# Patient Record
Sex: FEMALE | Race: WHITE | Employment: OTHER | URBAN - METROPOLITAN AREA
[De-identification: names, ages, dates, MRNs, and addresses within clinical notes are randomized per-mention and may not be internally consistent; named-entity substitution may affect disease eponyms.]

---

## 2017-10-16 ENCOUNTER — OFFICE VISIT (OUTPATIENT)
Dept: RHEUMATOLOGY | Age: 52
End: 2017-10-16

## 2017-10-16 VITALS
HEIGHT: 63 IN | SYSTOLIC BLOOD PRESSURE: 119 MMHG | HEART RATE: 71 BPM | DIASTOLIC BLOOD PRESSURE: 78 MMHG | TEMPERATURE: 98.4 F | WEIGHT: 151 LBS | OXYGEN SATURATION: 96 % | RESPIRATION RATE: 18 BRPM | BODY MASS INDEX: 26.75 KG/M2

## 2017-10-16 DIAGNOSIS — D86.9 SARCOIDOSIS: ICD-10-CM

## 2017-10-16 DIAGNOSIS — D64.9 ANEMIA, UNSPECIFIED TYPE: ICD-10-CM

## 2017-10-16 DIAGNOSIS — M81.6 LOCALIZED OSTEOPOROSIS, UNSPECIFIED PATHOLOGICAL FRACTURE PRESENCE: Primary | ICD-10-CM

## 2017-10-16 RX ORDER — LEVOCETIRIZINE DIHYDROCHLORIDE 5 MG/1
TABLET, FILM COATED ORAL
COMMUNITY
Start: 2017-10-11 | End: 2017-11-14

## 2017-10-16 NOTE — PROGRESS NOTES
CHIEF COMPLAINT  The patient was sent for rheumatology consultation by Dr. Ceci Redding MD and Dr. Jose Santacruz for evaluation of osteoporosis. HISTORY OF PRESENT ILLNESS  This is a 47 y.o.  female. Today, the patient complains of pain in the joints. Location: shoulder, hand, knee, foot, cervical spine  Severity:  3 on a scale of 0-10  Timing: intermittent   Duration:  many years  Modifying factors: none  Context/Associated signs and symptoms: The patient states that she was diagnosed with osteoporosis in 2011. She was started on Fosamax however this made her gain weight so she stopped this medication and took calcium and vitamin D. She had a bone density in 2013, 2015, and 2017 all of which showed osteoporosis. Her most recent bone density was \"worse\". She had a bone scan recently that showed a possible insufficiency fracture of the sacrum. The patient also states that she had a fracture over 10 years ago of both ribs with minimal trauma. She does not smoke, has not been on any anti-depressant medication, does have a carbonated beverage every day, has a vague history of sarcoidosis and no other risk factors. It was diagnosed after a chest x-ray and biopsy revealed sarcoidosis. She was not on any treatment for this and has not had any symptoms of inflammatory arthritis, interstitial lung disease or inflammatory skin disease.     RHEUMATOLOGY REVIEW OF SYSTEMS   Positives as per HPI  Negatives as follows:  Fredy Ramírez:  Denies unexplained persistent fevers, weight change, chronic fatigue  HEAD/EYES:   Denies eye redness, blurry vision or sudden loss of vision, dry eyes, HA, temporal artery pain  ENT:    Denies oral/nasal ulcers, recurrent sinus infections, dry mouth  RESPIRATORY:  No pleuritic pain, history of pleural effusions, hemoptysis, exertional dyspnea  CARDIOVASCULAR:  Denies chest pain, history of pericardial effusions  GASTRO:   Denies heartburn, esophageal dysmotility, abdominal pain, nausea, vomiting, diarrhea, blood in the stool  HEMATOLOGIC:  No easy bruising, purpura, swollen lymph nodes  SKIN:    Denies alopecia, ulcers, nodules, sun sensitivity, unexplained persistent rash   VASCULAR:   Denies edema, cyanosis, raynaud phenomenon  NEUROLOGIC:  Denies specific muscle weakness, paresthesias   PSYCHIATRIC:  No sleep disturbance / snoring  MSK:    No morning stiffness >1 hour, SI joint pain, persistent joint swelling    MEDICAL AND SOCIAL HISTORY  This was reviewed with the patient and reviewed in the medical records. Past Medical History:   Diagnosis Date    Arthritis     Constipation     Diabetes ([address]     Falls     Fatigue     Headache     Joint pain     Memory disorder     Neuropathy     Osteoporosis     Thyroid disease     Unspecified cerebral artery occlusion with cerebral infarction      Past Surgical History:   Procedure Laterality Date    HX  SECTION      HX HYSTERECTOMY      HX ORTHOPAEDIC       Social History   Substance Use Topics    Smoking status: Never Smoker    Smokeless tobacco: Not on file    Alcohol use No     Employment - No history of exposure to asbestos or silica  Sleep - No snoring  Exercise - no    FAMILY HISTORY  No autoimmune disease in 1st degree relatives     MEDICATIONS  All the current medications were reviewed in detail. PHYSICAL EXAM  Blood pressure 119/78, pulse 71, temperature 98.4 °F (36.9 °C), temperature source Oral, resp. rate 18, height 5' 3\" (1.6 m), weight 151 lb (68.5 kg), SpO2 96 %. GENERAL APPEARANCE: Well-nourished adult in no acute distress. EYES: No scleral erythema, conjunctival injection. ENT: No oral ulcer, parotid enlargement. NECK: No adenopathy, thyroid enlargement. CARDIOVASCULAR: Heart rhythm is regular. No murmur, rub, gallop. CHEST: Normal vesicular breath sounds. No wheezes, rales, pleural friction rubs. ABDOMINAL: The abdomen is soft and nontender. Liver and spleen are nonpalpable.  Bowel sounds are normal.  EXTREMITIES: There is no evidence of clubbing, cyanosis, edema. SKIN: No rash, palpable purpura, digital ulcer, abnormal thickening. NEUROLOGICAL: Normal gait and station, full strength in upper and lower extremities, normal sensation to light touch. MUSCULOSKELETAL:   Upper extremities - full range of motion, no tenderness, no swelling, no synovial thickening and no deformity of joints. Mild OA changes over DIPs  Lower extremities - full range of motion, no tenderness, no swelling, no synovial thickening and no deformity of joints. LABS, RADIOLOGY AND PROCEDURES  Previous labs reviewed -Yes  Previous radiology reviewed -Yes  Previous procedures reviewed -Yes  Previous medical records reviewed/summarized -Yes    ASSESSMENT  1. Patient meets criteria for treatment of osteoporosis. She began had fractures at an early age and therefore secondary causes of osteoporosis maybe found. We will obtain lab work for this. Her diagnosis of sarcoid was in the past and does not seem to be contributing to osteoporosis however she does have inflammatory disease this can caused osteoporosis. We will check a chest x-ray and an ACE level. Osteoporosis: The patient fulfills the [address] [place] [address] guidelines for pharmacologic intervention in postmenopausal women and men ? 47years of age    History of hip or vertebral fracture: yes  Other prior fractures and T-score between -1.0 and -2.5 at the femoral neck, total hip, or spine, as measured by DEXA : no   T-score ?-2.5 at the femoral neck, total hip, or spine, after appropriate evaluation to exclude secondary causes: yes  T-score between -1.0 and -2.5 at the femoral neck, total hip, or spine and secondary causes associated with high risk of fracture, such as glucocorticoid use or total immobilization: no  T-score between -1 and -2.5 at the femoral neck, total hip, or spine, and a 10-year probability of hip fracture ? 3% or a 10-year probability of any major osteoporosis-related fracture ? 20 % based upon the [place]-adapted WHO algorithm: no    PLAN  1. Check for secondary causes of low bone mass - vitamin D level, Ca, Cr, albumin, PO4, total protein, CBC, LFT's, Alk phos, Urine Ca, urine Cr (to perform spot Ca/Cr ratio)  2. Check bone turnover markers - osteocalcin, bone specific alk phos  3. Check PTH, TSH, ESR, immunoelectrophoresis  4. Start prolia for osteoporosis if workup is negative       Zack Han MD  Adult and Pediatric Rheumatology     [address] [place] [address] [facility] [address], [address] [place] [address], Phone [phone], Fax [phone]     Visiting  of Pediatrics    Department of Pediatrics, [facility] of [address] [place] [address], [address] [place] [address], Phone [phone], Fax [phone]    There are no Patient Instructions on file for this visit.     cc:  Claudio Blas MD

## 2017-10-16 NOTE — MR AVS SNAPSHOT
Visit Information Date & Time Provider Department Dept. Phone Encounter #  
 10/16/2017 11:00 AM Franca Hernández MD [address] [id number] Follow-up Instructions Return in about 12 months (around 10/16/2018). Upcoming Health Maintenance Date Due Hepatitis C Screening 1965 HEMOGLOBIN A1C Q6M 1965 LIPID PANEL Q1 1965 FOOT EXAM Q1 4/2/1974 MICROALBUMIN Q1 4/2/1974 EYE EXAM RETINAL OR DILATED Q1 4/2/1974 Pneumococcal 19-64 Medium Risk (1 of 1 - PPSV23) 4/2/1983 DTaP/Tdap/Td series (1 - Tdap) 4/2/1985 PAP AKA CERVICAL CYTOLOGY 4/2/1985 BREAST CANCER SCRN MAMMOGRAM 4/2/2014 FOBT Q 1 YEAR AGE 50-75 4/2/2014 INFLUENZA AGE 9 TO ADULT 8/1/2017 Allergies as of 10/16/2017  Review Complete On: 10/16/2017 By: Kenny Courtney LPN No Known Allergies Current Immunizations  Never Reviewed No immunizations on file. Not reviewed this visit You Were Diagnosed With   
  
 Codes Comments Localized osteoporosis, unspecified pathological fracture presence    -  Primary ICD-10-CM: M81.6 ICD-9-CM: 733.09 Anemia, unspecified type     ICD-10-CM: D64.9 ICD-9-CM: 934. 9 Vitals BP Pulse Temp Resp Height(growth percentile) Weight(growth percentile) 119/78 (BP 1 Location: Left arm, BP Patient Position: Sitting) 71 98.4 °F (36.9 °C) (Oral) 18 5' 3\" (1.6 m) 151 lb (68.5 kg) SpO2 BMI OB Status Smoking Status 96% 26.75 kg/m2 Hysterectomy Never Smoker BMI and BSA Data Body Mass Index Body Surface Area  
 26.75 kg/m 2 1.74 m 2 Preferred Pharmacy Pharmacy Name Phone [facility] [place] [id number] [phone] Your Updated Medication List  
  
   
This list is accurate as of: 10/16/17 11:28 AM.  Always use your most recent med list.  
  
  
  
  
 AMBIEN 5 mg tablet Generic drug:  zolpidem Take  by mouth nightly as needed for Sleep. butalbital-acetaminophen-caffeine -40 mg per tablet Commonly known as:  [employer] Take 1 Tab by mouth every four (4) hours as needed for Pain. Max Daily Amount: 6 Tabs. Indications: TENSION-TYPE HEADACHE, Intracranial Hypotension  
  
 diclofenac EC 75 mg EC tablet Commonly known as:  VOLTAREN Take  by mouth two (2) times a day.  
  
 gabapentin 400 mg capsule Commonly known as:  NEURONTIN Take 400 mg by mouth three (3) times daily. GLUCOSAMINE CHONDROITIN PLUS PO Take  by mouth. HUMALOG SC  
by SubCUTAneous route. Sliding scale LEVEMIR SC  
by SubCUTAneous route. 17 units in morning and 13 at night  
  
 levocetirizine 5 mg tablet Commonly known as:  XYZAL  
  
 LEVOTHROID 75 mcg tablet Generic drug:  levothyroxine Take  by mouth Daily (before breakfast). losartan 50 mg tablet Commonly known as:  COZAAR Take  by mouth daily. metoclopramide HCl 5 mg tablet Commonly known as:  REGLAN Take 5 mg by mouth Before breakfast, lunch, and dinner. promethazine 25 mg tablet Commonly known as:  PHENERGAN Take 1 Tab by mouth every six (6) hours as needed for Nausea. simvastatin 40 mg tablet Commonly known as:  ZOCOR Take  by mouth nightly. traMADol 50 mg tablet Commonly known as:  ULTRAM  
Take 1 Tab by mouth every six (6) hours as needed for Pain. Max Daily Amount: 200 mg. VITAMIN A PO Take  by mouth. VITAMIN B-12 500 mcg tablet Generic drug:  cyanocobalamin Take 500 mcg by mouth daily. VITAMIN C 500 mg tablet Generic drug:  ascorbic acid (vitamin C) Take  by mouth. VITAMIN D3 2,000 unit Tab Generic drug:  cholecalciferol (vitamin D3) Take  by mouth.  
  
 vitamin E 400 unit capsule Commonly known as:  [address] Take  by mouth. We Performed the Following ALKALINE PHOSPHATASE, BONE [87506 CPT(R)] CALCIUM, RANDOM URINE [[id number] Custom] CBC+PLATELET+HEM REVIEW [98770 CPT(R)] CREATININE, UR, RANDOM [id number] CPT(R)] ESTRADIOL [id number] CPT(R)] [facility] AND  [68616 CPT(R)] IMMUNOELECTROPHORESIS [facility] [id number] CPT(R)] MAGNESIUM [id number] CPT(R)] METABOLIC PANEL, COMPREHENSIVE [27411 CPT(R)] N-TELOPEPTIDE [[id number] Custom] OSTEOCALCIN [id number] CPT(R)] PHOSPHORUS [48364 CPT(R)] PROTEIN ELECTROPHORESIS, URINE RANDOM [55765 CPT(R)] PROTEIN ELECTROPHORESIS [38973 CPT(R)] PTH INTACT [43773 CPT(R)] TSH 3RD GENERATION [26790 CPT(R)] Follow-up Instructions Return in about 12 months (around 10/16/2018). To-Do List   
 10/16/2017 Lab:  SED RATE (ESR)   
  
 10/16/2017 Imaging:  XR CHEST PA LAT Introducing [facility]! [employer] introduces [employer] patient portal. Now you can access parts of your medical record, email your doctor's office, and request medication refills online. 1. In your internet browser, go to [email or web address] 2. Click on the First Time User? Click Here link in the Sign In box. You will see the New Member Sign Up page. 3. Enter your [employer] Access Code exactly as it appears below. You will not need to use this code after youve completed the sign-up process. If you do not sign up before the expiration date, you must request a new code. · [employer] Access Code: [email or web address] Expires: 11/23/2017 10:02 AM 
 
4. Enter the last four digits of your Social Security Number (xxxx) and Date of Birth (mm/dd/yyyy) as indicated and click Submit. You will be taken to the next sign-up page. 5. Create a [employer] ID. This will be your [employer] login ID and cannot be changed, so think of one that is secure and easy to remember. 6. Create a [employer] password. You can change your password at any time. 7. Enter your Password Reset Question and Answer.  This can be used at a later time if you forget your password. 8. Enter your e-mail address. You will receive e-mail notification when new information is available in [address] 9. Click Sign Up. You can now view and download portions of your medical record. 10. Click the Download Summary menu link to download a portable copy of your medical information. If you have questions, please visit the Frequently Asked Questions section of the [employer] website. Remember, [employer] is NOT to be used for urgent needs. For medical emergencies, dial 911. Now available from your iPhone and Android! Please provide this summary of care documentation to your next provider. Your primary care clinician is listed as Angelica Crane. If you have any questions after today's visit, please call [phone].

## 2017-10-17 LAB — OSTEOCALCIN SERPL-MCNC: 20.4 NG/ML

## 2017-10-19 LAB
ALBUMIN MFR UR ELPH: 100 %
ALPHA1 GLOB MFR UR ELPH: 0 %
ALPHA2 GLOB MFR UR ELPH: 0 %
B-GLOBULIN MFR UR ELPH: 0 %
CALCIUM 24H UR-MCNC: 4.9 MG/DL
COLLAGEN NTX SER-SCNC: 9.8 NMOL BCE/L (ref 6.2–19)
COLLAGEN NTX SER-SCNC: NORMAL NMOL BCE/L
CREAT UR-MCNC: 77.1 MG/DL
GAMMA GLOB MFR UR ELPH: 0 %
M PROTEIN MFR UR ELPH: NORMAL %
PLEASE NOTE:, 133800: NORMAL
PROT UR-MCNC: 6.3 MG/DL
SPECIMEN STATUS REPORT, ROLRST: NORMAL

## 2017-10-20 ENCOUNTER — TELEPHONE (OUTPATIENT)
Dept: RHEUMATOLOGY | Age: 52
End: 2017-10-20

## 2017-10-20 NOTE — TELEPHONE ENCOUNTER
Returned Patient's call and informed her lab work has not all resulted yet, and Dr. Aris Smyth will let her know  results either by mail, or have his nurse call with the final results, and plan of care.

## 2017-10-20 NOTE — TELEPHONE ENCOUNTER
Patient would like a call back to go over her blood work and urine test she had done. Her phone is [phone].

## 2017-10-21 LAB
ACE SERPL-CCNC: 66 U/L (ref 14–82)
ALBUMIN SERPL ELPH-MCNC: 3.7 G/DL (ref 2.9–4.4)
ALBUMIN SERPL-MCNC: 4.4 G/DL (ref 3.5–5.5)
ALBUMIN/GLOB SERPL: 1.3 {RATIO} (ref 0.7–1.7)
ALBUMIN/GLOB SERPL: 2.1 {RATIO} (ref 1.2–2.2)
ALP BONE SERPL-MCNC: 17.1 UG/L
ALP SERPL-CCNC: 87 IU/L (ref 39–117)
ALPHA1 GLOB SERPL ELPH-MCNC: 0.2 G/DL (ref 0–0.4)
ALPHA2 GLOB SERPL ELPH-MCNC: 0.7 G/DL (ref 0.4–1)
ALT SERPL-CCNC: 29 IU/L (ref 0–32)
AST SERPL-CCNC: 38 IU/L (ref 0–40)
B-GLOBULIN SERPL ELPH-MCNC: 1.1 G/DL (ref 0.7–1.3)
BASOPHILS # BLD MANUAL: 0 X10E3/UL (ref 0–0.2)
BASOPHILS NFR BLD MANUAL: 1 %
BILIRUB SERPL-MCNC: 0.8 MG/DL (ref 0–1.2)
BUN SERPL-MCNC: 15 MG/DL (ref 6–24)
BUN/CREAT SERPL: 16 (ref 9–23)
CALCIUM SERPL-MCNC: 9.9 MG/DL (ref 8.7–10.2)
CHLORIDE SERPL-SCNC: 96 MMOL/L (ref 96–106)
CO2 SERPL-SCNC: 26 MMOL/L (ref 18–29)
CREAT SERPL-MCNC: 0.94 MG/DL (ref 0.57–1)
CRP SERPL-MCNC: 0.5 MG/L (ref 0–4.9)
DIFFERENTIAL COMMENT, 115260: NORMAL
EOSINOPHIL # BLD MANUAL: 0 X10E3/UL (ref 0–0.4)
EOSINOPHIL NFR BLD MANUAL: 1 %
ERYTHROCYTE [DISTWIDTH] IN BLOOD BY AUTOMATED COUNT: 12.6 % (ref 12.3–15.4)
ERYTHROCYTE [SEDIMENTATION RATE] IN BLOOD BY WESTERGREN METHOD: 9 MM/HR (ref 0–40)
ESTRADIOL SERPL-MCNC: <5 PG/ML
FSH SERPL-ACNC: 111.1 MIU/ML
GAMMA GLOB SERPL ELPH-MCNC: 0.7 G/DL (ref 0.4–1.8)
GFR SERPLBLD CREATININE-BSD FMLA CKD-EPI: 69 ML/MIN/1.73
GFR SERPLBLD CREATININE-BSD FMLA CKD-EPI: 80 ML/MIN/1.73
GLOBULIN SER CALC-MCNC: 2.1 G/DL (ref 1.5–4.5)
GLOBULIN SER CALC-MCNC: 2.8 G/DL (ref 2.2–3.9)
GLUCOSE SERPL-MCNC: 167 MG/DL (ref 65–99)
HCT VFR BLD AUTO: 35.8 % (ref 34–46.6)
HGB BLD-MCNC: 12.4 G/DL (ref 11.1–15.9)
IGA SERPL-MCNC: 177 MG/DL (ref 87–352)
IGG SERPL-MCNC: 750 MG/DL (ref 700–1600)
IGM SERPL-MCNC: 99 MG/DL (ref 26–217)
LH SERPL-ACNC: 45.5 MIU/ML
LYMPHOCYTES # BLD MANUAL: 1.9 X10E3/UL (ref 0.7–3.1)
LYMPHOCYTES NFR BLD MANUAL: 41 %
M PROTEIN SERPL ELPH-MCNC: NORMAL G/DL
MAGNESIUM SERPL-MCNC: 1.9 MG/DL (ref 1.6–2.3)
MCH RBC QN AUTO: 30.3 PG (ref 26.6–33)
MCHC RBC AUTO-ENTMCNC: 34.6 G/DL (ref 31.5–35.7)
MCV RBC AUTO: 88 FL (ref 79–97)
MONOCYTES # BLD MANUAL: 0.4 X10E3/UL (ref 0.1–0.9)
MONOCYTES NFR BLD MANUAL: 8 %
NEUTROPHILS # BLD MANUAL: 2.3 X10E3/UL (ref 1.4–7)
NEUTROPHILS NFR BLD MANUAL: 49 %
PHOSPHATE SERPL-MCNC: 4.3 MG/DL (ref 2.5–4.5)
PLATELET # BLD AUTO: 223 X10E3/UL (ref 150–379)
PLATELET BLD QL SMEAR: ADEQUATE
PLEASE NOTE, 011150: NORMAL
POTASSIUM SERPL-SCNC: 4.5 MMOL/L (ref 3.5–5.2)
PROT PATTERN SERPL IFE-IMP: NORMAL
PROT SERPL-MCNC: 6.5 G/DL (ref 6–8.5)
PTH-INTACT SERPL-MCNC: 28 PG/ML (ref 15–65)
RBC # BLD AUTO: 4.09 X10E6/UL (ref 3.77–5.28)
RBC MORPH BLD: NORMAL
SODIUM SERPL-SCNC: 136 MMOL/L (ref 134–144)
TSH SERPL DL<=0.005 MIU/L-ACNC: 2.01 UIU/ML (ref 0.45–4.5)
WBC # BLD AUTO: 4.6 X10E3/UL (ref 3.4–10.8)

## 2017-10-24 ENCOUNTER — TELEPHONE (OUTPATIENT)
Dept: RHEUMATOLOGY | Age: 52
End: 2017-10-24

## 2017-10-24 NOTE — TELEPHONE ENCOUNTER
----- Message from Rosalie Yadav LPN sent at 86/95/4353  9:53 AM EDT -----  Regarding: FW: Dr. Terrell Potts      ----- Message -----     From: Maria Ines Dorsey     Sent: 10/24/2017   9:47 AM       To: [facility] Nurse Pool  Subject: FW: Dr. Terrell Potts                              ----- Message -----     From: Gianfranco Pretty     Sent: 10/24/2017   9:37 AM       To: [facility] Front Office Pool  Subject: Dr. Terrell Potts                              Pt is requesting a call back regarding her blood work that was done last week. The pt stated Dr. Alec Pagan cannot write her Rx until those results are in. The pt best contact number is [phone].

## 2017-11-03 ENCOUNTER — TELEPHONE (OUTPATIENT)
Dept: RHEUMATOLOGY | Age: 52
End: 2017-11-03

## 2017-11-03 NOTE — TELEPHONE ENCOUNTER
Patient wants to go to [facility] for her Prolia injection. Will fax order over on Tuesday. Mailed patient forms for patient assistance if needed.

## 2017-11-03 NOTE — TELEPHONE ENCOUNTER
Patient is checking on the status of her injection for her Osteoporosis for an authorization. She does not know the name of her medication. Her phone is [phone].

## 2017-11-14 ENCOUNTER — HOSPITAL ENCOUNTER (OUTPATIENT)
Dept: INFUSION THERAPY | Age: 52
Discharge: HOME | End: 2017-11-14
Payer: MEDICARE

## 2017-11-14 VITALS
HEART RATE: 72 BPM | RESPIRATION RATE: 18 BRPM | SYSTOLIC BLOOD PRESSURE: 107 MMHG | OXYGEN SATURATION: 98 % | DIASTOLIC BLOOD PRESSURE: 63 MMHG | TEMPERATURE: 97.1 F

## 2017-11-14 PROCEDURE — 74011250636 HC RX REV CODE- 250/636: Performed by: PEDIATRICS

## 2017-11-14 PROCEDURE — 96372 THER/PROPH/DIAG INJ SC/IM: CPT

## 2017-11-14 RX ADMIN — DENOSUMAB 60 MG: 60 INJECTION SUBCUTANEOUS at 10:24

## 2017-11-14 NOTE — PROGRESS NOTES
[facility] Stay Note:  Arrived - 1020  Labs obtained 10/16/17 Ca 9.9    Visit Vitals    /70 (BP 1 Location: Left arm, BP Patient Position: Sitting)    Pulse 72    Temp 97.1 °F (36.2 °C)    Resp 18    SpO2 98%     Assessment - unchanged. Reviewed Prolia info. Pt denies any recent or upcoming dental work. Prolia 60mg SQ slowly in L arm. Visit Vitals    /63    Pulse 72    Temp 97.1 °F (36.2 °C)    Resp 18    SpO2 98%     1055 - Tolerated well. Pt observed for 30 minutes post injection. No reaction noted. Reviewed Prolia D/C instructions, handouts given. Verbalized understanding. Pt denies any acute problems/changes. Discharged from [facility] ambulatory. No distress.  Next appt: 5/8/18 at 1030

## 2018-02-22 ENCOUNTER — HOSPITAL ENCOUNTER (OUTPATIENT)
Dept: MRI IMAGING | Age: 53
Discharge: HOME | End: 2018-02-22
Attending: FAMILY MEDICINE
Payer: MEDICARE

## 2018-02-22 DIAGNOSIS — M54.17 LUMBOSACRAL RADICULOPATHY: ICD-10-CM

## 2018-02-22 DIAGNOSIS — M54.50 LOW BACK PAIN: ICD-10-CM

## 2018-02-22 PROCEDURE — 72148 MRI LUMBAR SPINE W/O DYE: CPT

## 2018-04-03 ENCOUNTER — TELEPHONE (OUTPATIENT)
Dept: FAMILY MEDICINE CLINIC | Age: 53
End: 2018-04-03

## 2018-04-03 ENCOUNTER — OFFICE VISIT (OUTPATIENT)
Dept: FAMILY MEDICINE CLINIC | Age: 53
End: 2018-04-03

## 2018-04-03 VITALS
BODY MASS INDEX: 25.87 KG/M2 | DIASTOLIC BLOOD PRESSURE: 63 MMHG | SYSTOLIC BLOOD PRESSURE: 121 MMHG | RESPIRATION RATE: 18 BRPM | OXYGEN SATURATION: 98 % | TEMPERATURE: 97 F | HEART RATE: 72 BPM | WEIGHT: 146 LBS | HEIGHT: 63 IN

## 2018-04-03 DIAGNOSIS — E03.9 ACQUIRED HYPOTHYROIDISM: ICD-10-CM

## 2018-04-03 DIAGNOSIS — M15.9 PRIMARY OSTEOARTHRITIS INVOLVING MULTIPLE JOINTS: ICD-10-CM

## 2018-04-03 DIAGNOSIS — Z86.39 HISTORY OF HYPOGLYCEMIA: ICD-10-CM

## 2018-04-03 DIAGNOSIS — E11.42 TYPE 2 DIABETES MELLITUS WITH DIABETIC POLYNEUROPATHY, WITH LONG-TERM CURRENT USE OF INSULIN (HCC): Primary | ICD-10-CM

## 2018-04-03 DIAGNOSIS — Z79.4 TYPE 2 DIABETES MELLITUS WITH DIABETIC POLYNEUROPATHY, WITH LONG-TERM CURRENT USE OF INSULIN (HCC): Primary | ICD-10-CM

## 2018-04-03 DIAGNOSIS — Z11.59 ENCOUNTER FOR HEPATITIS C SCREENING TEST FOR LOW RISK PATIENT: ICD-10-CM

## 2018-04-03 DIAGNOSIS — R16.0 LIVER MASS: ICD-10-CM

## 2018-04-03 PROBLEM — E10.42 TYPE 1 DIABETES MELLITUS WITH DIABETIC POLYNEUROPATHY (HCC): Status: ACTIVE | Noted: 2018-04-03

## 2018-04-03 NOTE — PROGRESS NOTES
Ammy Borjas is a 46 y.o. female who presents with the following:  Chief Complaint   Patient presents with    Osteoporosis    Diabetes    Arthritis    Cholesterol Problem    Thyroid Problem    Neurologic Problem     Short-term memory loss    Mass     In the liver       Osteoporosis   The history is provided by the patient (Patient has a history of osteoporosis and is used Prolia as well as taking vitamin D and calcium supplementation and is having some pain in her back and down her legs which is mixed with arthritis). Pertinent negatives include no chest pain, no abdominal pain, no headaches and no shortness of breath. Diabetes   The history is provided by the patient (Patient is a fragile diabetic with frequent episodes of hypoglycemia and her body no longer responds as one normally would so she takes her sugars up to 8 times a day to watch out for this. The patient has already had at least 4 CVAs related to hypoglycem). Pertinent negatives include no chest pain, no abdominal pain, no headaches and no shortness of breath. Arthritis   The history is provided by the patient (Patient has diffuse osteoarthritis which is primary causing her pain and the right foot by the great toe especially. ). Pertinent negatives include no chest pain, no abdominal pain, no headaches and no shortness of breath. Cholesterol Problem   The history is provided by the patient (Has hyperlipidemia which is managed with a statin without muscle pain or weakness. ). Pertinent negatives include no chest pain, no abdominal pain, no headaches and no shortness of breath. Thyroid Problem   The history is provided by the patient (Patient's hypothyroidism is being treated with levothyroxine 75 mcg daily and she is feeling no fatigue. ). Pertinent negatives include no chest pain, no abdominal pain, no headaches and no shortness of breath.    Neurologic Problem   The history is provided by the patient (Patient has polyneuropathy which fortunately has not affected her eyes as of yet and she gets yearly eye examinations. ). Primary symptoms include memory loss. Pertinent negatives include no shortness of breath, no chest pain and no headaches. Mass   The history is provided by the patient (Patient has a 25 since size lesion in her liver which has not been checked for several years and partly because of her short-term memory loss she is forgotten about it until recently. ). Pertinent negatives include no chest pain, no abdominal pain, no headaches and no shortness of breath. No Known Allergies    Current Outpatient Prescriptions   Medication Sig    insulin detemir U-100 (LEVEMIR U-100 INSULIN) 100 unit/mL injection 12 units in morning and 11 at night    GLUC/CHND/OM3/DHA/EPA/FISH/STR (GLUCOSAMINE CHONDROITIN PLUS PO) Take  by mouth.  traMADol (ULTRAM) 50 mg tablet Take 1 Tab by mouth every six (6) hours as needed for Pain. Max Daily Amount: 200 mg.  cholecalciferol, vitamin D3, (VITAMIN D3) 2,000 unit tab Take  by mouth.  INSULIN LISPRO (HUMALOG SC) by SubCUTAneous route. Sliding scale    diclofenac EC (VOLTAREN) 75 mg EC tablet Take  by mouth two (2) times a day.  losartan (COZAAR) 50 mg tablet Take  by mouth daily.  levothyroxine (LEVOTHROID) 75 mcg tablet Take  by mouth Daily (before breakfast).  ascorbic acid (VITAMIN C) 500 mg tablet Take  by mouth.  cyanocobalamin (VITAMIN B-12) 500 mcg tablet Take 500 mcg by mouth daily.  VITAMIN A PO Take  by mouth.  vitamin E (AQUA GEMS) 400 unit capsule Take  by mouth.  gabapentin (NEURONTIN) 400 mg capsule Take 400 mg by mouth three (3) times daily.  simvastatin (ZOCOR) 40 mg tablet Take  by mouth nightly.  zolpidem (AMBIEN) 5 mg tablet Take  by mouth nightly as needed for Sleep. No current facility-administered medications for this visit.         Past Medical History:   Diagnosis Date    Arthritis     Constipation     Diabetes ([address]     Falls     Fatigue     Headache     Joint pain     Memory disorder     Neuropathy     Osteoporosis     Thyroid disease     Unspecified cerebral artery occlusion with cerebral infarction        Past Surgical History:   Procedure Laterality Date    HX  SECTION      HX HYSTERECTOMY      HX ORTHOPAEDIC         Family History   Problem Relation Age of Onset    Heart Disease Mother     Cancer Father     Cancer Maternal Grandfather        Social History     Social History    Marital status: SINGLE     Spouse name: N/A    Number of children: N/A    Years of education: N/A     Social History Main Topics    Smoking status: Former Smoker    Smokeless tobacco: Never Used    Alcohol use None    Drug use: No    Sexual activity: Not Asked     Other Topics Concern    None     Social History Narrative       Review of Systems   Constitutional: Negative for chills, fever, malaise/fatigue and weight loss. HENT: Negative for congestion, hearing loss, sore throat and tinnitus. Eyes: Negative for blurred vision, pain and discharge. Respiratory: Negative for cough, shortness of breath and wheezing. Cardiovascular: Negative for chest pain, palpitations, orthopnea, claudication and leg swelling. Gastrointestinal: Negative for abdominal pain, constipation and heartburn. History of what sounds like gastroparesis diabeticorum   Genitourinary: Negative for dysuria, frequency and urgency. Musculoskeletal: Positive for joint pain. Negative for falls and myalgias. Skin: Negative for itching and rash. Neurological: Negative for dizziness, tingling, tremors and headaches. Endo/Heme/Allergies: Negative for environmental allergies and polydipsia. Psychiatric/Behavioral: Positive for memory loss. Negative for depression and substance abuse. The patient is not nervous/anxious.         Visit Vitals    /63 (BP 1 Location: Left arm)    Pulse 72    Temp 97 °F (36.1 °C)    Resp 18    Ht 5' 3\" (1.6 m)  Wt 146 lb (66.2 kg)    SpO2 98%    BMI 25.86 kg/m2     Physical Exam   Constitutional: She is oriented to person, place, and time and well-developed, well-nourished, and in no distress. HENT:   Head: Normocephalic and atraumatic. Right Ear: External ear normal.   Left Ear: External ear normal.   Mouth/Throat: Oropharynx is clear and moist.   Eyes: Conjunctivae and EOM are normal. Pupils are equal, round, and reactive to light. Right eye exhibits no discharge. Left eye exhibits no discharge. Neck: Normal range of motion. Neck supple. No tracheal deviation present. No thyromegaly present. Cardiovascular: Normal rate, regular rhythm, normal heart sounds and intact distal pulses. Exam reveals no gallop and no friction rub. No murmur heard. Pulmonary/Chest: Effort normal and breath sounds normal. No respiratory distress. She has no wheezes. She exhibits no tenderness. Abdominal: Soft. Bowel sounds are normal. She exhibits no distension and no mass. There is no tenderness. There is no rebound and no guarding. Musculoskeletal: She exhibits no edema or tenderness. Lymphadenopathy:     She has no cervical adenopathy. Neurological: She is alert and oriented to person, place, and time. She has normal reflexes. No cranial nerve deficit. She exhibits normal muscle tone. Gait normal. Coordination normal.   Skin: Skin is warm and dry. No rash noted. No erythema. No pallor. Psychiatric: Mood, memory, affect and judgment normal.         ICD-10-CM ICD-9-CM    1. Type 2 diabetes mellitus with diabetic polyneuropathy, with long-term current use of insulin ([facility]) E11.42 250.60 MICROALBUMIN, UR, RAND W/ MICROALB/CREAT RATIO    Z79.4 357.2 HEMOGLOBIN A1C WITH EAG     R72.01 METABOLIC PANEL, COMPREHENSIVE      LIPID PANEL      HM DIABETES FOOT EXAM      REFERRAL TO OPHTHALMOLOGY   2. Acquired hypothyroidism E03.9 244.9 TSH 3RD GENERATION   3.  Encounter for hepatitis C screening test for low risk patient Z11.59 V73.89 HEPATITIS C AB   4. Primary osteoarthritis involving multiple joints M15.0 715.09    5. History of hypoglycemia Z86.39 V12.29        Orders Placed This Encounter    MICROALBUMIN, UR, RAND W/ MICROALB/CREAT RATIO    HEMOGLOBIN A1C WITH EAG    METABOLIC PANEL, COMPREHENSIVE    LIPID PANEL    HEPATITIS C AB    TSH 3RD GENERATION    REFERRAL TO OPHTHALMOLOGY     Referral Priority:   Routine     Referral Type:   Consultation     Referral Reason:   Specialty Services Required     Requested Specialty:   Ophthalmology    HM DIABETES FOOT EXAM    insulin detemir U-100 (LEVEMIR U-100 INSULIN) 100 unit/mL injection     Si units in morning and 11 at night     Dispense:  12 Vial     Refill:  1   As the pad patient's last A1c was 7 I would continue her connection with the diabetologist at [facility].  I have asked her to have a copy of her notes sent here so we can put it in her record. Patient is continuing with her mammograms and states she has one due next month and states she sees her eye doctor yearly and sometimes every 6 months and will keep us apprised.     Follow-up Disposition: Not on Payam Moeller MD

## 2018-04-03 NOTE — TELEPHONE ENCOUNTER
Pt came back in a was upset that her chart lists her as type 2 diabetic and she is type 1 and she would like this corrected

## 2018-04-03 NOTE — MR AVS SNAPSHOT
[address] [phone] Patient: Ashish Meehan MRN: [id number] :1965 Visit Information Date & Time Provider Department Dept. Phone Encounter #  
 4/3/2018  1:00 PM Steven Barron MD Regla Sanches [id number] Your Appointments 4/10/2018  1:30 PM  
New Patient with Julien Haro MD  
[facility] [address] [facility]) Appt Note: np diabetes referred by Dr. Georgia Eli ph. [phone]  
 [address] [place] [address] [address] [id number] 8713 [facility] [address]  
  
    
 7/3/2018 11:00 AM  
Follow Up with Steven Barron MD  
[facility] ([address]) Appt Note: 3 month f/u  
 [address]  
[phone]  [address] Upcoming Health Maintenance Date Due Hepatitis C Screening 1965 HEMOGLOBIN A1C Q6M 1965 LIPID PANEL Q1 1964 FOOT EXAM Q1 1974 MICROALBUMIN Q1 1974 EYE EXAM RETINAL OR DILATED Q1 1974 Pneumococcal 19-64 Medium Risk (1 of 1 - PPSV23) 1983 PAP AKA CERVICAL CYTOLOGY 1985 BREAST CANCER SCRN MAMMOGRAM 2014 MEDICARE YEARLY EXAM 2018 COLONOSCOPY 2027 DTaP/Tdap/Td series (2 - Td) 4/3/2028 Allergies as of 4/3/2018  Review Complete On: 2017 By: Leobardo Jane RN No Known Allergies Current Immunizations  Reviewed on 2017 No immunizations on file. Not reviewed this visit You Were Diagnosed With   
  
 Codes Comments Type 2 diabetes mellitus with diabetic polyneuropathy, with long-term current use of insulin (HCC)    -  Primary ICD-10-CM: E11.42, Z79.4 ICD-9-CM: 250.60, 357.2, V58.67   
 Acquired hypothyroidism     ICD-10-CM: E03.9 ICD-9-CM: 244.9 Encounter for hepatitis C screening test for low risk patient     ICD-10-CM: Z11.59 
ICD-9-CM: V73.89 Primary osteoarthritis involving multiple joints     ICD-10-CM: M15.0 ICD-9-CM: 715.09 History of hypoglycemia     ICD-10-CM: Z86.39 
ICD-9-CM: V12.29 Vitals BP Pulse Temp Resp Height(growth percentile) Weight(growth percentile) 121/63 (BP 1 Location: Left arm) 72 97 °F (36.1 °C) 18 5' 3\" (1.6 m) 146 lb (66.2 kg) SpO2 BMI OB Status Smoking Status 98% 25.86 kg/m2 Hysterectomy Former Smoker Vitals History BMI and BSA Data Body Mass Index Body Surface Area  
 25.86 kg/m 2 1.72 m 2 Preferred Pharmacy Pharmacy Name Phone [address] [phone] Your Updated Medication List  
  
   
This list is accurate as of 4/3/18  2:03 PM.  Always use your most recent med list.  
  
  
  
  
 AMBIEN 5 mg tablet Generic drug:  zolpidem Take  by mouth nightly as needed for Sleep. butalbital-acetaminophen-caffeine -40 mg per tablet Commonly known as:  [employer] Take 1 Tab by mouth every four (4) hours as needed for Pain. Max Daily Amount: 6 Tabs. Indications: TENSION-TYPE HEADACHE, Intracranial Hypotension  
  
 diclofenac EC 75 mg EC tablet Commonly known as:  VOLTAREN Take  by mouth two (2) times a day.  
  
 gabapentin 400 mg capsule Commonly known as:  NEURONTIN Take 400 mg by mouth three (3) times daily. GLUCOSAMINE CHONDROITIN PLUS PO Take  by mouth. HUMALOG SC  
by SubCUTAneous route. Sliding scale LEVEMIR SC  
by SubCUTAneous route. 17 units in morning and 13 at night LEVOTHROID 75 mcg tablet Generic drug:  levothyroxine Take  by mouth Daily (before breakfast). losartan 50 mg tablet Commonly known as:  COZAAR Take  by mouth daily. metoclopramide HCl 5 mg tablet Commonly known as:  REGLAN Take 5 mg by mouth Before breakfast, lunch, and dinner. promethazine 25 mg tablet Commonly known as:  PHENERGAN Take 1 Tab by mouth every six (6) hours as needed for Nausea. simvastatin 40 mg tablet Commonly known as:  ZOCOR Take  by mouth nightly. traMADol 50 mg tablet Commonly known as:  ULTRAM  
Take 1 Tab by mouth every six (6) hours as needed for Pain. Max Daily Amount: 200 mg. VITAMIN A PO Take  by mouth. VITAMIN B-12 500 mcg tablet Generic drug:  cyanocobalamin Take 500 mcg by mouth daily. VITAMIN C 500 mg tablet Generic drug:  ascorbic acid (vitamin C) Take  by mouth. VITAMIN D3 2,000 unit Tab Generic drug:  cholecalciferol (vitamin D3) Take  by mouth.  
  
 vitamin E 400 unit capsule Commonly known as:  [address] Take  by mouth. We Performed the Following HEMOGLOBIN A1C WITH EAG [13163 CPT(R)] HEPATITIS C AB [73131 CPT(R)]  DIABETES FOOT EXAM [HM7 Custom] LIPID PANEL [28542 CPT(R)] METABOLIC PANEL, COMPREHENSIVE [42019 CPT(R)] MICROALBUMIN, UR, RAND W/ MICROALB/CREAT RATIO [id number] CPT(R)] REFERRAL TO OPHTHALMOLOGY [REF57 Custom] TSH 3RD GENERATION [22037 CPT(R)] To-Do List   
 05/17/2018 10:30 AM  
  Appointment with CHAIR 2 [facility] ([phone]) Referral Information Referral ID Referred By Referred To  
  
 [id number] Monica Villanueva Not Available Visits Status Start Date End Date 1 New Request 4/3/18 4/3/19 If your referral has a status of pending review or denied, additional information will be sent to support the outcome of this decision. Introducing [facility] & HEALTH SERVICES! Mikael Chambers introduces [employer] patient portal. Now you can access parts of your medical record, email your doctor's office, and request medication refills online.    
 
1. In your internet browser, go to [email or web address] 2. Click on the First Time User? Click Here link in the Sign In box. You will see the New Member Sign Up page. 3. Enter your Resource Data Access Code exactly as it appears below. You will not need to use this code after youve completed the sign-up process. If you do not sign up before the expiration date, you must request a new code. · Resource Data Access Code: [email or web address] Expires: 7/2/2018 12:44 PM 
 
4. Enter the last four digits of your Social Security Number (xxxx) and Date of Birth (mm/dd/yyyy) as indicated and click Submit. You will be taken to the next sign-up page. 5. Create a Vudut ID. This will be your Resource Data login ID and cannot be changed, so think of one that is secure and easy to remember. 6. Create a Resource Data password. You can change your password at any time. 7. Enter your Password Reset Question and Answer. This can be used at a later time if you forget your password. 8. Enter your e-mail address. You will receive e-mail notification when new information is available in [address] 9. Click Sign Up. You can now view and download portions of your medical record. 10. Click the Download Summary menu link to download a portable copy of your medical information. If you have questions, please visit the Frequently Asked Questions section of the Resource Data website. Remember, Resource Data is NOT to be used for urgent needs. For medical emergencies, dial 911. Now available from your iPhone and Android! Please provide this summary of care documentation to your next provider. Your primary care clinician is listed as Mango Lubin. If you have any questions after today's visit, please call [phone].

## 2018-04-04 LAB
ALBUMIN SERPL-MCNC: 4.2 G/DL (ref 3.5–5.5)
ALBUMIN/CREAT UR: 12.3 MG/G CREAT (ref 0–30)
ALBUMIN/GLOB SERPL: 1.9 {RATIO} (ref 1.2–2.2)
ALP SERPL-CCNC: 59 IU/L (ref 39–117)
ALT SERPL-CCNC: 23 IU/L (ref 0–32)
AST SERPL-CCNC: 28 IU/L (ref 0–40)
BILIRUB SERPL-MCNC: 1.1 MG/DL (ref 0–1.2)
BUN SERPL-MCNC: 16 MG/DL (ref 6–24)
BUN/CREAT SERPL: 18 (ref 9–23)
CALCIUM SERPL-MCNC: 9.9 MG/DL (ref 8.7–10.2)
CHLORIDE SERPL-SCNC: 95 MMOL/L (ref 96–106)
CHOLEST SERPL-MCNC: 162 MG/DL (ref 100–199)
CO2 SERPL-SCNC: 27 MMOL/L (ref 18–29)
CREAT SERPL-MCNC: 0.87 MG/DL (ref 0.57–1)
CREAT UR-MCNC: 54.3 MG/DL
EST. AVERAGE GLUCOSE BLD GHB EST-MCNC: 194 MG/DL
GFR SERPLBLD CREATININE-BSD FMLA CKD-EPI: 76 ML/MIN/1.73
GFR SERPLBLD CREATININE-BSD FMLA CKD-EPI: 87 ML/MIN/1.73
GLOBULIN SER CALC-MCNC: 2.2 G/DL (ref 1.5–4.5)
GLUCOSE SERPL-MCNC: 262 MG/DL (ref 65–99)
HBA1C MFR BLD: 8.4 % (ref 4.8–5.6)
HCV AB S/CO SERPL IA: <0.1 S/CO RATIO (ref 0–0.9)
HDLC SERPL-MCNC: 70 MG/DL
INTERPRETATION, 910389: NORMAL
LDLC SERPL CALC-MCNC: 72 MG/DL (ref 0–99)
MICROALBUMIN UR-MCNC: 6.7 UG/ML
POTASSIUM SERPL-SCNC: 4.6 MMOL/L (ref 3.5–5.2)
PROT SERPL-MCNC: 6.4 G/DL (ref 6–8.5)
SODIUM SERPL-SCNC: 136 MMOL/L (ref 134–144)
TRIGL SERPL-MCNC: 101 MG/DL (ref 0–149)
TSH SERPL DL<=0.005 MIU/L-ACNC: 2.23 UIU/ML (ref 0.45–4.5)
VLDLC SERPL CALC-MCNC: 20 MG/DL (ref 5–40)

## 2018-04-05 NOTE — PROGRESS NOTES
Pt aware, has an appt with her endocrine dr tomorrow, she will call back to give us a number to fax to.

## 2018-04-06 ENCOUNTER — TELEPHONE (OUTPATIENT)
Dept: FAMILY MEDICINE CLINIC | Age: 53
End: 2018-04-06

## 2018-04-06 NOTE — TELEPHONE ENCOUNTER
Patient called and stated she is seeing Dr. Anurag [facility]   Fax: [phone]

## 2018-04-10 ENCOUNTER — OFFICE VISIT (OUTPATIENT)
Dept: ENDOCRINOLOGY | Age: 53
End: 2018-04-10

## 2018-04-10 VITALS
HEIGHT: 63 IN | DIASTOLIC BLOOD PRESSURE: 66 MMHG | SYSTOLIC BLOOD PRESSURE: 114 MMHG | WEIGHT: 144 LBS | TEMPERATURE: 68 F | BODY MASS INDEX: 25.52 KG/M2

## 2018-04-10 DIAGNOSIS — E10.42 TYPE 1 DIABETES MELLITUS WITH DIABETIC POLYNEUROPATHY (HCC): Primary | ICD-10-CM

## 2018-04-10 DIAGNOSIS — E03.9 ACQUIRED HYPOTHYROIDISM: ICD-10-CM

## 2018-04-10 RX ORDER — ACETAMINOPHEN 500 MG
500 TABLET ORAL
COMMUNITY
End: 2018-09-07

## 2018-04-10 NOTE — MR AVS SNAPSHOT
[address] [address] [phone] Patient: Artur Conn MRN: [id number] :1965 Visit Information Date & Time Provider Department Dept. Phone Encounter #  
 4/10/2018  1:30 PM Madhavi Montano, [address] [facility] [id number] Your Appointments 2018  9:30 AM  
Follow Up with Madhavi Montano MD  
[facility]) [address] [facility] [place] [address] [address] 45[address] [facility] [address]  
  
    
 7/3/2018 11:00 AM  
Follow Up with Salvatore Merritt MD  
[facility] ([facility]) Appt Note: 3 month f/u  
 [address]  
[phone] [address] Upcoming Health Maintenance Date Due  
 EYE EXAM RETINAL OR DILATED Q1 1974 Pneumococcal 19-64 Medium Risk (1 of 1 - PPSV23) 1983 PAP AKA CERVICAL CYTOLOGY 1985 BREAST CANCER SCRN MAMMOGRAM 2014 HEMOGLOBIN A1C Q6M 10/3/2018 MEDICARE YEARLY EXAM 2018 FOOT EXAM Q1 4/3/2019 MICROALBUMIN Q1 4/3/2019 LIPID PANEL Q1 4/3/2019 COLONOSCOPY 2027 DTaP/Tdap/Td series (2 - Td) 4/3/2028 Allergies as of 4/10/2018  Review Complete On: 4/10/2018 By: Madhavi Montano MD  
 No Known Allergies Current Immunizations  Reviewed on 2017 No immunizations on file. Not reviewed this visit You Were Diagnosed With   
  
 Codes Comments Type 1 diabetes mellitus with diabetic polyneuropathy (HCC)    -  Primary ICD-10-CM: E10.42 
ICD-9-CM: 250.61, 357.2 Acquired hypothyroidism     ICD-10-CM: E03.9 ICD-9-CM: 466. 9 Vitals BP Temp Height(growth percentile) Weight(growth percentile) BMI OB Status 114/66 (!) 68 °F (20 °C) 5' 3\" (1.6 m) 144 lb (65.3 kg) 25.51 kg/m2 Hysterectomy Smoking Status Former Smoker Vitals History BMI and BSA Data Body Mass Index Body Surface Area 25.51 kg/m 2 1.7 m 2 Preferred Pharmacy Pharmacy Name Phone [address] [phone] Your Updated Medication List  
  
   
This list is accurate as of 4/10/18  2:23 PM.  Always use your most recent med list.  
  
  
  
  
 AMBIEN 5 mg tablet Generic drug:  zolpidem Take  by mouth nightly as needed for Sleep. diclofenac EC 75 mg EC tablet Commonly known as:  VOLTAREN Take  by mouth two (2) times a day.  
  
 gabapentin 400 mg capsule Commonly known as:  NEURONTIN Take 400 mg by mouth three (3) times daily. GLUCOSAMINE CHONDROITIN PLUS PO Take  by mouth. HUMALOG SC  
by SubCUTAneous route. Sliding scale  
  
 insulin detemir U-100 100 unit/mL injection Commonly known as:  LEVEMIR U-100 INSULIN  
12 units in morning and 11 at night LEVOTHROID 75 mcg tablet Generic drug:  levothyroxine Take  by mouth Daily (before breakfast). losartan 50 mg tablet Commonly known as:  COZAAR Take  by mouth daily. simvastatin 40 mg tablet Commonly known as:  ZOCOR Take  by mouth nightly. traMADol 50 mg tablet Commonly known as:  ULTRAM  
Take 1 Tab by mouth every six (6) hours as needed for Pain. Max Daily Amount: 200 mg.  
  
 TYLENOL EXTRA STRENGTH 500 mg tablet Generic drug:  acetaminophen Take  by mouth every six (6) hours as needed for Pain (takes 2 tabs BID). VITAMIN A PO Take  by mouth. VITAMIN B-12 500 mcg tablet Generic drug:  cyanocobalamin Take 500 mcg by mouth daily. VITAMIN C 500 mg tablet Generic drug:  ascorbic acid (vitamin C) Take  by mouth. VITAMIN D3 2,000 unit Tab Generic drug:  cholecalciferol (vitamin D3) Take  by mouth.  
  
 vitamin E 400 unit capsule Commonly known as:  [address] Take  by mouth. Only on M and Thurs We Performed the Following  DIABETES FOOT EXAM [HM7 Custom] To-Do List   
 05/17/2018 10:30 AM  
  Appointment with CHAIR 2 [facility] ([phone]) Introducing [facility] & [facility] SERVICES! [facility] introduces [employer] patient portal. Now you can access parts of your medical record, email your doctor's office, and request medication refills online. 1. In your internet browser, go to [email or web address] 2. Click on the First Time User? Click Here link in the Sign In box. You will see the New Member Sign Up page. 3. Enter your [employer] Access Code exactly as it appears below. You will not need to use this code after youve completed the sign-up process. If you do not sign up before the expiration date, you must request a new code. · [employer] Access Code: [email or web address] Expires: 7/2/2018 12:44 PM 
 
4. Enter the last four digits of your Social Security Number (xxxx) and Date of Birth (mm/dd/yyyy) as indicated and click Submit. You will be taken to the next sign-up page. 5. Create a [employer] ID. This will be your [employer] login ID and cannot be changed, so think of one that is secure and easy to remember. 6. Create a [employer] password. You can change your password at any time. 7. Enter your Password Reset Question and Answer. This can be used at a later time if you forget your password. 8. Enter your e-mail address. You will receive e-mail notification when new information is available in [address] 9. Click Sign Up. You can now view and download portions of your medical record. 10. Click the Download Summary menu link to download a portable copy of your medical information. If you have questions, please visit the Frequently Asked Questions section of the [employer] website.  Remember, [employer] is NOT to be used for urgent needs. For medical emergencies, dial 911. Now available from your iPhone and Android! Please provide this summary of care documentation to your next provider. Your primary care clinician is listed as Duong Esqueda. If you have any questions after today's visit, please call [phone].

## 2018-04-10 NOTE — LETTER
4/10/2018 2:24 PM 
 
Patient:  Artur Conn YOB: 1965 Date of Visit: 4/10/2018 Dear Sonia Bailey., MD 
[address] [place] [address] VIA In Basket 
 : Thank you for referring Ms. Sharon Joshua to me for evaluation/treatment. Below are the relevant portions of my assessment and plan of care. If you have questions, please do not hesitate to call me. I look forward to following Ms. Mendosa along with you. Sincerely, Madhavi Montano MD

## 2018-04-10 NOTE — PROGRESS NOTES
Chief Complaint   Patient presents with    Diabetes     Pcp and pharmacy verified   Records reviewed  History of Present Illness: Aleks Alvarado is a 46 y.o. female who I was asked to see in consult by Dr. Cesar Hidalgo, for evaluation of diabetes. She was previously followed by Dr. Cary Vizcaino at [facility], will request the records from Dr. Jessica Domignuez. Pt notes \"I have been a Type I diabetic for 37 years\". \"I don't count carbs, I eat what I want and most of the time I will remember to take my insulin but not all the time. I stay active walking my dog 2-4 miles per day\". Current regimen is Levemir 12 units in the AM and 11 units with dinner and Humalog \"I don't usually take more than 2 units, but sometimes 2 is not enough and if I am going to eat a desert and what my blood sugar is I may take up to 4 units. She notes her BGs have been much higher recently, because she has had a sinus infection. Checks blood sugars 8 times per day because she is prone to low blood sugars and she is not able to sense hypoglycemia. Most recent Hgb A1c was 8.4% in April 2018. A typical day is as follows:  Pt wakes around 8AM.  She gets up, drinks a pepsi and if my sugar is not low I will give insulin to cover the pepsi. She will have some oatmeal or a 100 calorie protein bar. Her next meal will be around 1-2PM, yesterday she had 6\" seafood sub, no side items and water. She did not give herself any insulin for the sub. If she has a large lunch, she will not typically have dinner. For dinner last night she had pretzels. She will have one main meal per day. Exercise consists of waking her dog 2-4 miles per day. Pt has hx of CVA x4, \"one of which caused a CNS leak\". \"I had a heart issue in 1996 and they put me on medication but I was taken off the medication by another Endocrinologist about 3 years ago and I have not had any troubles. She reports she had a stress test and an ECHO and \"everything came back fine\".   Pt has hx of polyneuropathy from her knees to her feet. She takes Gabapentin 200mg BID, which does help with her pain. Pt has no hx of Nephrology (Urine MA/Cr 12 on 4/3/18). Last eye exam was 2017, she sees Dr. Ashwin Escobedo at [facility] every 6 months. She does have retinopathy and cataracts \"but there is nothing that needs to be done about it right now\". Pt has hx of osteoporosis for which she is followed by Dr. Carole Hawkins of Rheumatology. 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 She was never treated and it \"went away\". Pt has hx of hypothyroidism and is taking LT4 75mcg daily. Her TSH was 2.230 on 4/3/18. She has been on LT4 since the age of 20. Past Medical History:   Diagnosis Date    Arthritis     Constipation     Diabetes ([address]     Falls     Fatigue     Headache     Joint pain     Memory disorder     Neuropathy     Osteoporosis     Thyroid disease     Unspecified cerebral artery occlusion with cerebral infarction      Past Surgical History:   Procedure Laterality Date    HX  SECTION      HX HYSTERECTOMY      HX ORTHOPAEDIC       Current Outpatient Prescriptions   Medication Sig    acetaminophen (TYLENOL EXTRA STRENGTH) 500 mg tablet Take  by mouth every six (6) hours as needed for Pain (takes 2 tabs BID).  insulin detemir U-100 (LEVEMIR U-100 INSULIN) 100 unit/mL injection 12 units in morning and 11 at night    GLUC/CHND/OM3/DHA/EPA/FISH/STR (GLUCOSAMINE CHONDROITIN PLUS PO) Take  by mouth.  traMADol (ULTRAM) 50 mg tablet Take 1 Tab by mouth every six (6) hours as needed for Pain. Max Daily Amount: 200 mg.  cholecalciferol, vitamin D3, (VITAMIN D3) 2,000 unit tab Take  by mouth.  INSULIN LISPRO (HUMALOG SC) by SubCUTAneous route. Sliding scale    diclofenac EC (VOLTAREN) 75 mg EC tablet Take  by mouth two (2) times a day.  losartan (COZAAR) 50 mg tablet Take  by mouth daily.     levothyroxine (LEVOTHROID) 75 mcg tablet Take  by mouth Daily (before breakfast).  ascorbic acid (VITAMIN C) 500 mg tablet Take  by mouth.  cyanocobalamin (VITAMIN B-12) 500 mcg tablet Take 500 mcg by mouth daily.  VITAMIN A PO Take  by mouth.  vitamin E (AQUA GEMS) 400 unit capsule Take  by mouth. Only on M and Thurs    gabapentin (NEURONTIN) 400 mg capsule Take 400 mg by mouth three (3) times daily.  simvastatin (ZOCOR) 40 mg tablet Take  by mouth nightly.  zolpidem (AMBIEN) 5 mg tablet Take  by mouth nightly as needed for Sleep. No current facility-administered medications for this visit. No Known Allergies  Family History   Problem Relation Age of Onset    Heart Disease Mother     Hypertension Mother     Heart Disease Maternal Grandfather     Cancer Maternal Aunt      Pancreatic and Liver    Cancer Maternal Grandmother      Lung    Diabetes Maternal Grandmother     Cancer Maternal Aunt      Breast    Diabetes Maternal Aunt      Social History     Social History    Marital status: SINGLE     Spouse name: N/A    Number of children: N/A    Years of education: N/A     Occupational History    Not on file. Social History Main Topics    Smoking status: Former Smoker     Quit date: 4/10/2007    Smokeless tobacco: Never Used    Alcohol use No    Drug use: No    Sexual activity: Not on file     Other Topics Concern    Not on file     Social History Narrative     Review of Systems:  - Constitutional Symptoms: no fevers, chills, weight loss  - Eyes: no blurry vision or double vision  - Cardiovascular: no chest pain or palpitations  - Respiratory: no cough or shortness of breath  - Gastrointestinal: no dysphagia or abdominal pain  - Musculoskeletal: no joint pains or weakness  - Integumentary: no rashes  - Neurological: + numbness, tingling, or headaches  - Psychiatric: no depression or anxiety  - Endocrine: no heat or cold intolerance, occasional polyuria, pt drinks \"a gallon of water per day\".      Physical Examination:  Blood pressure 114/66, temperature (!) 68 °F (20 °C), height 5' 3\" (1.6 m), weight 144 lb (65.3 kg). - General: pleasant, no distress, good eye contact  - HEENT: no exopthalmos, no periorbital edema, no scleral/conjunctival injection, EOMI, no lid lag or stare  - Neck: supple, no thyromegaly, masses, lymph nodes, or carotid bruits, no supraclavicular or dorsocervical fat pads  - Cardiovascular: regular, normal rate, normal S1 and S2, no murmurs/rubs/gallops, 2+ dorsalis pedis pulses bilaterally  - Respiratory: clear to auscultation bilaterally  - Gastrointestinal: soft, nontender, nondistended, no masses, no hepatosplenomegaly  - Musculoskeletal: no proximal muscle weakness in upper or lower extremities  - Integumentary: no acanthosis nigricans, no abdominal striae, no rashes, no edema, no foot ulcers  - Neurological: intact sensation to monofilament 4/4 locations, intact vibratory sensation at great toes bilaterally, + calluses bilaterally  - Psychiatric: normal mood and affect    Data Reviewed:   Component      Latest Ref Rng & Units 4/3/2018 4/3/2018 4/3/2018 4/3/2018           2:01 PM  2:01 PM  2:01 PM  2:01 PM   Glucose      65 - 99 mg/dL    262 (H)   BUN      6 - 24 mg/dL    16   Creatinine      0.57 - 1.00 mg/dL    0.87   GFR est non-AA      >59 mL/min/1.73    76   GFR est AA      >59 mL/min/1.73    87   BUN/Creatinine ratio      9 - 23    18   Sodium      134 - 144 mmol/L    136   Potassium      3.5 - 5.2 mmol/L    4.6   Chloride      96 - 106 mmol/L    95 (L)   CO2      18 - 29 mmol/L    27   Calcium      8.7 - 10.2 mg/dL    9.9   Protein, total      6.0 - 8.5 g/dL    6.4   Albumin      3.5 - 5.5 g/dL    4.2   GLOBULIN, TOTAL      1.5 - 4.5 g/dL    2.2   A-G Ratio      1.2 - 2.2    1.9   Bilirubin, total      0.0 - 1.2 mg/dL    1.1   Alk.  phosphatase      39 - 117 IU/L    59   AST      0 - 40 IU/L    28   ALT (SGPT)      0 - 32 IU/L    23   Cholesterol, total      100 - 199 mg/dL   162 Triglyceride      0 - 149 mg/dL   101    HDL Cholesterol      >39 mg/dL   70    VLDL, calculated      5 - 40 mg/dL   20    LDL, calculated      0 - 99 mg/dL   72    Hep C Virus Ab      0.0 - 0.9 s/co ratio  <0.1     TSH      0.450 - 4.500 uIU/mL 2.230        Component      Latest Ref Rng & Units 4/3/2018 4/3/2018           2:01 PM  2:01 PM   Creatinine, urine      Not Estab. mg/dL  54.3   Microalbumin, urine      Not Estab. ug/mL  6.7   Microalbumin/Creat. Ratio      0.0 - 30.0 mg/g creat  12.3   Hemoglobin A1c, (calculated)      4.8 - 5.6 % 8.4 (H)    Estimated average glucose      mg/dL 194      Assessment/Plan:   1. Type 1 diabetes mellitus with diabetic polyneuropathy ([facility] [address]    2. Acquired hypothyroidism    1) Pt reports that she does take her Levemir 12 units in the AM and 11 units with dinner faithfully, but does not always take her Humalog. She notes that she is very afraid of getting low blood sugars and will not take humalog if her BG is not already high at times and there are times she forgets to test her BG before she eats so will not take humalog at that time. Will have pt check her BGs 8 times per day and keep a log of her blood sugars and a log of what she eats and drinks for the next two weeks and mail her BG logs to me in 2 weeks. With her hx of neuropathy and calluses, she would benefit from DM shoes and inserts. 2) Pt is clinically and biochemically euthyroid on the LT4 75mcg daily. Pt voices understanding and agreement with the plan. Pain noted and pt was recommended to call her PCP for further evaluation and treatment, as needed    RTC 4 weeks. We spent 40 minutes of face to face time together and > 50% of the time was spent in counseling on the above issues. Follow-up Disposition:  Return in 4 weeks (on 5/9/2018).     Copy sent to:  Dr. Rachel Rob

## 2018-05-01 ENCOUNTER — TELEPHONE (OUTPATIENT)
Dept: FAMILY MEDICINE CLINIC | Age: 53
End: 2018-05-01

## 2018-05-01 RX ORDER — TRAMADOL HYDROCHLORIDE 50 MG/1
50 TABLET ORAL
Qty: 20 TAB | Refills: 0 | OUTPATIENT
Start: 2018-05-01

## 2018-05-01 NOTE — TELEPHONE ENCOUNTER
Patient is needing to know what she can take for seasonal allergies that wont interfere with her other meds

## 2018-05-02 RX ORDER — TRAMADOL HYDROCHLORIDE 50 MG/1
50 TABLET ORAL
Qty: 20 TAB | Refills: 0 | OUTPATIENT
Start: 2018-05-02

## 2018-05-03 ENCOUNTER — OFFICE VISIT (OUTPATIENT)
Dept: FAMILY MEDICINE CLINIC | Age: 53
End: 2018-05-03

## 2018-05-03 VITALS
OXYGEN SATURATION: 100 % | WEIGHT: 145.2 LBS | SYSTOLIC BLOOD PRESSURE: 128 MMHG | DIASTOLIC BLOOD PRESSURE: 72 MMHG | RESPIRATION RATE: 18 BRPM | HEART RATE: 75 BPM | BODY MASS INDEX: 25.73 KG/M2 | HEIGHT: 63 IN

## 2018-05-03 DIAGNOSIS — M79.671 PAIN IN RIGHT FOOT: Primary | ICD-10-CM

## 2018-05-03 DIAGNOSIS — J30.9 CHRONIC ALLERGIC RHINITIS: ICD-10-CM

## 2018-05-03 DIAGNOSIS — M15.9 PRIMARY OSTEOARTHRITIS INVOLVING MULTIPLE JOINTS: ICD-10-CM

## 2018-05-03 RX ORDER — FLUTICASONE PROPIONATE 50 MCG
SPRAY, SUSPENSION (ML) NASAL
Qty: 1 BOTTLE | Refills: 5 | Status: SHIPPED | OUTPATIENT
Start: 2018-05-03 | End: 2018-08-31

## 2018-05-03 RX ORDER — TRAMADOL HYDROCHLORIDE 50 MG/1
50 TABLET ORAL
Qty: 30 TAB | Refills: 2 | Status: SHIPPED | OUTPATIENT
Start: 2018-05-03 | End: 2018-08-13 | Stop reason: SDUPTHER

## 2018-05-03 NOTE — PROGRESS NOTES
Essie Parkinson is a 46 y.o. female who presents with the following:  Chief Complaint   Patient presents with    Foot Pain     right foot    Allergic Rhinitis       Foot Pain   The history is provided by the patient (Patient fractured the proximal aspect of the right fifth ray about 2 years ago and has been doing well since it healed until the past week when it has been becoming painful again precluding her from walking normally. ). Pertinent negatives include no chest pain, no abdominal pain, no headaches and no shortness of breath. Associated symptoms comments: Patient has chronic osteoarthritis of the first MPJ of the right foot and was being given tramadol for this by the orthopedic surgeon and then was taken up by another physician in [facility] says that the patient was taking 1 every morning to help control the pain in the first MPJ along with 75 mg of diclofenac. Patient is a diabetic with peripheral neuropathy will also takes gabapentin 200 mg in the morning and 200 mg in the evening and has failed attempts at raising this dose because of edema. The patient has not seen a foot and ankle doctor yet. Allergic Rhinitis   The history is provided by the patient (Patient was told she did not have any allergies by an allergist has been having a horrible time since the pollen is been flying with her head being congested in sinuses feeling pressured and having a postnasal drip and rhinitis. ). Pertinent negatives include no chest pain, no abdominal pain, no headaches and no shortness of breath. No Known Allergies    Current Outpatient Prescriptions   Medication Sig    traMADol (ULTRAM) 50 mg tablet Take 1 Tab by mouth every six (6) hours as needed for Pain. Max Daily Amount: 200 mg.    fluticasone (FLONASE) 50 mcg/actuation nasal spray 1 spray each nostril twice daily  Indications:  Allergic Rhinitis    acetaminophen (TYLENOL EXTRA STRENGTH) 500 mg tablet Take  by mouth every six (6) hours as needed for Pain (takes 2 tabs BID).  insulin detemir U-100 (LEVEMIR U-100 INSULIN) 100 unit/mL injection 12 units in morning and 11 at night    GLUC/CHND/OM3/DHA/EPA/FISH/STR (GLUCOSAMINE CHONDROITIN PLUS PO) Take  by mouth.  cholecalciferol, vitamin D3, (VITAMIN D3) 2,000 unit tab Take  by mouth.  INSULIN LISPRO (HUMALOG SC) by SubCUTAneous route. Sliding scale    diclofenac EC (VOLTAREN) 75 mg EC tablet Take  by mouth two (2) times a day.  losartan (COZAAR) 50 mg tablet Take  by mouth daily.  levothyroxine (LEVOTHROID) 75 mcg tablet Take  by mouth Daily (before breakfast).  ascorbic acid (VITAMIN C) 500 mg tablet Take  by mouth.  cyanocobalamin (VITAMIN B-12) 500 mcg tablet Take 500 mcg by mouth daily.  VITAMIN A PO Take  by mouth.  vitamin E (AQUA GEMS) 400 unit capsule Take  by mouth. Only on M and Thurs    gabapentin (NEURONTIN) 400 mg capsule Take 400 mg by mouth three (3) times daily.  simvastatin (ZOCOR) 40 mg tablet Take  by mouth nightly.  zolpidem (AMBIEN) 5 mg tablet Take  by mouth nightly as needed for Sleep. No current facility-administered medications for this visit.         Past Medical History:   Diagnosis Date    Arthritis     Constipation     Diabetes ([address]     Falls     Fatigue     Headache     Joint pain     Memory disorder     Neuropathy     Osteoporosis     Thyroid disease     Unspecified cerebral artery occlusion with cerebral infarction        Past Surgical History:   Procedure Laterality Date    HX  SECTION      HX HYSTERECTOMY      HX ORTHOPAEDIC         Family History   Problem Relation Age of Onset    Heart Disease Mother     Hypertension Mother     Heart Disease Maternal Grandfather     Cancer Maternal Aunt      Pancreatic and Liver    Cancer Maternal Grandmother      Lung    Diabetes Maternal Grandmother     Cancer Maternal Aunt      Breast    Diabetes Maternal Aunt        Social History     Social History    Marital status: SINGLE     Spouse name: N/A    Number of children: N/A    Years of education: N/A     Social History Main Topics    Smoking status: Former Smoker     Quit date: 4/10/2007    Smokeless tobacco: Never Used    Alcohol use No    Drug use: No    Sexual activity: Not Asked     Other Topics Concern    None     Social History Narrative       Review of Systems   Constitutional: Negative for fever and malaise/fatigue. HENT: Positive for congestion. Respiratory: Positive for cough. Negative for shortness of breath. Cardiovascular: Negative for chest pain. Gastrointestinal: Negative for abdominal pain. Neurological: Negative for headaches. Visit Vitals    /72 (BP 1 Location: Left arm, BP Patient Position: Sitting)    Pulse 75    Resp 18    Ht 5' 3\" (1.6 m)    Wt 145 lb 3.2 oz (65.9 kg)    SpO2 100%    BMI 25.72 kg/m2     Physical Exam   Constitutional: She is oriented to person, place, and time. No distress. Has coryza that is clear - mild distress and the patient is walking with quite a limp favoring the right foot walking with her foot out to the side as to put most of her pressure on the medial aspect of the foot and keep pressure off the lateral aspect   HENT:   Head: Normocephalic and atraumatic. Right Ear: External ear normal.   Left Ear: External ear normal.   Mouth/Throat: No oropharyngeal exudate. Red mm's in the nose and tht   Eyes: Conjunctivae and EOM are normal. Pupils are equal, round, and reactive to light. Right eye exhibits no discharge. Left eye exhibits no discharge. Neck: Normal range of motion. Neck supple. No tracheal deviation present. No thyromegaly present. Cardiovascular: Normal rate, regular rhythm, normal heart sounds and intact distal pulses. Exam reveals no gallop and no friction rub. No murmur heard. Pulmonary/Chest: Effort normal and breath sounds normal. No stridor. No respiratory distress. She has no wheezes. She has no rales.  She exhibits no tenderness. Abdominal: She exhibits no distension and no mass. There is no tenderness. There is no guarding. Musculoskeletal: She exhibits tenderness (Patient is tender over the proximal aspect of the fifth ray of the right foot). She exhibits no edema. Lymphadenopathy:     She has no cervical adenopathy. Neurological: She is alert and oriented to person, place, and time. She has normal reflexes. Gait normal.   Skin: Skin is warm and dry. No rash noted. She is not diaphoretic. No erythema. Psychiatric: Mood, memory, affect and judgment normal.   Vitals reviewed. ICD-10-CM ICD-9-CM    1. Pain in right foot M79.671 729.5 XR FOOT RT MIN 3 V      traMADol (ULTRAM) 50 mg tablet      REFERRAL TO ORTHOPEDICS   2. Chronic allergic rhinitis J30.9 477.9 fluticasone (FLONASE) 50 mcg/actuation nasal spray   3. Primary osteoarthritis involving multiple joints M15.0 715.09 REFERRAL TO ORTHOPEDICS       Orders Placed This Encounter    XR FOOT RT MIN 3 V     Standing Status:   Future     Standing Expiration Date:   6/3/2019     Order Specific Question:   Reason for Exam     Answer:   right foot pain in the proximal aspect of the fifth ray    REFERRAL TO ORTHOPEDICS     Referral Priority:   Routine     Referral Type:   Consultation     Referral Reason:   Specialty Services Required    traMADol (ULTRAM) 50 mg tablet     Sig: Take 1 Tab by mouth every six (6) hours as needed for Pain. Max Daily Amount: 200 mg. Dispense:  30 Tab     Refill:  2    fluticasone (FLONASE) 50 mcg/actuation nasal spray     Si spray each nostril twice daily  Indications: Allergic Rhinitis     Dispense:  1 Bottle     Refill:  5   Patient is told to not try any marijuana extracts or oils for her foot pain as this would show up eventually in her drug screen and the law and [place] it will not allow me to prescribe for her and that event.   A controlled substances agreement was signed by the patient and I reviewed her  and found all to be in line.     Follow-up Disposition: Not on Ru Almaraz MD

## 2018-05-03 NOTE — MR AVS SNAPSHOT
[address] [phone] Patient: Ammy Borjas MRN: [id number] :[id number] Visit Information Date & Time Provider Department Dept. Phone Encounter #  
 5/3/2018  2:00 PM Constantine Ramirez MD [address] [place] [address] [id number] Your Appointments 2018  9:30 AM  
Follow Up with Andrew Garza MD  
[facility] [address] [facility]) One [facility] [address] [id number] [address] [facility] [phone]  
  
    
 8/3/2018 11:00 AM  
Follow Up with Constantine Ramirez MD  
[facility] ([address]) Appt Note: 3 month f/u  
 [address] [facility]  
[phone] [address] [facility] Upcoming Health Maintenance Date Due  
 EYE EXAM RETINAL OR DILATED Q1 1974 Pneumococcal 19-64 Medium Risk (1 of 1 - PPSV23) 1983 PAP AKA CERVICAL CYTOLOGY 1985 BREAST CANCER SCRN MAMMOGRAM 2014 Influenza Age 5 to Adult 2018 HEMOGLOBIN A1C Q6M 10/3/2018 MEDICARE YEARLY EXAM 2018 MICROALBUMIN Q1 4/3/2019 LIPID PANEL Q1 4/3/2019 FOOT EXAM Q1 4/10/2019 COLONOSCOPY 2027 DTaP/Tdap/Td series (2 - Td) 4/3/2028 Allergies as of 5/3/2018  Review Complete On: 5/3/2018 By: Constantine Ramirez MD  
 No Known Allergies Current Immunizations  Reviewed on 2017 No immunizations on file. Not reviewed this visit You Were Diagnosed With   
  
 Codes Comments Pain in right foot    -  Primary ICD-10-CM: T62.865 ICD-9-CM: 729.5 Chronic allergic rhinitis     ICD-10-CM: J30.9 ICD-9-CM: 477.9 Primary osteoarthritis involving multiple joints     ICD-10-CM: M15.0 ICD-9-CM: 715.09 Vitals BP Pulse Resp Height(growth percentile) Weight(growth percentile) SpO2  
 128/72 (BP 1 Location: Left arm, BP Patient Position: Sitting) 75 18 5' 3\" (1.6 m) 145 lb 3.2 oz (65.9 kg) 100% BMI OB Status Smoking Status 25.72 kg/m2 Hysterectomy Former Smoker Vitals History BMI and BSA Data Body Mass Index Body Surface Area 25.72 kg/m 2 1.71 m 2 Preferred Pharmacy Pharmacy Name Phone [facility] [address] [phone] Your Updated Medication List  
  
   
This list is accurate as of 5/3/18  2:59 PM.  Always use your most recent med list.  
  
  
  
  
 AMBIEN 5 mg tablet Generic drug:  zolpidem Take  by mouth nightly as needed for Sleep. diclofenac EC 75 mg EC tablet Commonly known as:  VOLTAREN Take  by mouth two (2) times a day. fluticasone 50 mcg/actuation nasal spray Commonly known as:  FLONASE  
1 spray each nostril twice daily  Indications: Allergic Rhinitis  
  
 gabapentin 400 mg capsule Commonly known as:  NEURONTIN Take 400 mg by mouth three (3) times daily. GLUCOSAMINE CHONDROITIN PLUS PO Take  by mouth. HUMALOG SC  
by SubCUTAneous route. Sliding scale  
  
 insulin detemir U-100 100 unit/mL injection Commonly known as:  LEVEMIR U-100 INSULIN  
12 units in morning and 11 at night LEVOTHROID 75 mcg tablet Generic drug:  levothyroxine Take  by mouth Daily (before breakfast). losartan 50 mg tablet Commonly known as:  COZAAR Take  by mouth daily. simvastatin 40 mg tablet Commonly known as:  ZOCOR Take  by mouth nightly. traMADol 50 mg tablet Commonly known as:  ULTRAM  
Take 1 Tab by mouth every six (6) hours as needed for Pain. Max Daily Amount: 200 mg.  
  
 TYLENOL EXTRA STRENGTH 500 mg tablet Generic drug:  acetaminophen Take  by mouth every six (6) hours as needed for Pain (takes 2 tabs BID).  VITAMIN A PO  
 Take  by mouth. VITAMIN B-12 500 mcg tablet Generic drug:  cyanocobalamin Take 500 mcg by mouth daily. VITAMIN C 500 mg tablet Generic drug:  ascorbic acid (vitamin C) Take  by mouth. VITAMIN D3 2,000 unit Tab Generic drug:  cholecalciferol (vitamin D3) Take  by mouth.  
  
 vitamin E 400 unit capsule Commonly known as:  [address] Take  by mouth. Only on M and Thurs Prescriptions Printed Refills  
 traMADol (ULTRAM) 50 mg tablet 2 Sig: Take 1 Tab by mouth every six (6) hours as needed for Pain. Max Daily Amount: 200 mg. Class: Print Route: Oral  
  
Prescriptions Sent to Pharmacy Refills  
 fluticasone (FLONASE) 50 mcg/actuation nasal spray 5 Si spray each nostril twice daily  Indications: Allergic Rhinitis Class: Normal  
 Pharmacy: [address] Ph #: [phone] We Performed the Following REFERRAL TO ORTHOPEDICS [[id number] Custom] Comments:  
 Refer to KEESHA Dickens at [address] [place] [address] To-Do List   
 2018 Imaging:  XR FOOT RT MIN 3 V   
  
 2018 1:00 PM  
  Appointment with [address] [place] [address] at [facility] ([phone]) EXAM INSTRUCTIONS Do not wear deodorant, lotion, or powders to your appointment. GENERAL INSTRUCTIONS - Bring a list of all medications you are currently taking, including over the counter medications. - Only patients will be allowed in the exam room. This includes children. - Children under the age of 15 may not be left unattended. - [address] [place] [address] patients must have an armband and be accompanied by a caregiver or family member.  - If you have a hand-written prescription for this exam, you must bring it with you on the day of your exam. - Bring all relevant prior images from any facility outside of [facility] with you on the day of your exam.  Failure to provide images may delay reading by Radiologist.  If you have questions or need to reschedule or cancel your appointment, call [phone].  
  
 05/17/2018 10:30 AM  
  Appointment with CHAIR 2 [facility] ([phone]) Referral Information Referral ID Referred By Referred To  
  
 [id number] Zeny Zaid Not Available Visits Status Start Date End Date 1 New Request 5/3/18 5/3/19 If your referral has a status of pending review or denied, additional information will be sent to support the outcome of this decision. Introducing [facility]! [employer] introduces [email or web address] patient portal. Now you can access parts of your medical record, email your doctor's office, and request medication refills online. 1. In your internet browser, go to [email or web address] 2. Click on the First Time User? Click Here link in the Sign In box. You will see the New Member Sign Up page. 3. Enter your [email or web address] Access Code exactly as it appears below. You will not need to use this code after youve completed the sign-up process. If you do not sign up before the expiration date, you must request a new code. · [email or web address] Access Code: [email or web address] Expires: 7/2/2018 12:44 PM 
 
4. Enter the last four digits of your Social Security Number (xxxx) and Date of Birth (mm/dd/yyyy) as indicated and click Submit. You will be taken to the next sign-up page. 5. Create a PlaytestCloudt ID. This will be your PlaytestCloudt login ID and cannot be changed, so think of one that is secure and easy to remember. 6. Create a PlaytestCloudt password. You can change your password at any time. 7. Enter your Password Reset Question and Answer. This can be used at a later time if you forget your password. 8. Enter your e-mail address. You will receive e-mail notification when new information is available in [address] 9. Click Sign Up. You can now view and download portions of your medical record. 10. Click the Download Summary menu link to download a portable copy of your medical information. If you have questions, please visit the Frequently Asked Questions section of the [email or web address] website. Remember, [email or web address] is NOT to be used for urgent needs. For medical emergencies, dial 911. Now available from your iPhone and Android! Please provide this summary of care documentation to your next provider. Your primary care clinician is listed as Tex Martinez. If you have any questions after today's visit, please call [phone].

## 2018-05-03 NOTE — PROGRESS NOTES
1. Have you been to the ER, urgent care clinic since your last visit? Hospitalized since your last visit? No    2. Have you seen or consulted any other health care providers outside of the [address] [place] [address] since your last visit? Include any pap smears or colon screening.  No

## 2018-05-08 ENCOUNTER — APPOINTMENT (OUTPATIENT)
Dept: INFUSION THERAPY | Age: 53
End: 2018-05-08
Payer: MEDICARE

## 2018-05-09 ENCOUNTER — OFFICE VISIT (OUTPATIENT)
Dept: ENDOCRINOLOGY | Age: 53
End: 2018-05-09

## 2018-05-09 VITALS
BODY MASS INDEX: 25.27 KG/M2 | HEIGHT: 63 IN | SYSTOLIC BLOOD PRESSURE: 97 MMHG | DIASTOLIC BLOOD PRESSURE: 56 MMHG | WEIGHT: 142.6 LBS | HEART RATE: 64 BPM

## 2018-05-09 DIAGNOSIS — E03.9 ACQUIRED HYPOTHYROIDISM: ICD-10-CM

## 2018-05-09 DIAGNOSIS — E10.42 TYPE 1 DIABETES MELLITUS WITH DIABETIC POLYNEUROPATHY (HCC): Primary | ICD-10-CM

## 2018-05-09 RX ORDER — GABAPENTIN 100 MG/1
200 CAPSULE ORAL 2 TIMES DAILY
COMMUNITY
Start: 2018-04-11 | End: 2018-08-17 | Stop reason: SDUPTHER

## 2018-05-09 RX ORDER — INSULIN LISPRO 100 [IU]/ML
INJECTION, SOLUTION INTRAVENOUS; SUBCUTANEOUS
Qty: 1 VIAL | Refills: 0
Start: 2018-05-09 | End: 2018-08-31 | Stop reason: SDUPTHER

## 2018-05-09 NOTE — PATIENT INSTRUCTIONS
1) Levemir:12 units in the morning and 8 units at bedtime. 2) Humalog: If your blood sugar in under 100 take 4 units  If your blood sugar is 100-149 take 5 units  If your blood sugar if 150-199 take 6 units  If your blood sugar is 200-250 take 7 units  If your blood sugar is over 300 take 8 units. 3) Send me some blood sugar readings in two weeks.

## 2018-05-09 NOTE — PROGRESS NOTES
Chief Complaint   Patient presents with    Diabetes     pcp and pharmacy verified. Eye exam next week. Records since last visit reviewed. History of Present Illness: Ashish Meehan is a 46 y.o. female here for follow up of diabetes. Pt notes \"I have been a Type I diabetic for 37 years\". \"I don't count carbs, I eat what I want and most of the time I will remember to take my insulin but not all the time. I stay active walking my dog 2-4 miles per day\". At our initial visit in April 2018 her regimen consisted of Levemir 12 units in the AM and 11 units with dinner and Humalog \"I don't usually take more than 2 units, but sometimes 2 is not enough and if I am going to eat a desert and what my blood sugar is I may take up to 4 units. Checks blood sugars 8 times per day because she is prone to low blood sugars and she is not able to sense hypoglycemia. Most recent Hgb A1c was 8.4% in April 2018. We agreed to have pt keep a diet, BG and insulin log for 4 weeks and then we would sit down with the numbers and discuss options to help improve her BGs and minimize her low blood sugars. Pt brought in her logs. Her FBGs have been in the 's range. When pt does not take any humalog with breakfast or takes less that 4 units her BGs tend to increase, but if she takes 5 units with breakfast her BGs two hours after breakfast are better. Between two hours post-breakfast, and pre-lunch her BGs tend to decline. She has had blood sugars at bedtime in the 40-60's, but also in the 180-300's range. She has had decreased in her BG overnight as well. She is still taking the Levemir 12 units in the AM and 11 units with dinner. She has been taking 2-5 units with meals plus correction    A typical day is as follows:  Pt wakes around 8AM.  She gets up, drinks a pepsi and if my sugar is not low I will give insulin to cover the pepsi. She will have some oatmeal or a 100 calorie protein bar.   Her next meal will be around 1-4PM, yesterday she had pasta, salad and some shrimp scampi. This tends to me her main meal of the day and she does not eat another meal.  She did not eat anything after her lunch yesterday. She will have one main meal per day. About once per week she will have a slurpie (she tries to cover with a predictive dose of humalog 3-5 units)    Exercise consists of waking her dog 2-4 miles per day. Pt has hx of CVA x4, \"one of which caused a CNS leak\". \"I had a heart issue in 1996 and they put me on medication but I was taken off the medication by another Endocrinologist about 3 years ago and I have not had any troubles. She reports she had a stress test and an ECHO and \"everything came back fine\". Pt has hx of polyneuropathy from her knees to her feet. She takes Gabapentin 200mg BID, which does help with her pain. Pt has no hx of Nephrology (Urine MA/Cr 12 on 4/3/18). Last eye exam was December 2017, she sees Dr. Tim Shea at [facility] every 6 months. She does have retinopathy and cataracts \"but there is nothing that needs to be done about it right now\". Pt has hx of osteoporosis for which she is followed by Dr. Avery Echols of Rheumatology. 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 She was never treated and it \"went away\". Pt has hx of hypothyroidism and is taking LT4 75mcg daily. Her TSH was 2.230 on 4/3/18. She has been on LT4 since the age of 20. Current Outpatient Prescriptions   Medication Sig    gabapentin (NEURONTIN) 100 mg capsule 200 mg two (2) times a day.  insulin detemir U-100 (LEVEMIR U-100 INSULIN) 100 unit/mL injection 12 units in morning and 8 at night    insulin lispro (HUMALOG U-100 INSULIN) 100 unit/mL injection 4-8 units with each meal. Max 15 units per day    traMADol (ULTRAM) 50 mg tablet Take 1 Tab by mouth every six (6) hours as needed for Pain.  Max Daily Amount: 200 mg.    fluticasone (FLONASE) 50 mcg/actuation nasal spray 1 spray each nostril twice daily  Indications: Allergic Rhinitis    acetaminophen (TYLENOL EXTRA STRENGTH) 500 mg tablet Take  by mouth every six (6) hours as needed for Pain (takes 2 tabs BID).  GLUC/CHND/OM3/DHA/EPA/FISH/STR (GLUCOSAMINE CHONDROITIN PLUS PO) Take  by mouth.  cholecalciferol, vitamin D3, (VITAMIN D3) 2,000 unit tab Take  by mouth.  diclofenac EC (VOLTAREN) 75 mg EC tablet Take  by mouth two (2) times a day.  losartan (COZAAR) 50 mg tablet Take  by mouth daily.  levothyroxine (LEVOTHROID) 75 mcg tablet Take  by mouth Daily (before breakfast).  ascorbic acid (VITAMIN C) 500 mg tablet Take  by mouth.  cyanocobalamin (VITAMIN B-12) 500 mcg tablet Take 500 mcg by mouth daily.  VITAMIN A PO Take  by mouth.  vitamin E (AQUA GEMS) 400 unit capsule Take  by mouth. Only on M and Thurs    simvastatin (ZOCOR) 40 mg tablet Take  by mouth nightly.  zolpidem (AMBIEN) 5 mg tablet Take  by mouth nightly as needed for Sleep. No current facility-administered medications for this visit. No Known Allergies  Review of Systems:  - Eyes: no blurry vision or double vision  - Cardiovascular: no chest pain  - Respiratory: no shortness of breath  - Musculoskeletal: no myalgias  - Neurological: no numbness/tingling in extremities    Physical Examination:  Blood pressure 97/56, pulse 64, height 5' 3\" (1.6 m), weight 142 lb 9.6 oz (64.7 kg). - General: pleasant, no distress, good eye contact   - Psychiatric: normal mood and affect    Data Reviewed:   RIGHT UPPER QUADRANT ULTRASOUND     INDICATION:  Liver lesion.     COMPARISON:  None.     FINDINGS:      The liver is normal in size, contour and echotexture.       There is hepatopedal portal vein flow. The hepatic veins are patent.     Previous cholecystectomy.     The common bile duct is not enlarged, measuring 5 mm. There is no intrahepatic  bile duct dilatation.     The right kidney measures 9.3 cm in length.   No renal mass, stone or collecting  system dilatation.     The visualized portions of the pancreas are unremarkable.     There is no evidence of ascites or pleural effusion.     IMPRESSION  IMPRESSION:     No liver lesion identified. Previous cholecystectomy. Component      Latest Ref Rng & Units 4/3/2018 4/3/2018 4/3/2018 4/3/2018           2:01 PM  2:01 PM  2:01 PM  2:01 PM   Glucose      65 - 99 mg/dL   262 (H)    BUN      6 - 24 mg/dL   16    Creatinine      0.57 - 1.00 mg/dL   0.87    GFR est non-AA      >59 mL/min/1.73   76    GFR est AA      >59 mL/min/1.73   87    BUN/Creatinine ratio      9 - 23   18    Sodium      134 - 144 mmol/L   136    Potassium      3.5 - 5.2 mmol/L   4.6    Chloride      96 - 106 mmol/L   95 (L)    CO2      18 - 29 mmol/L   27    Calcium      8.7 - 10.2 mg/dL   9.9    Protein, total      6.0 - 8.5 g/dL   6.4    Albumin      3.5 - 5.5 g/dL   4.2    GLOBULIN, TOTAL      1.5 - 4.5 g/dL   2.2    A-G Ratio      1.2 - 2.2   1.9    Bilirubin, total      0.0 - 1.2 mg/dL   1.1    Alk. phosphatase      39 - 117 IU/L   59    AST      0 - 40 IU/L   28    ALT (SGPT)      0 - 32 IU/L   23    Cholesterol, total      100 - 199 mg/dL  162     Triglyceride      0 - 149 mg/dL  101     HDL Cholesterol      >39 mg/dL  70     VLDL, calculated      5 - 40 mg/dL  20     LDL, calculated      0 - 99 mg/dL  72     Hemoglobin A1c, (calculated)      4.8 - 5.6 %    8.4 (H)   Estimated average glucose      mg/dL    194   TSH      0.450 - 4.500 uIU/mL 2.230        Component      Latest Ref Rng & Units 4/3/2018           2:01 PM   Creatinine, urine      Not Estab. mg/dL 54.3   Microalbumin, urine      Not Estab. ug/mL 6.7   Microalbumin/Creat. Ratio      0.0 - 30.0 mg/g creat 12.3       Assessment/Plan:   1) DM > Based on her BG logs will have pt change her Levemir to 12 units in the AM and 8 units HS. Pt to take 5 units of humalog with each meal plus a 1:50 correction.   Will have pt check her BGs 8 times per day and keep a log of her blood sugars and a log of what she eats and drinks for the next two weeks and mail her BG logs to me in 2 weeks. With her hx of neuropathy and calluses, she would benefit from DM shoes and inserts. 2) Hypothyroidism > Pt is clinically and biochemically euthyroid on the LT4 75mcg daily. Pt voices understanding and agreement with the plan. Pain noted and pt was recommended to call her PCP for further evaluation and treatment, as needed      Patient Instructions   1) Levemir:12 units in the morning and 8 units at bedtime. 2) Humalog: If your blood sugar in under 100 take 4 units  If your blood sugar is 100-149 take 5 units  If your blood sugar if 150-199 take 6 units  If your blood sugar is 200-250 take 7 units  If your blood sugar is over 300 take 8 units. 3) Send me some blood sugar readings in two weeks. Follow-up Disposition:  Return in about 3 months (around 8/9/2018).     Copy sent to:  Dr. Rupali Mcfarland

## 2018-05-09 NOTE — MR AVS SNAPSHOT
[address] [facility] [address] [address] [phone] Patient: Robi Vaughan MRN: [id number] :1965 Visit Information Date & Time Provider Department Dept. Phone Encounter #  
 2018  9:30 AM MD [facility] [phone] [id number] Follow-up Instructions Return in about 3 months (around 2018). Your Appointments 8/3/2018 11:00 AM  
Follow Up with Mariajose Nayak MD  
[facility] ([facility]) Appt Note: 3 month f/u  
 [address]  
[phone] [address] Upcoming Health Maintenance Date Due  
 EYE EXAM RETINAL OR DILATED Q1 1974 Pneumococcal 19-64 Medium Risk (1 of 1 - PPSV23) 1983 PAP AKA CERVICAL CYTOLOGY 1985 Influenza Age 5 to Adult 2018 HEMOGLOBIN A1C Q6M 10/3/2018 MEDICARE YEARLY EXAM 2018 MICROALBUMIN Q1 4/3/2019 LIPID PANEL Q1 4/3/2019 FOOT EXAM Q1 4/10/2019 BREAST CANCER SCRN MAMMOGRAM 2019 COLONOSCOPY 2027 DTaP/Tdap/Td series (2 - Td) 4/3/2028 Allergies as of 2018  Review Complete On: 2018 By: Marlene Mendez MD  
 No Known Allergies Current Immunizations  Reviewed on 2017 No immunizations on file. Not reviewed this visit You Were Diagnosed With   
  
 Codes Comments Type 1 diabetes mellitus with diabetic polyneuropathy (HCC)    -  Primary ICD-10-CM: E10.42 
ICD-9-CM: 250.61, 357.2 Acquired hypothyroidism     ICD-10-CM: E03.9 ICD-9-CM: 705. 9 Vitals BP Pulse Height(growth percentile) Weight(growth percentile) BMI OB Status 97/56 64 5' 3\" (1.6 m) 142 lb 9.6 oz (64.7 kg) 25.26 kg/m2 Hysterectomy Smoking Status Former Smoker Vitals History BMI and BSA Data Body Mass Index Body Surface Area  
 25.26 kg/m 2 1.7 m 2 Preferred Pharmacy Pharmacy Name Phone [address] [facility] [address] [phone] Your Updated Medication List  
  
   
This list is accurate as of 5/9/18  9:46 AM.  Always use your most recent med list.  
  
  
  
  
 AMBIEN 5 mg tablet Generic drug:  zolpidem Take  by mouth nightly as needed for Sleep. diclofenac EC 75 mg EC tablet Commonly known as:  VOLTAREN Take  by mouth two (2) times a day. fluticasone 50 mcg/actuation nasal spray Commonly known as:  FLONASE  
1 spray each nostril twice daily  Indications: Allergic Rhinitis  
  
 gabapentin 100 mg capsule Commonly known as:  NEURONTIN  
200 mg two (2) times a day. GLUCOSAMINE CHONDROITIN PLUS PO Take  by mouth. HUMALOG SC  
by SubCUTAneous route. Sliding scale  
  
 insulin detemir U-100 100 unit/mL injection Commonly known as:  LEVEMIR U-100 INSULIN  
12 units in morning and 11 at night LEVOTHROID 75 mcg tablet Generic drug:  levothyroxine Take  by mouth Daily (before breakfast). losartan 50 mg tablet Commonly known as:  COZAAR Take  by mouth daily. simvastatin 40 mg tablet Commonly known as:  ZOCOR Take  by mouth nightly. traMADol 50 mg tablet Commonly known as:  ULTRAM  
Take 1 Tab by mouth every six (6) hours as needed for Pain. Max Daily Amount: 200 mg.  
  
 TYLENOL EXTRA STRENGTH 500 mg tablet Generic drug:  acetaminophen Take  by mouth every six (6) hours as needed for Pain (takes 2 tabs BID). VITAMIN A PO Take  by mouth. VITAMIN B-12 500 mcg tablet Generic drug:  cyanocobalamin Take 500 mcg by mouth daily. VITAMIN C 500 mg tablet Generic drug:  ascorbic acid (vitamin C) Take  by mouth. VITAMIN D3 2,000 unit Tab Generic drug:  cholecalciferol (vitamin D3) Take  by mouth.  
  
 vitamin E 400 unit capsule Commonly known as:  [address] Take  by mouth. Only on M and Thurs Follow-up Instructions Return in about 3 months (around 8/9/2018). To-Do List   
 05/17/2018 10:30 AM  
  Appointment with CHAIR 2 [facility] ([phone]) Patient Instructions 1) Levemir:12 units in the morning and 8 units at bedtime. 2) Humalog: If your blood sugar in under 100 take 4 units If your blood sugar is 100-149 take 5 units If your blood sugar if 150-199 take 6 units If your blood sugar is 200-250 take 7 units If your blood sugar is over 300 take 8 units. 3) Send me some blood sugar readings in two weeks. Introducing [facility] SERVICES! Chari Blum introduces Manjrasoft patient portal. Now you can access parts of your medical record, email your doctor's office, and request medication refills online. 1. In your internet browser, go to [email or web address] 2. Click on the First Time User? Click Here link in the Sign In box. You will see the New Member Sign Up page. 3. Enter your Manjrasoft Access Code exactly as it appears below. You will not need to use this code after youve completed the sign-up process. If you do not sign up before the expiration date, you must request a new code. · Manjrasoft Access Code: [email or web address] Expires: 7/2/2018 12:44 PM 
 
4. Enter the last four digits of your Social Security Number (xxxx) and Date of Birth (mm/dd/yyyy) as indicated and click Submit. You will be taken to the next sign-up page. 5. Create a Big Fisht ID. This will be your Manjrasoft login ID and cannot be changed, so think of one that is secure and easy to remember. 6. Create a Big Fisht password. You can change your password at any time. 7. Enter your Password Reset Question and Answer. This can be used at a later time if you forget your password. 8. Enter your e-mail address.  You will receive e-mail notification when new information is available in SnapAppointments. 9. Click Sign Up. You can now view and download portions of your medical record. 10. Click the Download Summary menu link to download a portable copy of your medical information. If you have questions, please visit the Frequently Asked Questions section of the SnapAppointments website. Remember, SnapAppointments is NOT to be used for urgent needs. For medical emergencies, dial 911. Now available from your iPhone and Android! Please provide this summary of care documentation to your next provider. Your primary care clinician is listed as Emily Garsia. If you have any questions after today's visit, please call [phone].

## 2018-05-14 ENCOUNTER — TELEPHONE (OUTPATIENT)
Dept: FAMILY MEDICINE CLINIC | Age: 53
End: 2018-05-14

## 2018-05-14 ENCOUNTER — OFFICE VISIT (OUTPATIENT)
Dept: FAMILY MEDICINE CLINIC | Age: 53
End: 2018-05-14

## 2018-05-14 VITALS
BODY MASS INDEX: 25.52 KG/M2 | RESPIRATION RATE: 18 BRPM | OXYGEN SATURATION: 99 % | TEMPERATURE: 98.6 F | HEART RATE: 67 BPM | DIASTOLIC BLOOD PRESSURE: 53 MMHG | HEIGHT: 63 IN | WEIGHT: 144 LBS | SYSTOLIC BLOOD PRESSURE: 111 MMHG

## 2018-05-14 DIAGNOSIS — S61.112A LACERATION OF LEFT THUMB WITHOUT FOREIGN BODY WITH DAMAGE TO NAIL, INITIAL ENCOUNTER: ICD-10-CM

## 2018-05-14 DIAGNOSIS — J02.9 SORE THROAT: Primary | ICD-10-CM

## 2018-05-14 DIAGNOSIS — J20.9 ACUTE BRONCHITIS, UNSPECIFIED ORGANISM: ICD-10-CM

## 2018-05-14 DIAGNOSIS — J20.9 ACUTE BRONCHITIS, UNSPECIFIED ORGANISM: Primary | ICD-10-CM

## 2018-05-14 LAB
S PYO AG THROAT QL: NEGATIVE
VALID INTERNAL CONTROL?: YES

## 2018-05-14 RX ORDER — ALBUTEROL SULFATE 90 UG/1
2 AEROSOL, METERED RESPIRATORY (INHALATION)
Qty: 1 INHALER | Refills: 0 | Status: SHIPPED | OUTPATIENT
Start: 2018-05-14 | End: 2018-08-31

## 2018-05-14 RX ORDER — AZITHROMYCIN 250 MG/1
TABLET, FILM COATED ORAL
Qty: 6 TAB | Refills: 0 | Status: SHIPPED | OUTPATIENT
Start: 2018-05-14 | End: 2018-05-19

## 2018-05-14 RX ORDER — BENZONATATE 200 MG/1
200 CAPSULE ORAL
Qty: 30 CAP | Refills: 0 | Status: SHIPPED | OUTPATIENT
Start: 2018-05-14 | End: 2018-05-21

## 2018-05-14 NOTE — MR AVS SNAPSHOT
[facility] 
 
 
 [address] [phone] Patient: Jackelyn Trujillo MRN: [id number] :1965 Visit Information Date & Time Provider Department Dept. Phone Encounter #  
 2018 11:30 AM Michelle Garsia  [address] [facility] [phone] [id number] Your Appointments 8/3/2018 11:00 AM  
Follow Up with Darcy Londono MD  
[facility] ([address]) Appt Note: 3 month f/u  
 [address] [place] [address]  
[phone] [address] [address]  
  
    
 8/10/2018 11:10 AM  
Follow Up with Gilbert Chacon MD  
[facility] [address] [place] [address]) Appt Note: 3 month f/u  [facility] [employer] [phone] [address] [place] [address] Upcoming Health Maintenance Date Due  
 EYE EXAM RETINAL OR DILATED Q1 1974 Pneumococcal 19-64 Medium Risk (1 of 1 - PPSV23) 1983 PAP AKA CERVICAL CYTOLOGY 1985 Influenza Age 5 to Adult 2018 HEMOGLOBIN A1C Q6M 10/3/2018 MEDICARE YEARLY EXAM 2018 MICROALBUMIN Q1 4/3/2019 LIPID PANEL Q1 4/3/2019 FOOT EXAM Q1 4/10/2019 BREAST CANCER SCRN MAMMOGRAM 2019 COLONOSCOPY 2027 DTaP/Tdap/Td series (2 - Td) 4/3/2028 Allergies as of 2018  Review Complete On: 2018 By: Michelle Garsia NP No Known Allergies Current Immunizations  Reviewed on 2017 No immunizations on file. Not reviewed this visit You Were Diagnosed With   
  
 Codes Comments Sore throat    -  Primary ICD-10-CM: J02.9 ICD-9-CM: 389 Acute bronchitis, unspecified organism     ICD-10-CM: J20.9 ICD-9-CM: 466.0  Laceration of left thumb without foreign body with damage to nail, initial encounter     ICD-10-CM: J92.803N ICD-9-CM: 757. 0 Vitals BP Pulse Temp Resp Height(growth percentile) Weight(growth percentile) 111/53 (BP 1 Location: Left arm, BP Patient Position: Sitting) 67 98.6 °F (37 °C) (Oral) 18 5' 3\" (1.6 m) 144 lb (65.3 kg) SpO2 BMI OB Status Smoking Status 99% 25.51 kg/m2 Hysterectomy Former Smoker Vitals History BMI and BSA Data Body Mass Index Body Surface Area 25.51 kg/m 2 1.7 m 2 Preferred Pharmacy Pharmacy Name Phone [address] [phone] Your Updated Medication List  
  
   
This list is accurate as of 5/14/18 11:53 AM.  Always use your most recent med list.  
  
  
  
  
 albuterol 90 mcg/actuation inhaler Commonly known as:  PROVENTIL HFA, VENTOLIN HFA, PROAIR HFA Take 2 Puffs by inhalation every four (4) hours as needed (chest tightness). Indications: BRONCHOSPASM PREVENTION  
  
 AMBIEN 5 mg tablet Generic drug:  zolpidem Take  by mouth nightly as needed for Sleep.  
  
 benzonatate 200 mg capsule Commonly known as:  TESSALON Take 1 Cap by mouth three (3) times daily as needed for Cough for up to 7 days. diclofenac EC 75 mg EC tablet Commonly known as:  VOLTAREN Take  by mouth two (2) times a day. fluticasone 50 mcg/actuation nasal spray Commonly known as:  FLONASE  
1 spray each nostril twice daily  Indications: Allergic Rhinitis  
  
 gabapentin 100 mg capsule Commonly known as:  NEURONTIN  
200 mg two (2) times a day. GLUCOSAMINE CHONDROITIN PLUS PO Take  by mouth. insulin detemir U-100 100 unit/mL injection Commonly known as:  LEVEMIR U-100 INSULIN  
12 units in morning and 8 at night  
  
 insulin lispro 100 unit/mL injection Commonly known as:  HumaLOG U-100 Insulin 4-8 units with each meal. Max 15 units per day LEVOTHROID 75 mcg tablet Generic drug:  levothyroxine Take  by mouth Daily (before breakfast). losartan 50 mg tablet Commonly known as:  COZAAR Take  by mouth daily. simvastatin 40 mg tablet Commonly known as:  ZOCOR Take  by mouth nightly. traMADol 50 mg tablet Commonly known as:  ULTRAM  
Take 1 Tab by mouth every six (6) hours as needed for Pain. Max Daily Amount: 200 mg.  
  
 TYLENOL EXTRA STRENGTH 500 mg tablet Generic drug:  acetaminophen Take  by mouth every six (6) hours as needed for Pain (takes 2 tabs BID). VITAMIN A PO Take  by mouth. VITAMIN B-12 500 mcg tablet Generic drug:  cyanocobalamin Take 500 mcg by mouth daily. VITAMIN C 500 mg tablet Generic drug:  ascorbic acid (vitamin C) Take  by mouth. VITAMIN D3 2,000 unit Tab Generic drug:  cholecalciferol (vitamin D3) Take  by mouth.  
  
 vitamin E 400 unit capsule Commonly known as:  [address] Take  by mouth. Only on M and Thurs Prescriptions Sent to Pharmacy Refills  
 albuterol (PROVENTIL HFA, VENTOLIN HFA, PROAIR HFA) 90 mcg/actuation inhaler 0 Sig: Take 2 Puffs by inhalation every four (4) hours as needed (chest tightness). Indications: BRONCHOSPASM PREVENTION Class: Normal  
 Pharmacy: [facility] [address] [address] [address] Ph #: [phone] Route: Inhalation  
 benzonatate (TESSALON) 200 mg capsule 0 Sig: Take 1 Cap by mouth three (3) times daily as needed for Cough for up to 7 days. Class: Normal  
 Pharmacy: [facility] [address], [address] Ph #: [phone] Route: Oral  
  
We Performed the Following AMB POC RAPID STREP A [92238 CPT(R)] To-Do List   
 05/14/2018 Imaging:  XR CHEST PA LAT   
  
 05/17/2018 10:30 AM  
  Appointment with CHAIR 2 [facility] ([phone]) Introducing [address]!    
 Tereso Bragg introduces [employer] patient portal. Now you can access parts of your medical record, email your doctor's office, and request medication refills online. 1. In your internet browser, go to [email or web address] 2. Click on the First Time User? Click Here link in the Sign In box. You will see the New Member Sign Up page. 3. Enter your [employer] Access Code exactly as it appears below. You will not need to use this code after youve completed the sign-up process. If you do not sign up before the expiration date, you must request a new code. · [employer] Access Code: [email or web address] Expires: 7/2/2018 12:44 PM 
 
4. Enter the last four digits of your Social Security Number (xxxx) and Date of Birth (mm/dd/yyyy) as indicated and click Submit. You will be taken to the next sign-up page. 5. Create a WEMS ID. This will be your [employer]EMS login ID and cannot be changed, so think of one that is secure and easy to remember. 6. Create a [employer]EMS password. You can change your password at any time. 7. Enter your Password Reset Question and Answer. This can be used at a later time if you forget your password. 8. Enter your e-mail address. You will receive e-mail notification when new information is available in [address] 9. Click Sign Up. You can now view and download portions of your medical record. 10. Click the Download Summary menu link to download a portable copy of your medical information. If you have questions, please visit the Frequently Asked Questions section of the [employer]EM[employer] website. Remember, [employer] is NOT to be used for urgent needs. For medical emergencies, dial 911. Now available from your iPhone and Android! Please provide this summary of care documentation to your next provider. Your primary care clinician is listed as Catalino Franks. If you have any questions after today's visit, please call [phone].

## 2018-05-14 NOTE — TELEPHONE ENCOUNTER
Patient called and stated you were going to call her in a antibiotic and it wasn't done. She stated the tessalon pearls don't work for her.

## 2018-05-14 NOTE — PROGRESS NOTES
Subjective:     Chief Complaint   Patient presents with   Dolores Avendano is a 46 y.o. female who presents today with c/o a sore throat and a cough that started about a week ago after she took her mother to a doctor's appt. For the past 2 days, when she coughs or takes in a deep breath she feels a pressure in her chest. Can't sleep at night due to coughing. No fever or chills. No SOB. Does not smoke. No hx of asthma or COPD. Has had a hx of walking pneumonia. Also mentions that she cut her left thumb with a kitchen knife while chopping vegetables last week. \"It bled like crazy and probably needed stitches\" but she did not seek any medical care. Has been applying triple antibiotic ointment every day. Still has good use of her thumb and pain is tolerable. Patient Active Problem List   Diagnosis Code   [facility]. Abhi Alvarez    Hx of stroke without residual deficits Z86.73    Hx of brain surgery Z98.890    Type 1 diabetes mellitus with diabetic polyneuropathy (HCC) E10.42    Acquired hypothyroidism E03.9    Primary osteoarthritis involving multiple joints M15.0    History of hypoglycemia Z86.39    Pain in right foot M79.671    Chronic allergic rhinitis J30.9       Past Medical History:   Diagnosis Date    Arthritis     Constipation     Diabetes ([address]     Falls     Fatigue     Headache     Joint pain     Memory disorder     Menopause     Neuropathy     Osteoporosis     Thyroid disease     Unspecified cerebral artery occlusion with cerebral infarction          Current Outpatient Prescriptions:     gabapentin (NEURONTIN) 100 mg capsule, 200 mg two (2) times a day., Disp: , Rfl:     insulin detemir U-100 (LEVEMIR U-100 INSULIN) 100 unit/mL injection, 12 units in morning and 8 at night, Disp: 12 Vial, Rfl: 1    insulin lispro (HUMALOG U-100 INSULIN) 100 unit/mL injection, 4-8 units with each meal. Max 15 units per day, Disp: 1 Vial, Rfl: 0    traMADol (ULTRAM) 50 mg tablet, Take 1 Tab by mouth every six (6) hours as needed for Pain. Max Daily Amount: 200 mg., Disp: 30 Tab, Rfl: 2    fluticasone (FLONASE) 50 mcg/actuation nasal spray, 1 spray each nostril twice daily  Indications: Allergic Rhinitis, Disp: 1 Bottle, Rfl: 5    acetaminophen (TYLENOL EXTRA STRENGTH) 500 mg tablet, Take  by mouth every six (6) hours as needed for Pain (takes 2 tabs BID). , Disp: , Rfl:     GLUC/CHND/OM3/DHA/EPA/FISH/STR (GLUCOSAMINE CHONDROITIN PLUS PO), Take  by mouth., Disp: , Rfl:     cholecalciferol, vitamin D3, (VITAMIN D3) 2,000 unit tab, Take  by mouth., Disp: , Rfl:     diclofenac EC (VOLTAREN) 75 mg EC tablet, Take  by mouth two (2) times a day., Disp: , Rfl:     losartan (COZAAR) 50 mg tablet, Take  by mouth daily. , Disp: , Rfl:     levothyroxine (LEVOTHROID) 75 mcg tablet, Take  by mouth Daily (before breakfast). , Disp: , Rfl:     ascorbic acid (VITAMIN C) 500 mg tablet, Take  by mouth., Disp: , Rfl:     cyanocobalamin (VITAMIN B-12) 500 mcg tablet, Take 500 mcg by mouth daily. , Disp: , Rfl:     VITAMIN A PO, Take  by mouth., Disp: , Rfl:     vitamin E (AQUA GEMS) 400 unit capsule, Take  by mouth.  Only on M and Thurs, Disp: , Rfl:     simvastatin (ZOCOR) 40 mg tablet, Take  by mouth nightly., Disp: , Rfl:     zolpidem (AMBIEN) 5 mg tablet, Take  by mouth nightly as needed for Sleep., Disp: , Rfl:     No Known Allergies    Past Surgical History:   Procedure Laterality Date    HX  SECTION      HX HYSTERECTOMY  2006    HX OOPHORECTOMY      HX ORTHOPAEDIC         History   Smoking Status    Former Smoker    Quit date: 4/10/2007   Smokeless Tobacco    Never Used       Social History     Social History    Marital status: SINGLE     Spouse name: N/A    Number of children: N/A    Years of education: N/A     Social History Main Topics    Smoking status: Former Smoker     Quit date: 4/10/2007    Smokeless tobacco: Never Used    Alcohol use No    Drug use: No    Sexual activity: Not Asked     Other Topics Concern    None     Social History Narrative       Family History   Problem Relation Age of Onset    Heart Disease Mother     Hypertension Mother     Heart Disease Maternal Grandfather     Cancer Maternal Aunt      Pancreatic and Liver    Cancer Maternal Grandmother      Lung    Diabetes Maternal Grandmother     Cancer Maternal Aunt      Breast    Breast Cancer Maternal Aunt     Diabetes Maternal Aunt        ROS:  Gen: denies fever, chills, or fatigue  HEENT:+ sore throat. denies H/A, runny nose or nasal congestion  Resp: + cough, + chest pressure when taking in a deep breath, denies dyspnea or wheezing  CV:  Denies chest pain or palpitations  Extremeties: denies edema, pallor, or cyanosis  Skin: denies rashes or new lesions   Heme: no lymphadenopathy     Objective:     Visit Vitals    /53 (BP 1 Location: Left arm, BP Patient Position: Sitting)    Pulse 67    Temp 98.6 °F (37 °C) (Oral)    Resp 18    Ht 5' 3\" (1.6 m)    Wt 144 lb (65.3 kg)    SpO2 99%    BMI 25.51 kg/m2     Body mass index is 25.51 kg/(m^2). General: Alert and oriented. No acute distress. Well nourished. HEENT :  Ears:TMs normal. Canals clear. Eyes: Sclera white, conjunctiva clear. PERRLA. Extra ocular movements intact. Nose: Patent. Nasal mucosa pink, non-edematous, and without drainage. Mouth: Pharynx erythematous, without exudate   Neck: Supple with FROM. No lymphadenopathy  Lungs: Breathing even and unlabored. All lobes clear to auscultation bilaterally   Heart :RRR, S1 and S2 normal intensity, no extra heart sounds  Extremities: Non-edematous, no pallor or cyanosis. Bilat. radial pulses 2+  Skin: Laceration to tip of left thumb approx 0.5cm in length. Skin is peeled back and wound bed is beefy red without exudate. Surrounding skin is without erythema or streaking. Otherwise skin is warm, dry, and intact. No results found for this visit on 05/14/18.     Assessment/ Plan: 1. Sore throat  - Strep test negative, likely viral etiology. 2. Acute bronchitis  -Start albuterol inhaler- 2 puffs q4 hours prn cough/ chest pressure  -Start tessalon perles 100mg po TID prn cough  -CXR to rule out pneumonia- will call with results  -RTO if symptoms worsen or do not improve over the next week. -F/U prn    3. Laceration of thumb, left hand  Dead skin snipped off to promote wound healing. Triple antibiotic ointment applied with clean band-aid. Cont daily dressing changes with triple antibiotic ointment and band-aid. Notify PCP if s/s infection (yellow drainage, increased pain, or redness)  F/U prn    Orders Placed This Encounter    AMB POC RAPID STREP A         Verbal and written instructions (see AVS) provided.  Patient expresses understanding of diagnosis and treatment plan. Health Maintenance Due   Topic Date Due    EYE EXAM RETINAL OR DILATED Q1  04/02/1974    Pneumococcal 19-64 Medium Risk (1 of 1 - PPSV23) 04/02/1983    PAP AKA CERVICAL CYTOLOGY  04/02/1985         Follow-up Disposition: Not on File      Nora Sanchez NP

## 2018-05-14 NOTE — TELEPHONE ENCOUNTER
Please call pt and let her know I called her in an antibiotic for bronchitis. She can also try some OTC Delsym cough syrup as needed for symptom relief. Thanks!

## 2018-05-14 NOTE — PROGRESS NOTES
1. Have you been to the ER, urgent care clinic since your last visit? Hospitalized since your last visit? No    2. Have you seen or consulted any other health care providers outside of the [facility] since your last visit? Include any pap smears or colon screening.  No

## 2018-05-17 ENCOUNTER — HOSPITAL ENCOUNTER (OUTPATIENT)
Dept: INFUSION THERAPY | Age: 53
Discharge: HOME | End: 2018-05-17
Payer: MEDICARE

## 2018-05-17 VITALS
HEART RATE: 67 BPM | RESPIRATION RATE: 18 BRPM | OXYGEN SATURATION: 98 % | DIASTOLIC BLOOD PRESSURE: 56 MMHG | TEMPERATURE: 98.7 F | SYSTOLIC BLOOD PRESSURE: 92 MMHG

## 2018-05-17 LAB
ANION GAP BLD CALC-SCNC: 17 MMOL/L (ref 10–20)
BUN BLD-MCNC: 10 MG/DL (ref 9–20)
CA-I BLD-MCNC: 1.22 MMOL/L (ref 1.12–1.32)
CHLORIDE BLD-SCNC: 93 MMOL/L (ref 98–107)
CO2 BLD-SCNC: 27 MMOL/L (ref 21–32)
CREAT BLD-MCNC: 0.8 MG/DL (ref 0.6–1.3)
GLUCOSE BLD-MCNC: 122 MG/DL (ref 65–100)
HCT VFR BLD CALC: 34 % (ref 35–47)
MAGNESIUM SERPL-MCNC: 1.6 MG/DL (ref 1.6–2.4)
PHOSPHATE SERPL-MCNC: 3.5 MG/DL (ref 2.6–4.7)
POTASSIUM BLD-SCNC: 4.1 MMOL/L (ref 3.5–5.1)
SERVICE CMNT-IMP: ABNORMAL
SODIUM BLD-SCNC: 132 MMOL/L (ref 136–145)

## 2018-05-17 PROCEDURE — 83735 ASSAY OF MAGNESIUM: CPT | Performed by: PEDIATRICS

## 2018-05-17 PROCEDURE — 80047 BASIC METABLC PNL IONIZED CA: CPT

## 2018-05-17 PROCEDURE — 84100 ASSAY OF PHOSPHORUS: CPT | Performed by: PEDIATRICS

## 2018-05-17 PROCEDURE — 36415 COLL VENOUS BLD VENIPUNCTURE: CPT | Performed by: PEDIATRICS

## 2018-05-17 PROCEDURE — 96372 THER/PROPH/DIAG INJ SC/IM: CPT

## 2018-05-17 PROCEDURE — 74011250636 HC RX REV CODE- 250/636: Performed by: PEDIATRICS

## 2018-05-17 RX ADMIN — DENOSUMAB 60 MG: 60 INJECTION SUBCUTANEOUS at 10:18

## 2018-05-17 NOTE — PROGRESS NOTES
[facility] Visit Note:  Arrived - 1005  Visit Vitals    BP 92/56 (BP 1 Location: Left arm)    Pulse 67    Temp 98.7 °F (37.1 °C)    Resp 18    SpO2 98%           Assessment completed. Pt denies any distress or discomfort at this time. Reviewed Prolia info. Pt denies any recent or upcoming dental work. Prolia 60mg SQ slowly in left arm. 1020 - Tolerated well. No reaction noted. Reviewed Prolia D/C instructions. Verbalized understanding. Pt denies any acute problems/changes. Discharged from [facility] ambulatory.  No distress with next apt

## 2018-05-17 NOTE — PROGRESS NOTES
Problem: Patient Education:  Go to Education Activity  Goal: Patient/Family Education  Outcome: Progressing Towards Goal  Pt aware to ask questions when they arise

## 2018-05-22 ENCOUNTER — DOCUMENTATION ONLY (OUTPATIENT)
Dept: FAMILY MEDICINE CLINIC | Age: 53
End: 2018-05-22

## 2018-06-05 ENCOUNTER — TELEPHONE (OUTPATIENT)
Dept: FAMILY MEDICINE CLINIC | Age: 53
End: 2018-06-05

## 2018-06-05 NOTE — TELEPHONE ENCOUNTER
Pt states since stopping her levocetirizine she has been nauseous and is asking if there is something else she could take, or something over the counter.

## 2018-06-07 DIAGNOSIS — I49.3 PVC (PREMATURE VENTRICULAR CONTRACTION): ICD-10-CM

## 2018-06-07 DIAGNOSIS — R42 DIZZINESS: ICD-10-CM

## 2018-06-07 RX ORDER — ZOLPIDEM TARTRATE 5 MG/1
TABLET ORAL
OUTPATIENT
Start: 2018-06-07

## 2018-06-14 ENCOUNTER — OFFICE VISIT (OUTPATIENT)
Dept: FAMILY MEDICINE CLINIC | Age: 53
End: 2018-06-14

## 2018-06-14 VITALS
WEIGHT: 138 LBS | TEMPERATURE: 95.2 F | SYSTOLIC BLOOD PRESSURE: 125 MMHG | HEIGHT: 63 IN | OXYGEN SATURATION: 100 % | HEART RATE: 82 BPM | BODY MASS INDEX: 24.45 KG/M2 | RESPIRATION RATE: 18 BRPM | DIASTOLIC BLOOD PRESSURE: 71 MMHG

## 2018-06-14 DIAGNOSIS — I49.3 PVC (PREMATURE VENTRICULAR CONTRACTION): ICD-10-CM

## 2018-06-14 DIAGNOSIS — F07.81 CONCUSSION SYNDROME: Primary | ICD-10-CM

## 2018-06-14 DIAGNOSIS — R11.0 NAUSEA: ICD-10-CM

## 2018-06-14 DIAGNOSIS — R42 DIZZINESS: ICD-10-CM

## 2018-06-14 DIAGNOSIS — G47.01 INSOMNIA DUE TO MEDICAL CONDITION: ICD-10-CM

## 2018-06-14 RX ORDER — ONDANSETRON 4 MG/1
4-8 TABLET, ORALLY DISINTEGRATING ORAL
Qty: 45 TAB | Refills: 0 | Status: SHIPPED | OUTPATIENT
Start: 2018-06-14 | End: 2018-09-05

## 2018-06-14 RX ORDER — ZOLPIDEM TARTRATE 5 MG/1
5 TABLET ORAL
Qty: 30 TAB | Refills: 0 | Status: SHIPPED | OUTPATIENT
Start: 2018-06-14 | End: 2018-07-09 | Stop reason: SDUPTHER

## 2018-06-14 NOTE — PROGRESS NOTES
Subjective:     Chief Complaint   Patient presents with   [address] Follow Up    Dizziness    Nausea       Chun Rivera is a 46 y.o. female who presents today for follow-up from an ER visit 4 days ago after she hit her head on a tailgate. She had been experiencing numbness in her head and neck that day after the injury. Her CT of the head and cervical spine were negative and she was diagnosed with a concussion. This is her second one within the past 8 months. 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 However, yesterday she went to a neighbor's house around [address] and around 10:30 she felt like she was going to pass out. A day later she tried to take her dog for a walk and had to turn around due to dizziness. She is also c/o constant nausea. Feels better when she lays down to rest. She is also having problems sleeping at night although this is not a new problem. She used to take Ambien in the past and would like to restart it. She has already tried Melatonin and it does not work. She can only tolerate 400mg of gabapentin and it does not help insomnia, only her neuropathy. Patient Active Problem List   Diagnosis Code   [facility]. Barbkwame Griffinmarina    Hx of stroke without residual deficits Z86.73    Hx of brain surgery Z98.890    Type 1 diabetes mellitus with diabetic polyneuropathy (HCC) E10.42    Acquired hypothyroidism E03.9    Primary osteoarthritis involving multiple joints M15.0    History of hypoglycemia Z86.39    Pain in right foot M79.671    Chronic allergic rhinitis J30.9       Past Medical History:   Diagnosis Date    Arthritis     Constipation     Diabetes ([address]     Falls     Fatigue     Headache     Joint pain     Memory disorder     Menopause     Neuropathy     Osteoporosis     Thyroid disease     Unspecified cerebral artery occlusion with cerebral infarction          Current Outpatient Prescriptions:     albuterol (PROVENTIL HFA, VENTOLIN HFA, PROAIR HFA) 90 mcg/actuation inhaler, Take 2 Puffs by inhalation every four (4) hours as needed (chest tightness). Indications: BRONCHOSPASM PREVENTION, Disp: 1 Inhaler, Rfl: 0    gabapentin (NEURONTIN) 100 mg capsule, 200 mg two (2) times a day., Disp: , Rfl:     insulin detemir U-100 (LEVEMIR U-100 INSULIN) 100 unit/mL injection, 12 units in morning and 8 at night, Disp: 12 Vial, Rfl: 1    insulin lispro (HUMALOG U-100 INSULIN) 100 unit/mL injection, 4-8 units with each meal. Max 15 units per day, Disp: 1 Vial, Rfl: 0    traMADol (ULTRAM) 50 mg tablet, Take 1 Tab by mouth every six (6) hours as needed for Pain. Max Daily Amount: 200 mg., Disp: 30 Tab, Rfl: 2    fluticasone (FLONASE) 50 mcg/actuation nasal spray, 1 spray each nostril twice daily  Indications: Allergic Rhinitis, Disp: 1 Bottle, Rfl: 5    acetaminophen (TYLENOL EXTRA STRENGTH) 500 mg tablet, Take  by mouth every six (6) hours as needed for Pain (takes 2 tabs BID). , Disp: , Rfl:     GLUC/CHND/OM3/DHA/EPA/FISH/STR (GLUCOSAMINE CHONDROITIN PLUS PO), Take  by mouth., Disp: , Rfl:     cholecalciferol, vitamin D3, (VITAMIN D3) 2,000 unit tab, Take  by mouth., Disp: , Rfl:     diclofenac EC (VOLTAREN) 75 mg EC tablet, Take  by mouth two (2) times a day., Disp: , Rfl:     losartan (COZAAR) 50 mg tablet, Take  by mouth daily. , Disp: , Rfl:     levothyroxine (LEVOTHROID) 75 mcg tablet, Take  by mouth Daily (before breakfast). , Disp: , Rfl:     ascorbic acid (VITAMIN C) 500 mg tablet, Take  by mouth., Disp: , Rfl:     cyanocobalamin (VITAMIN B-12) 500 mcg tablet, Take 500 mcg by mouth daily. , Disp: , Rfl:     VITAMIN A PO, Take  by mouth., Disp: , Rfl:     vitamin E (AQUA GEMS) 400 unit capsule, Take  by mouth.  Only on M and Thurs, Disp: , Rfl:     simvastatin (ZOCOR) 40 mg tablet, Take  by mouth nightly., Disp: , Rfl:     zolpidem (AMBIEN) 5 mg tablet, Take  by mouth nightly as needed for Sleep., Disp: , Rfl:     No Known Allergies    Past Surgical History:   Procedure Laterality Date    HX  SECTION      HX HYSTERECTOMY  2006    HX OOPHORECTOMY      HX ORTHOPAEDIC         History   Smoking Status    Former Smoker    Quit date: 4/10/2007   Smokeless Tobacco    Never Used       Social History     Social History    Marital status: SINGLE     Spouse name: N/A    Number of children: N/A    Years of education: N/A     Social History Main Topics    Smoking status: Former Smoker     Quit date: 4/10/2007    Smokeless tobacco: Never Used    Alcohol use No    Drug use: No    Sexual activity: Not Asked     Other Topics Concern    None     Social History Narrative       Family History   Problem Relation Age of Onset    Heart Disease Mother     Hypertension Mother     Heart Disease Maternal Grandfather     Cancer Maternal Aunt      Pancreatic and Liver    Cancer Maternal Grandmother      Lung    Diabetes Maternal Grandmother     Cancer Maternal Aunt      Breast    Breast Cancer Maternal Aunt     Diabetes Maternal Aunt        ROS:  Gen: denies fever, chills, or fatigue  HEENT:denies H/A or changes in vision  Resp: denies dyspnea, cough, or wheezing  CV: denies chest pain, pressure, or palpitations  Extremeties: denies edema  GI[de-identified] + nausea. denies abdominal pain, vomiting, diarrhea, or constipation  Neuro: + dizziness. + numbness/tingling (has diabetic neuropathy)  Skin: denies rashes or new lesions   Psych: + insomnia, denies changes in mood    Objective:     Visit Vitals    /71 (BP 1 Location: Left arm, BP Patient Position: Sitting)    Pulse 82    Temp 95.2 °F (35.1 °C) (Oral)    Resp 18    Ht 5' 3\" (1.6 m)    Wt 138 lb (62.6 kg)    SpO2 100%    BMI 24.45 kg/m2     Body mass index is 24.45 kg/(m^2). General: Alert and oriented. No acute distress. Well nourished. HEENT :  Eyes: Sclera white, conjunctiva clear. PERRLA. Extra ocular movements intact. Neck: Supple with FROM. Lungs: Breathing even and unlabored.  All lobes clear to auscultation bilaterally   Heart :RRR, S1 and S2 normal intensity, no extra heart sounds  Extremities: Non-edematous  Neuro: Cranial nerves grossly normal.  Mental status: Cognition, concentration, memory, and speech intact. Sensory: Sensation intact. Coordination and balance normal.  Psych: Mood and thought content appropriate for situation. Dressed appropriately and with good hygiene. Skin: Warm, dry, and intact. No lesions or discoloration. No results found for this visit on 06/14/18. Assessment/ Plan:     1. Post concussion syndrome  Educated patient on the importance of \"brain rest\" for a while until she is symptom-free for 24 hours. No physical activity of any kind, no reading or intellectual stimulation. Once she is symptom-free for 24 hours she may gradually increase her level of activity. If symptoms return, she is to go back on \"brain rest\" and try again in a few days after she is symptom-free. Recommended she follow up in a couple of weeks but pt says she will call me if she has any problems. 2. Nausea  Start Zofran 4mg- take 1-2 tablets SL q8 hours prn nausea    3. Insomnia  Printed out script for Ambien 5mg po qHS prn sleep per pt request, it has worked well for her in the past.  Recommended her use it with caution considering post-concussion status. Instructed her to wait to start using it after she has been symptom-free for a few days. Notify PCP if insomnia worsens or if medication is ineffective. Verbal and written instructions (see AVS) provided.  Patient expresses understanding of diagnosis and treatment plan. Health Maintenance Due   Topic Date Due    Pneumococcal 19-64 Medium Risk (1 of 1 - PPSV23) 04/02/1983    PAP AKA CERVICAL CYTOLOGY  04/02/1985         Follow-up Disposition: Not on File      Nora Elizalde NP
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
## 2018-06-14 NOTE — MR AVS SNAPSHOT
[address] [facility] 
 
 
 [phone] Patient: Reinier Morrell MRN: [id number] :1965 Visit Information Date & Time Provider Department Dept. Phone Encounter #  
 2018  1:00 PM Shankar Lofton NP [address] [phone] Follow-up Instructions Return if symptoms worsen or fail to improve. Your Appointments 8/3/2018 11:00 AM  
Follow Up with Rosa Maria Aldana MD  
[facility] ([address]) Appt Note: 3 month f/u  
 [address] [place] [address]  
[phone] [address] [facility] [address]  
  
    
 8/10/2018 11:10 AM  
Follow Up with Emil Jones MD  
[facility] [address] [place] [address]) Appt Note: 3 month f/u  Diabetes [facility] [address] [id number] [address] [facility] Upcoming Health Maintenance Date Due Pneumococcal 19-64 Medium Risk (1 of 1 - PPSV23) 1983 PAP AKA CERVICAL CYTOLOGY 1985 Influenza Age 5 to Adult 2018 HEMOGLOBIN A1C Q6M 10/3/2018 MEDICARE YEARLY EXAM 2018 MICROALBUMIN Q1 4/3/2019 LIPID PANEL Q1 4/3/2019 FOOT EXAM Q1 4/10/2019 BREAST CANCER SCRN MAMMOGRAM 2019 EYE EXAM RETINAL OR DILATED Q1 2019 COLONOSCOPY 2027 DTaP/Tdap/Td series (2 - Td) 4/3/2028 Allergies as of 2018  Review Complete On: 2018 By: Shankar Lofton NP No Known Allergies Current Immunizations  Reviewed on 2017 No immunizations on file. Not reviewed this visit You Were Diagnosed With   
  
 Codes Comments Concussion syndrome    -  Primary ICD-10-CM: F07.81 ICD-9-CM: 310.2 Nausea     ICD-10-CM: R11.0 ICD-9-CM: 787.02   
 Insomnia due to medical condition     ICD-10-CM: G47.01 
ICD-9-CM: 327.01 Dizziness     ICD-10-CM: A44 ICD-9-CM: 780.4 PVC (premature ventricular contraction)     ICD-10-CM: I49.3 ICD-9-CM: 427.69 Vitals BP Pulse Temp Resp Height(growth percentile) Weight(growth percentile) 125/71 (BP 1 Location: Left arm, BP Patient Position: Sitting) 82 95.2 °F (35.1 °C) (Oral) 18 5' 3\" (1.6 m) 138 lb (62.6 kg) SpO2 BMI OB Status Smoking Status 100% 24.45 kg/m2 Hysterectomy Former Smoker Vitals History BMI and BSA Data Body Mass Index Body Surface Area  
 24.45 kg/m 2 1.67 m 2 Preferred Pharmacy Pharmacy Name Phone [address] [phone] Your Updated Medication List  
  
   
This list is accurate as of 6/14/18  1:38 PM.  Always use your most recent med list.  
  
  
  
  
 albuterol 90 mcg/actuation inhaler Commonly known as:  PROVENTIL HFA, VENTOLIN HFA, PROAIR HFA Take 2 Puffs by inhalation every four (4) hours as needed (chest tightness). Indications: BRONCHOSPASM PREVENTION  
  
 diclofenac EC 75 mg EC tablet Commonly known as:  VOLTAREN Take  by mouth two (2) times a day. fluticasone 50 mcg/actuation nasal spray Commonly known as:  FLONASE  
1 spray each nostril twice daily  Indications: Allergic Rhinitis  
  
 gabapentin 100 mg capsule Commonly known as:  NEURONTIN  
200 mg two (2) times a day. GLUCOSAMINE CHONDROITIN PLUS PO Take  by mouth. insulin detemir U-100 100 unit/mL injection Commonly known as:  LEVEMIR U-100 INSULIN  
12 units in morning and 8 at night  
  
 insulin lispro 100 unit/mL injection Commonly known as:  HumaLOG U-100 Insulin 4-8 units with each meal. Max 15 units per day LEVOTHROID 75 mcg tablet Generic drug:  levothyroxine Take  by mouth Daily (before breakfast). losartan 50 mg tablet Commonly known as:  COZAAR  
 Take  by mouth daily. ondansetron 4 mg disintegrating tablet Commonly known as:  ZOFRAN ODT Take 1-2 Tabs by mouth every eight (8) hours as needed for Nausea. simvastatin 40 mg tablet Commonly known as:  ZOCOR Take  by mouth nightly. traMADol 50 mg tablet Commonly known as:  ULTRAM  
Take 1 Tab by mouth every six (6) hours as needed for Pain. Max Daily Amount: 200 mg.  
  
 TYLENOL EXTRA STRENGTH 500 mg tablet Generic drug:  acetaminophen Take  by mouth every six (6) hours as needed for Pain (takes 2 tabs BID). VITAMIN A PO Take  by mouth. VITAMIN B-12 500 mcg tablet Generic drug:  cyanocobalamin Take 500 mcg by mouth daily. VITAMIN C 500 mg tablet Generic drug:  ascorbic acid (vitamin C) Take  by mouth. VITAMIN D3 2,000 unit Tab Generic drug:  cholecalciferol (vitamin D3) Take  by mouth.  
  
 vitamin E 400 unit capsule Commonly known as:  [address] Take  by mouth. Only on M and Thurs  
  
 zolpidem 5 mg tablet Commonly known as:  AMBIEN Take 1 Tab by mouth nightly as needed for Sleep. Max Daily Amount: 5 mg. Prescriptions Printed Refills  
 zolpidem (AMBIEN) 5 mg tablet 0 Sig: Take 1 Tab by mouth nightly as needed for Sleep. Max Daily Amount: 5 mg. Class: Print Route: Oral  
  
Prescriptions Sent to Pharmacy Refills  
 ondansetron (ZOFRAN ODT) 4 mg disintegrating tablet 0 Sig: Take 1-2 Tabs by mouth every eight (8) hours as needed for Nausea. Class: Normal  
 Pharmacy: [facility] [address] [address] [address] Ph #: [phone] Route: Oral  
  
Follow-up Instructions Return if symptoms worsen or fail to improve. To-Do List   
 11/15/2018 10:30 AM  
  Appointment with CHAIR 2 JEAN-PAUL [facility] ([phone]) Patient Instructions Postconcussion Syndrome: Care Instructions Your Care Instructions Postconcussion syndrome occurs after a blow to the head or body. Common symptoms are changes in the ability to concentrate, think, remember, or solve problems. Symptoms, which may include headaches, personality changes, and dizziness, may be related to stress from the events surrounding the accident that caused the injury. Follow-up care is a key part of your treatment and safety. Be sure to make and go to all appointments, and call your doctor if you are having problems. It's also a good idea to know your test results and keep a list of the medicines you take. How can you care for yourself at home? Pain · Rest is the best treatment for postconcussion syndrome. · Do not drive if you have taken a prescription pain medicine. · Rest in a quiet, dark room until your headache is gone. Close your eyes and try to relax or go to sleep. Do not watch TV or read. · Put a cold, moist cloth or cold pack on the painful area for 10 to 20 minutes at a time. Put a thin cloth between the cold pack and your skin. · Have someone gently massage your neck and shoulders. · Take your medicines exactly as prescribed. Call your doctor if you think you are having a problem with your medicine. You will get more details on the specific medicines your doctor prescribes. Stress · Try to reduce stress. Some ways to do this include: ¨ Taking slow, deep breaths. ¨ Soaking in a warm bath. ¨ Listening to soothing music. ¨ Taking a yoga class. ¨ Having a massage or back rub. ¨ Drinking a warm, nonalcoholic, noncaffeinated beverage. · Get enough sleep. · Eat a healthy, balanced diet. A balanced diet includes whole grains, dairy, fruits and vegetables, and protein. Eat a variety of foods from each of those groups so you get all the nutrients you need. · Avoid alcohol and illegal drugs. · Try relaxation exercises, such as breathing and muscle relaxation exercises. · Talk to your doctor about counseling. It may help you deal with stress from your accident. When should you call for help? Watch closely for changes in your health, and be sure to contact your doctor if: 
? · You do not get better as expected. ? · Your symptoms, such as headaches, trouble concentrating, or changes in mood, get worse. Where can you learn more? Go to http://barry-emma.info/. Enter V377 in the search box to learn more about \"Postconcussion Syndrome: Care Instructions. \" Current as of: October 14, 2016 Content Version: 11.4 © 3767-7035 niid.to. Care instructions adapted under license by [employer] (which disclaims liability or warranty for this information). If you have questions about a medical condition or this instruction, always ask your healthcare professional. [address] any warranty or liability for your use of this information. Introducing [facility]! [facility] introduces [employer] CU[employer] some sugar patient portal. Now you can access parts of your medical record, email your doctor's office, and request medication refills online. 1. In your internet browser, go to [email or web address] 2. Click on the First Time User? Click Here link in the Sign In box. You will see the New Member Sign Up page. 3. Enter your [employer] Access Code exactly as it appears below. You will not need to use this code after youve completed the sign-up process. If you do not sign up before the expiration date, you must request a new code. · [employer] Access Code: [email or web address] Expires: 7/2/2018 12:44 PM 
 
4. Enter the last four digits of your Social Security Number (xxxx) and Date of Birth (mm/dd/yyyy) as indicated and click Submit. You will be taken to the next sign-up page. 5. Create a [employer] some sugar ID. This will be your [employer] login ID and cannot be changed, so think of one that is secure and easy to remember. 6. Create a Collarity password. You can change your password at any time. 7. Enter your Password Reset Question and Answer. This can be used at a later time if you forget your password. 8. Enter your e-mail address. You will receive e-mail notification when new information is available in [address] 9. Click Sign Up. You can now view and download portions of your medical record. 10. Click the Download Summary menu link to download a portable copy of your medical information. If you have questions, please visit the Frequently Asked Questions section of the Collarity website. Remember, Collarity is NOT to be used for urgent needs. For medical emergencies, dial 911. Now available from your iPhone and Android! Please provide this summary of care documentation to your next provider. Your primary care clinician is listed as Ricardo Dai. If you have any questions after today's visit, please call [phone].

## 2018-07-03 DIAGNOSIS — I49.3 PVC (PREMATURE VENTRICULAR CONTRACTION): ICD-10-CM

## 2018-07-03 DIAGNOSIS — R42 DIZZINESS: ICD-10-CM

## 2018-07-03 RX ORDER — DICLOFENAC SODIUM 75 MG/1
TABLET, DELAYED RELEASE ORAL 2 TIMES DAILY
OUTPATIENT
Start: 2018-07-03

## 2018-07-05 DIAGNOSIS — R42 DIZZINESS: ICD-10-CM

## 2018-07-05 DIAGNOSIS — I49.3 PVC (PREMATURE VENTRICULAR CONTRACTION): ICD-10-CM

## 2018-07-06 ENCOUNTER — OFFICE VISIT (OUTPATIENT)
Dept: FAMILY MEDICINE CLINIC | Age: 53
End: 2018-07-06

## 2018-07-06 VITALS
TEMPERATURE: 96.1 F | RESPIRATION RATE: 18 BRPM | SYSTOLIC BLOOD PRESSURE: 127 MMHG | DIASTOLIC BLOOD PRESSURE: 65 MMHG | WEIGHT: 134 LBS | HEIGHT: 63 IN | OXYGEN SATURATION: 100 % | BODY MASS INDEX: 23.74 KG/M2 | HEART RATE: 84 BPM

## 2018-07-06 DIAGNOSIS — G56.01 CARPAL TUNNEL SYNDROME OF RIGHT WRIST: ICD-10-CM

## 2018-07-06 DIAGNOSIS — M79.671 PAIN IN RIGHT FOOT: ICD-10-CM

## 2018-07-06 DIAGNOSIS — M15.9 PRIMARY OSTEOARTHRITIS INVOLVING MULTIPLE JOINTS: ICD-10-CM

## 2018-07-06 DIAGNOSIS — Z79.899 ENCOUNTER FOR LONG-TERM (CURRENT) USE OF MEDICATIONS: Primary | ICD-10-CM

## 2018-07-06 RX ORDER — DICLOFENAC SODIUM 75 MG/1
75 TABLET, DELAYED RELEASE ORAL 2 TIMES DAILY
Qty: 60 TAB | Refills: 3 | Status: SHIPPED | OUTPATIENT
Start: 2018-07-06 | End: 2018-09-27 | Stop reason: SDUPTHER

## 2018-07-06 NOTE — PROGRESS NOTES
1. Have you been to the ER, urgent care clinic since your last visit? Hospitalized since your last visit?no    2. Have you seen or consulted any other health care providers outside of the [facility] since your last visit? Include any pap smears or colon screening.  no

## 2018-07-06 NOTE — PROGRESS NOTES
Subjective:     Chief Complaint   Patient presents with    Medication Refill       Avery Turner is a 46 y.o. female who presents today to request refills for diclofenac. Says she has chronic arthritic pain in her big toe joint on the R foot. Also has pain in the tailbone area from a few falls that occurred almost 8 months ago. Has significant osteoporosis and receives Prolia injections every 6 months at [facility]. She always takes her diclofenac with food to protect her stomach. She also mentions some numbness and tingling in the 2nd, 3rd, and 4th fingers of the R hand. Said she accidentally hit the tip of one of the fingers the other day and it was excruciating. Her mother has a hx of carpal tunnel and believes that is what it is. Already takes gabapentin and tramadol for chronic pain. Also has a hx of diabetes. Is hoping she does not need  surgery. Patient Active Problem List   Diagnosis Code   [facility]. Crissie Pel    Hx of stroke without residual deficits Z86.73    Hx of brain surgery Z98.890    Type 1 diabetes mellitus with diabetic polyneuropathy (HCC) E10.42    Acquired hypothyroidism E03.9    Primary osteoarthritis involving multiple joints M15.0    History of hypoglycemia Z86.39    Pain in right foot M79.671    Chronic allergic rhinitis J30.9       Past Medical History:   Diagnosis Date    Arthritis     Constipation     Diabetes ([address]     Falls     Fatigue     Headache     Joint pain     Memory disorder     Menopause     Neuropathy     Osteoporosis     Thyroid disease     Unspecified cerebral artery occlusion with cerebral infarction          Current Outpatient Prescriptions:     ondansetron (ZOFRAN ODT) 4 mg disintegrating tablet, Take 1-2 Tabs by mouth every eight (8) hours as needed for Nausea., Disp: 45 Tab, Rfl: 0    zolpidem (AMBIEN) 5 mg tablet, Take 1 Tab by mouth nightly as needed for Sleep.  Max Daily Amount: 5 mg., Disp: 30 Tab, Rfl: 0    albuterol (PROVENTIL HFA, VENTOLIN HFA, PROAIR HFA) 90 mcg/actuation inhaler, Take 2 Puffs by inhalation every four (4) hours as needed (chest tightness). Indications: BRONCHOSPASM PREVENTION, Disp: 1 Inhaler, Rfl: 0    gabapentin (NEURONTIN) 100 mg capsule, 200 mg two (2) times a day., Disp: , Rfl:     insulin detemir U-100 (LEVEMIR U-100 INSULIN) 100 unit/mL injection, 12 units in morning and 8 at night, Disp: 12 Vial, Rfl: 1    insulin lispro (HUMALOG U-100 INSULIN) 100 unit/mL injection, 4-8 units with each meal. Max 15 units per day, Disp: 1 Vial, Rfl: 0    traMADol (ULTRAM) 50 mg tablet, Take 1 Tab by mouth every six (6) hours as needed for Pain. Max Daily Amount: 200 mg., Disp: 30 Tab, Rfl: 2    fluticasone (FLONASE) 50 mcg/actuation nasal spray, 1 spray each nostril twice daily  Indications: Allergic Rhinitis, Disp: 1 Bottle, Rfl: 5    acetaminophen (TYLENOL EXTRA STRENGTH) 500 mg tablet, Take  by mouth every six (6) hours as needed for Pain (takes 2 tabs BID). , Disp: , Rfl:     GLUC/CHND/OM3/DHA/EPA/FISH/STR (GLUCOSAMINE CHONDROITIN PLUS PO), Take  by mouth., Disp: , Rfl:     cholecalciferol, vitamin D3, (VITAMIN D3) 2,000 unit tab, Take  by mouth., Disp: , Rfl:     losartan (COZAAR) 50 mg tablet, Take  by mouth daily. , Disp: , Rfl:     levothyroxine (LEVOTHROID) 75 mcg tablet, Take  by mouth Daily (before breakfast). , Disp: , Rfl:     ascorbic acid (VITAMIN C) 500 mg tablet, Take  by mouth., Disp: , Rfl:     cyanocobalamin (VITAMIN B-12) 500 mcg tablet, Take 500 mcg by mouth daily. , Disp: , Rfl:     VITAMIN A PO, Take  by mouth., Disp: , Rfl:     vitamin E (AQUA GEMS) 400 unit capsule, Take  by mouth.  Only on M and Thurs, Disp: , Rfl:     simvastatin (ZOCOR) 40 mg tablet, Take  by mouth nightly., Disp: , Rfl:     diclofenac EC (VOLTAREN) 75 mg EC tablet, Take 1 Tab by mouth two (2) times a day., Disp: 60 Tab, Rfl: 3    No Known Allergies    Past Surgical History:   Procedure Laterality Date    HX  SECTION      HX HYSTERECTOMY  2006    HX OOPHORECTOMY      HX ORTHOPAEDIC         History   Smoking Status    Former Smoker    Quit date: 4/10/2007   Smokeless Tobacco    Never Used       Social History     Social History    Marital status: SINGLE     Spouse name: N/A    Number of children: N/A    Years of education: N/A     Social History Main Topics    Smoking status: Former Smoker     Quit date: 4/10/2007    Smokeless tobacco: Never Used    Alcohol use No    Drug use: No    Sexual activity: Not on file     Other Topics Concern    Not on file     Social History Narrative       Family History   Problem Relation Age of Onset    Heart Disease Mother     Hypertension Mother     Heart Disease Maternal Grandfather     Cancer Maternal Aunt      Pancreatic and Liver    Cancer Maternal Grandmother      Lung    Diabetes Maternal Grandmother     Cancer Maternal Aunt      Breast    Breast Cancer Maternal Aunt     Diabetes Maternal Aunt        ROS:  Gen: denies fever, chills, or fatigue  Resp: denies dyspnea  CV: denies chest pain  Extremeties: denies edema, pallor, or cyanosis  Musculoskeletal: + joint pain of great toe R foot and tailbone, no stiffness or muscle cramps  Neuro: + numbness/tingling in 3 middle finger of R hand. No dizziness  Skin: denies rashes or new lesions     Objective:     Visit Vitals    /65    Pulse 84    Temp 96.1 °F (35.6 °C)    Resp 18    Ht 5' 3\" (1.6 m)    Wt 134 lb (60.8 kg)    SpO2 100%    BMI 23.74 kg/m2     Body mass index is 23.74 kg/(m^2). General: Alert and oriented. No acute distress. Well nourished. Lungs: Breathing even and unlabored. Extremities: Non-edematous, no pallor or cyanosis. Bilat. radial pulses 2+  Musculoskeletal: No joint heat, erythema, or swelling. FROM in all joints. Neuro: Cranial nerves grossly normal.  Mental status: Cognition, concentration, memory, and speech intact.    Sensory: Decreased sensation to light touch in 3 middle fingers of R hand. + Phalen's test. Coordination and balance normal.  Motor: good  strength. Skin: Warm, dry, and intact. No lesions or discoloration. No results found for this visit on 07/06/18. Assessment/ Plan:     1. Arthritis  Cont diclofenac 75mg po BID prn pain- take with food    2. Carpal tunnel syndrome, R hand  Recommended wrist splints to relieve pressure of median nerve. Pt sees an orthopedist Dr Darrell Aquino for her R foot. Says she will ask him if one of his partners at [facility] can look at her hand at her next appt 7/19. Verbal and written instructions (see AVS) provided.  Patient expresses understanding of diagnosis and treatment plan. Health Maintenance Due   Topic Date Due    Pneumococcal 19-64 Medium Risk (1 of 1 - PPSV23) 04/02/1983    PAP AKA CERVICAL CYTOLOGY  04/02/1985         Follow-up Disposition: Not on File      Nora Barrientos NP

## 2018-07-06 NOTE — TELEPHONE ENCOUNTER
Spoke to patient after verifying two identifiers, informed per provider her prescription refill request required a office visit for approval.  Patient acknowledged understanding. Forwarded call to scheduling.

## 2018-07-09 DIAGNOSIS — R42 DIZZINESS: ICD-10-CM

## 2018-07-09 DIAGNOSIS — I49.3 PVC (PREMATURE VENTRICULAR CONTRACTION): ICD-10-CM

## 2018-07-09 RX ORDER — ZOLPIDEM TARTRATE 5 MG/1
5 TABLET ORAL
Qty: 30 TAB | Refills: 3 | OUTPATIENT
Start: 2018-07-09 | End: 2018-08-31 | Stop reason: SDUPTHER

## 2018-07-11 LAB — DRUGS UR: NORMAL

## 2018-08-03 DIAGNOSIS — M79.671 PAIN IN RIGHT FOOT: ICD-10-CM

## 2018-08-03 DIAGNOSIS — F11.90 CHRONIC NARCOTIC USE: Primary | ICD-10-CM

## 2018-08-03 RX ORDER — TRAMADOL HYDROCHLORIDE 50 MG/1
50 TABLET ORAL
Qty: 30 TAB | Refills: 2 | OUTPATIENT
Start: 2018-08-03

## 2018-08-06 ENCOUNTER — LAB ONLY (OUTPATIENT)
Dept: FAMILY MEDICINE CLINIC | Age: 53
End: 2018-08-06

## 2018-08-06 DIAGNOSIS — Z79.899 ENCOUNTER FOR LONG-TERM (CURRENT) USE OF MEDICATIONS: Primary | ICD-10-CM

## 2018-08-08 ENCOUNTER — TELEPHONE (OUTPATIENT)
Dept: FAMILY MEDICINE CLINIC | Age: 53
End: 2018-08-08

## 2018-08-08 NOTE — TELEPHONE ENCOUNTER
Pt calling for refill on Tramadol and states she did the uds as directed. She is stating as of tomorrow she will be out.

## 2018-08-10 ENCOUNTER — OFFICE VISIT (OUTPATIENT)
Dept: ENDOCRINOLOGY | Age: 53
End: 2018-08-10

## 2018-08-10 VITALS
WEIGHT: 135.2 LBS | DIASTOLIC BLOOD PRESSURE: 64 MMHG | HEIGHT: 63 IN | SYSTOLIC BLOOD PRESSURE: 113 MMHG | HEART RATE: 63 BPM | BODY MASS INDEX: 23.96 KG/M2

## 2018-08-10 DIAGNOSIS — R53.83 FATIGUE, UNSPECIFIED TYPE: ICD-10-CM

## 2018-08-10 DIAGNOSIS — E10.42 TYPE 1 DIABETES MELLITUS WITH DIABETIC POLYNEUROPATHY (HCC): Primary | ICD-10-CM

## 2018-08-10 DIAGNOSIS — R68.89 OTHER GENERAL SYMPTOMS AND SIGNS: ICD-10-CM

## 2018-08-10 DIAGNOSIS — E55.9 HYPOVITAMINOSIS D: ICD-10-CM

## 2018-08-10 DIAGNOSIS — E03.9 ACQUIRED HYPOTHYROIDISM: ICD-10-CM

## 2018-08-10 LAB
AMPHETAMINES UR QL SCN: NEGATIVE NG/ML
BARBITURATES UR QL SCN: NEGATIVE NG/ML
BENZODIAZ UR QL SCN: NEGATIVE NG/ML
BZE UR QL SCN: NEGATIVE NG/ML
CANNABINOIDS UR QL SCN: NEGATIVE NG/ML
CREAT UR-MCNC: 35.4 MG/DL (ref 20–300)
FENTANYL+NORFENTANYL UR QL SCN: NEGATIVE PG/ML
HBA1C MFR BLD HPLC: 7.2 %
MEPERIDINE UR QL: NEGATIVE NG/ML
METHADONE UR QL SCN: NEGATIVE NG/ML
OPIATES UR QL SCN: NEGATIVE NG/ML
OXYCODONE+OXYMORPHONE UR QL SCN: NEGATIVE NG/ML
PCP UR QL: NEGATIVE NG/ML
PH UR: 6.2 [PH] (ref 4.5–8.9)
PLEASE NOTE:, 733157: ABNORMAL
PROPOXYPH UR QL SCN: NEGATIVE NG/ML
SP GR UR: 1.01
TRAMADOL UR-MCNC: POSITIVE UG/ML

## 2018-08-10 NOTE — PROGRESS NOTES
Chief Complaint   Patient presents with    Diabetes     pcp and pharmacy verified   Records since last visit reviewed. History of Present Illness: Supriya Slater is a 46 y.o. female here for follow up of diabetes. Pt notes \"I have been a Type I diabetic for 37 years\". \"I don't count carbs, I eat what I want and most of the time I will remember to take my insulin but not all the time. I stay active walking my dog 2-4 miles per day\". At our initial visit in April 2018 her regimen consisted of Levemir 12 units in the AM and 11 units with dinner and Humalog \"I don't usually take more than 2 units, but sometimes 2 is not enough and if I am going to eat a desert and what my blood sugar is I may take up to 4 units. Checks blood sugars 8 times per day because she is prone to low blood sugars and she is not able to sense hypoglycemia. At our last visit in May 2018 her BGs were improved, but still above goal, but was having issues of hypoglycemia, particularly at bedtime. I instructed her to change her insulin regimen to Levemir to 12 units in the AM and 8 units HS and Humalog 5 units with each meal plus a 1:50 correction. Her A1C today was 7.2%. She is taking Levemir 12 units in the AM and 10 units HS and Humalog 2-6 units based on what her sugars are running and how many carbs she is eating. Pt notes she fractured a rib, left foot and she needs surgery for her right wrist.  She has a stress fracture in her left foot and her podiatrist put her in a walking boot. Her orthopedic doctor will be doing a nerve study to see if she has CTS as she has been having numbness in her fingers. Pt notes that \"I feel exhausted, like someone drained my energy out\". When she wakes up she feels good in the morning, but \"by the afternoon I feel drained and tired\". She had a low BG in the 50's today. She notes she has not been having issues of hypoglycemia, before today.   She notes that \"I have not been checking my BGs as frequently because the cost of the strips so if I don't feel bad I may not check my BG and won't take my insulin and then if my BGs get high I have to start playing catch-up. Her BGs have been in the 's. A typical day is as follows:  Pt wakes around 6-7AM.  She gets up, takes her dog for a two mile walk and then drinks a pepsi and \"if my sugar is not low I will give insulin to cover the pepsi\". She will have 5 saltine crackers to help with the nausea she has in the morning, since her PCP stopped her Metoclopramide. Her next meal will be around 1-5PM, yesterday she had three slices of cheese pizza and and water. This tends to me her main meal of the day and she does not eat another meal.  She did not eat anything after her lunch yesterday. She will have one main meal per day. She is not having the slurpie as often \"mabey twice per month. \"    Exercise consists of waking her dog 2-4 miles per day. Pt has hx of CVA x4, \"one of which caused a CNS leak\". \"I had a heart issue in 1996 and they put me on medication but I was taken off the medication by another Endocrinologist about 3 years ago and I have not had any troubles. She reports she had a stress test and an ECHO and \"everything came back fine\". Pt has hx of polyneuropathy from her knees to her feet. She takes Gabapentin 200mg BID, which does help with her pain. Pt has no hx of Nephrology (Urine MA/Cr 12 on 4/3/18). Last eye exam was June 2018 She does have retinopathy and cataracts \"but there is nothing that needs to be done about it right now\". Pt has hx of osteoporosis for which she is followed by Dr. Meenu Chatman of Rheumatology. 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 She was never treated and it \"went away\". Pt has hx of hypothyroidism and is taking LT4 75mcg daily. Her TSH was 2.230 on 4/3/18. She has been on LT4 since the age of 20.     Current Outpatient Prescriptions   Medication Sig    calcium-cholecalciferol, d3, (CALCIUM 600 + D) 600-125 mg-unit tab Take  by mouth two (2) times a day.  denosumab (PROLIA) 60 mg/mL injection 60 mg by SubCUTAneous route. Every 6 months    zolpidem (AMBIEN) 5 mg tablet Take 1 Tab by mouth nightly as needed for Sleep. Max Daily Amount: 5 mg. (Patient taking differently: Take 5 mg by mouth daily.)    diclofenac EC (VOLTAREN) 75 mg EC tablet Take 1 Tab by mouth two (2) times a day.  albuterol (PROVENTIL HFA, VENTOLIN HFA, PROAIR HFA) 90 mcg/actuation inhaler Take 2 Puffs by inhalation every four (4) hours as needed (chest tightness). Indications: BRONCHOSPASM PREVENTION    gabapentin (NEURONTIN) 100 mg capsule 200 mg two (2) times a day.  insulin detemir U-100 (LEVEMIR U-100 INSULIN) 100 unit/mL injection 12 units in morning and 8 at night (Patient taking differently: 12 units in morning and 10 at night)    insulin lispro (HUMALOG U-100 INSULIN) 100 unit/mL injection 4-8 units with each meal. Max 15 units per day (Patient taking differently: 42-6 units with each meal, per sliding scale. Max 15 units per day)    fluticasone (FLONASE) 50 mcg/actuation nasal spray 1 spray each nostril twice daily  Indications: Allergic Rhinitis    acetaminophen (TYLENOL EXTRA STRENGTH) 500 mg tablet Take  by mouth every six (6) hours as needed for Pain (takes 2 tabs BID).  GLUC/CHND/OM3/DHA/EPA/FISH/STR (GLUCOSAMINE CHONDROITIN PLUS PO) Take  by mouth.  cholecalciferol, vitamin D3, (VITAMIN D3) 2,000 unit tab Take  by mouth.  losartan (COZAAR) 50 mg tablet Take  by mouth daily.  levothyroxine (LEVOTHROID) 75 mcg tablet Take  by mouth Daily (before breakfast).  ascorbic acid (VITAMIN C) 500 mg tablet Take  by mouth.  cyanocobalamin (VITAMIN B-12) 500 mcg tablet Take 500 mcg by mouth daily.  VITAMIN A PO Take  by mouth.  vitamin E (AQUA GEMS) 400 unit capsule Take  by mouth.  Only on M and Thurs    simvastatin (ZOCOR) 40 mg tablet Take  by mouth nightly.  ondansetron (ZOFRAN ODT) 4 mg disintegrating tablet Take 1-2 Tabs by mouth every eight (8) hours as needed for Nausea.  traMADol (ULTRAM) 50 mg tablet Take 1 Tab by mouth every six (6) hours as needed for Pain. Max Daily Amount: 200 mg. No current facility-administered medications for this visit. No Known Allergies  Review of Systems:  - Eyes: no blurry vision or double vision  - Cardiovascular: no chest pain  - Respiratory: no shortness of breath  - Musculoskeletal: no myalgias  - Neurological: no numbness/tingling in extremities    Physical Examination:  Blood pressure 113/64, pulse 63, height 5' 3\" (1.6 m), weight 135 lb 3.2 oz (61.3 kg). - General: pleasant, no distress, good eye contact   - Neck: no carotid bruits  - Cardiovascular: regular, normal rate, nl s1 and s2, no m/r/g, 2+ DP pulses   - Respiratory: clear bilaterally  - Integumentary: no edema, will defer foot exam as she just had an exam 3 months ago by her podiatrist and she is in a walking boot. - Psychiatric: normal mood and affect    Data Reviewed:   Component      Latest Ref Rng & Units 5/17/2018 5/17/2018          10:14 AM 10:12 AM   Calcium, ionized (POC)      1.12 - 1.32 mmol/L 1.22    Sodium (POC)      136 - 145 mmol/L 132 (L)    Potassium (POC)      3.5 - 5.1 mmol/L 4.1    Chloride, POC      98 - 107 mmol/L 93 (L)    CO2 (POC)      21 - 32 mmol/L 27    Anion gap, POC      10 - 20 mmol/L 17    GLUCOSE,FAST - POC      65 - 100 mg/dL 122 (H)    BUN (POC)      9 - 20 mg/dL 10    Creatinine, POC      0.6 - 1.3 mg/dL 0.8    GFRAA, POC      >60 ml/min/1.73m2 >60    GFRNA, POC      >60 ml/min/1.73m2 >60    Hematocrit (POC)      35.0 - 47.0 % 34 (L)    Phosphorus      2.6 - 4.7 MG/DL  3.5     Component      Latest Ref Rng & Units 5/17/2018          10:12 AM   Magnesium      1.6 - 2.4 mg/dL 1.6     Her A1C today was 7.2%. Assessment/Plan:   1) DM > Her A1C today was 7.2%.  When she takes her insulin as instructed her BGs are running in a good range. Pt to continue the Levemir to 12 units in the AM and 10 units HS. Pt to take 5 units of humalog with each meal plus a 1:50 correction. Will have pt check her BGs 8 times per day and keep a log of her blood sugars and a log of what she eats and drinks for the next two weeks and mail her BG logs to me in 2 weeks. With her hx of neuropathy and calluses, she would benefit from DM shoes and inserts. 2) Hypothyroidism > Pt reports that she has been feeling run down and tired. Will check TFTs today. 3) Fatigue > Will check Vitamin D, Vitamin B-12, Iron, CBC and CMP    Pt voices understanding and agreement with the plan. Pain noted and pt was recommended to call her PCP for further evaluation and treatment, as needed    Follow-up Disposition:  Return in about 3 months (around 11/10/2018).     Copy sent to:  Dr. Howie Stern

## 2018-08-11 LAB
25(OH)D3+25(OH)D2 SERPL-MCNC: 67.1 NG/ML (ref 30–100)
ALBUMIN SERPL-MCNC: 4.6 G/DL (ref 3.5–5.5)
ALBUMIN/GLOB SERPL: 2.1 {RATIO} (ref 1.2–2.2)
ALP SERPL-CCNC: 57 IU/L (ref 39–117)
ALT SERPL-CCNC: 27 IU/L (ref 0–32)
AST SERPL-CCNC: 32 IU/L (ref 0–40)
BILIRUB SERPL-MCNC: 1 MG/DL (ref 0–1.2)
BUN SERPL-MCNC: 15 MG/DL (ref 6–24)
BUN/CREAT SERPL: 19 (ref 9–23)
CALCIUM SERPL-MCNC: 9.5 MG/DL (ref 8.7–10.2)
CHLORIDE SERPL-SCNC: 94 MMOL/L (ref 96–106)
CO2 SERPL-SCNC: 26 MMOL/L (ref 20–29)
CREAT SERPL-MCNC: 0.81 MG/DL (ref 0.57–1)
ERYTHROCYTE [DISTWIDTH] IN BLOOD BY AUTOMATED COUNT: 13.4 % (ref 12.3–15.4)
FERRITIN SERPL-MCNC: 89 NG/ML (ref 15–150)
GLOBULIN SER CALC-MCNC: 2.2 G/DL (ref 1.5–4.5)
GLUCOSE SERPL-MCNC: 144 MG/DL (ref 65–99)
HCT VFR BLD AUTO: 34.7 % (ref 34–46.6)
HGB BLD-MCNC: 11.6 G/DL (ref 11.1–15.9)
IRON SATN MFR SERPL: 24 % (ref 15–55)
IRON SERPL-MCNC: 76 UG/DL (ref 27–159)
MAGNESIUM SERPL-MCNC: 1.8 MG/DL (ref 1.6–2.3)
MCH RBC QN AUTO: 30.1 PG (ref 26.6–33)
MCHC RBC AUTO-ENTMCNC: 33.4 G/DL (ref 31.5–35.7)
MCV RBC AUTO: 90 FL (ref 79–97)
PHOSPHATE SERPL-MCNC: 3.7 MG/DL (ref 2.5–4.5)
PLATELET # BLD AUTO: 217 X10E3/UL (ref 150–379)
POTASSIUM SERPL-SCNC: 4.3 MMOL/L (ref 3.5–5.2)
PROT SERPL-MCNC: 6.8 G/DL (ref 6–8.5)
RBC # BLD AUTO: 3.86 X10E6/UL (ref 3.77–5.28)
SODIUM SERPL-SCNC: 134 MMOL/L (ref 134–144)
T4 FREE SERPL-MCNC: 2.03 NG/DL (ref 0.82–1.77)
TIBC SERPL-MCNC: 312 UG/DL (ref 250–450)
TSH SERPL DL<=0.005 MIU/L-ACNC: 1.33 UIU/ML (ref 0.45–4.5)
UIBC SERPL-MCNC: 236 UG/DL (ref 131–425)
VIT B12 SERPL-MCNC: 1223 PG/ML (ref 232–1245)
WBC # BLD AUTO: 4.6 X10E3/UL (ref 3.4–10.8)

## 2018-08-13 ENCOUNTER — TELEPHONE (OUTPATIENT)
Dept: FAMILY MEDICINE CLINIC | Age: 53
End: 2018-08-13

## 2018-08-13 DIAGNOSIS — M79.671 PAIN IN RIGHT FOOT: ICD-10-CM

## 2018-08-13 RX ORDER — TRAMADOL HYDROCHLORIDE 50 MG/1
50 TABLET ORAL
Qty: 30 TAB | Refills: 0 | Status: SHIPPED | OUTPATIENT
Start: 2018-09-10 | End: 2018-09-07

## 2018-08-13 RX ORDER — TRAMADOL HYDROCHLORIDE 50 MG/1
50 TABLET ORAL
Qty: 30 TAB | Refills: 0 | Status: SHIPPED | OUTPATIENT
Start: 2018-08-13 | End: 2018-08-13 | Stop reason: SDUPTHER

## 2018-08-13 NOTE — TELEPHONE ENCOUNTER
Pt called this morning to check on her rx for tramadol. She stated she has been waiting two weeks now and would like a call today.

## 2018-08-13 NOTE — TELEPHONE ENCOUNTER
Spoke to patient on the phone, urine drug screen is appropriately positive for tramadol. Explained to her that I am refilling the Tramadol today and she will need to be seen every 3 months. I last saw her on 7/6/18 so she will need to be seen again around 10/6/18 for more refills. Pt was frustrated at this requirement but verbalized understanding.

## 2018-08-13 NOTE — PROGRESS NOTES
Called pt regarding her labs. Her CBC, CMP, Mag, Phos, Iron, Ferratin, B-12, Vitamin D were in a good normal range. Her TSH was in a good range. Pt to continue the LT4 75mcg daily.

## 2018-08-14 ENCOUNTER — TELEPHONE (OUTPATIENT)
Dept: FAMILY MEDICINE CLINIC | Age: 53
End: 2018-08-14

## 2018-08-14 NOTE — TELEPHONE ENCOUNTER
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 She was also concerned it may be food poisoning since she ate some [place] last night. I advised she go to the emergency room.

## 2018-08-17 DIAGNOSIS — R52 PAIN: Primary | ICD-10-CM

## 2018-08-17 DIAGNOSIS — R42 DIZZINESS: ICD-10-CM

## 2018-08-17 DIAGNOSIS — I49.3 PVC (PREMATURE VENTRICULAR CONTRACTION): ICD-10-CM

## 2018-08-17 RX ORDER — GABAPENTIN 100 MG/1
200 CAPSULE ORAL 2 TIMES DAILY
Qty: 180 CAP | Refills: 1 | Status: SHIPPED | OUTPATIENT
Start: 2018-08-17 | End: 2018-08-31 | Stop reason: SDUPTHER

## 2018-08-17 RX ORDER — LEVOTHYROXINE SODIUM 75 UG/1
75 TABLET ORAL
Qty: 90 TAB | Refills: 1 | Status: SHIPPED | OUTPATIENT
Start: 2018-08-17 | End: 2018-08-31 | Stop reason: SDUPTHER

## 2018-08-31 ENCOUNTER — OFFICE VISIT (OUTPATIENT)
Dept: FAMILY MEDICINE CLINIC | Age: 53
End: 2018-08-31

## 2018-08-31 ENCOUNTER — TELEPHONE (OUTPATIENT)
Dept: FAMILY MEDICINE CLINIC | Age: 53
End: 2018-08-31

## 2018-08-31 VITALS
TEMPERATURE: 97.8 F | SYSTOLIC BLOOD PRESSURE: 127 MMHG | HEIGHT: 63 IN | WEIGHT: 140 LBS | OXYGEN SATURATION: 98 % | RESPIRATION RATE: 18 BRPM | DIASTOLIC BLOOD PRESSURE: 78 MMHG | BODY MASS INDEX: 24.8 KG/M2 | HEART RATE: 70 BPM

## 2018-08-31 DIAGNOSIS — Z01.818 PREOP EXAMINATION: Primary | ICD-10-CM

## 2018-08-31 DIAGNOSIS — I49.3 PVC (PREMATURE VENTRICULAR CONTRACTION): ICD-10-CM

## 2018-08-31 DIAGNOSIS — R52 PAIN: ICD-10-CM

## 2018-08-31 DIAGNOSIS — E10.42 TYPE 1 DIABETES MELLITUS WITH DIABETIC POLYNEUROPATHY (HCC): ICD-10-CM

## 2018-08-31 RX ORDER — LOSARTAN POTASSIUM 50 MG/1
50 TABLET ORAL DAILY
Qty: 90 TAB | Refills: 1 | Status: SHIPPED | OUTPATIENT
Start: 2018-08-31 | End: 2019-02-07 | Stop reason: SDUPTHER

## 2018-08-31 RX ORDER — ZOLPIDEM TARTRATE 5 MG/1
5 TABLET ORAL
Qty: 30 TAB | Refills: 3 | Status: SHIPPED | OUTPATIENT
Start: 2018-08-31 | End: 2019-01-28 | Stop reason: ALTCHOICE

## 2018-08-31 RX ORDER — SIMVASTATIN 40 MG/1
40 TABLET, FILM COATED ORAL
Qty: 90 TAB | Refills: 1 | Status: SHIPPED | OUTPATIENT
Start: 2018-08-31 | End: 2019-02-07 | Stop reason: SDUPTHER

## 2018-08-31 RX ORDER — GABAPENTIN 100 MG/1
200 CAPSULE ORAL 4 TIMES DAILY
Qty: 120 CAP | Refills: 3 | Status: SHIPPED | OUTPATIENT
Start: 2018-08-31 | End: 2018-08-31 | Stop reason: SDUPTHER

## 2018-08-31 RX ORDER — INSULIN LISPRO 100 [IU]/ML
INJECTION, SOLUTION INTRAVENOUS; SUBCUTANEOUS
Qty: 1 VIAL | Refills: 0 | Status: SHIPPED | OUTPATIENT
Start: 2018-08-31 | End: 2019-02-07 | Stop reason: SDUPTHER

## 2018-08-31 RX ORDER — GABAPENTIN 100 MG/1
200 CAPSULE ORAL 2 TIMES DAILY
Qty: 120 CAP | Refills: 3 | Status: SHIPPED | OUTPATIENT
Start: 2018-08-31 | End: 2018-09-05 | Stop reason: DRUGHIGH

## 2018-08-31 RX ORDER — LEVOTHYROXINE SODIUM 75 UG/1
75 TABLET ORAL
Qty: 90 TAB | Refills: 1 | Status: SHIPPED | OUTPATIENT
Start: 2018-08-31 | End: 2019-02-07 | Stop reason: SDUPTHER

## 2018-08-31 NOTE — PROGRESS NOTES
1. Have you been to the ER, urgent care clinic since your last visit? Hospitalized since your last visit? yes ER 8-14-18    2. Have you seen or consulted any other health care providers outside of the [address] [place] [address] since your last visit? Include any pap smears or colon screening.  No

## 2018-08-31 NOTE — MR AVS SNAPSHOT
[address] [phone] Patient: Dorene Jones MRN: [id number] :1965 Visit Information Date & Time Provider Department Dept. Phone Encounter #  
 2018 10:00 AM Kike Mejia  [address] [facility] [phone] [id number] Follow-up Instructions Return if symptoms worsen or fail to improve. Your Appointments 2018 11:10 AM  
Follow Up with MD [facility] Appt Note: 3 month follow up/diabetes [facility] [phone] [address] Upcoming Health Maintenance Date Due Pneumococcal 19-64 Medium Risk (1 of 1 - PPSV23) 1983 PAP AKA CERVICAL CYTOLOGY 1985 Influenza Age 5 to Adult 2018 MEDICARE YEARLY EXAM 2018 HEMOGLOBIN A1C Q6M 2/10/2019 MICROALBUMIN Q1 4/3/2019 LIPID PANEL Q1 4/3/2019 BREAST CANCER SCRN MAMMOGRAM 2019 EYE EXAM RETINAL OR DILATED Q1 2019 FOOT EXAM Q1 8/10/2019 COLONOSCOPY 2027 DTaP/Tdap/Td series (2 - Td) 4/3/2028 Allergies as of 2018  Review Complete On: 2018 By: Kike Mejia NP No Known Allergies Current Immunizations  Reviewed on 2017 No immunizations on file. Not reviewed this visit You Were Diagnosed With   
  
 Codes Comments Preop examination    -  Primary ICD-10-CM: U78.460 ICD-9-CM: V72.84 Dizziness     ICD-10-CM: I23 ICD-9-CM: 780.4 PVC (premature ventricular contraction)     ICD-10-CM: I49.3 ICD-9-CM: 427.69 Type 1 diabetes mellitus with diabetic polyneuropathy (HCC)     ICD-10-CM: E10.42 
ICD-9-CM: 250.61, 357.2 Pain     ICD-10-CM: R52 ICD-9-CM: 780.96 Vitals BP Pulse Temp Resp Height(growth percentile) Weight(growth percentile) 127/78 (BP 1 Location: Left arm) 70 97.8 °F (36.6 °C) (Oral) 18 5' 3\" (1.6 m) 140 lb (63.5 kg) SpO2 BMI OB Status Smoking Status 98% 24.8 kg/m2 Hysterectomy Former Smoker Vitals History BMI and BSA Data Body Mass Index Body Surface Area  
 24.8 kg/m 2 1.68 m 2 Preferred Pharmacy Pharmacy Name Phone [id number] [address] [place] [phone] Your Updated Medication List  
  
   
This list is accurate as of 8/31/18 10:28 AM.  Always use your most recent med list.  
  
  
  
  
 albuterol 90 mcg/actuation inhaler Commonly known as:  PROVENTIL HFA, VENTOLIN HFA, PROAIR HFA Take 2 Puffs by inhalation every four (4) hours as needed (chest tightness). Indications: BRONCHOSPASM PREVENTION  
  
 CALCIUM 600 + D 600-125 mg-unit Tab Generic drug:  calcium-cholecalciferol (d3) Take  by mouth two (2) times a day. diclofenac EC 75 mg EC tablet Commonly known as:  VOLTAREN Take 1 Tab by mouth two (2) times a day. fluticasone 50 mcg/actuation nasal spray Commonly known as:  FLONASE  
1 spray each nostril twice daily  Indications: Allergic Rhinitis  
  
 gabapentin 100 mg capsule Commonly known as:  NEURONTIN Take 2 Caps by mouth two (2) times a day. GLUCOSAMINE CHONDROITIN PLUS PO Take  by mouth. insulin detemir U-100 100 unit/mL injection Commonly known as:  LEVEMIR U-100 INSULIN  
12 units in morning and 10 at night  
  
 insulin lispro 100 unit/mL injection Commonly known as:  HumaLOG U-100 Insulin 2-5 units with each meal. Max 15 units per day  
  
 levothyroxine 75 mcg tablet Commonly known as:  LEVOTHROID Take 1 Tab by mouth Daily (before breakfast). Indications: hypothyroidism  
  
 losartan 50 mg tablet Commonly known as:  COZAAR Take 1 Tab by mouth daily. ondansetron 4 mg disintegrating tablet Commonly known as:  ZOFRAN ODT Take 1-2 Tabs by mouth every eight (8) hours as needed for Nausea. ONETOUCH ULTRA BLUE TEST STRIP strip Generic drug:  glucose blood VI test strips USE TO TEST BLOOD SUGAR 8 TIMES A DAY (E11.40) PROLIA 60 mg/mL injection Generic drug:  denosumab 60 mg by SubCUTAneous route. Every 6 months  
  
 simvastatin 40 mg tablet Commonly known as:  ZOCOR Take 1 Tab by mouth nightly. traMADol 50 mg tablet Commonly known as:  ULTRAM  
Take 1 Tab by mouth every six (6) hours as needed for Pain. Max Daily Amount: 200 mg. Indications: Pain Start taking on:  9/10/2018 TYLENOL EXTRA STRENGTH 500 mg tablet Generic drug:  acetaminophen Take  by mouth every six (6) hours as needed for Pain (takes 2 tabs BID). VITAMIN A PO Take  by mouth. VITAMIN B-12 500 mcg tablet Generic drug:  cyanocobalamin Take 500 mcg by mouth daily. VITAMIN C 500 mg tablet Generic drug:  ascorbic acid (vitamin C) Take  by mouth. VITAMIN D3 2,000 unit Tab Generic drug:  cholecalciferol (vitamin D3) Take  by mouth.  
  
 vitamin E 400 unit capsule Commonly known as:  [address] Take  by mouth. Only on M and Thurs  
  
 zolpidem 5 mg tablet Commonly known as:  AMBIEN Take 1 Tab by mouth nightly as needed for Sleep. Max Daily Amount: 5 mg. Prescriptions Printed Refills  
 zolpidem (AMBIEN) 5 mg tablet 3 Sig: Take 1 Tab by mouth nightly as needed for Sleep. Max Daily Amount: 5 mg. Class: Print Route: Oral  
 simvastatin (ZOCOR) 40 mg tablet 1 Sig: Take 1 Tab by mouth nightly. Class: Print Route: Oral  
 losartan (COZAAR) 50 mg tablet 1 Sig: Take 1 Tab by mouth daily. Class: Print Route: Oral  
 levothyroxine (LEVOTHROID) 75 mcg tablet 1 Sig: Take 1 Tab by mouth Daily (before breakfast). Indications: hypothyroidism Class: Print  Route: Oral  
 insulin lispro (HUMALOG U-100 INSULIN) 100 unit/mL injection 0 Si-5 units with each meal. Max 15 units per day Class: Print  
 insulin detemir U-100 (LEVEMIR U-100 INSULIN) 100 unit/mL injection 1 Si units in morning and 10 at night Class: Print We Performed the Following CBC WITH AUTOMATED DIFF [30080 CPT(R)] METABOLIC PANEL, BASIC [76488 CPT(R)] Follow-up Instructions Return if symptoms worsen or fail to improve. To-Do List   
 11/15/2018 10:30 AM  
  Appointment with CHAIR 2 [facility] ([phone]) Introducing [facility] [place] [facility] SERVICES! Lev Herring introduces [employer] patient portal. Now you can access parts of your medical record, email your doctor's office, and request medication refills online. 1. In your internet browser, go to [email or web address] 2. Click on the First Time User? Click Here link in the Sign In box. You will see the New Member Sign Up page. 3. Enter your [employer] Access Code exactly as it appears below. You will not need to use this code after youve completed the sign-up process. If you do not sign up before the expiration date, you must request a new code. · [employer] Access Code: [email or web address] Expires: 2018 12:59 PM 
 
4. Enter the last four digits of your Social Security Number (xxxx) and Date of Birth (mm/dd/yyyy) as indicated and click Submit. You will be taken to the next sign-up page. 5. Create a [employer] ID. This will be your [employer] login ID and cannot be changed, so think of one that is secure and easy to remember. 6. Create a [employer] password. You can change your password at any time. 7. Enter your Password Reset Question and Answer. This can be used at a later time if you forget your password. 8. Enter your e-mail address. You will receive e-mail notification when new information is available in [address] 9. Click Sign Up. You can now view and download portions of your medical record. 10. Click the Download Summary menu link to download a portable copy of your medical information. If you have questions, please visit the Frequently Asked Questions section of the [employer] website. Remember, [employer] is NOT to be used for urgent needs. For medical emergencies, dial 911. Now available from your iPhone and Android! Please provide this summary of care documentation to your next provider. Your primary care clinician is listed as Sally Leal. If you have any questions after today's visit, please call [phone].

## 2018-08-31 NOTE — PROGRESS NOTES
Subjective:     Chief Complaint   Patient presents with    Pre-op Exam     right carpal tunnel        Adeola David is a 46 y.o. female who presents today for a pre-op exam. She is scheduled to have carpal tunnel surgery on the R hand 9/7/18 with Dr. Brigid Toussaint. She is feeling well today and has no complaints. She denies any problems with anesthesia in the past. She has a hx of well-controlled type 1 diabetes and is followed by an endocrinologist. Her last A1C was at goal at 7.2% on 8/10/18. She is on insulin. She does have a hx of CVA x 4 with the most recent o in 2004. Says all of her strokes were related to hypoglycemic episodes. She does not smoke or drink alcohol. She denies illegal drug use and takes Tramadol 50mg po q 6 hours prn chronic arthritic foot pain. She sees an orthopedist for her arthritis and is wearing a boot today. She also takes Gabapentin 200mg po BID for chronic back pain. She also has a hx of hypothyroidism and last TSH was within normal range at 1.33 on 8/10/18. She takes Losartan 50mg po daily for HTN and has well controlled BP. She is taking calcium supplements and receiving Prolia injections for osteoporosis. She did have an episode of heat exhaustion and dehydration on 8/14/18 that required a trip to the ER. At this time an EKG was done but was normal. She had a bump in her WBC's and creatinine so we will recheck this today. She is requesting paper prescriptions to take to her pharmacy because they have had issues filling her electronic prescriptions in the past.     Patient Active Problem List   Diagnosis Code   [facility]. Carmelo Medel    Hx of stroke without residual deficits Z86.73    Hx of brain surgery Z98.890    Type 1 diabetes mellitus with diabetic polyneuropathy (HCC) E10.42    Acquired hypothyroidism E03.9    Primary osteoarthritis involving multiple joints M15.0    History of hypoglycemia Z86.39    Pain in right foot M79.671    Chronic allergic rhinitis J30.9 Past Medical History:   Diagnosis Date    Arthritis     Constipation     Diabetes ([facility] [address]     Falls     Fatigue     Headache     Joint pain     Memory disorder     Menopause     Neuropathy     Osteoporosis     Thyroid disease     Unspecified cerebral artery occlusion with cerebral infarction          Current Outpatient Prescriptions:     zolpidem (AMBIEN) 5 mg tablet, Take 1 Tab by mouth nightly as needed for Sleep. Max Daily Amount: 5 mg., Disp: 30 Tab, Rfl: 3    simvastatin (ZOCOR) 40 mg tablet, Take 1 Tab by mouth nightly., Disp: 90 Tab, Rfl: 1    losartan (COZAAR) 50 mg tablet, Take 1 Tab by mouth daily. , Disp: 90 Tab, Rfl: 1    levothyroxine (LEVOTHROID) 75 mcg tablet, Take 1 Tab by mouth Daily (before breakfast). Indications: hypothyroidism, Disp: 90 Tab, Rfl: 1    insulin lispro (HUMALOG U-100 INSULIN) 100 unit/mL injection, 2-5 units with each meal. Max 15 units per day, Disp: 1 Vial, Rfl: 0    insulin detemir U-100 (LEVEMIR U-100 INSULIN) 100 unit/mL injection, 12 units in morning and 10 at night, Disp: 12 Vial, Rfl: 1    gabapentin (NEURONTIN) 100 mg capsule, Take 2 Caps by mouth two (2) times a day., Disp: 120 Cap, Rfl: 3    ONETOUCH ULTRA BLUE TEST STRIP strip, USE TO TEST BLOOD SUGAR 8 TIMES A DAY (E11.40), Disp: 300 Strip, Rfl: 5    [START ON 9/10/2018] traMADol (ULTRAM) 50 mg tablet, Take 1 Tab by mouth every six (6) hours as needed for Pain. Max Daily Amount: 200 mg. Indications: Pain, Disp: 30 Tab, Rfl: 0    calcium-cholecalciferol, d3, (CALCIUM 600 + D) 600-125 mg-unit tab, Take  by mouth two (2) times a day., Disp: , Rfl:     denosumab (PROLIA) 60 mg/mL injection, 60 mg by SubCUTAneous route.  Every 6 months, Disp: , Rfl:     diclofenac EC (VOLTAREN) 75 mg EC tablet, Take 1 Tab by mouth two (2) times a day., Disp: 60 Tab, Rfl: 3    ondansetron (ZOFRAN ODT) 4 mg disintegrating tablet, Take 1-2 Tabs by mouth every eight (8) hours as needed for Nausea., Disp: 45 Tab, Rfl: 0    acetaminophen (TYLENOL EXTRA STRENGTH) 500 mg tablet, Take  by mouth every six (6) hours as needed for Pain (takes 2 tabs BID). , Disp: , Rfl:     GLUC/CHND/OM3/DHA/EPA/FISH/STR (GLUCOSAMINE CHONDROITIN PLUS PO), Take  by mouth., Disp: , Rfl:     cholecalciferol, vitamin D3, (VITAMIN D3) 2,000 unit tab, Take  by mouth., Disp: , Rfl:     ascorbic acid (VITAMIN C) 500 mg tablet, Take  by mouth., Disp: , Rfl:     cyanocobalamin (VITAMIN B-12) 500 mcg tablet, Take 500 mcg by mouth daily. , Disp: , Rfl:     VITAMIN A PO, Take  by mouth., Disp: , Rfl:     vitamin E (AQUA GEMS) 400 unit capsule, Take  by mouth.  Only on  and Thurs, Disp: , Rfl:     No Known Allergies    Past Surgical History:   Procedure Laterality Date    HX  SECTION      HX HYSTERECTOMY      HX OOPHORECTOMY      HX ORTHOPAEDIC         History   Smoking Status    Former Smoker    Quit date: 4/10/2007   Smokeless Tobacco    Never Used       Social History     Social History    Marital status: SINGLE     Spouse name: N/A    Number of children: N/A    Years of education: N/A     Social History Main Topics    Smoking status: Former Smoker     Quit date: 4/10/2007    Smokeless tobacco: Never Used    Alcohol use No    Drug use: No    Sexual activity: Not Asked     Other Topics Concern    None     Social History Narrative       Family History   Problem Relation Age of Onset    Heart Disease Mother     Hypertension Mother     Heart Disease Maternal Grandfather     Cancer Maternal Aunt      Pancreatic and Liver    Cancer Maternal Grandmother      Lung    Diabetes Maternal Grandmother     Cancer Maternal Aunt      Breast    Breast Cancer Maternal Aunt     Diabetes Maternal Aunt        ROS:  Gen: denies fever, chills, or fatigue  HEENT:denies H/A, nasal congestion, runny nose, or sore throat  Resp: denies dyspnea, cough, or wheezing  CV: denies chest pain, pressure, or palpitations  Extremeties: denies edema, pallor, or cyanosis  GI[de-identified] denies abdominal pain, nausea, vomiting, diarrhea, or constipation  : denies dysuria, hematuria, urinary frequency or urgency  Musculoskeletal: + chronic pain in R great toe and lower back, No stiffness or muscle cramps  Neuro: denies numbness/tingling or dizziness  Endo: denies polyuria, polydipsia, or heat/cold intolerance  Skin: denies rashes or new lesions   Psych: denies anxiety, depression, demarco, or other changes in mood    Objective:     Visit Vitals    /78 (BP 1 Location: Left arm)    Pulse 70    Temp 97.8 °F (36.6 °C) (Oral)    Resp 18    Ht 5' 3\" (1.6 m)    Wt 140 lb (63.5 kg)    SpO2 98%    BMI 24.8 kg/m2     Body mass index is 24.8 kg/(m^2). General: Alert and oriented. No acute distress. Well nourished. HEENT :  Ears:TMs normal. Canals clear. Eyes: Sclera white, conjunctiva clear. PERRLA. Extra ocular movements intact. Nose: Patent. Nasal mucosa pink, non-edematous, and without drainage. Mouth: Pharynx non-erythematous, without exudate   Neck: Supple with FROM. No lymphadenopathy  Lungs: Breathing even and unlabored. All lobes clear to auscultation bilaterally   Heart :RRR, S1 and S2 normal intensity, no extra heart sounds  Extremities: Non-edematous, no pallor or cyanosis. Bilat. radial and pedal pulses 2+  Abdomen: Soft and non-distended. Bowel sounds active. No tenderness to palpation, no masses. Musculoskeletal: No joint pain, heat, erythema, or swelling. FROM in all joints. Neuro: Cranial nerves grossly normal.  Mental status: Cognition, concentration, memory, and speech intact. Sensory: Sensation intact. Coordination and balance normal.  Psych: Mood is pleasant and thought content appropriate for situation. Dressed appropriately and with good hygiene. Skin: Warm, dry, and intact. No lesions or discoloration.     Results for orders placed or performed during the hospital encounter of 08/14/18   CBC WITH AUTOMATED DIFF   Result Value Ref Range    WBC 11.7 (H) 3.6 - 11.0 K/uL    RBC 3.97 3.80 - 5.20 M/uL    HGB 12.5 11.5 - 16.0 g/dL    HCT 34.5 (L) 35.0 - 47.0 %    MCV 86.9 80.0 - 99.0 FL    MCH 31.5 26.0 - 34.0 PG    MCHC 36.2 30.0 - 36.5 g/dL    RDW 11.2 (L) 11.5 - 14.5 %    PLATELET 223 805 - 841 K/uL    MPV 8.6 (L) 8.9 - 12.9 FL    NRBC 0.0 0  WBC    ABSOLUTE NRBC 0.00 0.00 - 0.01 K/uL    NEUTROPHILS 83 (H) 32 - 75 %    LYMPHOCYTES 11 (L) 12 - 49 %    MONOCYTES 5 5 - 13 %    EOSINOPHILS 0 0 - 7 %    BASOPHILS 0 0 - 1 %    IMMATURE GRANULOCYTES 0 0.0 - 0.5 %    ABS. NEUTROPHILS 9.8 (H) 1.8 - 8.0 K/UL    ABS. LYMPHOCYTES 1.3 0.8 - 3.5 K/UL    ABS. MONOCYTES 0.6 0.0 - 1.0 K/UL    ABS. EOSINOPHILS 0.0 0.0 - 0.4 K/UL    ABS. BASOPHILS 0.0 0.0 - 0.1 K/UL    ABS. IMM. GRANS. 0.1 (H) 0.00 - 0.04 K/UL    DF AUTOMATED     METABOLIC PANEL, COMPREHENSIVE   Result Value Ref Range    Sodium 133 (L) 136 - 145 mmol/L    Potassium 4.5 3.5 - 5.1 mmol/L    Chloride 95 (L) 97 - 108 mmol/L    CO2 30 21 - 32 mmol/L    Anion gap 8 5 - 15 mmol/L    Glucose 231 (H) 65 - 100 mg/dL    BUN 19 6 - 20 MG/DL    Creatinine 1.11 (H) 0.55 - 1.02 MG/DL    BUN/Creatinine ratio 17 12 - 20      GFR est AA >60 >60 ml/min/1.73m2    GFR est non-AA 51 (L) >60 ml/min/1.73m2    Calcium 9.7 8.5 - 10.1 MG/DL    Bilirubin, total 1.4 (H) 0.2 - 1.0 MG/DL    ALT (SGPT) 32 12 - 78 U/L    AST (SGOT) 28 15 - 37 U/L    Alk.  phosphatase 63 45 - 117 U/L    Protein, total 7.4 6.4 - 8.2 g/dL    Albumin 4.3 3.5 - 5.0 g/dL    Globulin 3.1 2.0 - 4.0 g/dL    A-G Ratio 1.4 1.1 - 2.2     LIPASE   Result Value Ref Range    Lipase 180 73 - 393 U/L   MAGNESIUM   Result Value Ref Range    Magnesium 1.6 1.6 - 2.4 mg/dL   LACTIC ACID   Result Value Ref Range    Lactic acid 1.1 0.4 - 2.0 MMOL/L   TROPONIN I   Result Value Ref Range    Troponin-I, Qt. <0.05 <0.05 ng/mL   EKG, 12 LEAD, INITIAL   Result Value Ref Range    Ventricular Rate 72 BPM    Atrial Rate 72 BPM    P-R Interval 162 ms    QRS Duration 86 ms    Q-T Interval 412 ms    QTC Calculation (Bezet) 451 ms    Calculated P Axis 69 degrees    Calculated R Axis 61 degrees    Calculated T Axis 54 degrees    Diagnosis       Normal sinus rhythm  Low voltage QRS  Borderline ECG  When compared with ECG of 10-LIZETH-2018 15:07,  No significant change was found  Confirmed by Master Pineda MD, -- ([id number]) on 8/15/2018 5:48:23 AM         No results found for this visit on 18. Assessment/ Plan:     1. Preop examination  - CBC WITH AUTOMATED DIFF  - METABOLIC PANEL, BASIC  -Pt's diabetes and hypothyroidism are well-controlled at this time and this sounds like a low-risk procedure.  -Pt cleared for surgery pending lab results. Yane Freedman Cardiac Risk Evaluation: (Low risk) 0-5 points. - Revised Cardiac Risk Index score of 2 (6.6% risk)  - Will fill out paperwork and fax to Dr Alejandro Cooper office today. -F/U prn    Orders Placed This Encounter    CBC WITH AUTOMATED DIFF    METABOLIC PANEL, BASIC    zolpidem (AMBIEN) 5 mg tablet     Sig: Take 1 Tab by mouth nightly as needed for Sleep. Max Daily Amount: 5 mg. Dispense:  30 Tab     Refill:  3    simvastatin (ZOCOR) 40 mg tablet     Sig: Take 1 Tab by mouth nightly. Dispense:  90 Tab     Refill:  1    losartan (COZAAR) 50 mg tablet     Sig: Take 1 Tab by mouth daily. Dispense:  90 Tab     Refill:  1    levothyroxine (LEVOTHROID) 75 mcg tablet     Sig: Take 1 Tab by mouth Daily (before breakfast). Indications: hypothyroidism     Dispense:  90 Tab     Refill:  1    insulin lispro (HUMALOG U-100 INSULIN) 100 unit/mL injection     Si-5 units with each meal. Max 15 units per day     Dispense:  1 Vial     Refill:  0    insulin detemir U-100 (LEVEMIR U-100 INSULIN) 100 unit/mL injection     Si units in morning and 10 at night     Dispense:  12 Vial     Refill:  1    DISCONTD: gabapentin (NEURONTIN) 100 mg capsule     Sig: Take 2 Caps by mouth four (4) times daily.      Dispense:  120 Cap     Refill:  3    gabapentin (NEURONTIN) 100 mg capsule     Sig: Take 2 Caps by mouth two (2) times a day. Dispense:  120 Cap     Refill:  3         Verbal and written instructions (see AVS) provided.  Patient expresses understanding of diagnosis and treatment plan. Health Maintenance Due   Topic Date Due    Pneumococcal 19-64 Medium Risk (1 of 1 - PPSV23) 04/02/1983    PAP AKA CERVICAL CYTOLOGY  04/02/1985    Influenza Age 5 to Adult  08/01/2018         Follow-up Disposition:  Return if symptoms worsen or fail to improve. Vinayak Solitario.  Nini Barrientos NP

## 2018-08-31 NOTE — TELEPHONE ENCOUNTER
Spoke with Vanna Mcguire at [facility] DR LETY SMITH. João Vasquez had sent in insulin vials instead of the pens. Per João Vasquez we changed to the pens like she had been getting.

## 2018-09-01 LAB
BASOPHILS # BLD AUTO: 0 X10E3/UL (ref 0–0.2)
BASOPHILS NFR BLD AUTO: 1 %
BUN SERPL-MCNC: 16 MG/DL (ref 6–24)
BUN/CREAT SERPL: 20 (ref 9–23)
CALCIUM SERPL-MCNC: 9.6 MG/DL (ref 8.7–10.2)
CHLORIDE SERPL-SCNC: 97 MMOL/L (ref 96–106)
CO2 SERPL-SCNC: 26 MMOL/L (ref 20–29)
CREAT SERPL-MCNC: 0.8 MG/DL (ref 0.57–1)
EOSINOPHIL # BLD AUTO: 0.1 X10E3/UL (ref 0–0.4)
EOSINOPHIL NFR BLD AUTO: 2 %
ERYTHROCYTE [DISTWIDTH] IN BLOOD BY AUTOMATED COUNT: 13 % (ref 12.3–15.4)
GLUCOSE SERPL-MCNC: 229 MG/DL (ref 65–99)
HCT VFR BLD AUTO: 35.3 % (ref 34–46.6)
HGB BLD-MCNC: 11.7 G/DL (ref 11.1–15.9)
IMM GRANULOCYTES # BLD: 0 X10E3/UL (ref 0–0.1)
IMM GRANULOCYTES NFR BLD: 0 %
LYMPHOCYTES # BLD AUTO: 1.5 X10E3/UL (ref 0.7–3.1)
LYMPHOCYTES NFR BLD AUTO: 34 %
MCH RBC QN AUTO: 31 PG (ref 26.6–33)
MCHC RBC AUTO-ENTMCNC: 33.1 G/DL (ref 31.5–35.7)
MCV RBC AUTO: 94 FL (ref 79–97)
MONOCYTES # BLD AUTO: 0.4 X10E3/UL (ref 0.1–0.9)
MONOCYTES NFR BLD AUTO: 9 %
NEUTROPHILS # BLD AUTO: 2.3 X10E3/UL (ref 1.4–7)
NEUTROPHILS NFR BLD AUTO: 54 %
PLATELET # BLD AUTO: 210 X10E3/UL (ref 150–379)
POTASSIUM SERPL-SCNC: 4.6 MMOL/L (ref 3.5–5.2)
RBC # BLD AUTO: 3.77 X10E6/UL (ref 3.77–5.28)
SODIUM SERPL-SCNC: 135 MMOL/L (ref 134–144)
WBC # BLD AUTO: 4.2 X10E3/UL (ref 3.4–10.8)

## 2018-09-04 NOTE — PERIOP NOTES
Call placed to patient and PAT phone call started. Patient states that she is 3 hrs away from her house and does not know the names of her medications at this time. Patient to return call to PAT when she returns home to complete phone call. Message left with [facility] at Dr. Howell Asper office to request that patient come to PAT, DOS 9/7/18.

## 2018-09-04 NOTE — PERIOP NOTES
INSTRUCTIONS [address], [facility] [address] MAIN OR [phone] MAIN PRE OP [phone] AMBULATORY PRE OP [phone] PRE-ADMISSION TESTING 21  Surgery Date:   *** Is surgery arrival time given by surgeon? {YES/NO:85917} If Brittney Weir staff will call you between 3 and 7pm the day before your surgery with your arrival time. (If your surgery is on a Monday, we will call you the Friday before.) Call [phone] after 7pm Monday-Friday if you did not receive your arrival time. Answers to Common Questions When You 
Arrive Arrive at the [facility] [address] [facility] on the day of your surgery Have your insurance card, photo ID, and any copayment (if needed) Food 
 and  
Drink NO food or drink after midnight the night before surgery This means NO water, gum, mints, coffee, juice, etc. 
No alcohol (beer, wine, liquor) 24 hours before and after surgery Medicine to TAKE Morning of Surgery MEDICATIONS TO TAKE THE MORNING OF SURGERY WITH A SIP OF WATER:  
? *** Medicine To 
STOP  
FOR PAIN 
? NO Aspirin for pain ? NO Non-Steroidal Anti-Inflammatory Drugs (NSAIDs:  
for example, Ibuprofen (Advil, Motrin), Naproxen (Aleve) ? You can take Tylenol  follow instructions on the bottle 
? STOP herbal supplements and vitamins 1 week before surgery Blood Thinners ? If you take Aspirin, Plavix, Coumadin, blood-thinning or anti-clot medicine, talk to your surgeon and/or follow the instructions from the doctor who told you to take that medicine Clothing Jewelry Valuables Bathing CLOTHING 
? Wear loose, comfortable clothes ? Wear glasses instead of contacts ? Leave money, jewelry and valuables at home ? No make-up, particularly mascara, the day of surgery ? REMOVE ALL piercings, rings, and jewelry - leave at home ? Wear hair loose or down; no pony-tails, buns, or metal hair clips BATHING 
? Follow all special bath instructions (for total joint replacement, spine and bowel surgeries.) ? If you shower the morning of surgery, please do not apply any lotions, powders, or deodorants afterwards. Do not shave or trim anywhere 24 hours before surgery. Going Home 
or Spending the Night  
? SAME-DAY SURGERY: You must have a responsible adult drive you home and stay with you 24 hours after surgery ? ADMITS: If your doctor is keeping you into the hospital after surgery, leave personal belongings/luggage in your car until you have a hospital room number. Hospital discharge time is 12 noon Drivers must be here before 12 noon unless you are told differently Follow all instructions so your surgery wont be cancelled. Please, be on time. If a situation occurs and you are delayed the day of surgery, call [phone] or [phone]. If your physical condition changes (like a fever, cold, flu, etc.) call your surgeon as soon as possible. The Preadmission Testing staff can be reached at [phone]. OTHER SPECIAL INSTRUCTIONS:  *** The {patient/parent guardian:49441} was contacted  {genviaroute:[id number]}. {HE/SHE:28995}  {verbalizedemonstrate:11845}  understanding of all instructions and {does/does not/wild card (D):05372}  need reinforcement.

## 2018-09-04 NOTE — PROGRESS NOTES
Labs look great! Please fax results over to Dr Hiram Aragon (orthopedist), pt having carpal tunnel surgery this week. Glucose high but pt has diabetes.

## 2018-09-04 NOTE — PERIOP NOTES
Received a return call from Sylwia at Dr. Julian Mejia office informing me that the patient lives very far away, but she will try to see if the patient can come to [facility] on Wednesday of Thursday. Per Sylwia the patient is getting medical clearance from her PCP.   DOS: 9/7/2018

## 2018-09-05 RX ORDER — NAPROXEN SODIUM 220 MG
220 TABLET ORAL
COMMUNITY
End: 2019-03-05

## 2018-09-05 NOTE — PERIOP NOTES
[address] [employer] [facility] [address], [address]    MAIN OR [phone]    MAIN PRE OP [phone]    AMBULATORY PRE OP [phone]    PRE-ADMISSION TESTING [phone]       Surgery Date:   Friday, 9/7/18. Is surgery arrival time given by surgeon? NO  If NO, [facility] Patch staff will call you between 3 and 7pm the day before your surgery with your arrival time. (If your surgery is on a Monday, we will call you the Friday before.)    Call [phone] after 7pm Monday-Friday if you did not receive your arrival time. Verification phone call on Thursday, 9/6/18. Answers to Common Questions   When You  Arrive   Arrive at the [facility] [address] [facility] on the day of your surgery  Have your insurance card, photo ID, and any copayment (if needed)     Food   and   Drink   NO food or drink after midnight the night before surgery    This means NO water, gum, mints, coffee, juice, etc.  No alcohol (beer, wine, liquor) 24 hours before and after surgery     Medicine to   TAKE   Morning of Surgery   MEDICATIONS TO TAKE THE MORNING OF SURGERY WITH A SIP OF WATER:    Pt unable to TAKE any medications on empty stomach.  She will not be taking the Gabapentin or Levothyroxine     Medicine  To  STOP   FOR PAIN   NO Aspirin for pain Stop Today until after surgery   NO Non-Steroidal Anti-Inflammatory Drugs (NSAIDs:   for example, Ibuprofen (Advil, Motrin), Naproxen (Aleve) Stop NOW until after surgery   You can take Tylenol - follow instructions on the bottle   STOP herbal supplements and vitamins 1 week before surgery  STOP now until after surgery     Blood  Thinners    If you take Aspirin, Plavix, Coumadin, blood-thinning or anti-clot medicine, talk to your surgeon and/or follow the instructions from the doctor who told you to take that medicine     Clothing  Jewelry  Valuables  Bathing     CLOTHING   Wear loose, comfortable clothes   Wear glasses instead of contacts  [facility], jewelry and valuables at home   No make-up, particularly mascara, the day of surgery   REMOVE ALL piercings, rings, and jewelry - leave at home   Wear hair loose or down; no pony-tails, buns, or metal hair clips    BATHING   Follow all special bath instructions (for total joint replacement, spine and bowel surgeries.)   If you shower the morning of surgery, please do not apply any lotions, powders, or deodorants afterwards. Do not shave or trim anywhere 24 hours before surgery. Going Home  or  Spending the Night    SAME-DAY SURGERY: You must have a responsible adult drive you home and stay with you 24 hours after surgery   ADMITS: If your doctor is keeping you into the hospital after surgery, leave personal belongings/luggage in your car until you have a hospital room number. Hospital discharge time is 12 noon  Drivers must be here before 12 noon unless you are told differently         Follow all instructions so your surgery wont be cancelled. Please, be on time. If a situation occurs and you are delayed the day of surgery, call [phone] or [phone]. If your physical condition changes (like a fever, cold, flu, etc.) call your surgeon as soon as possible. The Preadmission Testing staff can be reached at [phone]. OTHER SPECIAL INSTRUCTIONS:  Free  parking 7:00 am to 5:00 pm.  Check blood sugar morning of surgery and if needed take sugar or glucose tabs as needed. The patient was contacted  via phone. She  verbalize  understanding of all instructions and does not  need reinforcement.

## 2018-09-06 ENCOUNTER — ANESTHESIA EVENT (OUTPATIENT)
Dept: SURGERY | Age: 53
End: 2018-09-06
Payer: MEDICARE

## 2018-09-07 ENCOUNTER — ANESTHESIA (OUTPATIENT)
Dept: SURGERY | Age: 53
End: 2018-09-07
Payer: MEDICARE

## 2018-09-07 ENCOUNTER — HOSPITAL ENCOUNTER (OUTPATIENT)
Age: 53
Setting detail: OUTPATIENT SURGERY
Discharge: HOME | End: 2018-09-07
Attending: ORTHOPAEDIC SURGERY | Admitting: ORTHOPAEDIC SURGERY
Payer: MEDICARE

## 2018-09-07 VITALS
HEART RATE: 72 BPM | RESPIRATION RATE: 21 BRPM | OXYGEN SATURATION: 98 % | TEMPERATURE: 97.7 F | DIASTOLIC BLOOD PRESSURE: 72 MMHG | HEIGHT: 63 IN | WEIGHT: 135 LBS | BODY MASS INDEX: 23.92 KG/M2 | SYSTOLIC BLOOD PRESSURE: 128 MMHG

## 2018-09-07 DIAGNOSIS — G56.01 CARPAL TUNNEL SYNDROME ON RIGHT: Primary | ICD-10-CM

## 2018-09-07 DIAGNOSIS — E10.42 TYPE 1 DIABETES MELLITUS WITH DIABETIC POLYNEUROPATHY (HCC): ICD-10-CM

## 2018-09-07 LAB
GLUCOSE BLD STRIP.AUTO-MCNC: 144 MG/DL (ref 65–100)
GLUCOSE BLD STRIP.AUTO-MCNC: 233 MG/DL (ref 65–100)
SERVICE CMNT-IMP: ABNORMAL
SERVICE CMNT-IMP: ABNORMAL

## 2018-09-07 PROCEDURE — 76060000061 HC AMB SURG ANES 0.5 TO 1 HR: Performed by: ORTHOPAEDIC SURGERY

## 2018-09-07 PROCEDURE — 74011250636 HC RX REV CODE- 250/636: Performed by: ORTHOPAEDIC SURGERY

## 2018-09-07 PROCEDURE — 74011250636 HC RX REV CODE- 250/636

## 2018-09-07 PROCEDURE — A4565 SLINGS: HCPCS

## 2018-09-07 PROCEDURE — 77030003601 HC NDL NRV BLK BBMI -A

## 2018-09-07 PROCEDURE — 74011250636 HC RX REV CODE- 250/636: Performed by: ANESTHESIOLOGY

## 2018-09-07 PROCEDURE — 77030018836 HC SOL IRR NACL ICUM -A: Performed by: ORTHOPAEDIC SURGERY

## 2018-09-07 PROCEDURE — 77030002986 HC SUT PROL J&J -A: Performed by: ORTHOPAEDIC SURGERY

## 2018-09-07 PROCEDURE — 82962 GLUCOSE BLOOD TEST: CPT

## 2018-09-07 PROCEDURE — 77030000032 HC CUF TRNQT ZIMM -B: Performed by: ORTHOPAEDIC SURGERY

## 2018-09-07 PROCEDURE — 77030020782 HC GWN BAIR PAWS FLX 3M -B

## 2018-09-07 PROCEDURE — 76210000050 HC AMBSU PH II REC 0.5 TO 1 HR: Performed by: ORTHOPAEDIC SURGERY

## 2018-09-07 PROCEDURE — 76030000000 HC AMB SURG OR TIME 0.5 TO 1: Performed by: ORTHOPAEDIC SURGERY

## 2018-09-07 RX ORDER — SODIUM CHLORIDE 0.9 % (FLUSH) 0.9 %
5-10 SYRINGE (ML) INJECTION AS NEEDED
Status: DISCONTINUED | OUTPATIENT
Start: 2018-09-07 | End: 2018-09-07 | Stop reason: HOSPADM

## 2018-09-07 RX ORDER — MIDAZOLAM HYDROCHLORIDE 1 MG/ML
INJECTION, SOLUTION INTRAMUSCULAR; INTRAVENOUS AS NEEDED
Status: DISCONTINUED | OUTPATIENT
Start: 2018-09-07 | End: 2018-09-07 | Stop reason: HOSPADM

## 2018-09-07 RX ORDER — PROPOFOL 10 MG/ML
INJECTION, EMULSION INTRAVENOUS
Status: DISCONTINUED | OUTPATIENT
Start: 2018-09-07 | End: 2018-09-07 | Stop reason: HOSPADM

## 2018-09-07 RX ORDER — CEPHALEXIN 500 MG/1
500 CAPSULE ORAL 3 TIMES DAILY
Qty: 21 CAP | Refills: 0 | Status: SHIPPED | OUTPATIENT
Start: 2018-09-07 | End: 2018-09-27 | Stop reason: ALTCHOICE

## 2018-09-07 RX ORDER — CEFAZOLIN SODIUM/WATER 2 G/20 ML
2 SYRINGE (ML) INTRAVENOUS ONCE
Status: COMPLETED | OUTPATIENT
Start: 2018-09-07 | End: 2018-09-07

## 2018-09-07 RX ORDER — HYDROMORPHONE HYDROCHLORIDE 1 MG/ML
.25-1 INJECTION, SOLUTION INTRAMUSCULAR; INTRAVENOUS; SUBCUTANEOUS
Status: DISCONTINUED | OUTPATIENT
Start: 2018-09-07 | End: 2018-09-07 | Stop reason: HOSPADM

## 2018-09-07 RX ORDER — SODIUM CHLORIDE, SODIUM LACTATE, POTASSIUM CHLORIDE, CALCIUM CHLORIDE 600; 310; 30; 20 MG/100ML; MG/100ML; MG/100ML; MG/100ML
125 INJECTION, SOLUTION INTRAVENOUS CONTINUOUS
Status: DISCONTINUED | OUTPATIENT
Start: 2018-09-07 | End: 2018-09-07 | Stop reason: HOSPADM

## 2018-09-07 RX ORDER — SODIUM CHLORIDE 0.9 % (FLUSH) 0.9 %
5-10 SYRINGE (ML) INJECTION EVERY 8 HOURS
Status: DISCONTINUED | OUTPATIENT
Start: 2018-09-07 | End: 2018-09-07 | Stop reason: HOSPADM

## 2018-09-07 RX ORDER — FLUMAZENIL 0.1 MG/ML
0.2 INJECTION INTRAVENOUS
Status: DISCONTINUED | OUTPATIENT
Start: 2018-09-07 | End: 2018-09-07 | Stop reason: HOSPADM

## 2018-09-07 RX ORDER — ONDANSETRON 2 MG/ML
INJECTION INTRAMUSCULAR; INTRAVENOUS AS NEEDED
Status: DISCONTINUED | OUTPATIENT
Start: 2018-09-07 | End: 2018-09-07 | Stop reason: HOSPADM

## 2018-09-07 RX ORDER — NALOXONE HYDROCHLORIDE 0.4 MG/ML
0.04 INJECTION, SOLUTION INTRAMUSCULAR; INTRAVENOUS; SUBCUTANEOUS
Status: DISCONTINUED | OUTPATIENT
Start: 2018-09-07 | End: 2018-09-07 | Stop reason: HOSPADM

## 2018-09-07 RX ORDER — LIDOCAINE HYDROCHLORIDE 10 MG/ML
0.1 INJECTION, SOLUTION EPIDURAL; INFILTRATION; INTRACAUDAL; PERINEURAL AS NEEDED
Status: DISCONTINUED | OUTPATIENT
Start: 2018-09-07 | End: 2018-09-07 | Stop reason: HOSPADM

## 2018-09-07 RX ORDER — FENTANYL CITRATE 50 UG/ML
INJECTION, SOLUTION INTRAMUSCULAR; INTRAVENOUS AS NEEDED
Status: DISCONTINUED | OUTPATIENT
Start: 2018-09-07 | End: 2018-09-07 | Stop reason: HOSPADM

## 2018-09-07 RX ORDER — DEXAMETHASONE SODIUM PHOSPHATE 4 MG/ML
INJECTION, SOLUTION INTRA-ARTICULAR; INTRALESIONAL; INTRAMUSCULAR; INTRAVENOUS; SOFT TISSUE AS NEEDED
Status: DISCONTINUED | OUTPATIENT
Start: 2018-09-07 | End: 2018-09-07 | Stop reason: HOSPADM

## 2018-09-07 RX ORDER — PROPOFOL 10 MG/ML
INJECTION, EMULSION INTRAVENOUS AS NEEDED
Status: DISCONTINUED | OUTPATIENT
Start: 2018-09-07 | End: 2018-09-07 | Stop reason: HOSPADM

## 2018-09-07 RX ORDER — DIPHENHYDRAMINE HYDROCHLORIDE 50 MG/ML
12.5 INJECTION, SOLUTION INTRAMUSCULAR; INTRAVENOUS AS NEEDED
Status: DISCONTINUED | OUTPATIENT
Start: 2018-09-07 | End: 2018-09-07 | Stop reason: HOSPADM

## 2018-09-07 RX ORDER — HYDROCODONE BITARTRATE AND ACETAMINOPHEN 5; 325 MG/1; MG/1
1 TABLET ORAL
Qty: 15 TAB | Refills: 0 | Status: SHIPPED | OUTPATIENT
Start: 2018-09-07 | End: 2018-09-27 | Stop reason: ALTCHOICE

## 2018-09-07 RX ADMIN — DEXAMETHASONE SODIUM PHOSPHATE 4 MG: 4 INJECTION, SOLUTION INTRA-ARTICULAR; INTRALESIONAL; INTRAMUSCULAR; INTRAVENOUS; SOFT TISSUE at 08:06

## 2018-09-07 RX ADMIN — PROPOFOL 100 MCG/KG/MIN: 10 INJECTION, EMULSION INTRAVENOUS at 07:57

## 2018-09-07 RX ADMIN — Medication 2 G: at 08:00

## 2018-09-07 RX ADMIN — FENTANYL CITRATE 50 MCG: 50 INJECTION, SOLUTION INTRAMUSCULAR; INTRAVENOUS at 07:09

## 2018-09-07 RX ADMIN — ONDANSETRON 4 MG: 2 INJECTION INTRAMUSCULAR; INTRAVENOUS at 08:06

## 2018-09-07 RX ADMIN — MIDAZOLAM HYDROCHLORIDE 2 MG: 1 INJECTION, SOLUTION INTRAMUSCULAR; INTRAVENOUS at 07:06

## 2018-09-07 RX ADMIN — PROPOFOL 50 MG: 10 INJECTION, EMULSION INTRAVENOUS at 07:57

## 2018-09-07 RX ADMIN — FENTANYL CITRATE 50 MCG: 50 INJECTION, SOLUTION INTRAMUSCULAR; INTRAVENOUS at 07:06

## 2018-09-07 RX ADMIN — SODIUM CHLORIDE, POTASSIUM CHLORIDE, SODIUM LACTATE AND CALCIUM CHLORIDE: 600; 310; 30; 20 INJECTION, SOLUTION INTRAVENOUS at 07:42

## 2018-09-07 NOTE — ANESTHESIA PROCEDURE NOTES
Peripheral Block Start time: 9/7/2018 7:06 AM 
End time: 9/7/2018 7:13 AM 
Performed by: Misty Mix Authorized by: Sara Boucher Pre-procedure: Indications: at surgeon's request and primary anesthetic Preanesthetic Checklist: patient identified, risks and benefits discussed, site marked, timeout performed, anesthesia consent given and patient being monitored Timeout Time: 07:06 Block Type:  
Block Type:  Supraclavicular Laterality:  Right Monitoring:  Continuous pulse ox, frequent vital sign checks, heart rate, responsive to questions and oxygen Injection Technique:  Single shot Procedures: ultrasound guided and nerve stimulator Patient Position: supine Prep: chlorhexidine Location:  Supraclavicular Needle Type:  Stimuplex Needle Gauge:  22 G Needle Localization:  Anatomical landmarks and ultrasound guidance Medication Injected:  2.0% 
mepivacaine Volume (mL):  30 
 
Assessment: 
Number of attempts:  1 Injection Assessment:  Incremental injection every 5 mL, local visualized surrounding nerve on ultrasound, negative aspiration for blood, no paresthesia and no intravascular symptoms Patient tolerance:  Patient tolerated the procedure well with no immediate complications

## 2018-09-07 NOTE — BRIEF OP NOTE
BRIEF OPERATIVE NOTE    Date of Procedure: 9/7/2018   Preoperative Diagnosis: RIGHT CARPAL TUNNEL  Postoperative Diagnosis: RIGHT CARPAL TUNNEL    Procedure(s):  RIGHT CARPAL TUNNEL RELEASE  (AXILLARY BLOCK)  Surgeon(s) and Role:     * Jennifer Montana MD - Primary         Surgical Assistant:     Surgical Staff:  Circ-1: Yao Pa RN  Physician Assistant: Hellen Victor PA-C  Scrub RN-1: Kvng Galvan RN  Event Time In   Incision Start 0818   Incision Close 0830     Anesthesia: Other   Estimated Blood Loss: None  Specimens: * No specimens in log *   Findings: Same as above  Complications: None  Implants: * No implants in log *

## 2018-09-07 NOTE — IP AVS SNAPSHOT
[address] [phone] Patient: Missy Arriaga MRN: [id number] :[id number] About your hospitalization You were admitted on:  2018 You last received care in the:  [facility] [address] AMB SURG UNIT You were discharged on:  2018 Why you were hospitalized Your primary diagnosis was:  Not on File Follow-up Information Follow up With Details Comments [facility] [address] [id number] [phone] Discharge Orders None A check zack indicates which time of day the medication should be taken. My Medications START taking these medications Instructions Each Dose to Equal  
 Morning Noon Evening Bedtime  
 cephALEXin 500 mg capsule Commonly known as:  Pablo Cantor Your next dose is: Today and finish all of this RX Take 1 Cap by mouth three (3) times daily. 500 mg HYDROcodone-acetaminophen 5-325 mg per tablet Commonly known as:  Nany Bolivar Your last dose was: Today as needed for pain Take 1 Tab by mouth every four (4) hours as needed for Pain. Max Daily Amount: 6 Tabs. 1 Tab CHANGE how you take these medications Instructions Each Dose to Equal  
 Morning Noon Evening Bedtime  
 diclofenac EC 75 mg EC tablet Commonly known as:  VOLTAREN What changed:  when to take this Your next dose is:  today Take 1 Tab by mouth two (2) times a day. 75 mg * LEVEMIR U-100 INSULIN 100 unit/mL injection Generic drug:  insulin detemir U-100 What changed:  Another medication with the same name was changed. Make sure you understand how and when to take each. Your next dose is: Today  Blood Glucose was 233 at 9AM  
   
 10 Units by SubCUTAneous route Daily (before dinner). 10 Units * insulin detemir U-100 100 unit/mL injection Commonly known as:  LEVEMIR U-100 INSULIN What changed:   
- how much to take 
- how to take this - when to take this 
- additional instructions 12 units in morning and 10 at night  
     
   
   
   
  
 insulin lispro 100 unit/mL injection Commonly known as:  HumaLOG U-100 Insulin What changed:  additional instructions Your next dose is: Today  Check glucose 2-5 units with each meal. Max 15 units per day  
     
   
   
   
  
 losartan 50 mg tablet Commonly known as:  COZAAR What changed:  when to take this Your last dose was:  today Take 1 Tab by mouth daily. 50 mg  
    
   
   
   
  
 * Notice: This list has 2 medication(s) that are the same as other medications prescribed for you. Read the directions carefully, and ask your doctor or other care provider to review them with you. CONTINUE taking these medications Instructions Each Dose to Equal  
 Morning Noon Evening Bedtime ALEVE 220 mg tablet Generic drug:  naproxen sodium Your next dose is: Today as needed Take 220 mg by mouth two (2) times daily as needed. 220 mg CALCIUM 500 + D PO Your next dose is:  today Take 4,000 mg by mouth nightly. 4000 mg  
    
   
   
   
  
 GABAPENTIN (BULK) Your next dose is:  today Take 200 mg by mouth ACB/HS. 200 mg GLUCOSAMINE CHONDROITIN PLUS PO Your next dose is:  today Take 1 Tab by mouth daily (with breakfast). 1 Tab  
    
   
   
   
  
 levothyroxine 75 mcg tablet Commonly known as:  LEVOTHROID Your next dose is: Today if not taken this AM  
   
 Take 1 Tab by mouth Daily (before breakfast). Indications: hypothyroidism 75 mcg ONETOUCH ULTRA BLUE TEST STRIP strip Generic drug:  glucose blood VI test strips Your next dose is:  N/A  
   
 USE TO TEST BLOOD SUGAR 8 TIMES A DAY (E11.40) PROLIA 60 mg/mL injection Generic drug:  denosumab Your next dose is:  When due according to your schedule 60 mg by SubCUTAneous route. Every 6 months 60 mg  
    
   
   
   
  
 simvastatin 40 mg tablet Commonly known as:  ZOCOR Your next dose is:  today Take 1 Tab by mouth nightly. 40 mg  
    
   
   
   
  
 VITAMIN A PO Your next dose is:  today Take 2,400 mcg by mouth nightly. 2400 mcg VITAMIN C PO Your next dose is:  today Take 3,000 mg by mouth daily (with breakfast). 3000 mg  
    
   
   
   
  
 VITAMIN D3 2,000 unit Tab Generic drug:  cholecalciferol (vitamin D3) Your next dose is: Today Take  by mouth.  
     
   
   
   
  
 vitamin E 400 unit capsule Commonly known as:  [address] Your last dose was:  today Your next dose is:  today Take 400 Units by mouth every Monday and Thursday. Only on M and Thurs at Bedtime 400 Units  
    
   
   
   
  
 zolpidem 5 mg tablet Commonly known as:  AMBIEN Your next dose is: Today as needed Take 1 Tab by mouth nightly as needed for Sleep. Max Daily Amount: 5 mg.  
 5 mg STOP taking these medications   
 traMADol 50 mg tablet Commonly known as:  ULTRAM  
   
  
 TYLENOL EXTRA STRENGTH 500 mg tablet Generic drug:  acetaminophen Where to Get Your Medications These medications were sent to [facility]/pharmacy #[id number]- [place], [address] [place], [facility] [address] Phone:  [phone]  
  cephALEXin 500 mg capsule Information on where to get these meds will be given to you by the nurse or doctor. ! Ask your nurse or doctor about these medications HYDROcodone-acetaminophen 5-325 mg per tablet Opioid Education Prescription Opioids: What You Need to Know: Prescription opioids can be used to help relieve moderate-to-severe pain and are often prescribed following a surgery or injury, or for certain health conditions. These medications can be an important part of treatment but also come with serious risks. Opioids are strong pain medicines. Examples include hydrocodone, oxycodone, fentanyl, and morphine. Heroin is an example of an illegal opioid. It is important to work with your health care provider to make sure you are getting the safest, most effective care. WHAT ARE THE RISKS AND SIDE EFFECTS OF OPIOID USE? Prescription opioids carry serious risks of addiction and overdose, especially with prolonged use. An opioid overdose, often marked by slow breathing, can cause sudden death. The use of prescription opioids can have a number of side effects as well, even when taken as directed. · Tolerance-meaning you might need to take more of a medication for the same pain relief · Physical dependence-meaning you have symptoms of withdrawal when the medication is stopped. Withdrawal symptoms can include nausea, sweating, chills, diarrhea, stomach cramps, and muscle aches. Withdrawal can last up to several weeks, depending on which drug you took and how long you took it. · Increased sensitivity to pain · Constipation · Nausea, vomiting, and dry mouth · Sleepiness and dizziness · Confusion · Depression · Low levels of testosterone that can result in lower sex drive, energy, and strength · Itching and sweating RISKS ARE GREATER WITH:      
· History of drug misuse, substance use disorder, or overdose · Mental health conditions (such as depression or anxiety) · Sleep apnea · Older age (72 years or older) · Pregnancy Avoid alcohol while taking prescription opioids. Also, unless specifically advised by your health care provider, medications to avoid include: · Benzodiazepines (such as Xanax or Valium) · Muscle relaxants (such as Soma or Flexeril) · Hypnotics (such as Ambien or Lunesta) · Other prescription opioids KNOW YOUR OPTIONS Talk to your health care provider about ways to manage your pain that don't involve prescription opioids. Some of these options may actually work better and have fewer risks and side effects. Options may include: 
· Pain relievers such as acetaminophen, ibuprofen, and naproxen · Some medications that are also used for depression or seizures · Physical therapy and exercise · Counseling to help patients learn how to cope better with triggers of pain and stress. · Application of heat or cold compress · Massage therapy · Relaxation techniques Be Informed Make sure you know the name of your medication, how much and how often to take it, and its potential risks & side effects. IF YOU ARE PRESCRIBED OPIOIDS FOR PAIN: 
· Never take opioids in greater amounts or more often than prescribed. Remember the goal is not to be pain-free but to manage your pain at a tolerable level. · Follow up with your primary care provider to: · Work together to create a plan on how to manage your pain. · Talk about ways to help manage your pain that don't involve prescription opioids. · Talk about any and all concerns and side effects. · Help prevent misuse and abuse. · Never sell or share prescription opioids · Help prevent misuse and abuse. · Store prescription opioids in a secure place and out of reach of others (this may include visitors, children, friends, and family). · Safely dispose of unused/unwanted prescription opioids: Find your community drug take-back program or your pharmacy mail-back program, or flush them down the toilet, following guidance from the Food and Drug Administration (www.fda.gov/Drugs/ResourcesForYou). · Visit www.cdc.gov/drugoverdose to learn about the risks of opioid abuse and overdose.  
· If you believe you may be struggling with addiction, tell your health care provider and ask for guidance or call [address] [facility] at 4-712-025-YINB. Discharge Instructions Going Home After A Peripheral Nerve Block Patient Information The anesthesiology team has provided for your pain control through a technique called regional anesthesia. As the name implies, anesthesia (decreased or no pain, sensation, or motor control) has been provided to a specific region, whether that be an arm or a leg. How does this happen?  you might ask. While you were sleepy, the anesthesia provider provided medicine to a specific group of nerves either in the neck/shoulder region or in the groin and/or buttock region, similar to the way a dentist might numb a tooth (teeth.)  They typically use an ultrasound machine to know where the medicine is placed in relation to the nerves they wish to numb up or block.   What this means is that for the next few hours, you should expect to have a numb limb. Below are some things we wish for you to read and be familiar with concerning your anesthetized limb. Caring For a Blocked Body Part General Considerations: ? The numbness may last up to 24 hours ? You must protect your arm or leg. The blocked extremity is numb, weak, and difficult for your brain to locate and communicate with. To do this you should: 
o Pay attention to the position of the blocked limb at all times. o Be very careful when placing hot or cod items on a numb limb. You could cause tissue damage like burns or frostbite if you are unable to feel temperature. Carefully follow your discharge instructions regarding the use of these therapies in you post-operative care. o Carefully pad the affected limb. Normally we continually move and adjust the position of our bodies without thinking about it.   This movement and continuous repositioning helps to prevent injuries from immobility. When a limb is numb it still requires this care 
o Be extremely careful not to bump or hit the numb body part. This can result in an unrecognized injury, at lease until the blocked limb wakes up. It can also result in worse pain of your already post-surgical limb. Arm/Shoulder Blocks: 
? You may experience a droopy eyelid, nasal stuffiness, and redness of the eye after receiving an arm/shoulder block. This is called Mageds Syndrome, and is very common. There is no need for concern. You may also experience mild hoarseness, but all of these symptoms should resolve within 24 hours. ? Some patients may experience mild shortness of breath. A sitting position will help alleviate this and it should resolve within 24 hours. If you experience significant or progressive worsening of the shortness of breath, seek medical attention immediately. Knee/Foot Blocks: 
? DO NOT use the blocked leg to walk, balance or support yourself. Your leg will not be able to balance your weight properly while any part of your leg is numb. You are at a RISK for [address]. ? Be careful not to drag your foot along the ground or stub your toes. Contact Phone Numbers CALL 911 IN CASE OF AN EMERGENCY. For all other non-emergency inquiries call the [facility]  at [phone] and ask for the anesthesiologist on call to be paged. P.R.I.C.E. INSTRUCTIONS PRICE is an acronym that stands for Protect, Rest, Ice, Compression, and Elevation (sometimes you might see the acronym RICE.)   Listed below are five activities one can do for an injured limb or soft tissue injury. While much anecdotal understanding learned through many years of experience supports these seemingly common sense treatments, building scientific evidence is showing how and why these treatment principles are proving to be so beneficial.  Below is a breakdown of what the PRICE principles entail to speed healing along. PROTECT may sound like an obvious thing to do, and really, it is common sense. After an injury or surgery, protecting the site that hurts help to prevent further injury. REST is essential for an injured limb. Like protection, the more you are up on an injured limb, especially in the early stages of an injury, the more damage you can do. Rest means no activities that would involve the use of the injured tissues so that the early stages of healing can begin without  interruption. ICE \"is perhaps the simplest and oldest [therapy] in the treatment of soft tissue injuries. \"4    Ice help decrease swelling in inflamed and damaged tissues, can diminish the feeling of pain and decrease muscle spasms, and, immediately after an injury,  can slow cellular metabolism and help to prevent further tissue injury from oxygen starvation caused by the swelling. 5   
 
COMPRESSION help decrease pain by limiting movement of an injured limb. Compression can be found through the use of an elastic wrap bandage, a cast, splint, or simply a snug cooling cuff or an ice pack and pillow. ELEVATION is a very important intervention. Placing the injury above the level of the heart whenever possible helps decrease swelling by using gravity to one's advantage . Placing the injury above the level of the heart also helps prevent, or at least decrease, the throbbing pain that is sometimes experienced after surgery or injury. Sources: 1. Muscle injuries: optimizing recovery (2007) Best Practice & Research Clinical Rheumatology Vol. 21, No. 2, pp 317-331, Accessed 9/26/11 2. PRICE first aid guidelines - Protection, Rest, Ice, Compression and Elevation By Lissette Nobles [employer]. com Guide, Updated March 27, 2011, Accessed 9/26/11 http://sportsmedicine. WISHCLOUDS.com/cs/rehab/a/rice. htm 3. Rest Ice Compression Elevation: RICE for injuries, Accessed 9/26/11 LipLotion.ch. com/rest-ice-compression-elevation. html 4. The use of ice in the treatment of acute soft-tissue injury (2004) Laurie, Vol. 32, No. 1, pp 251-261, Accessed 9/26/11 http://ajs.Fighters.com/content/32/1/251.full.pdf+html Soft tissue damage and healing; theory and techniques, www.iaaf.org, Ch. 9 of  medical page, by Mariama Khanna 9/27/11 [facility].gl. pdf [address] EFFECT GUIDE The [address] EFFECT GUIDE was provided to the PATIENT AND CARE PROVIDER. Information provided includes instruction about drug purpose and common side effects for the following medications:  
5. [facility] Hand/Wrist/Elbow Surgery Post op Instruction [phone] Dressing You have bulky dressing on your hand 
 
 
_____ Do NOT remove dressing until next appointment with Dr. Zuleika Soto or Occupational therapy (OT) please call Dr. Luis Pantoja office if this is not already arranged OR 
 
__X___ Kelly Hoyos may remove the dressing from your hand in __5___ days after surgery - Please cover the wound with a Band-Aid (NOT waterproof type) and change daily. - After your dressing is off, do not do any strenuous activities with your hand/wrist until seen by OT or Dr. Zuleika Soto within 2 weeks postoperatively. Please call Dr. Luis Pantoja office if this is not already arranged. Showering You may shower as soon as you want after surgery. Please cover the dressing with a bag. If you are allowed to remove your dressing you may shower and get the wound wet 3 days after your surgery. You may allow the water to run over the incision and pat the incision dry. DO NOT let the wound soak in a tub, pool, or dish water. ICE & ELEVATE The use of ice on your hand, wrist, or elbow after surgery will help with both pain control and swelling.  Continue with icing your hand, wrist or elbow for the first 48 hours after surgery. Thereafter, use it on an as needed basis. Elevate your hand above heart level as much as possible for the first 48 hours, even while walking. This will keep the swelling to a minimum and prevent throbbing and pain. Pain Medication If you received a regional anesthetic block your arm and hand maybe numb for several hours (6-24 hours). You will be discharged from the surgery center to home with an oral pain medication (analgesic). Rest and elevation is still one of the most important factor for pain control. Take pain medication as directed even though the pain is minimal with the block; do not wait for the pain to become out of control. The most severe pain occurs as the block wears off. Even if you are not having pain but feel the arm and hand waking up, take your pain medication so that it will be working when needed. Pain medication may cause some lethargy, nausea, or constipation. If these symptoms become significantly problematic, please contact Dr. Ash Tompkins office and discontinue the medication. You have been provided Norco for pain control. Do not take this while taking Tramadol. Take one medication or the other for pain but not both. You may take Ibuprofen or Aleve with this if needed and if you are able tolerate this. Diet Gradually resume your normal diet. The night of your surgery, begin with liquids and/or light foods. If you are feeling well enough in the morning, progress to your normal eating patterns. Eating a well-balanced diet with plenty of fresh fruits and vegetables and drinking plenty of fluids may help alleviate any constipation resulting from pain medication. Driving It is not advisable to drive a vehicle while you are on pain medication due to the possible side effects. However, once you are off pain medication and you feel that you are able to safely control the vehicle, you may drive Warning Signs Observe your hand and incision site for increased redness, inflammation, drainage, foul odor, or increased pain unrelieved by rest or medication. If any of the above occurs, or should you develop a fever greater than 101 degrees, please notify Dr. Talia Selby office. Appointments You should already have an appointment to see Dr. Brigid Toussaint within 2 weeks. If you do not have this appointment, please contact our office. Do not hesitate to call with any questions or concerns. Your post-operative appointment has been made for the following date and time with Dr. Stacia Nunez Physician Assistant, Diana Izaguirre: 
 
Date:     ______9/17/18_____________       Time:    ____2 pm_____________ Location:   [address] ____X________  [address] ____________ Hand Therapy Date:  ______________  Time: ________________ Location:  [address] ______________  [facility] Ed[place]s _____________ Dr. Talia Selby office numbers are:  
 
? [phone] or [phone] ? [phone], ext [phone] TOLL FREE ? After business hours or holidays, please call the numbers above and speak with the physician on call ? If life threatening emergency call 911 Introducing [facility]! Meena Naranjo introduces [employer] patient portal. Now you can access parts of your medical record, email your doctor's office, and request medication refills online. 1. In your internet browser, go to [email or web address] 2. Click on the First Time User? Click Here link in the Sign In box. You will see the New Member Sign Up page. 3. Enter your [employer]ribe Access Code exactly as it appears below. You will not need to use this code after youve completed the sign-up process. If you do not sign up before the expiration date, you must request a new code. · [employer]ribe Access Code: [email or web address] Expires: 9/30/2018 12:59 PM 
 
4.  Enter the last four digits of your Social Security Number (xxxx) and Date of Birth (mm/dd/yyyy) as indicated and click Submit. You will be taken to the next sign-up page. 5. Create a Creativity Softwaret ID. This will be your [employer] login ID and cannot be changed, so think of one that is secure and easy to remember. 6. Create a Creativity Softwaret password. You can change your password at any time. 7. Enter your Password Reset Question and Answer. This can be used at a later time if you forget your password. 8. Enter your e-mail address. You will receive e-mail notification when new information is available in [address] 9. Click Sign Up. You can now view and download portions of your medical record. 10. Click the Download Summary menu link to download a portable copy of your medical information. If you have questions, please visit the Frequently Asked Questions section of the [employer] website. Remember, [employer] is NOT to be used for urgent needs. For medical emergencies, dial 911. Now available from your iPhone and Android! Introducing Arias Saul As a [facility] patient, I wanted to make you aware of our electronic visit tool called Arias Raúldenysbuzz. [facility] 24/7 allows you to connect within minutes with a medical provider 24 hours a day, seven days a week via a mobile device or tablet or logging into a secure website from your computer. You can access Arias Saul from anywhere in the [place]. A virtual visit might be right for you when you have a simple condition and feel like you just dont want to get out of bed, or cant get away from work for an appointment, when your regular [facility] provider is not available (evenings, weekends or holidays), or when youre out of town and need minor care. Electronic visits cost only $49 and if the [facility] [facility] 24/7 provider determines a prescription is needed to treat your condition, one can be electronically transmitted to a nearby pharmacy*. Please take a moment to enroll today if you have not already done so. The enrollment process is free and takes just a few minutes. To enroll, please download the [employer] 24/7 geronimo to your tablet or phone, or visit [email or web address] to enroll on your computer. And, as an [address] [place] [address] patient with a [employer] account, the results of your visits will be scanned into your electronic medical record and your primary care provider will be able to view the scanned results. We urge you to continue to see your regular [facility] provider for your ongoing medical care. And while your primary care provider may not be the one available when you seek a eCert virtual visit, the peace of mind you get from getting a real diagnosis real time can be priceless. For more information on eCert, view our Frequently Asked Questions (FAQs) at [email or web address]. Sincerely, 
 
Loraine Henderson MD 
Chief Medical Officer [address] [place] [address] *:  certain medications cannot be prescribed via eCert Providers Seen During Your Hospitalization Provider Specialty Primary office phone Jen Story MD Orthopedic Surgery [phone] Your Primary Care Physician (PCP) Primary Care Physician Office Phone Office Fax [address] [place] [address] [phone] You are allergic to the following No active allergies Recent Documentation Height Weight BMI OB Status Smoking Status 1.6 m 61.2 kg 23.91 kg/m2 Hysterectomy Former Smoker Emergency Contacts Name Discharge Info Relation Home Work Mobile [facility] DISCHARGE CAREGIVER [3] Son [22]   [phone] Marlin Sutton DISCHARGE CAREGIVER [3] Mother [14]   [phone] Patient Belongings  The following personal items are in your possession at time of discharge: 
  Dental Appliances: Partials, Uppers, Lowers  Visual Aid: None      Home Medications: None   Jewelry: None  Clothing: Pants, Undergarments, Shirt (clothing bag to PACU locker, boot)    Other Valuables: None  Personal Items Sent to Safe: none Please provide this summary of care documentation to your next provider. Signatures-by signing, you are acknowledging that this After Visit Summary has been reviewed with you and you have received a copy. Patient Signature:  ____________________________________________________________ Date:  ____________________________________________________________  
  
[facility] Provider Signature:  ____________________________________________________________ Date:  ____________________________________________________________

## 2018-09-07 NOTE — IP AVS SNAPSHOT
[address] [place] [address] 1007 [facility] 
[phone] Patient: Justin Jarquin MRN: [id number] :1965 A check zack indicates which time of day the medication should be taken. My Medications START taking these medications Instructions Each Dose to Equal  
 Morning Noon Evening Bedtime  
 cephALEXin 500 mg capsule Commonly known as:  Julio Ta Your next dose is: Today and finish all of this RX Take 1 Cap by mouth three (3) times daily. 500 mg HYDROcodone-acetaminophen 5-325 mg per tablet Commonly known as:  Chelsie President Your last dose was: Today as needed for pain Take 1 Tab by mouth every four (4) hours as needed for Pain. Max Daily Amount: 6 Tabs. 1 Tab CHANGE how you take these medications Instructions Each Dose to Equal  
 Morning Noon Evening Bedtime  
 diclofenac EC 75 mg EC tablet Commonly known as:  VOLTAREN What changed:  when to take this Your next dose is:  today Take 1 Tab by mouth two (2) times a day. 75 mg * LEVEMIR U-100 INSULIN 100 unit/mL injection Generic drug:  insulin detemir U-100 What changed:  Another medication with the same name was changed. Make sure you understand how and when to take each. Your next dose is: Today  Blood Glucose was 233 at 9AM  
   
 10 Units by SubCUTAneous route Daily (before dinner). 10 Units * insulin detemir U-100 100 unit/mL injection Commonly known as:  LEVEMIR U-100 INSULIN What changed:   
- how much to take 
- how to take this - when to take this 
- additional instructions 12 units in morning and 10 at night  
     
   
   
   
  
 insulin lispro 100 unit/mL injection Commonly known as:  HumaLOG U-100 Insulin What changed:  additional instructions Your next dose is: Today  Check glucose 2-5 units with each meal. Max 15 units per day  
     
   
   
   
  
 losartan 50 mg tablet Commonly known as:  COZAAR What changed:  when to take this Your last dose was:  today Take 1 Tab by mouth daily. 50 mg  
    
   
   
   
  
 * Notice: This list has 2 medication(s) that are the same as other medications prescribed for you. Read the directions carefully, and ask your doctor or other care provider to review them with you. CONTINUE taking these medications Instructions Each Dose to Equal  
 Morning Noon Evening Bedtime ALEVE 220 mg tablet Generic drug:  naproxen sodium Your next dose is: Today as needed Take 220 mg by mouth two (2) times daily as needed. 220 mg CALCIUM 500 + D PO Your next dose is:  today Take 4,000 mg by mouth nightly. 4000 mg  
    
   
   
   
  
 GABAPENTIN (BULK) Your next dose is:  today Take 200 mg by mouth ACB/HS. 200 mg GLUCOSAMINE CHONDROITIN PLUS PO Your next dose is:  today Take 1 Tab by mouth daily (with breakfast). 1 Tab  
    
   
   
   
  
 levothyroxine 75 mcg tablet Commonly known as:  LEVOTHROID Your next dose is: Today if not taken this AM  
   
 Take 1 Tab by mouth Daily (before breakfast). Indications: hypothyroidism 75 mcg ONETOUCH ULTRA BLUE TEST STRIP strip Generic drug:  glucose blood VI test strips Your next dose is:  N/A  
   
 USE TO TEST BLOOD SUGAR 8 TIMES A DAY (E11.40) PROLIA 60 mg/mL injection Generic drug:  denosumab Your next dose is:  When due according to your schedule 60 mg by SubCUTAneous route. Every 6 months 60 mg  
    
   
   
   
  
 simvastatin 40 mg tablet Commonly known as:  ZOCOR Your next dose is:  today Take 1 Tab by mouth nightly. 40 mg  
    
   
   
   
  
 VITAMIN A PO Your next dose is:  today Take 2,400 mcg by mouth nightly. 2400 mcg VITAMIN C PO Your next dose is:  today Take 3,000 mg by mouth daily (with breakfast). 3000 mg  
    
   
   
   
  
 VITAMIN D3 2,000 unit Tab Generic drug:  cholecalciferol (vitamin D3) Your next dose is: Today Take  by mouth.  
     
   
   
   
  
 vitamin E 400 unit capsule Commonly known as:  A[address] Your last dose was:  today Your next dose is:  today Take 400 Units by mouth every Monday and Thursday. Only on M and Thurs at Bedtime 400 Units  
    
   
   
   
  
 zolpidem 5 mg tablet Commonly known as:  AMBIEN Your next dose is: Today as needed Take 1 Tab by mouth nightly as needed for Sleep. Max Daily Amount: 5 mg.  
 5 mg STOP taking these medications   
 traMADol 50 mg tablet Commonly known as:  ULTRAM  
   
  
 TYLENOL EXTRA STRENGTH 500 mg tablet Generic drug:  acetaminophen Where to Get Your Medications These medications were sent to [id number] [place], [address]  [address] [facility] [address] Phone:  [phone]  
  cephALEXin 500 mg capsule Information on where to get these meds will be given to you by the nurse or doctor. ! Ask your nurse or doctor about these medications HYDROcodone-acetaminophen 5-325 mg per tablet

## 2018-09-07 NOTE — ANESTHESIA PREPROCEDURE EVALUATION
Anesthetic History No history of anesthetic complications Review of Systems / Medical History Patient summary reviewed, nursing notes reviewed and pertinent labs reviewed Pulmonary Within defined limits Neuro/Psych Within defined limits CVA Headaches Cardiovascular Within defined limits CAD Exercise tolerance: >4 METS 
  
GI/Hepatic/Renal 
Within defined limits Endo/Other Within defined limits Diabetes Hypothyroidism Arthritis Other Findings Comments: Hx cerebral artery occlusion 
neuropathy Physical Exam 
 
Airway Mallampati: II 
 
Neck ROM: normal range of motion Mouth opening: Normal 
 
 Cardiovascular Regular rate and rhythm,  S1 and S2 normal,  no murmur, click, rub, or gallop Rhythm: regular Rate: normal 
 
 
 
 Dental 
No notable dental hx Pulmonary Breath sounds clear to auscultation Abdominal 
GI exam deferred Other Findings Anesthetic Plan ASA: 3 Anesthesia type: MAC and regional - supraclavicular block Induction: Intravenous Anesthetic plan and risks discussed with: Patient

## 2018-09-07 NOTE — OP NOTES
[facility]  OPERATIVE REPORT    Carisa Bello  MR#: [id number]  : 1965  ACCOUNT #: [de-identified]   DATE OF SERVICE: 2018    PREOPERATIVE DIAGNOSIS:  Right carpal tunnel syndrome. POSTOPERATIVE DIAGNOSIS:  Right carpal tunnel syndrome. PROCEDURE PERFORMED:  Right open carpal tunnel release. SURGEON:  Jossie Simmons MD    ASSISTANT:  CULLEN Rudd    ANESTHESIA:  Regional block. ESTIMATED BLOOD LOSS:  Minimal.    DRAINS:  No drains were used. SPECIMENS REMOVED:  There were no specimens. IMPLANTS:  None. COMPLICATIONS. None. The patient was returned to the PACU in stable condition. INDICATIONS:  This is a 69-year-old hand dominant female who has been experiencing paresthesias in her hand for the last 6 months. There is some concern for polyneuropathy secondary to her diabetes. She had tried conservative treatment including a splint, for which she did not find much relief. She underwent a nerve conduction study which demonstrated electrodiagnostic evidence of severe right carpal tunnel syndrome. No evidence of polyneuropathy. After risks, benefits, and alternatives were discussed with the patient, she elected to proceed with the aforementioned procedure. DESCRIPTION OF PROCEDURE:  The patient was identified in the preoperative holding area. Her right extremity was signed. She underwent a block by the anesthesia staff, and was brought back to the operating room. Formal timeout was called to identify the correct surgical site. She did receive preoperative antibiotics half an hour within the incision time. After the arm was prepped and draped in a normal sterile fashion, a tourniquet was inflated up to 250 mmHg for a total tourniquet time of less than 20 minutes. An incision was made in line with cardinal Garner line, the radial border of the ring finger coming proximally, but ending before the distal wrist flexion crease.   The incision was carried through the skin and subcutaneous tissue. Skin hooks were placed, and blunt dissection was performed to the transverse carpal ligament. Pockets were created both at the proximal and distal apices of the incision. Retractors were placed in a radial and ulnar direction as well as distally, and under direct visualization, the transverse carpal ligament was incised, opened from the mid portion distally up to the sentinel fat pad. Then, the retractors were adjusted proximally and again under direct visualization, the remaining transverse carpal ligament and distal forearm fascia were incised, opened. At this point, a Benge elevator was placed proximally in the wound, and pulled back. There were no bands still constricting on the contents of the carpal tunnel. The same was done distally. The contents of the carpal tunnel were inspected. There were no masses. The patient had moderate to severe tenosynovium along the flexor tendons. The transverse carpal ligament itself was moderately thickened. The median nerve appeared to be flattened in  nature. At this point, the wound was thoroughly irrigated out, closed with 4-0 Prolene. Xeroform, 4 x 4's, cast padding, and a volar splint were placed on the patch placed on the patient and allowed to harden. Tourniquet was deflated. The patient was woken up in the operating room and returned to PACU in stable condition.       MD SHON Mccray / JOCELYNN  D: 09/07/2018 08:38     T: 09/07/2018 11:05  JOB #: [id number]

## 2018-09-07 NOTE — DISCHARGE INSTRUCTIONS
A Note for the Pre-Diabetic / Diabetic Patient    · Your blood glucose after surgery was     233 AT 9 am.     Surgical stress on the body can make the blood sugar run higher than usual. Also, steroid-type drugs can be given sometimes to help reduce swelling and nausea. Steroids can unfortunately raise the blood sugar for the next few days as a side-effect of steroid medicines. After surgery, monitor your blood sugar closely. Watch what you eat by closely following a diabetic diet plan. If your blood sugar remains high, notify the doctor who monitors you blood sugar control for further instructions. Going Home After A  Peripheral Nerve Block    Patient Information    The anesthesiology team has provided for your pain control through a technique called regional anesthesia. As the name implies, anesthesia (decreased or no pain, sensation, or motor control) has been provided to a specific region, whether that be an arm or a leg. How does this happen?  you might ask. While you were sleepy, the anesthesia provider provided medicine to a specific group of nerves either in the neck/shoulder region or in the groin and/or buttock region, similar to the way a dentist might numb a tooth (teeth.)  They typically use an ultrasound machine to know where the medicine is placed in relation to the nerves they wish to numb up or block.   What this means is that for the next few hours, you should expect to have a numb limb. Below are some things we wish for you to read and be familiar with concerning your anesthetized limb. Caring For a Blocked Body Part    General Considerations:   The numbness may last up to 24 hours   You must protect your arm or leg. The blocked extremity is numb, weak, and difficult for your brain to locate and communicate with. To do this you should:  o Pay attention to the position of the blocked limb at all times.   o Be very careful when placing hot or cod items on a numb limb.  You could cause tissue damage like burns or frostbite if you are unable to feel temperature. Carefully follow your discharge instructions regarding the use of these therapies in you post-operative care. o Carefully pad the affected limb. Normally we continually move and adjust the position of our bodies without thinking about it. This movement and continuous repositioning helps to prevent injuries from immobility. When a limb is numb it still requires this care  o Be extremely careful not to bump or hit the numb body part. This can result in an unrecognized injury, at lease until the blocked limb wakes up. It can also result in worse pain of your already post-surgical limb. Arm/Shoulder Blocks:   You may experience a droopy eyelid, nasal stuffiness, and redness of the eye after receiving an arm/shoulder block. This is called Mageds Syndrome, and is very common. There is no need for concern. You may also experience mild hoarseness, but all of these symptoms should resolve within 24 hours.  Some patients may experience mild shortness of breath. A sitting position will help alleviate this and it should resolve within 24 hours. If you experience significant or progressive worsening of the shortness of breath, seek medical attention immediately. Knee/Foot Blocks:   DO NOT use the blocked leg to walk, balance or support yourself. Your leg will not be able to balance your weight properly while any part of your leg is numb. You are at a RISK for [address].  Be careful not to drag your foot along the ground or stub your toes. Contact Phone Numbers    CALL 911 IN CASE OF AN EMERGENCY. For all other non-emergency inquiries call the [facility]  at [phone] and ask for the anesthesiologist on call to be paged. P.R.I.C.E.  INSTRUCTIONS    PRICE is an acronym that stands for Protect, Rest, Ice, Compression, and Elevation (sometimes you might see the acronym RICE.) Listed below are five activities one can do for an injured limb or soft tissue injury. While much anecdotal understanding learned through many years of experience supports these seemingly common sense treatments, building scientific evidence is showing how and why these treatment principles are proving to be so beneficial.  Below is a breakdown of what the PRICE principles entail to speed healing along. PROTECT may sound like an obvious thing to do, and really, it is common sense. After an injury or surgery, protecting the site that hurts help to prevent further injury. REST is essential for an injured limb. Like protection, the more you are up on an injured limb, especially in the early stages of an injury, the more damage you can do. Rest means no activities that would involve the use of the injured tissues so that the early stages of healing can begin without  interruption. ICE \"is perhaps the simplest and oldest [therapy] in the treatment of soft tissue injuries. \"4    Ice help decrease swelling in inflamed and damaged tissues, can diminish the feeling of pain and decrease muscle spasms, and, immediately after an injury,  can slow cellular metabolism and help to prevent further tissue injury from oxygen starvation caused by the swelling. 5      COMPRESSION help decrease pain by limiting movement of an injured limb. Compression can be found through the use of an elastic wrap bandage, a cast, splint, or simply a snug cooling cuff or an ice pack and pillow. ELEVATION is a very important intervention. Placing the injury above the level of the heart whenever possible helps decrease swelling by using gravity to one's advantage . Placing the injury above the level of the heart also helps prevent, or at least decrease, the throbbing pain that is sometimes experienced after surgery or injury. Sources:  1. Muscle injuries: optimizing recovery (2007) Best Practice & Research Clinical Rheumatology Vol. 21, No. 2, pp 317-331, Accessed 9/26/11    2. PRICE first aid guidelines - Protection, Rest, Ice, Compression and Elevation By Jacky Bautista About. com Guide, Updated March 27, 2011, Accessed 9/26/11 http://sportsmedicine. [employer].com/cs/rehab/a/rice. htm    3. Rest Ice Compression Elevation: RICE for injuries, Accessed 9/26/11 LipLotion.ch. com/rest-ice-compression-elevation. html    4. The use of ice in the treatment of acute soft-tissue injury (2004) Monetad, Vol. 32, No. 1, pp 251-261, Accessed 9/26/11 http://ajs.Guardium.nVoq/content/32/1/251.full.pdf+html    Soft tissue damage and healing; theory and techniques, www.iaaf.org, Ch. 9 of  medical page, by cortes Peck And Anup Cha 9/27/11 FormerIdols.gl. pdf [facility] MEDICATION AND   SIDE EFFECT GUIDE    The [address] AND SIDE EFFECT GUIDE was provided to the PATIENT AND CARE PROVIDER. Information provided includes instruction about drug purpose and common side effects for the following medications:   5. [facility]        Hand/Wrist/Elbow Surgery  Post op Instruction  [phone]    Dressing    You have bulky dressing on your hand      _____ Do NOT remove dressing until next appointment with Dr. Milagros Pierce or Occupational therapy (OT) please call Dr. Vanessa Valdez office if this is not already arranged         OR    __X___ Hansel Suzy may remove the dressing from your hand in __5___ days after surgery    - Please cover the wound with a Band-Aid (NOT waterproof type) and change daily. - After your dressing is off, do not do any strenuous activities with your hand/wrist until seen by OT or Dr. Milagros Pierce within 2 weeks postoperatively. Please call Dr. Vanessa Valdez office if this is not already arranged. Showering    You may shower as soon as you want after surgery. Please cover the dressing with a bag.      If you are allowed to remove your dressing you may shower and get the wound wet 3 days after your surgery. You may allow the water to run over the incision and pat the incision dry. DO NOT let the wound soak in a tub, pool, or dish water. ICE & ELEVATE    The use of ice on your hand, wrist, or elbow after surgery will help with both pain control and swelling. Continue with icing your hand, wrist or elbow for the first 48 hours after surgery. Thereafter, use it on an as needed basis. Elevate your hand above heart level as much as possible for the first 48 hours, even while walking. This will keep the swelling to a minimum and prevent throbbing and pain. Pain Medication    If you received a regional anesthetic block your arm and hand maybe numb for several hours (6-24 hours). You will be discharged from the surgery center to home with an oral pain medication (analgesic). Rest and elevation is still one of the most important factor for pain control. Take pain medication as directed even though the pain is minimal with the block; do not wait for the pain to become out of control. The most severe pain occurs as the block wears off. Even if you are not having pain but feel the arm and hand waking up, take your pain medication so that it will be working when needed. Pain medication may cause some lethargy, nausea, or constipation. If these symptoms become significantly problematic, please contact Dr. Marty Motta office and discontinue the medication. You have been provided Norco for pain control. Do not take this while taking Tramadol. Take one medication or the other for pain but not both. You may take Ibuprofen or Aleve with this if needed and if you are able tolerate this. Diet    Gradually resume your normal diet. The night of your surgery, begin with liquids and/or light foods. If you are feeling well enough in the morning, progress to your normal eating patterns.  Eating a well-balanced diet with plenty of fresh fruits and vegetables and drinking plenty of fluids may help alleviate any constipation resulting from pain medication. Driving    It is not advisable to drive a vehicle while you are on pain medication due to the possible side effects. However, once you are off pain medication and you feel that you are able to safely control the vehicle, you may drive      Warning Signs    Observe your hand and incision site for increased redness, inflammation, drainage, foul odor, or increased pain unrelieved by rest or medication. If any of the above occurs, or should you develop a fever greater than 101 degrees, please notify Dr. Kelby Ding office. Appointments    You should already have an appointment to see Dr. Alba Marrero within 2 weeks. If you do not have this appointment, please contact our office. Do not hesitate to call with any questions or concerns.     Your post-operative appointment has been made for the following date and time with Dr. Katie Olvera Physician Assistant, Erica Solderrell:    Date:     ______9/17/18_____________       Time:    ____2 pm_____________    Location:   [facility] ____X________  [facility] ____________    Hand Therapy Date:  ______________  Time: ________________    Location:  [address] [place] [address] ______________  Enrique Bailon _____________      Dr. Kelby Ding office numbers are:     Michael [phone] or [phone], ext [phone] TOLL FREE   After business hours or holidays, please call the numbers above and speak with the physician on call   If life threatening emergency call [phone]

## 2018-09-27 PROBLEM — I63.9 CEREBROVASCULAR ACCIDENT (HCC): Status: ACTIVE | Noted: 2017-06-12

## 2018-09-27 PROBLEM — M48.48XA STRESS FRACTURE OF SACRUM: Status: ACTIVE | Noted: 2018-03-13

## 2018-09-27 PROBLEM — Z98.890 HX OF BRAIN SURGERY: Status: ACTIVE | Noted: 2018-09-27

## 2018-09-27 PROBLEM — M54.50 BILATERAL LOW BACK PAIN WITHOUT SCIATICA: Status: ACTIVE | Noted: 2018-03-13

## 2018-09-27 PROBLEM — M19.90 ARTHRITIS: Status: ACTIVE | Noted: 2017-06-26

## 2018-09-27 PROBLEM — M85.80 OSTEOPENIA: Status: ACTIVE | Noted: 2017-06-12

## 2018-11-05 PROBLEM — L85.1 ACQUIRED PLANTAR KERATODERMA: Status: ACTIVE | Noted: 2018-11-05

## 2018-11-06 PROBLEM — Z13.5 DIABETIC RETINOPATHY SCREENING: Status: ACTIVE | Noted: 2018-11-06

## 2018-11-06 PROBLEM — R04.0 NASAL BLEEDING: Status: ACTIVE | Noted: 2018-11-06

## 2018-11-13 ENCOUNTER — OFFICE VISIT (OUTPATIENT)
Dept: ENDOCRINOLOGY | Age: 53
End: 2018-11-13

## 2018-11-13 VITALS
WEIGHT: 138.2 LBS | BODY MASS INDEX: 24.49 KG/M2 | HEIGHT: 63 IN | DIASTOLIC BLOOD PRESSURE: 67 MMHG | HEART RATE: 69 BPM | SYSTOLIC BLOOD PRESSURE: 121 MMHG

## 2018-11-13 DIAGNOSIS — E03.9 ACQUIRED HYPOTHYROIDISM: ICD-10-CM

## 2018-11-13 DIAGNOSIS — E10.42 TYPE 1 DIABETES MELLITUS WITH DIABETIC POLYNEUROPATHY (HCC): Primary | ICD-10-CM

## 2018-11-13 LAB — HBA1C MFR BLD HPLC: 7.7 %

## 2018-11-13 NOTE — PROGRESS NOTES
Chief Complaint Patient presents with  Diabetes  
  pcp and pharmacy verified. Eye exam UTD Records since last visit reviewed. History of Present Illness: Alvarado Vazquez is a 46 y.o. female here for follow up of diabetes. Pt notes \"I have been a Type I diabetic for 37 years\". \"I don't count carbs, I eat what I want and most of the time I will remember to take my insulin but not all the time. I stay active walking my dog 2-4 miles per day\". At our last visit in August 2018 her A1C was 7.2% on Levemir 12 units in the AM and 10 units with dinner and Humalog 2-6 units based on her BGs and the number of carbs she will be eating. Based on her BG readings when she took her insulin as instructed her BGs ran in a good range. I instructed pt to continue the Levemir to 12 units in the AM and 10 units HS. Pt to take 5 units of humalog with each meal plus a 1:50 correction. Her A1C today was 7.7%. \"I have been having sinus infections and my blood sugars have been running high. I was also having a spot on my heal, but by PCP gave me a cream and that cleared up. She is taking Levemir 12 units in the AM and 10 units HS and Humalog 2-5 units based on what her sugars are running and how many carbs she is eating. \"lately it has been in the 5's range\". She checks her BGs 4-8 times per day. She notes her overnight and FBGs overnight have been higher. Pt wakes around 6AM. She has breakfast of 5 crackers and regular soda around 8AM. She will take 3-5 units of Humalog. She does not eat again till 1-5pm, which is her one meal of the day. Yesterday she had a salad with black bean salsa, chicken, pickles, raisins. She will take 3-5 units of Humalog with dinner. If she gets hungry, she will snack on celery with PB or nuts. After dinner she will have an ice cream bar. Around 9PM, if her BG is >200 she will take additional Humalog. Exercise consists of waking her dog 2-4 miles per day. Pt has hx of CVA x4, \"one of which caused a CNS leak\". \"I had a heart issue in 1996 and they put me on medication but I was taken off the medication by another Endocrinologist about 3 years ago and I have not had any troubles. She reports she had a stress test and an ECHO and \"everything came back fine\". Pt has hx of polyneuropathy from her knees to her feet. She takes Gabapentin 200mg BID, which does help with her pain. Pt has no hx of Nephrology (Urine MA/Cr 12 on 4/3/18). Last eye exam was June 2018 She does have retinopathy and cataracts \"but there is nothing that needs to be done about it right now\". She is still followed by Dr. Eliezer Garcia who is following her for foot fracture. She notes her right foot is still fractured and is not healing. Dr. Eliezer Garcia has talked about putting in hardware. She has a walking boot she wears when she is out walking. Pt has hx of osteoporosis for which she is followed by Dr. Omaira Palomares of Rheumatology. 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 She was never treated and it \"went away\". Pt has hx of hypothyroidism and is taking LT4 75mcg daily. She was euthyroid in August 2018. Current Outpatient Medications Medication Sig  
 fluticasone (FLONASE) 50 mcg/actuation nasal spray 2 Sprays by Both Nostrils route daily.  mupirocin (BACTROBAN) 2 % ointment Apply  to affected area daily.  ammonium lactate (AMLACTIN) 12 % topical cream Apply  to affected area two (2) times a day. rub in to affected area well  acetaminophen (TYLENOL) 500 mg tablet Take 2 Tabs by mouth two (2) times daily as needed for Pain for up to 30 days.  acetaminophen (TYLENOL PO) Take  by mouth two (2) times a day.  urea (CARMOL) 40 % ointment Apply  to affected area two (2) times a day. apply to affected area as directed  Dyclonine-Benzethonium (LIQUID BANDAGE) 0.75-0.2 % soln topical solution Apply  to affected area three (3) times daily.  diclofenac EC (VOLTAREN) 75 mg EC tablet  HUMALOG KWIKPEN INSULIN 100 unit/mL kwikpen  sodium chloride (AYR SALINE) 0.65 % drop 2 Drops by Both Nostrils route every two (2) hours as needed.  doxycycline (VIBRAMYCIN) 100 mg capsule Take 1 Cap by mouth two (2) times a day for 10 days.  traMADol (ULTRAM) 50 mg tablet Take 1 Tab by mouth every morning. Max Daily Amount: 50 mg. Indications: Pain, DJD/OA  gabapentin (NEURONTIN) 100 mg capsule Take 200 mg by mouth two (2) times a day.  ascorbate calcium (VITAMIN C PO) Take 3,000 mg by mouth daily (with breakfast).  naproxen sodium (ALEVE) 220 mg tablet Take 220 mg by mouth two (2) times daily as needed.  calcium carbonate/vitamin D3 (CALCIUM 500 + D PO) Take 4,000 mg by mouth nightly.  zolpidem (AMBIEN) 5 mg tablet Take 1 Tab by mouth nightly as needed for Sleep. Max Daily Amount: 5 mg.  simvastatin (ZOCOR) 40 mg tablet Take 1 Tab by mouth nightly.  losartan (COZAAR) 50 mg tablet Take 1 Tab by mouth daily. (Patient taking differently: Take 50 mg by mouth daily (with breakfast). )  levothyroxine (LEVOTHROID) 75 mcg tablet Take 1 Tab by mouth Daily (before breakfast). Indications: hypothyroidism  insulin lispro (HUMALOG U-100 INSULIN) 100 unit/mL injection 2-5 units with each meal. Max 15 units per day (Patient taking differently: 3-5 Units. 2-5 units with each meal. Max 15 units per day)  insulin detemir U-100 (LEVEMIR U-100 INSULIN) 100 unit/mL injection 12 units in morning and 10 at night (Patient taking differently: 12 Units by SubCUTAneous route Daily (before breakfast). 12 units in morning)  ONETOUCH ULTRA BLUE TEST STRIP strip USE TO TEST BLOOD SUGAR 8 TIMES A DAY (E11.40)  denosumab (PROLIA) 60 mg/mL injection 60 mg by SubCUTAneous route. Every 6 months  GLUC/CHND/OM3/DHA/EPA/FISH/STR (GLUCOSAMINE CHONDROITIN PLUS PO) Take 1 Tab by mouth daily (with breakfast).  cholecalciferol, vitamin D3, (VITAMIN D3) 2,000 unit tab Take  by mouth.  VITAMIN A PO Take 2,400 mcg by mouth nightly.  vitamin E (AQUA GEMS) 400 unit capsule Take 400 Units by mouth every Monday and Thursday. Only on M and Thurs at Bedtime  erythromycin (ILOTYCIN) ophthalmic ointment Apply 4-5 times per day for 5 days  Indications: SUPERFICIAL OCULAR INFECTION No current facility-administered medications for this visit. No Known Allergies Review of Systems: - Eyes: no blurry vision or double vision - Cardiovascular: no chest pain - Respiratory: no shortness of breath - Musculoskeletal: no myalgias - Neurological: no numbness/tingling in extremities Physical Examination: 
Blood pressure 121/67, pulse 69, height 5' 3\" (1.6 m), weight 138 lb 3.2 oz (62.7 kg). - General: pleasant, no distress, good eye contact  
- Neck: no carotid bruits - Cardiovascular: regular, normal rate, nl s1 and s2, no m/r/g, 2+ DP pulses - Respiratory: clear bilaterally - Integumentary: no edema,  
- Psychiatric: normal mood and affect Diabetic foot exam:  
 
Left Foot: 
 Visual Exam: callous - present Pulse DP: 2+ (normal) Filament test: reduced sensation Vibratory sensation: normal 
   
Right Foot: 
 Visual Exam: callous - present Pulse DP: 2+ (normal) Filament test: reduced sensation Vibratory sensation: normal 
 
 
 
Data Reviewed:  
Her A1C today was 7.7%. Assessment/Plan:  
1) DM > Her A1C today was 7.7%. When she takes her insulin as instructed her BGs are running in a good range. Pt to continue the Levemir to 12 units in the AM and increase her PM dose to 12 units. Pt again instructed to take 5 units of humalog with each meal plus a 1:50 correction. Will have pt check her BGs 8 times per day and keep a log of her blood sugars and a log of what she eats and drinks for the next two weeks and mail her BG logs to me in 2 weeks. With her hx of neuropathy and calluses, she would benefit from DM shoes and inserts. 2) Hypothyroidism > Pt's TFTs were at goal in August 2018 on LT4 75mcg daily. Pt voices understanding and agreement with the plan. Pain noted and pt was recommended to call her PCP for further evaluation and treatment, as needed Follow-up Disposition: 
Return in about 3 months (around 2/13/2019). Copy sent to: 
Dr. Leatha Hastings

## 2018-11-15 ENCOUNTER — HOSPITAL ENCOUNTER (OUTPATIENT)
Dept: INFUSION THERAPY | Age: 53
End: 2018-11-15
Payer: MEDICARE

## 2018-11-19 ENCOUNTER — HOSPITAL ENCOUNTER (OUTPATIENT)
Dept: INFUSION THERAPY | Age: 53
Discharge: HOME | End: 2018-11-19
Payer: MEDICARE

## 2018-11-19 ENCOUNTER — TELEPHONE (OUTPATIENT)
Dept: RHEUMATOLOGY | Age: 53
End: 2018-11-19

## 2018-11-19 NOTE — TELEPHONE ENCOUNTER
----- Message from Alanis Moreno sent at 11/19/2018 11:33 AM EST -----  Regarding: Dr. Jasmeet Navarrete ([facility]) is requesting a order for \"Prolia\" sent to [facility] [phone]. Pt appt is today. Afshan Melchor best contact [phone].

## 2018-11-19 NOTE — PROGRESS NOTES
@1110 Spoke with Pily Garcia nurse made her aware that no order for Pt.'s Prolia injection has been received. Order had been requested on 18 as well as since 945 am this morning. [facility] spoke with Dr. Teresa Braun and patient needed to see Linda Edouard prior to reordering medication. @5236 Attempted to call patient 3x, left VM. Pt returned call and was made aware that she needed to see physician prior to getting her Prolia. Pt instructed to follow up with Dr. Teresa Braun and re-schedule appointment. @ [phone] Pt arrived at [facility]. Pt stated \" I talk to the office and they said I can have the Prolia. No order received. Office contacted again. Spoke with Arabella Munguia who stated she was faxing order. Order received was a OK to treat and not a medication order for Prolia. Dr. Deborah Garcia office contact. After multiple attempts (7) only a ok to treat order was faxed. Pt has an  order for Prolia. Prolia order needs to be renewed on  Prolia order template and faxed to [address]. Pt rescheduled appointment Trudy HOOKER RN [facility]  spoke with patient as well as office. @ 1403 Received call [employer] clarification given as to why patient was not treated today. Pt needs and actual medication Prolia order not an ok to treat. Per pharmacist she needs the actually order. Made Ananda Samayoa aware there is a Prolia order template which includes what labs need to be drawn, dosage of medication, route and needs to be both signed and dated. Ananda Samayoa stated understanding of information provided and provided [facility] with a direct phone number to her desk.

## 2018-11-29 ENCOUNTER — HOSPITAL ENCOUNTER (OUTPATIENT)
Dept: INFUSION THERAPY | Age: 53
Discharge: HOME | End: 2018-11-29
Payer: MEDICARE

## 2018-11-29 ENCOUNTER — APPOINTMENT (OUTPATIENT)
Dept: INFUSION THERAPY | Age: 53
End: 2018-11-29
Payer: MEDICARE

## 2018-11-29 VITALS
SYSTOLIC BLOOD PRESSURE: 92 MMHG | HEART RATE: 72 BPM | DIASTOLIC BLOOD PRESSURE: 57 MMHG | RESPIRATION RATE: 18 BRPM | OXYGEN SATURATION: 97 % | TEMPERATURE: 97.7 F

## 2018-11-29 LAB
ANION GAP BLD CALC-SCNC: 18 MMOL/L (ref 10–20)
BUN BLD-MCNC: 14 MG/DL (ref 9–20)
CA-I BLD-MCNC: 1.21 MMOL/L (ref 1.12–1.32)
CHLORIDE BLD-SCNC: 94 MMOL/L (ref 98–107)
CO2 BLD-SCNC: 28 MMOL/L (ref 21–32)
CREAT BLD-MCNC: 0.7 MG/DL (ref 0.6–1.3)
GLUCOSE BLD-MCNC: 72 MG/DL (ref 65–100)
HCT VFR BLD CALC: 34 % (ref 35–47)
MAGNESIUM SERPL-MCNC: 1.7 MG/DL (ref 1.6–2.4)
PHOSPHATE SERPL-MCNC: 3.6 MG/DL (ref 2.6–4.7)
POTASSIUM BLD-SCNC: 3.8 MMOL/L (ref 3.5–5.1)
SERVICE CMNT-IMP: ABNORMAL
SODIUM BLD-SCNC: 135 MMOL/L (ref 136–145)

## 2018-11-29 PROCEDURE — 36415 COLL VENOUS BLD VENIPUNCTURE: CPT

## 2018-11-29 PROCEDURE — 83735 ASSAY OF MAGNESIUM: CPT

## 2018-11-29 PROCEDURE — 84100 ASSAY OF PHOSPHORUS: CPT

## 2018-11-29 PROCEDURE — 96372 THER/PROPH/DIAG INJ SC/IM: CPT

## 2018-11-29 PROCEDURE — 80047 BASIC METABLC PNL IONIZED CA: CPT

## 2018-11-29 PROCEDURE — 74011250636 HC RX REV CODE- 250/636: Performed by: INTERNAL MEDICINE

## 2018-11-29 RX ADMIN — DENOSUMAB 60 MG: 60 INJECTION SUBCUTANEOUS at 13:38

## 2018-11-29 NOTE — PROGRESS NOTES
1315 Pt arrived at [facility] ambulatory and in no distress for Prolia. Assessment completed, no new complaints voiced. Pt denies recent or upcoming dental work. Visit Vitals BP 92/57 Pulse 72 Temp 97.7 °F (36.5 °C) Resp 18 SpO2 97% Recent Results (from the past 12 hour(s)) POC CHEM8 Collection Time: 11/29/18  1:29 PM  
Result Value Ref Range Calcium, ionized (POC) 1.21 1.12 - 1.32 mmol/L Sodium (POC) 135 (L) 136 - 145 mmol/L Potassium (POC) 3.8 3.5 - 5.1 mmol/L Chloride (POC) 94 (L) 98 - 107 mmol/L  
 CO2 (POC) 28 21 - 32 mmol/L Anion gap (POC) 18 10 - 20 mmol/L Glucose (POC) 72 65 - 100 mg/dL BUN (POC) 14 9 - 20 mg/dL Creatinine (POC) 0.7 0.6 - 1.3 mg/dL GFRAA, POC >60 >60 ml/min/1.73m2 GFRNA, POC >60 >60 ml/min/1.73m2 Hematocrit (POC) 34 (L) 35.0 - 47.0 % Comment Notified RN or MD immediately by  Medications received: 
Prolia 1340 Pt will call Dr. Sunshine Davila to arrange Renal Panel 10 days post injection. Pt lives 2 hours from this facility and will schedule closer to home. Tolerated treatment well, no adverse reaction noted. D/Cd from [facility] ambulatory and in no distress accompanied by self. Next appt 5/30/18.

## 2018-12-11 ENCOUNTER — APPOINTMENT (OUTPATIENT)
Dept: INFUSION THERAPY | Age: 53
End: 2018-12-11

## 2019-01-11 RX ORDER — INSULIN DETEMIR 100 [IU]/ML
INJECTION, SOLUTION SUBCUTANEOUS
Qty: 10 ADJUSTABLE DOSE PRE-FILLED PEN SYRINGE | Refills: 5 | Status: SHIPPED | OUTPATIENT
Start: 2019-01-11 | End: 2019-02-07 | Stop reason: SDUPTHER

## 2019-01-11 NOTE — TELEPHONE ENCOUNTER
Pt needs a refill on her Levemir sent to [facility] in [place] she is almost out

## 2019-01-16 PROBLEM — R59.0 LYMPHADENOPATHY OF HEAD AND NECK REGION: Status: ACTIVE | Noted: 2019-01-16

## 2019-01-16 PROBLEM — K13.70 MOUTH LESION: Status: ACTIVE | Noted: 2019-01-16

## 2019-01-16 PROBLEM — R63.5 WEIGHT GAIN: Status: ACTIVE | Noted: 2019-01-16

## 2019-01-16 PROBLEM — R10.9 ABDOMINAL BLOATING WITH CRAMPS: Status: ACTIVE | Noted: 2019-01-16

## 2019-01-16 PROBLEM — J32.0 MAXILLARY SINUSITIS: Status: ACTIVE | Noted: 2019-01-16

## 2019-01-16 PROBLEM — R14.0 ABDOMINAL BLOATING WITH CRAMPS: Status: ACTIVE | Noted: 2019-01-16

## 2019-01-29 ENCOUNTER — OFFICE VISIT (OUTPATIENT)
Dept: RHEUMATOLOGY | Age: 54
End: 2019-01-29

## 2019-01-29 ENCOUNTER — HOSPITAL ENCOUNTER (OUTPATIENT)
Dept: LAB | Age: 54
Discharge: HOME | End: 2019-01-29
Payer: MEDICARE

## 2019-01-29 VITALS
HEART RATE: 70 BPM | RESPIRATION RATE: 16 BRPM | DIASTOLIC BLOOD PRESSURE: 70 MMHG | WEIGHT: 140.2 LBS | OXYGEN SATURATION: 96 % | TEMPERATURE: 97.8 F | BODY MASS INDEX: 24.84 KG/M2 | SYSTOLIC BLOOD PRESSURE: 127 MMHG

## 2019-01-29 DIAGNOSIS — D64.9 ANEMIA, UNSPECIFIED TYPE: ICD-10-CM

## 2019-01-29 DIAGNOSIS — D86.9 SARCOIDOSIS: ICD-10-CM

## 2019-01-29 DIAGNOSIS — M81.6 LOCALIZED OSTEOPOROSIS, UNSPECIFIED PATHOLOGICAL FRACTURE PRESENCE: ICD-10-CM

## 2019-01-29 DIAGNOSIS — N95.9 POST MENOPAUSAL PROBLEMS: ICD-10-CM

## 2019-01-29 DIAGNOSIS — M19.90 INFLAMMATORY ARTHRITIS: Primary | ICD-10-CM

## 2019-01-29 DIAGNOSIS — M81.0 AGE RELATED OSTEOPOROSIS, UNSPECIFIED PATHOLOGICAL FRACTURE PRESENCE: ICD-10-CM

## 2019-01-29 PROCEDURE — 86140 C-REACTIVE PROTEIN: CPT

## 2019-01-29 PROCEDURE — 85651 RBC SED RATE NONAUTOMATED: CPT

## 2019-01-29 PROCEDURE — 86431 RHEUMATOID FACTOR QUANT: CPT

## 2019-01-29 PROCEDURE — 86038 ANTINUCLEAR ANTIBODIES: CPT

## 2019-01-29 PROCEDURE — 80053 COMPREHEN METABOLIC PANEL: CPT

## 2019-01-29 PROCEDURE — 36415 COLL VENOUS BLD VENIPUNCTURE: CPT

## 2019-01-29 PROCEDURE — 86200 CCP ANTIBODY: CPT

## 2019-01-29 PROCEDURE — 85007 BL SMEAR W/DIFF WBC COUNT: CPT

## 2019-01-29 RX ORDER — ZOLPIDEM TARTRATE 5 MG/1
TABLET ORAL
COMMUNITY
Start: 2019-01-02 | End: 2019-02-04 | Stop reason: SDUPTHER

## 2019-01-29 NOTE — PROGRESS NOTES
RHEUMATOLOGY PROBLEM LIST AND CHIEF COMPLAINT 1. Osteoporosis: The patient fulfills the [facility] guidelines for pharmacologic intervention in postmenopausal women and men ? 47years of age Treatment - Prolia, 3 injections. 2. Remote history of sarcoidosis - diagnosed after a chest x-ray and biopsy revealed sarcoidosis. She was not on any treatment for this and has not had any symptoms of inflammatory arthritis, interstitial lung disease or inflammatory skin disease. HISTORY OF PRESENT ILLNESS This is a 46 y.o.  female. Today, the patient complains of pain in the joints. Location: hip Severity:  6 on a scale of 0-10 Timing: intermittent Duration: 1.5 years Modifying factors: none Context/Associated signs and symptoms: The patient was last seen Oct 2017 for treatment of osteoporosis. She complains of a \"butterfly fracture\" over her right foot. She was in a boot for 6 months, and continues to wear this PRN with physical activity. She has had regular Prolia treatment, most recent injection was not renewed due to lack of refill. She complains of 30 minutes daily of morning stiffness in her hands that noticeably worsened 6 months ago, improves with activity. She takes Diclofenac PRN, tramadol PRN. RHEUMATOLOGY REVIEW OF SYSTEMS Positives as per history Negatives as follows: 
CONSTITUTlONAL:  Denies unexplained persistent fevers, weight change, chronic fatigue HEAD/EYES:   Denies eye redness, blurry vision or sudden loss of vision, dry eyes, HA, temporal artery pain ENT:    Denies oral/nasal ulcers, recurrent sinus infections, dry mouth RESPIRATORY:  No pleuritic pain, history of pleural effusions, hemoptysis, exertional dyspnea CARDIOVASCULAR:  Denies chest pain, history of pericardial effusions GASTRO:   Denies heartburn, esophageal dysmotility, abdominal pain, nausea, vomiting, diarrhea, blood in the stool HEMATOLOGIC:  No easy bruising, purpura, swollen lymph nodes SKIN:    Denies alopecia, ulcers, nodules, sun sensitivity, unexplained persistent rash VASCULAR:   Denies edema, cyanosis, raynaud phenomenon NEUROLOGIC:  Denies specific muscle weakness, paresthesias PSYCHIATRIC:  No sleep disturbance / snoring, depression, anxiety MSK:    No morning stiffness >1 hour, SI joint pain, persistent joint swelling PAST MEDICAL HISTORY Reviewed with patient, significant changes in medical history - no PHYSICAL EXAM 
Blood pressure 127/70, pulse 70, temperature 97.8 °F (36.6 °C), temperature source Oral, resp. rate 16, weight 140 lb 3.2 oz (63.6 kg), SpO2 96 %. GENERAL APPEARANCE: Well-nourished adult in no acute distress. EYES: No scleral erythema, conjunctival injection. ENT: No oral ulcer, parotid enlargement. NECK: No adenopathy, thyroid enlargement. CARDIOVASCULAR: Heart rhythm is regular. No murmur, rub, gallop. CHEST: Normal vesicular breath sounds. No wheezes, rales, pleural friction rubs. ABDOMINAL: The abdomen is soft and nontender. Liver and spleen are nonpalpable. Bowel sounds are normal. 
EXTREMITIES: There is no evidence of clubbing, cyanosis, edema. SKIN: No rash, palpable purpura, digital ulcer, abnormal thickening. NEUROLOGICAL: Normal gait and station, full strength in upper and lower extremities, normal sensation to light touch. MUSCULOSKELETAL:  
Upper extremities - Fullness over MCPs on the left. Tenderness over the PIPs. Lower extremities - full range of motion, no tenderness, no swelling, no synovial thickening and no deformity of joints. LABS, RADIOLOGY AND PROCEDURES Previous labs reviewed -Yes Previous radiology reviewed -Yes Previous procedures reviewed -Yes Previous medical records reviewed/summarized -Yes ASSESSMENT 1. Osteoporosis - Check bone density scan. We will restart Prolia and reevaluate treatment plan based on the results of the bone scan. 2. Possible inflammatory arthritis - The patient has a history and exam consistent with an inflammatory arthritis. There is joint pain and swelling that is worse in the AM and improves with activity. There is synovitis on our exam today. The distribution is symmetrical which is common in inflammatory arthritis. There are autoimmune causes, infectious causes, malignancy and other systemic diseases that can cause these signs and symptoms. Common autoimmune disease that will need to be ruled out include RA, SLE, and Sjogren's. Hepatitis, malignancy and thyroid disease are other causes of these symptoms. We will try to find a diagnosis with basic labs, radiology and autoantibodies. There was a history of sarcoidosis in the past.  This could be secondary to that condition. PLAN 1. Check CBC, CMP, markers of inflammation (ESR, CRP), RF, CCP, SUJEY w IF 2. X-rays of hands, feet to look for inflammatory/erosive changes 3. Restart Prolia 4. Bone scan Zack Rhodes MD 
Adult and Pediatric Rheumatology [facility], [address] [place] [address], Phone [phone], Fax [phone] E-mail: Sam Mabry  of Pediatrics Department of Pediatrics, [facility] of [address] [place] [address], [address] [place] [address], Phone [phone], Fax [phone] E-mail: [email or web address] There are no Patient Instructions on file for this visit. cc: 
Ibrahima Ibrahim DO Written by velia Benitez, as dictated by Carolynn Jamison.  Elpidio Rhodes M.D.

## 2019-01-31 LAB
ALBUMIN SERPL-MCNC: 4.2 G/DL (ref 3.5–5.5)
ALBUMIN/GLOB SERPL: 1.8 {RATIO} (ref 1.2–2.2)
ALP SERPL-CCNC: 58 IU/L (ref 39–117)
ALT SERPL-CCNC: 35 IU/L (ref 0–32)
ANA TITR SER IF: NEGATIVE {TITER}
AST SERPL-CCNC: 31 IU/L (ref 0–40)
BASOPHILS # BLD MANUAL: 0 X10E3/UL (ref 0–0.2)
BASOPHILS NFR BLD MANUAL: 0 %
BILIRUB SERPL-MCNC: 0.8 MG/DL (ref 0–1.2)
BUN SERPL-MCNC: 15 MG/DL (ref 6–24)
BUN/CREAT SERPL: 17 (ref 9–23)
CALCIUM SERPL-MCNC: 9.3 MG/DL (ref 8.7–10.2)
CCP IGA+IGG SERPL IA-ACNC: 6 UNITS (ref 0–19)
CHLORIDE SERPL-SCNC: 98 MMOL/L (ref 96–106)
CO2 SERPL-SCNC: 26 MMOL/L (ref 20–29)
CREAT SERPL-MCNC: 0.9 MG/DL (ref 0.57–1)
CRP SERPL-MCNC: 1 MG/L (ref 0–4.9)
DIFFERENTIAL COMMENT, 115260: NORMAL
EOSINOPHIL # BLD MANUAL: 0 X10E3/UL (ref 0–0.4)
EOSINOPHIL NFR BLD MANUAL: 0 %
ERYTHROCYTE [DISTWIDTH] IN BLOOD BY AUTOMATED COUNT: 13.2 % (ref 12.3–15.4)
ERYTHROCYTE [SEDIMENTATION RATE] IN BLOOD BY WESTERGREN METHOD: 6 MM/HR (ref 0–40)
GLOBULIN SER CALC-MCNC: 2.3 G/DL (ref 1.5–4.5)
GLUCOSE SERPL-MCNC: 239 MG/DL (ref 65–99)
HCT VFR BLD AUTO: 34.2 % (ref 34–46.6)
HGB BLD-MCNC: 11.8 G/DL (ref 11.1–15.9)
LYMPHOCYTES # BLD MANUAL: 2.1 X10E3/UL (ref 0.7–3.1)
LYMPHOCYTES NFR BLD MANUAL: 31 %
MCH RBC QN AUTO: 30.9 PG (ref 26.6–33)
MCHC RBC AUTO-ENTMCNC: 34.5 G/DL (ref 31.5–35.7)
MCV RBC AUTO: 90 FL (ref 79–97)
MONOCYTES # BLD MANUAL: 0.3 X10E3/UL (ref 0.1–0.9)
MONOCYTES NFR BLD MANUAL: 5 %
NEUTROPHILS # BLD MANUAL: 4.4 X10E3/UL (ref 1.4–7)
NEUTROPHILS NFR BLD MANUAL: 64 %
PLATELET # BLD AUTO: 208 X10E3/UL (ref 150–379)
PLATELET BLD QL SMEAR: ADEQUATE
POTASSIUM SERPL-SCNC: 4.9 MMOL/L (ref 3.5–5.2)
PROT SERPL-MCNC: 6.5 G/DL (ref 6–8.5)
RBC # BLD AUTO: 3.82 X10E6/UL (ref 3.77–5.28)
RBC MORPH BLD: NORMAL
RHEUMATOID FACT SERPL-ACNC: <10 IU/ML (ref 0–13.9)
SODIUM SERPL-SCNC: 138 MMOL/L (ref 134–144)
WBC # BLD AUTO: 6.9 X10E3/UL (ref 3.4–10.8)

## 2019-02-12 ENCOUNTER — TELEPHONE (OUTPATIENT)
Dept: RHEUMATOLOGY | Age: 54
End: 2019-02-12

## 2019-02-12 RX ORDER — PREDNISONE 5 MG/1
5 TABLET ORAL DAILY
Qty: 30 TAB | Refills: 1 | Status: SHIPPED | OUTPATIENT
Start: 2019-02-12 | End: 2019-03-05

## 2019-02-12 NOTE — TELEPHONE ENCOUNTER
Spoke to pt informed pt that script has been sent to the pharmacy pt verbally acknowledged understanding

## 2019-02-12 NOTE — TELEPHONE ENCOUNTER
----- Message from Brenda Alonso RN sent at 2/12/2019 10:03 AM EST -----  Regarding: FW: Dr. Greg Brady: [phone]      ----- Message -----  From: Bri Wilder  Sent: 2/12/2019   9:29 AM  To: [facility] Nurse Pool  Subject: Dr. Julia Calvin                              Pt is requesting a call back to see if the doctor is going to send over a steroid to the pharmacy?

## 2019-02-26 ENCOUNTER — TELEPHONE (OUTPATIENT)
Dept: RHEUMATOLOGY | Age: 54
End: 2019-02-26

## 2019-02-26 PROBLEM — R04.0 NASAL BLEEDING: Status: RESOLVED | Noted: 2018-11-06 | Resolved: 2019-02-26

## 2019-02-26 PROBLEM — R51.9 FACIAL PAIN: Status: ACTIVE | Noted: 2019-02-26

## 2019-02-26 PROBLEM — F32.9 REACTIVE DEPRESSION: Status: ACTIVE | Noted: 2019-02-26

## 2019-02-26 PROBLEM — G89.4 CHRONIC PAIN SYNDROME: Status: ACTIVE | Noted: 2019-02-26

## 2019-02-26 PROBLEM — J32.0 MAXILLARY SINUSITIS: Status: RESOLVED | Noted: 2019-01-16 | Resolved: 2019-02-26

## 2019-02-26 PROBLEM — K13.70 MOUTH LESION: Status: RESOLVED | Noted: 2019-01-16 | Resolved: 2019-02-26

## 2019-02-26 NOTE — TELEPHONE ENCOUNTER
----- Message from Sharon Traore MD sent at 2/25/2019 12:28 PM EST -----  Her bone density showed osteopenia. Recommend Ca/Vit D over the counter.
Left voicemail to have the pt give the office a return call
17-Sep-2018 01:39

## 2019-03-05 ENCOUNTER — OFFICE VISIT (OUTPATIENT)
Dept: ENDOCRINOLOGY | Age: 54
End: 2019-03-05

## 2019-03-05 VITALS
SYSTOLIC BLOOD PRESSURE: 118 MMHG | DIASTOLIC BLOOD PRESSURE: 64 MMHG | BODY MASS INDEX: 25.09 KG/M2 | WEIGHT: 141.6 LBS | HEIGHT: 63 IN | HEART RATE: 74 BPM

## 2019-03-05 DIAGNOSIS — E03.9 ACQUIRED HYPOTHYROIDISM: ICD-10-CM

## 2019-03-05 DIAGNOSIS — E10.42 TYPE 1 DIABETES MELLITUS WITH DIABETIC POLYNEUROPATHY (HCC): Primary | ICD-10-CM

## 2019-03-05 NOTE — PROGRESS NOTES
Chief Complaint   Patient presents with    Diabetes     pcp and pharmacy verified    Thyroid Problem   Records since last visit reviewed. History of Present Illness: Zayra Navarrete is a 46 y.o. female here for follow up of diabetes. Pt notes \"I have been a Type I diabetic for 37 years\". \"I don't count carbs, I eat what I want and most of the time I will remember to take my insulin but not all the time\". At our last visit in November 2018 her A1C was 7.7% on Levemir 12 units in the AM and 10 units with dinner and Humalog 2-6 units based on her BGs and the number of carbs she will be eating. I instructed her to increase her Levemir to 12 units BID and her Humalog to 5 units with each meal plus 1:50 correction for BG >150. Her A1C in January 2019 was 8.0%. \"I know something is going on, my sugars are constantly high and I have been having swelling LNs in my left neck and under my left arm and I have had pressure HAs in the left side of my face. I went to the ER because my left face was numb and they found black spots under the tongue. According to my dentis it was due to the filling material that got into the gums. He adjusted my partial and I have been having sores in my gums. The last time I saw him he sent me to [facility] for an oral surgeon, who I have not seen yet. My ordered a CT of my head and neck and that was fine and a sinius CT was also fine. \"    She also notes she has gained 6 pounds since christmas and she notes \"I feel tired all the time\". Pt reports she has stopped drinking her pepsi in the morning and has stopped eating ice cream after dinner. She has been eating more fruit and vegetables. She is currently taking Levemir 12 units BID and Humalog 3-8 units \"as needed\", based on her BG and what she thinks she will need for what she going to eat. .   She notes she has not been checking her BGs regularly, because of the cost and because Medicare was not paying for it.   She notes that [employer] is able to get the strips, but requires proof that she is checking her BGs more than 3 times per day. She notes that she just got the papers for her to fill out to get proof to Medicare to get the strips covered. Pt wakes around 8AM.  She has breakfast of 5 crackers and water. She notes her FBGs have been in the 200's so she has been taking 4 units of Humalog. She does not eat again till 1-5pm, which is her one meal of the day. Yesterday she had nachos  If she gets hungry, she will snack on fruit, cheese or celery with PB. After dinner she will have an ice cream bar. Around 9PM, if her BG is >200 she will take additional Humalog. Last night at 1030PM she ate the leftover nachos from dinner. Pt has hx of CVA x4, \"one of which caused a CNS leak\". She reports she had a stress test and an ECHO in 2015 and \"everything came back fine\". Pt has hx of polyneuropathy from her knees to her feet. She takes Gabapentin 200mg BID, which does help with her pain. Pt has no hx of Nephrology (Urine MA/Cr 12 on 4/3/18). Last eye exam was June 2018 She does have retinopathy and cataracts \"but there is nothing that needs to be done about it right now\". She has follow up in May 2019. Pt has hx of osteoporosis for which she is followed by Dr. Conrado Escobedo of Rheumatology. 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 She was never treated and it \"went away\". Dr. Blair Oconnell recently put her on prednisone, which she only took for 4 days and her BGs increased significantly, and caused some weight gain, so she stopped taking it. Pt has hx of hypothyroidism and is taking LT4 75mcg daily. Her TSH in January 2019 was 1.45. Current Outpatient Medications   Medication Sig    diclofenac EC (VOLTAREN) 75 mg EC tablet TAKE 1 TABLET BY MOUTH TWICE DAILY FOR  30  DAYS    DULoxetine (CYMBALTA) 60 mg capsule Take 1 Cap by mouth daily.     tiZANidine (ZANAFLEX) 4 mg tablet Take 1 Tab by mouth three (3) times daily as needed.  triamcinolone acetonide (KENALOG) 0.1 % dental paste Apply to oral ulcers BID PRN    zolpidem (AMBIEN) 5 mg tablet Take 1 Tab by mouth nightly as needed for Sleep. Max Daily Amount: 5 mg.  gabapentin (NEURONTIN) 100 mg capsule Take 2 Caps by mouth two (2) times a day for 90 days.  diclofenac EC (VOLTAREN) 75 mg EC tablet     losartan (COZAAR) 50 mg tablet Take 1 Tab by mouth daily.  levothyroxine (LEVOTHROID) 75 mcg tablet Take 1 Tab by mouth Daily (before breakfast).  simvastatin (ZOCOR) 40 mg tablet Take 1 Tab by mouth nightly.  insulin detemir U-100 (LEVEMIR U-100 INSULIN) 100 unit/mL injection 12 Units by SubCUTAneous route Daily (before breakfast). 12 units in morning    albuterol (VENTOLIN HFA) 90 mcg/actuation inhaler Take 2 Puffs by inhalation every four (4) hours as needed for Wheezing or Shortness of Breath.  glucose blood VI test strips (ONETOUCH ULTRA BLUE TEST STRIP) strip USE TO TEST BLOOD SUGAR 8 TIMES A DAY ( z79.4,E11.40)    fluticasone (FLONASE) 50 mcg/actuation nasal spray 2 Sprays by Both Nostrils route daily.  HUMALOG KWIKPEN INSULIN 100 unit/mL kwikpen As per scale    insulin detemir U-100 (LEVEMIR FLEXTOUCH U-100 INSULN) 100 unit/mL (3 mL) inpn INJECT 12 UNITS SUBCUTANEOUSLY IN THE MORNING AND  10  UNITS  AT  NIGHT (Patient taking differently: INJECT 12 UNITS SUBCUTANEOUSLY IN THE MORNING AND  12  UNITS  AT  NIGHT)    bisacodyl (DULCOLAX) 10 mg suppository Insert 10 mg into rectum daily.  bisacodyl (DULCOLAX) 5 mg EC tablet Take 2 Tabs by mouth two (2) times daily as needed for Constipation.  ascorbate calcium 500 mg tab Take 1 Tab by mouth.  cyanocobalamin (VITAMIN B12) 500 mcg tablet Take 1 Tab by mouth.  polyethylene glycol (MIRALAX) 17 gram/dose powder Take 17 g by mouth.  promethazine (PHENERGAN) 25 mg tablet Take 25 mg by mouth.     ammonium lactate (AMLACTIN) 12 % topical cream Apply  to affected area two (2) times a day. rub in to affected area well    acetaminophen (TYLENOL PO) Take  by mouth two (2) times a day.  sodium chloride (AYR SALINE) 0.65 % drop 2 Drops by Both Nostrils route every two (2) hours as needed.  calcium carbonate/vitamin D3 (CALCIUM 500 + D PO) Take 4,000 mg by mouth nightly.  denosumab (PROLIA) 60 mg/mL injection 60 mg by SubCUTAneous route. Every 6 months    GLUC/CHND/OM3/DHA/EPA/FISH/STR (GLUCOSAMINE CHONDROITIN PLUS PO) Take 1 Tab by mouth daily (with breakfast).  cholecalciferol, vitamin D3, (VITAMIN D3) 2,000 unit tab Take  by mouth.  VITAMIN A PO Take 2,400 mcg by mouth nightly.  vitamin E (AQUA GEMS) 400 unit capsule Take 400 Units by mouth every Monday and Thursday. Only on M and Thurs at Bedtime    erythromycin (ILOTYCIN) ophthalmic ointment Apply 4-5 times per day for 5 days  Indications: SUPERFICIAL OCULAR INFECTION     No current facility-administered medications for this visit. No Known Allergies  Review of Systems:  - Eyes: no blurry vision or double vision  - Cardiovascular: no chest pain  - Respiratory: no shortness of breath  - Musculoskeletal: no myalgias  - Neurological: no numbness/tingling in extremities    Physical Examination:  Blood pressure 118/64, pulse 74, height 5' 3\" (1.6 m), weight 141 lb 9.6 oz (64.2 kg).   - General: pleasant, no distress, good eye contact   - Neck: no carotid bruits  - Cardiovascular: regular, normal rate, nl s1 and s2, no m/r/g, 2+ DP pulses   - Respiratory: clear bilaterally  - Integumentary: no edema,   - Psychiatric: normal mood and affect    Diabetic foot exam:     Left Foot:   Visual Exam: callous - present   Pulse DP: 2+ (normal)   Filament test: reduced sensation    Vibratory sensation: normal      Right Foot:   Visual Exam: callous - present   Pulse DP: 2+ (normal)   Filament test: reduced sensation    Vibratory sensation: normal        Data Reviewed:   Component      Latest Ref Rng & Units 1/29/2019 1/17/2019 1/16/2019 1/16/2019           1:55 PM  2:49 PM 12:07 PM 12:07 PM   Sodium      134 - 144 mmol/L 138 136     Potassium      3.5 - 5.2 mmol/L 4.9 4.8     Chloride      96 - 106 mmol/L 98 100     CO2      20 - 29 mmol/L 26 28     Anion gap      5 - 15 mmol/L  8     Glucose      65 - 99 mg/dL 239 (H) 218 (H)     BUN      6 - 24 mg/dL 15 15     Creatinine      0.57 - 1.00 mg/dL 0.90 0.82     BUN/Creatinine ratio      9 - 23 17 18     GFR est AA      >59 mL/min/1.73 84 >60     GFR est non-AA      >59 mL/min/1.73 73 >60     Calcium      8.7 - 10.2 mg/dL 9.3 8.5     Bilirubin, total      0.0 - 1.2 mg/dL 0.8 0.9     ALT (SGPT)      0 - 32 IU/L 35 (H) 47     AST      0 - 40 IU/L 31 39 (H)     Alk. phosphatase      39 - 117 IU/L 58 56     Protein, total      6.0 - 8.5 g/dL 6.5 6.3 (L)     Albumin      3.5 - 5.5 g/dL 4.2 3.5     Globulin      2.0 - 4.0 g/dL  2.8     A-G Ratio      1.2 - 2.2 1.8 1.3     GLOBULIN, TOTAL      1.5 - 4.5 g/dL 2.3      Cortisol, random      ug/dL    10.5   Prolactin      4.8 - 23.3 ng/mL   12.3      Component      Latest Ref Rng & Units 1/16/2019 1/16/2019 1/16/2019 1/16/2019          12:07 PM 12:07 PM 12:07 PM 12:07 PM   Sodium      134 - 144 mmol/L   139    Potassium      3.5 - 5.2 mmol/L   4.6    Chloride      96 - 106 mmol/L   98    CO2      20 - 29 mmol/L   24    Anion gap      5 - 15 mmol/L       Glucose      65 - 99 mg/dL   139 (H)    BUN      6 - 24 mg/dL   13    Creatinine      0.57 - 1.00 mg/dL   0.68    BUN/Creatinine ratio      9 - 23   19    GFR est AA      >59 mL/min/1.73   115    GFR est non-AA      >59 mL/min/1.73   99    Calcium      8.7 - 10.2 mg/dL   9.3    Bilirubin, total      0.0 - 1.2 mg/dL   1.1    ALT (SGPT)      0 - 32 IU/L   30    AST      0 - 40 IU/L   37    Alk.  phosphatase      39 - 117 IU/L   63    Protein, total      6.0 - 8.5 g/dL   6.7    Albumin      3.5 - 5.5 g/dL   4.5    Globulin      2.0 - 4.0 g/dL       A-G Ratio      1.2 - 2.2   2.0    GLOBULIN, TOTAL      1.5 - 4.5 g/dL   2.2    Cholesterol, total      100 - 199 mg/dL  163     Triglyceride      0 - 149 mg/dL  87     HDL Cholesterol      >39 mg/dL  70     VLDL, calculated      5 - 40 mg/dL  17     LDL, calculated      0 - 99 mg/dL  76     Hemoglobin A1c, (calculated)      4.8 - 5.6 % 8.0 (H)      Estimated average glucose      mg/dL 183      TSH      0.450 - 4.500 uIU/mL    1.450     Component      Latest Ref Rng & Units 1/16/2019          12:07 PM   WBC      3.4 - 10.8 x10E3/uL 4.1   RBC      3.77 - 5.28 x10E6/uL 3.85   HGB      11.1 - 15.9 g/dL 12.0   HCT      34.0 - 46.6 % 35.3   MCV      79 - 97 fL 92   MCH      26.6 - 33.0 pg 31.2   MCHC      31.5 - 35.7 g/dL 34.0   RDW      12.3 - 15.4 % 13.1   PLATELET      257 - 335 x10E3/uL 213   NEUTROPHILS      Not Estab. % 53   Lymphocytes      Not Estab. % 36   MONOCYTES      Not Estab. % 9   EOSINOPHILS      Not Estab. % 1   BASOPHILS      Not Estab. % 1   ABS. NEUTROPHILS      1.4 - 7.0 x10E3/uL 2.2   Abs Lymphocytes      0.7 - 3.1 x10E3/uL 1.5   ABS. MONOCYTES      0.1 - 0.9 x10E3/uL 0.4   ABS. EOSINOPHILS      0.0 - 0.4 x10E3/uL 0.0   ABS. BASOPHILS      0.0 - 0.2 x10E3/uL 0.0   IMMATURE GRANULOCYTES      Not Estab. % 0   ABS. IMM. GRANS.      0.0 - 0.1 x10E3/uL 0.0       Assessment/Plan:   1) DM > Her A1C in January 2019 was 8.0%. Pt notes her BGs have been consistently in the 200's range. Will have pt increase her Levemir to 15 units BID and continue the Humalog 4-8 units with each meal.  Will have pt check her BGs 4 times per day and keep a log of her blood sugars and a log of what she eats and drinks for the next two weeks and mail her BG logs to me in 3 weeks. With her hx of neuropathy and calluses, she would benefit from DM shoes and inserts. 2) Hypothyroidism > Pt's TSH was 1.45 in January 2019 on LT4 75mcg daily. Pt voices understanding and agreement with the plan.   Pain noted and pt was recommended to call her PCP for further evaluation and treatment, as needed    We spent 40 minutes of face to face time together and > 50% of the time was spent in counseling on diabetes management and determining what changes to make to her insulin regimen. Follow-up Disposition:  Return in about 3 months (around 6/5/2019).     Copy sent to:  Dr. Chente Cevallos

## 2019-03-05 NOTE — PATIENT INSTRUCTIONS
1) Levemir: 15 units in the morning and 15 units at bedtime. 2) Send me your blood sugar readings in 2-3 weeks.

## 2019-03-06 LAB
ALBUMIN/CREAT UR: <3.5 MG/G CREAT (ref 0–30)
CREAT UR-MCNC: 86 MG/DL
MICROALBUMIN UR-MCNC: <3 UG/ML

## 2019-03-11 ENCOUNTER — TELEPHONE (OUTPATIENT)
Dept: NEUROLOGY | Age: 54
End: 2019-03-11

## 2019-03-11 NOTE — TELEPHONE ENCOUNTER
----- Message from Asif Hunt sent at 3/11/2019 10:09 AM EDT -----  Regarding: Dr. Rajat Harrison  Pt would like to know if the doctor specializes in \"TMJ\" before she schedule a NP appt. Best contact number [phone].

## 2019-03-12 NOTE — TELEPHONE ENCOUNTER
I will see if we can send in a prior authorization letter for her Humalog. I hope she is able to get into the neurologist sooner rather than later. Other things that she can try for TMJ include certain medications, Botox injections in the area, or even a muscular relaxant. The neurologist would be able to tell her more about how the medications work or if she needs injections.

## 2019-03-12 NOTE — TELEPHONE ENCOUNTER
GAVIOTA GRAVES Ms Rene Jarrett, she stated that she made a mistake when she SW someone on yesterday stating that she had TMJ. Per her dentist office on yesterday, she was told that it was a condition called Trigeminal Neuralgia. According to what she had looked up regarding that diagnoses, she is having some of the symptoms and is very miserable right now with the pressure which is causing her to even be nausea. Th only time she feels good is when she first wakes up in the morning before moving around. She has a neurologist appointment scheduled for 04/08/2019 and is hoping she can go sooner if a cancellation comes available. She also wanted to make sure that her Humalog insulin medication be fixed so she can get it from her insurance company because she can't use the others. She eventually will be bringing the insurance information regarding the insulin, has some for now, but wants to make sure she does not run out. Message routed to Dr. Corona Oneill for [id number] [address].

## 2019-03-12 NOTE — TELEPHONE ENCOUNTER
Ms Candance Marinas was called and informed and that per Dr. Abby Stapleton, she stated to me that the Trigeminal Neuralgia deals more with the nerves and could cause some pressure where as the TMJ is more discomfort to the joints. They both sometimes can be treated similar, but that is where the neurologist comes in at with those determinations. She stated OK, thanks and that she is on the cancellation list for a sooner appointment with the urologist.   Dr. Abby Stapleton has been verbally informed.

## 2019-03-20 PROBLEM — J01.11 ACUTE RECURRENT FRONTAL SINUSITIS: Status: ACTIVE | Noted: 2019-03-20

## 2019-03-20 PROBLEM — M53.3: Status: ACTIVE | Noted: 2019-03-20

## 2019-04-08 ENCOUNTER — OFFICE VISIT (OUTPATIENT)
Dept: NEUROLOGY | Age: 54
End: 2019-04-08

## 2019-04-08 VITALS
DIASTOLIC BLOOD PRESSURE: 60 MMHG | OXYGEN SATURATION: 98 % | SYSTOLIC BLOOD PRESSURE: 118 MMHG | WEIGHT: 147 LBS | BODY MASS INDEX: 26.05 KG/M2 | HEIGHT: 63 IN | HEART RATE: 77 BPM

## 2019-04-08 DIAGNOSIS — G50.1 ATYPICAL FACIAL PAIN: Primary | ICD-10-CM

## 2019-04-08 DIAGNOSIS — E10.42 DIABETIC POLYNEUROPATHY ASSOCIATED WITH TYPE 1 DIABETES MELLITUS (HCC): ICD-10-CM

## 2019-04-08 DIAGNOSIS — G43.709 CHRONIC MIGRAINE WITHOUT AURA WITHOUT STATUS MIGRAINOSUS, NOT INTRACTABLE: ICD-10-CM

## 2019-04-08 RX ORDER — AMITRIPTYLINE HYDROCHLORIDE 10 MG/1
30 TABLET, FILM COATED ORAL
Qty: 90 TAB | Refills: 1 | Status: SHIPPED | OUTPATIENT
Start: 2019-04-08 | End: 2019-05-31 | Stop reason: SINTOL

## 2019-04-08 NOTE — PATIENT INSTRUCTIONS
Diabetic Neuropathy: Care Instructions  Your Care Instructions    When you have diabetes, your blood sugar level may get too high. Over time, high blood sugar levels can damage nerves. This is called diabetic neuropathy. Nerve damage can cause pain, burning, tingling, and numbness and may leave you feeling weak. The feet are often affected. When you have nerve damage in your feet, you cannot feel your feet and toes as well as normal and may not notice cuts or sores. Even a small injury can lead to a serious infection. It is very important that you follow your doctor's advice on foot care. Sometimes diabetes damages nerves that help the body function. If this happens, your blood pressure, sweating, digestion, and urination might be affected. Your doctor may give you a target blood sugar level that is higher or lower than you are used to. Try to keep your blood sugar very close to this target level to prevent more damage. Follow-up care is a key part of your treatment and safety. Be sure to make and go to all appointments, and call your doctor if you are having problems. It's also a good idea to know your test results and keep a list of the medicines you take. How can you care for yourself at home? · Take your medicines exactly as prescribed. Call your doctor if you think you are having a problem with your medicine. It is very important that you take your insulin or diabetes pills as your doctor tells you. · Try to keep blood sugar at your target level. ? Eat a variety of healthy foods, with carbohydrate spread out in your meals. A dietitian can help you plan meals. ? Try to get at least 30 minutes of exercise on most days. ? Check your blood sugar as many times each day as your doctor recommends. · Take and record your blood pressure at home if your doctor tells you to. Learn the importance of the two measures of blood pressure (such as 130 over 80, or 130/80).  To take your blood pressure at home:  ? Ask your doctor to check your blood pressure monitor to be sure it is accurate and the cuff fits you. Also ask your doctor to watch you to make sure that you are using it right. ? Do not use medicine known to raise blood pressure (such as some nasal decongestant sprays) before taking your blood pressure. ? Avoid taking your blood pressure if you have just exercised or are nervous or upset. Rest at least 15 minutes before you take a reading. · Take pain medicines exactly as directed. ? If the doctor gave you a prescription medicine for pain, take it as prescribed. ? If you are not taking a prescription pain medicine, ask your doctor if you can take an over-the-counter medicine. · Do not smoke. Smoking can increase your chance for a heart attack or stroke. If you need help quitting, talk to your doctor about stop-smoking programs and medicines. These can increase your chances of quitting for good. · Limit alcohol to 2 drinks a day for men and 1 drink a day for women. Too much alcohol can cause health problems. · Eat small meals often, rather than 2 or 3 large meals a day. To care for your feet  · Prevent injury by wearing shoes at all times, even when you are indoors. · Do foot care as part of your daily routine. Wash your feet and then rub lotion on your feet, but not between your toes. Use a handheld mirror or magnifying mirror to inspect your feet for blisters, cuts, cracks, or sores. · Have your toenails trimmed and filed straight across. · Wear shoes and socks that fit well. Soft shoes that have good support and that fit well (such as tennis shoes) are best for your feet. · Check your shoes for any loose objects or rough edges before you put them on. · Ask your doctor to check your feet during each visit. Your doctor may notice a foot problem you have missed. · Get early treatment for any foot problem, even a minor one. When should you call for help?   Call your doctor now or seek immediate medical care if:    · You have symptoms of infection, such as:  ? Increased pain, swelling, warmth, or redness. ? Red streaks leading from the area. ? Pus draining from the area. ? A fever.     · You have new or worse numbness, pain, or tingling in any part of your body.    Watch closely for changes in your health, and be sure to contact your doctor if:    · You have a new problem with your feet, such as:  ? A new sore or ulcer. ? A break in the skin that is not healing after several days. ? Bleeding corns or calluses. ? An ingrown toenail.     · You do not get better as expected. Where can you learn more? Go to http://barry-emma.info/. Enter G687 in the search box to learn more about \"Diabetic Neuropathy: Care Instructions. \"  Current as of: July 25, 2018  Content Version: 11.9  © 4537-0375 [employer]. Care instructions adapted under license by [employer] (which disclaims liability or warranty for this information). If you have questions about a medical condition or this instruction, always ask your healthcare professional. [place] [address] any warranty or liability for your use of this information. Head or Face Pain: Care Instructions  Your Care Instructions    Common causes of head or face pain are allergies, stress, and injuries. Other causes include tooth problems and sinus infections. Eating certain foods, such as chocolate or cheese, or drinking certain liquids, such as coffee or cola, can cause head pain for some people. If you have mild head pain, you may not need treatment. It is important to watch your symptoms and talk to your doctor if your pain continues or gets worse. Follow-up care is a key part of your treatment and safety. Be sure to make and go to all appointments, and call your doctor if you are having problems.  It's also a good idea to know your test results and keep a list of the medicines you take.  How can you care for yourself at home? · Take pain medicines exactly as directed. ? If the doctor gave you a prescription medicine for pain, take it as prescribed. ? If you are not taking a prescription pain medicine, ask your doctor if you can take an over-the-counter pain medicine. · Take it easy for the next few days or longer if you are not feeling well. · Use a warm, moist towel or heating pad set on low to relax tight muscles in your shoulder and neck. Have someone gently massage your neck and shoulders. · Put ice or a cold pack on the area for 10 to 20 minutes at a time. Put a thin cloth between the ice and your skin. When should you call for help? Call 911 anytime you think you may need emergency care. For example, call if:    · You have twitching, jerking, or a seizure.     · You passed out (lost consciousness).     · You have symptoms of a stroke. These may include:  ? Sudden numbness, tingling, weakness, or loss of movement in your face, arm, or leg, especially on only one side of your body. ? Sudden vision changes. ? Sudden trouble speaking. ? Sudden confusion or trouble understanding simple statements. ? Sudden problems with walking or balance. ? A sudden, severe headache that is different from past headaches.     · You have jaw pain and pain in your chest, shoulder, neck, or arm.    Call your doctor now or seek immediate medical care if:    · You have a fever with a stiff neck or a severe headache.     · You have nausea and vomiting, or you cannot keep food or liquids down.    Watch closely for changes in your health, and be sure to contact your doctor if:    · Your head or face pain does not get better as expected. Where can you learn more? Go to http://barry-emma.info/. Enter P568 in the search box to learn more about \"Head or Face Pain: Care Instructions. \"  Current as of: September 23, 2018  Content Version: 11.9  © 6860-1867 [employer]. Care instructions adapted under license by [employer] (which disclaims liability or warranty for this information). If you have questions about a medical condition or this instruction, always ask your healthcare professional. [address] any warranty or liability for your use of this information. Migraine Headache: Care Instructions  Your Care Instructions  Migraines are painful, throbbing headaches that often start on one side of the head. They may cause nausea and vomiting and make you sensitive to light, sound, or smell. Without treatment, migraines can last from 4 hours to a few days. Medicines can help prevent migraines or stop them after they have started. Your doctor can help you find which ones work best for you. Follow-up care is a key part of your treatment and safety. Be sure to make and go to all appointments, and call your doctor if you are having problems. It's also a good idea to know your test results and keep a list of the medicines you take. How can you care for yourself at home? · Do not drive if you have taken a prescription pain medicine. · Rest in a quiet, dark room until your headache is gone. Close your eyes, and try to relax or go to sleep. Don't watch TV or read. · Put a cold, moist cloth or cold pack on the painful area for 10 to 20 minutes at a time. Put a thin cloth between the cold pack and your skin. · Use a warm, moist towel or a heating pad set on low to relax tight shoulder and neck muscles. · Have someone gently massage your neck and shoulders. · Take your medicines exactly as prescribed. Call your doctor if you think you are having a problem with your medicine. You will get more details on the specific medicines your doctor prescribes. · Be careful not to take pain medicine more often than the instructions allow. You could get worse or more frequent headaches when the medicine wears off.   To prevent migraines  · Keep a headache diary so you can figure out what triggers your headaches. Avoiding triggers may help you prevent headaches. Record when each headache began, how long it lasted, and what the pain was like. (Was it throbbing, aching, stabbing, or dull?) Write down any other symptoms you had with the headache, such as nausea, flashing lights or dark spots, or sensitivity to bright light or loud noise. Note if the headache occurred near your period. List anything that might have triggered the headache. Triggers may include certain foods (chocolate, cheese, wine) or odors, smoke, bright light, stress, or lack of sleep. · If your doctor has prescribed medicine for your migraines, take it as directed. You may have medicine that you take only when you get a migraine and medicine that you take all the time to help prevent migraines. ? If your doctor has prescribed medicine for when you get a headache, take it at the first sign of a migraine, unless your doctor has given you other instructions. ? If your doctor has prescribed medicine to prevent migraines, take it exactly as prescribed. Call your doctor if you think you are having a problem with your medicine. · Find healthy ways to deal with stress. Migraines are most common during or right after stressful times. Take time to relax before and after you do something that has caused a migraine in the past.  · Try to keep your muscles relaxed by keeping good posture. Check your jaw, face, neck, and shoulder muscles for tension. Try to relax them. When you sit at a desk, change positions often. And make sure to stretch for 30 seconds each hour. · Get plenty of sleep and exercise. · Eat meals on a regular schedule. Avoid foods and drinks that often trigger migraines. These include chocolate, alcohol (especially red wine and port), aspartame, monosodium glutamate (MSG), and some additives found in foods (such as hot dogs, santos, cold cuts, aged cheeses, and pickled foods).   · Limit caffeine. Don't drink too much coffee, tea, or soda. But don't quit caffeine suddenly. That can also give you migraines. · Do not smoke or allow others to smoke around you. If you need help quitting, talk to your doctor about stop-smoking programs and medicines. These can increase your chances of quitting for good. · If you are taking birth control pills or hormone therapy, talk to your doctor about whether they are triggering your migraines. When should you call for help? Call 911 anytime you think you may need emergency care. For example, call if:    · You have signs of a stroke. These may include:  ? Sudden numbness, paralysis, or weakness in your face, arm, or leg, especially on only one side of your body. ? Sudden vision changes. ? Sudden trouble speaking. ? Sudden confusion or trouble understanding simple statements. ? Sudden problems with walking or balance. ? A sudden, severe headache that is different from past headaches.    Call your doctor now or seek immediate medical care if:    · You have new or worse nausea and vomiting.     · You have a new or higher fever.     · Your headache gets much worse.    Watch closely for changes in your health, and be sure to contact your doctor if:    · You are not getting better after 2 days (48 hours). Where can you learn more? Go to http://barry-emma.info/. Enter S300 in the search box to learn more about \"Migraine Headache: Care Instructions. \"  Current as of: Nicole 3, 2018  Content Version: 11.9  © 5096-0158 [employer]. Care instructions adapted under license by [employer] (which disclaims liability or warranty for this information). If you have questions about a medical condition or this instruction, always ask your healthcare professional. [place] [address] any warranty or liability for your use of this information.

## 2019-04-08 NOTE — PROGRESS NOTES
NEUROLOGY CLINIC NOTE    Patient ID:  Holly Sherif  [id number]  53 y.o.  1965    Date of Consultation:  April 8, 2019    Referring Physician:  Dr. Lindsay Rogel    Reason for Consultation:  Facial and neck pain    Chief Complaint   Patient presents with    New Patient     Numbness on left side of head and neck, left sided jaw pain, and a lot of pressure in the head       History of Present Illness:     Patient Active Problem List    Diagnosis Date Noted    Acute recurrent frontal sinusitis 03/20/2019    Sacralgia 03/20/2019    Reactive depression 02/26/2019    Chronic pain syndrome 02/26/2019    Facial pain 02/26/2019    Lymphadenopathy of head and neck region 01/16/2019    Abdominal bloating with cramps 01/16/2019    Weight gain 01/16/2019    Diabetic retinopathy screening 11/06/2018    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    Hx of brain surgery 09/27/2018    Pain in right foot 05/03/2018    Chronic allergic rhinitis 05/03/2018    Type 1 diabetes mellitus with diabetic polyneuropathy ([address] 04/03/2018    Acquired hypothyroidism 04/03/2018    Primary osteoarthritis involving multiple joints 04/03/2018    History of hypoglycemia 04/03/2018    Bilateral low back pain without sciatica 03/13/2018    Stress fracture of sacrum 03/13/2018    Arthritis 06/26/2017    Cerebrovascular accident ([address] 06/12/2017    Osteopenia 06/12/2017    Hx of stroke without residual deficits 07/06/2015    History of carpal tunnel release 07/06/2015    Falls     Vitamin D deficiency 11/05/2012    Insomnia 08/18/2011    Dysfunction of eustachian tube 11/09/2010    Mild nonproliferative diabetic retinopathy ([address] 11/08/2010    Impingement syndrome of shoulder region 08/05/2010    Hypercholesterolemia 05/24/2010     Past Medical History:   Diagnosis Date    Arthritis     patient states \"every joint affected\"    Bee sting 09/05/2018    left elbow bee sting     Blister of ankle 09/05/2018    Blister small per patient of left ankle. Wears an air boot due to 2 stress fractures in last 2 months.  CAD (coronary artery disease)     Constipation     Falls 2017    pt reports having 4 falls in 3 days    Fatigue     Headache     Ill-defined condition     multiple broken ribs    Ill-defined condition     reports \"getting infections easily\"    Joint pain     Memory disorder     following spinal fluid leak repair    Menopause     Nausea & vomiting     Neuropathy     Osteoporosis     Thyroid disease     Type 1 diabetes ([address]     diagnosed at age 8    Unspecified cerebral artery occlusion with cerebral infarction     x 4 (last in )      Past Surgical History:   Procedure Laterality Date    HX CARPAL TUNNEL RELEASE Right 2018    HX  SECTION  1987    HX  SECTION      HX CHOLECYSTECTOMY  1996    HX HYSTERECTOMY  2006    HX ORTHOPAEDIC      frozen shoulder surgery x 3    HX ORTHOPAEDIC      frozen finger surgery x 8    HX ORTHOPAEDIC Left 2014    wrist surgery    HX ORTHOPAEDIC Right 1977    hand surgery    HX ORTHOPAEDIC Left     foot surgery    HX OTHER SURGICAL      spinal fluid leak repair (spinal fluid leaking from nose, remove nose, cut fat from stomach, use that as patch behind nose, nose replaced)    HX ROTATOR CUFF REPAIR Left     HX ROTATOR CUFF REPAIR Right     HX TONSILLECTOMY        Medication list reviewed.     No Known Allergies   Social History     Tobacco Use    Smoking status: Former Smoker     Packs/day: 0.50     Years: 8.00     Pack years: 4.00     Last attempt to quit: 4/10/2007     Years since quittin.0    Smokeless tobacco: Never Used   Substance Use Topics    Alcohol use: No      Family History   Problem Relation Age of Onset    Heart Disease Mother     Hypertension Mother     Heart Disease Maternal Grandfather     Cancer Maternal Aunt         Pancreatic and Liver    Cancer Maternal Grandmother         Lung    Diabetes Maternal Grandmother     Cancer Maternal Aunt         Breast    Breast Cancer Maternal Aunt     Diabetes Maternal Aunt         Subjective:      Luba Benton is a 53 y.o. PGHR with a history of depression, chronic pain syndrome, diabetes complicated with neuropathy and retinopathy, thyroid disorder, hypercholesterolemia and vitamin D deficiency who was referred here by Dr. Aditya Warner for further evaluation of her chronic facial pain. Condition has been ongoing since last year. No clear precipitant. Described as pressure pain. Gradual onset. Located eyeballs and left ear. Left face numbness and aching pain. Pressure pain (she does not call it a pain) can be from 8-10/10. It sometimes occurs daily. She can have 4 days without pressure when on a antibiotic. Last for hours. Laying down can ease it. When supine, pressure on the head may worsen it. Worse with movement. Last hgbA1c done 1/16/2019 was 8. Patient has been seen by her PCP for the same condition and workup included:  Treated with antibiotics and medications. CT of neck soft tissue with contrast done 1/24/2019 revealed no masses or lymphadenopathy. No significant abnormalities. Labs done 1/29/2019 were unremarkable (ESR, SUJEY, RF, CCP antibodies IgG/IgA, CRP, CBC and CMP) except for elevated BS (239) and slightly elevated ALT. CT of maxillofacial without contrast done 2/15/2019 revealed no acute bony or soft tissue abnormality of the maxillofacial structures. Review of records reveal:  CT of cervical spine without contrast 6/10/2018 revealed mild degenerative changes with moderate facet arthropathy C3-7. Head CT without contrast 6/10/2018 was unremarkable. Outside reports reviewed: office notes, radiology reports, lab reports, historical medical records.     Review of Systems:    A comprehensive review of systems was performed:   Constitutional: positive for fatigue   Eyes: positive for visual disturbance   Ears, nose, mouth, throat, and face: positive for none  Respiratory: positive for none  Cardiovascular: positive for chest pain, leg swelling  Gastrointestinal: positive for constipation, nausea, poor appetite  Genitourinary: positive for none  Integument/breast: positive for none  Hematologic/lymphatic: positive for none  Musculoskeletal: positive for joint pain, myalgia, weakness  Neurological: positive for falls, memory loss  Behavioral/Psych: positive for none  Endocrine: positive for none  Allergic/Immunologic: positive for none      Objective:     Visit Vitals  /60   Pulse 77   Ht 5' 3\" (1.6 m)   Wt 147 lb (66.7 kg)   SpO2 98%   BMI 26.04 kg/m²       PHYSICAL EXAM:    General Appearance: Alert, patient appears stated age. General:  Well developed, well nourished patent in no apparent distress. Head/Face: The head is normocephalic and atraumatic. Eyes: Conjunctivae appear normal. Sclera appear normal and non-icteric. Nose (and Sinus):   No abnormality of the nose or sinuses is noted. Oral:   Throat is clear. Lymphatics:  No lymphadenopathy in the neck/head. Neck and Thyroid:   No bruits noted in the neck. Respiratory:  Lungs clear to auscultation. Cardiovascular:  Palpation and auscultation: regular rate and rhythm. Extremity: No joint swelling, erythema or pedal edema. NEUROLOGICAL EXAM:    Appearance: The patient is well developed, well nourished, provides a coherent history and is in no acute distress. Mental Status: Oriented to time, place and person. Fluent, no aphasia or dysarthria. Mood and affect appropriate. Cranial Nerves:   Intact visual fields. KENNEY, EOM's full, no nystagmus, no ptosis. Facial sensation is normal. Corneal reflexes are intact. Facial movement is symmetric. Hearing is normal bilaterally. Palate is midline with normal elevation. Sternocleidomastoid and trapezius muscles are normal. Tongue is midline. Motor:  5/5 strength in upper and lower proximal and distal muscles.  Normal bulk and tone. No pronator drift. Reflexes:   Deep tendon reflexes 1+/4 and symmetrical. Downgoing toes. Sensory:   Normal to cold, pinprick except decrease vibration feet. Gait:  Steady. No Romberg. Mild truncal instability. Tremor:   No tremor noted. Cerebellar:  Intact FTN/DEON/HTS. Anterior head posture with shoulders rotated in. Imaging  CT Head, cervical spine, maxillofacial and neck soft tissue were reviewed    Lab Review      Assessment:   Atypical facial pain  Chronic migraine headache  Diabetic neuropathy    Plan:   Neurological examination does not reveal any trigeminal sensory deficits. However her atypical facial pain is suspicious for some elements of trigeminal neuralgia especially in light that she is a diabetic. Discussed trial of medications typically given for neuralgia. Since condition seems to be worse when she lays at night, trial of amitriptyline 10 mg at bedtime initially and up to 30 mg at bedtime if necessary. Prescriptions provided. Further titration will depend on response and tolerability. Need to also consider some elements of migraine and cervicogenic headaches. Patient is advised to correct her anterior head posture. Use headrest while at home and while driving. Use wireless headset. Do not carry anything on the shoulder. Use only one pillow at most when sleeping. If pain worsens may do a trial of triptans. Trial of amitriptyline should also help this condition. Exam reveals distal lower extremity predominantly sensory neuropathy in this patient with long-standing history of diabetes. This may cause some gait instability. Discuss direct correlate between diabetic control and progression of her neuropathy. Advised strict compliance with dietary modification and medications for diabetes. Patient is currently on gabapentin for neuropathic discomforts. Fall precautions. All questions and concerns were answered. Visit lasted 65 minutes.   Greater than 50% was spent reviewing her medical records as summarized above, discussion about her condition, potential etiology, prognosis, direct correlate between her diabetic control and neuralgia and neuropathy, fall precautions, treatment options, medications

## 2019-04-26 ENCOUNTER — TELEPHONE (OUTPATIENT)
Dept: ENDOCRINOLOGY | Age: 54
End: 2019-04-26

## 2019-05-30 ENCOUNTER — HOSPITAL ENCOUNTER (OUTPATIENT)
Dept: INFUSION THERAPY | Age: 54
Discharge: HOME | End: 2019-05-30
Payer: MEDICARE

## 2019-05-30 VITALS
DIASTOLIC BLOOD PRESSURE: 72 MMHG | RESPIRATION RATE: 18 BRPM | SYSTOLIC BLOOD PRESSURE: 123 MMHG | HEART RATE: 63 BPM | TEMPERATURE: 98 F

## 2019-05-30 LAB
ANION GAP BLD CALC-SCNC: 16 MMOL/L (ref 10–20)
BUN BLD-MCNC: 16 MG/DL (ref 9–20)
CA-I BLD-MCNC: 1.31 MMOL/L (ref 1.12–1.32)
CHLORIDE BLD-SCNC: 97 MMOL/L (ref 98–107)
CO2 BLD-SCNC: 28 MMOL/L (ref 21–32)
CREAT BLD-MCNC: 0.9 MG/DL (ref 0.6–1.3)
GLUCOSE BLD-MCNC: 174 MG/DL (ref 65–100)
HCT VFR BLD CALC: 33 % (ref 35–47)
MAGNESIUM SERPL-MCNC: 1.9 MG/DL (ref 1.6–2.4)
PHOSPHATE SERPL-MCNC: 3.2 MG/DL (ref 2.6–4.7)
POTASSIUM BLD-SCNC: 4.6 MMOL/L (ref 3.5–5.1)
SERVICE CMNT-IMP: ABNORMAL
SODIUM BLD-SCNC: 135 MMOL/L (ref 136–145)

## 2019-05-30 PROCEDURE — 80047 BASIC METABLC PNL IONIZED CA: CPT

## 2019-05-30 PROCEDURE — 36415 COLL VENOUS BLD VENIPUNCTURE: CPT

## 2019-05-30 PROCEDURE — 83735 ASSAY OF MAGNESIUM: CPT

## 2019-05-30 PROCEDURE — 84100 ASSAY OF PHOSPHORUS: CPT

## 2019-05-30 PROCEDURE — 96372 THER/PROPH/DIAG INJ SC/IM: CPT

## 2019-05-30 PROCEDURE — 74011250636 HC RX REV CODE- 250/636: Performed by: PEDIATRICS

## 2019-05-30 RX ADMIN — DENOSUMAB 60 MG: 60 INJECTION SUBCUTANEOUS at 11:09

## 2019-05-31 ENCOUNTER — TELEPHONE (OUTPATIENT)
Dept: ENDOCRINOLOGY | Age: 54
End: 2019-05-31

## 2019-05-31 NOTE — TELEPHONE ENCOUNTER
----- Message from [facility] sent at 5/31/2019  1:14 PM EDT -----  Regarding: Dr. Calzada Noss: [phone]  Pt stated she had to do a log for 28days on her levels and Dr. Ele Orozco did not call her back about this report. She submitted the report last week and this is her second time calling about a call back from the doctor.

## 2019-06-01 PROBLEM — G50.0 TRIGEMINAL NEURALGIA: Status: ACTIVE | Noted: 2019-06-01

## 2019-06-01 PROBLEM — R63.5 WEIGHT GAIN: Status: RESOLVED | Noted: 2019-01-16 | Resolved: 2019-06-01

## 2019-06-01 PROBLEM — B35.3 TINEA PEDIS OF BOTH FEET: Status: ACTIVE | Noted: 2019-06-01

## 2019-06-01 PROBLEM — M48.48XA STRESS FRACTURE OF SACRUM: Status: RESOLVED | Noted: 2018-03-13 | Resolved: 2019-06-01

## 2019-06-01 PROBLEM — M53.3: Status: RESOLVED | Noted: 2019-03-20 | Resolved: 2019-06-01

## 2019-06-01 PROBLEM — L60.0 INGROWN TOENAIL OF RIGHT FOOT WITH INFECTION: Status: ACTIVE | Noted: 2019-06-01

## 2019-06-03 ENCOUNTER — TELEPHONE (OUTPATIENT)
Dept: ENDOCRINOLOGY | Age: 54
End: 2019-06-03

## 2019-06-03 NOTE — TELEPHONE ENCOUNTER
I have not received any blood sugar logs and I can not find any logs that have been scanned into her chart.

## 2019-06-03 NOTE — TELEPHONE ENCOUNTER
----- Message from Loida Malone sent at 6/3/2019  1:37 PM EDT -----  Regarding: Dr Nickie Jones  Pt is following up on the status of the 28 day logging of her blood sugar that was faxed over last week    Best contact # [phone]

## 2019-06-04 ENCOUNTER — APPOINTMENT (OUTPATIENT)
Dept: INFUSION THERAPY | Age: 54
End: 2019-06-04
Payer: MEDICARE

## 2019-06-06 NOTE — TELEPHONE ENCOUNTER
Called patient. She states that she will contact her PCP again to ask them to fax the blood sugar logs. Advised her to bring her meter to her appt next week. Patient agreed.

## 2019-06-11 ENCOUNTER — OFFICE VISIT (OUTPATIENT)
Dept: ENDOCRINOLOGY | Age: 54
End: 2019-06-11

## 2019-06-11 VITALS
WEIGHT: 143 LBS | SYSTOLIC BLOOD PRESSURE: 103 MMHG | HEART RATE: 65 BPM | HEIGHT: 63 IN | DIASTOLIC BLOOD PRESSURE: 53 MMHG | BODY MASS INDEX: 25.34 KG/M2

## 2019-06-11 DIAGNOSIS — E10.42 TYPE 1 DIABETES MELLITUS WITH DIABETIC POLYNEUROPATHY (HCC): Primary | ICD-10-CM

## 2019-06-11 DIAGNOSIS — E03.9 ACQUIRED HYPOTHYROIDISM: ICD-10-CM

## 2019-06-11 LAB — HBA1C MFR BLD HPLC: 7.1 %

## 2019-06-11 RX ORDER — CEPHALEXIN 500 MG/1
500 CAPSULE ORAL 2 TIMES DAILY
COMMUNITY
End: 2019-08-06 | Stop reason: ALTCHOICE

## 2019-06-11 NOTE — PATIENT INSTRUCTIONS
1) Levemir: 13 units in the morning and 14 units at bedtime. 2) Send me your blood sugar readings in 3 weeks.

## 2019-06-11 NOTE — PROGRESS NOTES
Chief Complaint   Patient presents with    Diabetes     pcp and pharmacy verified. Eye exam UTD   Records since last visit reviewed. History of Present Illness: Salas Craig is a 53 y.o. female here for follow up of diabetes. Pt notes \"I have been a Type I diabetic for 37 years\". \"I don't count carbs, I eat what I want and most of the time I will remember to take my insulin but not all the time\". Her A1C today was 7.1%. Pt mailed in her BG logs, her FBGs have been running in the 's range, with occasional BGs in the 200's. Her pre-lunch BGs have been in the 's, though she has had some as low as the 50's and some in then 200-300's, her pre dinner have been in the 's range and her HS have been in the 's range. Pt notes that the 15 units of Levemir BID was too high and causing her to have very low BGs so she has been adjusting it and is now taking 14 units twice per day. She is taking Humalog 2-6 units based on her carb intake and BGs. Pt wakes around 6-630AM.  She takes her first dose of Levemir when she wakes up. She notes if her FBG is >70 she will take correction humalog. She notes that she has not been checking her pre-meal BGs lately. She has breakfast around 7-8AM She will have her 6 ritz crackers and regular pepsi. If her BG is under 100 she will take 2 units of Humalog with breakfast, she notes she rarely takes more than 4 units in the morning. She will take her dog for a walk after breakfast.  She does not eat lunch. If she gets hungry, she will snack on popcorn or celery sticks and PB. She has dinner around 4-6PM, last night she had steamed rice, with mixed vegetables and black bean salsa and ham.  She has stopped eating the ice cream bars after dinner. She notes that she may have peanuts or walnuts in the evening on occasions. Pt has hx of CVA x4, \"one of which caused a CNS leak\".    She reports she had a stress test and an ECHO in 2015 and \"everything came back fine\". Pt has hx of polyneuropathy from her knees to her feet. She takes Gabapentin 200mg BID, which does help with her pain. Pt has no hx of Nephrology (Urine MA/Cr 12 on 4/3/18). Last eye exam was May 2019. She does have retinopathy and cataracts. \"He sent me to see a cataract specialist, who told me I had bad cataracts in the right and some in the left\". Pt has hx of osteoporosis for which she is followed by Dr. Varghese Naqvi of Rheumatology. 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 She was never treated and it \"went away\". She is on Prolia every 6 month. Pt has hx of hypothyroidism and is taking LT4 75mcg daily. Her TSH in January 2019 was 1.45. Current Outpatient Medications   Medication Sig    cephALEXin (KEFLEX) 500 mg capsule Take 500 mg by mouth two (2) times a day.  zolpidem (AMBIEN) 5 mg tablet TAKE 1 TABLET BY MOUTH NIGHTLY AS NEEDED (MAX DAILY AMOUNT-5MG(ONE TABLET)    ciclopirox (LOPROX) 0.77 % topical cream Apply  to affected area two (2) times a day.  diclofenac EC (VOLTAREN) 75 mg EC tablet Take 1 Tab by mouth two (2) times a day for 30 days.  traMADol (ULTRAM) 50 mg tablet Take 1 Tab by mouth daily for 30 days. Max Daily Amount: 50 mg. For traumatic arthritis    calcium-cholecalciferol, d3, (CALCIUM 600 + D) 600-125 mg-unit tab Take 1,200 mg by mouth daily.  b complex vitamins (B COMPLEX 1) tablet Take 1 Tab by mouth daily.  glucosamine sulfate 500 mg capsule Take 500 mg by mouth daily.  montelukast (SINGULAIR) 10 mg tablet Take 1 Tab by mouth daily.  losartan (COZAAR) 100 mg tablet Take 1/2 tab daily    levothyroxine (LEVOTHROID) 75 mcg tablet Take 1 Tab by mouth Daily (before breakfast).  simvastatin (ZOCOR) 40 mg tablet Take 1 Tab by mouth nightly.     glucose blood VI test strips (ONETOUCH ULTRA BLUE TEST STRIP) strip USE TO TEST BLOOD SUGAR 8 TIMES A DAY ( z79.4,E11.40)    HUMALOG KWIKPEN INSULIN 100 unit/mL kwikpen As per scale    insulin detemir U-100 (LEVEMIR FLEXTOUCH U-100 INSULN) 100 unit/mL (3 mL) inpn INJECT 12 UNITS SUBCUTANEOUSLY IN THE MORNING AND  10  UNITS  AT  NIGHT (Patient taking differently: INJECT 14 UNITS SUBCUTANEOUSLY IN THE MORNING AND  14  UNITS  AT  NIGHT)    denosumab (PROLIA) 60 mg/mL injection 60 mg by SubCUTAneous route. Every 6 months    cholecalciferol, vitamin D3, (VITAMIN D3) 2,000 unit tab Take 2 Tabs by mouth daily. Indications: vitamin D deficiency    VITAMIN A PO Take 2,400 mcg by mouth nightly.  vitamin E (AQUA GEMS) 400 unit capsule Take 400 Units by mouth every Monday and Thursday. Only on M and Thurs at Bedtime    vitamin a-vitamin c-vit e-min (OCUVITE) tablet Take 1 Tab by mouth daily.  albuterol (VENTOLIN HFA) 90 mcg/actuation inhaler Take 2 Puffs by inhalation every four (4) hours as needed for Wheezing or Shortness of Breath.  cyanocobalamin (VITAMIN B12) 500 mcg tablet Take 1 Tab by mouth. No current facility-administered medications for this visit. No Known Allergies  Review of Systems:  - Eyes: no blurry vision or double vision  - Cardiovascular: no chest pain  - Respiratory: no shortness of breath  - Musculoskeletal: no myalgias  - Neurological: no numbness/tingling in extremities    Physical Examination:  Blood pressure 103/53, pulse 65, height 5' 3\" (1.6 m), weight 143 lb (64.9 kg).   - General: pleasant, no distress, good eye contact   - Neck: no carotid bruits  - Cardiovascular: regular, normal rate, nl s1 and s2, no m/r/g, 2+ DP pulses   - Respiratory: clear bilaterally  - Integumentary: no edema,   - Psychiatric: normal mood and affect    Diabetic foot exam:     Left Foot:   Visual Exam: callous - present   Pulse DP: 2+ (normal)   Filament test: reduced sensation    Vibratory sensation: normal      Right Foot:   Visual Exam: callous - present   Pulse DP: 2+ (normal)   Filament test: reduced sensation    Vibratory sensation: normal        Data Reviewed:   Her A1C today was 7.1%. Assessment/Plan:   1) DM > Her A1C today was 7.1%. Since she has been having morning low BGs, will decrease her AM Levemir from 14 units to 13 units and continue the HS at 14 units. Pt to continue the Humalog 2-6 units with each meal.  Will have pt check her BGs 4 times per day and mail her BG logs to me in 3 weeks. With her hx of neuropathy and calluses, she would benefit from DM shoes and inserts. 2) Hypothyroidism > Pt's TSH was 1.45 in January 2019 on LT4 75mcg daily. Pt is clinically euthyroid. Pt voices understanding and agreement with the plan. Pain noted and pt was recommended to call her PCP for further evaluation and treatment, as needed      Follow-up and Dispositions    · Return in about 3 months (around 9/11/2019).          Copy sent to:  Dr. Jt Mccarthy

## 2019-08-09 PROBLEM — J01.10 SUBACUTE FRONTAL SINUSITIS: Status: ACTIVE | Noted: 2019-03-20

## 2019-08-09 PROBLEM — I70.90 ATHEROSCLEROSIS OF ARTERY: Status: ACTIVE | Noted: 2019-08-09

## 2019-08-14 ENCOUNTER — TELEPHONE (OUTPATIENT)
Dept: ENDOCRINOLOGY | Age: 54
End: 2019-08-14

## 2019-08-14 NOTE — TELEPHONE ENCOUNTER
Pt called and reported that she has been having a lot more high BGs. She is currently taking Levemir 15 units BID and Humalog 1 unit for every 100 points her BG is running. She notes that she will forget her Humalog sometimes. I recommended she continue the Levemimr 15 units BID, but to take 3 units of Humalog with her 5 crackers and regular pepsi, 2 units with with her popcorn in the afternoon and 4-5 units with her dinner in the evening. Pt to change her carb ratio from 1:100 to 1:50. Pt to send me some BG readings in 1 week.

## 2019-08-27 PROBLEM — L60.0 INGROWN TOENAIL OF RIGHT FOOT WITH INFECTION: Status: RESOLVED | Noted: 2019-06-01 | Resolved: 2019-08-27

## 2019-10-09 ENCOUNTER — OFFICE VISIT (OUTPATIENT)
Dept: RHEUMATOLOGY | Age: 54
End: 2019-10-09

## 2019-10-09 VITALS
RESPIRATION RATE: 18 BRPM | SYSTOLIC BLOOD PRESSURE: 113 MMHG | OXYGEN SATURATION: 96 % | HEIGHT: 63 IN | TEMPERATURE: 98 F | DIASTOLIC BLOOD PRESSURE: 71 MMHG | WEIGHT: 146.4 LBS | BODY MASS INDEX: 25.94 KG/M2 | HEART RATE: 68 BPM

## 2019-10-09 DIAGNOSIS — M19.90 INFLAMMATORY ARTHRITIS: Primary | ICD-10-CM

## 2019-10-09 DIAGNOSIS — D86.9 SARCOIDOSIS: ICD-10-CM

## 2019-10-09 DIAGNOSIS — M81.6 LOCALIZED OSTEOPOROSIS, UNSPECIFIED PATHOLOGICAL FRACTURE PRESENCE: ICD-10-CM

## 2019-10-09 RX ORDER — PREDNISONE 5 MG/1
5 TABLET ORAL 2 TIMES DAILY
Qty: 60 TAB | Refills: 0 | Status: SHIPPED | OUTPATIENT
Start: 2019-10-09 | End: 2019-11-26 | Stop reason: ALTCHOICE

## 2019-10-09 NOTE — PROGRESS NOTES
RHEUMATOLOGY PROBLEM LIST AND CHIEF COMPLAINT  1. Osteoporosis: The patient fulfills the [facility] guidelines for pharmacologic intervention in postmenopausal women and men ? 48years of age  Treatment - Prolia, 3 injections. 2. Remote history of sarcoidosis - diagnosed after a chest x-ray and biopsy revealed sarcoidosis. She was not on any treatment for this and has not had any symptoms of inflammatory arthritis, interstitial lung disease or inflammatory skin disease. HISTORY OF PRESENT ILLNESS  This is a 53 y.o.  female. Today, the patient complains of pain in the joints. Location: hip  Severity:  7 on a scale of 0-10  Timing: intermittent   Duration: 2 years  Modifying factors: none  Context/Associated signs and symptoms: The patient returns today due to persistent bilateral hip pain that began two years ago and has since worsened in the past four months. She has had some relief with lidocaine patches. She additionally complains of pain across the bilateral digits with daily monring stiffness lasting 30 minutes to 1 hour. We reviewed the x-rays of the bilateral hands which revealed possible right TFC chondrocalcinosis. She continues on prolia q6 months. She mentions she will be having a surgery on her right foot due to a past fracture.     RHEUMATOLOGY REVIEW OF SYSTEMS   Positives as per history  Negatives as follows:  Starlette Petra:  Denies unexplained persistent fevers, weight change, chronic fatigue  HEAD/EYES:   Denies eye redness, blurry vision or sudden loss of vision, dry eyes, HA, temporal artery pain  ENT:    Denies oral/nasal ulcers, recurrent sinus infections, dry mouth  RESPIRATORY:  No pleuritic pain, history of pleural effusions, hemoptysis, exertional dyspnea  CARDIOVASCULAR:  Denies chest pain, history of pericardial effusions  GASTRO:   Denies heartburn, esophageal dysmotility, abdominal pain, nausea, vomiting, diarrhea, blood in the stool  HEMATOLOGIC:  No easy bruising, purpura, swollen lymph nodes  SKIN:    Denies alopecia, ulcers, nodules, sun sensitivity, unexplained persistent rash   VASCULAR:   Denies edema, cyanosis, raynaud phenomenon  NEUROLOGIC:  Denies specific muscle weakness, paresthesias   PSYCHIATRIC:  No sleep disturbance / snoring, depression, anxiety  MSK:    No morning stiffness >1 hour, SI joint pain, persistent joint swelling    PAST MEDICAL HISTORY  Reviewed with patient, significant changes in medical history - no    PHYSICAL EXAM  Blood pressure 113/71, pulse 68, temperature 98 °F (36.7 °C), temperature source Oral, resp. rate 18, height 5' 3\" (1.6 m), weight 146 lb 6.4 oz (66.4 kg), SpO2 96 %. GENERAL APPEARANCE: Well-nourished adult in no acute distress. EYES: No scleral erythema, conjunctival injection. ENT: No oral ulcer, parotid enlargement. NECK: No adenopathy, thyroid enlargement. CARDIOVASCULAR: Heart rhythm is regular. No murmur, rub, gallop. CHEST: Normal vesicular breath sounds. No wheezes, rales, pleural friction rubs. ABDOMINAL: The abdomen is soft and nontender. Liver and spleen are nonpalpable. Bowel sounds are normal.  EXTREMITIES: There is no evidence of clubbing, cyanosis, edema. SKIN: No rash, palpable purpura, digital ulcer, abnormal thickening. NEUROLOGICAL: Normal gait and station, full strength in upper and lower extremities, normal sensation to light touch. MUSCULOSKELETAL:   Upper extremities - Puffiness over bilateral fingers. Tenderness over the MCPs and PIPs. Lower extremities - Tenderness with external and internal range of motion in the bilateral hips. LABS, RADIOLOGY AND PROCEDURES  Previous labs reviewed -Yes  Previous radiology reviewed -Yes  Previous procedures reviewed -Yes  Previous medical records reviewed/summarized -Yes    ASSESSMENT  1. Osteoporosis - The patient has not had another bone density scan completed. I recommend she continues on Prolia for now.  We will reevaluate the treatment plan based on the results of the bone scan. 2. Possible inflammatory arthritis - The patient's exam revealed puffiness in the bilateral digits with tenderness over the MCPs and PIPs. We discussed that given her history of sarcoidosis, an inflammatory illness could be secondary to that condition. I recommend she begin Prednisone 5 mg BID, if her hands improve with steroid treatment we will plan to escalate treatment. I explained that her hips will likely not improve with Prednisone use; I suspect her hip pain is secondary to OA. I will order x-rays of the hips to further evaluate. She should return in 2 months. 3. Hip Osteoarthritis - Non pharmacologic treatment recommendations include enrollment in an exercise program; land-based or aqua-therapy. This is more successful if the program is individualized to the patient's ability; it can be aerobic conditioning, strengthening or both. An evaluation by physical therapy to access the ability to perform the above is recommended. The use of thermal agents should also be reviewed by PT. Weight loss counseling was given; a nutrition evaluation is usefull for some patients. Psychosocial interventions and walking aids are other management options. Pharmacological treatment with tylenol, oral NSAID's, and tramadol has been shown to be beneficial.  We recommend a PPI with the oral NSAID. Supplements such as glucosamine, chondroitin sulfate and topical capsaicin are not recommended for hip OA. Intraarticular hyaluronate injections are also not recommended. Intraarticular steroid therapies are an option as long as no more then 3 per year are performed. Opioids are reserved for severe refractory hip OA. PLAN  1. Prednisone 5 mg BID  2. X-rays of hips to look for inflammatory/erosive changes   3. Continue Prolia  4. Return in 2 months     Zack Muñoz MD  Adult and Pediatric Rheumatology     [facility], [address] [place] [address], Phone [phone], Fax [phone]   E-mail: [email or web address]    Visiting  of Pediatrics    Department of Pediatrics, [facility] of [address] [place] [address], [address] [place] [address], Phone [phone], Fax [phone]  E-mail: [email or web address]    There are no Patient Instructions on file for this visit. cc:  Nena Velazco DO    Written by velia Cummings, as dictated by Matthews Speaker.  Ivette South M.D.

## 2019-10-09 NOTE — PROGRESS NOTES
Chief Complaint   Patient presents with    Osteoporosis    Follow-up     1. Have you been to the ER, urgent care clinic since your last visit? Hospitalized since your last visit? No    2. Have you seen or consulted any other health care providers outside of the [address] [place] [address] since your last visit? Include any pap smears or colon screening. Yes. Went GI doctor, and Vascular Surgeon in [place].

## 2019-10-10 ENCOUNTER — TELEPHONE (OUTPATIENT)
Dept: ENDOCRINOLOGY | Age: 54
End: 2019-10-10

## 2019-10-10 ENCOUNTER — TELEPHONE (OUTPATIENT)
Dept: RHEUMATOLOGY | Age: 54
End: 2019-10-10

## 2019-10-10 NOTE — TELEPHONE ENCOUNTER
Returned call to patient, but had to leave a message to call the office back due to patient did not answer.

## 2019-10-10 NOTE — TELEPHONE ENCOUNTER
----- Message from Meño Vail sent at 10/10/2019 11:49 AM EDT -----  Regarding: Dr. Curiel/Rx  General Message/Vendor Calls    Caller's first and last name:Pt      Reason for call: Requesting a call back in regards to changing medication (Steriod). Stated need a call back as a possible.       Callback required yes/no and why:Yes      Best contact number(s):[phone]      Details to clarify the request:      Meño Vail

## 2019-10-10 NOTE — TELEPHONE ENCOUNTER
Pt notes her rheumatologist just diagnosed her with Sarcoidosis in her hands and is starting her on prednisone 5mg BID. I instructed her to increase her Levemir to 20 units BID. Pt voices understanding and agreement with the plan.

## 2019-10-10 NOTE — TELEPHONE ENCOUNTER
----- Message from Cedric Shell sent at 10/10/2019 11:35 AM EDT -----  Regarding: Dr. Salazar Message/Vendor Calls    Caller's first and last name: Autumn Jump      Reason for call: pt never received a call back in regards to her 28 day labs and now she's having issues with her osteoporosis and now she has to take steroids but before taking the steroids, pt would like to talk to Dr. Yong Roque because steroids makes her surgar go up      Callback required yes/no and why: yes      Best contact number(s): [phone]      Details to clarify the request:      Cedric Shell

## 2019-10-10 NOTE — TELEPHONE ENCOUNTER
10/10/2019  4:17 PM        MsHéctor Opal Layton called to check the status of the message from today @ 1pm.          Thanks

## 2019-10-10 NOTE — TELEPHONE ENCOUNTER
Patient is returning a call back from [facility] from today. Her phone is [phone].

## 2019-10-11 NOTE — TELEPHONE ENCOUNTER
Called pt and instructed her to take one unit of Humalog with each serving of carbohydrate 1:15 and a correction of 1:50 for BG >150. Pt voices understanding and agreement with the plan.

## 2019-10-11 NOTE — TELEPHONE ENCOUNTER
Used 2 identifier. Patient given Dr. Teresa Braun instructions to take Steroids for 1 week. Informed non-steroidal's do not have the same effect. Patient states her diabetes doctor did adjust her Insulin for the steroid use.

## 2019-10-29 ENCOUNTER — TELEPHONE (OUTPATIENT)
Dept: RHEUMATOLOGY | Age: 54
End: 2019-10-29

## 2019-10-29 NOTE — TELEPHONE ENCOUNTER
Spoke to pt informed pt per Dr Wiley Puentes that there are no steroids that will not affect the pt blood sugars, the pt stated that the prednisone is working and helping with the hand and foot pain, pt stated that she will finish the prednisone and watch her blood sugars.  Pt verbally acknowledged understanding

## 2019-10-29 NOTE — TELEPHONE ENCOUNTER
----- Message from Gamaliel Hassan RN sent at 10/28/2019 11:37 AM EDT -----  Regarding: FW: Dr. Deloris Billings      ----- Message -----  From: Chacho Farfan  Sent: 10/28/2019  11:16 AM EDT  To: [facility] Nurse Pool  Subject: Dr. Jada Chinchilla Message/Vendor Calls    Caller's first and last name:      Reason for call: Requesting a call back in reference to update on \"predniSONE (DELTASONE) 5 mg tablet. Medication is helping arthritis. Glucose levels have been elevated.        Callback required yes/no and why: yes      Best contact number(s):[phone]      Details to clarify the request:      Sharif Quinn

## 2019-10-30 NOTE — TELEPHONE ENCOUNTER
Spoke with patient. Complaining of cramps in legs and thighs pretty severe per patient pain comes and goes. She is taking Gabapentin with no relief. Appointment made for next week patient accepted offered other providers appointment times but she declined wants to see [facility]. Advised to be seen in [place] or call office if symptoms become wors.

## 2019-10-30 NOTE — TELEPHONE ENCOUNTER
----- Message from Fina Route sent at 10/30/2019 11:26 AM EDT -----  Regarding: Dr. Bradley Torres: [phone]  Caller's first and last name: n/a  Reason for call: Legs get calf tightness and cramping  Callback required yes/no and why: yes  Best contact number(s): [phone]  Details to clarify the request: Would like a call from nurse to know if she needs to take potassium or if she needs to come in.

## 2019-11-05 PROBLEM — R25.2 MUSCLE CRAMP, NOCTURNAL: Status: ACTIVE | Noted: 2019-11-05

## 2019-11-12 ENCOUNTER — TELEPHONE (OUTPATIENT)
Dept: RHEUMATOLOGY | Age: 54
End: 2019-11-12

## 2019-11-12 NOTE — TELEPHONE ENCOUNTER
Spoke to pt informed pt per Dr Chuck Ames that her xrays were normal that it showed osteoarthritis, pt verbally acknowledged understanding

## 2019-11-12 NOTE — TELEPHONE ENCOUNTER
----- Message from Mable Zuluaga MD sent at 11/12/2019 10:45 AM EST -----  Her x-rays are normal; show osteoarthritis.

## 2019-12-05 ENCOUNTER — HOSPITAL ENCOUNTER (OUTPATIENT)
Dept: INFUSION THERAPY | Age: 54
End: 2019-12-05
Payer: MEDICARE

## 2019-12-17 ENCOUNTER — HOSPITAL ENCOUNTER (OUTPATIENT)
Dept: INFUSION THERAPY | Age: 54
Discharge: HOME | End: 2019-12-17
Payer: MEDICARE

## 2019-12-17 ENCOUNTER — OFFICE VISIT (OUTPATIENT)
Dept: ENDOCRINOLOGY | Age: 54
End: 2019-12-17

## 2019-12-17 VITALS
WEIGHT: 148 LBS | HEART RATE: 73 BPM | BODY MASS INDEX: 26.22 KG/M2 | DIASTOLIC BLOOD PRESSURE: 57 MMHG | HEIGHT: 63 IN | SYSTOLIC BLOOD PRESSURE: 108 MMHG

## 2019-12-17 VITALS
HEART RATE: 70 BPM | SYSTOLIC BLOOD PRESSURE: 105 MMHG | BODY MASS INDEX: 26.15 KG/M2 | WEIGHT: 147.6 LBS | OXYGEN SATURATION: 97 % | TEMPERATURE: 97.8 F | DIASTOLIC BLOOD PRESSURE: 65 MMHG | HEIGHT: 63 IN | RESPIRATION RATE: 18 BRPM

## 2019-12-17 DIAGNOSIS — E03.9 ACQUIRED HYPOTHYROIDISM: ICD-10-CM

## 2019-12-17 DIAGNOSIS — E10.42 TYPE 1 DIABETES MELLITUS WITH DIABETIC POLYNEUROPATHY (HCC): Primary | ICD-10-CM

## 2019-12-17 LAB
ANION GAP BLD CALC-SCNC: 12 MMOL/L (ref 10–20)
BUN BLD-MCNC: 19 MG/DL (ref 9–20)
CA-I BLD-MCNC: 1.26 MMOL/L (ref 1.12–1.32)
CHLORIDE BLD-SCNC: 96 MMOL/L (ref 98–107)
CO2 BLD-SCNC: 31 MMOL/L (ref 21–32)
CREAT BLD-MCNC: 0.9 MG/DL (ref 0.6–1.3)
GLUCOSE BLD-MCNC: 88 MG/DL (ref 65–100)
HCT VFR BLD CALC: 34 % (ref 35–47)
MAGNESIUM SERPL-MCNC: 1.6 MG/DL (ref 1.6–2.4)
PHOSPHATE SERPL-MCNC: 3.9 MG/DL (ref 2.6–4.7)
POTASSIUM BLD-SCNC: 3.8 MMOL/L (ref 3.5–5.1)
SERVICE CMNT-IMP: ABNORMAL
SODIUM BLD-SCNC: 134 MMOL/L (ref 136–145)

## 2019-12-17 PROCEDURE — 83735 ASSAY OF MAGNESIUM: CPT

## 2019-12-17 PROCEDURE — 80047 BASIC METABLC PNL IONIZED CA: CPT

## 2019-12-17 PROCEDURE — 84100 ASSAY OF PHOSPHORUS: CPT

## 2019-12-17 PROCEDURE — 74011250636 HC RX REV CODE- 250/636: Performed by: PEDIATRICS

## 2019-12-17 PROCEDURE — 96372 THER/PROPH/DIAG INJ SC/IM: CPT

## 2019-12-17 PROCEDURE — 36415 COLL VENOUS BLD VENIPUNCTURE: CPT

## 2019-12-17 RX ADMIN — DENOSUMAB 60 MG: 60 INJECTION SUBCUTANEOUS at 13:23

## 2019-12-17 NOTE — PATIENT INSTRUCTIONS
1) Levemir: 16 units in the morning and 13 units at bedtime. 2) Send me your blood sugar readings in 3 weeks.

## 2019-12-17 NOTE — PROGRESS NOTES
Pt arrived at [facility] ambulatory and in no acute distress for injection. Patient Vitals for the past 12 hrs:   Temp Pulse Resp BP SpO2   12/17/19 1219 97.8 °F (36.6 °C) 70 18 105/65 97 %     Recent Results (from the past 12 hour(s))   POC CHEM8    Collection Time: 12/17/19  1:20 PM   Result Value Ref Range    Calcium, ionized (POC) 1.26 1.12 - 1.32 mmol/L    Sodium (POC) 134 (L) 136 - 145 mmol/L    Potassium (POC) 3.8 3.5 - 5.1 mmol/L    Chloride (POC) 96 (L) 98 - 107 mmol/L    CO2 (POC) 31 21 - 32 mmol/L    Anion gap (POC) 12 10 - 20 mmol/L    Glucose (POC) 88 65 - 100 mg/dL    BUN (POC) 19 9 - 20 mg/dL    Creatinine (POC) 0.9 0.6 - 1.3 mg/dL    GFRAA, POC >60 >60 ml/min/1.73m2    GFRNA, POC >60 >60 ml/min/1.73m2    Hematocrit (POC) 34 (L) 35.0 - 47.0 %    Comment Notified RN or MD immediately by          Medications Administered     denosumab (PROLIA) injection 60 mg     Admin Date  12/17/2019 Action  Given Dose  60 mg Route  SubCUTAneous Administered By  Virgilio Franklin RN                Pt tolerated injection well, no adverse reaction noted. D/Cd from [facility] ambulatory and in no acute distress.     Future Appointments   Date Time Provider Manny Jennifer   12/17/2019  1:30 PM [facility] NURSE 1 [address] REG   1/10/2020 10:10 AM Chiquis Hernández DO [facility] SCHED   1/29/2020 10:30 AM Rosemarie Santiago MD [facility]   3/23/2020 12:10 PM Elizabeth Chase MD [address]   6/19/2020 11:00 AM [facility] NURSE 1 [id number] [facility]

## 2019-12-17 NOTE — PROGRESS NOTES
Chief Complaint   Patient presents with    Diabetes     pcp and pharmacy verified. Eye exam UTD   Records since last visit reviewed. History of Present Illness: Juan Arana is a 53 y.o. female here for follow up of diabetes. Pt notes \"I have been a Type I diabetic for 37 years\". \"I don't count carbs, I eat what I want and most of the time I will remember to take my insulin but not all the time\". Her A1C in November 2019 was up to 8.2%. In October she was diagnosed with Sarcoid and started on Prednisone 5mg BID. I instructed her to increase her Levemir to 20 units BID and her Humalog to 1:15. \"After I finished the steroids I gained my weight and I decreased my Levemir back to 15units twice per day. I have has some low BGs overnight in the 30-60s\". During the day her BGs run in then 100-250's range. \"If I get busy and forget a shot I will get a BG in the 400 range\". Pt notes that she has been forgetting her insulin on occasions. She notes that she has been eating and HS snack as well to prevent low BGs. She has been off the prednisone for 4 weeks. Pt wakes around 6-630AM.  She takes her first dose of Levemir when she wakes up. She notes if her FBG is >70 she will take correction humalog. She notes that she has not been checking her pre-meal BGs lately. She has breakfast around 7-8AM She will have her 6 ritz crackers and regular pepsi. If her BG is under 100 she will take 2 units of Humalog with breakfast, she notes she rarely takes more than 4 units in the morning. She will take her dog for a walk after breakfast. She does not eat lunch. If she gets hungry, she will snack on popcorn or celery sticks and PB. She has dinner around 4-6PM, last night she had a crab salad. She has stopped eating the ice cream bars after dinner. She notes that she may have peanuts or walnuts in the evening on occasions. Last night she had a nutty chauncey. Pt has hx of CVA x4, \"one of which caused a CNS leak\". She reports she had a stress test and an ECHO in 2015 and \"everything came back fine\". Pt has hx of polyneuropathy from her knees to her feet. She takes Gabapentin 200mg BID, which does help with her pain. Pt has no hx of Nephrology (Urine MA/Cr <4547 on June 2019). Last eye exam was November 2019. She does have retinopathy and cataracts. \"He sent me to see a cataract specialist, who told me I had bad cataracts in both eyes. Pt has hx of osteoporosis for which she is followed by Dr. Cecil Fraser of Rheumatology. 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 She was never treated and it \"went away\". She is on Prolia every 6 month. Pt has hx of hypothyroidism and is taking LT4 75mcg daily. Her TSH in August 2019 was 1.73. Current Outpatient Medications   Medication Sig    lidocaine (LIDODERM) 5 % Apply  2 patch to the affected area for 12 hours a day and remove for 12 hours a day.  metoclopramide HCl (REGLAN) 5 mg tablet TAKE 1 TABLET BY MOUTH 4 TIMES DAILY. TAKE 30 MINUTES BEFORE MEALS    HUMALOG KWIKPEN INSULIN 100 unit/mL kwikpen As per scale    miconazole (MONISTAT 7) 2 % vaginal cream Insert 1 Applicator into vagina nightly.  glucos sul 2KCl/msm/chond/C/Mn (GLUCOSAMINE CHONDROITIN PO) Take  by mouth.  potassium 99 mg tablet Take 99 mg by mouth daily.  MAGNESIUM PO Take  by mouth.  ONETOUCH ULTRA BLUE TEST STRIP strip  USE 1 TEST STRIP EACH TIME TO TEST BLOOD SUGAR 8 TIMES A DAY    diclofenac EC (VOLTAREN) 75 mg EC tablet TAKE 1 TABLET BY MOUTH TWICE DAILY FOR 30 DAYS    zolpidem (AMBIEN) 5 mg tablet TAKE 1 TABLET BY MOUTH NIGHTLY MAXIMUM  DAILY  AMOUNT  5MG    traMADol (ULTRAM) 50 mg tablet TAKE 1 TABLET BY MOUTH ONCE DAILY FOR 30 DAYS MAXIMUM  DAILY  AMOUNT  50MG    ascorbate calcium, vitamin C, 500 mg tab Take 1 Tab by mouth.  losartan (COZAAR) 50 mg tablet Take 1 Tab by mouth daily.  Take 1 tab daily    levothyroxine (LEVOTHROID) 75 mcg tablet Take 1 Tab by mouth Daily (before breakfast). Indications: hypothyroidism    simvastatin (ZOCOR) 40 mg tablet Take 1 Tab by mouth nightly.  ciclopirox (LOPROX) 0.77 % topical cream Apply  to affected area two (2) times a day.  glucose blood VI test strips (ONETOUCH ULTRA BLUE TEST STRIP) strip USE TO TEST BLOOD SUGAR 8 TIMES A DAY ( z79.4,E11.40)    insulin detemir U-100 (LEVEMIR FLEXTOUCH U-100 INSULN) 100 unit/mL (3 mL) inpn INJECT 14 UNITS SUBCUTANEOUSLY IN THE MORNING AND  14  UNITS  AT  NIGHT (Patient taking differently: INJECT 15UNITS SUBCUTANEOUSLY IN THE MORNING AND  15  UNITS  AT  NIGHT)    montelukast (SINGULAIR) 10 mg tablet Take 1 Tab by mouth daily.  calcium-cholecalciferol, d3, (CALCIUM 600 + D) 600-125 mg-unit tab Take 1,200 mg by mouth daily.  b complex vitamins (B COMPLEX 1) tablet Take 1 Tab by mouth daily.  denosumab (PROLIA) 60 mg/mL injection 60 mg by SubCUTAneous route. Every 6 months    cholecalciferol, vitamin D3, (VITAMIN D3) 2,000 unit tab Take 1 Tab by mouth daily. 5000 units daily  Indications: low vitamin D levels    VITAMIN A PO Take 2,400 mcg by mouth nightly.  vitamin E (AQUA GEMS) 400 unit capsule Take 400 Units by mouth every Monday and Thursday. Only on M and Thurs at Bedtime     No current facility-administered medications for this visit. Facility-Administered Medications Ordered in Other Visits   Medication Dose Route Frequency    denosumab (PROLIA) injection 60 mg  60 mg SubCUTAneous ONCE     No Known Allergies  Review of Systems:  - Eyes: no blurry vision or double vision  - Cardiovascular: no chest pain  - Respiratory: no shortness of breath  - Musculoskeletal: no myalgias  - Neurological: no numbness/tingling in extremities    Physical Examination:  Blood pressure 108/57, pulse 73, height 5' 3\" (1.6 m), weight 148 lb (67.1 kg).   - General: pleasant, no distress, good eye contact   - Neck: no carotid bruits  - Cardiovascular: regular, normal rate, nl s1 and s2, no m/r/g, 2+ DP pulses   - Respiratory: clear bilaterally  - Integumentary: no edema, no foot ulcers  - Psychiatric: normal mood and affect    Diabetic foot exam:     Left Foot:   Visual Exam: callous - present   Pulse DP: 2+ (normal)   Filament test: reduced sensation    Vibratory sensation: normal      Right Foot:   Visual Exam: callous - present   Pulse DP: 2+ (normal)   Filament test: reduced sensation    Vibratory sensation: normal        Data Reviewed:   Component      Latest Ref Rng & Units 11/5/2019 11/5/2019 8/27/2019          11:48 AM 11:48 AM  2:16 PM   Glucose      65 - 99 mg/dL  264 (H)    BUN      6 - 24 mg/dL  17    Creatinine      0.57 - 1.00 mg/dL  0.72    GFR est non-AA      >59 mL/min/1.73  95    GFR est AA      >59 mL/min/1.73  109    BUN/Creatinine ratio      9 - 23  24 (H)    Sodium      134 - 144 mmol/L  137    Potassium      3.5 - 5.2 mmol/L  4.3    Chloride      96 - 106 mmol/L  96    CO2      20 - 29 mmol/L  26    Calcium      8.7 - 10.2 mg/dL  9.8    Protein, total      6.0 - 8.5 g/dL  6.4    Albumin      3.5 - 5.5 g/dL  4.2    GLOBULIN, TOTAL      1.5 - 4.5 g/dL  2.2    A-G Ratio      1.2 - 2.2  1.9    Bilirubin, total      0.0 - 1.2 mg/dL  0.7    Alk. phosphatase      39 - 117 IU/L  61    AST      0 - 40 IU/L  22    ALT (SGPT)      0 - 32 IU/L  19    Hemoglobin A1c, (calculated)      4.8 - 5.6 % 8.2 (H)     Estimated average glucose      mg/dL 189     TSH      0.450 - 4.500 uIU/mL   1.730       Assessment/Plan:   1) DM > Her A1C in November was up to 8.2%. She was having low BGs over night. Will decrease her HS Levemir by 15% to 13 units, continue Levemir 16 units in the AM and continue the Humalog 1:15 carb ratio. Will have pt check her BGs 4 times per day and mail her BG logs to me in 3 weeks. With her hx of neuropathy and calluses, she would benefit from DM shoes and inserts.     2) Hypothyroidism > Pt's TSH was 1.73 in August 2019 on LT4 75mcg daily. Pt is clinically euthyroid. Pt voices understanding and agreement with the plan. Pain noted and pt was recommended to call her PCP for further evaluation and treatment, as needed      Follow-up and Dispositions    · Return in about 3 months (around 3/17/2020).          Copy sent to:  Dr. Luis Hitchcock

## 2020-01-10 PROBLEM — M70.60 GREATER TROCHANTERIC BURSITIS: Status: ACTIVE | Noted: 2020-01-10

## 2020-01-29 ENCOUNTER — TELEPHONE (OUTPATIENT)
Dept: ENDOCRINOLOGY | Age: 55
End: 2020-01-29

## 2020-01-29 ENCOUNTER — OFFICE VISIT (OUTPATIENT)
Dept: RHEUMATOLOGY | Age: 55
End: 2020-01-29

## 2020-01-29 VITALS
OXYGEN SATURATION: 96 % | SYSTOLIC BLOOD PRESSURE: 139 MMHG | TEMPERATURE: 97.7 F | BODY MASS INDEX: 25.79 KG/M2 | RESPIRATION RATE: 16 BRPM | DIASTOLIC BLOOD PRESSURE: 85 MMHG | WEIGHT: 145.6 LBS | HEART RATE: 78 BPM

## 2020-01-29 DIAGNOSIS — D86.9 SARCOIDOSIS: ICD-10-CM

## 2020-01-29 DIAGNOSIS — M81.6 LOCALIZED OSTEOPOROSIS, UNSPECIFIED PATHOLOGICAL FRACTURE PRESENCE: ICD-10-CM

## 2020-01-29 DIAGNOSIS — M19.90 INFLAMMATORY ARTHRITIS: Primary | ICD-10-CM

## 2020-01-29 RX ORDER — GABAPENTIN 100 MG/1
CAPSULE ORAL
COMMUNITY
Start: 2020-01-25 | End: 2020-03-06 | Stop reason: SDUPTHER

## 2020-01-29 RX ORDER — FOLIC ACID 1 MG/1
1 TABLET ORAL DAILY
Qty: 30 TAB | Refills: 11 | Status: SHIPPED | OUTPATIENT
Start: 2020-01-29 | End: 2020-07-20 | Stop reason: SDUPTHER

## 2020-01-29 RX ORDER — METHOTREXATE 2.5 MG/1
10 TABLET ORAL
Qty: 24 TAB | Refills: 6 | Status: SHIPPED | OUTPATIENT
Start: 2020-02-02 | End: 2020-03-03

## 2020-01-29 NOTE — PROGRESS NOTES
RHEUMATOLOGY PROBLEM LIST AND CHIEF COMPLAINT  1. Osteoporosis: The patient fulfills the [facility] guidelines for pharmacologic intervention in postmenopausal women and men ? 48years of age  Treatment - Prolia. 2. Remote history of sarcoidosis - diagnosed after a chest x-ray and biopsy revealed sarcoidosis. She was not on any treatment for this and has not had any symptoms of inflammatory arthritis, interstitial lung disease or inflammatory skin disease. 3. Inflammatory arthritis-swelling of b/l hands. Negative RF. Therapy History:  Current DMARDs: Methotrexate (1/2020-current)       HISTORY OF PRESENT ILLNESS  This is a 53 y.o.  female. Today, the patient complains of pain in the joints. Location: leg  Severity:  5 on a scale of 0-10  Timing: intermittent   Duration: 3 months  Modifying factors: none  Context/Associated signs and symptoms: Following the last visit the patient started Prednisonewhich significantly improved the pain across the bilateral digits and morning stiffness. The patient states she used the Prednisone for one month but tapered off due to worsening blood sugar. She reports her swelling has mildly improved but she continues to have daily morning stiffness since tapering off the prednisone. The patient's chief complaint today is pain over the lateral, anterior thigh for the past several weeks. She received a hip injection with no improvement.      RHEUMATOLOGY REVIEW OF SYSTEMS   Positives as per history  Negatives as follows:  Tricia Jones:  Denies unexplained persistent fevers, weight change, chronic fatigue  HEAD/EYES:   Denies eye redness, blurry vision or sudden loss of vision, dry eyes, HA, temporal artery pain  ENT:    Denies oral/nasal ulcers, recurrent sinus infections, dry mouth  RESPIRATORY:  No pleuritic pain, history of pleural effusions, hemoptysis, exertional dyspnea  CARDIOVASCULAR:  Denies chest pain, history of pericardial effusions  GASTRO:   Denies heartburn, esophageal dysmotility, abdominal pain, nausea, vomiting, diarrhea, blood in the stool  HEMATOLOGIC:  No easy bruising, purpura, swollen lymph nodes  SKIN:    Denies alopecia, ulcers, nodules, sun sensitivity, unexplained persistent rash   VASCULAR:   Denies edema, cyanosis, raynaud phenomenon  NEUROLOGIC:  Denies specific muscle weakness, paresthesias   PSYCHIATRIC:  No sleep disturbance / snoring, depression, anxiety  MSK:    No morning stiffness >1 hour, SI joint pain, persistent joint swelling    PAST MEDICAL HISTORY  Reviewed with patient, significant changes in medical history - no    PHYSICAL EXAM  Blood pressure 139/85, pulse 78, temperature 97.7 °F (36.5 °C), temperature source Oral, resp. rate 16, weight 145 lb 9.6 oz (66 kg), SpO2 96 %. GENERAL APPEARANCE: Well-nourished adult in no acute distress. EYES: No scleral erythema, conjunctival injection. ENT: No oral ulcer, parotid enlargement. NECK: No adenopathy, thyroid enlargement. CARDIOVASCULAR: Heart rhythm is regular. No murmur, rub, gallop. CHEST: Normal vesicular breath sounds. No wheezes, rales, pleural friction rubs. ABDOMINAL: The abdomen is soft and nontender. Liver and spleen are nonpalpable. Bowel sounds are normal.  EXTREMITIES: There is no evidence of clubbing, cyanosis, edema. SKIN: No rash, palpable purpura, digital ulcer, abnormal thickening. NEUROLOGICAL: Normal gait and station, full strength in upper and lower extremities, normal sensation to light touch. MUSCULOSKELETAL:   Upper extremities - Puffiness over bilateral fingers (improved). Tenderness over the MCPs and PIPs. Lower extremities - Tenderness with external and internal range of motion in the bilateral hips. LABS, RADIOLOGY AND PROCEDURES  Previous labs reviewed -Yes  Previous radiology reviewed -Yes  Previous procedures reviewed -Yes  Previous medical records reviewed/summarized -Yes    ASSESSMENT  1.  Osteoporosis - The patient's last DEXA scan (1/2019) were consistent with osteopenia. She should continue on Prolia for now. We will reevaluate the treatment plan based on the results of her next bone scan.-unchanged  2. Possible inflammatory arthritis - The patient' responded well to prednisone; however the medication was not tolerated due to elevation of her blood glucose. I recommend she start Methotrexate 10 mg PO weekly which should improve the inflammation present on exam. The most common side effect is nausea the day after use. We discussed that high levels of of alcohol use are contraindicated on this medication. She should return in 2 months. 3. Drug therapy monitoring for toxicity (methotrexate) - CBC, BUN, Cr, AST, ALT and albumin every 3 months  4. New medication - Methotrexate - A written summary, as prepared by the [employer] of Rheumatology was provided. The patient was given the opportunity to ask questions, and verbalized understanding of the following: The most common side effects reported were those associated with nausea or vomiting and abnormalities in liver functions tests. Other less common side effects include mouth sores, rash, diarrhea and blood count abnormalities. Liver scarring, lung problems and slow hair loss are rare complications. The patients should not receive live vaccines while receiving methotrexate, should not become pregnant if female and should not drink alcohol. 5. Hip Osteoarthritis - We did not discuss this issue at length today. Non pharmacologic treatment recommendations include enrollment in an exercise program; land-based or aqua-therapy. Continue tylenol or oral NSAID's as needed. PLAN  1. Methotrexate 10 mg + Folic acid 1 mg daily  2. Prolia q6 months  3. Return in 2 months     Zack Georges MD  Adult and Pediatric Rheumatology     [address] [place] [address], Phone [phone], Fax [phone]     Visiting Assistant Professor of Pediatrics    Department of Pediatrics, [facility] of [facility], [address] [place] [address], Phone [phone], Fax [phone]      There are no Patient Instructions on file for this visit. cc:  Eduar Mcfarlane DO    Written by velia Up, as dictated by Brandy Cruz.  Rachell Julien M.D.

## 2020-01-29 NOTE — TELEPHONE ENCOUNTER
Pt called and stated that her bs was over 400 today but she was not worried about the reading, she just needed to know if she needs to be seen before May. Pt stated her bs has been running as high as the mid 400s and as low as 44. Pt stated her current appt is in  May but would like to know if that appt date is ok or if a sooner is needed.

## 2020-01-29 NOTE — PROGRESS NOTES
Chief Complaint   Patient presents with    Joint Pain     1. Have you been to the ER, urgent care clinic since your last visit? Hospitalized since your last visit? Yes seen at the ER on Friday to r/o the flu     2. Have you seen or consulted any other health care providers outside of the [address] [place] [address] since your last visit? Include any pap smears or colon screening.  No

## 2020-01-29 NOTE — TELEPHONE ENCOUNTER
She notes that for the last month she has been fighting a URI and she notes that she has been forgetting to take her Humalog and has been \"playing catch-up\" when her sugars are already high. Pt notes that she had a low this AM, but she notes that she forgot to take her regular HS Levemir and by the time she took it, it was 3 hours later than her normal time. Her low occurred an hour after her AM Levemir 16 units, her pepsi and and 6 ritz crackers. Pt to work on taking her Humalog with meals. Pt to send me BG logs in 2-3 weeks. Pt voices understanding and agreement with the plan.

## 2020-03-17 DIAGNOSIS — E03.9 ACQUIRED HYPOTHYROIDISM: ICD-10-CM

## 2020-03-17 DIAGNOSIS — E10.42 TYPE 1 DIABETES MELLITUS WITH DIABETIC POLYNEUROPATHY (HCC): ICD-10-CM

## 2020-03-28 DIAGNOSIS — Z91.89 AT RISK FOR DIABETIC FOOT ULCER: Primary | ICD-10-CM

## 2020-03-28 RX ORDER — CLINDAMYCIN HYDROCHLORIDE 300 MG/1
300 CAPSULE ORAL 3 TIMES DAILY
Qty: 30 CAP | Refills: 0 | Status: SHIPPED | OUTPATIENT
Start: 2020-03-28 | End: 2020-04-07

## 2020-03-28 RX ORDER — MUPIROCIN 20 MG/G
OINTMENT TOPICAL
Qty: 22 G | Refills: 1 | Status: SHIPPED | OUTPATIENT
Start: 2020-03-28 | End: 2020-05-11

## 2020-04-13 ENCOUNTER — TELEPHONE (OUTPATIENT)
Dept: RHEUMATOLOGY | Age: 55
End: 2020-04-13

## 2020-04-13 NOTE — TELEPHONE ENCOUNTER
Spoke to pt Informed pt that you were not doing injections in the office at this time, pt stated that she was told to contact you by her PCP, pt stated that she can not sleep at night due to the pain in her hips and lower back top of buttock, pt stated that she is wearing the lidocaine patches at night but she doesn't want to wear them all day, pt was informed that I will give her message to dr Jad Bynum, pt verbally acknowledged understanding

## 2020-04-13 NOTE — TELEPHONE ENCOUNTER
----- Message from Shanthi Bowman sent at 4/13/2020  3:49 PM EDT -----  Regarding: JJ/TELEPHONE  Contact: [phone]  Caller's first and last name:  Hali You  Reason for call: Cortisone injection  Callback required yes/no and why: Yes   Best contact number(s): [phone]  Details to clarify the request: Patient would like to know if Dr. Nemesio Biggs could give her a cortisone injection in both hips.

## 2020-04-15 RX ORDER — LOSARTAN POTASSIUM 50 MG/1
TABLET ORAL DAILY
COMMUNITY
End: 2020-04-27

## 2020-04-16 ENCOUNTER — OFFICE VISIT (OUTPATIENT)
Dept: SURGERY | Age: 55
End: 2020-04-16

## 2020-04-16 VITALS
BODY MASS INDEX: 26.19 KG/M2 | DIASTOLIC BLOOD PRESSURE: 53 MMHG | HEART RATE: 71 BPM | TEMPERATURE: 98.3 F | HEIGHT: 63 IN | RESPIRATION RATE: 16 BRPM | WEIGHT: 147.8 LBS | SYSTOLIC BLOOD PRESSURE: 126 MMHG | OXYGEN SATURATION: 99 %

## 2020-04-16 DIAGNOSIS — R23.4 SKIN FISSURES: Primary | ICD-10-CM

## 2020-04-16 DIAGNOSIS — M72.2 PLANTAR FASCIITIS, LEFT: ICD-10-CM

## 2020-04-16 NOTE — PROGRESS NOTES
CC: left foot pain and infections    HPI  Eduar Salas is a 63 y.o. female who since October has had areas on her heel and great toe that are very dry. They crack, break the skin and then get infected and red. She takes antibiotics and then they heal.  The process keeps repeating itself. After about one week off the antibiotics, it recurs. She has tried creams but is unsure of the names. She was recently told to use muciprocin when it is infected. She wears open, flat sandals year round. Now she has pain that extends from the sole of her heel into her instep and arch. She takes a 5-10 minute very hot tub bath a day, followed by a shower. She does not like socks on her feet and cannot sleep at night with them. She denies soaking her feet. ROS   As outlined above    PE  Visit Vitals  /53 (BP 1 Location: Left arm, BP Patient Position: Sitting)   Pulse 71   Temp 98.3 °F (36.8 °C) (Oral)   Resp 16   Ht 5' 3\" (1.6 m)   Wt 147 lb 12.8 oz (67 kg)   SpO2 99%   BMI 26.18 kg/m²     Great toe, laterally and medial heel with deep fissures. Lots of heel dryness noted  ETOH prep and callous trimmed. There is no skin breakage underneath and is normal skin. MRI reviewed    IMP  Fissuring of dry skin at heel and toe, left foot  This fissure then breaks the skin and this leads to a superficial skin infection. The way to stop this is to eliminate the very thick dry skin that forms. This is difficult if she will not wear socks. The pain is not related to this directly. The foot pain is plantar fascitis from her modification in walking secondary to the fissures and pain. PLAN  Instructed in plantar fascitis exercises to be done tid. This should help in several weeks. 12% LacHydrin lotion to dry areas bid, wear cotton socks as long as tolerated  Minimize soak and hot water(she cannot do this because of back issues). Once the skin breaks, use the muciprocin.     Spent over half of the 20 minute visit in discussion and coordination of care of wound, including treatment plans, interventions patient can do to help healing and symptoms to watch out for and when to be seen immediately.

## 2020-04-16 NOTE — LETTER
4/16/20 Patient: Clay Castleman YOB: 1965 Date of Visit: 4/16/2020 Stacey Murillo DO 
[address] [address] VIA In Basket Dear Stacey Murillo DO, Thank you for referring Ms. Luann Morales to [address] [place] [address] for evaluation. My notes for this consultation are attached. If you have questions, please do not hesitate to call me. I look forward to following your patient along with you.  
 
 
Sincerely, 
 
Stephanie Copeland MD

## 2020-04-16 NOTE — PROGRESS NOTES
1. Have you been to the ER, urgent care clinic since your last visit? Hospitalized since your last visit? No    2. Have you seen or consulted any other health care providers outside of the [address] [place] [address] since your last visit? Include any pap smears or colon screening.  No

## 2020-04-20 ENCOUNTER — DOCUMENTATION ONLY (OUTPATIENT)
Dept: SURGERY | Age: 55
End: 2020-04-20

## 2020-04-20 NOTE — PROGRESS NOTES
PATIENT CALLED AND ASKED TO BE SEEN BY DR. Argyle Olszewski BECAUSE HER FOOT IS BAD AND CAUSING PAIN I OFFERED HER AN APPOINTMENT TOMORROW AND SHE SAID NO BECAUSE HER SISTER HAS AN APPOINTMENT IN [facility]. I TOLD HER THAT JAY IS RETIRING AFTER NEXT WEEK. SHE SAID SHE PUT A MESSAGE IN TO HER PCP AND IS GOING TO SEE WHAT SHE SAYS.

## 2020-04-27 ENCOUNTER — HOSPITAL ENCOUNTER (OUTPATIENT)
Dept: WOUND CARE | Age: 55
Discharge: HOME | End: 2020-04-27
Payer: MEDICARE

## 2020-04-27 VITALS
TEMPERATURE: 97 F | HEART RATE: 68 BPM | RESPIRATION RATE: 16 BRPM | DIASTOLIC BLOOD PRESSURE: 71 MMHG | SYSTOLIC BLOOD PRESSURE: 146 MMHG

## 2020-04-27 PROBLEM — L97.422 DIABETIC ULCER OF LEFT HEEL ASSOCIATED WITH TYPE 1 DIABETES MELLITUS, WITH FAT LAYER EXPOSED (HCC): Status: ACTIVE | Noted: 2020-04-27

## 2020-04-27 PROBLEM — E10.621 DIABETIC ULCER OF LEFT HEEL ASSOCIATED WITH TYPE 1 DIABETES MELLITUS, WITH FAT LAYER EXPOSED (HCC): Status: ACTIVE | Noted: 2020-04-27

## 2020-04-27 PROCEDURE — 99212 OFFICE O/P EST SF 10 MIN: CPT

## 2020-04-27 RX ORDER — CALCIUM CARBONATE 600 MG
1000 TABLET ORAL 2 TIMES DAILY
COMMUNITY

## 2020-04-27 RX ORDER — VITAMIN E 1000 UNIT
CAPSULE ORAL 2 TIMES DAILY
COMMUNITY

## 2020-04-27 NOTE — PROGRESS NOTES
Wound Care    Hgb A1C noted to be 8.6 on 3/6/20. The patient has no insurance and will not be able to be seen by a podiatrist.  I have recommended she follow up with me in 2 weeks. She last saw Dr Nelia Jacques in May 2019.     Александр Gonzalez MD

## 2020-04-27 NOTE — H&P
[address]  HISTORY AND PHYSICAL    Name:  Rea Villatoro  MR#:  [id number]  :  1965  ACCOUNT #:  [de-identified]  ADMIT DATE:  2020      HISTORY OF PRESENT ILLNESS:  The patient is a 65-year-old woman referred by Savi Chacon DO regarding a nonhealing site on her left heel. The patient has history of diabetes insulin-dependent since age 8. She reports that she has a crack in the skin of her left heel, which intermittently opens up. She has received antibiotics periodically and reports that when she gets antibiotics, the wound will almost heal.  She is currently using a spray, which she does not know the name of, and Vaseline on the wound. She applies this in the evening about 5 p.m. and covers it with a cotton sock. She then removes the sock about 11 p.m. when she goes to bed. During the day, she will sometimes use the spray and Vaseline covered by a large Band-Aid. She says she cannot tolerate any socks on her foot during the day or night. The only shoe she tolerates are Flip Flops on the left foot. She reports that she has a fracture in her right foot, which apparently is nonhealing and does use a boot on that side at times. The patient reports she is very active and walks several miles per day. She reports that her blood sugars vary from below normal to over 600. She reports very labile blood sugars. She sees Dr. Reece Dutta as her endocrinologist and is presently taking Levemir twice per day and has Humalog sliding scale. It appears she last saw Dr Nelia Jacques about 1 year ago. She has not had any fevers. The patient had an MRI of the left foot on 2020, which did not suggest osteomyelitis. She has no active cardiac symptoms or dyspnea. The patient's past medical history is remarkable for stroke, rib fractures, arthritis. Medical history also includes hypercholesterolemia, insomnia, osteopenia.     PHYSICAL EXAMINATION:  GENERAL:  The patient is a woman in no acute distress. VITAL SIGNS:  Blood pressure 146/71, respirations 16, temperature 97 degrees. EXTREMITIES:  Examination of the lower extremities revealed no edema in either lower leg or foot. On the right, there is a palpable dorsalis pedis pulse and posterior tibial pulse. There were no wounds on the right foot or lower leg. Examination of the left lower extremity revealed a palpable dorsalis pedis pulse which is 2+. The left posterior tibial pulse was not palpable. On the posterior medial aspect of the left heel, there was a fissure, which was 1.1 x 0.2 x 0.2 cm in dimension. The surface had a very small amount of slough. There was no surrounding erythema. There was no drainage. There was some mild thickening of the skin of both heels, similar bilaterally. ASSESSMENT AND PLAN:  I recommended that the patient apply Vaseline directly on the left heel site covered by a Band-Aid. I recommended wearing white socks over this. The Vaseline should be applied twice per day. Band-Aid should be on at all times. White socks worn throughout the day, can be removed at night. The patient said she would not be able to comply with use of the sock other than for a few hours in the evening and said that dirt tends to get under a Band-Aid when she places it there. I have recommended strict control of diabetes to assist with healing. I have recommended that the patient see one of the podiatrist here at the wound care center, who could provide her with ongoing management of her wound and direction  regarding best footwear. FINAL DIAGNOSES:  Skin fissure left heel, type 1 diabetes mellitus.         Nery Frias MD      [id number]  D:  04/27/2020 10:39  T:  04/27/2020 11:33  JOB #:  [id number]

## 2020-04-27 NOTE — WOUND CARE
04/27/20 1005   Wound Heel Left #1 04/20/20   Date First Assessed/Time First Assessed: 04/27/20 1004   Present on Hospital Admission: Yes  Wound Approximate Age at First Assessment (Weeks): 28 weeks  Primary Wound Type: Diabetic Ulcer  Location: Heel  Wound Location Orientation: Left  Wound Descr. ..    Dressing Status   (Open to air)   Non-staged Wound Description Full thickness   Wound Length (cm) 0.2 cm   Wound Width (cm) 1.1 cm   Wound Depth (cm) 0.2 cm   Wound Surface Area (cm^2) 0.22 cm^2   Wound Volume (cm^3) 0.04 cm^3   Condition of Base Slough   Condition of Edges Open   Drainage Amount Small   Drainage Color Serosanguinous   Wound Odor None   Ciara-wound Assessment Intact   Cleansing and Cleansing Agents  Normal saline     Visit Vitals  /71 (BP 1 Location: Left arm, BP Patient Position: At rest;Sitting)   Pulse 68   Temp 97 °F (36.1 °C)   Resp 16

## 2020-04-27 NOTE — DISCHARGE INSTRUCTIONS
Discharge Instructions/Wound [facility]  [address]  [place], [address] [facility]  Telephone: [phone]       Wound Care Orders:  Left Heel fissure/wound-Cleanse with Normal Saline (or mild soap and water, rinse), pat dry and apply layer of Petroleum Jelly (Vaseline), and cover with cotton sock. Patient to reapply vaseline 2 times a day. Follow up with Dr. Lj Palma in 2 weeks. Treatment Orders:   Elevate leg(s) above the level of the heart when sitting, if swelling present. Avoid prolonged standing in one place. Wear off-loading shoe/boot on the affected foot when walking. Do not use objects to scratch inside cast/wrap. Follow diet as prescribed:  [] Diet as tolerated: [x] Diabetic Diet: Low carbohydrate, no sugar [] No Added Salt:  [] Increase Protein: [] Other:                Follow-up:  [x] Return Appointment: With  Dr. Lj Palma in 2 weeks. Electronically signed Nia Chinchilla RN on 4/27/2020 at [address] [address]: Should you experience any significant changes in your wound(s) or have questions about your wound care, please contact the [address] [place] [address] at [address] [place] [address] 8:00 am - 4:30. If you need help with your wound outside these hours and cannot wait until we are again available, contact your PCP or go to the hospital emergency room. PLEASE NOTE: IF YOU ARE UNABLE TO OBTAIN WOUND SUPPLIES, CONTINUE TO USE THE SUPPLIES YOU HAVE AVAILABLE UNTIL YOU ARE ABLE TO REACH US. IT IS MOST IMPORTANT TO KEEP THE WOUND COVERED AT ALL TIMES.      Physician Signature:_______________________    Date: ___________ Time:  ____________

## 2020-05-06 ENCOUNTER — OFFICE VISIT (OUTPATIENT)
Dept: ENDOCRINOLOGY | Age: 55
End: 2020-05-06

## 2020-05-06 DIAGNOSIS — E03.9 ACQUIRED HYPOTHYROIDISM: ICD-10-CM

## 2020-05-06 DIAGNOSIS — E55.9 HYPOVITAMINOSIS D: ICD-10-CM

## 2020-05-06 DIAGNOSIS — E10.42 TYPE 1 DIABETES MELLITUS WITH DIABETIC POLYNEUROPATHY (HCC): Primary | ICD-10-CM

## 2020-05-06 RX ORDER — HYDROXYZINE 50 MG/1
TABLET, FILM COATED ORAL
COMMUNITY
Start: 2020-03-11 | End: 2020-05-11

## 2020-05-06 NOTE — PROGRESS NOTES
No chief complaint on file. Records since last visit reviewed. **THIS IS A VIRTUAL VISIT VIA A VIDEO ENCOUNTER. PATIENT AGREED TO HAVE THEIR CARE DELIVERED OVER VIDEO IN PLACE OF THEIR REGULARLY SCHEDULED OFFICE VISIT**      History of Present Illness: Megan Miller is a 63 y.o. female here for follow up of diabetes. Pt notes \"I have been a Type I diabetic for 37 years\". \"I don't count carbs, I eat what I want and most of the time I will remember to take my insulin but not all the time\". In October 2019 she was diagnosed with Sarcoid. Because of her recurrent sinus infections and URIs she has been following with an ENT in [place]. Her A1C in March 2020 was up to 8.6%. She is current taking Levemir 16 units in the morning and 13 units HS. She is taking Humalog 2-6 units (she is guessing based on what she is eating, she is not counting carbs). She notes that she is typically forgetting to take her Humalog to after she has eaten and her BGs are very high and \"I start to play catch-up\"    She is checking her BGs 8 times per day, unless she gets busy and then she will forget to take her BGs and will forget to take her insulin as well. She notes that on multiple occasions she has forgotten to take Levemir as well has Humalog. She notes that she has had a couple of low BGs overnight or in the early morning. She is not consistently having symptoms with her low BGs. She notes her FBGs are typically in the 50-80's but can been in the 200's. Pt notes she has had a wound on her left heel and she has been dealing with multiple URIs. She has been on multiple rounds of infections for the heel wound and the URI. She is following with the wound care clinic and Dr. Vicenta Urbina for a wound on her left heel that has not been healing well. She is not on any prednisone or steroids. She has not had any steroid injections recently. The last was in January 2020.   \"That is why my hips hurt, because I can't get my cortisone injections. \"    Pt wakes around 6-7AM.  She takes her first dose of Levemir when she wakes up. She has breakfast around 7-8AM She will have her 6 ritz crackers and regular pepsi. If her BG is under 100 she will take 2 units of Humalog with breakfast, she notes she rarely takes more than 4 units in the morning. She will take her dog for a walk after breakfast. She does not eat lunch. If she gets hungry, she will snack on popcorn or celery sticks and PB or cheese strings. She has dinner around 4-6PM, last night she had shrimp skewers, red beans and rice, potato wedges and a fudge bar. She notes that she may have peanuts or walnuts in the evening on occasions. Last night she had popcorn. Pt has hx of CVA x4, \"one of which caused a CNS leak\". She reports she had a stress test and an ECHO in 2015 and \"everything came back fine\". Pt has hx of polyneuropathy from her knees to her feet. She takes Gabapentin 200mg BID, which does help with her pain. Pt has no hx of Nephrology (Urine MA/Cr <7793 on June 2019). Last eye exam was November 2019. She does have retinopathy and cataracts. \"He sent me to see a cataract specialist, who told me I had bad cataracts in both eyes. She has a follow up in May. Pt has hx of osteoporosis for which she is followed by Dr. Willy Carnes of Rheumatology. 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 She was never treated and it \"went away\". She is on Prolia every 6 month. Pt has hx of hypothyroidism and is taking LT4 75mcg daily. Her TSH in August 2019 was 1.020. Current Outpatient Medications   Medication Sig    lidocaine (LIDODERM) 5 % Apply  2 patch to the affected area for 12 hours a day and remove for 12 hours a day.  metoclopramide HCl (REGLAN) 5 mg tablet TAKE 1 TABLET BY MOUTH 4 TIMES DAILY.  TAKE 30 MINUTES BEFORE MEALS    HUMALOG KWIKPEN INSULIN 100 unit/mL kwikpen As per scale    miconazole (MONISTAT 7) 2 % vaginal cream Insert 1 Applicator into vagina nightly.  glucos sul 2KCl/msm/chond/C/Mn (GLUCOSAMINE CHONDROITIN PO) Take  by mouth.  potassium 99 mg tablet Take 99 mg by mouth daily.  MAGNESIUM PO Take  by mouth.  ONETOUCH ULTRA BLUE TEST STRIP strip  USE 1 TEST STRIP EACH TIME TO TEST BLOOD SUGAR 8 TIMES A DAY    diclofenac EC (VOLTAREN) 75 mg EC tablet TAKE 1 TABLET BY MOUTH TWICE DAILY FOR 30 DAYS    zolpidem (AMBIEN) 5 mg tablet TAKE 1 TABLET BY MOUTH NIGHTLY MAXIMUM  DAILY  AMOUNT  5MG    traMADol (ULTRAM) 50 mg tablet TAKE 1 TABLET BY MOUTH ONCE DAILY FOR 30 DAYS MAXIMUM  DAILY  AMOUNT  50MG    ascorbate calcium, vitamin C, 500 mg tab Take 1 Tab by mouth.  losartan (COZAAR) 50 mg tablet Take 1 Tab by mouth daily. Take 1 tab daily    levothyroxine (LEVOTHROID) 75 mcg tablet Take 1 Tab by mouth Daily (before breakfast). Indications: hypothyroidism    simvastatin (ZOCOR) 40 mg tablet Take 1 Tab by mouth nightly.  ciclopirox (LOPROX) 0.77 % topical cream Apply  to affected area two (2) times a day.  glucose blood VI test strips (ONETOUCH ULTRA BLUE TEST STRIP) strip USE TO TEST BLOOD SUGAR 8 TIMES A DAY ( z79.4,E11.40)    insulin detemir U-100 (LEVEMIR FLEXTOUCH U-100 INSULN) 100 unit/mL (3 mL) inpn INJECT 14 UNITS SUBCUTANEOUSLY IN THE MORNING AND  14  UNITS  AT  NIGHT (Patient taking differently: INJECT 15UNITS SUBCUTANEOUSLY IN THE MORNING AND  15  UNITS  AT  NIGHT)    montelukast (SINGULAIR) 10 mg tablet Take 1 Tab by mouth daily.  calcium-cholecalciferol, d3, (CALCIUM 600 + D) 600-125 mg-unit tab Take 1,200 mg by mouth daily.  b complex vitamins (B COMPLEX 1) tablet Take 1 Tab by mouth daily.  denosumab (PROLIA) 60 mg/mL injection 60 mg by SubCUTAneous route. Every 6 months    cholecalciferol, vitamin D3, (VITAMIN D3) 2,000 unit tab Take 1 Tab by mouth daily.  5000 units daily  Indications: low vitamin D levels    VITAMIN A PO Take 2,400 mcg by mouth nightly.  vitamin E (AQUA GEMS) 400 unit capsule Take 400 Units by mouth every Monday and Thursday. Only on M and Thurs at Bedtime     No current facility-administered medications for this visit. Facility-Administered Medications Ordered in Other Visits   Medication Dose Route Frequency    denosumab (PROLIA) injection 60 mg  60 mg SubCUTAneous ONCE     No Known Allergies  Review of Systems:  - Eyes: no blurry vision or double vision  - Cardiovascular: no chest pain  - Respiratory: no shortness of breath  - Musculoskeletal: no myalgias  - Neurological: no numbness/tingling in extremities    Physical Examination:  There were no vitals taken for this visit.   - GENERAL: NCAT, Appears well nourished   - EYES: EOMI, non-icteric, no proptosis   - Ear/Nose/Throat: NCAT, no visible inflammation or masses   - CARDIOVASCULAR: no cyanosis, no visible JVD   - RESPIRATORY: respiratory effort normal without any distress or labored breathing   - MUSCULOSKELETAL: Normal ROM of neck and upper extremities observed   - SKIN: No rash on face   - NEUROLOGIC:  No facial asymmetry (Cranial nerve 7 motor function), No gaze palsy   - PSYCHIATRIC: Normal affect, Normal insight and judgement       Data Reviewed:   Component      Latest Ref Rng & Units 3/6/2020 3/6/2020 3/6/2020 3/6/2020          11:06 AM 11:06 AM 11:06 AM 11:06 AM   Ammonia      34 - 178 ug/dL    89   Immunoglobulin E      6 - 495 IU/mL   10    Immunoglobulin A, Qt.      87 - 352 mg/dL  192     Immunoglobulin G, Qt.      700 - 1,600 mg/dL 772        Component      Latest Ref Rng & Units 3/6/2020 3/6/2020 3/6/2020 3/6/2020          11:06 AM 11:06 AM 11:06 AM 11:06 AM   Cholesterol, total      100 - 199 mg/dL    153   Triglyceride      0 - 149 mg/dL    134   HDL Cholesterol      >39 mg/dL    63   VLDL, calculated      5 - 40 mg/dL    27   LDL, calculated      0 - 99 mg/dL    63   Hemoglobin A1c, (calculated)      4.8 - 5.6 %   8.6 (H)    Estimated average glucose      mg/dL   200    TSH      0.450 - 4.500 uIU/mL       VITAMIN D, 25-HYDROXY      30.0 - 100.0 ng/mL  53.0     Uric acid      2.5 - 7.1 mg/dL 2.6        Component      Latest Ref Rng & Units 3/6/2020 3/6/2020 3/6/2020          11:06 AM 11:06 AM 11:06 AM   WBC      3.4 - 10.8 x10E3/uL   6.5   RBC      3.77 - 5.28 x10E6/uL   4.02   HGB      11.1 - 15.9 g/dL   12.0   HCT      34.0 - 46.6 %   36.3   MCV      79 - 97 fL   90   MCH      26.6 - 33.0 pg   29.9   MCHC      31.5 - 35.7 g/dL   33.1   RDW      11.7 - 15.4 %   12.4   PLATELET      477 - 623 x10E3/uL   262   NEUTROPHILS      Not Estab. %   69   Lymphocytes      Not Estab. %   21   MONOCYTES      Not Estab. %   8   EOSINOPHILS      Not Estab. %   1   BASOPHILS      Not Estab. %   1   ABS. NEUTROPHILS      1.4 - 7.0 x10E3/uL   4.5   Abs Lymphocytes      0.7 - 3.1 x10E3/uL   1.4   ABS. MONOCYTES      0.1 - 0.9 x10E3/uL   0.5   ABS. EOSINOPHILS      0.0 - 0.4 x10E3/uL   0.1   ABS. BASOPHILS      0.0 - 0.2 x10E3/uL   0.1   IMMATURE GRANULOCYTES      Not Estab. %   0   ABS. IMM. GRANS.      0.0 - 0.1 x10E3/uL   0.0   Glucose      65 - 99 mg/dL 332 (H)     BUN      6 - 24 mg/dL 20     Creatinine      0.57 - 1.00 mg/dL 0.77     GFR est non-AA      >59 mL/min/1.73 87     GFR est AA      >59 mL/min/1.73 101     BUN/Creatinine ratio      9 - 23 26 (H)     Sodium      134 - 144 mmol/L 136     Potassium      3.5 - 5.2 mmol/L 4.6     Chloride      96 - 106 mmol/L 98     CO2      20 - 29 mmol/L 24     Calcium      8.7 - 10.2 mg/dL 9.1     Protein, total      6.0 - 8.5 g/dL 6.5     Albumin      3.8 - 4.9 g/dL 4.0     GLOBULIN, TOTAL      1.5 - 4.5 g/dL 2.5     A-G Ratio      1.2 - 2.2 1.6     Bilirubin, total      0.0 - 1.2 mg/dL 1.1     Alk.  phosphatase      39 - 117 IU/L 74     AST      0 - 40 IU/L 23     ALT (SGPT)      0 - 32 IU/L 26     TSH      0.450 - 4.500 uIU/mL  1.020        Assessment/Plan:   1) DM > Her A1C in March 2020 was up to November was up to 8.6%. Pt notes she is still having low BGs in the morning and overnight. Will have her decrease her HS Levemir from 13 units to 11 units and increase her AM Levemir from 16 units to 20 units. Pt encouraged to start checking her BGs before each meal and taking her Humalog before each meal and after meals, when her BG is already high. Will have pt check her BGs 4 times per day and mail her BG logs to me in 3 weeks. With her hx of neuropathy and calluses, she would benefit from DM shoes and inserts. 2) Hypothyroidism > Pt's TSH was 1.020 in March 2020 on LT4 75mcg daily. Pt is clinically euthyroid. Pt voices understanding and agreement with the plan.   Pain noted and pt was recommended to call her PCP for further evaluation and treatment, as needed    RTC 3 months      Copy sent to:  Dr. Jericho Johnson

## 2020-05-11 ENCOUNTER — HOSPITAL ENCOUNTER (OUTPATIENT)
Dept: WOUND CARE | Age: 55
Discharge: HOME | End: 2020-05-11
Payer: MEDICARE

## 2020-05-11 VITALS
DIASTOLIC BLOOD PRESSURE: 64 MMHG | TEMPERATURE: 98.3 F | RESPIRATION RATE: 16 BRPM | WEIGHT: 142 LBS | HEIGHT: 63 IN | HEART RATE: 70 BPM | BODY MASS INDEX: 25.16 KG/M2 | SYSTOLIC BLOOD PRESSURE: 118 MMHG

## 2020-05-11 PROCEDURE — 99213 OFFICE O/P EST LOW 20 MIN: CPT

## 2020-05-11 NOTE — PROGRESS NOTES
HISTORY OF PRESENT ILLNESS:  The patient is a 62-year-old woman referred by Jolene William DO regarding a nonhealing site on her left heel, seen first at the [address] [place] [address] on 4/27/2020. The patient has history of diabetes insulin-dependent since age 8. She reports that she has a crack in the skin of her left heel, which intermittently opens up. She has received antibiotics periodically and reports that when she gets antibiotics, the wound will almost heal.      Prior to starting with the [address] [place] [address], she was using using a spray, which she does not know the name of, and Vaseline on the wound. She applied this in the evening about 5 p.m. and covered it with a cotton sock. She then removed the sock about 11 p.m. when she goes to bed. During the day, she  sometimes used the spray and Vaseline covered by a large Band-Aid. She says she cannot tolerate any socks on her foot during the day or night. The only shoe she tolerates are Flip Flops on the left foot.     She reports that she has a fracture in her right foot, which apparently is nonhealing and does use a boot on that side at times.     The patient reports she is very active and walks several miles per day.     She reports that her blood sugars vary from below normal to over 600. She reports very labile blood sugars. She sees Dr. Autumn Benjamin as her endocrinologist and is presently taking Levemir twice per day and has Humalog sliding scale. It appears she last saw Dr Kiko Knutson about 1 year ago.     She has not had any fevers.     The patient had an MRI of the left foot on 04/03/2020, which did not suggest osteomyelitis.     She has no active cardiac symptoms or dyspnea.     The patient's past medical history is remarkable for stroke, rib fractures, arthritis.     Medical history also includes hypercholesterolemia, insomnia, osteopenia.     Current dressing:  I recommended that the patient apply Vaseline directly on the left heel site covered by a Band-Aid. I recommended wearing white socks over this. The Vaseline should be applied twice per day. Band-Aid should be on at all times. White socks worn throughout the day, can be removed at night.     PHYSICAL EXAMINATION:  GENERAL:  The patient is a woman in no acute distress. EXTREMITIES:  Examination of the lower extremities revealed no edema in either lower leg or foot. On the right, there is a palpable dorsalis pedis pulse and posterior tibial pulse. There were no wounds on the right foot or lower leg.     Examination of the left lower extremity revealed a palpable dorsalis pedis pulse which is 2+. The left posterior tibial pulse was not palpable.     On the posterior medial aspect of the left heel, there was a fissure, which was 1.1 x 0.2 x 0.1 cm in dimension. There was no surrounding erythema. There was no drainage. There was some mild thickening of the skin of both heels, similar bilaterally.     ASSESSMENT AND PLAN:  I recommended that the patient continue to apply Vaseline directly on the left heel site covered by a Band-Aid. I recommended wearing white socks over this. The Vaseline should be applied twice per day. Band-Aid should be on at all times.   White socks worn throughout the day, can be removed at night.     The patient said she would not be able to comply with use of the sock other than for a few hours in the evening and said that dirt tends to get under a Band-Aid when she places it there.     I have recommended strict control of diabetes to assist with healing.     I have recommended that the patient see one of the podiatrist here at the wound care center, who could provide her with ongoing management of her wound and direction  regarding best footwear.     FINAL DIAGNOSES:  Skin fissure left heel, type 1 diabetes mellitus.      N79.860, B24.221     Latia Melara MD

## 2020-05-11 NOTE — WOUND CARE
05/11/20 1146   Anesthetic   Anesthetic 4% Lidocaine Cream   Peripheral Vascular   Peripheral Vascular (WDL) WDL   LLE Peripheral Vascular    Capillary Refill Less than/equal to 3 seconds   Color Appropriate for race   Temperature Warm   Pedal Pulse Palpable   Circumference of Calf (cm) 33.5 cm   Circumference of Ankle (cm) 20.5 cm   Musculoskeletal   Musculoskeletal (WDL) WDL   Foot Assessment   Foot Assessment WDL   Toe Nail Assessment   Toe Nail Assessment WDL   Wound Heel Left #1 04/20/20   Date First Assessed/Time First Assessed: 04/27/20 1004   Present on Hospital Admission: Yes  Wound Approximate Age at First Assessment (Weeks): 28 weeks  Primary Wound Type: Diabetic Ulcer  Location: Heel  Wound Location Orientation: Left  Wound Descr. ..    Dressing Status Other (Comment)   Dressing Type Open to air   Non-staged Wound Description Partial thickness   Wound Length (cm) 0.1 cm   Wound Width (cm) 1.2 cm   Wound Depth (cm) 0.1 cm   Wound Surface Area (cm^2) 0.12 cm^2   Wound Volume (cm^3) 0.01 cm^3   Change in Wound Size % 45.45   Condition of Base Pink   Drainage Amount None   Wound Odor None   Ciara-wound Assessment Intact   Cleansing and Cleansing Agents  Normal saline

## 2020-05-11 NOTE — DISCHARGE INSTRUCTIONS
Discharge Instructions/Wound [facility]  [address] [facility]  [place], [address] [facility]  Telephone: [phone]       Wound Care Orders:   Left Heel fissure/wound-Cleanse with Normal Saline (or mild soap and water, rinse), pat dry and apply layer of Petroleum Jelly (Vaseline), and cover with cotton sock. Patient to reapply vaseline 2-3 times a day. Follow up with Dr. Ritu Can in 3 weeks. .      Treatment Orders:   Elevate leg(s) above the level of the heart when sitting, if swelling present. Avoid prolonged standing in one place. Wear off-loading shoe/boot on the affected foot when walking. Do not get dressing/cast wet. Do not use objects to scratch inside cast/wrap. Follow diet as prescribed:  [] Diet as tolerated: [x] Diabetic Diet: Low carb no sugar [] No Added Salt:  [x] Increase Protein: [] Other:               Follow-up:  [x] Return Appointment: With  Dr. Cochran Degree in [address] [place] [address])  [x] Follow up with Dr. Pickens [place] (Endocrinology)      Electronically signed Larisa Canales RN on 5/11/2020 at 12:20 PM     Jillian Gay 281: Should you experience any significant changes in your wound(s) or have questions about your wound care, please contact the [address] [place] [address] at [address] [place] [address] 8:00 am - 4:30. If you need help with your wound outside these hours and cannot wait until we are again available, contact your PCP or go to the hospital emergency room. PLEASE NOTE: IF YOU ARE UNABLE TO OBTAIN WOUND SUPPLIES, CONTINUE TO USE THE SUPPLIES YOU HAVE AVAILABLE UNTIL YOU ARE ABLE TO REACH US. IT IS MOST IMPORTANT TO KEEP THE WOUND COVERED AT ALL TIMES.      Physician Signature:_______________________    Date: ___________ Time:  ____________

## 2020-05-26 ENCOUNTER — HOSPITAL ENCOUNTER (OUTPATIENT)
Dept: WOUND CARE | Age: 55
Discharge: HOME | End: 2020-05-26
Payer: MEDICARE

## 2020-05-26 VITALS
HEART RATE: 71 BPM | RESPIRATION RATE: 16 BRPM | TEMPERATURE: 97.7 F | SYSTOLIC BLOOD PRESSURE: 165 MMHG | DIASTOLIC BLOOD PRESSURE: 72 MMHG

## 2020-05-26 PROBLEM — S90.852A SPLINTER OF LEFT FOOT: Status: ACTIVE | Noted: 2020-05-26

## 2020-05-26 PROBLEM — L84 HEEL CALLUS: Status: ACTIVE | Noted: 2020-05-26

## 2020-05-26 PROCEDURE — 99212 OFFICE O/P EST SF 10 MIN: CPT

## 2020-05-26 NOTE — PROGRESS NOTES
Patient presents to wound clinic for: Prieto Valdes is a 63 y.o. female who presents of the following: nonhealing, painful left heel wounds. She has previously been evaluated at the wound center by Dr. CH[facility] and by Dr. Chioma Duran of [facility] Patient is a type 1 diabetic and relates that her blood sugars vary from below normal.  She has been applying vaseline directly to her fissures since the last appointment. She relates that she has taken antibiotics in the past which has helped with the fissures. Additionally, patient relates that less than two weeks ago (5/15/20), she went to her primary care for the removal for part of a splint/thorn from her left heel after trimming rosebushes. She relates today that while her heel fissures have resolved. She still feels as if there is still a part of the thorn remaining in her heel. Pertinent Medical History:  Past Medical History:   Diagnosis Date    Arthritis     patient states \"every joint affected\"    Bee sting 2018    left elbow bee sting     Blister of ankle 2018    Blister small per patient of left ankle. Wears an air boot due to 2 stress fractures in last 2 months.     CAD (coronary artery disease)     Constipation     Falls 2017    pt reports having 4 falls in 3 days    Fatigue     Headache     Ill-defined condition     multiple broken ribs    Ill-defined condition     reports \"getting infections easily\"    Joint pain     Memory disorder     following spinal fluid leak repair    Menopause     Nausea & vomiting     Neuropathy     Osteoporosis     Thyroid disease     Type 1 diabetes ([facility] [address]     diagnosed at age 8    Unspecified cerebral artery occlusion with cerebral infarction     x 4 (last in )     Past Surgical History:   Procedure Laterality Date    HX CARPAL TUNNEL RELEASE Right 2018    HX  SECTION      HX  SECTION      HX CHOLECYSTECTOMY      HX HYSTERECTOMY    HX ORTHOPAEDIC      frozen shoulder surgery x 3    HX ORTHOPAEDIC      frozen finger surgery x 8    HX ORTHOPAEDIC Left 2014    wrist surgery    HX ORTHOPAEDIC Right 1977    hand surgery    HX ORTHOPAEDIC Left 1995    foot surgery    HX OTHER SURGICAL      spinal fluid leak repair (spinal fluid leaking from nose, remove nose, cut fat from stomach, use that as patch behind nose, nose replaced)    HX ROTATOR CUFF REPAIR Left     HX ROTATOR CUFF REPAIR Right     HX TONSILLECTOMY  1968     Prior to Admission medications    Medication Sig Start Date End Date Taking? Authorizing Provider   traMADoL (ULTRAM) 50 mg tablet TAKE 1 TABLET BY MOUTH ONCE DAILY LIMIT  1  TAB  PER  DAY 5/18/20 6/17/20 Yes Dee Hernández DO   zolpidem (AMBIEN) 5 mg tablet TAKE 1 TABLET BY MOUTH NIGHTLY MAX  DAILY  AMOUNT  5  MG 5/15/20  Yes Dee Hernández, DO   ascorbic acid, vitamin C, (VITAMIN C) 1,000 mg tablet Take 3,000 mg by mouth two (2) times a day. Yes Provider, Historical   calcium carbonate (CALTREX) 600 mg calcium (1,500 mg) tablet Take 600 mg by mouth daily. Yes Provider, Historical   gabapentin (NEURONTIN) 100 mg capsule TAKE 2 CAPSULES BY MOUTH TWICE DAILY 4/24/20  Yes Dee Hernández, DO   lidocaine (LIDODERM) 5 % APPLY 2 PATCHES TO THE AFFECTED AREA FOR 12 HOURS A DAY, AND REMOVE FOR 12 HOURS A DAY 3/6/20  Yes Dee Hernández DO   diclofenac EC (VOLTAREN) 75 mg EC tablet TAKE 1 TABLET BY MOUTH TWICE DAILY FOR 30 DAYS 3/6/20  Yes Dee Hernández, DO   montelukast (SINGULAIR) 10 mg tablet Take 1 Tab by mouth daily.  3/6/20  Yes Rand Hernández, DO   HUMALOG KWIKPEN INSULIN 100 unit/mL kwikpen As per scale 3/6/20  Yes Dee Hernández, DO   insulin detemir U-100 (LEVEMIR FLEXTOUCH U-100 INSULN) 100 unit/mL (3 mL) inpn INJECT 15UNITS SUBCUTANEOUSLY IN THE MORNING AND  15  UNITS  AT  NIGHT  Patient taking differently: INJECT 16UNITS SUBCUTANEOUSLY IN THE MORNING AND  13  UNITS  AT  NIGHT 3/6/20  Yes Good, Dee STEVENSON, DO   losartan (COZAAR) 50 mg tablet Take 1 Tab by mouth daily. Take 1 tab daily 3/6/20  Yes Dee Hernández, DO   simvastatin (ZOCOR) 40 mg tablet Take 1 Tab by mouth nightly. 3/6/20  Yes Dee Hernández, DO   levothyroxine (LEVOTHROID) 75 mcg tablet Take 1 Tab by mouth Daily (before breakfast). Indications: a condition with low thyroid hormone levels 3/6/20  Yes Dee Hernández, DO   folic acid (FOLVITE) 1 mg tablet Take 1 Tab by mouth daily. 1/29/20  Yes Lamar Rivera MD   metoclopramide HCl (REGLAN) 5 mg tablet TAKE 1 TABLET BY MOUTH 4 TIMES DAILY. TAKE 30 MINUTES BEFORE MEALS  Patient taking differently: 5 mg two (2) times a day. Up to BID. TAKE 30 MINUTES BEFORE MEALS 1/15/20  Yes Dee Hernández, DO   glucos sul 2KCl/msm/chond/C/Mn (GLUCOSAMINE CHONDROITIN PO) Take  by mouth. Yes Provider, Historical   potassium 99 mg tablet Take 99 mg by mouth every other day. Yes Provider, Historical   MAGNESIUM PO Take  by mouth. Yes Provider, Historical   b complex vitamins (B COMPLEX 1) tablet Take 1 Tab by mouth daily. Yes Provider, Historical   cholecalciferol, vitamin D3, (VITAMIN D3) 2,000 unit tab Take 1 Tab by mouth daily. 5000 units daily  Indications: low vitamin D levels   Yes Provider, Historical   VITAMIN A PO Take 2,400 mcg by mouth nightly. Yes Provider, Historical   vitamin E (AQUA GEMS) 400 unit capsule Take 400 Units by mouth every Monday and Thursday. Only on M and Thurs at Bedtime   Yes Provider, Historical   methylPREDNISolone (MEDROL DOSEPACK) 4 mg tablet Per dose pack instructions 5/28/20   Jalene Barthel, NP   amoxicillin-clavulanate (AUGMENTIN) 875-125 mg per tablet Take 1 Tab by mouth every twelve (12) hours for 7 days. 5/28/20 6/4/20  Jalene Barthel, NP   tiZANidine (ZANAFLEX) 4 mg tablet Take 1 Tab by mouth three (3) times daily as needed for Muscle Spasm(s) or Pain. 5/13/20   Seng Hernández,    levocetirizine (XYZAL) 5 mg tablet Take 1 Tab by mouth daily.  5/4/20 Mike Perera, DO   Insulin Needles, Disposable, (Niru Pen Needle) 32 gauge x \" ndle Use as directed with pen device - up to 5 times daily  Dx:  E11.9 3/31/20   Dee Hernández DO   glucose blood VI test strips (ONETOUCH ULTRA BLUE TEST STRIP) strip USE TO TEST BLOOD SUGAR 8 TIMES A DAY ( z79.4,E11.40) 3/6/20   Dee Hernández, DO   ciclopirox (LOPROX) 0.77 % topical cream Apply  to affected area two (2) times a day. 19   Pasquale Hernández 91, DO   denosumab (PROLIA) 60 mg/mL injection 60 mg by SubCUTAneous route.  Every 6 months    Provider, Historical     No Known Allergies  Family History   Problem Relation Age of Onset    Heart Disease Mother     Hypertension Mother     Heart Disease Maternal Grandfather     Cancer Maternal Aunt         Pancreatic and Liver    Cancer Maternal Grandmother         Lung    Diabetes Maternal Grandmother     Cancer Maternal Aunt         Breast    Breast Cancer Maternal Aunt     Diabetes Maternal Aunt      Social History     Socioeconomic History    Marital status: SINGLE     Spouse name: Not on file    Number of children: Not on file    Years of education: Not on file    Highest education level: Not on file   Occupational History    Not on file   Social Needs    Financial resource strain: Not on file    Food insecurity     Worry: Not on file     Inability: Not on file    Transportation needs     Medical: Not on file     Non-medical: Not on file   Tobacco Use    Smoking status: Former Smoker     Packs/day: 0.50     Years: 8.00     Pack years: 4.00     Last attempt to quit: 4/10/2007     Years since quittin.1    Smokeless tobacco: Never Used   Substance and Sexual Activity    Alcohol use: No    Drug use: No    Sexual activity: Not on file   Lifestyle    Physical activity     Days per week: Not on file     Minutes per session: Not on file    Stress: Not on file   Relationships    Social connections     Talks on phone: Not on file     Gets together: Not on file     Attends [employer] service: Not on file     Active member of club or organization: Not on file     Attends meetings of clubs or organizations: Not on file     Relationship status: Not on file    Intimate partner violence     Fear of current or ex partner: Not on file     Emotionally abused: Not on file     Physically abused: Not on file     Forced sexual activity: Not on file   Other Topics Concern    Not on file   Social History Narrative    Not on file       Vitals:    05/26/20 1304   BP: 165/72   Pulse: 71   Resp: 16   Temp: 97.7 °F (36.5 °C)       Review of Systems:    Gen: No fever, chills, malaise, weight loss/gain. Heent: No headache, rhinorrhea, epistaxis, ear pain, hearing loss, sinus pain, neck pain/stiffness, sore throat. Heart: No chest pain, palpitations, MEADOWS, pnd, or orthopnea. Resp: No cough, hemoptysis, wheezing and shortness of breath. GI: No nausea, vomiting, diarrhea, constipation, melena or hematochezia. : No urinary obstruction, dysuria or hematuria. Derm: see below  Musc/skeletal: no bone or joint complains. Vasc: No edema, cyanosis or claudication. Endo: No heat/cold intolerance, no polyuria,polydipsia or polyphagia. Neuro: No unilateral weakness, numbness, tingling. No seizures. Heme: No easy bruising or bleeding. Wound Description:       05/26/20 1315   Wound Heel Left #1 04/20/20   Date First Assessed/Time First Assessed: 04/27/20 1004   Present on Hospital Admission: Yes  Wound Approximate Age at First Assessment (Weeks): 28 weeks  Primary Wound Type: Diabetic Ulcer  Location: Heel  Wound Location Orientation: Left  Wound Descr. ..    Dressing Type Open to air   Wound Length (cm) 0.1 cm   Wound Width (cm) 0.1 cm   Wound Depth (cm) 0.1 cm   Wound Surface Area (cm^2) 0.01 cm^2   Wound Volume (cm^3) 0 cm^3   Change in Wound Size % 95.45   Condition of Edges Calloused   Drainage Amount None   Wound Odor None   Ciara-wound Assessment Intact Cleansing and Cleansing Agents  Normal saline      Visit Vitals  /72 (BP 1 Location: Left arm, BP Patient Position: At rest;Sitting)   Pulse 71   Temp 97.7 °F (36.5 °C)   Resp 16             Removal of Foreign Body: To the lateral left heel, there was a small darkened area where the patient was experiencing significant tenderness upon palpation in an area where she previously had part of a thorn removed from the foot less then two weeks ago. A #15 blade was used to debride overlying callus from the area until a piece of a remaining thorn embedded in the subcutaneous tissue was exposed. The thorn was then removed from the subcutaneous and the area was then clean. Neuropathy: Decreased sensation    Assessment:  1. Type 1 diabetes with diabetes polyneuropathy  2. Splinter/thorn of left foot-initial encounter  3. Skin fissures of the left heel    Plan:   -Patient seen and evaluated  -Removal of superficial foreign body as noted above  -At this time fissures are healed and no sign of infection  -Apply OTC antibiotic ointment and bandaid to site where the remainder of the piece of the thorn was removed. -Follow-up in my office in 2 week. -Agree with Dr. Mari Shankar that patient needs to use LacHydrin lotion bid to prevent the buildup of the thick dry skin that leads to fissures. Discussed this with patient but she seems reluctant. Discussed that wearing the socks will help lock in the moisture and letting the skin become too dry leads to cracking. Educated the patient that the formation of cracks are what causes the infection and that infections will continue to recur if she allows to skin to become excessively dry. -Patient discharged from wound center.

## 2020-05-26 NOTE — DISCHARGE INSTRUCTIONS
Discharge Instructions/Wound [address] [id number] [address]  [place], [address]  Telephone: [phone]       Wound Care Orders:  Left heel wound - cleanse with soap and water, rinse, pat dry, apply Ammonium hydroxide lotion, cover with clean white sock. Repeat nightly. Follow-up with Dr. Smooth Haile in her office in 2 months. No Further clinic follow-up needed. Treatment Orders:   Elevate leg(s) above the level of the heart when sitting, if swelling present. Avoid prolonged standing in one place. Wear off-loading shoe/boot on the affected foot when walking. Do not get dressing/cast wet. Do not use objects to scratch inside cast/wrap. Follow diet as prescribed:  [] Diet as tolerated: [x] Diabetic Diet: Low carb no sugar [] No Added Salt:  [] Increase Protein: [] Other:               Follow-up:  [x] Return Appointment: With DR Cindy Kilpatrick   in  8 Week(s) in her office [phone]  [] Ordered tests:      Electronically signed Leonard Walton RN on 5/26/2020 at 1:35 PM     Jillian Gay 281: Should you experience any significant changes in your wound(s) or have questions about your wound care, please contact the [address] [place] [address] at [address] [place] [address] 8:00 am - 4:30. If you need help with your wound outside these hours and cannot wait until we are again available, contact your PCP or go to the hospital emergency room. PLEASE NOTE: IF YOU ARE UNABLE TO OBTAIN WOUND SUPPLIES, CONTINUE TO USE THE SUPPLIES YOU HAVE AVAILABLE UNTIL YOU ARE ABLE TO REACH US. IT IS MOST IMPORTANT TO KEEP THE WOUND COVERED AT ALL TIMES.      Physician Signature:_______________________    Date: ___________ Time:  ____________

## 2020-05-26 NOTE — WOUND CARE
05/26/20 1315   Wound Heel Left #1 04/20/20   Date First Assessed/Time First Assessed: 04/27/20 1004   Present on Hospital Admission: Yes  Wound Approximate Age at First Assessment (Weeks): 28 weeks  Primary Wound Type: Diabetic Ulcer  Location: Heel  Wound Location Orientation: Left  Wound Descr. ..    Dressing Type Open to air   Wound Length (cm) 0.1 cm   Wound Width (cm) 0.1 cm   Wound Depth (cm) 0.1 cm   Wound Surface Area (cm^2) 0.01 cm^2   Wound Volume (cm^3) 0 cm^3   Change in Wound Size % 95.45   Condition of Edges Calloused   Drainage Amount None   Wound Odor None   Ciara-wound Assessment Intact   Cleansing and Cleansing Agents  Normal saline     Visit Vitals  /72 (BP 1 Location: Left arm, BP Patient Position: At rest;Sitting)   Pulse 71   Temp 97.7 °F (36.5 °C)   Resp 16

## 2020-06-07 ENCOUNTER — TELEPHONE (OUTPATIENT)
Dept: ENDOCRINOLOGY | Age: 55
End: 2020-06-07

## 2020-06-07 NOTE — TELEPHONE ENCOUNTER
Pt called and noted that she received cortisone injections a week ago and her BGs have been in the 400's ever since. Pt notes that last night she had \"high\" BG, she gave 6 units of Humalog and this AM her BG was down to 143. Pt instructed to increase her Levemir to 24 units in the AM starting tomorrow and 15 units HS starting tonight. Pt to continue to correct her high BGs using her Humalog correction scale. When her BG start to improve she should return to her Levemir 20 units in the AM and 13 units HS. Pt instructed to call me if she receives steroid injections in the future. Pt voices understanding and agreement with the plan.

## 2020-06-19 ENCOUNTER — HOSPITAL ENCOUNTER (OUTPATIENT)
Dept: INFUSION THERAPY | Age: 55
Discharge: HOME | End: 2020-06-19
Payer: MEDICARE

## 2020-06-19 VITALS
DIASTOLIC BLOOD PRESSURE: 70 MMHG | TEMPERATURE: 97.5 F | RESPIRATION RATE: 16 BRPM | HEART RATE: 90 BPM | SYSTOLIC BLOOD PRESSURE: 152 MMHG

## 2020-06-19 LAB
ANION GAP BLD CALC-SCNC: 16 MMOL/L (ref 10–20)
BUN BLD-MCNC: 8 MG/DL (ref 9–20)
CA-I BLD-MCNC: 1.22 MMOL/L (ref 1.12–1.32)
CHLORIDE BLD-SCNC: 92 MMOL/L (ref 98–107)
CO2 BLD-SCNC: 27 MMOL/L (ref 21–32)
CREAT BLD-MCNC: 0.7 MG/DL (ref 0.6–1.3)
GLUCOSE BLD-MCNC: 381 MG/DL (ref 65–100)
HCT VFR BLD CALC: 40 % (ref 35–47)
MAGNESIUM SERPL-MCNC: 1.7 MG/DL (ref 1.6–2.4)
PHOSPHATE SERPL-MCNC: 3 MG/DL (ref 2.6–4.7)
POTASSIUM BLD-SCNC: 4 MMOL/L (ref 3.5–5.1)
SERVICE CMNT-IMP: ABNORMAL
SODIUM BLD-SCNC: 129 MMOL/L (ref 136–145)

## 2020-06-19 PROCEDURE — 83735 ASSAY OF MAGNESIUM: CPT

## 2020-06-19 PROCEDURE — 96372 THER/PROPH/DIAG INJ SC/IM: CPT

## 2020-06-19 PROCEDURE — 80047 BASIC METABLC PNL IONIZED CA: CPT

## 2020-06-19 PROCEDURE — 84100 ASSAY OF PHOSPHORUS: CPT

## 2020-06-19 PROCEDURE — 36415 COLL VENOUS BLD VENIPUNCTURE: CPT

## 2020-06-19 PROCEDURE — 74011250636 HC RX REV CODE- 250/636: Performed by: PEDIATRICS

## 2020-06-19 RX ADMIN — DENOSUMAB 60 MG: 60 INJECTION SUBCUTANEOUS at 14:41

## 2020-06-19 NOTE — PROGRESS NOTES
[facility] VISIT NOTE    3683  Pt arrived at [facility] ambulatory and in no distress for Prolia. Patient denies any recent invasive dental procedures. Assessment completed, pt c/o chronic back pain. Blood pressure 152/70, pulse 90, temperature 97.5 °F (36.4 °C), resp. rate 16, not currently breastfeeding. Labs drawn peripherally. Lab results are within treatment parameters. Recent Results (from the past 12 hour(s))   POC CHEM8    Collection Time: 06/19/20  2:30 PM   Result Value Ref Range    Calcium, ionized (POC) 1.22 1.12 - 1.32 mmol/L    Sodium (POC) 129 (L) 136 - 145 mmol/L    Potassium (POC) 4.0 3.5 - 5.1 mmol/L    Chloride (POC) 92 (L) 98 - 107 mmol/L    CO2 (POC) 27 21 - 32 mmol/L    Anion gap (POC) 16 10 - 20 mmol/L    Glucose (POC) 381 (H) 65 - 100 mg/dL    BUN (POC) 8 (L) 9 - 20 mg/dL    Creatinine (POC) 0.7 0.6 - 1.3 mg/dL    GFRAA, POC >60 >60 ml/min/1.73m2    GFRNA, POC >60 >60 ml/min/1.73m2    Hematocrit (POC) 40 35.0 - 47.0 %    Comment Notified RN or MD immediately by        Medications received:  Prolia SQ left upper arm    Tolerated treatment well, no adverse reaction noted. 1445  D/C'd from [facility] ambulatory and in no distress.

## 2020-08-12 ENCOUNTER — VIRTUAL VISIT (OUTPATIENT)
Dept: ENDOCRINOLOGY | Age: 55
End: 2020-08-12
Payer: SUBSIDIZED

## 2020-08-12 DIAGNOSIS — E03.9 ACQUIRED HYPOTHYROIDISM: ICD-10-CM

## 2020-08-12 DIAGNOSIS — E55.9 HYPOVITAMINOSIS D: ICD-10-CM

## 2020-08-12 DIAGNOSIS — E10.42 TYPE 1 DIABETES MELLITUS WITH DIABETIC POLYNEUROPATHY (HCC): Primary | ICD-10-CM

## 2020-08-12 PROCEDURE — 99443 PR PHYS/QHP TELEPHONE EVALUATION 21-30 MIN: CPT | Performed by: INTERNAL MEDICINE

## 2020-08-12 NOTE — PROGRESS NOTES
Chief Complaint   Patient presents with    Diabetes     No recent labs telephone [phone] Other     Pharmacy: [facility]   Records since last visit reviewed. **THIS IS A VIRTUAL VISIT VIA A TELEPHONE ENCOUNTER. PATIENT AGREED TO HAVE THEIR CARE DELIVERED OVER THE PHONE IN PLACE OF THEIR REGULARLY SCHEDULED OFFICE VISIT**        History of Present Illness: Efrain Brewster is a 63 y.o. female here for follow up of diabetes. Pt notes \"I have been a Type I diabetic for 37 years\". \"I don't count carbs, I eat what I want and most of the time I will remember to take my insulin but not all the time\". In October 2019 she was diagnosed with Sarcoid. Because of her recurrent sinus infections and URIs she [facility]. In June pt called to let me know she had received a cortisone injection and her BGs had been running higher. Pt instructed to increase her Levemir to 24 units in the AM starting tomorrow and 15 units HS starting tonight. Pt to continue to correct her high BGs using her Humalog correction scale. When her BG start to improve she should return to her Levemir 20 units in the AM and 13 units HS. On 7/30 she presented to the ED for symptoms of dehydration, lightheadedness, N/V. She was concerned she could have had a stroke. She was given IVF, and discharged home. Pt also notes that she was in the ED in early July for a fall, but she did not have any fractures. Pt notes she has been fighting a sinus infection as well. \"My sugars have been leveling back out since I have not had any more steroid injections\". She is current taking Levemir 16 units in the morning and 13 units HS. She is taking Humalog 2-4 units (she is guessing based on what she is eating, she is not counting carbs).  She notes that she is typically forgetting to take her Humalog to after she has eaten and her BGs are very high and \"I start to play catch-up\"    Pt notes her BG this AM was 28. She notes that she will got go to bed, unless her BG are 200+. She notes her BGs overnight tend to decrease a significant amount. She is checking her BGs 6-8 times per day, unless she gets busy and then she will forget to take her BGs and will forget to take her insulin as well. She notes that on multiple occasions she has forgotten to take Levemir as well has Humalog. She notes her FBGs are typically the lowest and her BGs during the day run in the 200-250s range. Her left heel has been healing well. She keeps her foot moisturized. She is not having any active skin breakdown or ulcers. She is following with the wound care clinic and Dr. Jaren Steve for a wound on her left heel that has not been healing well. Pt wakes around 6-7AM.  She takes her first dose of Levemir when she wakes up. She has breakfast around 7-8AM She got tired of Ritz crackers so she is now eating 4 powered mini-doughnuts and regular pepsi. If her BG is under 100 she will take 2 units of Humalog with breakfast, she notes she rarely takes more than 4 units in the morning. She will take her dog for a 3 mile walk after breakfast. She does not eat lunch. If she gets hungry, she will snack on popcorn or celery sticks and PB or cheese strings. She has dinner around 4-6PM, last night she had ham and cheese sandwich, crab meat and water. She notes that she may have peanuts or walnuts in the evening on occasions. Last night she had some 4 pieces of hard candy last night. Pt has hx of CVA x4, \"one of which caused a CNS leak\". She reports she had a stress test and an ECHO in 2015 and \"everything came back fine\". Pt has hx of polyneuropathy from her knees to her feet. She takes Gabapentin 200mg BID, which does help with her pain. Pt has no hx of Nephrology (Urine MA/Cr <30 in July 2020). Last eye exam was November 2019. She does have retinopathy and cataracts.  \"He sent me to see a cataract specialist, who told me I had bad cataracts in both eyes. She has a follow up in May. Pt has hx of osteoporosis for which she is followed by Dr. Patito Zamora of Rheumatology. 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 She was never treated and it \"went away\". She is on Prolia every 6 month. Pt has hx of hypothyroidism and is taking LT4 75mcg daily. Current Outpatient Medications   Medication Sig    lidocaine (LIDODERM) 5 % Apply  2 patch to the affected area for 12 hours a day and remove for 12 hours a day.  metoclopramide HCl (REGLAN) 5 mg tablet TAKE 1 TABLET BY MOUTH 4 TIMES DAILY. TAKE 30 MINUTES BEFORE MEALS    HUMALOG KWIKPEN INSULIN 100 unit/mL kwikpen As per scale    miconazole (MONISTAT 7) 2 % vaginal cream Insert 1 Applicator into vagina nightly.  glucos sul 2KCl/msm/chond/C/Mn (GLUCOSAMINE CHONDROITIN PO) Take  by mouth.  potassium 99 mg tablet Take 99 mg by mouth daily.  MAGNESIUM PO Take  by mouth.  ONETOUCH ULTRA BLUE TEST STRIP strip  USE 1 TEST STRIP EACH TIME TO TEST BLOOD SUGAR 8 TIMES A DAY    diclofenac EC (VOLTAREN) 75 mg EC tablet TAKE 1 TABLET BY MOUTH TWICE DAILY FOR 30 DAYS    zolpidem (AMBIEN) 5 mg tablet TAKE 1 TABLET BY MOUTH NIGHTLY MAXIMUM  DAILY  AMOUNT  5MG    traMADol (ULTRAM) 50 mg tablet TAKE 1 TABLET BY MOUTH ONCE DAILY FOR 30 DAYS MAXIMUM  DAILY  AMOUNT  50MG    ascorbate calcium, vitamin C, 500 mg tab Take 1 Tab by mouth.  losartan (COZAAR) 50 mg tablet Take 1 Tab by mouth daily. Take 1 tab daily    levothyroxine (LEVOTHROID) 75 mcg tablet Take 1 Tab by mouth Daily (before breakfast). Indications: hypothyroidism    simvastatin (ZOCOR) 40 mg tablet Take 1 Tab by mouth nightly.  ciclopirox (LOPROX) 0.77 % topical cream Apply  to affected area two (2) times a day.     glucose blood VI test strips (ONETOUCH ULTRA BLUE TEST STRIP) strip USE TO TEST BLOOD SUGAR 8 TIMES A DAY ( z79.4,E11.40)    insulin detemir U-100 (LEVEMIR FLEXTOUCH U-100 INSULN) 100 unit/mL (3 mL) inpn INJECT 14 UNITS SUBCUTANEOUSLY IN THE MORNING AND  14  UNITS  AT  NIGHT (Patient taking differently: INJECT 15UNITS SUBCUTANEOUSLY IN THE MORNING AND  15  UNITS  AT  NIGHT)    montelukast (SINGULAIR) 10 mg tablet Take 1 Tab by mouth daily.  calcium-cholecalciferol, d3, (CALCIUM 600 + D) 600-125 mg-unit tab Take 1,200 mg by mouth daily.  b complex vitamins (B COMPLEX 1) tablet Take 1 Tab by mouth daily.  denosumab (PROLIA) 60 mg/mL injection 60 mg by SubCUTAneous route. Every 6 months    cholecalciferol, vitamin D3, (VITAMIN D3) 2,000 unit tab Take 1 Tab by mouth daily. 5000 units daily  Indications: low vitamin D levels    VITAMIN A PO Take 2,400 mcg by mouth nightly.  vitamin E (AQUA GEMS) 400 unit capsule Take 400 Units by mouth every Monday and Thursday. Only on M and Thurs at Bedtime     No current facility-administered medications for this visit. Facility-Administered Medications Ordered in Other Visits   Medication Dose Route Frequency    denosumab (PROLIA) injection 60 mg  60 mg SubCUTAneous ONCE     No Known Allergies  Review of Systems:  - Eyes: no blurry vision or double vision  - Cardiovascular: no chest pain  - Respiratory: no shortness of breath  - Musculoskeletal: no myalgias  - Neurological: no numbness/tingling in extremities    Physical Examination:  There were no vitals taken for this visit.   None    Data Reviewed:   Component      Latest Ref Rng & Units 8/5/2020 7/30/2020 7/30/2020           9:39 AM  3:55 PM  3:27 PM   Color        YELLOW/STRAW    Appearance      CLEAR    CLEAR    Specific gravity      1.003 - 1.030        pH (UA)      5.0 - 8.0    6.0    Protein      NEG mg/dL  Negative    Glucose      NEG mg/dL  100 (A)    Ketone      NEG mg/dL  Negative    Bilirubin      NEG    Negative    Blood      NEG    Negative    Urobilinogen      0.2 - 1.0 EU/dL  0.2    Nitrites      NEG    Negative    Leukocyte Esterase      NEG    Negative    Specific gravity      1.003 - 1.030    1.006    ALBUMIN, URINE POC      Negative mg/L 10     CREATININE, URINE POC      mg/dL 100     Microalbumin/creat ratio (POC)      <30 MG/G <30     Magnesium      1.6 - 2.4 mg/dL   1.6     Component      Latest Ref Rng & Units 7/30/2020 7/30/2020           3:27 PM  3:27 PM   WBC      3.6 - 11.0 K/uL  8.3   RBC      3.80 - 5.20 M/uL  4.26   HGB      11.5 - 16.0 g/dL  12.9   HCT      35.0 - 47.0 %  37.4   MCV      80.0 - 99.0 FL  87.8   MCH      26.0 - 34.0 PG  30.3   MCHC      30.0 - 36.5 g/dL  34.5   RDW      11.5 - 14.5 %  12.0   PLATELET      327 - 939 K/uL  237   MPV      8.9 - 12.9 FL  8.5 (L)   NRBC      0  WBC  0.0   ABSOLUTE NRBC      0.00 - 0.01 K/uL  0.00   NEUTROPHILS      32 - 75 %  65   LYMPHOCYTES      12 - 49 %  24   MONOCYTES      5 - 13 %  10   EOSINOPHILS      0 - 7 %  0   BASOPHILS      0 - 1 %  1   IMMATURE GRANULOCYTES      0.0 - 0.5 %  0   ABS. NEUTROPHILS      1.8 - 8.0 K/UL  5.4   ABS. LYMPHOCYTES      0.8 - 3.5 K/UL  2.0   ABS. MONOCYTES      0.0 - 1.0 K/UL  0.8   ABS. EOSINOPHILS      0.0 - 0.4 K/UL  0.0   ABS. BASOPHILS      0.0 - 0.1 K/UL  0.1   ABS. IMM. GRANS.      0.00 - 0.04 K/UL  0.0   DF        AUTOMATED   Sodium      136 - 145 mmol/L 133 (L)    Potassium      3.5 - 5.1 mmol/L 4.1    Chloride      97 - 108 mmol/L 95 (L)    CO2      21 - 32 mmol/L 30    Anion gap      5 - 15 mmol/L 8    Glucose      65 - 100 mg/dL 157 (H)    BUN      6 - 20 MG/DL 13    Creatinine      0.55 - 1.02 MG/DL 0.86    BUN/Creatinine ratio      12 - 20   15    GFR est AA      >60 ml/min/1.73m2 >60    GFR est non-AA      >60 ml/min/1.73m2 >60    Calcium      8.5 - 10.1 MG/DL 9.5    Bilirubin, total      0.2 - 1.0 MG/DL 0.8    ALT      12 - 78 U/L 29    AST      15 - 37 U/L 28    Alk.  phosphatase      45 - 117 U/L 77    Protein, total      6.4 - 8.2 g/dL 7.1    Albumin      3.5 - 5.0 g/dL 3.7    Globulin      2.0 - 4.0 g/dL 3.4    A-G Ratio      1.1 - 2.2   1.1      Component      Latest Ref Rng & Units 7/3/2020          10:31 AM   Color       YELLOW/STRAW   Appearance      CLEAR   CLEAR   Specific gravity      1.003 - 1.030   1.010   pH (UA)      5.0 - 8.0   6.0   Protein      NEG mg/dL Negative   Glucose      NEG mg/dL >1,000 (A)   Ketone      NEG mg/dL Negative   Bilirubin      NEG   Negative   Blood      NEG   Negative   Urobilinogen      0.2 - 1.0 EU/dL 0.2   Nitrites      NEG   Negative   Leukocyte Esterase      NEG   Negative   WBC      0 - 4 /hpf 0-4   RBC      0 - 5 /hpf 0-5   Epithelial cells      FEW /lpf FEW   Bacteria      NEG /hpf Negative   UA:UC IF INDICATED      CNI   CULTURE NOT INDICATED BY UA RESULT   Hyaline cast      0 - 5 /lpf 0-2   Yeast      NEG   PRESENT (A)     Component      Latest Ref Rng & Units 6/19/2020 6/19/2020           2:30 PM  2:25 PM   Calcium, ionized (POC)      1.12 - 1.32 mmol/L 1.22    Sodium (POC)      136 - 145 mmol/L 129 (L)    Potassium (POC)      3.5 - 5.1 mmol/L 4.0    Chloride, POC      98 - 107 mmol/L 92 (L)    CO2 (POC)      21 - 32 mmol/L 27    Anion gap, POC      10 - 20 mmol/L 16    GLUCOSE,FAST - POC      65 - 100 mg/dL 381 (H)    BUN (POC)      9 - 20 mg/dL 8 (L)    Creatinine, POC      0.6 - 1.3 mg/dL 0.7    GFRAA, POC      >60 ml/min/1.73m2 >60    GFRNA, POC      >60 ml/min/1.73m2 >60    Hematocrit (POC)      35.0 - 47.0 % 40    Magnesium      1.6 - 2.4 mg/dL  1.7     Component      Latest Ref Rng & Units 6/19/2020           2:25 PM   Phosphorus      2.6 - 4.7 MG/DL 3.0   Magnesium      1.6 - 2.4 mg/dL        Assessment/Plan:   1) DM > Pt reports that her BGs are dropping overnight, but are running in the 200-250s during the day. Pt instructed to change her Levemir to 20 units in the AM and 10 units HS. Pt encouraged to start checking her BGs before each meal and taking her Humalog before each meal rather than waiting till after meals, when her BG is already high.    With her hx of neuropathy and calluses, she would benefit from DM shoes and inserts. 2) Hypothyroidism > Pt is clinically euthyroid. Will check TFTs and adjust her dose as needed. 3) Hypovitaminosis D > Pt to continue her Vitamin D 5000 units daily. Will order a Vitamin D level today. 4) HTN > Pt is on an ARB for renal protection. Will not make any changes at this time. Pt voices understanding and agreement with the plan. Pain noted and pt was recommended to call her PCP for further evaluation and treatment, as needed    RTC 3 months     We spent 21 minutes of total time together during this virtual visit with a telephone encounter. All questions were answered and a copy of the instructions were sent to her via [employer]/a letter.        Copy sent to:  Dr. Nita Abernathy

## 2020-08-26 ENCOUNTER — TELEPHONE (OUTPATIENT)
Dept: ENDOCRINOLOGY | Age: 55
End: 2020-08-26

## 2020-08-26 NOTE — TELEPHONE ENCOUNTER
----- Message from Lety Kemp sent at 8/26/2020  3:32 PM EDT -----  Regarding: Dr Momo Fatima Message/Vendor Calls    Caller's first and last name:      Reason for call:pt said Dr Tasneem Valdivia wanted her to submit some labs, but he has not sent in the order yet for the request, this was requested two weeks ago                              Callback required yes/no and why:yes for reason given above      Best contact number(s):[phone]      Details to clarify the request:      Lety Kemp

## 2020-08-26 NOTE — TELEPHONE ENCOUNTER
Spoke with pt to make her aware that her lab orders was mailed the day of her virtual visit, but because our mailing system is operated by the hospital, the time flame of receiving mail generally takes make longer than the regular mail. Pt was then made aware that when she is ready to complete her labs, she will need to make the lab aware that she's completing Dr Analisa Meneses orders, and [facility] will be able to pull up her orders in their system. Pt voiced understanding.

## 2020-08-29 LAB
25(OH)D3+25(OH)D2 SERPL-MCNC: 61.4 NG/ML (ref 30–100)
EST. AVERAGE GLUCOSE BLD GHB EST-MCNC: 194 MG/DL
HBA1C MFR BLD: 8.4 % (ref 4.8–5.6)
T4 FREE SERPL-MCNC: 2.14 NG/DL (ref 0.82–1.77)
TSH SERPL DL<=0.005 MIU/L-ACNC: 0.78 UIU/ML (ref 0.45–4.5)

## 2020-09-01 ENCOUNTER — TELEPHONE (OUTPATIENT)
Dept: ENDOCRINOLOGY | Age: 55
End: 2020-09-01

## 2020-09-01 DIAGNOSIS — E03.9 ACQUIRED HYPOTHYROIDISM: ICD-10-CM

## 2020-09-01 DIAGNOSIS — E10.42 TYPE 1 DIABETES MELLITUS WITH DIABETIC POLYNEUROPATHY (HCC): Primary | ICD-10-CM

## 2020-09-01 NOTE — TELEPHONE ENCOUNTER
Pt notes she was recently in the ED in August for \"heat stroke\" and pt notes that \"I have not felt the same ever since. I am tired all the time and I just feel foggy headed. \". Spoke with pt regarding her recent labs. Results for orders placed or performed in visit on 08/12/20   TSH 3RD GENERATION   Result Value Ref Range    TSH 0.778 0.450 - 4.500 uIU/mL   T4, FREE   Result Value Ref Range    T4, Free 2.14 (H) 0.82 - 1.77 ng/dL   HEMOGLOBIN A1C WITH EAG   Result Value Ref Range    Hemoglobin A1c 8.4 (H) 4.8 - 5.6 %    Estimated average glucose 194 mg/dL   VITAMIN D, 25 HYDROXY   Result Value Ref Range    VITAMIN D, 25-HYDROXY 61.4 30.0 - 100.0 ng/mL     Pt to continue the LT4 75mcg daily since her TSH was in a good range. Pt to continue her Vitamin D supplement. Pt is taking Levemir 20 units in the AM and 11 units HS. Pt notes that she she has been waking up in better ranges. She has had some low BGs overnight. During the day her BGs have been in the 120-300 range. Pt notes she is taking the Humalog 2-6 units, but she is consistently forgetting to take her insulin till an hour or two after she eats. She also notes she will sometimes forget her HS Levemir as well. Will have pt decrease her HS Levemir to 10 units, since she has had some low BGs. Pt voices understanding and agreement with the plan.

## 2020-10-06 ENCOUNTER — VIRTUAL VISIT (OUTPATIENT)
Dept: SLEEP MEDICINE | Age: 55
End: 2020-10-06
Payer: MEDICARE

## 2020-10-06 DIAGNOSIS — G47.00 INSOMNIA, UNSPECIFIED TYPE: ICD-10-CM

## 2020-10-06 DIAGNOSIS — G47.33 OBSTRUCTIVE SLEEP APNEA (ADULT) (PEDIATRIC): Primary | ICD-10-CM

## 2020-10-06 DIAGNOSIS — G47.33 OBSTRUCTIVE SLEEP APNEA (ADULT) (PEDIATRIC): ICD-10-CM

## 2020-10-06 PROCEDURE — G9899 SCRN MAM PERF RSLTS DOC: HCPCS | Performed by: INTERNAL MEDICINE

## 2020-10-06 PROCEDURE — G9717 DOC PT DX DEP/BP F/U NT REQ: HCPCS | Performed by: INTERNAL MEDICINE

## 2020-10-06 PROCEDURE — G8427 DOCREV CUR MEDS BY ELIG CLIN: HCPCS | Performed by: INTERNAL MEDICINE

## 2020-10-06 PROCEDURE — 99204 OFFICE O/P NEW MOD 45 MIN: CPT | Performed by: INTERNAL MEDICINE

## 2020-10-06 PROCEDURE — 3017F COLORECTAL CA SCREEN DOC REV: CPT | Performed by: INTERNAL MEDICINE

## 2020-10-06 NOTE — PROGRESS NOTES
This note will not be viewable in [address] [facility] [address] [facility] [address] [place]. [place], [address]  Tel.  [phone]  Fax. 100 [facility] 60  [place], [address]  Tel.  [phone]  Fax. [phone] [address] [facility] [place]   Tel.  [phone]  Fax. [phone]         Subjective:        Telemedicine visit performed with verbal consent of the patient. Patient called and identity confirmed with 2 patient identifers    Patient was seen at home  Jeremy Saunders is a 63 y.o. female who was seen by synchronous (real-time) audio-video technology on 10/6/2020. Consent:  She and/or her healthcare decision maker is aware that this patient-initiated Telehealth encounter is the equivalent to a face to face encounter in the sleep disorder center and has provided verbal consent to proceed: Yes    I was at home while conducting this encounter. Jeremy Saunders is an 63 y.o. female self-referred for evaluation for a sleep disorder. She complains of frequent nighttime awakenings associated with difficulty falling asleep once awakened, feels sleepy during the day, she has advanced arthritis and a lot of hip pain that wakes her at night. Symptoms began several years ago, gradually worsening since that time. She usually can fall asleep in 15 minutes. . She denies falling asleep while driving. Jennifer GIGI Mendosa does wake up frequently at night. She is bothered by waking up too early and left unable to get back to sleep. She actually sleeps about 3 hours at night and wakes up about 6 times during the night. She does not work shifts: Delores Rodriguez indicates she does get too little sleep at night. Her bedtime is 1130. She awakens at 0800. She does not take naps  . She has the following observed behaviors: Twitching of legs or feet, Kicking with legs; (vivid dreams ). Other remarks:    She has chronic pain and has had sleep problems for years.  She has taken ambien 5 mg for years. She has no trouble falling asleep. She awakens primarily due to hip pain and having to switch sides all the time. Sometimes her legs are aching terribly in bed that she cannot get comfortable  She walk her dog 4-5 miles a day but still wakes up many times at night. She denies dozing off in front of tv. She does get in the bed at 8:30 since her son has been living with her (she watched tv in there)  [place] Sleepiness Score: 0     No Known Allergies      Current Outpatient Medications:     traMADoL (ULTRAM) 50 mg tablet, TAKE 1 TABLET BY MOUTH ONCE DAILY AS NEEDED FOR PAIN, Disp: 30 Tab, Rfl: 0    gabapentin (NEURONTIN) 100 mg capsule, Take 2 capsules by mouth twice daily, Disp: 120 Cap, Rfl: 0    zolpidem (AMBIEN) 5 mg tablet, Take 1 Tab by mouth nightly as needed for Sleep. Max Daily Amount: 5 mg. TAKE 1 TABLET BY MOUTH NIGHTLY MAXIMUM  DAILY  AMOUNT  5MG, Disp: 30 Tab, Rfl: 0    hydrOXYzine HCL (ATARAX) 50 mg tablet, TAKE 1 TABLET BY MOUTH THREE TIMES DAILY AS NEEDED FOR  ITCHING, Disp: 30 Tab, Rfl: 0    levothyroxine (Levothroid) 75 mcg tablet, Take 1 Tab by mouth Daily (before breakfast). Indications: a condition with low thyroid hormone levels, Disp: 90 Tab, Rfl: 1    lidocaine (LIDODERM) 5 %, APPLY 2 PATCHES TO THE AFFECTED AREA FOR 12 HOURS A DAY, AND REMOVE FOR 12 HOURS A DAY, Disp: 60 Each, Rfl: 2    losartan (COZAAR) 50 mg tablet, Take 1 Tab by mouth daily. Take 1 tab daily, Disp: 90 Tab, Rfl: 3    metoclopramide HCl (REGLAN) 5 mg tablet, Take 1 Tab by mouth two (2) times a day. Up to BID. TAKE 30 MINUTES BEFORE MEALS, Disp: 360 Tab, Rfl: 0    montelukast (SINGULAIR) 10 mg tablet, Take 1 Tab by mouth daily. , Disp: 90 Tab, Rfl: 1    potassium 99 mg tablet, Take 1 Tab by mouth every other day., Disp: 90 Tab, Rfl: 1    simvastatin (ZOCOR) 40 mg tablet, Take 1 Tab by mouth nightly., Disp: 90 Tab, Rfl: 1    tiZANidine (ZANAFLEX) 4 mg tablet, Take 1-2 Tabs by mouth three (3) times daily as needed for Muscle Spasm(s) or Pain (max dose 24 mg/day). Indications: muscle spasm, Disp: 30 Tab, Rfl: 1    levocetirizine (XYZAL) 5 mg tablet, Take 1 Tab by mouth daily. , Disp: 30 Tab, Rfl: 1    ascorbic acid, vitamin C, (VITAMIN C) 1,000 mg tablet, Take 3,000 mg by mouth two (2) times a day., Disp: , Rfl:     calcium carbonate (CALTREX) 600 mg calcium (1,500 mg) tablet, Take 600 mg by mouth two (2) times a day., Disp: , Rfl:     Insulin Needles, Disposable, (Niru Pen Needle) 32 gauge x 5/32\" ndle, Use as directed with pen device - up to 5 times daily  Dx:  E11.9, Disp: 200 Pen Needle, Rfl: 5    diclofenac EC (VOLTAREN) 75 mg EC tablet, TAKE 1 TABLET BY MOUTH TWICE DAILY FOR 30 DAYS, Disp: 180 Tab, Rfl: 1    HUMALOG KWIKPEN INSULIN 100 unit/mL kwikpen, As per scale, Disp: 3 Package, Rfl: 5    insulin detemir U-100 (LEVEMIR FLEXTOUCH U-100 INSULN) 100 unit/mL (3 mL) inpn, INJECT 15UNITS SUBCUTANEOUSLY IN THE MORNING AND  15  UNITS  AT  NIGHT (Patient taking differently: INJECT 16UNITS SUBCUTANEOUSLY IN THE MORNING AND  13  UNITS  AT  NIGHT), Disp: 10 Adjustable Dose Pre-filled Pen Syringe, Rfl: 5    glucose blood VI test strips (ONETOUCH ULTRA BLUE TEST STRIP) strip, USE TO TEST BLOOD SUGAR 8 TIMES A DAY ( z79.4,E11.40), Disp: 300 Strip, Rfl: 5    glucos sul 2KCl/msm/chond/C/Mn (GLUCOSAMINE CHONDROITIN PO), Take  by mouth., Disp: , Rfl:     MAGNESIUM PO, Take  by mouth., Disp: , Rfl:     denosumab (PROLIA) 60 mg/mL injection, 60 mg by SubCUTAneous route. Every 6 months, Disp: , Rfl:     cholecalciferol, vitamin D3, (VITAMIN D3) 2,000 unit tab, Take 1 Tab by mouth daily. 5000 units daily  Indications: low vitamin D levels, Disp: , Rfl:     VITAMIN A PO, Take 2,400 mcg by mouth nightly., Disp: , Rfl:     vitamin E (AQUA GEMS) 400 unit capsule, Take 400 Units by mouth every Monday and Thursday.  Only on M and Thurs at Bedtime, Disp: , Rfl:      She  has a past medical history of Arthritis, Bee sting (2018), Blister of ankle (2018), CAD (coronary artery disease), Constipation, Falls (), Fatigue, Headache, Ill-defined condition, Ill-defined condition, Joint pain, Memory disorder, Menopause, Nausea & vomiting, Neuropathy, Osteoporosis, Thyroid disease, Type 1 diabetes ([facility] [address] and Unspecified cerebral artery occlusion with cerebral infarction. She  has a past surgical history that includes hx other surgical; hx cholecystectomy (); hx  section (); hx  section (); hx hysterectomy (); hx rotator cuff repair (Left); hx rotator cuff repair (Right); hx orthopaedic; hx orthopaedic; hx orthopaedic (Left, ); hx orthopaedic (Right, ); hx orthopaedic (Left, ); hx tonsillectomy (); and hx carpal tunnel release (Right, 2018). She family history includes Breast Cancer in her maternal aunt; Cancer in her maternal aunt, maternal aunt, and maternal grandmother; Diabetes in her maternal aunt and maternal grandmother; Heart Disease in her maternal grandfather and mother; Hypertension in her mother; No Known Problems in her father. She  reports that she quit smoking about 13 years ago. She has a 4.00 pack-year smoking history. She has never used smokeless tobacco. She reports that she does not drink alcohol or use drugs.      Review of Systems:  Constitutional:  No significant weight loss or weight gain  Eyes:  No blurred vision, recently diagnosed with cataracts  CVS:  No significant chest pain  Pulm:  No significant shortness of breath  GI:  No significant nausea or vomiting  :  + nocturia  Musculoskeletal:  ++significant joint pain at night  Skin:  No significant rashes  Neuro:  No significant dizziness   Psych:  No active mood issues    Sleep Review of Systems: notable for no difficulty falling asleep; ++frequent awakenings at night;  + dreaming noted; no nightmares ; no early morning headaches; + memory problems; + concentration issues    Objective: There were no vitals taken for this visit. Vital Signs: (As obtained by patient/caregiver at home)        Constitutional: [x] Appears well-developed and well-nourished [x] No apparent distress      [] Abnormal -     Mental status: [x] Alert and awake  [x] Oriented to person/place/time [x] Able to follow commands    [] Abnormal -     Eyes:   EOM    [x]  Normal    [] Abnormal -   Sclera  [x]  Normal    [] Abnormal -          Discharge [x]  None visible   [] Abnormal -     HENT: [x] Normocephalic, atraumatic  [] Abnormal -   [x] Mouth/Throat: Mucous membranes are moist               Class 3 oropharynx, thick tongue base                  Low-lying soft palate,absent retrognathia    External Ears [x] Normal  [] Abnormal -    Neck: [x] No visualized mass [] Abnormal -     Pulmonary/Chest: [x] Respiratory effort normal   [x] No visualized signs of difficulty breathing or respiratory distress        [] Abnormal -       Neurological:        [x] No Facial Asymmetry (Cranial nerve 7 motor function) (limited exam due to video visit)          [x] No gaze palsy        [] Abnormal -          Skin:        [x] No significant exanthematous lesions or discoloration noted on facial skin         [] Abnormal -            Psychiatric:       [x] Normal Affect [] Abnormal -       Other pertinent observable physical exam findings:-          Assessment:       ICD-10-CM ICD-9-CM    1. Obstructive sleep apnea (adult) (pediatric)  G47.33 327.23 POLYSOMNOGRAPHY 1 NIGHT   2. Insomnia, unspecified type  G47.00 780.52          Plan:       * Sleep testing was ordered for initial evaluation. * She was provided information on sleep apnea including coresponding risk factors and the importance of proper treatment. * Counseling was provided regarding proper sleep hygiene, appropriate sleep schedule, need for sleep environment safety and safe driving. * Effect of sleep disturbance on weight was reviewed.  We have recommended a dedicated weight loss through appropriate diet and an exercise regimen as significant weight reduction has been shown to reduce severity of obstructive sleep apnea. * Patient agrees to telephone follow-up by sleep technologist shortly after sleep study to review results and plan final management. 2. Insomnia -I have advised a regular sleep wake cycle. She iwll reduce her time in bed. She was advised not to spend more than 7 hours in her bed. She should watch tv somewhere else. she  was advised to avoid looking at the clock during the night. Ideally, the clock face should be turned away. she was advised to minimize caffeine use and to avoid caffeine-containing beverages 8 hours prior to bedtime. Naps were discouraged. Continued r exercise schedule, at least 3 hours before bedtime, would be beneficial to improving sleep quality. she was advised to avoid evening light and to use sunglasses in the late afternoon. Watching TV, using laptops, tablets and smartphones in the evening was discouraged. she  was advised to keep the bedroom cool and dark. All of her questions were addressed. The treatment plan was reviewed with the patient in detail and reviewed with the patient . she understands that the lead technologist will be calling her  with the results and assisting with the next step in the treatment plan as outlined today during the consultation with me. All of her questions were addressed. Thank you for allowing us to participate in your patient's medical care. We'll keep you updated on these investigations. Pursuant to the emergency declaration under the [facility] waiver authority and the [employer] and [employer] Act, this Virtual  Visit was conducted, with patient's consent, to reduce the patient's risk of exposure to COVID-19 and provide continuity of care for an established patient. Services were provided through a video synchronous discussion virtually to substitute for in-person clinic visit.     Sheryl Bagley MD    Electronically signed by    Angie Back MD  Diplomate in Sleep Medicine  [facility]

## 2020-10-06 NOTE — PATIENT INSTRUCTIONS
[address] [facility] [place]. [address] [facility], [address] Tel.  [phone] Fax. 100 [facility] 60 [place], [address] [facility] Tel.  [phone] Fax. [phone] [address] [place] Tel.  [phone] Fax. [phone] Sleep Apnea: After Your Visit Your Care Instructions Sleep apnea occurs when you frequently stop breathing for 10 seconds or longer during sleep. It can be mild to severe, based on the number of times per hour that you stop breathing or have slowed breathing. Blocked or narrowed airways in your nose, mouth, or throat can cause sleep apnea. Your airway can become blocked when your throat muscles and tongue relax during sleep. Sleep apnea is common, occurring in 1 out of 20 individuals. Individuals having any of the following characteristics should be evaluated and treated right away due to high risk and detrimental consequences from untreated sleep apnea: 
1. Obesity 2. Congestive Heart failure 3. Atrial Fibrillation 4. Uncontrolled Hypertension 5. Type II Diabetes 6. Night-time Arrhythmias 7. Stroke 8. Pulmonary Hypertension 9. High-risk Driving Populations (pilots, truck drivers, etc.) 10. Patients Considering Weight-loss Surgery How do you know you have sleep apnea? You probably have sleep apnea if you answer 'yes' to 3 or more of the following questions: S - Have you been told that you Snore? T - Are you often Tired during the day? O - Has anyone Observed you stop breathing while sleeping? P- Do you have (or are being treated for) high blood Pressure? B - Are you obese (Body Mass Index > 35)? A - Is your Age 48years old or older? N - Is your Neck size greater than 16 inches? G - Are you male Gender? A sleep physician can prescribe a breathing device that prevents tissues in the throat from blocking your airway.  Or your doctor may recommend using a dental device (oral breathing device) to help keep your airway open. In some cases, surgery may be needed to remove enlarged tissues in the throat. Follow-up care is a key part of your treatment and safety. Be sure to make and go to all appointments, and call your doctor if you are having problems. It's also a good idea to know your test results and keep a list of the medicines you take. How can you care for yourself at home? · Lose weight, if needed. It may reduce the number of times you stop breathing or have slowed breathing. · Go to bed at the same time every night. · Sleep on your side. It may stop mild apnea. If you tend to roll onto your back, sew a pocket in the back of your pajama top. Put a tennis ball into the pocket, and stitch the pocket shut. This will help keep you from sleeping on your back. · Avoid alcohol and medicines such as sleeping pills and sedatives before bed. · Do not smoke. Smoking can make sleep apnea worse. If you need help quitting, talk to your doctor about stop-smoking programs and medicines. These can increase your chances of quitting for good. · Prop up the head of your bed 4 to 6 inches by putting bricks under the legs of the bed. · Treat breathing problems, such as a stuffy nose, caused by a cold or allergies. · Use a continuous positive airway pressure (CPAP) breathing machine if lifestyle changes do not help your apnea and your doctor recommends it. The machine keeps your airway from closing when you sleep. · If CPAP does not help you, ask your doctor whether you should try other breathing machines. A bilevel positive airway pressure machine has two types of air pressureâone for breathing in and one for breathing out. Another device raises or lowers air pressure as needed while you breathe. · If your nose feels dry or bleeds when using one of these machines, talk with your doctor about increasing moisture in the air. A humidifier may help.  
· If your nose is runny or stuffy from using a breathing machine, talk with your doctor about using decongestants or a corticosteroid nasal spray. When should you call for help? Watch closely for changes in your health, and be sure to contact your doctor if: 
· You still have sleep apnea even though you have made lifestyle changes. · You are thinking of trying a device such as CPAP. · You are having problems using a CPAP or similar machine. Where can you learn more? Go to [employer]. Enter I225 in the search box to learn more about \"Sleep Apnea: After Your Visit. \"  
© 4469-2580 Healthwise, Incorporated. Care instructions adapted under license by [employer] (which disclaims liability or warranty for this information). This care instruction is for use with your licensed healthcare professional. If you have questions about a medical condition or this instruction, always ask your healthcare professional. Bernardino Bending any warranty or liability for your use of this information. PROPER SLEEP HYGIENE What to avoid · Do not have drinks with caffeine, such as coffee or black tea, for 8 hours before bed. · Do not smoke or use other types of tobacco near bedtime. Nicotine is a stimulant and can keep you awake. · Avoid drinking alcohol late in the evening, because it can cause you to wake in the middle of the night. · Do not eat a big meal close to bedtime. If you are hungry, eat a light snack. · Do not drink a lot of water close to bedtime, because the need to urinate may wake you up during the night. · Do not read or watch TV in bed. Use the bed only for sleeping and sexual activity. What to try · Go to bed at the same time every night, and wake up at the same time every morning. Do not take naps during the day. · Keep your bedroom quiet, dark, and cool. · Get regular exercise, but not within 3 to 4 hours of your bedtime. Hakan Verma · Sleep on a comfortable pillow and mattress. · If watching the clock makes you anxious, turn it facing away from you so you cannot see the time. · If you worry when you lie down, start a worry book. Well before bedtime, write down your worries, and then set the book and your concerns aside. · Try meditation or other relaxation techniques before you go to bed. · If you cannot fall asleep, get up and go to another room until you feel sleepy. Do something relaxing. Repeat your bedtime routine before you go to bed again. · Make your house quiet and calm about an hour before bedtime. Turn down the lights, turn off the TV, log off the computer, and turn down the volume on music. This can help you relax after a busy day. Drowsy Driving The [place] [address] cites drowsiness as a causing factor in more than 715,487 police reported crashes annually, resulting in 76,000 injuries and 1,500 deaths. Other surveys suggest 55% of people polled have driven while drowsy in the past year, 23% had fallen asleep but not crashed, 3% crashed, and 2% had and accident due to drowsy driving. Who is at risk? Young Drivers: One study of drowsy driving accidents states that 55% of the drivers were under 25 years. Of those, 75% were male. Shift Workers and Travelers: People who work overnight or travel across time zones frequently are at higher risk of experiencing Circadian Rhythm Disorders. They are trying to work and function when their body is programed to sleep. Sleep Deprived: Lack of sleep has a serious impact on your ability to pay attention or focus on a task. Consistently getting less than the average of 8 hours your body needs creates partial or cumulative sleep deprivation. Untreated Sleep Disorders: Sleep Apnea, Narcolepsy, R.L.S., and other sleep disorders (untreated) prevent a person from getting enough restful sleep.  This leads to excessive daytime sleepiness and increases the risk for drowsy driving accidents by up to 7 times. Medications / Alcohol: Even over the counter medications can cause drowsiness. Medications that impair a drivers attention should have a warning label. Alcohol naturally makes you sleepy and on its own can cause accidents. Combined with excessive drowsiness its effects are amplified. Signs of Drowsy Driving: * You don't remember driving the last few miles * You may drift out of your jossue * You are unable to focus and your thoughts wander * You may yawn more often than normal 
 * You have difficulty keeping your eyes open / nodding off * Missing traffic signs, speeding, or tailgating Prevention-  
Good sleep hygiene, lifestyle and behavioral choices have the most impact on drowsy driving. There is no substitute for sleep and the average person requires 8 hours nightly. If you find yourself driving drowsy, stop and sleep. Consider the sleep hygiene tips provided during your visit as well. Medication Refill Policy: Refills for all medications require 1 week advance notice. Please have your pharmacy fax a refill request. We are unable to fax, or call in \"controled substance\" medications and you will need to pick these prescriptions up from our office. Sara Campbell Activation Thank you for requesting access to Sara Campbell. Please follow the instructions below to securely access and download your online medical record. Sara Campbell allows you to send messages to your doctor, view your test results, renew your prescriptions, schedule appointments, and more. How Do I Sign Up? 1. In your internet browser, go to [email or web address] 2. Click on the First Time User? Click Here link in the Sign In box. You will see the New Member Sign Up page. 3. Enter your Sara Campbell Access Code exactly as it appears below. You will not need to use this code after youve completed the sign-up process.  If you do not sign up before the expiration date, you must request a new code. MyCosmik Access Code: [email or web address] Expires: 2020  8:33 AM (This is the date your MyCosmik access code will ) 4. Enter the last four digits of your Social Security Number (xxxx) and Date of Birth (mm/dd/yyyy) as indicated and click Submit. You will be taken to the next sign-up page. 5. Create a MyCosmik ID. This will be your MyCosmik login ID and cannot be changed, so think of one that is secure and easy to remember. 6. Create a MyCosmik password. You can change your password at any time. 7. Enter your Password Reset Question and Answer. This can be used at a later time if you forget your password. 8. Enter your e-mail address. You will receive e-mail notification when new information is available in [address] 9. Click Sign Up. You can now view and download portions of your medical record. 10. Click the Download Summary menu link to download a portable copy of your medical information. Additional Information If you have questions, please call [phone]. Remember, MyCosmik is NOT to be used for urgent needs. For medical emergencies, dial 911.

## 2020-10-06 NOTE — Clinical Note
Thank you for the referral.  I will keep you informed of her progress.   [address],  Jenny Barrow

## 2020-10-20 ENCOUNTER — TELEPHONE (OUTPATIENT)
Dept: ENDOCRINOLOGY | Age: 55
End: 2020-10-20

## 2020-10-20 DIAGNOSIS — E03.9 ACQUIRED HYPOTHYROIDISM: Primary | ICD-10-CM

## 2020-10-20 NOTE — TELEPHONE ENCOUNTER
----- Message from Kartik Siddiqi sent at 10/20/2020  9:44 AM EDT -----  Regarding: MD Zepeda/ telephone  Patient's first and last name: Malachi Etienne  : 1965    Caller's first and last name: pt    Reason for call: Medication change    Callback required yes/no and why: yes     Best contact number(s): [phone]    Details to clarify the request: Pt would like to request a medication change for Thyroid medication Synthyroid. Pt is experiencing weight gain, swollen, and hair loss. Pt would like a call back to discuss.

## 2020-10-20 NOTE — TELEPHONE ENCOUNTER
Pt reports that she has been experiencing hair loss and weight gain, She reports that she has been experiencing swelling in her ankles. and she thinks that her thyroid medication needs to be adjusted. Pt is currently taking Levothyroxine 75mcg (she has been on an different brand for the last 60 days). Pt notes that if the pharmacy has changed their supplier of the LT4 then in the past she has had troubles. Will order TFTS and her PCP will draw them. We can discuss possible regimen changes at that time.

## 2020-10-29 ENCOUNTER — HOSPITAL ENCOUNTER (OUTPATIENT)
Dept: SLEEP MEDICINE | Age: 55
Discharge: HOME | End: 2020-10-29
Payer: MEDICARE

## 2020-10-29 PROCEDURE — 95810 POLYSOM 6/> YRS 4/> PARAM: CPT | Performed by: INTERNAL MEDICINE

## 2020-11-01 DIAGNOSIS — E10.42 TYPE 1 DIABETES MELLITUS WITH DIABETIC POLYNEUROPATHY (HCC): ICD-10-CM

## 2020-11-01 DIAGNOSIS — E03.9 ACQUIRED HYPOTHYROIDISM: ICD-10-CM

## 2020-11-03 PROBLEM — M25.512 ACUTE PAIN OF LEFT SHOULDER: Status: ACTIVE | Noted: 2020-11-03

## 2020-11-03 PROBLEM — M25.559 HIP PAIN: Status: ACTIVE | Noted: 2020-11-03

## 2020-11-04 ENCOUNTER — TELEPHONE (OUTPATIENT)
Dept: ENDOCRINOLOGY | Age: 55
End: 2020-11-04

## 2020-11-04 ENCOUNTER — TRANSCRIBE ORDER (OUTPATIENT)
Dept: ENDOCRINOLOGY | Age: 55
End: 2020-11-04

## 2020-11-04 NOTE — TELEPHONE ENCOUNTER
Spoke with pt regarding her TFT. Results for orders placed or performed in visit on 10/27/20   TSH AND FREE T4   Result Value Ref Range    TSH 1.650 0.450 - 4.500 uIU/mL    T4, Free 1.99 (H) 0.82 - 1.77 ng/dL   T3 TOTAL   Result Value Ref Range    T3, total 89 71 - 180 ng/dL   THYROID ANTIBODY PANEL   Result Value Ref Range    Thyroid peroxidase Ab <9 0 - 34 IU/mL    Thyroglobulin Ab <1.0 0.0 - 0.9 IU/mL     Pt to continue the LT4 75mcg daily. Pt voices understanding and agreement with the plan.

## 2020-11-04 NOTE — TELEPHONE ENCOUNTER
----- Message from Chidi Harrington sent at 11/4/2020  2:33 PM EST -----  Regarding: MD Zepeda/ telephone  Caller's first and last name: pt       Reason for call: [phone] required yes/no and why: yes      Best contact number(s): [phone]      Details to clarify the request: Pt is requesting call back to discuss recent lab work results in reference to Thyroid medication.       Chidi Harrington

## 2020-11-13 ENCOUNTER — VIRTUAL VISIT (OUTPATIENT)
Dept: ENDOCRINOLOGY | Age: 55
End: 2020-11-13
Payer: MEDICARE

## 2020-11-13 DIAGNOSIS — E10.42 TYPE 1 DIABETES MELLITUS WITH DIABETIC POLYNEUROPATHY (HCC): Primary | ICD-10-CM

## 2020-11-13 DIAGNOSIS — I10 ESSENTIAL HYPERTENSION: ICD-10-CM

## 2020-11-13 DIAGNOSIS — E55.9 HYPOVITAMINOSIS D: ICD-10-CM

## 2020-11-13 DIAGNOSIS — E03.9 ACQUIRED HYPOTHYROIDISM: ICD-10-CM

## 2020-11-13 PROCEDURE — G9717 DOC PT DX DEP/BP F/U NT REQ: HCPCS | Performed by: INTERNAL MEDICINE

## 2020-11-13 PROCEDURE — G9899 SCRN MAM PERF RSLTS DOC: HCPCS | Performed by: INTERNAL MEDICINE

## 2020-11-13 PROCEDURE — 2022F DILAT RTA XM EVC RTNOPTHY: CPT | Performed by: INTERNAL MEDICINE

## 2020-11-13 PROCEDURE — 3052F HG A1C>EQUAL 8.0%<EQUAL 9.0%: CPT | Performed by: INTERNAL MEDICINE

## 2020-11-13 PROCEDURE — 3017F COLORECTAL CA SCREEN DOC REV: CPT | Performed by: INTERNAL MEDICINE

## 2020-11-13 PROCEDURE — G8427 DOCREV CUR MEDS BY ELIG CLIN: HCPCS | Performed by: INTERNAL MEDICINE

## 2020-11-13 PROCEDURE — 99214 OFFICE O/P EST MOD 30 MIN: CPT | Performed by: INTERNAL MEDICINE

## 2020-11-13 RX ORDER — TRIAMCINOLONE ACETONIDE 0.25 MG/G
CREAM TOPICAL
COMMUNITY
Start: 2020-08-27 | End: 2021-07-13 | Stop reason: ALTCHOICE

## 2020-11-13 RX ORDER — MUPIROCIN 20 MG/G
OINTMENT TOPICAL
COMMUNITY
Start: 2020-09-29

## 2020-11-13 NOTE — PROGRESS NOTES
Chief Complaint   Patient presents with    Follow-up     Type I diabetes; neuropathy   Connect with:  PEGGY [id number]   Records since last visit reviewed. **THIS IS A VIRTUAL VISIT VIA A VIDEO ENCOUNTER. PATIENT AGREED TO HAVE THEIR CARE DELIVERED OVER VIDEO IN PLACE OF THEIR REGULARLY SCHEDULED OFFICE VISIT**      History of Present Illness: Ivanna Gaytan is a 63 y.o. female here for follow up of diabetes. Pt notes \"I have been a Type I diabetic for 37 years\". \"I don't count carbs, I eat what I want and most of the time I will remember to take my insulin but not all the time\". In October 2019 she was diagnosed with Sarcoid. Pt reports that her FBGs were dropping low in the AM so she dropped her AM dose of her Levrmir from 20 units to 16 units. She notes that her FBG today was >300 so she is now taking Levemir 16 units in the morning and 13 units HS. Pt is taking Humalog 2-6 units depending on what her sugars are and what she is going to eat. She notes she will not take her Humalog if her BGs under 100. \"I will not go to bed unless my sugars are 200 +\"  Pt notes that her cataracts are getting worse so she saw a cataract specialist and he plans to take her to surgery in the near future\". Because her blood sugars are so erratic and she has had frequent issues of hypoglycemia, she is testing her blood sugars 8 times per day. \"The neurologist told me the numbness in my face is due to diabetic nerve damage\". She notes her left heel has been healing well and she is no longer going to the wound care. \"I forget to us my creams they gave me and I forget to put on my socks at night. \"    Pt wakes around 6-7AM.  She takes her first dose of Levemir when she wakes up. - She has breakfast around 7-8AM, she will take her AM Levemir, and then eat. Today she had a cereal bar and  regular pepsi.  If her BG is under 100 she will take 2 units of Humalog with breakfast, she notes she rarely takes more than 4 units in the morning. She will take her dog for a 3 mile walk after breakfast.   - She does not typically eat lunch in the afternoon, but will have a snack of fruit, chips or PB.  - She has dinner between 4-6PM, last night she had shrimp fried rice and water. - She will have a snack in the evening. Last night she had a 100 calorie ice cream sandwich. Pt has hx of CVA x4, \"one of which caused a CNS leak\". She reports she had a stress test and an ECHO in 2015 and \"everything came back fine\". Pt has hx of polyneuropathy from her knees to her feet. She takes Gabapentin 200mg BID, which does help with her pain. Pt has no hx of Nephrology (Urine MA/Cr <30 in July 2020). Last eye exam was November 2020. Will request records from Dr. Nhi Betancourt. She does have retinopathy and cataracts. \"He sent me to see a cataract specialist, who told me I had bad cataracts in both eyes. She has a follow up in May. Pt has hx of osteoporosis for which she is followed by Dr. Jose Alejandro Carrasquillo of Rheumatology. 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 She was never treated and it \"went away\". She is on Prolia every 6 month. Pt has hx of hypothyroidism and is taking LT4 75mcg daily. Current Outpatient Medications   Medication Sig    lidocaine (LIDODERM) 5 % Apply  2 patch to the affected area for 12 hours a day and remove for 12 hours a day.  metoclopramide HCl (REGLAN) 5 mg tablet TAKE 1 TABLET BY MOUTH 4 TIMES DAILY. TAKE 30 MINUTES BEFORE MEALS    HUMALOG KWIKPEN INSULIN 100 unit/mL kwikpen As per scale    miconazole (MONISTAT 7) 2 % vaginal cream Insert 1 Applicator into vagina nightly.  glucos sul 2KCl/msm/chond/C/Mn (GLUCOSAMINE CHONDROITIN PO) Take  by mouth.  potassium 99 mg tablet Take 99 mg by mouth daily.  MAGNESIUM PO Take  by mouth.     ONETOUCH ULTRA BLUE TEST STRIP strip  USE 1 TEST STRIP EACH TIME TO TEST BLOOD SUGAR 8 TIMES A DAY    diclofenac EC (VOLTAREN) 75 mg EC tablet TAKE 1 TABLET BY MOUTH TWICE DAILY FOR 30 DAYS    zolpidem (AMBIEN) 5 mg tablet TAKE 1 TABLET BY MOUTH NIGHTLY MAXIMUM  DAILY  AMOUNT  5MG    traMADol (ULTRAM) 50 mg tablet TAKE 1 TABLET BY MOUTH ONCE DAILY FOR 30 DAYS MAXIMUM  DAILY  AMOUNT  50MG    ascorbate calcium, vitamin C, 500 mg tab Take 1 Tab by mouth.  losartan (COZAAR) 50 mg tablet Take 1 Tab by mouth daily. Take 1 tab daily    levothyroxine (LEVOTHROID) 75 mcg tablet Take 1 Tab by mouth Daily (before breakfast). Indications: hypothyroidism    simvastatin (ZOCOR) 40 mg tablet Take 1 Tab by mouth nightly.  ciclopirox (LOPROX) 0.77 % topical cream Apply  to affected area two (2) times a day.  glucose blood VI test strips (ONETOUCH ULTRA BLUE TEST STRIP) strip USE TO TEST BLOOD SUGAR 8 TIMES A DAY ( z79.4,E11.40)    insulin detemir U-100 (LEVEMIR FLEXTOUCH U-100 INSULN) 100 unit/mL (3 mL) inpn INJECT 14 UNITS SUBCUTANEOUSLY IN THE MORNING AND  14  UNITS  AT  NIGHT (Patient taking differently: INJECT 15UNITS SUBCUTANEOUSLY IN THE MORNING AND  15  UNITS  AT  NIGHT)    montelukast (SINGULAIR) 10 mg tablet Take 1 Tab by mouth daily.  calcium-cholecalciferol, d3, (CALCIUM 600 + D) 600-125 mg-unit tab Take 1,200 mg by mouth daily.  b complex vitamins (B COMPLEX 1) tablet Take 1 Tab by mouth daily.  denosumab (PROLIA) 60 mg/mL injection 60 mg by SubCUTAneous route. Every 6 months    cholecalciferol, vitamin D3, (VITAMIN D3) 2,000 unit tab Take 1 Tab by mouth daily. 5000 units daily  Indications: low vitamin D levels    VITAMIN A PO Take 2,400 mcg by mouth nightly.  vitamin E (AQUA GEMS) 400 unit capsule Take 400 Units by mouth every Monday and Thursday. Only on M and Thurs at Bedtime     No current facility-administered medications for this visit.       Facility-Administered Medications Ordered in Other Visits   Medication Dose Route Frequency    denosumab (PROLIA) injection 60 mg  60 mg SubCUTAneous ONCE     No Known Allergies  Review of Systems:  - Eyes: no blurry vision or double vision  - Cardiovascular: no chest pain  - Respiratory: no shortness of breath  - Musculoskeletal: no myalgias  - Neurological: no numbness/tingling in extremities    Physical Examination:  There were no vitals taken for this visit. - GENERAL: NCAT, Appears well nourished   - EYES: EOMI, non-icteric, no proptosis   - Ear/Nose/Throat: NCAT, no visible inflammation or masses   - CARDIOVASCULAR: no cyanosis, no visible JVD   - RESPIRATORY: respiratory effort normal without any distress or labored breathing   - MUSCULOSKELETAL: Normal ROM of neck and upper extremities observed   - SKIN: No rash on face   - NEUROLOGIC:  No facial asymmetry (Cranial nerve 7 motor function), No gaze palsy   - PSYCHIATRIC: Normal affect, Normal insight and judgement       Data Reviewed:   Component      Latest Ref Rng & Units 10/27/2020 10/27/2020 10/27/2020          10:53 AM 10:53 AM 10:53 AM   TSH      0.450 - 4.500 uIU/mL   1.650   T4, Free      0.82 - 1.77 ng/dL   1.99 (H)   Thyroid peroxidase Ab      0 - 34 IU/mL <9     Thyroglobulin Ab      0.0 - 0.9 IU/mL <1.0     T3, total      71 - 180 ng/dL  89        Assessment/Plan:   1) DM > Pt reported that her BGs in the morning were dropping so I instructed her to change her Levemir to 20 units in the AM and 10 units HSPt encouraged to start checking her BGs before each meal and taking her Humalog before each meal rather than waiting till after meals, when her BG is already high. With her hx of neuropathy and calluses, she would benefit from DM shoes and inserts. Because her blood sugars are so erratic and she has had frequent issues of hypoglycemia, she is testing her blood sugars 8 times per day. 2) Hypothyroidism > Pt is clinically and biochemically euthyroid. Pt to continue the LT4 75mcg daily. 3) Hypovitaminosis D > Pt to continue her Vitamin D 5000 units daily.      4) HTN > Pt is on an ARB for renal protection. Will not make any changes at this time. Pt voices understanding and agreement with the plan.   Pain noted and pt was recommended to call her PCP for further evaluation and treatment, as needed    RTC 4 months       Copy sent to:  Dr. Tripp Neves

## 2020-11-17 ENCOUNTER — HOSPITAL ENCOUNTER (OUTPATIENT)
Dept: MRI IMAGING | Age: 55
Discharge: HOME | End: 2020-11-17
Attending: INTERNAL MEDICINE
Payer: MEDICARE

## 2020-11-17 ENCOUNTER — OFFICE VISIT (OUTPATIENT)
Dept: RHEUMATOLOGY | Age: 55
End: 2020-11-17
Payer: MEDICARE

## 2020-11-17 VITALS
WEIGHT: 149.8 LBS | DIASTOLIC BLOOD PRESSURE: 70 MMHG | HEART RATE: 76 BPM | OXYGEN SATURATION: 98 % | BODY MASS INDEX: 26.54 KG/M2 | RESPIRATION RATE: 16 BRPM | TEMPERATURE: 97.7 F | SYSTOLIC BLOOD PRESSURE: 133 MMHG

## 2020-11-17 DIAGNOSIS — M19.90 INFLAMMATORY ARTHRITIS: ICD-10-CM

## 2020-11-17 DIAGNOSIS — N95.9 POST MENOPAUSAL PROBLEMS: Primary | ICD-10-CM

## 2020-11-17 DIAGNOSIS — D86.9 SARCOIDOSIS: ICD-10-CM

## 2020-11-17 DIAGNOSIS — M25.512 ACUTE PAIN OF LEFT SHOULDER: ICD-10-CM

## 2020-11-17 PROCEDURE — G9717 DOC PT DX DEP/BP F/U NT REQ: HCPCS | Performed by: PEDIATRICS

## 2020-11-17 PROCEDURE — 3017F COLORECTAL CA SCREEN DOC REV: CPT | Performed by: PEDIATRICS

## 2020-11-17 PROCEDURE — 73221 MRI JOINT UPR EXTREM W/O DYE: CPT

## 2020-11-17 PROCEDURE — G8419 CALC BMI OUT NRM PARAM NOF/U: HCPCS | Performed by: PEDIATRICS

## 2020-11-17 PROCEDURE — 99214 OFFICE O/P EST MOD 30 MIN: CPT | Performed by: PEDIATRICS

## 2020-11-17 PROCEDURE — G9899 SCRN MAM PERF RSLTS DOC: HCPCS | Performed by: PEDIATRICS

## 2020-11-17 PROCEDURE — G8427 DOCREV CUR MEDS BY ELIG CLIN: HCPCS | Performed by: PEDIATRICS

## 2020-11-17 PROCEDURE — G0463 HOSPITAL OUTPT CLINIC VISIT: HCPCS | Performed by: PEDIATRICS
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
## 2020-11-17 NOTE — PROGRESS NOTES
Chief Complaint   Patient presents with    Joint Pain     1. Have you been to the ER, urgent care clinic since your last visit? Hospitalized since your last visit? No, patient reported fallen with no injuries x 1 month ago     2. Have you seen or consulted any other health care providers outside of the [address] [place] [address] since your last visit? Include any pap smears or colon screening.  NO

## 2020-11-18 NOTE — PROGRESS NOTES
Mild rotator cuff tendinosis.  No full-thickness tendon tear., Acromioclavicular osteoarthritis may contribute to pain and may cause impingement of underlying bursa and muscle/tendons/ligaments

## 2020-11-19 NOTE — TELEPHONE ENCOUNTER
Per patient she said you did told her she did not have to come in anymore to have tramadol filled. She was last seen by Dae Santoro for foot pain and I originally sent to Dr. Sushant Murray but he will not fill it.   She was very rude and wants you to call her
Returned pt's call, I last saw her on 7/6/18 and we did talk about her tramadol use. I explained to her that on this day I had mistakenly ordered the incorrect urine drug screen which had only tested for Tramadol and that I need an updated urine drug screen that tests for multiple including illegal drugs. Pt stated she would come in this afternoon to do a urine drug screen. If results are appropriate, will refill Tramadol.
PAST MEDICAL HISTORY:  Diabetes mellitus     HLD (hyperlipidemia)     Schizoaffective disorder

## 2020-11-20 ENCOUNTER — TELEPHONE (OUTPATIENT)
Dept: ENDOCRINOLOGY | Age: 55
End: 2020-11-20
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
## 2020-11-30 ENCOUNTER — TELEPHONE (OUTPATIENT)
Dept: ENDOCRINOLOGY | Age: 55
End: 2020-11-30

## 2020-11-30 NOTE — TELEPHONE ENCOUNTER
Pt called to let me know that last week she went to bed with a BG of 300, but woke up with a BG of 40. She was not able to walk an dher left side felt numb. She went to the ED and had an MRI of the brain, which was negative. She also had a tick-bite work-up, which was negative. Pt notes she has been needing more Humalog during the day, but has had low overnight. Pt notes her insulin is new and has been kept in the refrigerator. I instructed her to increase her Levemir to 25 units in the AM and continue the 10 units HS. Pt to call on Friday with BG readings.

## 2020-11-30 NOTE — TELEPHONE ENCOUNTER
----- Message from Rikki Ogden sent at 11/30/2020 10:19 AM EST -----  Regarding: Dr Maria Eugenia Deleon Message/Vendor Calls    Caller's first and last name:      Reason for call:pt said since her virtual visit with Dr Leigh Villalobos 3 weeks ago and after adjusting her medications she is still having issues with her sugar levels they have been going between 400- 600 and would like a call back from Dr Leigh Villalobos to discuss what else does  Dr Leigh Villalobos recommend      Callback required yes/no and why:yes for reason given above       Best contact number(s):[phone]      Details to clarify the request:      Rikki Ogden

## 2020-12-04 ENCOUNTER — TELEPHONE (OUTPATIENT)
Dept: ENDOCRINOLOGY | Age: 55
End: 2020-12-04

## 2020-12-04 NOTE — TELEPHONE ENCOUNTER
Pt is actually taking her PM dose of Levemir 10 units with diner at 5-6PM and not HS so her low BGs at bedtime or overnight are likely due to this dose of the Levemir. I recommended she either move her PM Levemir to 10 units HS or if she is going to continue taking it with dinner to decrease to 7 units. She notes she just received 3 steroid injections in her hips so I instructed her to increase her AM Levemir to 35 units (if her BGs start to increase) and her PM dose to 15mg, (if her AM and overnight BGs increase). Pt voices understanding and agreement with the plan.

## 2020-12-04 NOTE — TELEPHONE ENCOUNTER
----- Message from Ovidio Morel sent at 12/4/2020  9:20 AM EST -----  Regarding: /Telephone  General Message/Vendor Calls    Caller's first and last name:Jennifer Mendosa      Reason for call: inform how insulin is working       Callback required yes/no and why:yes      Best contact number(s):[phone]      Details to clarify the request:Pt informing that the insulin does good in the day at night it goes up and down and before pt goes to bed it goes low 3 am in the morning it is still low .       Ovidio Morel

## 2020-12-04 NOTE — TELEPHONE ENCOUNTER
Spoke with patient. She states that last pm at 10:00 her blood sugar was over 300. She did not take any short acting insulin. At 11:30 pm, her blood sugar dropped to 100. She saw ortho today. She was given 3 shots in her hips (patient assumes was cortisone). Sometimes her blood sugars drop to 50 at 3 am.   She takes Levemir 25 units in AM and 10 units at dinner. She took 23 units this am. States did not know would receive steroid shots today.  She takes short acting insulin 2-6 units based on sliding scale with each meal.

## 2020-12-11 PROBLEM — E11.9 TYPE 2 DIABETES MELLITUS, WITH LONG-TERM CURRENT USE OF INSULIN (HCC): Status: ACTIVE | Noted: 2020-11-24

## 2020-12-11 PROBLEM — Z79.4 TYPE 2 DIABETES MELLITUS, WITH LONG-TERM CURRENT USE OF INSULIN (HCC): Status: ACTIVE | Noted: 2020-11-24

## 2020-12-15 ENCOUNTER — TELEPHONE (OUTPATIENT)
Dept: ENDOCRINOLOGY | Age: 55
End: 2020-12-15

## 2020-12-15 NOTE — TELEPHONE ENCOUNTER
----- Message from Rimma Rose sent at 12/15/2020 11:48 AM EST -----  Regarding: Dr. Shalini Vizcarra General Message/Vendor Calls    Caller's first and last name:      Reason for call: Regarding pt has been sent to the hospital twice in 2 wks  due to having mini strokes.        Call back required yes/no and why: Yes       Best contact number(s): [phone]      Details to clarify the request:      Rimma Rose

## 2020-12-15 NOTE — TELEPHONE ENCOUNTER
Spoke with pt she notes she had a CVA two weeks ago and again earlier this week. She was taken to [facility] they started her on Plavix 75mg daily and changed her Simvastatin to Atorvastatin 40mg every day. I recommended she take the Atorvastatin 40mg at bedtime. Pt notes she is taking Levemir 30 units in the AM and 11 units with dinner. She notes the 13 with diner was causing low BGs around bedtime so she has been taking 11 with dinner. Pt notes that she is not taking much Humalog \"because the 30 in the morning is doing well keeping my sugars around 200 so I am not needing it\". Pt to send me BG readings in 2-3 weeks.

## 2020-12-22 ENCOUNTER — TRANSCRIBE ORDER (OUTPATIENT)
Dept: ENDOCRINOLOGY | Age: 55
End: 2020-12-22

## 2020-12-22 PROBLEM — G45.9 TIA (TRANSIENT ISCHEMIC ATTACK): Status: ACTIVE | Noted: 2020-11-24

## 2021-01-06 ENCOUNTER — TELEPHONE (OUTPATIENT)
Dept: ENDOCRINOLOGY | Age: 56
End: 2021-01-06

## 2021-01-06 NOTE — TELEPHONE ENCOUNTER
Patient called stating that her blood sugar was 35 this am , 89 yesterday AM, 31 on 1/4/21 and 33 on 1/3/21. She states for the past 2 days, her mother had to come over to wake her up due to the low blood sugars. Her blood sugar was 301 on 1/2/21 at HS (she had not taken any dinner Levemir) and 367 last night with no dinner insulin. She states that she normally takes 30 units of Levemir at breakfast and 10 units of Levemir at dinner. She states that she takes 2-6 units of Humalog if she eats food high in carbs or sweet. She said it depends on how much food she eats and what her blood sugar is reading.

## 2021-01-06 NOTE — TELEPHONE ENCOUNTER
I recommend she decrease her dinner Levemir from 10 units down to 6 units and send in some BG readings in a week.

## 2021-01-07 PROBLEM — R25.2 MUSCLE CRAMP, NOCTURNAL: Status: RESOLVED | Noted: 2019-11-05 | Resolved: 2021-01-07

## 2021-01-07 PROBLEM — R59.0 LYMPHADENOPATHY OF HEAD AND NECK REGION: Status: RESOLVED | Noted: 2019-01-16 | Resolved: 2021-01-07

## 2021-01-07 PROBLEM — M54.50 BILATERAL LOW BACK PAIN WITHOUT SCIATICA: Status: RESOLVED | Noted: 2018-03-13 | Resolved: 2021-01-07

## 2021-01-07 PROBLEM — R14.0 ABDOMINAL BLOATING: Status: RESOLVED | Noted: 2019-01-16 | Resolved: 2021-01-07

## 2021-01-07 PROBLEM — B35.3 TINEA PEDIS OF BOTH FEET: Status: RESOLVED | Noted: 2019-06-01 | Resolved: 2021-01-07

## 2021-01-07 PROBLEM — R51.9 FACIAL PAIN: Status: RESOLVED | Noted: 2019-02-26 | Resolved: 2021-01-07

## 2021-01-07 PROBLEM — E11.9 TYPE 2 DIABETES MELLITUS, WITH LONG-TERM CURRENT USE OF INSULIN (HCC): Status: RESOLVED | Noted: 2020-11-24 | Resolved: 2021-01-07

## 2021-01-07 PROBLEM — Z79.4 TYPE 2 DIABETES MELLITUS, WITH LONG-TERM CURRENT USE OF INSULIN (HCC): Status: RESOLVED | Noted: 2020-11-24 | Resolved: 2021-01-07

## 2021-01-12 ENCOUNTER — TELEPHONE (OUTPATIENT)
Dept: ENDOCRINOLOGY | Age: 56
End: 2021-01-12

## 2021-01-12 NOTE — TELEPHONE ENCOUNTER
----- Message from [facility] sent at 2021  9:36 AM EST -----  Regarding: MD Zepeda/ telephone  Patient's first and last name:     Dick Sewell  :   1965    Caller's first and last name: pt    Reason for call:   lab review    Callback required yes/no and why: yes    Best contact number(s):  [phone]    Details to clarify the request:  Pt is requesting a call to discuss recent labs performed by PCP MD Hernández. Pt was advise to follow up with MD [facility] for clarification.

## 2021-01-12 NOTE — TELEPHONE ENCOUNTER
Per chart, PCP mentioned cortisol level being low. She was advised by PCP to speak with endo about possible adrenal insufficiency. Patient wants to speak with  regarding this.

## 2021-01-12 NOTE — TELEPHONE ENCOUNTER
Spoke with pt regarding the cortisol level. The Cortisol was drawn at 4PM, which is a time it would be expected to be lower and even at that time it was in a \"normal\" range. I do not feel pt has adrenal insufficiency and a single random cortisol level drawn a 4PM can not be used as a basis for that diagnosis.   I will order a romy stim test to evaluate her adrenal function

## 2021-01-21 ENCOUNTER — TELEPHONE (OUTPATIENT)
Dept: ENDOCRINOLOGY | Age: 56
End: 2021-01-21

## 2021-01-21 NOTE — TELEPHONE ENCOUNTER
Jennifer reports that her BG is 53. She was told she could not eat or drink prior to her cosyntropin stim test.  She is afraid to drive. I told her it would be absolutely fine to eat and or drink before the cosyntropin stim test and that it would not affect the cortisol results. She understood this.     Yumiko Rodriguez, [place] [facility]

## 2021-01-26 NOTE — TELEPHONE ENCOUNTER
Requested Prescriptions     Pending Prescriptions Disp Refills    HumaLOG KwikPen Insulin 100 unit/mL kwikpen 3 Package 5     Sig: As per scale

## 2021-01-27 RX ORDER — INSULIN LISPRO 100 [IU]/ML
INJECTION, SOLUTION INTRAVENOUS; SUBCUTANEOUS
Qty: 3 PACKAGE | Refills: 5 | Status: SHIPPED | OUTPATIENT
Start: 2021-01-27 | End: 2021-01-27 | Stop reason: SDUPTHER

## 2021-03-16 ENCOUNTER — OFFICE VISIT (OUTPATIENT)
Dept: ENDOCRINOLOGY | Age: 56
End: 2021-03-16
Payer: MEDICARE

## 2021-03-16 VITALS
WEIGHT: 154 LBS | DIASTOLIC BLOOD PRESSURE: 70 MMHG | TEMPERATURE: 98.2 F | RESPIRATION RATE: 12 BRPM | BODY MASS INDEX: 27.28 KG/M2 | SYSTOLIC BLOOD PRESSURE: 115 MMHG | HEART RATE: 80 BPM

## 2021-03-16 DIAGNOSIS — I10 ESSENTIAL HYPERTENSION: ICD-10-CM

## 2021-03-16 DIAGNOSIS — E03.9 ACQUIRED HYPOTHYROIDISM: ICD-10-CM

## 2021-03-16 DIAGNOSIS — E55.9 HYPOVITAMINOSIS D: ICD-10-CM

## 2021-03-16 DIAGNOSIS — E10.42 TYPE 1 DIABETES MELLITUS WITH DIABETIC POLYNEUROPATHY (HCC): Primary | ICD-10-CM

## 2021-03-16 PROCEDURE — 2022F DILAT RTA XM EVC RTNOPTHY: CPT | Performed by: INTERNAL MEDICINE

## 2021-03-16 PROCEDURE — G8754 DIAS BP LESS 90: HCPCS | Performed by: INTERNAL MEDICINE

## 2021-03-16 PROCEDURE — G9717 DOC PT DX DEP/BP F/U NT REQ: HCPCS | Performed by: INTERNAL MEDICINE

## 2021-03-16 PROCEDURE — G9899 SCRN MAM PERF RSLTS DOC: HCPCS | Performed by: INTERNAL MEDICINE

## 2021-03-16 PROCEDURE — 99215 OFFICE O/P EST HI 40 MIN: CPT | Performed by: INTERNAL MEDICINE

## 2021-03-16 PROCEDURE — 3017F COLORECTAL CA SCREEN DOC REV: CPT | Performed by: INTERNAL MEDICINE

## 2021-03-16 PROCEDURE — G8427 DOCREV CUR MEDS BY ELIG CLIN: HCPCS | Performed by: INTERNAL MEDICINE

## 2021-03-16 PROCEDURE — G8419 CALC BMI OUT NRM PARAM NOF/U: HCPCS | Performed by: INTERNAL MEDICINE

## 2021-03-16 PROCEDURE — G8752 SYS BP LESS 140: HCPCS | Performed by: INTERNAL MEDICINE

## 2021-03-16 PROCEDURE — 3046F HEMOGLOBIN A1C LEVEL >9.0%: CPT | Performed by: INTERNAL MEDICINE

## 2021-03-16 RX ORDER — DICLOFENAC SODIUM 75 MG/1
TABLET, DELAYED RELEASE ORAL
COMMUNITY
Start: 2021-01-19 | End: 2021-10-26

## 2021-03-16 RX ORDER — INSULIN DETEMIR 100 [IU]/ML
INJECTION, SOLUTION SUBCUTANEOUS
Qty: 10 ADJUSTABLE DOSE PRE-FILLED PEN SYRINGE | Refills: 5 | Status: SHIPPED | OUTPATIENT
Start: 2021-03-16 | End: 2021-07-29 | Stop reason: SDUPTHER

## 2021-03-16 RX ORDER — INSULIN LISPRO 100 [IU]/ML
INJECTION, SOLUTION INTRAVENOUS; SUBCUTANEOUS
Qty: 15 ML | Refills: 5 | Status: SHIPPED | OUTPATIENT
Start: 2021-03-16 | End: 2021-07-29 | Stop reason: SDUPTHER

## 2021-03-16 RX ORDER — TRAMADOL HYDROCHLORIDE 50 MG/1
TABLET ORAL
COMMUNITY
Start: 2021-03-13 | End: 2021-04-09

## 2021-03-16 RX ORDER — FLASH GLUCOSE SENSOR
KIT MISCELLANEOUS
Qty: 2 KIT | Refills: 5 | Status: SHIPPED | OUTPATIENT
Start: 2021-03-16 | End: 2021-06-17

## 2021-03-16 RX ORDER — FLASH GLUCOSE SCANNING READER
EACH MISCELLANEOUS
Qty: 1 EACH | Refills: 0 | Status: SHIPPED | OUTPATIENT
Start: 2021-03-16 | End: 2021-06-17

## 2021-03-16 NOTE — PROGRESS NOTES
Chief Complaint   Patient presents with    Diabetes     pcp and pharmacy verified. Eye exam: last week. IN PERSON   Records since last visit reviewed. History of Present Illness: Carolina Dalton is a 63 y.o. female here for follow up of diabetes. Pt notes \"I have been a Type I diabetic for 37 years\". \"I don't count carbs, I eat what I want and most of the time I will remember to take my insulin but not all the time\". Pt notes that she did have some weight gain. She reports that her generic Tramadol was changed and she feels that caused the weight gain. When she went to the pharmacy to make sure the manufacture went back to the previous, her weight began to improve. Pt brought in her BG logs and glucometer. Her BGs have run in the 60-200s range. Pt reports that she has had low BG before she goes to bed, first thing in the morning and in the middle of the afternoon. \"I have to eat to keep my sugars high. My sugars be 300 at bedtime and I wake up with BGs in the 60s. She is taking Levemir 30 units in the AM and 5 units HS Humalog 1-2 units with each meal.  She is testing her BGs 6-8 times per day. She has not been on any steroids since our last visit. Pt wakes around 6-630AM.  She takes her first dose of Levemir when she wakes up. She notes if her FBG is >70 she will take correction humalog. She notes that she has not been checking her pre-meal BGs lately. She has breakfast around 7-8AM She will have her 6 ritz crackers and regular pepsi. If her BG is under 100 she will take 2 units of Humalog with breakfast, she notes she rarely takes more than 4 units in the morning. She will take her dog for a walk after breakfast. She does not eat lunch. If she gets hungry, she will snack on popcorn or celery sticks and PB. She has dinner around 4-6PM, last night she had a crab salad. She has stopped eating the ice cream bars after dinner.   She notes that she may have peanuts or walnuts in the evening on occasions. Pt has hx of CVA x4, \"one of which caused a CNS leak\". She reports she had a stress test and an ECHO in 2015 and \"everything came back fine\". Pt has hx of polyneuropathy from her knees to her feet. She takes Gabapentin 200mg BID, which does help with her pain. Pt has no hx of Nephrology    Last eye exam was 3/13/21 [facility] sent me to see a cataract specialist, who told me I had bad cataracts in both eyes. Pt has hx of osteoporosis for which she is followed by Dr. Delfina Hernandez of Rheumatology. 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 She was never treated and it \"went away\". She is on Prolia every 6 month. \"My hips are now so bad I can't sleep at night and I knee hip replacement. Pt has hx of hypothyroidism and is taking LT4 75mcg daily. Current Outpatient Medications   Medication Sig    zolpidem (AMBIEN) 5 mg tablet TAKE 1 TABLET BY MOUTH ONCE DAILY NIGHTLY AS NEEDED FOR SLEEP    HumaLOG KwikPen Insulin 100 unit/mL kwikpen As per scale    Insulin Needles, Disposable, (Niru Pen Needle) 32 gauge x 5/32\" ndle Use as directed with pen device - up to 5 times daily  Dx:  E11.9    gabapentin (NEURONTIN) 100 mg capsule Take 2 capsules by mouth twice daily    Insulin Needles, Disposable, (Pen Needle) 30 gauge x 5/16\" ndle Use one needle up to 8 times daily to dispense insulin. E10.65,E 10.649    clopidogreL (PLAVIX) 75 mg tab Take 1 Tab by mouth every other day for 90 days.  glucose blood VI test strips (OneTouch Ultra Blue Test Strip) strip TEST BLOOD SUGAR 8 TIMES A DAY DUE TO UNCONTROLLED SUGARS Dx : E10.42    aspirin 81 mg chewable tablet Take 81 mg by mouth daily.     insulin detemir U-100 (Levemir FlexTouch U-100 Insuln) 100 unit/mL (3 mL) inpn INJECT 25 UNITS SUBCUTANEOUSLY IN THE MORNING AND  10  UNITS  AT  NIGHT  Indications: type 1 diabetes mellitus (Patient taking differently: INJECT 30 UNITS SUBCUTANEOUSLY IN THE MORNING AND 5-6 UNITS  AT  NIGHT  Indications: type 1 diabetes mellitus)    triamcinolone acetonide (KENALOG) 0.025 % topical cream APPLY CREAM TOPICALLY TO AFFECTED AREA TWICE DAILY AS NEEDED    mupirocin (BACTROBAN) 2 % ointment APPLY ONCE DAILY TO AREAS OF OPEN SKIN UNTIL SORES HEAL UP ALSO APPLY TO TOES    metoclopramide HCl (REGLAN) 5 mg tablet Take 1 Tab by mouth two (2) times a day. Up to BID. TAKE 30 MINUTES BEFORE MEALS (Patient taking differently: Take 5 mg by mouth. TAKES 30 MINUTES AFTER A MEAL. )    levothyroxine (Levothroid) 75 mcg tablet Take 1 Tab by mouth Daily (before breakfast). Indications: a condition with low thyroid hormone levels    losartan (COZAAR) 50 mg tablet Take 1 Tab by mouth daily. Take 1 tab daily    montelukast (SINGULAIR) 10 mg tablet Take 1 Tab by mouth daily.  potassium 99 mg tablet Take 1 Tab by mouth every other day.  simvastatin (ZOCOR) 40 mg tablet Take 1 Tab by mouth nightly.  tiZANidine (ZANAFLEX) 4 mg tablet Take 1-2 Tabs by mouth three (3) times daily as needed for Muscle Spasm(s) or Pain (max dose 24 mg/day). Indications: muscle spasm (Patient taking differently: Take 4-8 mg by mouth three (3) times daily as needed for Muscle Spasm(s) or Pain (max dose 24 mg/day). TAKES ONLY AT HS, RARELY  Indications: muscle spasm)    levocetirizine (XYZAL) 5 mg tablet Take 1 Tab by mouth daily.  ascorbic acid, vitamin C, (VITAMIN C) 1,000 mg tablet Take 8,000 mg by mouth two (2) times a day.  calcium carbonate (CALTREX) 600 mg calcium (1,500 mg) tablet Take 600 mg by mouth two (2) times a day.  glucos sul 2KCl/msm/chond/C/Mn (GLUCOSAMINE CHONDROITIN PO) Take  by mouth.  MAGNESIUM PO Take  by mouth every fourty-eight (48) hours.  denosumab (PROLIA) 60 mg/mL injection 60 mg by SubCUTAneous route. Every 6 months    cholecalciferol, vitamin D3, (VITAMIN D3) 2,000 unit tab Take 1 Tab by mouth daily.  5000 units daily  Indications: low vitamin D levels    VITAMIN A PO Take 2,400 mcg by mouth nightly.  vitamin E (AQUA GEMS) 400 unit capsule Take 400 Units by mouth every Monday and Thursday. Only on M and Thurs at Bedtime    diclofenac EC (VOLTAREN) 75 mg EC tablet     traMADoL (ULTRAM) 50 mg tablet TAKE 1 TABLET BY MOUTH ONCE DAILY MAXIMUM DAILY AMOUNT 50MG     No current facility-administered medications for this visit. No Known Allergies  Review of Systems:  - Eyes: no blurry vision or double vision  - Cardiovascular: no chest pain  - Respiratory: no shortness of breath  - Musculoskeletal: no myalgias  - Neurological: no numbness/tingling in extremities    Physical Examination:  Temperature 98.2 °F (36.8 °C), weight 154 lb (69.9 kg).  115/70 HR 80 RR 12  - General: pleasant, no distress, good eye contact   - Neck: no carotid bruits  - Cardiovascular: regular, normal rate, nl s1 and s2, no m/r/g, 2+ DP pulses   - Respiratory: clear bilaterally  - Integumentary: no edema, no foot ulcers  - Psychiatric: normal mood and affect    Diabetic foot exam:     Left Foot:   Visual Exam: callous - present   Pulse DP: 2+ (normal)   Filament test: reduced sensation    Vibratory sensation: normal      Right Foot:   Visual Exam: callous - present   Pulse DP: 2+ (normal)   Filament test: reduced sensation    Vibratory sensation: normal        Data Reviewed:   Component      Latest Ref Rng & Units 1/21/2021 1/5/2021 1/5/2021 1/5/2021           9:33 AM  3:48 PM  3:48 PM  3:48 PM   Cortisol, baseline      ug/dL 10.2      Cortisol, 30 min.      ug/dL 27.8      Cortisol, 60 min.      ug/dL 32.0      Cortisol, random      ug/dL    2.8   Carbon monoxide, whole bld.      0.0 - 3.6 %   2.6    Vitamin B12      232 - 1245 pg/mL  >2000 (H)       Component      Latest Ref Rng & Units 10/27/2020 10/27/2020          10:53 AM 10:53 AM   TSH      0.450 - 4.500 uIU/mL  1.650   T4, Free      0.82 - 1.77 ng/dL  1.99 (H)   T3, total      71 - 180 ng/dL 89        Assessment/Plan:   1) DM > Pt reported that her BGs in the morning were dropping so I instructed her to change her Levemir to 28 units in the AM and 3 units HS.  Pt encouraged to start checking her BGs before each meal and taking her Humalog before each meal rather than waiting till after meals, when her BG is already high.   With her hx of neuropathy and calluses, she would benefit from DM shoes and inserts.  Because her blood sugars are so erratic and she has had frequent issues of hypoglycemia, she is testing her blood sugars 8 times per day.  Will order an A1C    2) Hypothyroidism > Pt is clinically euthyroid on  LT4 75mcg daily.  Will order TFTs and adjust her dose as needed.    3) Hypovitaminosis D > Pt to continue her Vitamin D 5000 units daily. Will order a Vitamin D level    4) HTN > Pt is on an ARB for renal protection. Her BP today was 115/70 Will not make any changes at this time.    Pt voices understanding and agreement with the plan.  Pain noted and pt was recommended to call her PCP for further evaluation and treatment, as needed    I spent 40 minutes of total time during this in-person visit.        RTC 3 months            Copy sent to:  Dr. Dee Hernández

## 2021-03-18 ENCOUNTER — TELEPHONE (OUTPATIENT)
Dept: FAMILY MEDICINE CLINIC | Age: 56
End: 2021-03-18

## 2021-03-18 ENCOUNTER — HOSPITAL ENCOUNTER (OUTPATIENT)
Dept: LAB | Age: 56
Discharge: HOME | End: 2021-03-18

## 2021-03-18 DIAGNOSIS — F51.01 PRIMARY INSOMNIA: ICD-10-CM

## 2021-03-18 NOTE — TELEPHONE ENCOUNTER
Pt called asking if Dr. Edna Chaney could change her Zolpidem to the actual \"ambien\" medication? Pt states the Zolpidem isnt working.  Pt wants to know if Lili Thomas could call her tomorrow

## 2021-03-19 ENCOUNTER — TELEPHONE (OUTPATIENT)
Dept: ENDOCRINOLOGY | Age: 56
End: 2021-03-19

## 2021-03-19 RX ORDER — ZOLPIDEM TARTRATE 5 MG
5 TABLET ORAL
Qty: 30 TAB | Refills: 1 | Status: SHIPPED | OUTPATIENT
Start: 2021-04-10 | End: 2021-04-09 | Stop reason: SDUPTHER

## 2021-03-19 NOTE — TELEPHONE ENCOUNTER
----- Message from [employer] sent at 3/19/2021  4:54 PM EDT -----  Regarding: Dr. Aniya Vazquez first and last name: Self    Reason for call: Missed call from nurse    Callback required yes/no and why: Yes, missed call from office    Best contact number(s): [phone]    Details to clarify the request: Pt. Missed call from [address] [place] [address], and would like a call back

## 2021-03-19 NOTE — TELEPHONE ENCOUNTER
----- Message from Latanya Carlin sent at 3/19/2021  3:33 PM EDT -----  Regarding: Dr. Thad Max: [phone]  General Message/Vendor Calls    Caller's first and last name:Pt      Reason for call:Pt would like  a call back to see if her blood work results from [facility] were sent and if so, she would like to discuss them. Callback required yes/no and why:Yes, confirm.        Best contact number(s):[phone]      Details to clarify the request:n/a      Latanya Carlin

## 2021-03-19 NOTE — TELEPHONE ENCOUNTER
I can change to \"dispense as written\" but I am not sure if this will be covered by her insurance.  She should be aware that there could be a price difference if the script is written for the brand name.  ([facility] is pricing the brand name in the 600$ range, and the generic in the 20-40 dollar range for 30 days of the medication).  I will send the script, but she needs to be aware that I don't know what the cost will be.  As well, she just picked up the recent script on 3/12; could not fill new script until 4/10

## 2021-03-20 LAB
25(OH)D3+25(OH)D2 SERPL-MCNC: 48.1 NG/ML (ref 30–100)
ALBUMIN/CREAT UR: <14 MG/G CREAT (ref 0–29)
CREAT UR-MCNC: 21.6 MG/DL
EST. AVERAGE GLUCOSE BLD GHB EST-MCNC: 192 MG/DL
HBA1C MFR BLD: 8.3 % (ref 4.8–5.6)
MICROALBUMIN UR-MCNC: <3 UG/ML
T4 FREE SERPL-MCNC: 1.81 NG/DL (ref 0.82–1.77)
TSH SERPL DL<=0.005 MIU/L-ACNC: 1 UIU/ML (ref 0.45–4.5)

## 2021-03-22 NOTE — TELEPHONE ENCOUNTER
Pt instructed to continue the LT4 75mcg daily and Vitamin D 5000 units per day. Pt to make the changes to her insulin as we discussed and send me BG readings in 2 weeks. Pt voices understanding and agreement with the plan.

## 2021-03-23 ENCOUNTER — OFFICE VISIT (OUTPATIENT)
Dept: FAMILY MEDICINE CLINIC | Age: 56
End: 2021-03-23
Payer: MEDICARE

## 2021-03-23 VITALS
HEIGHT: 63 IN | HEART RATE: 84 BPM | BODY MASS INDEX: 26.9 KG/M2 | TEMPERATURE: 97.6 F | SYSTOLIC BLOOD PRESSURE: 140 MMHG | RESPIRATION RATE: 20 BRPM | OXYGEN SATURATION: 97 % | DIASTOLIC BLOOD PRESSURE: 66 MMHG | WEIGHT: 151.8 LBS

## 2021-03-23 DIAGNOSIS — M25.559 HIP PAIN: Primary | ICD-10-CM

## 2021-03-23 PROCEDURE — 20610 DRAIN/INJ JOINT/BURSA W/O US: CPT | Performed by: INTERNAL MEDICINE

## 2021-03-23 PROCEDURE — 99212 OFFICE O/P EST SF 10 MIN: CPT | Performed by: INTERNAL MEDICINE

## 2021-03-23 NOTE — PROGRESS NOTES
Anu Ellis is a 63 y.o. female who presents to the office today with the following:  Chief Complaint   Patient presents with    Hip Pain     Sleep study = not done-we will need to follow-up on this. She states the bed was too hard and she was unable to sleep at the sleep center-sounds like there was a snafu and that she was supposed to have a particular bed which was not available at the day she went to do the study. Patient will be moving soon to new home as the owner of her current location passed away and the sons, who live in [place], would like to sell the house. She does have a dog that is larger than what would normally be allowed to live in the place where she is moving and she need a note for the dog-he acts much like a service pet and that he wakes her up when sugars are low. Additionally, she would like to have cortisone injections of the bilateral hips to try and get her through until she is able to follow-up with Dr. Jesse Duran in [facility]. She had originally planned on following up with him as soon as possible, but given the fact that she now has to move, would like to try hip injections to see if this may prevent her from needing to go for the replacements earlier. She notices pain in the bilateral hips which often keep her from sleeping at night and the most pain seems to be over the trochanteric bursa although she does endorse pain with walking her dog as well. She would also like to know if she would be able to get the brand name Ambien. She states that the generic does not seem to be very effective for her. No Known Allergies    Current Outpatient Medications   Medication Sig    [START ON 4/10/2021] Ambien 5 mg tablet Take 1 Tab by mouth nightly as needed for Sleep. Max Daily Amount: 5 mg.  Indications: difficulty falling asleep    diclofenac EC (VOLTAREN) 75 mg EC tablet     traMADoL (ULTRAM) 50 mg tablet TAKE 1 TABLET BY MOUTH ONCE DAILY MAXIMUM DAILY AMOUNT 50MG    insulin detemir U-100 (Levemir FlexTouch U-100 Insuln) 100 unit/mL (3 mL) inpn INJECT 28 UNITS SUBCUTANEOUSLY IN THE MORNING AND 3 UNITS  AT  NIGHT  Indications: type 1 diabetes mellitus    HumaLOG KwikPen Insulin 100 unit/mL kwikpen Max daily dose 30    glucose blood VI test strips (OneTouch Ultra Blue Test Strip) strip TEST BLOOD SUGAR 8 TIMES A DAY DUE TO UNCONTROLLED SUGARS Dx : H25.44    flash glucose sensor (FreeStyle Hal 14 Day Sensor) kit TEST BLOOD SUGAR 8 TIMES A DAY DUE TO UNCONTROLLED SUGARS Dx : V41.08    flash glucose scanning reader (FreeStyle Hal 14 Day Hazel) misc TEST BLOOD SUGAR 8 TIMES A DAY DUE TO UNCONTROLLED SUGARS Dx : I24.28    zolpidem (AMBIEN) 5 mg tablet TAKE 1 TABLET BY MOUTH ONCE DAILY NIGHTLY AS NEEDED FOR SLEEP    Insulin Needles, Disposable, (Niru Pen Needle) 32 gauge x 5/32\" ndle Use as directed with pen device - up to 5 times daily  Dx:  E11.9    gabapentin (NEURONTIN) 100 mg capsule Take 2 capsules by mouth twice daily    Insulin Needles, Disposable, (Pen Needle) 30 gauge x 5/16\" ndle Use one needle up to 8 times daily to dispense insulin. E10.65,E 10.649    clopidogreL (PLAVIX) 75 mg tab Take 1 Tab by mouth every other day for 90 days.  aspirin 81 mg chewable tablet Take 81 mg by mouth daily.  triamcinolone acetonide (KENALOG) 0.025 % topical cream APPLY CREAM TOPICALLY TO AFFECTED AREA TWICE DAILY AS NEEDED    mupirocin (BACTROBAN) 2 % ointment APPLY ONCE DAILY TO AREAS OF OPEN SKIN UNTIL SORES HEAL UP ALSO APPLY TO TOES    metoclopramide HCl (REGLAN) 5 mg tablet Take 1 Tab by mouth two (2) times a day. Up to BID. TAKE 30 MINUTES BEFORE MEALS (Patient taking differently: Take 5 mg by mouth. TAKES 30 MINUTES AFTER A MEAL. )    levothyroxine (Levothroid) 75 mcg tablet Take 1 Tab by mouth Daily (before breakfast). Indications: a condition with low thyroid hormone levels    losartan (COZAAR) 50 mg tablet Take 1 Tab by mouth daily.  Take 1 tab daily    montelukast (SINGULAIR) 10 mg tablet Take 1 Tab by mouth daily.  potassium 99 mg tablet Take 1 Tab by mouth every other day.  simvastatin (ZOCOR) 40 mg tablet Take 1 Tab by mouth nightly.  tiZANidine (ZANAFLEX) 4 mg tablet Take 1-2 Tabs by mouth three (3) times daily as needed for Muscle Spasm(s) or Pain (max dose 24 mg/day). Indications: muscle spasm (Patient taking differently: Take 4-8 mg by mouth three (3) times daily as needed for Muscle Spasm(s) or Pain (max dose 24 mg/day). TAKES ONLY AT HS, RARELY  Indications: muscle spasm)    levocetirizine (XYZAL) 5 mg tablet Take 1 Tab by mouth daily.  ascorbic acid, vitamin C, (VITAMIN C) 1,000 mg tablet Take 8,000 mg by mouth two (2) times a day.  calcium carbonate (CALTREX) 600 mg calcium (1,500 mg) tablet Take 600 mg by mouth two (2) times a day.  glucos sul 2KCl/msm/chond/C/Mn (GLUCOSAMINE CHONDROITIN PO) Take  by mouth.  MAGNESIUM PO Take  by mouth every fourty-eight (48) hours.  denosumab (PROLIA) 60 mg/mL injection 60 mg by SubCUTAneous route. Every 6 months    cholecalciferol, vitamin D3, (VITAMIN D3) 2,000 unit tab Take 1 Tab by mouth daily. 5000 units daily  Indications: low vitamin D levels    VITAMIN A PO Take 2,400 mcg by mouth nightly.  vitamin E (AQUA GEMS) 400 unit capsule Take 400 Units by mouth every Monday and Thursday. Only on M and Thurs at Bedtime     Current Facility-Administered Medications   Medication    bupivacaine (PF) (MARCAINE) 0.5 % (5 mg/mL) injection 30 mg    triamcinolone acetonide (KENALOG-40) 40 mg/mL injection 40 mg    triamcinolone acetonide (KENALOG-40) 40 mg/mL injection 40 mg       Past Medical History:   Diagnosis Date    Arthritis     patient states \"every joint affected\"    Bee sting 09/05/2018    left elbow bee sting     Blister of ankle 09/05/2018    Blister small per patient of left ankle. Wears an air boot due to 2 stress fractures in last 2 months.     CAD (coronary artery disease)     Constipation     Falls 2017    pt reports having 4 falls in 3 days    Fatigue     Headache     Ill-defined condition     multiple broken ribs    Ill-defined condition     reports \"getting infections easily\"    Joint pain     Memory disorder     following spinal fluid leak repair    Menopause     Nausea & vomiting     Neuropathy     Osteoporosis     Thyroid disease     Type 1 diabetes ([facility] [address]     diagnosed at age 8    Unspecified cerebral artery occlusion with cerebral infarction     x 4 (last in )       Past Surgical History:   Procedure Laterality Date    HX CARPAL TUNNEL RELEASE Right 2018    HX  SECTION      HX  SECTION      HX CHOLECYSTECTOMY      HX HYSTERECTOMY  2006    HX ORTHOPAEDIC      frozen shoulder surgery x 3    HX ORTHOPAEDIC      frozen finger surgery x 8    HX ORTHOPAEDIC Left 2014    wrist surgery    HX ORTHOPAEDIC Right 1977    hand surgery    HX ORTHOPAEDIC Left     foot surgery    HX OTHER SURGICAL      spinal fluid leak repair (spinal fluid leaking from nose, remove nose, cut fat from stomach, use that as patch behind nose, nose replaced)    HX ROTATOR CUFF REPAIR Left     HX ROTATOR CUFF REPAIR Right     HX TONSILLECTOMY  1968       Social History     Socioeconomic History    Marital status: SINGLE     Spouse name: Not on file    Number of children: Not on file    Years of education: Not on file    Highest education level: Not on file   Tobacco Use    Smoking status: Former Smoker     Packs/day: 0.50     Years: 8.00     Pack years: 4.00     Quit date: 4/10/2007     Years since quittin.9    Smokeless tobacco: Never Used   Substance and Sexual Activity    Alcohol use: No    Drug use: No    Sexual activity: Not Currently       Social History     Tobacco Use   Smoking Status Former Smoker    Packs/day: 0.50    Years: 8.00    Pack years: 4.00    Quit date: 4/10/2007    Years since quittin.9   Smokeless Tobacco Never Used       Family History   Problem Relation Age of Onset    Heart Disease Mother     Hypertension Mother     No Known Problems Father     Heart Disease Maternal Grandfather     Cancer Maternal Aunt         Pancreatic and Liver    Cancer Maternal Grandmother         Lung    Diabetes Maternal Grandmother     Cancer Maternal Aunt         Breast    Breast Cancer Maternal Aunt     Diabetes Maternal Aunt        Vitals:    03/23/21 1010   BP: (!) 140/66   BP 1 Location: Left upper arm   BP Patient Position: Sitting   BP Cuff Size: Adult   Pulse: 84   Resp: 20   Temp: 97.6 °F (36.4 °C)   TempSrc: Core   SpO2: 97%   Weight: 151 lb 12.8 oz (68.9 kg)   Height: 5' 3\" (1.6 m)         3 most recent PHQ Screens 3/23/2021   Little interest or pleasure in doing things Not at all   Feeling down, depressed, irritable, or hopeless Not at all   Total Score PHQ 2 0       ROS:  Denies fever, chills, cough, chest pain or pressure, SOB/EVAN,  nausea, vomiting, or diarrhea/constipation. No changes in vision or hearing. No new or worsening headaches. No edema, no claudication. Denies wt loss, wt gain, hemoptysis, hematochezia or melena. No dysuria, pyuria, frequency. No polydipsia, polyuria. No new weakness, arthralgias, myalgias. No weakness, dizziness, lightheadedness, numbness or tingling. No recent changes in moods. Physical Examination:    GENERAL APPEARANCE: NAD, appears stated age, comfortable  VITAL SIGNS:   Visit Vitals  BP (!) 140/66 (BP 1 Location: Left upper arm, BP Patient Position: Sitting, BP Cuff Size: Adult)   Pulse 84   Temp 97.6 °F (36.4 °C) (Core)   Resp 20   Ht 5' 3\" (1.6 m)   Wt 151 lb 12.8 oz (68.9 kg)   SpO2 97%   BMI 26.89 kg/m²     HEENT: Normocephalic and atraumatic. No scleral icterus. Pupils are equal, round, and reactive to light and accommodation. No conjunctival injection is noted. NECK: Supple. Trachea is midline.    LUNGS: chest rise and fall equal bilaterally, CTAB  HEART: pulses regular, RRR s1s2+ no r/m/g  EXTREMITIES: No cyanosis, clubbing, or edema.    MUSCULOSKELETAL:  gait antalgic with pain to palpation in the area of the trochanteric bursae bilaterally as well, motor intact  NEUROLOGIC: CN II-XII intact; no focal neurological deficits. PSYCHIATRIC: The patient is awake, alert, and oriented x3  SKIN: Warm, dry, and well perfused. Good turgor.       ASSESSMENT AND PLAN:  Bilateral Hip Pain  Time out performed immediately prior to procedure:    Chart reviewed for the following:    *  Patient identified by name and   *  Agreement on procedure being performed  *  Risks and Benefits explained to the patient  *  Procedure site verified and marked as needed  *  Patient positioned for comfort  *  Consent was signed and verified    Date of Procedure:  3/23/2021  Procedure performed by Simran Tatum DO   Patient tolerated procedure well  Post procedural pain scale:  0 - no hurt  Comments:  none    Procedure:  Right and left trochanteric bursae Steroid Injection    Indication:  Symptomatic relief from bursitis    The right hip was prepped with chlorhexidine and draped in the usual sterile manner. Local anesthetic:  cold spray    The trochanteric was infiltrated with 3 ml of bupivicaine 0.5% and 40 mg of kenalog and 20 mg depomedrol using a #23 1 1/2 inch needle. The procedure was well tolerated. The patient is asked to continue to rest the joint for a few more days before resuming regular activities. It may be more painful for the first 1-2 days. Watch for fever, or increased swelling or persistent pain in the joint. Call or return to clinic prn if such symptoms occur or there is failure to improve as anticipated.   Instructions for care and follow up printed and given to patient    The above procedure was then repeated on the left hip    Orders Placed This Encounter     - DRAIN/INJECT LARGE JOINT/BURSA    bupivacaine (PF) (MARCAINE) 0.5 % (5 mg/mL) injection 30 mg    triamcinolone acetonide (KENALOG-40) 40 mg/mL injection 40 mg    triamcinolone acetonide (KENALOG-40) 40 mg/mL injection 40 mg       Patient to return PRN for any new symptoms, call/come to clinic if notices any side effects from medication or any new side effects, and return for regularly scheduled visit     Patient verbalized understanding of and agreement to treatment plan. Patient has been advised to contact practice or seek care if condition persists or worsens. Richelle Morgan, DO      This documentation was facilitated by voice recognition software and may contain inadvertent typographical errors. Please note that this dictation was completed with [employer], the computer voice recognition software. Quite often unanticipated grammatical, syntax, homophones, and other interpretive errors are inadvertently transcribed by the computer software. Please disregard these errors. Please excuse any errors that have escaped final proofreading.

## 2021-03-23 NOTE — PROGRESS NOTES
1. Have you been to the ER, urgent care clinic since your last visit? Hospitalized since your last visit? No    2. Have you seen or consulted any other health care providers outside of the [address] [place] [address] since your last visit? Include any pap smears or colon screening.  No           Learning Assessment 3/23/2021   PRIMARY LEARNER Patient   HIGHEST LEVEL OF EDUCATION - PRIMARY LEARNER  -   BARRIERS PRIMARY LEARNER EMOTIONAL   CO-LEARNER CAREGIVER No   PRIMARY LANGUAGE ENGLISH   LEARNER PREFERENCE PRIMARY DEMONSTRATION     -     -   ANSWERED BY Patient   RELATIONSHIP SELF

## 2021-03-24 ENCOUNTER — TELEPHONE (OUTPATIENT)
Dept: ENDOCRINOLOGY | Age: 56
End: 2021-03-24

## 2021-03-24 ENCOUNTER — TELEPHONE (OUTPATIENT)
Dept: FAMILY MEDICINE CLINIC | Age: 56
End: 2021-03-24
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
## 2021-03-24 NOTE — TELEPHONE ENCOUNTER
----- Message from Enrique Christina sent at 3/24/2021  8:34 AM EDT -----  Regarding: [employer]/Telephone  General Message/Vendor Calls    Caller's first and last name: n/a      Reason for call:    requesting the know if a letter about the dog waking her up for medical problems; is ready for her to  and when can she pick it up. .. 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

## 2021-03-24 NOTE — TELEPHONE ENCOUNTER
----- Message from Reggie Seo sent at 3/24/2021  8:17 AM EDT -----  Regarding: Dr. Ce Oliva: [phone]  General Message/Vendor Calls    Caller's first and last name:Pt      Reason for call:Pt had a steroid injection on 3/23 and pt accidentally administered her AM insulin last night (3/23). Pt was reporting blood sugar readings of 600+ around midnight and 369 at 2 or 3 AM.  Currently the patient's blood sugar is at 178. Patient would like a call back to see if she should up her AM amount of insulin and increase her evening amount to 15mgs.        Callback required yes/no and why:Yes, discuss insulin      Best contact number(s):[phone]      Details to clarify the request:n/a      Reggie Seo

## 2021-03-24 NOTE — TELEPHONE ENCOUNTER
Pt notes she took 2 units of Novolog last night and this AM.  Today she took Levemir 35 units. She accidentally took 35 units last night as well. I recommended she increase her AM dose to 40 and her HS to 10 units, but if her AM BG tomorrow are high then up the HA to 15 units. Pt to call in a week with her BG readings. Pt voices understanding and agreement with the plan.

## 2021-03-25 RX ORDER — BUPIVACAINE HYDROCHLORIDE 5 MG/ML
6 INJECTION, SOLUTION EPIDURAL; INTRACAUDAL ONCE
Status: SHIPPED | OUTPATIENT
Start: 2021-03-25 | End: 2021-03-26

## 2021-03-25 RX ORDER — TRIAMCINOLONE ACETONIDE 40 MG/ML
40 INJECTION, SUSPENSION INTRA-ARTICULAR; INTRAMUSCULAR ONCE
Status: SHIPPED | OUTPATIENT
Start: 2021-03-25 | End: 2021-03-26

## 2021-03-26 ENCOUNTER — TELEPHONE (OUTPATIENT)
Dept: FAMILY MEDICINE CLINIC | Age: 56
End: 2021-03-26
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
## 2021-03-26 NOTE — TELEPHONE ENCOUNTER
----- Message from Terri Oakes sent at 3/26/2021  8:50 AM EDT -----  Regarding: Dr. Lou Alcala Message/Vendor Calls    Caller's first and last name: pt      Reason for call: Pt is inquiring about a \"word\" used to research a diagnosis.  Pt advised the word was used during a December 2019/January 2020 appt       Callback required yes/no and why: yes, to discuss      Best contact number(s): [phone]      Details to clarify the request: n/a      Terri Oakes

## 2021-04-07 ENCOUNTER — VIRTUAL VISIT (OUTPATIENT)
Dept: FAMILY MEDICINE CLINIC | Age: 56
End: 2021-04-07
Payer: MEDICARE

## 2021-04-07 DIAGNOSIS — R21 RASH AND NONSPECIFIC SKIN ERUPTION: Primary | ICD-10-CM

## 2021-04-07 DIAGNOSIS — B88.2: ICD-10-CM

## 2021-04-07 PROCEDURE — G2025 DIS SITE TELE SVCS RHC/FQHC: HCPCS | Performed by: INTERNAL MEDICINE

## 2021-04-07 NOTE — PROGRESS NOTES
Jhon Maya evaluated via telephone on 04/07/21     Consent:  He and/or health care decision maker is aware that that he may receive a bill for this telephone service, depending on his insurance coverage, and has provided verbal consent to proceed: Yes      Documentation:  I communicated with the patient and/or health care decision maker about   Details of this discussion including any medical advice provided:     She thinks that she may have a sand flea infestation, which she believes may have started either when her son returned from a trip to [place], or may have occurred when she was walking through sand (she tends to walk barefoot, although she should not because of her diabetes). She has been assessed and treated for scabies, has had her house fumigated, and has been to dermatology. Contacted the extension office to try and find out ways in which she could eradicate the bug. Request that we contact the extension office to talk with the person there regarding treatment options. She stated that she spoke with a gentleman there, who suggested that she return to her PCP and then contact them, regarding treatment options. She had noted that she had felt better after her move initially, but now she feels itchiness in the sensation she had at her old home returning. She wonders if her dog may have carried over. She has tried to decontaminate the house, but is unsure what may be the best method to get rid of them. I affirm this is a Patient Initiated Episode with an Established Patient who has not had a related appointment within my department in the past 7 days or scheduled within the next 24 hours.     Total Time: minutes: 11-20 minutes    Note: not billable if this call serves to triage the patient into an appointment for the relevant concern      Quan Cantu DO

## 2021-04-07 NOTE — PROGRESS NOTES
Learning Assessment 3/23/2021   PRIMARY LEARNER Patient   HIGHEST LEVEL OF EDUCATION - PRIMARY LEARNER  -   BARRIERS PRIMARY LEARNER EMOTIONAL   CO-LEARNER CAREGIVER No   PRIMARY LANGUAGE ENGLISH   LEARNER PREFERENCE PRIMARY DEMONSTRATION     -     -   ANSWERED BY Patient   RELATIONSHIP SELF         1. Have you been to the ER, urgent care clinic since your last visit? Hospitalized since your last visit? No    2. Have you seen or consulted any other health care providers outside of the [address] [place] [address] since your last visit? Include any pap smears or colon screening.  No

## 2021-04-09 DIAGNOSIS — F51.01 PRIMARY INSOMNIA: ICD-10-CM

## 2021-04-09 RX ORDER — ZOLPIDEM TARTRATE 5 MG
5 TABLET ORAL
Qty: 30 TAB | Refills: 1 | Status: SHIPPED | OUTPATIENT
Start: 2021-04-10 | End: 2021-04-12 | Stop reason: SINTOL

## 2021-04-12 ENCOUNTER — TELEPHONE (OUTPATIENT)
Dept: FAMILY MEDICINE CLINIC | Age: 56
End: 2021-04-12

## 2021-04-12 DIAGNOSIS — F51.01 PRIMARY INSOMNIA: ICD-10-CM

## 2021-04-12 RX ORDER — ZOLPIDEM TARTRATE 5 MG/1
5 TABLET ORAL
Qty: 30 TAB | Refills: 2 | Status: SHIPPED | OUTPATIENT
Start: 2021-04-12 | End: 2021-07-29 | Stop reason: SDUPTHER

## 2021-04-12 NOTE — TELEPHONE ENCOUNTER
----- Message from Rema Remedies sent at 4/12/2021  2:11 PM EDT -----  Regarding: Dr. Yobani Kellogg Message/Vendor Calls    Caller's first and last name: pt    Reason for call: would call back from [facility] in regard to sleeping medication      Callback required yes/no and why: yes      Best contact number(s): [phone]      Details to clarify the request: pt stated she left several notes this week and last      Rema Logan

## 2021-04-12 NOTE — TELEPHONE ENCOUNTER
----- Message from Loreto Matthews sent at 4/12/2021 11:07 AM EDT -----  Regarding: Dr. Malou Hough  Medication Refill    Caller (if not patient): pt      Relationship of caller (if not patient): self      Best contact number(s): [phone]      Name of medication and dosage if known: \"Zolpidem off brand of Ambien\"      Is patient out of this medication (yes/no): yes      Pharmacy name: [facility] listed in chart? (yes/no): yes  Pharmacy phone number: [phone]      Details to clarify the request:  Luis needed a pre-Authorization called in and she out of medication so would like get the off brand so she doesn't have to wait for the pre-authorization. Pt needs this today stated it been more than 48 hours.       Loreto Matthews

## 2021-04-20 ENCOUNTER — TELEPHONE (OUTPATIENT)
Dept: FAMILY MEDICINE CLINIC | Age: 56
End: 2021-04-20

## 2021-04-20 NOTE — TELEPHONE ENCOUNTER
Pt called and stated that Dr. Miah Fisher was going to call the Extension office (Lucrecia Wu) by "[employer] in [place]. The office told the pt that no one has called yet.

## 2021-04-22 NOTE — TELEPHONE ENCOUNTER
The office had closed by the time I was able to call on Friday - will try again this afternoon or tomorrow

## 2021-04-23 ENCOUNTER — OFFICE VISIT (OUTPATIENT)
Dept: FAMILY MEDICINE CLINIC | Age: 56
End: 2021-04-23
Payer: MEDICARE

## 2021-04-23 ENCOUNTER — TELEPHONE (OUTPATIENT)
Dept: FAMILY MEDICINE CLINIC | Age: 56
End: 2021-04-23

## 2021-04-23 VITALS
HEIGHT: 63 IN | BODY MASS INDEX: 26.89 KG/M2 | OXYGEN SATURATION: 98 % | TEMPERATURE: 97.5 F | HEART RATE: 81 BPM | DIASTOLIC BLOOD PRESSURE: 66 MMHG | RESPIRATION RATE: 20 BRPM | SYSTOLIC BLOOD PRESSURE: 120 MMHG

## 2021-04-23 DIAGNOSIS — S05.91XA RIGHT EYE INJURY, INITIAL ENCOUNTER: Primary | ICD-10-CM

## 2021-04-23 PROCEDURE — 99213 OFFICE O/P EST LOW 20 MIN: CPT | Performed by: INTERNAL MEDICINE

## 2021-04-23 RX ORDER — KETOROLAC TROMETHAMINE 5 MG/ML
1 SOLUTION OPHTHALMIC 4 TIMES DAILY
Qty: 1 BOTTLE | Refills: 1 | Status: SHIPPED | OUTPATIENT
Start: 2021-04-23 | End: 2021-05-03

## 2021-04-23 RX ORDER — OFLOXACIN 3 MG/ML
3 SOLUTION/ DROPS OPHTHALMIC EVERY 4 HOURS
Qty: 9 ML | Refills: 0 | Status: SHIPPED | OUTPATIENT
Start: 2021-04-23 | End: 2021-05-03

## 2021-04-23 RX ORDER — TETRAHYDROZOLINE HCL 0.05 %
1 DROPS OPHTHALMIC (EYE) 4 TIMES DAILY
Qty: 10 ML | Refills: 0 | Status: SHIPPED | OUTPATIENT
Start: 2021-04-23 | End: 2021-07-13 | Stop reason: ALTCHOICE

## 2021-04-23 NOTE — PROGRESS NOTES
Keagan Blanca is a 61 y.o. female who presents to the office today with the following:  Chief Complaint   Patient presents with    Eye Injury     Right. Patient was working in her yard approximately 2 hours ago cutting ornamental grass when she bent over and had a stem stick her in her right eye. She states that this time she feels like she has something underneath her eyelid itself and feels like it may have been scratch there. She did flush the eye out before coming today. She does have a history of corneal abrasions from previous incidences with her left eye. She did note tearing, photophobia with the right eye. No overt injection. No Known Allergies    Current Outpatient Medications   Medication Sig    ketorolac (ACULAR) 0.5 % ophthalmic solution Administer 1 Drop to right eye four (4) times daily for 10 days.  ofloxacin (FLOXIN) 0.3 % ophthalmic solution Administer 3 Drops to right eye every four (4) hours for 10 days.  tetrahydrozoline (Sterile Eye Drops) 0.05 % ophthalmic solution Administer 1 Drop to both eyes four (4) times daily.  zolpidem (AMBIEN) 5 mg tablet Take 1 Tab by mouth nightly as needed for Sleep. Max Daily Amount: 5 mg.     traMADoL (ULTRAM) 50 mg tablet TAKE 1 TABLET BY MOUTH ONCE DAILY MAXIMUM  DAILY  AMOUNT  50MG    diclofenac EC (VOLTAREN) 75 mg EC tablet     insulin detemir U-100 (Levemir FlexTouch U-100 Insuln) 100 unit/mL (3 mL) inpn INJECT 28 UNITS SUBCUTANEOUSLY IN THE MORNING AND 3 UNITS  AT  NIGHT  Indications: type 1 diabetes mellitus    HumaLOG KwikPen Insulin 100 unit/mL kwikpen Max daily dose 30    glucose blood VI test strips (OneTouch Ultra Blue Test Strip) strip TEST BLOOD SUGAR 8 TIMES A DAY DUE TO UNCONTROLLED SUGARS Dx : N61.79    flash glucose sensor (FreeStyle Hal 14 Day Sensor) kit TEST BLOOD SUGAR 8 TIMES A DAY DUE TO UNCONTROLLED SUGARS Dx : R01.79    flash glucose scanning reader (FreeStyle Hal 14 Day [place]) misc TEST BLOOD SUGAR 8 TIMES A DAY DUE TO UNCONTROLLED SUGARS Dx : E10.42    Insulin Needles, Disposable, (Niru Pen Needle) 32 gauge x 5/32\" ndle Use as directed with pen device - up to 5 times daily  Dx:  E11.9    gabapentin (NEURONTIN) 100 mg capsule Take 2 capsules by mouth twice daily    Insulin Needles, Disposable, (Pen Needle) 30 gauge x 5/16\" ndle Use one needle up to 8 times daily to dispense insulin. E10.65,E 10.649    aspirin 81 mg chewable tablet Take 81 mg by mouth daily.  triamcinolone acetonide (KENALOG) 0.025 % topical cream APPLY CREAM TOPICALLY TO AFFECTED AREA TWICE DAILY AS NEEDED    mupirocin (BACTROBAN) 2 % ointment APPLY ONCE DAILY TO AREAS OF OPEN SKIN UNTIL SORES HEAL UP ALSO APPLY TO TOES    metoclopramide HCl (REGLAN) 5 mg tablet Take 1 Tab by mouth two (2) times a day. Up to BID. TAKE 30 MINUTES BEFORE MEALS (Patient taking differently: Take 5 mg by mouth. TAKES 30 MINUTES AFTER A MEAL. )    levothyroxine (Levothroid) 75 mcg tablet Take 1 Tab by mouth Daily (before breakfast). Indications: a condition with low thyroid hormone levels    losartan (COZAAR) 50 mg tablet Take 1 Tab by mouth daily. Take 1 tab daily    montelukast (SINGULAIR) 10 mg tablet Take 1 Tab by mouth daily.  potassium 99 mg tablet Take 1 Tab by mouth every other day.  simvastatin (ZOCOR) 40 mg tablet Take 1 Tab by mouth nightly.  tiZANidine (ZANAFLEX) 4 mg tablet Take 1-2 Tabs by mouth three (3) times daily as needed for Muscle Spasm(s) or Pain (max dose 24 mg/day). Indications: muscle spasm (Patient taking differently: Take 4-8 mg by mouth three (3) times daily as needed for Muscle Spasm(s) or Pain (max dose 24 mg/day). TAKES ONLY AT HS, RARELY  Indications: muscle spasm)    levocetirizine (XYZAL) 5 mg tablet Take 1 Tab by mouth daily.  ascorbic acid, vitamin C, (VITAMIN C) 1,000 mg tablet Take 8,000 mg by mouth two (2) times a day.     calcium carbonate (CALTREX) 600 mg calcium (1,500 mg) tablet Take 600 mg by mouth two (2) times a day.  glucos sul 2KCl/msm/chond/C/Mn (GLUCOSAMINE CHONDROITIN PO) Take  by mouth.  MAGNESIUM PO Take  by mouth every fourty-eight (48) hours.  denosumab (PROLIA) 60 mg/mL injection 60 mg by SubCUTAneous route. Every 6 months    cholecalciferol, vitamin D3, (VITAMIN D3) 2,000 unit tab Take 1 Tab by mouth daily. 5000 units daily  Indications: low vitamin D levels    VITAMIN A PO Take 2,400 mcg by mouth nightly.  vitamin E (AQUA GEMS) 400 unit capsule Take 400 Units by mouth every Monday and Thursday. Only on  and Thurs at Bedtime     No current facility-administered medications for this visit. Past Medical History:   Diagnosis Date    Arthritis     patient states \"every joint affected\"    Bee sting 2018    left elbow bee sting     Blister of ankle 2018    Blister small per patient of left ankle. Wears an air boot due to 2 stress fractures in last 2 months.     CAD (coronary artery disease)     Constipation     Falls     pt reports having 4 falls in 3 days    Fatigue     Headache     Ill-defined condition     multiple broken ribs    Ill-defined condition     reports \"getting infections easily\"    Joint pain     Memory disorder     following spinal fluid leak repair    Menopause     Nausea & vomiting     Neuropathy     Osteoporosis     Thyroid disease     Type 1 diabetes ([facility] [address]     diagnosed at age 8    Unspecified cerebral artery occlusion with cerebral infarction     x 4 (last in )       Past Surgical History:   Procedure Laterality Date    HX CARPAL TUNNEL RELEASE Right 2018    HX  SECTION  1987    HX  SECTION      HX CHOLECYSTECTOMY  1996    HX HYSTERECTOMY  2006    HX ORTHOPAEDIC      frozen shoulder surgery x 3    HX ORTHOPAEDIC      frozen finger surgery x 8    HX ORTHOPAEDIC Left 2014    wrist surgery    HX ORTHOPAEDIC Right 1977    hand surgery    HX ORTHOPAEDIC Left     foot surgery    HX OTHER SURGICAL      spinal fluid leak repair (spinal fluid leaking from nose, remove nose, cut fat from stomach, use that as patch behind nose, nose replaced)    HX ROTATOR CUFF REPAIR Left     HX ROTATOR CUFF REPAIR Right     HX TONSILLECTOMY  1968       Social History     Socioeconomic History    Marital status: SINGLE     Spouse name: Not on file    Number of children: Not on file    Years of education: Not on file    Highest education level: Not on file   Tobacco Use    Smoking status: Former Smoker     Packs/day: 0.50     Years: 8.00     Pack years: 4.00     Quit date: 4/10/2007     Years since quittin.0    Smokeless tobacco: Never Used   Substance and Sexual Activity    Alcohol use: No    Drug use: No    Sexual activity: Not Currently       Social History     Tobacco Use   Smoking Status Former Smoker    Packs/day: 0.50    Years: 8.00    Pack years: 4.00    Quit date: 4/10/2007    Years since quittin.0   Smokeless Tobacco Never Used       Family History   Problem Relation Age of Onset    Heart Disease Mother     Hypertension Mother     No Known Problems Father     Heart Disease Maternal Grandfather     Cancer Maternal Aunt         Pancreatic and Liver    Cancer Maternal Grandmother         Lung    Diabetes Maternal Grandmother     Cancer Maternal Aunt         Breast    Breast Cancer Maternal Aunt     Diabetes Maternal Aunt        Vitals:    21 1340   BP: 120/66   BP 1 Location: Left upper arm   BP Patient Position: At rest   BP Cuff Size: Adult   Pulse: 81   Resp: 20   Temp: 97.5 °F (36.4 °C)   TempSrc: Core   SpO2: 98%   Height: 5' 3\" (1.6 m)         3 most recent PHQ Screens 2021   Little interest or pleasure in doing things Not at all   Feeling down, depressed, irritable, or hopeless Not at all   Total Score PHQ 2 0       ROS:  Denies fever, chills, cough, chest pain or pressure, SOB/EVAN,  nausea, vomiting, or diarrhea/constipation. No changes in vision or hearing. No new or worsening headaches. No edema, no claudication. Denies wt loss, wt gain, hemoptysis, hematochezia or melena. No dysuria, pyuria, frequency. No polydipsia, polyuria. No new weakness, arthralgias, myalgias. No weakness, dizziness, lightheadedness, numbness or tingling. No recent changes in moods. Physical Examination:    GENERAL APPEARANCE: NAD, appears stated age, comfortable  VITAL SIGNS:   Visit Vitals  /66 (BP 1 Location: Left upper arm, BP Patient Position: At rest, BP Cuff Size: Adult)   Pulse 81   Temp 97.5 °F (36.4 °C) (Core)   Resp 20   Ht 5' 3\" (1.6 m)   SpO2 98%   BMI 26.89 kg/m²     HEENT: Normocephalic and atraumatic. No scleral icterus. Pupils are equal, round, and reactive to light and accommodation. No conjunctival injection is noted. Fluorescein staining for corneal abrasion negative  NECK: Supple. CHEST: Chest rise and fall equal bilaterally with no respiratory distress  HEART: Pulses intact  NEUROLOGIC: CN II-XII intact; no focal neurological deficits. EOMI intact with no deficits, no corneal abrasions, no pupillary defects. PSYCHIATRIC: The patient is awake, alert, and oriented x3. Recent and remote memory is intact. Appropriate mood and affect. SKIN: Warm, dry, and well perfused. Good turgor. No lesions, nodules or rashes are noted. .      ASSESSMENT AND PLAN:    Eye irritation from foreign object. Sleep of the eye and eyelid today did not reveal any retained foreign material.  Tetracaine applied and fluorescein staining did not reveal any corneal abrasion. Due to the fact that the patient has diabetes, will cover for any potential infection with ofloxacin, ketorolac ophthalmic solution for pain. Encouraged her to follow-up with her ophthalmologist or optometrist as soon as possible. Signs and symptoms that warrant urgent or emergent clinical reevaluation discussed the patient who verbalized understanding.     Orders Placed This Encounter    ketorolac (ACULAR) 0.5 % ophthalmic solution     Sig: Administer 1 Drop to right eye four (4) times daily for 10 days. Dispense:  1 Bottle     Refill:  1    ofloxacin (FLOXIN) 0.3 % ophthalmic solution     Sig: Administer 3 Drops to right eye every four (4) hours for 10 days. Dispense:  9 mL     Refill:  0    tetrahydrozoline (Sterile Eye Drops) 0.05 % ophthalmic solution     Sig: Administer 1 Drop to both eyes four (4) times daily. Dispense:  10 mL     Refill:  0       Patient to return PRN for any new symptoms, call/come to clinic if notices any side effects from medication or any new side effects, and return for regularly scheduled visit    Patient verbalized understanding of and agreement to treatment plan. Patient has been advised to contact practice or seek care if condition persists or worsens. Shahram Bolaños,       This documentation was facilitated by voice recognition software and may contain inadvertent typographical errors. Please note that this dictation was completed with DataPad, the computer voice recognition software. Quite often unanticipated grammatical, syntax, homophones, and other interpretive errors are inadvertently transcribed by the computer software. Please disregard these errors. Please excuse any errors that have escaped final proofreading.

## 2021-04-23 NOTE — PROGRESS NOTES
Learning Assessment 3/23/2021   PRIMARY LEARNER Patient   HIGHEST LEVEL OF EDUCATION - PRIMARY LEARNER  -   BARRIERS PRIMARY LEARNER EMOTIONAL   CO-LEARNER CAREGIVER No   PRIMARY LANGUAGE ENGLISH   LEARNER PREFERENCE PRIMARY DEMONSTRATION     -     -   ANSWERED BY Patient   RELATIONSHIP SELF         1. Have you been to the ER, urgent care clinic since your last visit? Hospitalized since your last visit? No    2. Have you seen or consulted any other health care providers outside of the [address] [place] [address] since your last visit? Include any pap smears or colon screening.  No

## 2021-04-27 NOTE — TELEPHONE ENCOUNTER
"Reports that she is having left sided chest pain and rib pain for the past couple of days, dneies injury denies falling. Reports feeling ""some Sob\"" denies any increased swelling.    " Can we call the extension office and find out exactly what type of information the person there would like to have?      Dhruv Summers , [employer]   [phone]  [email or web address]

## 2021-04-28 ENCOUNTER — TELEPHONE (OUTPATIENT)
Dept: FAMILY MEDICINE CLINIC | Age: 56
End: 2021-04-28

## 2021-04-28 NOTE — TELEPHONE ENCOUNTER
Patient has been advised per Daneil Pallas. Patient stated that she was never advised by Mr Herbie Shelton to bring in a specimen , but that she had tried to bring him a sample but was told to put it in alcohol so by time it was dropped off he did not seeing anything. She stated that he told her that as long as we( as in 's office )called and told him what to look for he would know what to do. I advised patient that I did tell Mr Emily Persaud per [employer] it was likely a tropical sand flea. She has already had pest control business come in but they did not find anything .

## 2021-04-28 NOTE — TELEPHONE ENCOUNTER
She thinks that she may have a sand flea infestation, which she believes may have started either when her son returned from a trip to [place], or may have occurred when she was walking through sand (she tends to walk barefoot, although she should not because of her diabetes). She has been assessed and treated for scabies, has had her house fumigated, and has been to dermatology. She just moved to a new apartment, and believes that these bugs may have followed her there is she initially had relief from her symptoms, but they are now coming back. She wanted to know if there was anything with which they could fumigate the house potentially for the sand flea. I was not sure if there was anything that could recommend be recommended for fumigation or decontamination for fleas and/or for the sand fleas. I think that is what she is interested in when she contact the extension office. Does he have any suggestions?

## 2021-04-28 NOTE — TELEPHONE ENCOUNTER
Spoke with Mr Sean Samaniego. He stated that patient had contacted him about some type of insect that maybe in her home and causing skin irritation. Patient has not been able to bring in some type of specimen to him to identify so he recommended she see her PCP. So if you have any suggestions please let him know . [phone]

## 2021-04-28 NOTE — TELEPHONE ENCOUNTER
----- Message from Alanis Hernandez sent at 4/28/2021  2:57 PM EDT -----  Regarding: Dr. Epstein Face Message/Vendor Calls    Caller's first and last name: pt      Reason for call: needs update      Callback required yes/no and why: yes      Best contact number(s): [phone]      Details to clarify the request: needs call back with answer from  that they had discussed at her appointment.       Alanis Hernandez

## 2021-04-28 NOTE — TELEPHONE ENCOUNTER
Spoke with Mr Mcelroyio Jody and advised him of Do Edgar's message below . His suggestion is that she contact a professional pest control agency . Since he can not identify without a specimen.

## 2021-04-29 ENCOUNTER — APPOINTMENT (OUTPATIENT)
Dept: GENERAL RADIOLOGY | Age: 56
End: 2021-04-29
Attending: EMERGENCY MEDICINE
Payer: MEDICARE

## 2021-04-29 ENCOUNTER — HOSPITAL ENCOUNTER (EMERGENCY)
Age: 56
Discharge: HOME | End: 2021-04-29
Attending: EMERGENCY MEDICINE
Payer: MEDICARE

## 2021-04-29 ENCOUNTER — APPOINTMENT (OUTPATIENT)
Dept: CT IMAGING | Age: 56
End: 2021-04-29
Attending: EMERGENCY MEDICINE
Payer: MEDICARE

## 2021-04-29 VITALS
HEART RATE: 80 BPM | TEMPERATURE: 98.4 F | OXYGEN SATURATION: 98 % | WEIGHT: 150 LBS | SYSTOLIC BLOOD PRESSURE: 148 MMHG | RESPIRATION RATE: 18 BRPM | DIASTOLIC BLOOD PRESSURE: 73 MMHG | BODY MASS INDEX: 26.57 KG/M2

## 2021-04-29 DIAGNOSIS — T71.193A ASSAULT BY MANUAL STRANGULATION: Primary | ICD-10-CM

## 2021-04-29 DIAGNOSIS — E10.65 TYPE 1 DIABETES MELLITUS WITH HYPERGLYCEMIA (HCC): ICD-10-CM

## 2021-04-29 LAB
ALBUMIN SERPL-MCNC: 3.4 G/DL (ref 3.5–5)
ALBUMIN/GLOB SERPL: 1.1 {RATIO} (ref 1.1–2.2)
ALP SERPL-CCNC: 94 U/L (ref 45–117)
ALT SERPL-CCNC: 31 U/L (ref 12–78)
ANION GAP SERPL CALC-SCNC: 10 MMOL/L (ref 5–15)
AST SERPL-CCNC: 26 U/L (ref 15–37)
BASOPHILS # BLD: 0 K/UL (ref 0–0.1)
BASOPHILS NFR BLD: 1 % (ref 0–1)
BILIRUB SERPL-MCNC: 0.8 MG/DL (ref 0.2–1)
BUN SERPL-MCNC: 13 MG/DL (ref 6–20)
BUN/CREAT SERPL: 13 (ref 12–20)
CALCIUM SERPL-MCNC: 9.4 MG/DL (ref 8.5–10.1)
CHLORIDE SERPL-SCNC: 97 MMOL/L (ref 97–108)
CO2 SERPL-SCNC: 27 MMOL/L (ref 21–32)
COMMENT, HOLDF: NORMAL
CREAT SERPL-MCNC: 0.98 MG/DL (ref 0.55–1.02)
DIFFERENTIAL METHOD BLD: ABNORMAL
EOSINOPHIL # BLD: 0 K/UL (ref 0–0.4)
EOSINOPHIL NFR BLD: 0 % (ref 0–7)
ERYTHROCYTE [DISTWIDTH] IN BLOOD BY AUTOMATED COUNT: 11.8 % (ref 11.5–14.5)
GLOBULIN SER CALC-MCNC: 3 G/DL (ref 2–4)
GLUCOSE BLD STRIP.AUTO-MCNC: 310 MG/DL (ref 65–100)
GLUCOSE SERPL-MCNC: 286 MG/DL (ref 65–100)
HCT VFR BLD AUTO: 36 % (ref 35–47)
HGB BLD-MCNC: 12.4 G/DL (ref 11.5–16)
IMM GRANULOCYTES # BLD AUTO: 0 K/UL (ref 0–0.04)
IMM GRANULOCYTES NFR BLD AUTO: 0 % (ref 0–0.5)
INR PPP: 1 (ref 0.9–1.1)
LYMPHOCYTES # BLD: 0.8 K/UL (ref 0.8–3.5)
LYMPHOCYTES NFR BLD: 14 % (ref 12–49)
MCH RBC QN AUTO: 30.2 PG (ref 26–34)
MCHC RBC AUTO-ENTMCNC: 34.4 G/DL (ref 30–36.5)
MCV RBC AUTO: 87.6 FL (ref 80–99)
MONOCYTES # BLD: 0.5 K/UL (ref 0–1)
MONOCYTES NFR BLD: 8 % (ref 5–13)
NEUTS SEG # BLD: 4.5 K/UL (ref 1.8–8)
NEUTS SEG NFR BLD: 77 % (ref 32–75)
NRBC # BLD: 0 K/UL (ref 0–0.01)
NRBC BLD-RTO: 0 PER 100 WBC
PLATELET # BLD AUTO: 207 K/UL (ref 150–400)
PMV BLD AUTO: 8 FL (ref 8.9–12.9)
POTASSIUM SERPL-SCNC: 3.8 MMOL/L (ref 3.5–5.1)
PROT SERPL-MCNC: 6.4 G/DL (ref 6.4–8.2)
PROTHROMBIN TIME: 10.1 SEC (ref 9–11.1)
RBC # BLD AUTO: 4.11 M/UL (ref 3.8–5.2)
SAMPLES BEING HELD,HOLD: NORMAL
SERVICE CMNT-IMP: ABNORMAL
SODIUM SERPL-SCNC: 134 MMOL/L (ref 136–145)
TROPONIN I SERPL-MCNC: <0.05 NG/ML
WBC # BLD AUTO: 5.8 K/UL (ref 3.6–11)

## 2021-04-29 PROCEDURE — 36415 COLL VENOUS BLD VENIPUNCTURE: CPT

## 2021-04-29 PROCEDURE — 82962 GLUCOSE BLOOD TEST: CPT

## 2021-04-29 PROCEDURE — 71045 X-RAY EXAM CHEST 1 VIEW: CPT

## 2021-04-29 PROCEDURE — 84484 ASSAY OF TROPONIN QUANT: CPT

## 2021-04-29 PROCEDURE — 70498 CT ANGIOGRAPHY NECK: CPT

## 2021-04-29 PROCEDURE — 70450 CT HEAD/BRAIN W/O DYE: CPT

## 2021-04-29 PROCEDURE — 99285 EMERGENCY DEPT VISIT HI MDM: CPT

## 2021-04-29 PROCEDURE — 93005 ELECTROCARDIOGRAM TRACING: CPT

## 2021-04-29 PROCEDURE — 85610 PROTHROMBIN TIME: CPT

## 2021-04-29 PROCEDURE — 85025 COMPLETE CBC W/AUTO DIFF WBC: CPT

## 2021-04-29 PROCEDURE — 80053 COMPREHEN METABOLIC PANEL: CPT

## 2021-04-29 PROCEDURE — 74011000636 HC RX REV CODE- 636: Performed by: EMERGENCY MEDICINE

## 2021-04-29 RX ADMIN — IOPAMIDOL 100 ML: 755 INJECTION, SOLUTION INTRAVENOUS at 13:45
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
## 2021-04-29 NOTE — ED NOTES
This writer spoke to Kasie Roman from [place] and gave appropriate information regarding this pt and incident
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
## 2021-04-30 ENCOUNTER — TELEPHONE (OUTPATIENT)
Dept: FAMILY MEDICINE CLINIC | Age: 56
End: 2021-04-30
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
## 2021-04-30 NOTE — TELEPHONE ENCOUNTER
Spoke with patient . Still having symptoms of throat felling swollen and headache. Advised to be seen in ER ASAP. Patient stated understanding and agreed.

## 2021-05-02 LAB
ATRIAL RATE: 84 BPM
CALCULATED P AXIS, ECG09: 55 DEGREES
CALCULATED R AXIS, ECG10: -13 DEGREES
CALCULATED T AXIS, ECG11: 23 DEGREES
DIAGNOSIS, 93000: NORMAL
P-R INTERVAL, ECG05: 146 MS
Q-T INTERVAL, ECG07: 384 MS
QRS DURATION, ECG06: 90 MS
QTC CALCULATION (BEZET), ECG08: 453 MS
VENTRICULAR RATE, ECG03: 84 BPM

## 2021-05-04 ENCOUNTER — TELEPHONE (OUTPATIENT)
Dept: FAMILY MEDICINE CLINIC | Age: 56
End: 2021-05-04

## 2021-05-04 DIAGNOSIS — Z98.890 HX OF BRAIN SURGERY: Primary | ICD-10-CM
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
## 2021-05-05 ENCOUNTER — TRANSCRIBE ORDER (OUTPATIENT)
Dept: SCHEDULING | Age: 56
End: 2021-05-05

## 2021-05-05 DIAGNOSIS — M25.551 RIGHT HIP PAIN: Primary | ICD-10-CM

## 2021-05-05 DIAGNOSIS — M25.552 LEFT HIP PAIN: ICD-10-CM

## 2021-05-05 PROBLEM — M70.62 TROCHANTERIC BURSITIS OF LEFT HIP: Status: ACTIVE | Noted: 2021-03-29

## 2021-05-05 PROBLEM — M70.61 TROCHANTERIC BURSITIS OF RIGHT HIP: Status: ACTIVE | Noted: 2021-03-29

## 2021-05-05 NOTE — TELEPHONE ENCOUNTER
Pt saw Dr. Krish Philip on 6/19/19 for a spinal fluid leak, but was unhappy with his care. Pt would like to change providers to Dr. Onofre South, as she was recommended to him by her friend.  Pt is having similar symptoms as to when she had the previous spinal fluid leak

## 2021-05-06 ENCOUNTER — TELEPHONE (OUTPATIENT)
Dept: FAMILY MEDICINE CLINIC | Age: 56
End: 2021-05-06

## 2021-05-06 NOTE — TELEPHONE ENCOUNTER
Dr. Navya Watters office has scheduled with patient. FYI - Patient notes she's seeing a surgeon today about an enlarged appendix.

## 2021-05-06 NOTE — TELEPHONE ENCOUNTER
Called pt back, pt called to make Dr. Nawaf Vazquez aware that she has an enlarged appendix. She is scheduled to see Dr. Deep Agee in [address] [place] [address].  KT

## 2021-05-06 NOTE — TELEPHONE ENCOUNTER
----- Message from Lissa Glover sent at 5/5/2021  3:21 PM EDT -----  Regarding: [employer]/Telephone  General Message/Vendor Calls    Caller's first and last name:n/a      Reason for call: reporting recently in the hospital and wants to discuss a neurologist referral      Callback required yes/no and why:yes      Best contact number(s): [phone]      Details to clarify the request: n/a      Lissa Glover

## 2021-05-12 ENCOUNTER — TELEPHONE (OUTPATIENT)
Dept: ENDOCRINOLOGY | Age: 56
End: 2021-05-12

## 2021-05-13 ENCOUNTER — TELEPHONE (OUTPATIENT)
Dept: ENDOCRINOLOGY | Age: 56
End: 2021-05-13

## 2021-05-13 NOTE — TELEPHONE ENCOUNTER
[facility] [place] [facility]  She is in [facility] that [employer] stopped working was in 65-70  BG was 279 before bed  BG was >400 when she woke up   BG is >600 currently despite several extra doses of humalog   Just finished abx for possible appendicitis - was having RLQ pain - appendix enlarged on CT   Was offered surgery but didnt want to try it, took abx BID for 10 days   Never had repeat labs or imaging   Has not missed levdickir, was not in the car     A/P:   Needs to have CBC, CMP etc to make sure this isnt ruptured appendicitis   May present atypically given DM1   Seek care tonight to ensure it is not appendix related or infection   If not we can send in more humalog - rule out serious infection first       Jennifer paged again at about 00:15 to let me know she was in the ER but she had  Been there for 30 minutes and was not seen yet. Was asking again if I would send in   Lispro rx. I explained that my recommendations had not changed and she needed  To be seen by an MD, have labs drawn at a bare minimum prior to   Us refilling the lispro. SHe was understanding.      Osei Wyatt, [address] [facility]

## 2021-05-13 NOTE — PROGRESS NOTES
Returned page from [place] rao at [employer]  She is in [facility] that [employer] stopped working was in 65-70  BG was 279 before bed  BG was >400 when she woke up   BG is >600 currently despite several extra doses of humalog   Just finished abx for possible appendicitis - was having RLQ pain - appendix enlarged on CT   Was offered surgery but didnt want to try it, took abx BID for 10 days   Never had repeat labs or imaging   Has not missed ana m, was not in the car     A/P:   Needs to have CBC, CMP etc to make sure this isnt ruptured appendicitis   May present atypically given DM1   Seek care tonight to ensure it is not appendix related or infection   If not we can send in more humalog - rule out serious infection first

## 2021-05-19 ENCOUNTER — HOSPITAL ENCOUNTER (OUTPATIENT)
Dept: MRI IMAGING | Age: 56
Discharge: HOME | End: 2021-05-19
Attending: ORTHOPAEDIC SURGERY
Payer: MEDICARE

## 2021-05-19 ENCOUNTER — TELEPHONE (OUTPATIENT)
Dept: ENDOCRINOLOGY | Age: 56
End: 2021-05-19

## 2021-05-19 DIAGNOSIS — M25.552 LEFT HIP PAIN: ICD-10-CM

## 2021-05-19 DIAGNOSIS — M25.551 RIGHT HIP PAIN: ICD-10-CM

## 2021-05-19 PROCEDURE — 73721 MRI JNT OF LWR EXTRE W/O DYE: CPT

## 2021-05-19 NOTE — TELEPHONE ENCOUNTER
----- Message from Esther Husbands sent at 5/19/2021  9:50 AM EDT -----  Regarding: Dr Vadim Le  General Message/Vendor Calls    Caller's first and last name: N/A      Reason for call: Pt would like to discuss her diabetes      Callback required yes/no and why: yes, to discuss her diabetes      Best contact number(s): [phone]      Details to clarify the request: Pt would like to discuss her diabetes      Esther Husbands

## 2021-05-19 NOTE — TELEPHONE ENCOUNTER
LANE: Spoke with patient. She states that she has gotten back from [place]. She picked up new Humalog. Her blood sugar was 45 this am and in the 30s recently. She normally takes 2-6 units per sliding scale with each meal.   Her blood sugar is now 279 and lower. She now has good test strips and Humalog. (her Humalog had gotten warm in [place] and her blood sugars were in the 400-600 range). She states that she did go to the ER. No appendicitis. Was transferred to [facility] for possible small stroke again. She said that [facility] will draw her blood prior to her appt with . She states that this is all just informational for .

## 2021-05-23 ENCOUNTER — HOSPITAL ENCOUNTER (EMERGENCY)
Age: 56
Discharge: ANOTHER HOSPITAL | End: 2021-05-23
Attending: EMERGENCY MEDICINE
Payer: MEDICARE

## 2021-05-23 ENCOUNTER — APPOINTMENT (OUTPATIENT)
Dept: CT IMAGING | Age: 56
End: 2021-05-23
Attending: EMERGENCY MEDICINE
Payer: MEDICARE

## 2021-05-23 ENCOUNTER — HOSPITAL ENCOUNTER (INPATIENT)
Age: 56
LOS: 2 days | Discharge: HOME | DRG: 066 | End: 2021-05-25
Attending: FAMILY MEDICINE | Admitting: FAMILY MEDICINE
Payer: MEDICARE

## 2021-05-23 VITALS
HEART RATE: 73 BPM | TEMPERATURE: 98.6 F | DIASTOLIC BLOOD PRESSURE: 73 MMHG | OXYGEN SATURATION: 98 % | HEIGHT: 63 IN | BODY MASS INDEX: 25.69 KG/M2 | SYSTOLIC BLOOD PRESSURE: 142 MMHG | RESPIRATION RATE: 14 BRPM | WEIGHT: 145 LBS

## 2021-05-23 DIAGNOSIS — R73.9 HYPERGLYCEMIA: ICD-10-CM

## 2021-05-23 DIAGNOSIS — E10.65 HYPERGLYCEMIA DUE TO TYPE 1 DIABETES MELLITUS (HCC): ICD-10-CM

## 2021-05-23 DIAGNOSIS — R27.0 LIMB ATAXIA: Primary | ICD-10-CM

## 2021-05-23 PROBLEM — I63.9 CVA (CEREBRAL VASCULAR ACCIDENT) (HCC): Status: ACTIVE | Noted: 2021-05-23

## 2021-05-23 LAB
ALBUMIN SERPL-MCNC: 3.6 G/DL (ref 3.5–5)
ALBUMIN/GLOB SERPL: 1.1 {RATIO} (ref 1.1–2.2)
ALP SERPL-CCNC: 98 U/L (ref 45–117)
ALT SERPL-CCNC: 26 U/L (ref 12–78)
ANION GAP SERPL CALC-SCNC: 6 MMOL/L (ref 5–15)
APPEARANCE UR: CLEAR
AST SERPL-CCNC: 27 U/L (ref 15–37)
BACTERIA URNS QL MICRO: NEGATIVE /HPF
BASOPHILS # BLD: 0 K/UL (ref 0–0.1)
BASOPHILS NFR BLD: 0 % (ref 0–1)
BILIRUB SERPL-MCNC: 0.8 MG/DL (ref 0.2–1)
BILIRUB UR QL: NEGATIVE
BUN SERPL-MCNC: 12 MG/DL (ref 6–20)
BUN/CREAT SERPL: 13 (ref 12–20)
CALCIUM SERPL-MCNC: 9.5 MG/DL (ref 8.5–10.1)
CHLORIDE SERPL-SCNC: 98 MMOL/L (ref 97–108)
CK SERPL-CCNC: 56 U/L (ref 26–192)
CO2 SERPL-SCNC: 33 MMOL/L (ref 21–32)
COLOR UR: ABNORMAL
CREAT SERPL-MCNC: 0.9 MG/DL (ref 0.55–1.02)
DIFFERENTIAL METHOD BLD: ABNORMAL
EOSINOPHIL # BLD: 0 K/UL (ref 0–0.4)
EOSINOPHIL NFR BLD: 0 % (ref 0–7)
EPITH CASTS URNS QL MICRO: ABNORMAL /LPF
ERYTHROCYTE [DISTWIDTH] IN BLOOD BY AUTOMATED COUNT: 11.9 % (ref 11.5–14.5)
FLUAV RNA SPEC QL NAA+PROBE: NOT DETECTED
FLUBV RNA SPEC QL NAA+PROBE: NOT DETECTED
GLOBULIN SER CALC-MCNC: 3.3 G/DL (ref 2–4)
GLUCOSE BLD STRIP.AUTO-MCNC: 117 MG/DL (ref 65–117)
GLUCOSE BLD STRIP.AUTO-MCNC: 225 MG/DL (ref 65–117)
GLUCOSE BLD STRIP.AUTO-MCNC: 245 MG/DL (ref 65–117)
GLUCOSE BLD STRIP.AUTO-MCNC: 288 MG/DL (ref 65–117)
GLUCOSE SERPL-MCNC: 321 MG/DL (ref 65–100)
GLUCOSE UR STRIP.AUTO-MCNC: >1000 MG/DL
HCT VFR BLD AUTO: 36.9 % (ref 35–47)
HGB BLD-MCNC: 12.7 G/DL (ref 11.5–16)
HGB UR QL STRIP: NEGATIVE
IMM GRANULOCYTES # BLD AUTO: 0 K/UL (ref 0–0.04)
IMM GRANULOCYTES NFR BLD AUTO: 0 % (ref 0–0.5)
INR PPP: 1 (ref 0.9–1.1)
KETONES UR QL STRIP.AUTO: NEGATIVE MG/DL
LACTATE SERPL-SCNC: 1.3 MMOL/L (ref 0.4–2)
LEUKOCYTE ESTERASE UR QL STRIP.AUTO: NEGATIVE
LYMPHOCYTES # BLD: 0.9 K/UL (ref 0.8–3.5)
LYMPHOCYTES NFR BLD: 19 % (ref 12–49)
MCH RBC QN AUTO: 30.5 PG (ref 26–34)
MCHC RBC AUTO-ENTMCNC: 34.4 G/DL (ref 30–36.5)
MCV RBC AUTO: 88.5 FL (ref 80–99)
MONOCYTES # BLD: 0.5 K/UL (ref 0–1)
MONOCYTES NFR BLD: 10 % (ref 5–13)
NEUTS SEG # BLD: 3.3 K/UL (ref 1.8–8)
NEUTS SEG NFR BLD: 71 % (ref 32–75)
NITRITE UR QL STRIP.AUTO: NEGATIVE
NRBC # BLD: 0 K/UL (ref 0–0.01)
NRBC BLD-RTO: 0 PER 100 WBC
PH UR STRIP: 6 [PH] (ref 5–8)
PLATELET # BLD AUTO: 218 K/UL (ref 150–400)
PMV BLD AUTO: 8.6 FL (ref 8.9–12.9)
POTASSIUM SERPL-SCNC: 3.8 MMOL/L (ref 3.5–5.1)
PROT SERPL-MCNC: 6.9 G/DL (ref 6.4–8.2)
PROT UR STRIP-MCNC: NEGATIVE MG/DL
PROTHROMBIN TIME: 9.6 SEC (ref 9–11.1)
RBC # BLD AUTO: 4.17 M/UL (ref 3.8–5.2)
RBC #/AREA URNS HPF: ABNORMAL /HPF (ref 0–5)
SARS-COV-2, COV2: NOT DETECTED
SERVICE CMNT-IMP: ABNORMAL
SERVICE CMNT-IMP: NORMAL
SODIUM SERPL-SCNC: 137 MMOL/L (ref 136–145)
SP GR UR REFRACTOMETRY: 1.01 (ref 1–1.03)
TROPONIN I SERPL-MCNC: <0.05 NG/ML
TROPONIN I SERPL-MCNC: <0.05 NG/ML
UA: UC IF INDICATED,UAUC: ABNORMAL
UROBILINOGEN UR QL STRIP.AUTO: 0.2 EU/DL (ref 0.2–1)
WBC # BLD AUTO: 4.7 K/UL (ref 3.6–11)
WBC URNS QL MICRO: ABNORMAL /HPF (ref 0–4)

## 2021-05-23 PROCEDURE — 74011636637 HC RX REV CODE- 636/637: Performed by: FAMILY MEDICINE

## 2021-05-23 PROCEDURE — 85025 COMPLETE CBC W/AUTO DIFF WBC: CPT

## 2021-05-23 PROCEDURE — 80053 COMPREHEN METABOLIC PANEL: CPT

## 2021-05-23 PROCEDURE — 36415 COLL VENOUS BLD VENIPUNCTURE: CPT

## 2021-05-23 PROCEDURE — 74011250637 HC RX REV CODE- 250/637: Performed by: EMERGENCY MEDICINE

## 2021-05-23 PROCEDURE — 84484 ASSAY OF TROPONIN QUANT: CPT

## 2021-05-23 PROCEDURE — 74011250636 HC RX REV CODE- 250/636: Performed by: EMERGENCY MEDICINE

## 2021-05-23 PROCEDURE — 87636 SARSCOV2 & INF A&B AMP PRB: CPT

## 2021-05-23 PROCEDURE — 74011250637 HC RX REV CODE- 250/637: Performed by: FAMILY MEDICINE

## 2021-05-23 PROCEDURE — 85610 PROTHROMBIN TIME: CPT

## 2021-05-23 PROCEDURE — 82550 ASSAY OF CK (CPK): CPT

## 2021-05-23 PROCEDURE — 82962 GLUCOSE BLOOD TEST: CPT

## 2021-05-23 PROCEDURE — 65660000000 HC RM CCU STEPDOWN

## 2021-05-23 PROCEDURE — 81001 URINALYSIS AUTO W/SCOPE: CPT

## 2021-05-23 PROCEDURE — 99285 EMERGENCY DEPT VISIT HI MDM: CPT

## 2021-05-23 PROCEDURE — 70450 CT HEAD/BRAIN W/O DYE: CPT

## 2021-05-23 PROCEDURE — 83605 ASSAY OF LACTIC ACID: CPT

## 2021-05-23 PROCEDURE — 93005 ELECTROCARDIOGRAM TRACING: CPT

## 2021-05-23 PROCEDURE — 74011000258 HC RX REV CODE- 258: Performed by: FAMILY MEDICINE

## 2021-05-23 RX ORDER — MONTELUKAST SODIUM 10 MG/1
10 TABLET ORAL DAILY
Status: DISCONTINUED | OUTPATIENT
Start: 2021-05-23 | End: 2021-05-25 | Stop reason: HOSPADM

## 2021-05-23 RX ORDER — GABAPENTIN 100 MG/1
200 CAPSULE ORAL 2 TIMES DAILY
Status: DISCONTINUED | OUTPATIENT
Start: 2021-05-23 | End: 2021-05-25 | Stop reason: HOSPADM

## 2021-05-23 RX ORDER — ACETAMINOPHEN 325 MG/1
650 TABLET ORAL
Status: DISCONTINUED | OUTPATIENT
Start: 2021-05-23 | End: 2021-05-25 | Stop reason: HOSPADM

## 2021-05-23 RX ORDER — ASPIRIN 325 MG
325 TABLET ORAL ONCE
Status: DISCONTINUED | OUTPATIENT
Start: 2021-05-23 | End: 2021-05-23

## 2021-05-23 RX ORDER — ATORVASTATIN CALCIUM 40 MG/1
40 TABLET, FILM COATED ORAL
Status: DISCONTINUED | OUTPATIENT
Start: 2021-05-23 | End: 2021-05-25 | Stop reason: HOSPADM

## 2021-05-23 RX ORDER — LOSARTAN POTASSIUM 50 MG/1
50 TABLET ORAL DAILY
Status: DISCONTINUED | OUTPATIENT
Start: 2021-05-24 | End: 2021-05-25 | Stop reason: HOSPADM

## 2021-05-23 RX ORDER — SODIUM CHLORIDE 9 MG/ML
75 INJECTION, SOLUTION INTRAVENOUS CONTINUOUS
Status: DISCONTINUED | OUTPATIENT
Start: 2021-05-23 | End: 2021-05-23

## 2021-05-23 RX ORDER — FAMOTIDINE 20 MG/1
20 TABLET, FILM COATED ORAL EVERY 12 HOURS
Status: DISCONTINUED | OUTPATIENT
Start: 2021-05-23 | End: 2021-05-25 | Stop reason: HOSPADM

## 2021-05-23 RX ORDER — ACETAMINOPHEN 650 MG/1
650 SUPPOSITORY RECTAL
Status: DISCONTINUED | OUTPATIENT
Start: 2021-05-23 | End: 2021-05-25 | Stop reason: HOSPADM

## 2021-05-23 RX ORDER — ASCORBIC ACID 500 MG
4000 TABLET ORAL 2 TIMES DAILY
Status: DISCONTINUED | OUTPATIENT
Start: 2021-05-23 | End: 2021-05-25 | Stop reason: HOSPADM

## 2021-05-23 RX ORDER — GUAIFENESIN 100 MG/5ML
243 LIQUID (ML) ORAL
Status: COMPLETED | OUTPATIENT
Start: 2021-05-23 | End: 2021-05-23

## 2021-05-23 RX ORDER — LIDOCAINE 4 G/100G
2 PATCH TOPICAL EVERY 24 HOURS
Status: DISCONTINUED | OUTPATIENT
Start: 2021-05-24 | End: 2021-05-25 | Stop reason: HOSPADM

## 2021-05-23 RX ORDER — MONTELUKAST SODIUM 10 MG/1
10 TABLET ORAL DAILY
Status: DISCONTINUED | OUTPATIENT
Start: 2021-05-24 | End: 2021-05-25 | Stop reason: HOSPADM

## 2021-05-23 RX ORDER — SODIUM CHLORIDE 9 MG/ML
100 INJECTION, SOLUTION INTRAVENOUS ONCE
Status: COMPLETED | OUTPATIENT
Start: 2021-05-23 | End: 2021-05-23

## 2021-05-23 RX ORDER — IBUPROFEN 600 MG/1
600 TABLET ORAL 3 TIMES DAILY
Status: DISCONTINUED | OUTPATIENT
Start: 2021-05-24 | End: 2021-05-24

## 2021-05-23 RX ORDER — GUAIFENESIN 100 MG/5ML
81 LIQUID (ML) ORAL DAILY
Status: DISCONTINUED | OUTPATIENT
Start: 2021-05-24 | End: 2021-05-25 | Stop reason: HOSPADM

## 2021-05-23 RX ORDER — LEVOTHYROXINE SODIUM 75 UG/1
75 TABLET ORAL
Status: DISCONTINUED | OUTPATIENT
Start: 2021-05-24 | End: 2021-05-25 | Stop reason: HOSPADM

## 2021-05-23 RX ORDER — DEXTROSE MONOHYDRATE AND SODIUM CHLORIDE 5; .9 G/100ML; G/100ML
60 INJECTION, SOLUTION INTRAVENOUS CONTINUOUS
Status: DISCONTINUED | OUTPATIENT
Start: 2021-05-23 | End: 2021-05-24

## 2021-05-23 RX ORDER — TETRAHYDROZOLINE HCL 0.05 %
1 DROPS OPHTHALMIC (EYE) 4 TIMES DAILY
Status: DISCONTINUED | OUTPATIENT
Start: 2021-05-23 | End: 2021-05-24

## 2021-05-23 RX ORDER — INSULIN LISPRO 100 [IU]/ML
INJECTION, SOLUTION INTRAVENOUS; SUBCUTANEOUS
Status: DISCONTINUED | OUTPATIENT
Start: 2021-05-23 | End: 2021-05-24

## 2021-05-23 RX ORDER — ASCORBIC ACID 500 MG
8000 TABLET ORAL 2 TIMES DAILY
Status: DISCONTINUED | OUTPATIENT
Start: 2021-05-23 | End: 2021-05-23

## 2021-05-23 RX ORDER — MAGNESIUM SULFATE 100 %
4 CRYSTALS MISCELLANEOUS AS NEEDED
Status: DISCONTINUED | OUTPATIENT
Start: 2021-05-23 | End: 2021-05-25 | Stop reason: HOSPADM

## 2021-05-23 RX ORDER — ZOLPIDEM TARTRATE 5 MG/1
5 TABLET ORAL
Status: DISCONTINUED | OUTPATIENT
Start: 2021-05-23 | End: 2021-05-25 | Stop reason: HOSPADM

## 2021-05-23 RX ORDER — DEXTROSE 50 % IN WATER (D50W) INTRAVENOUS SYRINGE
25-50 AS NEEDED
Status: DISCONTINUED | OUTPATIENT
Start: 2021-05-23 | End: 2021-05-25 | Stop reason: HOSPADM

## 2021-05-23 RX ADMIN — ASPIRIN 81 MG CHEWABLE TABLET 243 MG: 81 TABLET CHEWABLE at 14:38

## 2021-05-23 RX ADMIN — GABAPENTIN 200 MG: 100 CAPSULE ORAL at 22:03

## 2021-05-23 RX ADMIN — INSULIN LISPRO 2 UNITS: 100 INJECTION, SOLUTION INTRAVENOUS; SUBCUTANEOUS at 22:36

## 2021-05-23 RX ADMIN — MONTELUKAST 10 MG: 10 TABLET, FILM COATED ORAL at 18:14

## 2021-05-23 RX ADMIN — SODIUM CHLORIDE 1000 ML: 9 INJECTION, SOLUTION INTRAVENOUS at 09:39

## 2021-05-23 RX ADMIN — OXYCODONE HYDROCHLORIDE AND ACETAMINOPHEN 4000 MG: 500 TABLET ORAL at 22:03

## 2021-05-23 RX ADMIN — ATORVASTATIN CALCIUM 40 MG: 40 TABLET, FILM COATED ORAL at 22:03

## 2021-05-23 RX ADMIN — DEXTROSE AND SODIUM CHLORIDE 60 ML/HR: 5; 900 INJECTION, SOLUTION INTRAVENOUS at 18:15

## 2021-05-23 RX ADMIN — MONTELUKAST 10 MG: 10 TABLET, FILM COATED ORAL at 18:13

## 2021-05-23 RX ADMIN — SODIUM CHLORIDE 100 ML/HR: 9 INJECTION, SOLUTION INTRAVENOUS at 12:07

## 2021-05-23 NOTE — ED PROVIDER NOTES
EMERGENCY DEPARTMENT HISTORY AND PHYSICAL EXAM          Date: 5/23/2021  Patient Name: Jhon Hancock    History of Presenting Illness     Chief Complaint   Patient presents with    Dehydration       History Provided By: Patient    HPI: Jhon Hancock is a 61 y.o. female, pmhx diabetes, arthritis, CAD, who presents ambulatory to the ED c/o weakness    Patient explains she woke up Saturday feeling weird in her whole body Saturday morning. She states she thinks it secondary to working out in the sun to help some folks for a few hours on Friday. She states her symptoms have not resolved and her head feels \"weird\", her back hurts but otherwise she denies dizziness, weakness, numbness, chest pain, abdominal pain, vomiting or diarrhea. She notes her stools have been a little loose but she would not consider them diarrhea, and she is had a little bit of nausea but she has not had any vomiting. She denies any green, black or red stools. She also states \"they thought I had appendicitis 2 weeks ago\" so she has been on antibiotics for 10 days and just finished the medication. PCP: Joselyn Barber DO    Allergies: None known  Social Hx: Previous tobacco, -vaping, -EtOH, -Illicit Drugs; There are no other complaints, changes, or physical findings at this time.      Facility-Administered Medications Ordered in Other Encounters   Medication Dose Route Frequency Provider Last Rate Last Admin    montelukast (SINGULAIR) tablet 10 mg  10 mg Oral DAILY Selena Carrion MD   10 mg at 05/23/21 1814    [START ON 5/24/2021] aspirin chewable tablet 81 mg  81 mg Oral DAILY MD Teo Rob ON 5/24/2021] levothyroxine (SYNTHROID) tablet 75 mcg  75 mcg Oral Aparna Wolf MD        atorvastatin (LIPITOR) tablet 40 mg  40 mg Oral QHS Selena Carrion MD        acetaminophen (TYLENOL) tablet 650 mg  650 mg Oral Q4H PRN Selena Carrion MD        Or    acetaminophen (TYLENOL) solution 650 mg  650 mg Per NG tube Q4H PRN Luke Mode, MD        Or   [facility] acetaminophen (TYLENOL) suppository 650 mg  650 mg Rectal Q4H PRN Greybull Mode, MD        famotidine (PEPCID) tablet 20 mg  20 mg Oral Q12H Greybull Mode, MD        dextrose 5% and 0.9% NaCl infusion  60 mL/hr IntraVENous CONTINUOUS Luke Mode, MD 60 mL/hr at 21 1815 60 mL/hr at 21 181    glucose chewable tablet 16 g  4 Tablet Oral PRN Greybull Mode, MD        dextrose (D50W) injection syrg 12.5-25 g  25-50 mL IntraVENous PRN Greybull Mode, MD        glucagon (GLUCAGEN) injection 1 mg  1 mg IntraMUSCular PRN Greybull Mode, MD        insulin lispro (HUMALOG) injection   SubCUTAneous AC&HS Luke Mode, MD           Past History     Past Medical History:  Past Medical History:   Diagnosis Date    Arthritis     patient states \"every joint affected\"    Bee sting 2018    left elbow bee sting     Blister of ankle 2018    Blister small per patient of left ankle. Wears an air boot due to 2 stress fractures in last 2 months.     CAD (coronary artery disease)     Constipation     Falls 2017    pt reports having 4 falls in 3 days    Fatigue     Headache     Ill-defined condition     multiple broken ribs    Ill-defined condition     reports \"getting infections easily\"    Joint pain     Memory disorder     following spinal fluid leak repair    Menopause     Nausea & vomiting     Neuropathy     Osteoporosis     Thyroid disease     Type 1 diabetes ([facility] [address]     diagnosed at age 8    Unspecified cerebral artery occlusion with cerebral infarction     x 4 (last in )       Past Surgical History:  Past Surgical History:   Procedure Laterality Date    HX CARPAL TUNNEL RELEASE Right 2018    HX  SECTION      HX  SECTION      HX CHOLECYSTECTOMY      HX HYSTERECTOMY  2006    HX ORTHOPAEDIC      frozen shoulder surgery x 3    HX ORTHOPAEDIC      frozen finger surgery x 8    HX ORTHOPAEDIC Left     wrist surgery    HX ORTHOPAEDIC Right     hand surgery    HX ORTHOPAEDIC Left     foot surgery    HX OTHER SURGICAL      spinal fluid leak repair (spinal fluid leaking from nose, remove nose, cut fat from stomach, use that as patch behind nose, nose replaced)    HX ROTATOR CUFF REPAIR Left     HX ROTATOR CUFF REPAIR Right     HX TONSILLECTOMY  1968       Family History:  Family History   Problem Relation Age of Onset    Heart Disease Mother     Hypertension Mother     No Known Problems Father     Heart Disease Maternal Grandfather     Cancer Maternal Aunt         Pancreatic and Liver    Cancer Maternal Grandmother         Lung    Diabetes Maternal Grandmother     Cancer Maternal Aunt         Breast    Breast Cancer Maternal Aunt     Diabetes Maternal Aunt        Social History:  Social History     Tobacco Use    Smoking status: Former Smoker     Packs/day: 0.50     Years: 8.00     Pack years: 4.00     Quit date: 4/10/2007     Years since quittin.1    Smokeless tobacco: Never Used   Vaping Use    Vaping Use: Never used   Substance Use Topics    Alcohol use: No    Drug use: No       Allergies:  No Known Allergies    Review of Systems   Review of Systems   Constitutional: Positive for fatigue. Negative for activity change, appetite change, chills, fever and unexpected weight change. HENT: Negative for congestion. Eyes: Negative for pain and visual disturbance. Respiratory: Negative for cough and shortness of breath. Cardiovascular: Negative for chest pain. Gastrointestinal: Negative for abdominal pain, diarrhea, nausea and vomiting. Genitourinary: Negative for dysuria. Musculoskeletal: Negative for back pain. Skin: Negative for rash. Neurological: Positive for dizziness, weakness and headaches. Physical Exam   Physical Exam  Vitals and nursing note reviewed. Constitutional:       Appearance: She is well-developed. She is not diaphoretic.       Comments: Thin middle-aged female, laying with her eyes closed currently in mild to moderate distress   HENT:      Head: Normocephalic and atraumatic. Eyes:      General:         Right eye: No discharge. Left eye: No discharge. Conjunctiva/sclera: Conjunctivae normal.      Pupils: Pupils are equal, round, and reactive to light. Cardiovascular:      Rate and Rhythm: Normal rate and regular rhythm. Heart sounds: Normal heart sounds. No murmur heard. Pulmonary:      Effort: Pulmonary effort is normal. No respiratory distress. Breath sounds: Normal breath sounds. No wheezing or rales. Abdominal:      General: Bowel sounds are normal. There is no distension. Palpations: Abdomen is soft. Tenderness: There is no abdominal tenderness. Musculoskeletal:         General: Normal range of motion. Cervical back: Normal range of motion and neck supple. Skin:     General: Skin is warm and dry. Coloration: Skin is not pale. Findings: No erythema or rash. Neurological:      Mental Status: She is alert and oriented to person, place, and time. GCS: GCS eye subscore is 4. GCS verbal subscore is 5. GCS motor subscore is 6. Cranial Nerves: No cranial nerve deficit. Sensory: No sensory deficit. Motor: No weakness or abnormal muscle tone.       Coordination: Finger-Nose-Finger Test abnormal and Heel to [employer] abnormal.      Comments: Dysmetria noted right upper and lower extremity       Diagnostic Study Results     Labs -     Recent Results (from the past 12 hour(s))   CBC WITH AUTOMATED DIFF    Collection Time: 05/23/21  9:42 AM   Result Value Ref Range    WBC 4.7 3.6 - 11.0 K/uL    RBC 4.17 3.80 - 5.20 M/uL    HGB 12.7 11.5 - 16.0 g/dL    HCT 36.9 35.0 - 47.0 %    MCV 88.5 80.0 - 99.0 FL    MCH 30.5 26.0 - 34.0 PG    MCHC 34.4 30.0 - 36.5 g/dL    RDW 11.9 11.5 - 14.5 %    PLATELET 812 764 - 590 K/uL    MPV 8.6 (L) 8.9 - 12.9 FL    NRBC 0.0 0  WBC ABSOLUTE NRBC 0.00 0.00 - 0.01 K/uL    NEUTROPHILS 71 32 - 75 %    LYMPHOCYTES 19 12 - 49 %    MONOCYTES 10 5 - 13 %    EOSINOPHILS 0 0 - 7 %    BASOPHILS 0 0 - 1 %    IMMATURE GRANULOCYTES 0 0.0 - 0.5 %    ABS. NEUTROPHILS 3.3 1.8 - 8.0 K/UL    ABS. LYMPHOCYTES 0.9 0.8 - 3.5 K/UL    ABS. MONOCYTES 0.5 0.0 - 1.0 K/UL    ABS. EOSINOPHILS 0.0 0.0 - 0.4 K/UL    ABS. BASOPHILS 0.0 0.0 - 0.1 K/UL    ABS. IMM. GRANS. 0.0 0.00 - 0.04 K/UL    DF AUTOMATED     METABOLIC PANEL, COMPREHENSIVE    Collection Time: 05/23/21  9:42 AM   Result Value Ref Range    Sodium 137 136 - 145 mmol/L    Potassium 3.8 3.5 - 5.1 mmol/L    Chloride 98 97 - 108 mmol/L    CO2 33 (H) 21 - 32 mmol/L    Anion gap 6 5 - 15 mmol/L    Glucose 321 (H) 65 - 100 mg/dL    BUN 12 6 - 20 MG/DL    Creatinine 0.90 0.55 - 1.02 MG/DL    BUN/Creatinine ratio 13 12 - 20      GFR est AA >60 >60 ml/min/1.73m2    GFR est non-AA >60 >60 ml/min/1.73m2    Calcium 9.5 8.5 - 10.1 MG/DL    Bilirubin, total 0.8 0.2 - 1.0 MG/DL    ALT (SGPT) 26 12 - 78 U/L    AST (SGOT) 27 15 - 37 U/L    Alk.  phosphatase 98 45 - 117 U/L    Protein, total 6.9 6.4 - 8.2 g/dL    Albumin 3.6 3.5 - 5.0 g/dL    Globulin 3.3 2.0 - 4.0 g/dL    A-G Ratio 1.1 1.1 - 2.2     PROTHROMBIN TIME + INR    Collection Time: 05/23/21  9:42 AM   Result Value Ref Range    INR 1.0 0.9 - 1.1      Prothrombin time 9.6 9.0 - 11.1 sec   TROPONIN I    Collection Time: 05/23/21  9:42 AM   Result Value Ref Range    Troponin-I, Qt. <0.05 <0.05 ng/mL   LACTIC ACID    Collection Time: 05/23/21  9:42 AM   Result Value Ref Range    Lactic acid 1.3 0.4 - 2.0 MMOL/L   URINALYSIS W/ REFLEX CULTURE    Collection Time: 05/23/21  9:42 AM    Specimen: Urine   Result Value Ref Range    Color YELLOW/STRAW      Appearance CLEAR CLEAR      Specific gravity 1.007 1.003 - 1.030      pH (UA) 6.0 5.0 - 8.0      Protein Negative NEG mg/dL    Glucose >1,000 (A) NEG mg/dL    Ketone Negative NEG mg/dL    Bilirubin Negative NEG      Blood Negative NEG      Urobilinogen 0.2 0.2 - 1.0 EU/dL    Nitrites Negative NEG      Leukocyte Esterase Negative NEG      WBC 0-4 0 - 4 /hpf    RBC 0-5 0 - 5 /hpf    Epithelial cells FEW FEW /lpf    Bacteria Negative NEG /hpf    UA:UC IF INDICATED CULTURE NOT INDICATED BY UA RESULT CNI     EKG, 12 LEAD, INITIAL    Collection Time: 05/23/21  9:50 AM   Result Value Ref Range    Ventricular Rate 73 BPM    Atrial Rate 73 BPM    P-R Interval 152 ms    QRS Duration 92 ms    Q-T Interval 392 ms    QTC Calculation (Bezet) 431 ms    Calculated P Axis 66 degrees    Calculated R Axis 1 degrees    Calculated T Axis 33 degrees    Diagnosis       Normal sinus rhythm  Anterior infarct , age undetermined  Abnormal ECG  When compared with ECG of 29-APR-2021 14:23,  Nonspecific T wave abnormality now evident in Anterior leads     GLUCOSE, POC    Collection Time: 05/23/21 10:10 AM   Result Value Ref Range    Glucose (POC) 288 (H) 65 - 117 mg/dL    Performed by Kulwinder Alcantar (RN)    GLUCOSE, POC    Collection Time: 05/23/21 11:54 AM   Result Value Ref Range    Glucose (POC) 225 (H) 65 - 117 mg/dL    Performed by Kulwinedr Alcantar (RN)    COVID-19 WITH INFLUENZA A/B    Collection Time: 05/23/21  2:42 PM   Result Value Ref Range    SARS-CoV-2 Not detected NOTD      Influenza A by PCR Not detected NOTD      Influenza B by PCR Not detected NOTD     GLUCOSE, POC    Collection Time: 05/23/21  6:36 PM   Result Value Ref Range    Glucose (POC) 117 65 - 117 mg/dL    Performed by Loretta Jacob        Radiologic Studies -   CT HEAD WO CONT   Final Result   No acute intracranial process. CT Results  (Last 48 hours)               05/23/21 1011  CT HEAD WO CONT Final result    Impression:  No acute intracranial process. Narrative:  CLINICAL HISTORY: weakness, rt sided dysmmetria   INDICATION: weakness, rt sided dysmmetria   COMPARISON: 4/29/2021.    CT dose reduction was achieved through use of a standardized protocol tailored for this examination and automatic exposure control for dose modulation. TECHNIQUE: Serial axial images with a collimation of 5 mm were obtained from the   skull base through the vertex     FINDINGS:    The sulci and ventricles are within normal limits for patient age. There is no   evidence of an acute infarction, hemorrhage, or mass-effect. There is no   evidence of midline shift or hydrocephalus. Posterior fossa structures are   unremarkable. No extra-axial collections are seen. Mastoid air cells are well pneumatized and clear. There is no evidence of depressed skull fractures of soft tissue swelling. CXR Results  (Last 48 hours)    None          Medical Decision Making   I am the first provider for this patient. I reviewed the vital signs, available nursing notes, past medical history, past surgical history, family history and social history. Vital Signs-Reviewed the patient's vital signs. Patient Vitals for the past 12 hrs:   Temp Pulse Resp BP SpO2   05/23/21 1454  73 14 (!) 142/73 98 %   05/23/21 1400  72 17 (!) 143/69 97 %   05/23/21 1330  73 18 136/76 99 %   05/23/21 1130  80 20 (!) 162/74 99 %   05/23/21 1100  75 15 (!) 142/73 99 %   05/23/21 1030  76 19 (!) 153/75 98 %   05/23/21 0950  78 17  99 %   05/23/21 0940 98.6 °F (37 °C) (!) 106 16 (!) 159/84 98 %       Pulse Oximetry Analysis - 98% on RA    Cardiac Monitor:   Rate: 80bpm  Rhythm: Normal Sinus Rhythm      Records Reviewed: Nursing Notes, Old Medical Records, Previous Radiology Studies and Previous Laboratory Studies   [place] [facility] note 5/1/2021. Patient was given Augmentin 4/30/2021 for possible appendicitis with normal lactic acid and normal CBC. She was discharged home but then returned back to the hospital following day with increased pain but again evaluated with normal labs and discharged home. She followed up in surgery clinic 5/6/2021 and 5/10/2021 and was cleared.     Provider Notes (Medical Decision Making):   MDM: Middle-aged diabetic presenting with 24 hours of not doing well. She states she had sick stroke in the past.  On review of her records I am not seeing any CVA but she has been diagnosed with TIA in the past.  She is on a statin at 40 mg but without any evidence of anticoagulant use. Currently on exam she has dysmetria right upper and right lower extremity but is been greater than 24 hours at least.  We will start CTA and stroke evaluation today and possibly admit for MRI based on how she is doing and feeling after fluid and medication management. In record I have also noted that patient has a history of feeling poorly after CSF leak. Query whether this is part of her symptoms as she started feeling bad the beginning of May and requested neurology referral by her PCP Dr. Ofelia Burch of [facility] neurology. ED Course:   Initial assessment performed. The patients presenting problems have been discussed, and they are in agreement with the care plan formulated and outlined with them. I have encouraged them to ask questions as they arise throughout their visit. EKG interpretation: (Preliminary)  Rhythm: Sinus rhythm at a rate of 73 bpm; normal UT; normal QRS; normal QTC. Poor R wave progression noted with T wave flattening in 3 and V4 with T wave inversions in V3. Unable to visually compared to prior EKG from April 2021 but it seems according to the report that the R wave progression changes were already present. This EKG was interpreted by ED Provider Lucio Councilman, MD    Repeat EKG at 4:48 PM: Normal sinus rhythm at a rate of 69 bpm; normal UT; normal QRS; normal QTC with left axis deviation. T wave flattening noted in lead III as well as V3. Appears similar to previous. EKG was interpreted by ED provider Nelli Henderson. MD Dayan.      PROGRESS NOTE:    ED Course as of May 23 1839   Sun May 23, 2021   1100 Pt re-evaluated and denies improvement with fluids.  Now complaining of right leg heaviness. Discussed admission with MRI tomorrow. Pt states she must be sedated. Discussed with house sup who will call to anesthesia to see if they are able/willing to sedate for MRI tomorrow    [JT]   1133 Apparently we have ability to provide anesthesia however, our MRI is not compatible with monitoring during sedation. I discussed this with the patient and her mom. She states she likes [facility] would like to go there. Call placed to the transfer center. [JT]   1158 I have received a call from the transfer center. All of the beds at [facility] are full but there is a telemetry neuro bed at [facility]. Call placed for neurology consultation at [facility].    [JT]   1431 Notify the patient took her own 81 mg aspirin a few minutes ago. Changed her dose from 325-243mg to cover the rest of the dose. Continue to await [facility] for transport. On repeat evaluation of the patient she states she took the aspirin because she started to get some chest pressure. She states is better after the aspirin but notes it is still present. EKG to be obtained. [JT]      ED Course User Index  [JT] Marie Rider., MD      CONSULT NOTE:   1:10 PM  Tala Dawn MD spoke with Dr Bibi Dos Santos,   Specialty: Neurologist  Discussed pt's hx, disposition, and available diagnostic and imaging results. Reviewed care plans. Consultant agrees with plans as outlined. He said the hospitalist accepts the patient they will evaluate her when she gets to [facility]. CONSULT NOTE:   1:16 PM  Tala Dawn MD spoke with Dr Cleta Riedel,   Specialty: Hospitalist  Discussed pt's hx, disposition, and available diagnostic and imaging results. Reviewed care plans. Consultant agrees with plans as outlined. He will accept the pt to the hospital.     2:30 PM  [facility] is at bedside. Vital signs remained stable for transport.     Critical Care Time:   CRITICAL CARE NOTE :  2:30 PM  IMPENDING DETERIORATION -Airway, Respiratory, Cardiovascular, CNS, Metabolic, Renal and Hepatic  ASSOCIATED RISK FACTORS - Hypotension, Shock, Dysrhythmia, Metabolic changes and CNS Decompensation  MANAGEMENT- Bedside Assessment, Supervision of Care and Transfer  INTERPRETATION -  Xrays, CT Scan, ECG and Blood Pressure  INTERVENTIONS - hemodynamic mngmt and Metobolic interventions  CASE REVIEW - Hospitalist/Intensivist, Medical Sub-Specialist, Nursing and Family  TREATMENT RESPONSE -Improved and Stable  PERFORMED BY - Self    NOTES   :  I have spent 60 minutes of critical care time involved in lab review, consultations with specialist, family decision- making, bedside attention and documentation. During this entire length of time I was immediately available to the patient. Alona Scott. MD Dayan       Diagnosis     Clinical Impression:   1. Limb ataxia    2. Hyperglycemia        PLAN:  Transfer to [facility] for further management and care      Please note, this dictation was completed with [employer], the computer voice recognition software. Quite often unanticipated grammatical, syntax, homophones, and other interpretive errors are inadvertently transcribed by the computer software. Please disregard these errors. Please excuse any errors that have escaped final proof reading.

## 2021-05-23 NOTE — H&P
History & Physical    Primary Care Provider: Payal Rodriguez DO  Source of Information: Patient     History of Presenting Illness:   Yuliet Sepulveda is a 61 y.o. female who presents with dizziness    History was primarily obtained from the patient, and reports that she has history of diabetes, is a brittle diabetic, has had 6 strokes in the past.  Patient reports that she takes some blood thinner every other day. Does not remember the name. Patient reports that yesterday she worked out in the yard weeding yard for some other people to help them out. Patient reports she started having some dizziness associated with lightheadedness thought that this was secondary to dehydration but her mother was concerned so she was brought to the hospital for further management and evaluation. Patient came to [facility], there was a concern for stroke. Patient was sent to [facility] for possible MRI under sedation. While on the route patient was found to have a blood glucose in 30s, was given D 10, blood glucose checked at [facility] was in the [de-identified]. Patient denies any other complaints or problems. Patient reports she feels dehydrated. Patient reports that she is also having some chest pain and feels like \"there is an elephant sitting on her chest\". Patient denies any other complaints or problems. Patient denies any radiation to the pain any exertional component or any other concerns or problems. The patient denies any Headache, blurry vision, sore throat, trouble swallowing, trouble with speech, SOB, cough, fever, chills, N/V/D, abd pain, urinary symptoms, constipation, recent travels, sick contacts,  falls, injuries, rashes, contact with COVID-19 diagnosed patients, hematemesis, melena, hemoptysis, hematuria, rashes, denies starting any new medications and denies any other concerns or problems besides as mentioned above.          Review of Systems:  A comprehensive review of systems was negative except for that written in the History of Present Illness. All other systems reviewed, pertinent positives and negatives noted in HPI    Past Medical History:   Diagnosis Date    Arthritis     patient states \"every joint affected\"    Bee sting 2018    left elbow bee sting     Blister of ankle 2018    Blister small per patient of left ankle. Wears an air boot due to 2 stress fractures in last 2 months.  CAD (coronary artery disease)     Constipation     Falls 2017    pt reports having 4 falls in 3 days    Fatigue     Headache     Ill-defined condition     multiple broken ribs    Ill-defined condition     reports \"getting infections easily\"    Joint pain     Memory disorder     following spinal fluid leak repair    Menopause     Nausea & vomiting     Neuropathy     Osteoporosis     Thyroid disease     Type 1 diabetes ([facility] [address]     diagnosed at age 8    Unspecified cerebral artery occlusion with cerebral infarction     x 4 (last in )      Past Surgical History:   Procedure Laterality Date    HX CARPAL TUNNEL RELEASE Right 2018    HX  SECTION  1987    HX  SECTION  1995    HX CHOLECYSTECTOMY  1996    HX HYSTERECTOMY  2006    HX ORTHOPAEDIC      frozen shoulder surgery x 3    HX ORTHOPAEDIC      frozen finger surgery x 8    HX ORTHOPAEDIC Left 2014    wrist surgery    HX ORTHOPAEDIC Right 1977    hand surgery    HX ORTHOPAEDIC Left     foot surgery    HX OTHER SURGICAL      spinal fluid leak repair (spinal fluid leaking from nose, remove nose, cut fat from stomach, use that as patch behind nose, nose replaced)    HX ROTATOR CUFF REPAIR Left     HX ROTATOR CUFF REPAIR Right     HX TONSILLECTOMY       Prior to Admission medications    Medication Sig Start Date End Date Taking?  Authorizing Provider   gabapentin (NEURONTIN) 100 mg capsule Take 2 capsules by mouth twice daily 21   Good, Dee STEVENSON DO   traMADoL (ULTRAM) 50 mg tablet TAKE 1 TABLET BY MOUTH ONCE DAILY NO  MORE  THAN  50  MG  PER  DAY 5/6/21 6/5/21  Dee Hernández DO   Euthyrox 75 mcg tablet TAKE 1 TABLET BY MOUTH ONCE DAILY BEFORE  BREAKFAST  FOR  A  CONDITION  WITH  LOW  THYROID  HORMONE  LEVELS 5/6/21   Howard Hernández DO   tetrahydrozoline (Sterile Eye Drops) 0.05 % ophthalmic solution Administer 1 Drop to both eyes four (4) times daily. 4/23/21   Artur Hernández DO   zolpidem (AMBIEN) 5 mg tablet Take 1 Tab by mouth nightly as needed for Sleep. Max Daily Amount: 5 mg. 4/12/21   Miguel Milan DO   diclofenac EC (VOLTAREN) 75 mg EC tablet  1/19/21   Provider, Historical   insulin detemir U-100 (Levemir FlexTouch U-100 Insuln) 100 unit/mL (3 mL) inpn INJECT 28 UNITS SUBCUTANEOUSLY IN THE MORNING AND 3 UNITS  AT  NIGHT  Indications: type 1 diabetes mellitus 3/16/21   Marcelino Reyna MD   HumaLOG KwikPen Insulin 100 unit/mL kwikpen Max daily dose 30 3/16/21   Marcelino Reyna MD   glucose blood VI test strips (OneTouch Ultra Blue Test Strip) strip TEST BLOOD SUGAR 8 TIMES A DAY DUE TO UNCONTROLLED SUGARS Dx : E10.42 3/16/21   Marcelino Reyna MD   flash glucose sensor (FreeStyle Hla 14 Day Sensor) kit TEST BLOOD SUGAR 8 TIMES A DAY DUE TO UNCONTROLLED SUGARS Dx : E10.42 3/16/21   Marcelino Reyna MD   flash glucose scanning reader (FreeStyle Hal 14 Day [place]) misc TEST BLOOD SUGAR 8 TIMES A DAY DUE TO UNCONTROLLED SUGARS Dx : E10.42 3/16/21   Marcelino Reyna MD   Insulin Needles, Disposable, (Niru Pen Needle) 32 gauge x 5/32\" ndle Use as directed with pen device - up to 5 times daily  Dx:  E11.9 1/11/21   Artur Hernández DO   Insulin Needles, Disposable, (Pen Needle) 30 gauge x 5/16\" ndle Use one needle up to 8 times daily to dispense insulin. E10.65,E 10.649 1/7/21   Miguel Kayli, DO   aspirin 81 mg chewable tablet Take 81 mg by mouth daily.  11/26/20   Provider, Historical   triamcinolone acetonide (KENALOG) 0.025 % topical cream APPLY CREAM TOPICALLY TO AFFECTED AREA TWICE DAILY AS NEEDED 8/27/20   Provider, Historical   mupirocin (BACTROBAN) 2 % ointment APPLY ONCE DAILY TO AREAS OF OPEN SKIN UNTIL SORES HEAL UP ALSO APPLY TO TOES 9/29/20   Provider, Historical   metoclopramide HCl (REGLAN) 5 mg tablet Take 1 Tab by mouth two (2) times a day. Up to BID. TAKE 30 MINUTES BEFORE MEALS  Patient taking differently: Take 5 mg by mouth. TAKES 30 MINUTES AFTER A MEAL. 11/11/20   Dee Hernández DO   losartan (COZAAR) 50 mg tablet Take 1 Tab by mouth daily. Take 1 tab daily 7/20/20   Dee Hernández DO   montelukast (SINGULAIR) 10 mg tablet Take 1 Tab by mouth daily. 7/20/20   Dee Hernández DO   potassium 99 mg tablet Take 1 Tab by mouth every other day. 7/20/20   Araceli Hernández DO   simvastatin (ZOCOR) 40 mg tablet Take 1 Tab by mouth nightly. 7/20/20   Araceli Hernández DO   tiZANidine (ZANAFLEX) 4 mg tablet Take 1-2 Tabs by mouth three (3) times daily as needed for Muscle Spasm(s) or Pain (max dose 24 mg/day). Indications: muscle spasm  Patient taking differently: Take 4-8 mg by mouth three (3) times daily as needed for Muscle Spasm(s) or Pain (max dose 24 mg/day). TAKES ONLY AT HS, RARELY  Indications: muscle spasm 7/20/20   Dee Hernández DO   levocetirizine (XYZAL) 5 mg tablet Take 1 Tab by mouth daily. 5/4/20   Araceli Hernández DO   ascorbic acid, vitamin C, (VITAMIN C) 1,000 mg tablet Take 8,000 mg by mouth two (2) times a day. Provider, Historical   calcium carbonate (CALTREX) 600 mg calcium (1,500 mg) tablet Take 600 mg by mouth two (2) times a day. Provider, Historical   glucos sul 2KCl/msm/chond/C/Mn (GLUCOSAMINE CHONDROITIN PO) Take  by mouth. Provider, Historical   MAGNESIUM PO Take  by mouth every fourty-eight (48) hours. Provider, Historical   denosumab (PROLIA) 60 mg/mL injection 60 mg by SubCUTAneous route.  Every 6 months    Provider, Historical   cholecalciferol, vitamin D3, (VITAMIN D3) 2,000 unit tab Take 1 Tab by mouth daily. 5000 units daily  Indications: low vitamin D levels    Provider, Historical   VITAMIN A PO Take 2,400 mcg by mouth nightly. Provider, Historical   vitamin E (AQUA GEMS) 400 unit capsule Take 400 Units by mouth every Monday and Thursday. Only on M and Thurs at Bedtime    Provider, Historical     No Known Allergies   Family History   Problem Relation Age of Onset    Heart Disease Mother     Hypertension Mother     No Known Problems Father     Heart Disease Maternal Grandfather     Cancer Maternal Aunt         Pancreatic and Liver    Cancer Maternal Grandmother         Lung    Diabetes Maternal Grandmother     Cancer Maternal Aunt         Breast    Breast Cancer Maternal Aunt     Diabetes Maternal Aunt       Family history was discussed with the patient, all pertinent and relevant details are mentioned as above, no other pertinent and relevant family history was noted on my discussion with the patient.   Patient specifically denies any history of Gaucher disease in the family  SOCIAL HISTORY:  Patient resides:  Independently x   Assisted Living    SNF    With family care       Smoking history:   None    Former x   Chronic      Alcohol history:   None    Social x   Chronic      Ambulates:   Independently x   w/cane    w/walker    w/wc    CODE STATUS:  DNR    Full x   Other      Objective:     Physical Exam:     Visit Vitals  BP (!) 153/70   Pulse 81   Temp 98.1 °F (36.7 °C)   Resp 13   SpO2 99%           General : alert x 3, awake, no acute distress, resting in bed, pleasant female, appears to be stated age  [de-identified]: PEERL, EOMI, moist mucus membrane, TM clear  Neck: supple, no JVD, no meningeal signs  Chest: Clear to auscultation bilaterally   CVS: S1 S2 heard,   Abd: soft/ Non tender, non distended, BS physiological,   Ext: no clubbing, no cyanosis, no edema, brisk 2+ DP pulses  Neuro/Psych: pleasant mood and affect, CN 2-12 grossly intact, sensory grossly within normal limit, Strength 5/5 in all extremities, DTR 1+ x 4  Skin: warm     EKG: Normal sinus rhythm with nonspecific ST changes      Data Review:     Recent Days:  Recent Labs     05/23/21  0942   WBC 4.7   HGB 12.7   HCT 36.9        Recent Labs     05/23/21  0942      K 3.8   CL 98   CO2 33*   *   BUN 12   CREA 0.90   CA 9.5   ALB 3.6   ALT 26   INR 1.0     No results for input(s): PH, PCO2, PO2, HCO3, FIO2 in the last 72 hours. 24 Hour Results:  Recent Results (from the past 24 hour(s))   CBC WITH AUTOMATED DIFF    Collection Time: 05/23/21  9:42 AM   Result Value Ref Range    WBC 4.7 3.6 - 11.0 K/uL    RBC 4.17 3.80 - 5.20 M/uL    HGB 12.7 11.5 - 16.0 g/dL    HCT 36.9 35.0 - 47.0 %    MCV 88.5 80.0 - 99.0 FL    MCH 30.5 26.0 - 34.0 PG    MCHC 34.4 30.0 - 36.5 g/dL    RDW 11.9 11.5 - 14.5 %    PLATELET 011 757 - 599 K/uL    MPV 8.6 (L) 8.9 - 12.9 FL    NRBC 0.0 0  WBC    ABSOLUTE NRBC 0.00 0.00 - 0.01 K/uL    NEUTROPHILS 71 32 - 75 %    LYMPHOCYTES 19 12 - 49 %    MONOCYTES 10 5 - 13 %    EOSINOPHILS 0 0 - 7 %    BASOPHILS 0 0 - 1 %    IMMATURE GRANULOCYTES 0 0.0 - 0.5 %    ABS. NEUTROPHILS 3.3 1.8 - 8.0 K/UL    ABS. LYMPHOCYTES 0.9 0.8 - 3.5 K/UL    ABS. MONOCYTES 0.5 0.0 - 1.0 K/UL    ABS. EOSINOPHILS 0.0 0.0 - 0.4 K/UL    ABS. BASOPHILS 0.0 0.0 - 0.1 K/UL    ABS. IMM.  GRANS. 0.0 0.00 - 0.04 K/UL    DF AUTOMATED     METABOLIC PANEL, COMPREHENSIVE    Collection Time: 05/23/21  9:42 AM   Result Value Ref Range    Sodium 137 136 - 145 mmol/L    Potassium 3.8 3.5 - 5.1 mmol/L    Chloride 98 97 - 108 mmol/L    CO2 33 (H) 21 - 32 mmol/L    Anion gap 6 5 - 15 mmol/L    Glucose 321 (H) 65 - 100 mg/dL    BUN 12 6 - 20 MG/DL    Creatinine 0.90 0.55 - 1.02 MG/DL    BUN/Creatinine ratio 13 12 - 20      GFR est AA >60 >60 ml/min/1.73m2    GFR est non-AA >60 >60 ml/min/1.73m2    Calcium 9.5 8.5 - 10.1 MG/DL    Bilirubin, total 0.8 0.2 - 1.0 MG/DL    ALT (SGPT) 26 12 - 78 U/L    AST (SGOT) 27 15 - 37 U/L    Alk.  phosphatase 98 45 - 117 U/L    Protein, total 6.9 6.4 - 8.2 g/dL    Albumin 3.6 3.5 - 5.0 g/dL    Globulin 3.3 2.0 - 4.0 g/dL    A-G Ratio 1.1 1.1 - 2.2     PROTHROMBIN TIME + INR    Collection Time: 05/23/21  9:42 AM   Result Value Ref Range    INR 1.0 0.9 - 1.1      Prothrombin time 9.6 9.0 - 11.1 sec   TROPONIN I    Collection Time: 05/23/21  9:42 AM   Result Value Ref Range    Troponin-I, Qt. <0.05 <0.05 ng/mL   LACTIC ACID    Collection Time: 05/23/21  9:42 AM   Result Value Ref Range    Lactic acid 1.3 0.4 - 2.0 MMOL/L   URINALYSIS W/ REFLEX CULTURE    Collection Time: 05/23/21  9:42 AM    Specimen: Urine   Result Value Ref Range    Color YELLOW/STRAW      Appearance CLEAR CLEAR      Specific gravity 1.007 1.003 - 1.030      pH (UA) 6.0 5.0 - 8.0      Protein Negative NEG mg/dL    Glucose >1,000 (A) NEG mg/dL    Ketone Negative NEG mg/dL    Bilirubin Negative NEG      Blood Negative NEG      Urobilinogen 0.2 0.2 - 1.0 EU/dL    Nitrites Negative NEG      Leukocyte Esterase Negative NEG      WBC 0-4 0 - 4 /hpf    RBC 0-5 0 - 5 /hpf    Epithelial cells FEW FEW /lpf    Bacteria Negative NEG /hpf    UA:UC IF INDICATED CULTURE NOT INDICATED BY UA RESULT CNI     EKG, 12 LEAD, INITIAL    Collection Time: 05/23/21  9:50 AM   Result Value Ref Range    Ventricular Rate 73 BPM    Atrial Rate 73 BPM    P-R Interval 152 ms    QRS Duration 92 ms    Q-T Interval 392 ms    QTC Calculation (Bezet) 431 ms    Calculated P Axis 66 degrees    Calculated R Axis 1 degrees    Calculated T Axis 33 degrees    Diagnosis       Normal sinus rhythm  Anterior infarct , age undetermined  Abnormal ECG  When compared with ECG of 29-APR-2021 14:23,  Nonspecific T wave abnormality now evident in Anterior leads     GLUCOSE, POC    Collection Time: 05/23/21 10:10 AM   Result Value Ref Range    Glucose (POC) 288 (H) 65 - 117 mg/dL    Performed by Ron Schmid (RN)    GLUCOSE, POC    Collection Time: 05/23/21 11:54 AM Result Value Ref Range    Glucose (POC) 225 (H) 65 - 117 mg/dL    Performed by Guzman Copeland (RN)    COVID-19 WITH INFLUENZA A/B    Collection Time: 05/23/21  2:42 PM   Result Value Ref Range    SARS-CoV-2 Not detected NOTD      Influenza A by PCR Not detected NOTD      Influenza B by PCR Not detected NOTD           Imaging:   CT HEAD WO CONT    Result Date: 5/23/2021  No acute intracranial process. Assessment/Plan     Dizziness: Not ACS, continue patient on aspirin, neurology consult, will hold MRI for now, will discuss with neurology may consider repeating a CT or if MRI is needed and patient will need sedation protocol, neurovascular checks, telemetry, IV hydration, PT OT speech consult, supportive care close monitoring, further intervention per hospitalist, reassess as needed    Chest pain: Rule out ACS, cycle troponins, aspirin, cardiology consult, echocardiogram, supportive care and close monitoring, further intervention per hospitalist, reassess as needed    Diabetes with hyperglycemia: Hold basal insulin, sliding scale insulin, Accu-Cheks, diet control, close monitoring, with patient on D5 NS for now, will stop if patient becomes hypoglycemic, continue to monitor, reassess as needed    Hypothyroidism: Continue home medication continue monitor    GI DVT prophylaxis: Patient will be on SCDs             Please note that this dictation was completed with [employer] Point, the computer voice recognition software. Quite often unanticipated grammatical, syntax, homophones, and other interpretive errors are inadvertently transcribed by the computer software. Please disregard these errors. Please excuse any errors that have escaped final proofreading.          Signed By: Lino Gusman MD     May 23, 2021

## 2021-05-23 NOTE — PROGRESS NOTES
1330-- Spoke with Larisa Christianson at [facility] and placed this patient on will call for ALS transport to [address] Requested information provided. Will update when bed assignment is available. 1407-- Updated Larisa Christianson on bed assignment, Room 654. ETA 1500-- DEJUAN Romano RN made aware.

## 2021-05-24 ENCOUNTER — APPOINTMENT (OUTPATIENT)
Dept: CT IMAGING | Age: 56
DRG: 066 | End: 2021-05-24
Attending: INTERNAL MEDICINE
Payer: MEDICARE

## 2021-05-24 ENCOUNTER — APPOINTMENT (OUTPATIENT)
Dept: NON INVASIVE DIAGNOSTICS | Age: 56
DRG: 066 | End: 2021-05-24
Attending: NURSE PRACTITIONER
Payer: MEDICARE

## 2021-05-24 ENCOUNTER — TELEPHONE (OUTPATIENT)
Dept: FAMILY MEDICINE CLINIC | Age: 56
End: 2021-05-24

## 2021-05-24 ENCOUNTER — TELEPHONE (OUTPATIENT)
Dept: ENDOCRINOLOGY | Age: 56
End: 2021-05-24

## 2021-05-24 ENCOUNTER — APPOINTMENT (OUTPATIENT)
Dept: NON INVASIVE DIAGNOSTICS | Age: 56
DRG: 066 | End: 2021-05-24
Attending: FAMILY MEDICINE
Payer: MEDICARE

## 2021-05-24 ENCOUNTER — APPOINTMENT (OUTPATIENT)
Dept: NUCLEAR MEDICINE | Age: 56
DRG: 066 | End: 2021-05-24
Attending: NURSE PRACTITIONER
Payer: MEDICARE

## 2021-05-24 LAB
ANION GAP SERPL CALC-SCNC: 8 MMOL/L (ref 5–15)
BUN SERPL-MCNC: 13 MG/DL (ref 6–20)
BUN/CREAT SERPL: 13 (ref 12–20)
CALCIUM SERPL-MCNC: 9.3 MG/DL (ref 8.5–10.1)
CHLORIDE SERPL-SCNC: 95 MMOL/L (ref 97–108)
CO2 SERPL-SCNC: 25 MMOL/L (ref 21–32)
COMMENT, HOLDF: NORMAL
CREAT SERPL-MCNC: 0.99 MG/DL (ref 0.55–1.02)
ECHO AO ROOT DIAM: 2.82 CM
ECHO AV AREA PEAK VELOCITY: 1.97 CM2
ECHO AV AREA/BSA PEAK VELOCITY: 1.2 CM2/M2
ECHO AV PEAK GRADIENT: 20.41 MMHG
ECHO AV PEAK VELOCITY: 225.86 CM/S
ECHO LA AREA 4C: 10.01 CM2
ECHO LA MAJOR AXIS: 2.82 CM
ECHO LA MINOR AXIS: 1.67 CM
ECHO LA VOL 2C: 30.53 ML (ref 22–52)
ECHO LA VOL 4C: 19.53 ML (ref 22–52)
ECHO LA VOL BP: 27.74 ML (ref 22–52)
ECHO LA VOL/BSA BIPLANE: 16.41 ML/M2 (ref 16–28)
ECHO LA VOLUME INDEX A2C: 18.07 ML/M2 (ref 16–28)
ECHO LA VOLUME INDEX A4C: 11.56 ML/M2 (ref 16–28)
ECHO LV E' LATERAL VELOCITY: 9.01 CM/S
ECHO LV E' SEPTAL VELOCITY: 7.67 CM/S
ECHO LV INTERNAL DIMENSION DIASTOLIC: 4.01 CM (ref 3.9–5.3)
ECHO LV INTERNAL DIMENSION SYSTOLIC: 2.38 CM
ECHO LV IVSD: 0.99 CM (ref 0.6–0.9)
ECHO LV MASS 2D: 126.7 G (ref 67–162)
ECHO LV MASS INDEX 2D: 74.9 G/M2 (ref 43–95)
ECHO LV POSTERIOR WALL DIASTOLIC: 1 CM (ref 0.6–0.9)
ECHO LVOT DIAM: 1.97 CM
ECHO LVOT PEAK GRADIENT: 8.48 MMHG
ECHO LVOT PEAK VELOCITY: 145.6 CM/S
ECHO MV A VELOCITY: 107.61 CM/S
ECHO MV AREA PHT: 3.65 CM2
ECHO MV E DECELERATION TIME (DT): 207.74 MS
ECHO MV E VELOCITY: 57.16 CM/S
ECHO MV E/A RATIO: 0.53
ECHO MV E/E' LATERAL: 6.34
ECHO MV E/E' RATIO (AVERAGED): 6.9
ECHO MV E/E' SEPTAL: 7.45
ECHO MV PRESSURE HALF TIME (PHT): 60.24 MS
ECHO PV MAX VELOCITY: 158.69 CM/S
ECHO PV PEAK INSTANTANEOUS GRADIENT SYSTOLIC: 10.07 MMHG
ECHO RV INTERNAL DIMENSION: 4 CM
ECHO RV TAPSE: 1.73 CM (ref 1.5–2)
ECHO TV REGURGITANT MAX VELOCITY: 283.08 CM/S
ECHO TV REGURGITANT PEAK GRADIENT: 32.05 MMHG
GLUCOSE BLD STRIP.AUTO-MCNC: 122 MG/DL (ref 65–117)
GLUCOSE BLD STRIP.AUTO-MCNC: 206 MG/DL (ref 65–117)
GLUCOSE BLD STRIP.AUTO-MCNC: 290 MG/DL (ref 65–117)
GLUCOSE BLD STRIP.AUTO-MCNC: 314 MG/DL (ref 65–117)
GLUCOSE BLD STRIP.AUTO-MCNC: 400 MG/DL (ref 65–117)
GLUCOSE BLD STRIP.AUTO-MCNC: 400 MG/DL (ref 65–117)
GLUCOSE BLD STRIP.AUTO-MCNC: 401 MG/DL (ref 65–117)
GLUCOSE BLD STRIP.AUTO-MCNC: 435 MG/DL (ref 65–117)
GLUCOSE BLD STRIP.AUTO-MCNC: 507 MG/DL (ref 65–117)
GLUCOSE BLD STRIP.AUTO-MCNC: 87 MG/DL (ref 65–117)
GLUCOSE SERPL-MCNC: 531 MG/DL (ref 65–100)
POTASSIUM SERPL-SCNC: 4.2 MMOL/L (ref 3.5–5.1)
SAMPLES BEING HELD,HOLD: NORMAL
SERVICE CMNT-IMP: ABNORMAL
SERVICE CMNT-IMP: NORMAL
SODIUM SERPL-SCNC: 128 MMOL/L (ref 136–145)
STRESS BASELINE DIAS BP: 71 MMHG
STRESS BASELINE HR: 89 BPM
STRESS BASELINE SYS BP: 141 MMHG
STRESS ESTIMATED WORKLOAD: 1 METS
STRESS EXERCISE DUR MIN: NORMAL
STRESS PEAK DIAS BP: 80 MMHG
STRESS PEAK SYS BP: 193 MMHG
STRESS PERCENT HR ACHIEVED: 80 %
STRESS POST PEAK HR: 131 BPM
STRESS RATE PRESSURE PRODUCT: NORMAL BPM*MMHG
STRESS ST DEPRESSION: 0 MM
STRESS ST ELEVATION: 0 MM
STRESS STAGE 1 BP: NORMAL MMHG
STRESS STAGE 1 HR: 129 BPM
STRESS STAGE RECOVERY 1 BP: NORMAL MMHG
STRESS STAGE RECOVERY 1 HR: 121 BPM
STRESS STAGE RECOVERY 2 BP: NORMAL MMHG
STRESS STAGE RECOVERY 2 HR: 119 BPM
STRESS TARGET HR: 163 BPM

## 2021-05-24 PROCEDURE — 70450 CT HEAD/BRAIN W/O DYE: CPT

## 2021-05-24 PROCEDURE — 97116 GAIT TRAINING THERAPY: CPT

## 2021-05-24 PROCEDURE — 74011250636 HC RX REV CODE- 250/636: Performed by: INTERNAL MEDICINE

## 2021-05-24 PROCEDURE — 0042T CT CODE NEURO PERF W CBF: CPT

## 2021-05-24 PROCEDURE — 36415 COLL VENOUS BLD VENIPUNCTURE: CPT

## 2021-05-24 PROCEDURE — 74011250637 HC RX REV CODE- 250/637: Performed by: INTERNAL MEDICINE

## 2021-05-24 PROCEDURE — 80048 BASIC METABOLIC PNL TOTAL CA: CPT

## 2021-05-24 PROCEDURE — 99223 1ST HOSP IP/OBS HIGH 75: CPT | Performed by: SPECIALIST

## 2021-05-24 PROCEDURE — 99223 1ST HOSP IP/OBS HIGH 75: CPT | Performed by: PSYCHIATRY & NEUROLOGY

## 2021-05-24 PROCEDURE — 74011636637 HC RX REV CODE- 636/637: Performed by: INTERNAL MEDICINE

## 2021-05-24 PROCEDURE — 82962 GLUCOSE BLOOD TEST: CPT

## 2021-05-24 PROCEDURE — 99356 PR PROLONGED SVC I/P OR OBS SETTING 1ST HOUR: CPT | Performed by: CLINICAL NURSE SPECIALIST

## 2021-05-24 PROCEDURE — 97161 PT EVAL LOW COMPLEX 20 MIN: CPT

## 2021-05-24 PROCEDURE — 78452 HT MUSCLE IMAGE SPECT MULT: CPT

## 2021-05-24 PROCEDURE — 74011250636 HC RX REV CODE- 250/636: Performed by: NURSE PRACTITIONER

## 2021-05-24 PROCEDURE — 74011000250 HC RX REV CODE- 250: Performed by: NURSE PRACTITIONER

## 2021-05-24 PROCEDURE — 74011250637 HC RX REV CODE- 250/637: Performed by: FAMILY MEDICINE

## 2021-05-24 PROCEDURE — 74011250637 HC RX REV CODE- 250/637: Performed by: NURSE PRACTITIONER

## 2021-05-24 PROCEDURE — A9500 TC99M SESTAMIBI: HCPCS

## 2021-05-24 PROCEDURE — 74011000636 HC RX REV CODE- 636: Performed by: RADIOLOGY

## 2021-05-24 PROCEDURE — 99233 SBSQ HOSP IP/OBS HIGH 50: CPT | Performed by: CLINICAL NURSE SPECIALIST

## 2021-05-24 PROCEDURE — 70496 CT ANGIOGRAPHY HEAD: CPT

## 2021-05-24 PROCEDURE — 65660000000 HC RM CCU STEPDOWN

## 2021-05-24 PROCEDURE — 4A03X5D MEASUREMENT OF ARTERIAL FLOW, INTRACRANIAL, EXTERNAL APPROACH: ICD-10-PCS | Performed by: INTERNAL MEDICINE

## 2021-05-24 PROCEDURE — 93306 TTE W/DOPPLER COMPLETE: CPT

## 2021-05-24 RX ORDER — IBUPROFEN 600 MG/1
600 TABLET ORAL
Status: DISCONTINUED | OUTPATIENT
Start: 2021-05-24 | End: 2021-05-24

## 2021-05-24 RX ORDER — AMINOPHYLLINE 25 MG/ML
125 INJECTION, SOLUTION INTRAVENOUS
Status: COMPLETED | OUTPATIENT
Start: 2021-05-24 | End: 2021-05-24

## 2021-05-24 RX ORDER — DEXTROSE 50 % IN WATER (D50W) INTRAVENOUS SYRINGE
12.5-25 AS NEEDED
Status: DISCONTINUED | OUTPATIENT
Start: 2021-05-24 | End: 2021-05-25 | Stop reason: HOSPADM

## 2021-05-24 RX ORDER — MORPHINE SULFATE 2 MG/ML
2 INJECTION, SOLUTION INTRAMUSCULAR; INTRAVENOUS
Status: DISCONTINUED | OUTPATIENT
Start: 2021-05-24 | End: 2021-05-25 | Stop reason: HOSPADM

## 2021-05-24 RX ORDER — DICLOFENAC SODIUM 10 MG/G
2 GEL TOPICAL 4 TIMES DAILY
Status: DISCONTINUED | OUTPATIENT
Start: 2021-05-24 | End: 2021-05-25 | Stop reason: HOSPADM

## 2021-05-24 RX ORDER — METOCLOPRAMIDE 10 MG/1
5 TABLET ORAL 2 TIMES DAILY
Status: DISCONTINUED | OUTPATIENT
Start: 2021-05-24 | End: 2021-05-25 | Stop reason: HOSPADM

## 2021-05-24 RX ORDER — INSULIN LISPRO 100 [IU]/ML
3 INJECTION, SOLUTION INTRAVENOUS; SUBCUTANEOUS ONCE
Status: COMPLETED | OUTPATIENT
Start: 2021-05-24 | End: 2021-05-24

## 2021-05-24 RX ORDER — SODIUM CHLORIDE 0.9 % (FLUSH) 0.9 %
20 SYRINGE (ML) INJECTION
Status: COMPLETED | OUTPATIENT
Start: 2021-05-24 | End: 2021-05-24

## 2021-05-24 RX ORDER — MAGNESIUM SULFATE HEPTAHYDRATE 40 MG/ML
2 INJECTION, SOLUTION INTRAVENOUS ONCE
Status: COMPLETED | OUTPATIENT
Start: 2021-05-24 | End: 2021-05-24

## 2021-05-24 RX ORDER — MELATONIN
50 DAILY
Status: DISCONTINUED | OUTPATIENT
Start: 2021-05-25 | End: 2021-05-25 | Stop reason: HOSPADM

## 2021-05-24 RX ORDER — IBUPROFEN 600 MG/1
600 TABLET ORAL 3 TIMES DAILY
Status: DISCONTINUED | OUTPATIENT
Start: 2021-05-24 | End: 2021-05-25 | Stop reason: HOSPADM

## 2021-05-24 RX ORDER — INSULIN LISPRO 100 [IU]/ML
INJECTION, SOLUTION INTRAVENOUS; SUBCUTANEOUS
Status: DISCONTINUED | OUTPATIENT
Start: 2021-05-24 | End: 2021-05-25 | Stop reason: HOSPADM

## 2021-05-24 RX ADMIN — IOPAMIDOL 80 ML: 755 INJECTION, SOLUTION INTRAVENOUS at 13:00

## 2021-05-24 RX ADMIN — MAGNESIUM SULFATE HEPTAHYDRATE 2 G: 40 INJECTION, SOLUTION INTRAVENOUS at 18:54

## 2021-05-24 RX ADMIN — REGADENOSON 0.4 MG: 0.08 INJECTION, SOLUTION INTRAVENOUS at 09:27

## 2021-05-24 RX ADMIN — GABAPENTIN 200 MG: 100 CAPSULE ORAL at 12:52

## 2021-05-24 RX ADMIN — Medication 20 ML: at 09:27

## 2021-05-24 RX ADMIN — AMINOPHYLLINE 125 MG: 25 INJECTION, SOLUTION INTRAVENOUS at 09:34

## 2021-05-24 RX ADMIN — FAMOTIDINE 20 MG: 20 TABLET, FILM COATED ORAL at 12:52

## 2021-05-24 RX ADMIN — ZOLPIDEM TARTRATE 5 MG: 5 TABLET ORAL at 00:17

## 2021-05-24 RX ADMIN — ASPIRIN 81 MG: 81 TABLET, CHEWABLE ORAL at 12:53

## 2021-05-24 RX ADMIN — OXYCODONE HYDROCHLORIDE AND ACETAMINOPHEN 4000 MG: 500 TABLET ORAL at 12:52

## 2021-05-24 RX ADMIN — LEVOTHYROXINE SODIUM 75 MCG: 0.07 TABLET ORAL at 06:47

## 2021-05-24 RX ADMIN — IBUPROFEN 600 MG: 600 TABLET, FILM COATED ORAL at 12:53

## 2021-05-24 RX ADMIN — HUMAN INSULIN 10 UNITS: 100 INJECTION, SUSPENSION SUBCUTANEOUS at 21:09

## 2021-05-24 RX ADMIN — INSULIN LISPRO 4 UNITS: 100 INJECTION, SOLUTION INTRAVENOUS; SUBCUTANEOUS at 21:10

## 2021-05-24 RX ADMIN — INSULIN LISPRO 3 UNITS: 100 INJECTION, SOLUTION INTRAVENOUS; SUBCUTANEOUS at 15:20

## 2021-05-24 RX ADMIN — HUMAN INSULIN 10 UNITS: 100 INJECTION, SUSPENSION SUBCUTANEOUS at 13:21

## 2021-05-24 RX ADMIN — METOCLOPRAMIDE 5 MG: 10 TABLET ORAL at 21:11

## 2021-05-24 RX ADMIN — LOSARTAN POTASSIUM 50 MG: 50 TABLET, FILM COATED ORAL at 12:52

## 2021-05-24 RX ADMIN — DICLOFENAC SODIUM 2 G: 10 GEL TOPICAL at 23:14

## 2021-05-24 RX ADMIN — IOPAMIDOL 40 ML: 755 INJECTION, SOLUTION INTRAVENOUS at 13:00

## 2021-05-24 RX ADMIN — ZOLPIDEM TARTRATE 5 MG: 5 TABLET ORAL at 23:14

## 2021-05-24 RX ADMIN — INSULIN LISPRO 3 UNITS: 100 INJECTION, SOLUTION INTRAVENOUS; SUBCUTANEOUS at 11:00

## 2021-05-24 RX ADMIN — IBUPROFEN 600 MG: 600 TABLET, FILM COATED ORAL at 23:14

## 2021-05-24 RX ADMIN — MONTELUKAST 10 MG: 10 TABLET, FILM COATED ORAL at 12:52

## 2021-05-24 RX ADMIN — ATORVASTATIN CALCIUM 40 MG: 40 TABLET, FILM COATED ORAL at 21:11

## 2021-05-24 NOTE — PROGRESS NOTES
Bedside and Verbal shift change report given to Jourdan Gamble RN (oncoming nurse) by Howie Astorga RN (offgoing nurse). Report included the following information SBAR, Kardex, ED Summary, Intake/Output, MAR, Cardiac Rhythm NSR, Quality Measures and Dual Neuro Assessment.

## 2021-05-24 NOTE — TELEPHONE ENCOUNTER
----- Message from Landen Anton sent at 5/24/2021  8:12 AM EDT -----  Regarding: Dr. Yazan López first and last name: Patient      Reason for call: Admitted to [facility] and would like the doctor to call to tell them she need her 24 hour Insulin and Levimir      Callback required yes/no and why: yes      Best contact number(s):[phone]      Details to clarify the request:      Landen Anton

## 2021-05-24 NOTE — PROGRESS NOTES
Neurocritical Care Code Stroke Documentation    Symptoms:   right-sided tingling, numbness/tingling around mouth/face, blurry vision, jaw pain   Last Known Well: 0800 am today    Medical hx: Active Problems:    CVA (cerebral vascular accident) ([address] (2021)    Arthritis, CAD, constipation, memory disorder, thyroid disease, nausea, vomiting, type 1 DM, remote strokes. Patient was admitted the hospital on  due to complaints of dizziness. She was being evaluated for a stroke at [facility] where she initially presented. She transferred to [facility] for MRI, however, while en route to the hospital she was found to by hypoglycemic and was treated. Anticoagulation: Aspirin 81 mg daily    VAN:   Negative   NIHSS:   1a-LOC:0    1b-Month/Age:0    1c-Open/Close Hand:0    2-Best Gaze:0    3-Visual Fields:0    4-Facial Palsy:0    5a-Left Arm:0    5b-Right Arm:0    6a-Left Le    6b-Right Le    7-Limb Ataxia:0    8-Sensory:1    9-Best Language:0    10-Dysarthria:0    11-Extinction/Inattention:0  TOTAL SCORE:3   Imaging:   CT: IMPRESSION  No acute abnormality demonstrated. CTA: IMPRESSION  No occlusion, intraluminal filling defect or other acute arterial abnormality demonstrated in the arch, neck and head arteries. CTP: IMPRESSION: No perfusion or flow abnormality demonstrated. Plan:   TPA Candidate: NO    Mechanical thrombectomy Candidate: NO      Discussed with Dr. José Miguel Daniel. Time spent: 15 minutes.      Rohini Ferrara NP  Neurocritical Care Nurse Practitioner  [phone]

## 2021-05-24 NOTE — PROGRESS NOTES
[facility] Adult  Hospitalist Group                                                                                          Hospitalist Progress Note  Asad Li MD  Answering service: [phone] OR [phone] from in house phone        Date of Service:  2021  NAME:  Ambrocio Eldridge  :  1965  MRN:  [id number]      Admission Summary:   Ambrocio Eldridge is a 61 y.o. female with pmh of type 1 diabetes, CSF leak?, CVA, who presents with dizziness    Interval history / Subjective:   Code stroke called early this am for L facial numbness, difficulty opening eyes. No other deficits noted. NIHSS score 3 (with x 2 scores fr bilateral LE weakness, though exam inconsistent)  STAT CT/CTA obtained, no large vessel occlusion  MRI under sedation ordered  Discussed with neurology. Patient with BS elevated all night, has been on D5, and this has been discontinued. Assessment & Plan:     Dizziness - suspect related to hypoglycemia. Review of records reveals multiple ER admissions for concern for TIA/CVA. MRI in  chronic pontine infarction  L facial numbness  - STAT CT/CTA head done and negative for large vessel occlusion  - Neurology consulted  - Call code stroke for any new neurologic deficits. - MRI under sedation. Pt refuses to even try with benzo. - Echo pending   - ASA 81mg, statin     Chest pain -   - Cardiology following  - Stress test today , if negative no further cardiac workup.   - Monitor   - PRN morphine    Type 1 diabetes, brittle.  A1c 8.3%  - Start NPH 10 U BID  - DC all D5  - POCT glucose checks and hypoglycemia protocol  - SSI   - Diabetes treatment team following     Hypothyroid - home meds  OA, - ibuprofen, PRN Voltaren     Code status: FULL  DVT prophylaxis: SCDs    Care Plan discussed with: Patient/Family  Anticipated Disposition: Home w/Family  Anticipated Discharge: Less than 24 hours     Hospital Problems  Date Reviewed: 2021        Codes Class Noted POA    CVA (cerebral vascular accident) [facility]) ICD-10-CM: I63.9  ICD-9-CM: 434.91  5/23/2021 Unknown                Review of Systems:   A comprehensive review of systems was negative except for that written in the HPI. Vital Signs:    Last 24hrs VS reviewed since prior progress note. Most recent are:  Visit Vitals  /69   Pulse 99   Temp 97.7 °F (36.5 °C)   Resp 13   Ht 5' 3\" (1.6 m)   Wt 65.8 kg (145 lb)   SpO2 97%   BMI 25.69 kg/m²       No intake or output data in the 24 hours ending 05/24/21 1425     Physical Examination:     I had a face to face encounter with this patient and independently examined them on 5/24/2021 as outlined below:          Constitutional:  Moderate distress, cooperative, anxious    ENT:  Oral mucosa moist, oropharynx benign. L facial numbness. EOMI, PERRLA   Resp:  CTA bilaterally. No wheezing/rhonchi/rales. No accessory muscle use   CV:  Regular rhythm, normal rate, no murmurs, gallops, rubs    GI:  Soft, non distended, non tender. normoactive bowel sounds, no hepatosplenomegaly     Musculoskeletal:  No edema, warm, 2+ pulses throughout    Neurologic:  Moves all extremities. AAOx3, CN II-XII reviewed, heal to toe and finger to nose intact. Complains of global weakness and refuses to life legs, but then robbie to do heel to shin. UE strength intact bilaterally. Speech intact. Word recall intact.              Data Review:    Review and/or order of clinical lab test  Review and/or order of tests in the radiology section of CPT  Review and/or order of tests in the medicine section of CPT      Labs:     Recent Labs     05/23/21  0942   WBC 4.7   HGB 12.7   HCT 36.9        Recent Labs     05/23/21  0942      K 3.8   CL 98   CO2 33*   BUN 12   CREA 0.90   *   CA 9.5     Recent Labs     05/23/21  0942   ALT 26   AP 98   TBILI 0.8   TP 6.9   ALB 3.6   GLOB 3.3     Recent Labs     05/23/21  0942   INR 1.0   PTP 9.6      No results for input(s): FE, TIBC, PSAT, FERR in the last 72 hours. No results found for: FOL, RBCF   No results for input(s): PH, PCO2, PO2 in the last 72 hours.   Recent Labs     05/23/21  1904 05/23/21  0942   CPK 56  --    TROIQ <0.05 <0.05     Lab Results   Component Value Date/Time    Cholesterol, total 153 03/06/2020 11:06 AM    HDL Cholesterol 63 03/06/2020 11:06 AM    LDL, calculated 63 03/06/2020 11:06 AM    Triglyceride 134 03/06/2020 11:06 AM     Lab Results   Component Value Date/Time    Glucose (POC) 400 (H) 05/24/2021 11:20 AM    Glucose (POC) 401 (H) 05/24/2021 10:49 AM    Glucose (POC) 314 (H) 05/24/2021 07:36 AM    Glucose (POC) 206 (H) 05/24/2021 04:42 AM    Glucose (POC) 122 (H) 05/24/2021 12:25 AM     Lab Results   Component Value Date/Time    Color YELLOW/STRAW 05/23/2021 09:42 AM    Appearance CLEAR 05/23/2021 09:42 AM    Specific gravity 1.007 05/23/2021 09:42 AM    Specific gravity 1.010 07/03/2020 10:31 AM    pH (UA) 6.0 05/23/2021 09:42 AM    Protein Negative 05/23/2021 09:42 AM    Glucose >1,000 (A) 05/23/2021 09:42 AM    Ketone Negative 05/23/2021 09:42 AM    Bilirubin Negative 05/23/2021 09:42 AM    Urobilinogen 0.2 05/23/2021 09:42 AM    Nitrites Negative 05/23/2021 09:42 AM    Leukocyte Esterase Negative 05/23/2021 09:42 AM    Epithelial cells FEW 05/23/2021 09:42 AM    Bacteria Negative 05/23/2021 09:42 AM    WBC 0-4 05/23/2021 09:42 AM    RBC 0-5 05/23/2021 09:42 AM         Medications Reviewed:     Current Facility-Administered Medications   Medication Dose Route Frequency    insulin NPH (NOVOLIN N, HUMULIN N) injection 10 Units  10 Units SubCUTAneous Q12H    insulin lispro (HUMALOG) injection   SubCUTAneous AC&HS    dextrose (D50W) injection syrg 12.5-25 g  12.5-25 g IntraVENous PRN    montelukast (SINGULAIR) tablet 10 mg  10 mg Oral DAILY    aspirin chewable tablet 81 mg  81 mg Oral DAILY    levothyroxine (SYNTHROID) tablet 75 mcg  75 mcg Oral 6am    atorvastatin (LIPITOR) tablet 40 mg  40 mg Oral QHS    acetaminophen (TYLENOL) tablet 650 mg  650 mg Oral Q4H PRN    Or    acetaminophen (TYLENOL) solution 650 mg  650 mg Per NG tube Q4H PRN    Or    acetaminophen (TYLENOL) suppository 650 mg  650 mg Rectal Q4H PRN    famotidine (PEPCID) tablet 20 mg  20 mg Oral Q12H    glucose chewable tablet 16 g  4 Tablet Oral PRN    dextrose (D50W) injection syrg 12.5-25 g  25-50 mL IntraVENous PRN    glucagon (GLUCAGEN) injection 1 mg  1 mg IntraMUSCular PRN    gabapentin (NEURONTIN) capsule 200 mg  200 mg Oral BID    losartan (COZAAR) tablet 50 mg  50 mg Oral DAILY    montelukast (SINGULAIR) tablet 10 mg  10 mg Oral DAILY    tetrahydrozoline (OPTI-CLEAR) 0.05 % ophthalmic drops 1 Drop  1 Drop Both Eyes QID    ascorbic acid (vitamin C) (VITAMIN C) tablet 4,000 mg  4,000 mg Oral BID    ibuprofen (MOTRIN) tablet 600 mg  600 mg Oral TID    zolpidem (AMBIEN) tablet 5 mg  5 mg Oral QHS PRN    lidocaine 4 % patch 2 Patch  2 Patch TransDERmal Q24H     ______________________________________________________________________  EXPECTED LENGTH OF STAY: 2d 0h  ACTUAL LENGTH OF STAY:          1                 Kim Lopez MD

## 2021-05-24 NOTE — TELEPHONE ENCOUNTER
Patient asks if brand name Ambien has been approved. She is being released from the hospital and was given brand name Ambien which she says works for her.

## 2021-05-24 NOTE — PROGRESS NOTES
Problem: Falls - Risk of  Goal: *Absence of Falls  Description: Document Leeanne Lopez Fall Risk and appropriate interventions in the flowsheet. Outcome: Progressing Towards Goal  Note: Fall Risk Interventions:  Mobility Interventions: Bed/chair exit alarm, Assess mobility with egress test, OT consult for ADLs, Patient to call before getting OOB, PT Consult for mobility concerns, Strengthening exercises (ROM-active/passive)         Medication Interventions: Bed/chair exit alarm, Patient to call before getting OOB, Teach patient to arise slowly    Elimination Interventions: Bed/chair exit alarm, Call light in reach              Problem: Patient Education: Go to Patient Education Activity  Goal: Patient/Family Education  Outcome: Progressing Towards Goal     Problem: Diabetes Self-Management  Goal: *Disease process and treatment process  Description: Define diabetes and identify own type of diabetes; list 3 options for treating diabetes. Outcome: Progressing Towards Goal  Goal: *Incorporating nutritional management into lifestyle  Description: Describe effect of type, amount and timing of food on blood glucose; list 3 methods for planning meals. Outcome: Progressing Towards Goal  Goal: *Incorporating physical activity into lifestyle  Description: State effect of exercise on blood glucose levels. Outcome: Progressing Towards Goal  Goal: *Developing strategies to promote health/change behavior  Description: Define the ABC's of diabetes; identify appropriate screenings, schedule and personal plan for screenings. Outcome: Progressing Towards Goal  Goal: *Using medications safely  Description: State effect of diabetes medications on diabetes; name diabetes medication taking, action and side effects. Outcome: Progressing Towards Goal  Goal: *Monitoring blood glucose, interpreting and using results  Description: Identify recommended blood glucose targets  and personal targets.   Outcome: Progressing Towards Goal  Goal: *Prevention, detection, treatment of acute complications  Description: List symptoms of hyper- and hypoglycemia; describe how to treat low blood sugar and actions for lowering  high blood glucose level. Outcome: Progressing Towards Goal  Goal: *Prevention, detection and treatment of chronic complications  Description: Define the natural course of diabetes and describe the relationship of blood glucose levels to long term complications of diabetes.   Outcome: Progressing Towards Goal  Goal: *Developing strategies to address psychosocial issues  Description: Describe feelings about living with diabetes; identify support needed and support network  Outcome: Progressing Towards Goal  Goal: *Insulin pump training  Outcome: Progressing Towards Goal  Goal: *Sick day guidelines  Outcome: Progressing Towards Goal  Goal: *Patient Specific Goal (EDIT GOAL, INSERT TEXT)  Outcome: Progressing Towards Goal     Problem: Patient Education: Go to Patient Education Activity  Goal: Patient/Family Education  Outcome: Progressing Towards Goal     Problem: Anxiety  Goal: *Alleviation of anxiety  Outcome: Progressing Towards Goal  Goal: *Alleviation of anxiety (Palliative Care)  Outcome: Progressing Towards Goal     Problem: Patient Education: Go to Patient Education Activity  Goal: Patient/Family Education  Outcome: Progressing Towards Goal     Problem: Hypertension  Goal: *Blood pressure within specified parameters  Outcome: Progressing Towards Goal  Goal: *Fluid volume balance  Outcome: Progressing Towards Goal  Goal: *Labs within defined limits  Outcome: Progressing Towards Goal     Problem: Patient Education: Go to Patient Education Activity  Goal: Patient/Family Education  Outcome: Progressing Towards Goal

## 2021-05-24 NOTE — PROGRESS NOTES
Chart reviewed. Pt off the floor for stress test and unable to participate in Physical Therapy at this time. Will check back as time permits and as appropriate. Thank you. Anny Roldan, PT, DPT  Geriatric Clinical Specialist     1130 Code S called. Will follow up as appropriate after medical workup and as time allows.

## 2021-05-24 NOTE — ROUTINE PROCESS
Bedside shift change report given to [id number] Jer Lyle (oncoming nurse) by Angeline RUIZ (offgoing nurse). Report included the following information SBAR, Kardex, ED Summary, Intake/Output, MAR, Cardiac Rhythm NSR and Dual Neuro Assessment.

## 2021-05-24 NOTE — TELEPHONE ENCOUNTER
Tried to run authorization on cover my med's and got a reading stating member not found. Will have to check with pharmacy .

## 2021-05-24 NOTE — PROGRESS NOTES
Spiritual Care Assessment/Progress Note  [facility]      NAME: Pino Saldana      MRN: [id number]  AGE: 61 y.o. SEX: female  Sikh Affiliation: Zoroastrianism   Language: English     5/24/2021     Total Time (in minutes): 7     Spiritual Assessment begun in [address] through conversation with:         []Patient        [] Family    [] Friend(s)        Reason for Consult: Other (comment) (Code Stroke)     Spiritual beliefs: (Please include comment if needed)     [] Identifies with a christ tradition:         [] Supported by a christ community:            [] Claims no spiritual orientation:           [] Seeking spiritual identity:                [] Adheres to an individual form of spirituality:           [x] Not able to assess:                           Identified resources for coping:      [] Prayer                               [] Music                  [] Guided Imagery     [] Family/friends                 [] Pet visits     [] Devotional reading                         [x] Unknown     [] Other:                                              Interventions offered during this visit: (See comments for more details)                Plan of Care:     [] Support spiritual and/or cultural needs    [] Support AMD and/or advance care planning process      [] Support grieving process   [] Coordinate Rites and/or Rituals    [] Coordination with community clergy   [] No spiritual needs identified at this time   [] Detailed Plan of Care below (See Comments)  [] Make referral to Music Therapy  [] Make referral to Pet Therapy     [] Make referral to Addiction services  [] Make referral to [facility]  [] Make referral to Spiritual Care Partner  [] No future visits requested        [x] Follow up upon further referrals     Comments:   responded to Code Stroke room 654. Staff with patient providing care. No family or visitors present.   Please contact spiritual care for further referral or consult. Rev.  Rupert Qiu MDiv, [facility], [address] [facility] paging service: [phone])
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
## 2021-05-24 NOTE — CONSULTS
INPATIENT NEUROLOGY CONSULTATION  5/24/2021     Consulted by: Dionicio MCLEAN        Patient ID:  Celi Rehman  [id number]  61 y.o.  1965    CC: Dizziness and pain and numbness    HPI    77-year-old woman with a long history of insulin-dependent diabetes, history of stroke, CSF leak, coronary artery disease, daily headaches, etc. here because yesterday she had increasing dizziness after working outside. She drinks maybe a gallon of water a day she tells me. She also had difficulty focusing her vision but no diplopia. She was at a local hospital evaluated and transferred here for escalated care for concern of stroke. While in route she had a blood glucose in the 30s. She is feeling a little better but would like more answers. She seems to think this may be more dehydration related. This morning a code stroke was activated because she had right-sided numbness and tingling in her right leg still feels numb but 50% of normal.  She tells me she has a history of a CSF leak atypical presentation management years ago and was in [place]. Her mother is at the bedside to provide collateral history. Review of Systems   Eyes: Positive for blurred vision. Negative for double vision. Neurological: Positive for tingling, sensory change, focal weakness and headaches. All other systems reviewed and are negative. Past Medical History:   Diagnosis Date    Arthritis     patient states \"every joint affected\"    Bee sting 09/05/2018    left elbow bee sting     Blister of ankle 09/05/2018    Blister small per patient of left ankle. Wears an air boot due to 2 stress fractures in last 2 months.     CAD (coronary artery disease)     Constipation     Falls 2017    pt reports having 4 falls in 3 days    Fatigue     Headache     Ill-defined condition     multiple broken ribs    Ill-defined condition     reports \"getting infections easily\"    Joint pain     Memory disorder     following spinal fluid leak repair    Menopause     Nausea & vomiting     Neuropathy     Osteoporosis     Thyroid disease     Type 1 diabetes (HCC)     diagnosed at age 8    Unspecified cerebral artery occlusion with cerebral infarction     x 4 (last in )     Family History   Problem Relation Age of Onset    Heart Disease Mother     Hypertension Mother     No Known Problems Father     Heart Disease Maternal Grandfather     Cancer Maternal Aunt         Pancreatic and Liver    Cancer Maternal Grandmother         Lung    Diabetes Maternal Grandmother     Cancer Maternal Aunt         Breast    Breast Cancer Maternal Aunt     Diabetes Maternal Aunt      Social History     Socioeconomic History    Marital status: SINGLE     Spouse name: Not on file    Number of children: Not on file    Years of education: Not on file    Highest education level: Not on file   Occupational History    Not on file   Tobacco Use    Smoking status: Former Smoker     Packs/day: 0.50     Years: 8.00     Pack years: 4.00     Quit date: 4/10/2007     Years since quittin.1    Smokeless tobacco: Never Used   Vaping Use    Vaping Use: Never used   Substance and Sexual Activity    Alcohol use: No    Drug use: No    Sexual activity: Not Currently   Other Topics Concern    Not on file   Social History Narrative    Not on file     Social Determinants of Health     Financial Resource Strain:     Difficulty of Paying Living Expenses:    Food Insecurity:     Worried About Running Out of Food in the Last Year:     Ran Out of Food in the Last Year:    Transportation Needs:     Lack of Transportation (Medical):      Lack of Transportation (Non-Medical):    Physical Activity:     Days of Exercise per Week:     Minutes of Exercise per Session:    Stress:     Feeling of Stress :    Social Connections:     Frequency of Communication with Friends and Family:     Frequency of Social Gatherings with Friends and Family:     Attends Christianity Services:     Active Member of Clubs or Organizations:     Attends Club or Organization Meetings:     Marital Status:    Intimate Partner Violence:     Fear of Current or Ex-Partner:     Emotionally Abused:     Physically Abused:     Sexually Abused:      Current Facility-Administered Medications   Medication Dose Route Frequency    [COMPLETED] iopamidoL (ISOVUE-370) 76 % injection 100 mL  100 mL IntraVENous RAD ONCE    [COMPLETED] iopamidoL (ISOVUE-370) 76 % injection 50 mL  50 mL IntraVENous RAD ONCE    insulin NPH (NOVOLIN N, HUMULIN N) injection 10 Units  10 Units SubCUTAneous Q12H    insulin lispro (HUMALOG) injection   SubCUTAneous AC&HS    dextrose (D50W) injection syrg 12.5-25 g  12.5-25 g IntraVENous PRN    montelukast (SINGULAIR) tablet 10 mg  10 mg Oral DAILY    aspirin chewable tablet 81 mg  81 mg Oral DAILY    levothyroxine (SYNTHROID) tablet 75 mcg  75 mcg Oral 6am    atorvastatin (LIPITOR) tablet 40 mg  40 mg Oral QHS    acetaminophen (TYLENOL) tablet 650 mg  650 mg Oral Q4H PRN    Or    acetaminophen (TYLENOL) solution 650 mg  650 mg Per NG tube Q4H PRN    Or    acetaminophen (TYLENOL) suppository 650 mg  650 mg Rectal Q4H PRN    famotidine (PEPCID) tablet 20 mg  20 mg Oral Q12H    glucose chewable tablet 16 g  4 Tablet Oral PRN    dextrose (D50W) injection syrg 12.5-25 g  25-50 mL IntraVENous PRN    glucagon (GLUCAGEN) injection 1 mg  1 mg IntraMUSCular PRN    gabapentin (NEURONTIN) capsule 200 mg  200 mg Oral BID    losartan (COZAAR) tablet 50 mg  50 mg Oral DAILY    montelukast (SINGULAIR) tablet 10 mg  10 mg Oral DAILY    tetrahydrozoline (OPTI-CLEAR) 0.05 % ophthalmic drops 1 Drop  1 Drop Both Eyes QID    ascorbic acid (vitamin C) (VITAMIN C) tablet 4,000 mg  4,000 mg Oral BID    ibuprofen (MOTRIN) tablet 600 mg  600 mg Oral TID    zolpidem (AMBIEN) tablet 5 mg  5 mg Oral QHS PRN    lidocaine 4 % patch 2 Patch  2 Patch TransDERmal Q24H     No Known Allergies    Visit Vitals  /71   Pulse (!) 108   Temp 98.2 °F (36.8 °C)   Resp 15   Ht 5' 3\" (1.6 m)   Wt 145 lb (65.8 kg)   SpO2 99%   BMI 25.69 kg/m²     Physical Exam  Vitals and nursing note reviewed. Exam conducted with a chaperone present. Neurologic Exam     Mental Status   Older appearing woman supine awake and alert following commands well. She can give me good details about her history. Pupils are equal reactive EOMI  Face is symmetric grossly on smile tongue is midline speech is clear and very rapid  Strength 5/5 throughout supine  Reduce sensation right leg globally 50%  No abnormal movements  Gait deferred            Lab Results   Component Value Date/Time    WBC 4.7 05/23/2021 09:42 AM    HGB 12.7 05/23/2021 09:42 AM    HCT 36.9 05/23/2021 09:42 AM    Hematocrit (POC) 40 06/19/2020 02:30 PM    PLATELET 031 69/70/5552 09:42 AM    MCV 88.5 05/23/2021 09:42 AM     Lab Results   Component Value Date/Time    Hemoglobin A1c 8.3 (H) 03/19/2021 12:00 AM    Hemoglobin A1c 8.4 (H) 08/28/2020 12:00 AM    Hemoglobin A1c 8.6 (H) 03/06/2020 11:06 AM    Glucose 321 (H) 05/23/2021 09:42 AM    Glucose (POC) 400 (H) 05/24/2021 11:20 AM    Microalb/Creat ratio (ug/mg creat.) <14 03/19/2021 12:00 AM    LDL, calculated 63 03/06/2020 11:06 AM    Creatinine (POC) 0.7 06/19/2020 02:30 PM    Creatinine 0.90 05/23/2021 09:42 AM      Lab Results   Component Value Date/Time    Cholesterol, total 153 03/06/2020 11:06 AM    HDL Cholesterol 63 03/06/2020 11:06 AM    LDL, calculated 63 03/06/2020 11:06 AM    Triglyceride 134 03/06/2020 11:06 AM     Lab Results   Component Value Date/Time    ALT (SGPT) 26 05/23/2021 09:42 AM    Alk.  phosphatase 98 05/23/2021 09:42 AM    Bilirubin, direct 0.17 11/16/2020 03:23 PM    Bilirubin, total 0.8 05/23/2021 09:42 AM    Albumin 3.6 05/23/2021 09:42 AM    Protein, total 6.9 05/23/2021 09:42 AM    Ammonia, Plasma 89 03/06/2020 11:06 AM    INR 1.0 05/23/2021 09:42 AM    Prothrombin time 9.6 05/23/2021 09:42 AM    PLATELET 363 13/40/8113 09:42 AM        CT Results (maximum last 3): Results from [place] encounter on 05/23/21    CTA CODE NEURO HEAD AND NECK W CONT    Narrative  EXAM:  CTA CODE NEURO HEAD AND NECK W CONT, CT CODE NEURO PERF W CBF  INDICATION:  code stroke, patient recently transferred from [address] [place] [address] for dizziness  evaluation of stroke. Hypoglycemia. TECHNIQUE:  Axial spiral acquired CT angiography was performed from the aortic arch up  through the intracranial vessels. This was performed during intravenous bolus  infusion 100 mL of Isovue 370. Post-processing with  MIP reconstructions from  aortic arch up through the calvarium. Standard sagittal and coronal  reconstructions. . CT dose reduction was achieved through use of a standardized  protocol tailored for this examination and automatic exposure control for dose  modulation. This examination performed using the VIZ A I algorithm. COMPARISON: CT, CTP  FINDINGS:  Normal origins brachycephalic arteries from the arch. Left vertebral artery is  demonstrated with separate origin from the arch. Vertebral arteries are similar  in size and both contribute supply to the basilar artery. The distal vertebral  artery V4 segments have atherosclerotic calcification. Mild stenosis. Basilar  artery is unremarkable and supplies both posterior cerebral arteries. Common carotid arteries are mildly tortuous. Carotid bifurcations have mild  atherosclerosis on the left with 0% stenosis bilaterally by NASCET criteria. Cervical internal carotid arteries are adequately maintained. Carotid siphons  are adequately maintained. Symmetric opacification of middle and anterior  cerebral arteries. No intraluminal filling defect, occlusion or acute arterial abnormality  demonstrated. Chronic findings of cervical spondylosis and facet arthrosis.     Impression  No occlusion, intraluminal filling defect or other acute arterial abnormality  demonstrated in the arch, neck and head arteries. EXAM:  CTA CODE NEURO HEAD AND NECK W CONT, CT CODE NEURO PERF W CBF  INDICATION:  code strokepatient recently transferred from [facility] for dizziness  evaluation of stroke. Hypoglycemia. TECHNIQUE:  During rapid bolus infusion 40 mL Isovue-370 CT perfusion was acquired with  color coded mapping reconstruction of blood flow, blood volume, mean transit  time and T-Max. CT dose reduction was achieved through use of a standardized  protocol tailored for this examination and automatic exposure control for dose  modulation. This examination was performed using the VIZ A I algorithm. COMPARISON: CT, CTA  FINDINGS:  No perfusion or flow abnormality demonstrated in the imaged areas of the  cerebral hemispheres and cerebellum. IMPRESSION: No perfusion or flow abnormality demonstrated. CT CODE NEURO HEAD WO CONTRAST    Narrative  EXAM:  CT CODE NEURO HEAD WO CONTRAST  INDICATION:  code stroke patient recently transferred from [facility] for dizziness  evaluation of stroke. Hypoglycemia. TECHNIQUE:  Thin axial images were obtained through the calvarium and saved with standard  and bone window algorithm. Coronal and sagittal reconstructions were generated. CT dose reduction was achieved through use of a standardized protocol tailored  for this examination and automatic exposure control for dose modulation. COMPARISON: CT head 5/23/21  FINDINGS:  The ventricular size and configuration are within normal limits. The cerebral density is within normal limits throughout. No evidence of intracranial hemorrhage, infarct, mass or abnormal extra-axial  fluid collections. Visualized osseous structures of the calvarium and skull base including  paranasal sinuses are unremarkable. Impression  No acute abnormality demonstrated. MRI Results (maximum last 3):   Results from [place] encounter on 05/19/21    MRI HIP BI WO CONT    Narrative  EXAM:  MRI HIP BI WO CONT    INDICATION: History of falls with bilateral chronic hip pain    COMPARISON: None    TECHNIQUE: Coronal T1 and axial T2 fat-saturated MRI of the whole pelvis; axial  and sagittal T2 fat-saturated; coronal proton density fat-saturated MRI of the  right and left hips . Study is limited by open technique and inhomogeneous fat  saturation    CONTRAST: None. FINDINGS:    Bone marrow: Within normal limits. No acute fracture, dislocation, or marrow  replacing process. Joint fluid: None. Articular cartilage: Intact. Acetabular labrum: Intact. Hip morphology:  Normal concavity of the femoral head-neck junction. No  acetabular over coverage or retroversion. Tendons and muscles: There is a high-grade partial-thickness tear of the left  hamstring origin. A few remaining intact fibers predominantly involving the  biceps femoris. Torn fibers have retracted 3.4 cm. Full-thickness tear of the right gluteus minimus torn fibers have retracted 2.5  cm. Mild tendinopathy anterior right gluteus minimus. Tendinopathy with mild  partial tearing of the right hamstring origin    Soft tissue mass: None. Intrapelvic soft tissues: no acute process. Impression  1. Near full-thickness tear of the left hamstring origin. A few remaining intact  fibers from the biceps femoris. Torn fibers have retracted 3.4 cm  2. Full-thickness tear of the right gluteus medius minimus with 2.5 cm of  retraction  3. Tendinopathy and mild partial tearing of the right hamstrings origin      Results from Hospital Encounter encounter on 11/17/20    MRI SHOULDER LT WO CONT    Narrative  EXAM: MRI SHOULDER LT WO CONT    INDICATION: Left shoulder pain after injury, exacerbated by internal rotation. COMPARISON: None    TECHNIQUE: Axial proton density fat-saturated; oblique coronal T1, T2  fat-saturated, and proton density fat-saturated; and oblique sagittal T2  fat-saturated MRI of the left shoulder . CONTRAST: None.     FINDINGS: A.C. joint: Mild osteoarthritis and moderate capsular hypertrophy. Small marginal osteophytes. Anterior acromion process type: 3 with inferior  osteophytes. Bone marrow: Degenerative bone marrow edema is in the distal clavicle more than  the acromion process. Cystic degenerative signal is in the greater tuberosity of  the humerus. No acute fracture, dislocation, or marrow replacing process. Joint fluid: Trace glenohumeral joint effusion. Rotator cuff tendons: Mild supraspinatus and infraspinatus tendinosis and  shallow partial-thickness articular surface tears. No hyperintense T2 fluid  signal in these tendons. Teres minor tendon is intact. Mild distal subscapularis  tendinosis. Biceps tendon: Intact and located within the bicipital groove. Muscles: No edema or atrophy. Glenoid labrum: Intact. Glenohumeral joint capsule: within normal limits. Glenohumeral articular cartilage: Intact. No focal osteochondral lesion. Soft tissue mass: None. Impression  IMPRESSION:  1. Mild rotator cuff tendinosis. No full-thickness tendon tear. 2. Acromioclavicular osteoarthritis and morphology may contribute to pain and  may cause subacromial impingement. Results from [place] encounter on 04/03/20    MRI ANKLE LT W WO CONT    Narrative  EXAM:  MRI ANKLE LT W WO CONT    INDICATION: Heel split. Evaluate for osteomyelitis. COMPARISON: Radiographs 1/29/2019    TECHNIQUE: Axial T2 and proton density fat-saturated; coronal T2 fat-saturated;  sagittal T1, T2 fat-saturated, and spoiled gradient-echo MRI of the left ankle. CONTRAST: Pre and postcontrast imaging with 15 mL of Dotarem    FINDINGS: Bone Marrow: There is a T2 bright lesion in the fourth metatarsal  shaft measuring approximately 10 mm in size that is likely incidental and may  represent a small enchondroma. No aggressive features. No fracture, dislocation,  or marrow replacing process. No evidence of osteomyelitis.     Joint fluid: No significant joint effusion. Tendons: There is mild flexor hallucis tenosynovitis from the level of the  tibiotalar joint into the instep. There is mild edema and enhancement surrounding the distal Achilles tendon. There is a small amount of fluid in the retrocalcaneal bursa. There is mild  edema in Kager's fat pad. Muscles: Within normal limits. Lateral ligaments: intact. Deltoid and Lisfranc ligaments: intact. Sinus tarsi: within normal limits. Plantar fascia: There is a plantar calcaneal enthesophyte. No significant  fasciitis. Articular cartilage: Within normal limits. No osteochondral lesion. Soft tissue mass: None. Impression  IMPRESSION:  1. No evidence of osteomyelitis. 2. Mild flexor hallucis longus tenosynovitis. 3. Mild distal Achilles tendinitis with a small amount of fluid in the  retrocalcaneal bursa with mild nonspecific edema in Kager's fat pad. VAS/US/Carotid Doppler Results (maximum last 3): Results from [facility] encounter on 03/04/15    DUPLEX CAROTID BILATERAL      PET Results (maximum last 3): No results found for this or any previous visit. Assessment and Plan        19-year-old woman with multiple risk factors for stroke and history of stroke is here because of dizziness. Main concern for that issue would be a posterior circulation process. Subsequently this morning she had right-sided numbness and tingling which was a new event prompting a code stroke. Right leg still symptomatic. MRI brain should be done under sedation if that is the only option. She does take an aspirin daily but also tells me she takes another type of blood thinning medication every other day. I do not know what the medication is and I do not see any Plavix in her orders. Start with MRI looking for the extent of ischemic burden. CTA is reassuring that there is no severe intracranial atherosclerosis.   She is getting frequent daily headaches usually late in the day that sound more tension-like. Doubtful this is CSF leak related but imaging may help clarify. Questions answered for her and her mother at the bedside. More recommendations after imaging is completed. During this evaluation, we also discussed stroke education to include signs and symptoms of stroke and TIA. This clinical note was dictated with an electronic dictation software that can make unintentional errors. If there are any questions, please contact me directly for clarification.       [address],   NEUROLOGIST  Diplomate CARMELA  5/24/2021

## 2021-05-24 NOTE — PROGRESS NOTES
Bedside shift change report given to Liberty Nunes (oncoming nurse) by Nadia/Raf (offgoing nurse). Report included the following information SBAR.

## 2021-05-24 NOTE — TELEPHONE ENCOUNTER
Pt notes that she had been very busy helping neighbors on Friday and by Saturday she was \"not feeling good\" so she laid on the couch all day and by Sunday she was feeling worse but she was taken to her local hospital.   Yonas Odell though I may be having a stroke in the back of my brain so they transferred me to [facility]. They have not given me any of My Lever and the diabetic person changed me to NPH. \"    Pt had a stress test and she started to feel very poorly and \"they thought I was having another stroke so they called the stroke code and the Neurologist said I need an MRI tomorrow and they plan to transfer me to [facility]\".

## 2021-05-24 NOTE — PROGRESS NOTES
Transition of Care Plan   RUR- Low  10%   DISPOSITION: Return home   F/U with PCP/Specialist     Transport: family      Reason for Admission:  weakness                     RUR Score: 10%                    Plan for utilizing home health: Patient was cleared by PT - patient was not open to home health. PCP: First and Last name:  Jamie Hurtado DO     Name of Practice:    Are you a current patient: Yes/No: Yes   Approximate date of last visit: within last 2 months   Can you participate in a virtual visit with your PCP: no                     Current Advanced Directive/Advance Care Plan: Full Code      Healthcare Decision Maker:   Click here to complete [employer] including selection of the Healthcare Decision Maker Relationship (ie \"Primary\")             Primary Decision MakerMrachele Sánchez -  - [phone]    Secondary Decision Maker: Tigrefarzad Khalifparish - [phone]                  Transition of Care Plan:    CM met with patient at bedside to introduce self and role. Patient lives alone in duplex apartment, no steps to enter. Patient is independent at baseline; does not use DME to ambulate. Patient's mom provides assistance if needed with diabetes management. Patient declined home health; cleared by PT. Care Management Interventions  PCP Verified by CM: No  Palliative Care Criteria Met (RRAT>21 & CHF Dx)?: No  Mode of Transport at Discharge: Other (see comment) (mom)  Transition of Care Consult (CM Consult): Discharge Planning  MyChart Signup: No  Discharge Durable Medical Equipment: No  Health Maintenance Reviewed: Yes  Physical Therapy Consult: Yes  Occupational Therapy Consult: Yes  Speech Therapy Consult: Yes  Current Support Network: Lives Alone  Confirm Follow Up Transport: Family  Discharge Location  Discharge Placement: [facility]

## 2021-05-24 NOTE — PROGRESS NOTES
SLP Contact Note    Consult received and appreciated. Patient chart reviewed. Pt passed swallow screen and has been eating and drinking without difficulty per RN. No speech, language, nor cognitive concerns. CT negative for acute infarct, and may or may not get MRI. Therefore, SLP to sign-off. Please reconsult if MRI completed that shows acute infarct or if difficulties arise.     Thank you,  CAMILLE ChaudhariEd, [id number] [facility]  Speech-Language Pathologist

## 2021-05-24 NOTE — DIABETES MGMT
[address] [facility]    CLINICAL NURSE SPECIALIST CONSULT     Initial Presentation   Page Estuardo is a 61 y.o. female who presented to \[facility]\"" ED 5/23/21 with a one day c/o weakness. Ataxia was noted and transferred to [facility] [place] for evaluation by neurology/MRI. HX:   Past Medical History:   Diagnosis Date    Arthritis     patient states \"every joint affected\"    Bee sting 09/05/2018    left elbow bee sting     Blister of ankle 09/05/2018    Blister small per patient of left ankle. Wears an air boot due to 2 stress fractures in last 2 months.  CAD (coronary artery disease)     Constipation     Falls 2017    pt reports having 4 falls in 3 days    Fatigue     Headache     Ill-defined condition     multiple broken ribs    Ill-defined condition     reports \"getting infections easily\"    Joint pain     Memory disorder     following spinal fluid leak repair    Menopause     Nausea & vomiting     Neuropathy     Osteoporosis     Thyroid disease     Type 1 diabetes ([facility] [address]     diagnosed at age 8    Unspecified cerebral artery occlusion with cerebral infarction     x 4 (last in 2004)   CVA x6       DX: Ataxia, Dizziness, r/o acute CVA  Treatment plan     TX: CT, MRI    Hospital course   Clinical progress has been complicated by hypoglycemia. Diabetes    Patient has known Type 1 diabetes, treated with Levemir and Humalog PTA. Family history positive for diabetes: Maternal Grandmother with Type 1 Diabetes    Admission BG 39 and A1c 8.3% indicate poor diabetes control.      Ambulatory blood glucose management provided by endocrinologist: Valentino Simpson MD    Consulted by Provider for advanced diabetes nursing assessment and care, specifically related to   [x] Inpatient management strategy  [x] Home management assessment      Diabetes-related medical history  Acute complications  Recurrent hypoglycemia  Neurological complications  Peripheral neuropathy  Microvascular disease: None  Macrovascular disease  Cerebral vascular accident, CAD      Diabetes medication history  Drug class Currently in use Discontinued Never used   Biguanide      DDP-4 inhibitor       Sulfonylurea      Thiazolidinedione      GLP-1 RA      SGLT-2 inhibitors      Basal insulin Levemir 28 units am, 5 units pm     Bolus insulin Humalog Kwikpen     Fixed Dose  Combinations        Subjective   \"I don't count carbs and I just base my humalog off my sugar when I eat\". History of Type 1 Diabetes, diagnosed at age 8  Endocrinologist: Smith Chu MD with [facility]    -She wakes up around 9am with a blood glucose usually 30-70, she will drink a 12oz regular pepsi to treat hypoglycemia. Will take her morning Levemir 28 units.  -She doesn't work but if she is going on a long walk with her dog or going to help with yard-work, she will eat a pack of PB crackers (treasure 6 pack)  -Usually skips lunch  -Will eat a frozen vegetable steamer- may sometimes add black beans or chicken  -Drinks about a gallon of water every day  -Will only take mealtime Humalog with dinner or when I drink a slurpee and give 1 unit Humalog for every 100 points- starting at 200.  -Does not give correctional humalog at breakfast or dinner    Physical activity pattern  [x] Aerobic exercise, enjoys walking her dog- can be up to 5 miles/day. Will also help with yard work for others (can be 4-5 hours of work)   Monitoring pattern   Checks 6-8 times daily with traditional glucometer  [x] Breakfast Was on 3 units Levemir at bedtime and fasting glucose was consistently over 300. With 5 units Levemir, BG 30-70 consistently. Will check several times during the day- can drop below 70 during the day with walking/yardwork when she doesn't eat. Does report hypoglycemia daily.   Taking medications pattern  [x] Consistent administration  [x] Affordable    Social determinants of health impacting diabetes self-management practices Struggling with anxiety and/or depression and Concerned that you need to know more about how to stay healthy with diabetes     Enroute to [facility] BG dropped to 30    Objective   Physical exam  General   Neuro  Alert, oriented and in no acute distress/ill-appearing. Conversant and cooperative. Angry insulin is not ordered    Vital Signs   Visit Vitals  /71   Pulse (!) 108   Temp 98.2 °F (36.8 °C)   Resp 15   Ht 5' 3\" (1.6 m)   Wt 65.8 kg (145 lb)   SpO2 99%   BMI 25.69 kg/m²     Skin  Warm and dry. No acanthosis noted along neckline. No lipohypertrophy or lipoatrophy noted at injection sites   Heart   Regular rate and rhythm. No murmurs, rubs or gallops  Lungs  Clear to auscultation without rales or rhonchi  Extremities No foot wounds      Laboratory  No results found for this visit on 05/23/21 (from the past 12 hour(s)). No results found for this visit on 05/23/21 (from the past 12 hour(s)). All inpatient labs reviewed in full    Factors impacting BG management  Factor Dose Comments   Nutrition:  Carb-controlled meals     60 grams/meal   NPO? Blood glucose pattern  117-400      Assessment and Plan   Nursing Diagnosis Risk for unstable blood glucose pattern   Nursing Intervention Domain 5739 Decision-making Support   Nursing Interventions Examined current inpatient diabetes control   Explored factors facilitating and impeding inpatient management  Identified self-management practices impeding diabetes control  Explored corrective strategies with patient and responsible inpatient provider   Informed patient of rational for insulin strategy while hospitalized     Evaluation   Luisa Monahan is a 61year old female with a 50 year history of Type 1 Diabetes on Levemir and Humalog admitted for stroke rule out. Admitting A1C 8.3% indicating inadequate glucose control. She is on high dose Levemir but reports significant daily fluctuations in glucose ranging from 30-300s.   Her fluctuations are largely contributed to by once daily meal consumption and inadequate correctional and carbohydrate coverage. 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 She did have a hypoglycemic event with a blood glucose of 39 during transport from [facility] to [facility] yesterday morning. Dextrose IVF were started with correctional humalog with glucose 122-400 in the last 24 hrs. Dextrose IVF were stopped and moderate dose basal NPH started. Basal insulin ultimately needs to be reduced so that glucose remains stable when NPO or doesn't drop with long periods between meals. Moderate dose NPH has been started but may need further reduction. With a reduction in basal insulin, I expect she will need improved correctional humalog adjustments to cover for rise in glucose from intake. She is not willing to count carbohydrates for oral intake but may be able to do a set \"dinner dose\"    Inpatient glucose goal 100-180. Recommendations     [x] Use of Subcutaneous Insulin Order set (5274)  Insulin Dosing Specific recommendation   Start Basal                                      (Based on weight, BMI & GFR) 10 units NPH BID If fasting glucose below 100,   reduce to 8 units NPH BID   Start Corrective                                 (Useful in adjusting insulin dosing) 1 unit Humalog for every 100,   starting at a glucose of 200 Expect this to need to  change to 1:50       POC glucose Q4hrs to closely monitor BG pattern/hypoglycemia unawareness  Consider using 6 units NPH tomorrow am (instead of 10) as she will be NPO. Do not hold basal/correctional insulin if NPO  Billing Code(s)   [x] [id number]; [id number]    Before making these care recommendations, I personally reviewed the hosptialization record, including laboratory and diagnostic data, medications and examined the patient at bedside (circumstances permitting).   Total minutes: 72      Collins Ames CNS  Diabetes Clinical Nurse Specialist  [facility]  Access via [employer] [facility]

## 2021-05-24 NOTE — PROGRESS NOTES
Occupational Therapy  05/24/21    Order acknowledged, chart reviewed. Attempted to see patient for OT evaluation however patient KATHRINE at this time, will follow up as able & appropriate.      Thank you,   Jonathan Herrera, OTD, OTR/L

## 2021-05-24 NOTE — CONSULTS
Date of  Admission: 5/23/2021  5:25 PM     Jennifer Agustin is a 61 y.o. female admitted for CVA (cerebral vascular accident) ([facility] [I63.9]  Subjective: We are asked to see for cardiac evaluation. Patient was transferred from [facility] for further work-up of a recent neurologic event. She has been feeling vaguely poor for the last couple of days and then had some focal neuro issues so she went to the ER and was transferred here for advanced testing. During her ambulance ride she complained of chest pressure which she has not time to time usually with emotional upset or anxiety. Until 1 April when she moved, she was walking her dog every day up to 5 miles with no worrisome symptoms. She is not been walking regularly since the move and last week she had an episode where she got up and walked across the room inside and had very severe shortness of breath had a stop and rest for a moment. She has had prior cardiac testing in the past including echocardiograms and stress testing, it sounds like the last time was about 3 years ago at [facility].  She is also had monitors applied not for palpitations according to her, but I am wondering if they were looking for unrecognized A. fib given her neurologic history. denies syncope, orthopnea, paroxysmal nocturnal dyspnea, claudication, lower extremity edema. Cardiac risk factors: smoking/ tobacco exposure, dyslipidemia, diabetes mellitus, hypertension, post-menopausal.    Assessment/Plan: Based on her symptoms and lab evaluation thus far I do not think her episode yesterday was an acute coronary event though given all her risk factors I do think she needs a stress test for stratification purposes. Unfortunately I cannot view her EKGs in her chart because they are not availale, however the computer interpretation says that she has new nonspecific anterior T wave abnormalities.   Assuming her stress test looks okay she will need no further cardiac testing. Patient Active Problem List    Diagnosis Date Noted    CVA (cerebral vascular accident) ([address] 05/23/2021    Trochanteric bursitis of right hip 03/29/2021    Trochanteric bursitis of left hip 03/29/2021    TIA (transient ischemic attack) 11/24/2020    Acute pain of left shoulder 11/03/2020    Hip pain 11/03/2020    Splinter of left foot 05/26/2020    Heel callus 05/26/2020    Diabetic ulcer of left heel associated with type 1 diabetes mellitus, with fat layer exposed ([address] 04/27/2020    Greater trochanteric bursitis 01/10/2020    Atherosclerosis of artery 08/09/2019    Trigeminal neuralgia 06/01/2019    Subacute frontal sinusitis 03/20/2019    Reactive depression 02/26/2019    Chronic pain syndrome 02/26/2019    Diabetic retinopathy screening 11/06/2018    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    Hx of brain surgery 09/27/2018    Pain in right foot 05/03/2018    Chronic allergic rhinitis 05/03/2018    Type 1 diabetes mellitus with diabetic polyneuropathy ([address] 04/03/2018    Acquired hypothyroidism 04/03/2018    Primary osteoarthritis involving multiple joints 04/03/2018    History of hypoglycemia 04/03/2018    Arthritis 06/26/2017    Cerebrovascular accident ([address] 06/12/2017    Osteopenia 06/12/2017    Hx of stroke without residual deficits 07/06/2015    History of carpal tunnel release 07/06/2015    Vitamin D deficiency 11/05/2012    Insomnia 08/18/2011    Mild nonproliferative diabetic retinopathy ([address] 11/08/2010    Hypercholesterolemia 05/24/2010      Evorn Prior, DO  Past Medical History:   Diagnosis Date    Arthritis     patient states \"every joint affected\"    Bee sting 09/05/2018    left elbow bee sting     Blister of ankle 09/05/2018    Blister small per patient of left ankle. Wears an air boot due to 2 stress fractures in last 2 months.     CAD (coronary artery disease)     Constipation     Falls 2017    pt reports having 4 falls in 3 days    Fatigue     Headache     Ill-defined condition     multiple broken ribs    Ill-defined condition     reports \"getting infections easily\"    Joint pain     Memory disorder     following spinal fluid leak repair    Menopause     Nausea & vomiting     Neuropathy     Osteoporosis     Thyroid disease     Type 1 diabetes ([address]     diagnosed at age 8    Unspecified cerebral artery occlusion with cerebral infarction     x 4 (last in )      Past Surgical History:   Procedure Laterality Date    HX CARPAL TUNNEL RELEASE Right 2018    HX  SECTION      HX  SECTION      HX CHOLECYSTECTOMY      HX HYSTERECTOMY  2006    HX ORTHOPAEDIC      frozen shoulder surgery x 3    HX ORTHOPAEDIC      frozen finger surgery x 8    HX ORTHOPAEDIC Left 2014    wrist surgery    HX ORTHOPAEDIC Right 1977    hand surgery    HX ORTHOPAEDIC Left     foot surgery    HX OTHER SURGICAL      spinal fluid leak repair (spinal fluid leaking from nose, remove nose, cut fat from stomach, use that as patch behind nose, nose replaced)    HX ROTATOR CUFF REPAIR Left     HX ROTATOR CUFF REPAIR Right     HX TONSILLECTOMY  1968     No Known Allergies   Family History   Problem Relation Age of Onset    Heart Disease Mother     Hypertension Mother     No Known Problems Father     Heart Disease Maternal Grandfather     Cancer Maternal Aunt         Pancreatic and Liver    Cancer Maternal Grandmother         Lung    Diabetes Maternal Grandmother     Cancer Maternal Aunt         Breast    Breast Cancer Maternal Aunt     Diabetes Maternal Aunt       Current Facility-Administered Medications   Medication Dose Route Frequency    montelukast (SINGULAIR) tablet 10 mg  10 mg Oral DAILY    aspirin chewable tablet 81 mg  81 mg Oral DAILY    levothyroxine (SYNTHROID) tablet 75 mcg  75 mcg Oral 6am    atorvastatin (LIPITOR) tablet 40 mg  40 mg Oral QHS    acetaminophen (TYLENOL) tablet 650 mg  650 mg Oral Q4H PRN    Or    acetaminophen (TYLENOL) solution 650 mg  650 mg Per NG tube Q4H PRN    Or    acetaminophen (TYLENOL) suppository 650 mg  650 mg Rectal Q4H PRN    famotidine (PEPCID) tablet 20 mg  20 mg Oral Q12H    dextrose 5% and 0.9% NaCl infusion  60 mL/hr IntraVENous CONTINUOUS    glucose chewable tablet 16 g  4 Tablet Oral PRN    dextrose (D50W) injection syrg 12.5-25 g  25-50 mL IntraVENous PRN    glucagon (GLUCAGEN) injection 1 mg  1 mg IntraMUSCular PRN    insulin lispro (HUMALOG) injection   SubCUTAneous AC&HS    gabapentin (NEURONTIN) capsule 200 mg  200 mg Oral BID    losartan (COZAAR) tablet 50 mg  50 mg Oral DAILY    montelukast (SINGULAIR) tablet 10 mg  10 mg Oral DAILY    tetrahydrozoline (OPTI-CLEAR) 0.05 % ophthalmic drops 1 Drop  1 Drop Both Eyes QID    ascorbic acid (vitamin C) (VITAMIN C) tablet 4,000 mg  4,000 mg Oral BID    ibuprofen (MOTRIN) tablet 600 mg  600 mg Oral TID    zolpidem (AMBIEN) tablet 5 mg  5 mg Oral QHS PRN    lidocaine 4 % patch 2 Patch  2 Patch TransDERmal Q24H         Review of Symptoms:  A comprehensive review of systems was negative except for: Neurological: positive for paresthesia    Physical Exam    Visit Vitals  BP (!) 150/77 (BP 1 Location: Right upper arm, BP Patient Position: At rest)   Pulse 86   Temp 98.2 °F (36.8 °C)   Resp 16   Wt 145 lb 4.8 oz (65.9 kg)   SpO2 97%   BMI 25.74 kg/m²     Neck: supple, symmetrical, trachea midline, no adenopathy, no carotid bruit and no JVD  Heart: regular rate and rhythm, S1, S2 normal, no murmur, click, rub or gallop  Lungs: clear to auscultation bilaterally  Abdomen: soft, non-tender.  Bowel sounds normal. No masses,  no organomegaly  Extremities: extremities normal, atraumatic, no cyanosis or edema  Pulses: 2+ and symmetric  Neurologic: Grossly normal    Cardiographics    Telemetry: normal sinus rhythm  ECG: not viewable  Echocardiogram: Not done    Recent radiology, intake/output and wt reviewed    Labs:   Recent Results (from the past 24 hour(s))   CBC WITH AUTOMATED DIFF    Collection Time: 05/23/21  9:42 AM   Result Value Ref Range    WBC 4.7 3.6 - 11.0 K/uL    RBC 4.17 3.80 - 5.20 M/uL    HGB 12.7 11.5 - 16.0 g/dL    HCT 36.9 35.0 - 47.0 %    MCV 88.5 80.0 - 99.0 FL    MCH 30.5 26.0 - 34.0 PG    MCHC 34.4 30.0 - 36.5 g/dL    RDW 11.9 11.5 - 14.5 %    PLATELET 044 085 - 477 K/uL    MPV 8.6 (L) 8.9 - 12.9 FL    NRBC 0.0 0  WBC    ABSOLUTE NRBC 0.00 0.00 - 0.01 K/uL    NEUTROPHILS 71 32 - 75 %    LYMPHOCYTES 19 12 - 49 %    MONOCYTES 10 5 - 13 %    EOSINOPHILS 0 0 - 7 %    BASOPHILS 0 0 - 1 %    IMMATURE GRANULOCYTES 0 0.0 - 0.5 %    ABS. NEUTROPHILS 3.3 1.8 - 8.0 K/UL    ABS. LYMPHOCYTES 0.9 0.8 - 3.5 K/UL    ABS. MONOCYTES 0.5 0.0 - 1.0 K/UL    ABS. EOSINOPHILS 0.0 0.0 - 0.4 K/UL    ABS. BASOPHILS 0.0 0.0 - 0.1 K/UL    ABS. IMM. GRANS. 0.0 0.00 - 0.04 K/UL    DF AUTOMATED     METABOLIC PANEL, COMPREHENSIVE    Collection Time: 05/23/21  9:42 AM   Result Value Ref Range    Sodium 137 136 - 145 mmol/L    Potassium 3.8 3.5 - 5.1 mmol/L    Chloride 98 97 - 108 mmol/L    CO2 33 (H) 21 - 32 mmol/L    Anion gap 6 5 - 15 mmol/L    Glucose 321 (H) 65 - 100 mg/dL    BUN 12 6 - 20 MG/DL    Creatinine 0.90 0.55 - 1.02 MG/DL    BUN/Creatinine ratio 13 12 - 20      GFR est AA >60 >60 ml/min/1.73m2    GFR est non-AA >60 >60 ml/min/1.73m2    Calcium 9.5 8.5 - 10.1 MG/DL    Bilirubin, total 0.8 0.2 - 1.0 MG/DL    ALT (SGPT) 26 12 - 78 U/L    AST (SGOT) 27 15 - 37 U/L    Alk.  phosphatase 98 45 - 117 U/L    Protein, total 6.9 6.4 - 8.2 g/dL    Albumin 3.6 3.5 - 5.0 g/dL    Globulin 3.3 2.0 - 4.0 g/dL    A-G Ratio 1.1 1.1 - 2.2     PROTHROMBIN TIME + INR    Collection Time: 05/23/21  9:42 AM   Result Value Ref Range    INR 1.0 0.9 - 1.1      Prothrombin time 9.6 9.0 - 11.1 sec   TROPONIN I    Collection Time: 05/23/21  9:42 AM   Result Value Ref Range    Troponin-I, Qt. <0.05 <0.05 ng/mL   LACTIC ACID Collection Time: 05/23/21  9:42 AM   Result Value Ref Range    Lactic acid 1.3 0.4 - 2.0 MMOL/L   URINALYSIS W/ REFLEX CULTURE    Collection Time: 05/23/21  9:42 AM    Specimen: Urine   Result Value Ref Range    Color YELLOW/STRAW      Appearance CLEAR CLEAR      Specific gravity 1.007 1.003 - 1.030      pH (UA) 6.0 5.0 - 8.0      Protein Negative NEG mg/dL    Glucose >1,000 (A) NEG mg/dL    Ketone Negative NEG mg/dL    Bilirubin Negative NEG      Blood Negative NEG      Urobilinogen 0.2 0.2 - 1.0 EU/dL    Nitrites Negative NEG      Leukocyte Esterase Negative NEG      WBC 0-4 0 - 4 /hpf    RBC 0-5 0 - 5 /hpf    Epithelial cells FEW FEW /lpf    Bacteria Negative NEG /hpf    UA:UC IF INDICATED CULTURE NOT INDICATED BY UA RESULT CNI     EKG, 12 LEAD, INITIAL    Collection Time: 05/23/21  9:50 AM   Result Value Ref Range    Ventricular Rate 73 BPM    Atrial Rate 73 BPM    P-R Interval 152 ms    QRS Duration 92 ms    Q-T Interval 392 ms    QTC Calculation (Bezet) 431 ms    Calculated P Axis 66 degrees    Calculated R Axis 1 degrees    Calculated T Axis 33 degrees    Diagnosis       Normal sinus rhythm  Anterior infarct , age undetermined  Abnormal ECG  When compared with ECG of 29-APR-2021 14:23,  Nonspecific T wave abnormality now evident in Anterior leads     GLUCOSE, POC    Collection Time: 05/23/21 10:10 AM   Result Value Ref Range    Glucose (POC) 288 (H) 65 - 117 mg/dL    Performed by Luca Vera (RN)    GLUCOSE, POC    Collection Time: 05/23/21 11:54 AM   Result Value Ref Range    Glucose (POC) 225 (H) 65 - 117 mg/dL    Performed by Luca Vear (RN)    COVID-19 WITH INFLUENZA A/B    Collection Time: 05/23/21  2:42 PM   Result Value Ref Range    SARS-CoV-2 Not detected NOTD      Influenza A by PCR Not detected NOTD      Influenza B by PCR Not detected NOTD     GLUCOSE, POC    Collection Time: 05/23/21  6:36 PM   Result Value Ref Range    Glucose (POC) 117 65 - 117 mg/dL    Performed by Devante Quintero CK    Collection Time: 05/23/21  7:04 PM   Result Value Ref Range    CK 56 26 - 192 U/L   TROPONIN I    Collection Time: 05/23/21  7:04 PM   Result Value Ref Range    Troponin-I, Qt. <0.05 <0.05 ng/mL   GLUCOSE, POC    Collection Time: 05/23/21  9:50 PM   Result Value Ref Range    Glucose (POC) 245 (H) 65 - 117 mg/dL    Performed by Nimo Oleary RN    GLUCOSE, POC    Collection Time: 05/24/21 12:25 AM   Result Value Ref Range    Glucose (POC) 122 (H) 65 - 117 mg/dL    Performed by Nimo Oleary RN    GLUCOSE, POC    Collection Time: 05/24/21  4:42 AM   Result Value Ref Range    Glucose (POC) 206 (H) 65 - 117 mg/dL    Performed by Stephanie Hamlin

## 2021-05-25 VITALS
SYSTOLIC BLOOD PRESSURE: 123 MMHG | BODY MASS INDEX: 25.48 KG/M2 | DIASTOLIC BLOOD PRESSURE: 75 MMHG | WEIGHT: 143.8 LBS | RESPIRATION RATE: 16 BRPM | OXYGEN SATURATION: 97 % | TEMPERATURE: 98.5 F | HEIGHT: 63 IN | HEART RATE: 93 BPM

## 2021-05-25 PROBLEM — R20.0 LEFT FACIAL NUMBNESS: Status: ACTIVE | Noted: 2021-05-25

## 2021-05-25 PROBLEM — R07.9 CHEST PAIN: Status: ACTIVE | Noted: 2021-05-25

## 2021-05-25 PROBLEM — R42 DIZZINESS: Status: ACTIVE | Noted: 2021-05-25

## 2021-05-25 LAB
ANION GAP SERPL CALC-SCNC: 7 MMOL/L (ref 5–15)
ATRIAL RATE: 69 BPM
ATRIAL RATE: 73 BPM
BASOPHILS # BLD: 0.1 K/UL (ref 0–0.1)
BASOPHILS NFR BLD: 1 % (ref 0–1)
BUN SERPL-MCNC: 12 MG/DL (ref 6–20)
BUN/CREAT SERPL: 16 (ref 12–20)
CALCIUM SERPL-MCNC: 9.3 MG/DL (ref 8.5–10.1)
CALCULATED P AXIS, ECG09: 60 DEGREES
CALCULATED P AXIS, ECG09: 66 DEGREES
CALCULATED R AXIS, ECG10: -17 DEGREES
CALCULATED R AXIS, ECG10: 1 DEGREES
CALCULATED T AXIS, ECG11: 24 DEGREES
CALCULATED T AXIS, ECG11: 33 DEGREES
CHLORIDE SERPL-SCNC: 101 MMOL/L (ref 97–108)
CO2 SERPL-SCNC: 25 MMOL/L (ref 21–32)
CREAT SERPL-MCNC: 0.73 MG/DL (ref 0.55–1.02)
DIAGNOSIS, 93000: NORMAL
DIAGNOSIS, 93000: NORMAL
DIFFERENTIAL METHOD BLD: ABNORMAL
EOSINOPHIL # BLD: 0 K/UL (ref 0–0.4)
EOSINOPHIL NFR BLD: 0 % (ref 0–7)
ERYTHROCYTE [DISTWIDTH] IN BLOOD BY AUTOMATED COUNT: 11.9 % (ref 11.5–14.5)
GLUCOSE BLD STRIP.AUTO-MCNC: 154 MG/DL (ref 65–117)
GLUCOSE BLD STRIP.AUTO-MCNC: 226 MG/DL (ref 65–117)
GLUCOSE BLD STRIP.AUTO-MCNC: 240 MG/DL (ref 65–117)
GLUCOSE BLD STRIP.AUTO-MCNC: 338 MG/DL (ref 65–117)
GLUCOSE SERPL-MCNC: 261 MG/DL (ref 65–100)
HCT VFR BLD AUTO: 36.3 % (ref 35–47)
HGB BLD-MCNC: 12.3 G/DL (ref 11.5–16)
IMM GRANULOCYTES # BLD AUTO: 0 K/UL (ref 0–0.04)
IMM GRANULOCYTES NFR BLD AUTO: 0 % (ref 0–0.5)
LYMPHOCYTES # BLD: 1.2 K/UL (ref 0.8–3.5)
LYMPHOCYTES NFR BLD: 27 % (ref 12–49)
MAGNESIUM SERPL-MCNC: 1.9 MG/DL (ref 1.6–2.4)
MCH RBC QN AUTO: 30.2 PG (ref 26–34)
MCHC RBC AUTO-ENTMCNC: 33.9 G/DL (ref 30–36.5)
MCV RBC AUTO: 89.2 FL (ref 80–99)
MONOCYTES # BLD: 0.5 K/UL (ref 0–1)
MONOCYTES NFR BLD: 12 % (ref 5–13)
NEUTS SEG # BLD: 2.8 K/UL (ref 1.8–8)
NEUTS SEG NFR BLD: 60 % (ref 32–75)
NRBC # BLD: 0 K/UL (ref 0–0.01)
NRBC BLD-RTO: 0 PER 100 WBC
P-R INTERVAL, ECG05: 150 MS
P-R INTERVAL, ECG05: 152 MS
PHOSPHATE SERPL-MCNC: 3.2 MG/DL (ref 2.6–4.7)
PLATELET # BLD AUTO: 206 K/UL (ref 150–400)
PMV BLD AUTO: 8.5 FL (ref 8.9–12.9)
POTASSIUM SERPL-SCNC: 4.2 MMOL/L (ref 3.5–5.1)
Q-T INTERVAL, ECG07: 392 MS
Q-T INTERVAL, ECG07: 396 MS
QRS DURATION, ECG06: 92 MS
QRS DURATION, ECG06: 92 MS
QTC CALCULATION (BEZET), ECG08: 424 MS
QTC CALCULATION (BEZET), ECG08: 431 MS
RBC # BLD AUTO: 4.07 M/UL (ref 3.8–5.2)
SERVICE CMNT-IMP: ABNORMAL
SODIUM SERPL-SCNC: 133 MMOL/L (ref 136–145)
VENTRICULAR RATE, ECG03: 69 BPM
VENTRICULAR RATE, ECG03: 73 BPM
WBC # BLD AUTO: 4.6 K/UL (ref 3.6–11)

## 2021-05-25 PROCEDURE — 83735 ASSAY OF MAGNESIUM: CPT

## 2021-05-25 PROCEDURE — 80048 BASIC METABOLIC PNL TOTAL CA: CPT

## 2021-05-25 PROCEDURE — 36415 COLL VENOUS BLD VENIPUNCTURE: CPT

## 2021-05-25 PROCEDURE — 85025 COMPLETE CBC W/AUTO DIFF WBC: CPT

## 2021-05-25 PROCEDURE — 74011636637 HC RX REV CODE- 636/637: Performed by: INTERNAL MEDICINE

## 2021-05-25 PROCEDURE — 99233 SBSQ HOSP IP/OBS HIGH 50: CPT | Performed by: CLINICAL NURSE SPECIALIST

## 2021-05-25 PROCEDURE — 74011250637 HC RX REV CODE- 250/637: Performed by: NURSE PRACTITIONER

## 2021-05-25 PROCEDURE — 74011250637 HC RX REV CODE- 250/637: Performed by: FAMILY MEDICINE

## 2021-05-25 PROCEDURE — 84100 ASSAY OF PHOSPHORUS: CPT

## 2021-05-25 PROCEDURE — 82962 GLUCOSE BLOOD TEST: CPT

## 2021-05-25 PROCEDURE — 99233 SBSQ HOSP IP/OBS HIGH 50: CPT | Performed by: NURSE PRACTITIONER

## 2021-05-25 RX ORDER — BLOOD-GLUCOSE,RECEIVER,CONT
1 EACH MISCELLANEOUS 4 TIMES DAILY
Qty: 1 EACH | Refills: 1 | Status: SHIPPED | OUTPATIENT
Start: 2021-05-25 | End: 2021-05-26 | Stop reason: SDUPTHER

## 2021-05-25 RX ORDER — BLOOD-GLUCOSE TRANSMITTER
1 EACH MISCELLANEOUS
Qty: 1 DEVICE | Refills: 5 | Status: SHIPPED | OUTPATIENT
Start: 2021-05-25 | End: 2021-05-25 | Stop reason: SDUPTHER

## 2021-05-25 RX ORDER — BLOOD-GLUCOSE SENSOR
1 EACH MISCELLANEOUS
Qty: 3 DEVICE | Refills: 12 | Status: SHIPPED | OUTPATIENT
Start: 2021-05-25 | End: 2021-05-26 | Stop reason: SDUPTHER

## 2021-05-25 RX ORDER — BLOOD-GLUCOSE TRANSMITTER
1 EACH MISCELLANEOUS
Qty: 1 DEVICE | Refills: 5 | Status: SHIPPED | OUTPATIENT
Start: 2021-05-25 | End: 2021-05-26 | Stop reason: SDUPTHER

## 2021-05-25 RX ORDER — BLOOD-GLUCOSE SENSOR
1 EACH MISCELLANEOUS
Qty: 3 DEVICE | Refills: 12 | Status: SHIPPED | OUTPATIENT
Start: 2021-05-25 | End: 2021-05-25 | Stop reason: SDUPTHER

## 2021-05-25 RX ADMIN — HUMAN INSULIN 8 UNITS: 100 INJECTION, SUSPENSION SUBCUTANEOUS at 09:00

## 2021-05-25 RX ADMIN — METOCLOPRAMIDE 5 MG: 10 TABLET ORAL at 10:25

## 2021-05-25 RX ADMIN — INSULIN LISPRO 2 UNITS: 100 INJECTION, SOLUTION INTRAVENOUS; SUBCUTANEOUS at 12:02

## 2021-05-25 RX ADMIN — LEVOTHYROXINE SODIUM 75 MCG: 0.07 TABLET ORAL at 07:06

## 2021-05-25 NOTE — ADVANCED PRACTICE NURSE
[facility] [address] [place] [address], [address] [place] [address], [facility] [address]    [phone]  Ambrocio Eldridge  5/25/2021  8:35 AM      NUCLEAR CARDIAC STRESS TEST 05/24/2021, 05/24/2021 5/24/2021    Interpretation Summary  · NM: No ischemia or infarct demonstrated. LVEF 58%. · Baseline ECG: Normal sinus rhythm. · Stress test: Negative stress test.    Rest and Lexiscan myocardial perfusion SPECT imaging is performed with IV injections of 10 and 33 mCi technetium 99m Sestamibi respectively. SPECT images displayed in three planes demonstrate uniform radiotracer uptake in the myocardium on both sequences. There is no ischemic reversibility. There is no apparent infarct fixed defect. Gated SPECT images reviewed in 3 planes exhibit appropriate LV systolic wall thickening. There is no segmental wall motion abnormality of the left ventricular myocardium. The calculated LV ejection fraction is 58%. Pulmonary uptake and left ventricular cavity size appear normal.    Impression  : NM: No ischemia or infarct demonstrated. LVEF 58%. Signed by: Funmilayo Crook MD on 5/24/2021 11:34 AM, Signed by: Asif Carroll MD on 5/24/2021 11:51 AM         No ischemia or infarct on nuc stress. LVEF 58%. Normal nuc. Needs no further cardiac testing. Will see again as needed.     Rico Evans PA-C

## 2021-05-25 NOTE — PROGRESS NOTES
Problem: Falls - Risk of  Goal: *Absence of Falls  Description: Document Haleigh Canseco Fall Risk and appropriate interventions in the flowsheet. Outcome: Resolved/Met  Note: Fall Risk Interventions:  Mobility Interventions: Communicate number of staff needed for ambulation/transfer, PT Consult for mobility concerns    Mentation Interventions: Adequate sleep, hydration, pain control, Door open when patient unattended    Medication Interventions: Evaluate medications/consider consulting pharmacy, Patient to call before getting OOB    Elimination Interventions: Call light in reach              Problem: Patient Education: Go to Patient Education Activity  Goal: Patient/Family Education  Outcome: Resolved/Met     Problem: Diabetes Self-Management  Goal: *Disease process and treatment process  Description: Define diabetes and identify own type of diabetes; list 3 options for treating diabetes. Outcome: Resolved/Met  Goal: *Incorporating nutritional management into lifestyle  Description: Describe effect of type, amount and timing of food on blood glucose; list 3 methods for planning meals. Outcome: Resolved/Met  Goal: *Incorporating physical activity into lifestyle  Description: State effect of exercise on blood glucose levels. Outcome: Resolved/Met  Goal: *Developing strategies to promote health/change behavior  Description: Define the ABC's of diabetes; identify appropriate screenings, schedule and personal plan for screenings. Outcome: Resolved/Met  Goal: *Using medications safely  Description: State effect of diabetes medications on diabetes; name diabetes medication taking, action and side effects. Outcome: Resolved/Met  Goal: *Monitoring blood glucose, interpreting and using results  Description: Identify recommended blood glucose targets  and personal targets.   Outcome: Resolved/Met  Goal: *Prevention, detection, treatment of acute complications  Description: List symptoms of hyper- and hypoglycemia; describe how to treat low blood sugar and actions for lowering  high blood glucose level. Outcome: Resolved/Met  Goal: *Prevention, detection and treatment of chronic complications  Description: Define the natural course of diabetes and describe the relationship of blood glucose levels to long term complications of diabetes.   Outcome: Resolved/Met  Goal: *Developing strategies to address psychosocial issues  Description: Describe feelings about living with diabetes; identify support needed and support network  Outcome: Resolved/Met  Goal: *Insulin pump training  Outcome: Resolved/Met  Goal: *Sick day guidelines  Outcome: Resolved/Met  Goal: *Patient Specific Goal (EDIT GOAL, INSERT TEXT)  Outcome: Resolved/Met     Problem: Patient Education: Go to Patient Education Activity  Goal: Patient/Family Education  Outcome: Resolved/Met     Problem: Anxiety  Goal: *Alleviation of anxiety  Outcome: Resolved/Met  Goal: *Alleviation of anxiety (Palliative Care)  Outcome: Resolved/Met     Problem: Patient Education: Go to Patient Education Activity  Goal: Patient/Family Education  Outcome: Resolved/Met     Problem: Hypertension  Goal: *Blood pressure within specified parameters  Outcome: Resolved/Met  Goal: *Fluid volume balance  Outcome: Resolved/Met  Goal: *Labs within defined limits  Outcome: Resolved/Met     Problem: Patient Education: Go to Patient Education Activity  Goal: Patient/Family Education  Outcome: Resolved/Met     Problem: Patient Education: Go to Patient Education Activity  Goal: Patient/Family Education  Outcome: Resolved/Met     Problem: TIA/CVA Stroke: 0-24 hours  Goal: Off Pathway (Use only if patient is Off Pathway)  Outcome: Resolved/Met  Goal: Activity/Safety  Outcome: Resolved/Met  Goal: Consults, if ordered  Outcome: Resolved/Met  Goal: Diagnostic Test/Procedures  Outcome: Resolved/Met  Goal: Nutrition/Diet  Outcome: Resolved/Met  Goal: Discharge Planning  Outcome: Resolved/Met  Goal: Medications  Outcome: Resolved/Met  Goal: Respiratory  Outcome: Resolved/Met  Goal: Treatments/Interventions/Procedures  Outcome: Resolved/Met  Goal: Minimize risk of bleeding post-thrombolytic infusion  Outcome: Resolved/Met  Goal: Monitor for complications post-thrombolytic infusion  Outcome: Resolved/Met  Goal: Psychosocial  Outcome: Resolved/Met  Goal: *Hemodynamically stable  Outcome: Resolved/Met  Goal: *Neurologically stable  Description: Absence of additional neurological deficits    Outcome: Resolved/Met  Goal: *Verbalizes anxiety and depression are reduced or absent  Outcome: Resolved/Met  Goal: *Absence of Signs of Aspiration on Current Diet  Outcome: Resolved/Met  Goal: *Absence of deep venous thrombosis signs and symptoms(Stroke Metric)  Outcome: Resolved/Met  Goal: *Ability to perform ADLs and demonstrates progressive mobility and function  Outcome: Resolved/Met  Goal: *Stroke education started(Stroke Metric)  Outcome: Resolved/Met  Goal: *Dysphagia screen performed(Stroke Metric)  Outcome: Resolved/Met  Goal: *Rehab consulted(Stroke Metric)  Outcome: Resolved/Met     Problem: TIA/CVA Stroke: Day 2 Until Discharge  Goal: Off Pathway (Use only if patient is Off Pathway)  Outcome: Resolved/Met  Goal: Activity/Safety  Outcome: Resolved/Met  Goal: Diagnostic Test/Procedures  Outcome: Resolved/Met  Goal: Nutrition/Diet  Outcome: Resolved/Met  Goal: Discharge Planning  Outcome: Resolved/Met  Goal: Medications  Outcome: Resolved/Met  Goal: Respiratory  Outcome: Resolved/Met  Goal: Treatments/Interventions/Procedures  Outcome: Resolved/Met  Goal: Psychosocial  Outcome: Resolved/Met  Goal: *Verbalizes anxiety and depression are reduced or absent  Outcome: Resolved/Met  Goal: *Absence of aspiration  Outcome: Resolved/Met  Goal: *Absence of deep venous thrombosis signs and symptoms(Stroke Metric)  Outcome: Resolved/Met  Goal: *Optimal pain control at patient's stated goal  Outcome: Resolved/Met  Goal: *Tolerating diet  Outcome: Resolved/Met  Goal: *Ability to perform ADLs and demonstrates progressive mobility and function  Outcome: Resolved/Met  Goal: *Stroke education continued(Stroke Metric)  Outcome: Resolved/Met     Problem: Ischemic Stroke: Discharge Outcomes  Goal: *Verbalizes anxiety and depression are reduced or absent  Outcome: Resolved/Met  Goal: *Verbalize understanding of risk factor modification(Stroke Metric)  Outcome: Resolved/Met  Goal: *Hemodynamically stable  Outcome: Resolved/Met  Goal: *Absence of aspiration pneumonia  Outcome: Resolved/Met  Goal: *Aware of needed dietary changes  Outcome: Resolved/Met  Goal: *Verbalize understanding of prescribed medications including anti-coagulants, anti-lipid, and/or anti-platelets(Stroke Metric)  Outcome: Resolved/Met  Goal: *Tolerating diet  Outcome: Resolved/Met  Goal: *Aware of follow-up diagnostics related to anticoagulants  Outcome: Resolved/Met  Goal: *Ability to perform ADLs and demonstrates progressive mobility and function  Outcome: Resolved/Met  Goal: *Absence of DVT(Stroke Metric)  Outcome: Resolved/Met  Goal: *Absence of aspiration  Outcome: Resolved/Met  Goal: *Optimal pain control at patient's stated goal  Outcome: Resolved/Met  Goal: *Home safety concerns addressed  Outcome: Resolved/Met  Goal: *Describes available resources and support systems  Outcome: Resolved/Met  Goal: *Verbalizes understanding of activation of EMS(911) for stroke symptoms(Stroke Metric)  Outcome: Resolved/Met  Goal: *Understands and describes signs and symptoms to report to providers(Stroke Metric)  Outcome: Resolved/Met  Goal: *Neurolgocially stable (absence of additional neurological deficits)  Outcome: Resolved/Met  Goal: *Verbalizes importance of follow-up with primary care physician(Stroke Metric)  Outcome: Resolved/Met  Goal: *Smoking cessation discussed,if applicable(Stroke Metric)  Outcome: Resolved/Met  Goal: *Depression screening completed(Stroke Metric)  Outcome: Resolved/Met

## 2021-05-25 NOTE — PROGRESS NOTES
I have reviewed discharge instructions with the patient. The patient verbalized understanding. Signed copy on pt chart. PIV removed. Pt walked down with mother.

## 2021-05-25 NOTE — PROGRESS NOTES
Problem: Falls - Risk of  Goal: *Absence of Falls  Description: Document Jono Sites Fall Risk and appropriate interventions in the flowsheet. Outcome: Progressing Towards Goal  Note: Fall Risk Interventions:  Mobility Interventions: Bed/chair exit alarm, Patient to call before getting OOB, PT Consult for mobility concerns         Medication Interventions: Bed/chair exit alarm, Patient to call before getting OOB, Teach patient to arise slowly    Elimination Interventions: Bed/chair exit alarm, Call light in reach              Problem: Patient Education: Go to Patient Education Activity  Goal: Patient/Family Education  Outcome: Progressing Towards Goal     Problem: Diabetes Self-Management  Goal: *Disease process and treatment process  Description: Define diabetes and identify own type of diabetes; list 3 options for treating diabetes. Outcome: Progressing Towards Goal  Goal: *Incorporating nutritional management into lifestyle  Description: Describe effect of type, amount and timing of food on blood glucose; list 3 methods for planning meals. Outcome: Progressing Towards Goal  Goal: *Incorporating physical activity into lifestyle  Description: State effect of exercise on blood glucose levels. Outcome: Progressing Towards Goal  Goal: *Developing strategies to promote health/change behavior  Description: Define the ABC's of diabetes; identify appropriate screenings, schedule and personal plan for screenings. Outcome: Progressing Towards Goal  Goal: *Using medications safely  Description: State effect of diabetes medications on diabetes; name diabetes medication taking, action and side effects. Outcome: Progressing Towards Goal  Goal: *Monitoring blood glucose, interpreting and using results  Description: Identify recommended blood glucose targets  and personal targets.   Outcome: Progressing Towards Goal  Goal: *Prevention, detection, treatment of acute complications  Description: List symptoms of hyper- and hypoglycemia; describe how to treat low blood sugar and actions for lowering  high blood glucose level. Outcome: Progressing Towards Goal  Goal: *Prevention, detection and treatment of chronic complications  Description: Define the natural course of diabetes and describe the relationship of blood glucose levels to long term complications of diabetes.   Outcome: Progressing Towards Goal  Goal: *Developing strategies to address psychosocial issues  Description: Describe feelings about living with diabetes; identify support needed and support network  Outcome: Progressing Towards Goal  Goal: *Insulin pump training  Outcome: Progressing Towards Goal  Goal: *Sick day guidelines  Outcome: Progressing Towards Goal  Goal: *Patient Specific Goal (EDIT GOAL, INSERT TEXT)  Outcome: Progressing Towards Goal     Problem: Patient Education: Go to Patient Education Activity  Goal: Patient/Family Education  Outcome: Progressing Towards Goal     Problem: Anxiety  Goal: *Alleviation of anxiety  Outcome: Progressing Towards Goal  Goal: *Alleviation of anxiety (Palliative Care)  Outcome: Progressing Towards Goal     Problem: Patient Education: Go to Patient Education Activity  Goal: Patient/Family Education  Outcome: Progressing Towards Goal     Problem: Hypertension  Goal: *Blood pressure within specified parameters  Outcome: Progressing Towards Goal  Goal: *Fluid volume balance  Outcome: Progressing Towards Goal  Goal: *Labs within defined limits  Outcome: Progressing Towards Goal     Problem: Patient Education: Go to Patient Education Activity  Goal: Patient/Family Education  Outcome: Progressing Towards Goal     Problem: Patient Education: Go to Patient Education Activity  Goal: Patient/Family Education  Outcome: Progressing Towards Goal     Problem: TIA/CVA Stroke: Day 2 Until Discharge  Goal: Off Pathway (Use only if patient is Off Pathway)  Outcome: Progressing Towards Goal  Goal: Activity/Safety  Outcome: Progressing Towards Goal  Goal: Diagnostic Test/Procedures  Outcome: Progressing Towards Goal  Goal: Nutrition/Diet  Outcome: Progressing Towards Goal  Goal: Discharge Planning  Outcome: Progressing Towards Goal  Goal: Medications  Outcome: Progressing Towards Goal  Goal: Respiratory  Outcome: Progressing Towards Goal  Goal: Treatments/Interventions/Procedures  Outcome: Progressing Towards Goal  Goal: Psychosocial  Outcome: Progressing Towards Goal  Goal: *Verbalizes anxiety and depression are reduced or absent  Outcome: Progressing Towards Goal  Goal: *Absence of aspiration  Outcome: Progressing Towards Goal  Goal: *Absence of deep venous thrombosis signs and symptoms(Stroke Metric)  Outcome: Progressing Towards Goal  Goal: *Optimal pain control at patient's stated goal  Outcome: Progressing Towards Goal  Goal: *Tolerating diet  Outcome: Progressing Towards Goal  Goal: *Ability to perform ADLs and demonstrates progressive mobility and function  Outcome: Progressing Towards Goal  Goal: *Stroke education continued(Stroke Metric)  Outcome: Progressing Towards Goal     Problem: Ischemic Stroke: Discharge Outcomes  Goal: *Verbalizes anxiety and depression are reduced or absent  Outcome: Progressing Towards Goal  Goal: *Verbalize understanding of risk factor modification(Stroke Metric)  Outcome: Progressing Towards Goal  Goal: *Hemodynamically stable  Outcome: Progressing Towards Goal  Goal: *Absence of aspiration pneumonia  Outcome: Progressing Towards Goal  Goal: *Aware of needed dietary changes  Outcome: Progressing Towards Goal  Goal: *Verbalize understanding of prescribed medications including anti-coagulants, anti-lipid, and/or anti-platelets(Stroke Metric)  Outcome: Progressing Towards Goal  Goal: *Tolerating diet  Outcome: Progressing Towards Goal  Goal: *Aware of follow-up diagnostics related to anticoagulants  Outcome: Progressing Towards Goal  Goal: *Ability to perform ADLs and demonstrates progressive mobility and function  Outcome: Progressing Towards Goal  Goal: *Absence of DVT(Stroke Metric)  Outcome: Progressing Towards Goal  Goal: *Absence of aspiration  Outcome: Progressing Towards Goal  Goal: *Optimal pain control at patient's stated goal  Outcome: Progressing Towards Goal  Goal: *Home safety concerns addressed  Outcome: Progressing Towards Goal  Goal: *Describes available resources and support systems  Outcome: Progressing Towards Goal  Goal: *Verbalizes understanding of activation of EMS(911) for stroke symptoms(Stroke Metric)  Outcome: Progressing Towards Goal  Goal: *Understands and describes signs and symptoms to report to providers(Stroke Metric)  Outcome: Progressing Towards Goal  Goal: *Neurolgocially stable (absence of additional neurological deficits)  Outcome: Progressing Towards Goal  Goal: *Verbalizes importance of follow-up with primary care physician(Stroke Metric)  Outcome: Progressing Towards Goal  Goal: *Smoking cessation discussed,if applicable(Stroke Metric)  Outcome: Progressing Towards Goal  Goal: *Depression screening completed(Stroke Metric)  Outcome: Progressing Towards Goal

## 2021-05-25 NOTE — PROGRESS NOTES
Patient's glucose elevated and found hyponatremic last night. Paged Hospitalist, Chau Angulo and was asked to do a repeat BMP in the AM. Holding on fluids until morning labs result.

## 2021-05-25 NOTE — DISCHARGE INSTRUCTIONS
Dear Stevan Kellogg,    Thank you for choosing [facility] for your healthcare needs. We strive to provide EXCELLENT care to you and your family. In an effort to explain clearly why you were here in the hospital, I've also written a very brief summary below. Other details including formal diagnosis, medication changes, and follow up appointment recommendations can also be found in this packet. You were admitted for dizziness due to potentially dehydration vs. stroke for which you were evaluated for potential stroke. You also had chest pain, and were evaluated by cardiology and your stress test was negative. We have recommended you stay for MRI of the brain however you do not want to wait for the result. You also received care from specialist physicians in the following specialties:  [address] TO ANESTHESIOLOGY  IP CONSULT TO CARDIOLOGY    Here are the updates to your medication list:  **No changes. Resume your home medications, and insulin. Remember that it is important for you to take your medications exactly as they are prescribed. It is helpful to keep a list of your medication with the names, dosages, and times to be taken in your wallet. Additionally,   - Please make sure to follow up with your primary care physician within 1-2 weeks of discharge for hospital follow up. You should also follow up with your outpatient neurologists. - Please make sure to continue to monitor for: sudden changes in vision, numbness, tingling, weakness, facial droop and return to the Emergency Department with any of these symptoms:   - Please get up slowly from a seated or laying position, avoid falls. - Avoid tobacco, alcohol and other illicit drug use. Make sure to also see your primary care doctor for follow-up. Bring these papers with you and be sure to review your medication list with your doctor. I cannot stress the importance of follow up enough.  I've included the information for your follow-up appointments below: Follow-up Information     Follow up With Specialties Details Why Contact Info    Juan Pablo Thompson DO Internal Medicine, Pediatric Medicine In 1 week  [place] [id number]  [address] [facility]  [phone]      Harrison Aldana MD Neurology In 2 weeks For neurology follow up. Could also consider going to [facility] for follow up [address] [place] [address]  [phone]              Should you have any fever over 101 degrees for 24 hours, chest pain, shortness of breath, fever, chills, nausea, vomiting, diarrhea, change in mentation, falling, weakness, bleeding, or worsening pain, please seek medical attention immediately. Finally, as your discharging physician, you may be receiving a survey regarding my care. I would greatly value and appreciate your input in the survey as we strive for excellence. If you have any questions, I can be reached at [phone].   Thank you so much again for allowing me to care for you at [address] [place] [address].    Respectfully yours,  Leandro Elizondo MD

## 2021-05-25 NOTE — PROGRESS NOTES
Bedside and Verbal shift change report given to Francisca Medel RN (oncoming nurse) by Tania Garcias RN (offgoing nurse). Report included the following information SBAR, Kardex, ED Summary, Intake/Output, MAR, Cardiac Rhythm NSR, Quality Measures and Dual Neuro Assessment.

## 2021-05-25 NOTE — PROGRESS NOTES
Problem: Falls - Risk of  Goal: *Absence of Falls  Description: Document Vic Herrera Fall Risk and appropriate interventions in the flowsheet.   5/25/2021 0047 by Amadou Shea RN  Outcome: Progressing Towards Goal  Note: Fall Risk Interventions:  Mobility Interventions: Bed/chair exit alarm, Patient to call before getting OOB, PT Consult for mobility concerns         Medication Interventions: Evaluate medications/consider consulting pharmacy, Patient to call before getting OOB    Elimination Interventions: Bed/chair exit alarm, Call light in reach           5/24/2021 2307 by Amadou Shea RN  Outcome: Progressing Towards Goal  Note: Fall Risk Interventions:  Mobility Interventions: Bed/chair exit alarm, Patient to call before getting OOB, PT Consult for mobility concerns         Medication Interventions: Evaluate medications/consider consulting pharmacy, Patient to call before getting OOB    Elimination Interventions: Bed/chair exit alarm, Call light in reach

## 2021-05-25 NOTE — PROGRESS NOTES
Neurology Progress Note    Patient ID:  Jorge Ramon  [id number]  61 y.o.  1965        Subjective:   Ms. Krunal Hale has a distant memory of a nasal CSF leak that required surgery in 1999. According to the note, she experienced stroke-like symptoms in January 1999. Although treating her diabetes has helped, she still suffers from frequent headaches. She claims that she eventually wore some strips in her ears and nose, which revealed a CSF leak. She underwent surgery using a transphenoidal procedure and had experienced complications. The patient was then referred to neurosurgery, Dr. Guillermina Correa, for a second opinion, after which she was sent back to our office. Patient claims she was only in the office for 5 minutes and thought the doctor was rude and unable to help. The patient claims to have had regular MRIs in the past, but that testing with \"Object ( cotton pad?) inserted in nose and ears for 24 hours that detected spinal fluids\" warranted three brain surgeries. She now complains of extreme pressure-like discomfort. She now notices that she has a lot of head pressure when she wakes up. She has also had a lot of congestion. She has never had rhinorrhea, but just congestion. She is now having difficulty blowing her nose. She claims that lying down relieves her pain. She will feel good in the morning, but her condition will worsen during the day. She has seen several neurologist in our group none of her MRI confirmed  CSF leak     Objective:    All records in [facility] reviewed and noted    ROS:  Per HPI  All other 12 pt ROS negative    Meds:  Current Facility-Administered Medications   Medication Dose Route Frequency    insulin NPH (NOVOLIN N, HUMULIN N) injection 10 Units  10 Units SubCUTAneous Q12H    insulin lispro (HUMALOG) injection   SubCUTAneous AC&HS    dextrose (D50W) injection syrg 12.5-25 g  12.5-25 g IntraVENous PRN    morphine injection 2 mg  2 mg IntraVENous Q4H PRN    diclofenac (VOLTAREN) 1 % topical gel 2 g  2 g Topical QID    ibuprofen (MOTRIN) tablet 600 mg  600 mg Oral TID    cholecalciferol (VITAMIN D3) (1000 Units /25 mcg) tablet 2,000 Units  50 mcg Oral DAILY    metoclopramide HCl (REGLAN) tablet 5 mg  5 mg Oral BID    montelukast (SINGULAIR) tablet 10 mg  10 mg Oral DAILY    aspirin chewable tablet 81 mg  81 mg Oral DAILY    levothyroxine (SYNTHROID) tablet 75 mcg  75 mcg Oral 6am    atorvastatin (LIPITOR) tablet 40 mg  40 mg Oral QHS    acetaminophen (TYLENOL) tablet 650 mg  650 mg Oral Q4H PRN    Or    acetaminophen (TYLENOL) solution 650 mg  650 mg Per NG tube Q4H PRN    Or    acetaminophen (TYLENOL) suppository 650 mg  650 mg Rectal Q4H PRN    famotidine (PEPCID) tablet 20 mg  20 mg Oral Q12H    glucose chewable tablet 16 g  4 Tablet Oral PRN    dextrose (D50W) injection syrg 12.5-25 g  25-50 mL IntraVENous PRN    glucagon (GLUCAGEN) injection 1 mg  1 mg IntraMUSCular PRN    gabapentin (NEURONTIN) capsule 200 mg  200 mg Oral BID    losartan (COZAAR) tablet 50 mg  50 mg Oral DAILY    montelukast (SINGULAIR) tablet 10 mg  10 mg Oral DAILY    ascorbic acid (vitamin C) (VITAMIN C) tablet 4,000 mg  4,000 mg Oral BID    zolpidem (AMBIEN) tablet 5 mg  5 mg Oral QHS PRN    lidocaine 4 % patch 2 Patch  2 Patch TransDERmal Q24H       Imaging:  CT Results (most recent):  Results from Hospital Encounter encounter on 05/23/21    CTA CODE NEURO HEAD AND NECK W CONT    Narrative  EXAM:  CTA CODE NEURO HEAD AND NECK W CONT, CT CODE NEURO PERF W CBF  INDICATION:  code stroke, patient recently transferred from [facility] for dizziness  evaluation of stroke. Hypoglycemia. TECHNIQUE:  Axial spiral acquired CT angiography was performed from the aortic arch up  through the intracranial vessels. This was performed during intravenous bolus  infusion 100 mL of Isovue 370. Post-processing with  MIP reconstructions from  aortic arch up through the calvarium.  Standard sagittal and coronal  reconstructions. . CT dose reduction was achieved through use of a standardized  protocol tailored for this examination and automatic exposure control for dose  modulation. This examination performed using the VIZ A I algorithm. COMPARISON: CT, CTP  FINDINGS:  Normal origins brachycephalic arteries from the arch. Left vertebral artery is  demonstrated with separate origin from the arch. Vertebral arteries are similar  in size and both contribute supply to the basilar artery. The distal vertebral  artery V4 segments have atherosclerotic calcification. Mild stenosis. Basilar  artery is unremarkable and supplies both posterior cerebral arteries. Common carotid arteries are mildly tortuous. Carotid bifurcations have mild  atherosclerosis on the left with 0% stenosis bilaterally by NASCET criteria. Cervical internal carotid arteries are adequately maintained. Carotid siphons  are adequately maintained. Symmetric opacification of middle and anterior  cerebral arteries. No intraluminal filling defect, occlusion or acute arterial abnormality  demonstrated. Chronic findings of cervical spondylosis and facet arthrosis. Impression  No occlusion, intraluminal filling defect or other acute arterial abnormality  demonstrated in the arch, neck and head arteries. EXAM:  CTA CODE NEURO HEAD AND NECK W CONT, CT CODE NEURO PERF W CBF  INDICATION:  code strokepatient recently transferred from [facility] for dizziness  evaluation of stroke. Hypoglycemia. TECHNIQUE:  During rapid bolus infusion 40 mL Isovue-370 CT perfusion was acquired with  color coded mapping reconstruction of blood flow, blood volume, mean transit  time and T-Max. CT dose reduction was achieved through use of a standardized  protocol tailored for this examination and automatic exposure control for dose  modulation. This examination was performed using the VIZ A I algorithm.   COMPARISON: CT, CTA  FINDINGS:  No perfusion or flow abnormality demonstrated in the imaged areas of the  cerebral hemispheres and cerebellum. IMPRESSION: No perfusion or flow abnormality demonstrated. Lab Review   Recent Results (from the past 24 hour(s))   SAMPLES BEING HELD    Collection Time: 05/24/21 12:05 PM   Result Value Ref Range    SAMPLES BEING HELD 1UA,1UC     COMMENT        Add-on orders for these samples will be processed based on acceptable specimen integrity and analyte stability, which may vary by analyte.    GLUCOSE, POC    Collection Time: 05/24/21  3:18 PM   Result Value Ref Range    Glucose (POC) 435 (H) 65 - 117 mg/dL    Performed by Chapito Parrish, POC    Collection Time: 05/24/21  4:29 PM   Result Value Ref Range    Glucose (POC) 400 (H) 65 - 117 mg/dL    Performed by Armenian Cord    GLUCOSE, POC    Collection Time: 05/24/21  8:52 PM   Result Value Ref Range    Glucose (POC) 507 (H) 65 - 117 mg/dL    Performed by Yang Oleary RN    METABOLIC PANEL, BASIC    Collection Time: 05/24/21  9:21 PM   Result Value Ref Range    Sodium 128 (L) 136 - 145 mmol/L    Potassium 4.2 3.5 - 5.1 mmol/L    Chloride 95 (L) 97 - 108 mmol/L    CO2 25 21 - 32 mmol/L    Anion gap 8 5 - 15 mmol/L    Glucose 531 (H) 65 - 100 mg/dL    BUN 13 6 - 20 MG/DL    Creatinine 0.99 0.55 - 1.02 MG/DL    BUN/Creatinine ratio 13 12 - 20      GFR est AA >60 >60 ml/min/1.73m2    GFR est non-AA 58 (L) >60 ml/min/1.73m2    Calcium 9.3 8.5 - 10.1 MG/DL   GLUCOSE, POC    Collection Time: 05/24/21 10:32 PM   Result Value Ref Range    Glucose (POC) 290 (H) 65 - 117 mg/dL    Performed by Yang Oleary RN    GLUCOSE, POC    Collection Time: 05/25/21 12:36 AM   Result Value Ref Range    Glucose (POC) 226 (H) 65 - 117 mg/dL    Performed by Yang Oleary RN    GLUCOSE, POC    Collection Time: 05/25/21  6:49 AM   Result Value Ref Range    Glucose (POC) 240 (H) 65 - 117 mg/dL    Performed by KAMRAN MORAN    MAGNESIUM    Collection Time: 05/25/21  6:59 AM   Result Value Ref Range    Magnesium 1.9 1.6 - 2.4 mg/dL   METABOLIC PANEL, BASIC    Collection Time: 05/25/21  6:59 AM   Result Value Ref Range    Sodium 133 (L) 136 - 145 mmol/L    Potassium 4.2 3.5 - 5.1 mmol/L    Chloride 101 97 - 108 mmol/L    CO2 25 21 - 32 mmol/L    Anion gap 7 5 - 15 mmol/L    Glucose 261 (H) 65 - 100 mg/dL    BUN 12 6 - 20 MG/DL    Creatinine 0.73 0.55 - 1.02 MG/DL    BUN/Creatinine ratio 16 12 - 20      GFR est AA >60 >60 ml/min/1.73m2    GFR est non-AA >60 >60 ml/min/1.73m2    Calcium 9.3 8.5 - 10.1 MG/DL   PHOSPHORUS    Collection Time: 05/25/21  6:59 AM   Result Value Ref Range    Phosphorus 3.2 2.6 - 4.7 MG/DL   CBC WITH AUTOMATED DIFF    Collection Time: 05/25/21  6:59 AM   Result Value Ref Range    WBC 4.6 3.6 - 11.0 K/uL    RBC 4.07 3.80 - 5.20 M/uL    HGB 12.3 11.5 - 16.0 g/dL    HCT 36.3 35.0 - 47.0 %    MCV 89.2 80.0 - 99.0 FL    MCH 30.2 26.0 - 34.0 PG    MCHC 33.9 30.0 - 36.5 g/dL    RDW 11.9 11.5 - 14.5 %    PLATELET 153 755 - 347 K/uL    MPV 8.5 (L) 8.9 - 12.9 FL    NRBC 0.0 0  WBC    ABSOLUTE NRBC 0.00 0.00 - 0.01 K/uL    NEUTROPHILS 60 32 - 75 %    LYMPHOCYTES 27 12 - 49 %    MONOCYTES 12 5 - 13 %    EOSINOPHILS 0 0 - 7 %    BASOPHILS 1 0 - 1 %    IMMATURE GRANULOCYTES 0 0.0 - 0.5 %    ABS. NEUTROPHILS 2.8 1.8 - 8.0 K/UL    ABS. LYMPHOCYTES 1.2 0.8 - 3.5 K/UL    ABS. MONOCYTES 0.5 0.0 - 1.0 K/UL    ABS. EOSINOPHILS 0.0 0.0 - 0.4 K/UL    ABS. BASOPHILS 0.1 0.0 - 0.1 K/UL    ABS. IMM.  GRANS. 0.0 0.00 - 0.04 K/UL    DF AUTOMATED     GLUCOSE, POC    Collection Time: 05/25/21 10:57 AM   Result Value Ref Range    Glucose (POC) 338 (H) 65 - 117 mg/dL    Performed by Deonte hurley RN        Exam:  Visit Vitals  /70 (BP 1 Location: Left upper arm, BP Patient Position: At rest)   Pulse 96   Temp 98.2 °F (36.8 °C)   Resp 18   Ht 5' 3\" (1.6 m)   Wt 143 lb 12.8 oz (65.2 kg)   SpO2 96%   BMI 25.48 kg/m²     Gen: Well developed  CV: RRR  Lungs: non labored breathing  Abd: non distending  Neuro: A&O x 3, no dysarthria or aphasia  CN II-XII: PERRL, EOMI, face symmetric, tongue/palate midline  Motor: strength 5/5 all four ext  Sensory: intact to LT  Gait:deferred     Assessment:     Patient Active Problem List   Diagnosis Code    Hx of stroke without residual deficits Z86.73    History of carpal tunnel release Z98.890    Type 1 diabetes mellitus with diabetic polyneuropathy (HCC) E10.42    Acquired hypothyroidism E03.9    Primary osteoarthritis involving multiple joints M89.49    History of hypoglycemia Z86.39    Pain in right foot M79.671    Chronic allergic rhinitis J30.9    Arthritis M19.90    Cerebrovascular accident ([address] I63.9    Hypercholesterolemia E78.00    Insomnia G47.00    Mild nonproliferative diabetic retinopathy ([address] P17.9291    Osteopenia M85.80    Vitamin D deficiency E55.9    Hx of brain surgery Z98.890    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    Diabetic retinopathy screening Z13.5    Reactive depression F32.9    Chronic pain syndrome G89.4    Subacute frontal sinusitis J01.10    Trigeminal neuralgia G50.0    Atherosclerosis of artery I70.8    Greater trochanteric bursitis M70.60    Diabetic ulcer of left heel associated with type 1 diabetes mellitus, with fat layer exposed (HCC) E10.621, L97.422    Splinter of left foot S90.852A    Heel callus L84    Acute pain of left shoulder M25.512    Hip pain M25.559    TIA (transient ischemic attack) G45.9    Trochanteric bursitis of right hip M70.61    Trochanteric bursitis of left hip M70.62    CVA (cerebral vascular accident) ([address] I63.9       Plan:   28-year-old female presents to the hospital with dizziness and lightheadedness, which are assumed to be linked to dehydration as a result of her prolonged  yard work.  She was transferred from [facility] to undergo a brain MRI with sedations to rule out a posterior circulation stroke.  She claims to have experienced similar symptoms when she had a cerebrospinal leak.  Will Follow after.       Following   Signed:  Erlinda Martel NP  5/25/2021  12:02 PM

## 2021-05-25 NOTE — TELEPHONE ENCOUNTER
Patient still admitted to [facility]. Case pending on cover my med's for Ambien prior auth.

## 2021-05-25 NOTE — PROGRESS NOTES
Occupational Therapy  05/25/21    Chart reviewed, met with patient who pleasantly declined OT evaluation or concerns for d/c home (possibly today pending MRI). She states she has been getting to/from the bathroom ad kaycee, completing ADLs independently with no issue. Reviewed home safety strategies, patient acknowledging understanding, no further needs, will sign off.     Thank you,   Ashley Zaldivar, OTD, OTR/L

## 2021-05-25 NOTE — DIABETES MGMT
[address] [place] [address]    CLINICAL NURSE SPECIALIST CONSULT     Initial Presentation   Yuliet Sepulveda is a 61 y.o. female who [facility]23/21 with a one day c/o weakness. Ataxia was noted and transferred to [address] [place] [address] for evaluation by neurology/MRI. HX:   Past Medical History:   Diagnosis Date    Arthritis     patient states \"every joint affected\"    Bee sting 09/05/2018    left elbow bee sting     Blister of ankle 09/05/2018    Blister small per patient of left ankle. Wears an air boot due to 2 stress fractures in last 2 months.  CAD (coronary artery disease)     Constipation     Falls 2017    pt reports having 4 falls in 3 days    Fatigue     Headache     Ill-defined condition     multiple broken ribs    Ill-defined condition     reports \"getting infections easily\"    Joint pain     Memory disorder     following spinal fluid leak repair    Menopause     Nausea & vomiting     Neuropathy     Osteoporosis     Thyroid disease     Type 1 diabetes ([facility] [address]     diagnosed at age 8    Unspecified cerebral artery occlusion with cerebral infarction     x 4 (last in 2004)   CVA x6       DX: Ataxia, Dizziness, r/o acute CVA  Treatment plan   TX: CT, MRI    Hospital course   Clinical progress has been complicated by hypoglycemia. Diabetes    Patient has known Type 1 diabetes, treated with Levemir and Humalog PTA. Family history positive for diabetes: Maternal Grandmother with Type 1 Diabetes    Admission BG 39 and A1c 8.3% indicate poor diabetes control.      Ambulatory blood glucose management provided by endocrinologist: Zeina Perry MD    Consulted by Provider for advanced diabetes nursing assessment and care, specifically related to   [x] Inpatient management strategy  [x] Home management assessment      Diabetes-related medical history  Acute complications  Recurrent hypoglycemia  Neurological complications  Peripheral neuropathy  Microvascular disease: None  Macrovascular disease  Cerebral vascular accident, CAD      Diabetes medication history  Drug class Currently in use Discontinued Never used   Biguanide      DDP-4 inhibitor       Sulfonylurea      Thiazolidinedione      GLP-1 RA      SGLT-2 inhibitors      Basal insulin Levemir 28 units am, 5 units pm     Bolus insulin Humalog Kwikpen  1 unit for every 100 mg/dL  (starting at a BG of 200)     Fixed Dose  Combinations        Subjective   \"I don't count carbs and I just base my humalog off my sugar when I eat\". History of Type 1 Diabetes, diagnosed at age 8  Endocrinologist: Ty Loja MD with [facility]    -She wakes up around 9am with a blood glucose usually 30-70, she will drink a 12oz regular pepsi to treat hypoglycemia. Will take her morning Levemir 28 units.  -She doesn't work but if she is going on a long walk with her dog or going to help with yard-work, she will eat a pack of PB crackers (treasure 6 pack)  -Usually skips lunch  -Will eat a frozen vegetable steamer- may sometimes add black beans or chicken  -Drinks about a gallon of water every day  -Will only take mealtime Humalog with dinner or when I drink a slurpee and give 1 unit Humalog for every 100 points- starting at 200.  -Does not give correctional humalog at breakfast or dinner    Physical activity pattern  [x] Aerobic exercise, enjoys walking her dog- can be up to 5 miles/day. Will also help with yard work for others (can be 4-5 hours of work)   Monitoring pattern   Checks 6-8 times daily with traditional glucometer  [x] Breakfast Was on 3 units Levemir at bedtime and fasting glucose was consistently over 300. With 5 units Levemir, BG 30-70 consistently. Will check several times during the day- can drop below 70 during the day with walking/yardwork when she doesn't eat. Does report hypoglycemia daily.   Taking medications pattern  [x] Consistent administration  [x] Affordable    Social determinants of health impacting diabetes self-management practices   Struggling with anxiety and/or depression and Concerned that you need to know more about how to stay healthy with diabetes     Enroute to [facility] BG dropped to 30   yesterday and basal insulin resumed at 1pm  Ate french fries at [employer] at 5pm: Vegetables, peach crumble, few bites of beef stroganoff, water    After dinner, bedtime BG marco to 500, given 4units correctional   Fasting glucose this am is 240   Objective   Physical exam  General   Neuro  Alert, oriented and in no acute distress/ill-appearing. Conversant and cooperative. Vital Signs   Visit Vitals  /62 (BP 1 Location: Right upper arm, BP Patient Position: At rest)   Pulse 74   Temp 97.9 °F (36.6 °C)   Resp 16   Ht 5' 3\" (1.6 m)   Wt 65.2 kg (143 lb 12.8 oz)   SpO2 98%   BMI 25.48 kg/m²     Skin  Warm and dry. No acanthosis noted along neckline. No lipohypertrophy or lipoatrophy noted at injection sites   Heart   Regular rate and rhythm. No murmurs, rubs or gallops  Lungs  Clear to auscultation without rales or rhonchi  Extremities No foot wounds      Laboratory      CBC WITH AUTOMATED DIFF    Collection Time: 05/25/21  6:59 AM   Result Value Ref Range    WBC 4.6 3.6 - 11.0 K/uL    RBC 4.07 3.80 - 5.20 M/uL    HGB 12.3 11.5 - 16.0 g/dL    HCT 36.3 35.0 - 47.0 %    MCV 89.2 80.0 - 99.0 FL    MCH 30.2 26.0 - 34.0 PG    MCHC 33.9 30.0 - 36.5 g/dL    RDW 11.9 11.5 - 14.5 %    PLATELET 330 497 - 438 K/uL    MPV 8.5 (L) 8.9 - 12.9 FL    NRBC 0.0 0  WBC    ABSOLUTE NRBC 0.00 0.00 - 0.01 K/uL    NEUTROPHILS 60 32 - 75 %    LYMPHOCYTES 27 12 - 49 %    MONOCYTES 12 5 - 13 %    EOSINOPHILS 0 0 - 7 %    BASOPHILS 1 0 - 1 %    IMMATURE GRANULOCYTES 0 0.0 - 0.5 %    ABS. NEUTROPHILS 2.8 1.8 - 8.0 K/UL    ABS. LYMPHOCYTES 1.2 0.8 - 3.5 K/UL    ABS. MONOCYTES 0.5 0.0 - 1.0 K/UL    ABS. EOSINOPHILS 0.0 0.0 - 0.4 K/UL    ABS. BASOPHILS 0.1 0.0 - 0.1 K/UL    ABS. IMM.  GRANS. 0.0 0.00 - 0.04 K/UL DF AUTOMATED       No results found for this visit on 05/23/21 (from the past 12 hour(s)). All inpatient labs reviewed in full    Factors impacting BG management  Factor Dose Comments   Nutrition:  Carb-controlled meals     60 grams/meal   NPO? Blood glucose pattern        Assessment and Plan   Nursing Diagnosis Risk for unstable blood glucose pattern   Nursing Intervention Domain 4530 Decision-making Support   Nursing Interventions Examined current inpatient diabetes control   Explored factors facilitating and impeding inpatient management  Identified self-management practices impeding diabetes control  Explored corrective strategies with patient and responsible inpatient provider   Informed patient of rational for insulin strategy while hospitalized     Evaluation   Rachel Maurer is a 61year old female with a 50 year history of Type 1 Diabetes on Levemir and Humalog admitted for stroke rule out. Admitting A1C 8.3% indicating inadequate glucose control. She is on high dose Levemir but reports significant daily fluctuations in glucose ranging from 30-300s. Her fluctuations are largely contributed to by once daily meal consumption and inadequate correctional and carbohydrate coverage. 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 She did have a hypoglycemic event with a blood glucose of 39 during transport from \[facility]\"" to [facility] yesterday morning. Dextrose IVF were started with correctional humalog with glucose 122-400 in the last 24 hrs. Dextrose IVF were stopped and moderate dose basal NPH started. Bedtime glucose was 500 in response to carbohdyrate load at dinner. Basal insulin ultimately needs to be reduced so that glucose remains stable when NPO or doesn't drop with long periods between meals.  Moderate dose NPH has been started and fasting glucose 240 this am.  Would continue this dosing as she likely has some residual hyperglycemia from her high last night. With a reduction in basal insulin, I expect she will need improved correctional humalog adjustments to cover for rise in glucose from intake. She is not willing to count carbohydrates for oral intake but may be able to do a set \"dinner dose\"    Inpatient glucose goal 100-180. Recommendations     [x] Use of Subcutaneous Insulin Order set (4906)  Insulin Dosing Specific recommendation   Continue Basal                                  (Based on weight, BMI & GFR) 10 units NPH BID    Consider using 8 units NPH x1 this am (instead of 10) as she will be NPO for MRI. If fasting glucose below 100,   reduce to 8 units NPH BID   Add Nutritional 2 units Humalog at meals    Hold if patient consumes less   than 50% of carbohydrates on   meal tray    Continue Corrective                          (Useful in adjusting insulin dosing) 1 unit Humalog for every 100,   starting at a glucose of 200 Expect this to need to  change to 1:50       POC glucose Q4hrs to closely monitor BG pattern/hypoglycemia unawareness. Can switch to ACHS once back from MRI    Do not hold basal/correctional insulin if NPO    Working with her endocrinologist, Evaristo Leigh on obtaining Dexcom G6 CGM. If able to fill here, will apply it prior to discharge to help detect low BG. Addendum: Rx sent to out outpatient pharmacy. They are not able to bill under her insurance plan. Rx sent to [facility] and PA initiated for coverage. PA pending. Billing Code(s)   [x] 19598  Before making these care recommendations, I personally reviewed the hosptialization record, including laboratory and diagnostic data, medications and examined the patient at bedside (circumstances permitting).   Total minutes: 48      ANDIE Bailey  Diabetes Clinical Nurse Specialist  [facility]  Access via [employer]

## 2021-05-25 NOTE — DISCHARGE SUMMARY
Discharge Summary       PATIENT ID: Rodrigo Buchanan  MRN: [id number]   YOB: 1965    DATE OF ADMISSION: 5/23/2021  5:25 PM    DATE OF DISCHARGE: 05/25/2021   PRIMARY CARE PROVIDER: Latonia Chaidez DO     DISCHARGING PROVIDER: Aide Burgess MD    To contact this individual call [phone] and ask the  to page. If unavailable ask to be transferred the Adult Hospitalist Department. CONSULTATIONS: IP CONSULT TO NEUROLOGY  IP CONSULT TO ANESTHESIOLOGY  IP CONSULT TO CARDIOLOGY    PROCEDURES/SURGERIES: * No surgery found *    ADMITTING DIAGNOSES & HOSPITAL COURSE:   Dizziness  Chest pain   Type 1 diabetes with hypoglycemia     Rodrigo Buchanan is a 61 y.o. female with pmh of type 1 diabetes, HTN, ?CSF leak who presents with dizziness. History was primarily obtained from the patient, and reports that she has history of diabetes, is a brittle diabetic, has had 6 strokes in the past.  Patient reports that she takes some blood thinner every other day. Does not remember the name. Patient reports that yesterday she worked out in the yard weeding yard for some other people to help them out. Patient reports she started having some dizziness associated with lightheadedness thought that this was secondary to dehydration but her mother was concerned so she was brought to the hospital for further management and evaluation. Patient came to [facility], there was a concern for stroke. Patient was sent to [facility] for possible MRI under sedation. While on the route patient was found to have a blood glucose in 30s, was given D 10, blood glucose checked at [facility] was in the [de-identified]. Patient denies any other complaints or problems. Patient reports she feels dehydrated. Patient reports that she is also having some chest pain and feels like \"there is an elephant sitting on her chest\". Patient denies any other complaints or problems.   Patient denies any radiation to the pain any exertional component or any other concerns or problems. Patient was admitted and worked up for chest pain. Stress test was ordered, and negative. Cardiology was consulted, and no further cardaic workup is planned. Post stress test, the patient started having facial numbness, and code stroke was called. CT/CTA done and negative. MRI ordered, however patient refuses to try with ativan, and instead demanded sedation due to extreme claustrophobia. Neurology evaluated and recommended MRI. Symptoms improved thereafter. MRI was ordered, and was going to be scheduled on 5/25, however multiple emergencies occurred, and this was unable to get done with anesthesia. Patient is not willing to wait for another MRI, and demanding discharge. Discussed with neurology, and reviewed risks and benefits of awaiting MRI including death, risk of further stroke, CSF leak. Despite this patient would like to DC home and follow up with o/p neuro, and an open MRI. DISCHARGE DIAGNOSES / PLAN:      Dizziness - suspect related to hypoglycemia. Review of records reveals multiple ER admissions for concern for TIA/CVA. MRI in 2015 chronic pontine infarction  L facial numbness  - STAT CT/CTA head done and negative for large vessel occlusion  - Neurology consulted, ok to DC, though would recommend she stay and get MRI. Patient however refusing and would like to leave. - should follow up with outpatient neurologist and open MRI   - Echo with normal EF  - ASA 81mg, statin      Chest pain -   - Cardiology following  - Stress test today 5/24, if negative no further cardiac workup.   - Monitor   - PRN morphine     Type 1 diabetes, brittle. A1c 8.3%  - Start NPH 10 U BID  - DC all D5  - POCT glucose checks and hypoglycemia protocol  - SSI   - Diabetes treatment team following      Hypothyroid - home meds  OA, - ibuprofen, PRN Voltaren      ADDITIONAL CARE RECOMMENDATIONS:   - Please take all medications as prescribed.  Note changes as below. **No changes. Resume your home medications, and insulin. - Please make sure to follow up with your primary care physician within 1-2 weeks of discharge for hospital follow up. You should also follow up with your outpatient neurologists. - Please make sure to continue to monitor for: sudden changes in vision, numbness, tingling, weakness, facial droop and return to the Emergency Department with any of these symptoms:   - Please get up slowly from a seated or laying position, avoid falls. - Avoid tobacco, alcohol and other illicit drug use. PENDING TEST RESULTS:   At the time of discharge the following test results are still pending: None    FOLLOW UP APPOINTMENTS:    Follow-up Information     Follow up With Specialties Details Why Contact Epiafnio Chester DO Internal Medicine, Pediatric Medicine In 1 week  [address]  [phone]      Izabela Aldana MD Neurology In 2 weeks For neurology follow up. Could also consider going to [facility] for follow up [address] [place] [address]  [phone]               DIET: Resume previous diet    ACTIVITY: Activity as tolerated    WOUND CARE: None    EQUIPMENT needed: None      DISCHARGE MEDICATIONS:  Current Discharge Medication List      START taking these medications    Details   Blood-Glucose Sensor (Dexcom G6 Sensor) ella 1 Each by Does Not Apply route every ten (10) days. Qty: 3 Device, Refills: 12  Start date: 5/25/2021    Comments: Please trend blood glucose before meals, bedtime, overnight and PRN. If continuing to experience hypoglycemia more than 2-3 times a week, contact endocrinologist for insulin dose adjustments. Blood-Glucose Transmitter (Dexcom G6 Transmitter) ella 1 Each by Does Not Apply route every three (3) months. Qty: 1 Device, Refills: 5  Start date: 5/25/2021    Comments: Please trend blood glucose before meals, bedtime, overnight and PRN.   If continuing to experience hypoglycemia more than 2-3 times a week, contact endocrinologist for insulin dose adjustments. Blood-Glucose Meter,Continuous (Dexcom G6 ) misc 1 Each by Does Not Apply route four (4) times daily. Qty: 1 Each, Refills: 1  Start date: 5/25/2021    Comments: Please check sugar before meals, bedtime and PRN               NOTIFY YOUR PHYSICIAN FOR ANY OF THE FOLLOWING:   Fever over 101 degrees for 24 hours. Chest pain, shortness of breath, fever, chills, nausea, vomiting, diarrhea, change in mentation, falling, weakness, bleeding. Severe pain or pain not relieved by medications. Or, any other signs or symptoms that you may have questions about.     DISPOSITION:   X Home With:   OT  PT  HH  RN       Long term SNF/Inpatient Rehab    Independent/assisted living    Hospice    Other:       PATIENT CONDITION AT DISCHARGE:     Functional status    Poor     Deconditioned    X Independent      Cognition   X  Lucid     Forgetful     Dementia      Catheters/lines (plus indication)    Mendoza     PICC     PEG    X None      Code status    X Full code     DNR      PHYSICAL EXAMINATION AT DISCHARGE:  Visit Vitals  /75 (BP 1 Location: Right arm, BP Patient Position: At rest)   Pulse 93   Temp 98.5 °F (36.9 °C)   Resp 16   Ht 5' 3\" (1.6 m)   Wt 65.2 kg (143 lb 12.8 oz)   SpO2 97%   BMI 25.48 kg/m²     Gen: NAD, awake in bed  HEENT: NC/AT, sclera anicteric, PERRL, EOMI  CV: RRR no m/r/g, normal S1 and S2, no pedal edema   Resp: CTA b/l no increased work of breathing, no wheezing or rhonchi, speaking in full sentences   Abd: NT/ND, normal bowel sounds, no rebound or guarding  Ext: 2+ pulses, no edema  Skin: No rashes or lesions        CHRONIC MEDICAL DIAGNOSES:  Problem List as of 5/25/2021 Date Reviewed: 5/24/2021        Codes Class Noted - Resolved    CVA (cerebral vascular accident) [facility]) ICD-10-CM: I63.9  ICD-9-CM: 434.91  5/23/2021 - Present        Trochanteric bursitis of right hip ICD-10-CM: M70.61  ICD-9-CM: 726.5  3/29/2021 - Present        Trochanteric bursitis of left hip ICD-10-CM: M70.62  ICD-9-CM: 726.5  3/29/2021 - Present        TIA (transient ischemic attack) ICD-10-CM: G45.9  ICD-9-CM: 435.9  11/24/2020 - Present        Acute pain of left shoulder ICD-10-CM: M25.512  ICD-9-CM: 719.41  11/3/2020 - Present        Hip pain ICD-10-CM: M25.559  ICD-9-CM: 719.45  11/3/2020 - Present        Splinter of left foot ICD-10-CM: Y90.058V  ICD-9-CM: 917.6  5/26/2020 - Present        Heel callus ICD-10-CM: L84  ICD-9-CM: 700  5/26/2020 - Present        Diabetic ulcer of left heel associated with type 1 diabetes mellitus, with fat layer exposed ([facility] ICD-10-CM: K47.503, L97.422  ICD-9-CM: 250.81, 707.14  4/27/2020 - Present        Greater trochanteric bursitis ICD-10-CM: M70.60  ICD-9-CM: 726.5  1/10/2020 - Present        Atherosclerosis of artery ICD-10-CM: I70.8  ICD-9-CM: 440.8  8/9/2019 - Present    Overview Signed 8/9/2019 12:15 PM by Gayville Bias, DO     OF NOEMÍ AND SMA ON CT - INCIDENTAL FINDINGS             Trigeminal neuralgia ICD-10-CM: G50.0  ICD-9-CM: 350.1  6/1/2019 - Present        Subacute frontal sinusitis ICD-10-CM: J01.10  ICD-9-CM: 461.1  3/20/2019 - Present        Reactive depression ICD-10-CM: F32.9  ICD-9-CM: 300.4  2/26/2019 - Present        Chronic pain syndrome ICD-10-CM: G89.4  ICD-9-CM: 338.4  2/26/2019 - Present        Diabetic retinopathy screening ICD-10-CM: Z13.5  ICD-9-CM: V80.2  11/6/2018 - Present        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 Sarah Pastrana for endocrinology and Dr. Hoang Stoner at [facility] for eye care             Acquired hypothyroidism ICD-10-CM: E03.9  ICD-9-CM: 244.9  4/3/2018 - Present        Primary osteoarthritis involving multiple joints ICD-10-CM: M89.49  ICD-9-CM: 715.98  4/3/2018 - Present    Overview Signed 9/27/2018 11:54 AM by Serenity Mathur for osteoporosis               History of hypoglycemia ICD-10-CM: Z86.39  ICD-9-CM: V12.29  4/3/2018 - Present        Arthritis ICD-10-CM: M19.90  ICD-9-CM: 716.90  6/26/2017 - Present        Cerebrovascular accident [facility]) ICD-10-CM: I63.9  ICD-9-CM: 434.91  6/12/2017 - Present        Osteopenia ICD-10-CM: M85.80  ICD-9-CM: 733.90  6/12/2017 - Present        Hx of stroke without residual deficits ICD-10-CM: Z86.73  ICD-9-CM: V12.54  7/6/2015 - Present        History of carpal tunnel release ICD-10-CM: Z98.890  ICD-9-CM: V45.89  7/6/2015 - Present    Overview Signed 9/27/2018 11:55 AM by Annika Hall DO     Right hand - Dr. Bessy Murray D deficiency ICD-10-CM: E55.9  ICD-9-CM: 268.9  11/5/2012 - Present        Insomnia ICD-10-CM: G47.00  ICD-9-CM: 780.52  8/18/2011 - Present        Mild nonproliferative diabetic retinopathy ([facility] ICD-10-CM: J25.0732  ICD-9-CM: 250.50, 362.04  11/8/2010 - Present        Hypercholesterolemia ICD-10-CM: E78.00  ICD-9-CM: 272.0  5/24/2010 - Present        RESOLVED: Type 2 diabetes mellitus, with long-term current use of insulin ([facility] ICD-10-CM: E11.9, Z79.4  ICD-9-CM: 250.00, V58.67  11/24/2020 - 1/7/2021        RESOLVED: Muscle cramp, nocturnal ICD-10-CM: R25.2  ICD-9-CM: 729.82  11/5/2019 - 1/7/2021        RESOLVED: Ingrown toenail of right foot with infection ICD-10-CM: L60.0  ICD-9-CM: 703.0  6/1/2019 - 8/27/2019        RESOLVED: Tinea pedis of both feet ICD-10-CM: B35.3  ICD-9-CM: 110.4  6/1/2019 - 1/7/2021        RESOLVED: Sacralgia ICD-10-CM: M53.3  ICD-9-CM: 724.6  3/20/2019 - 6/1/2019        RESOLVED: Facial pain ICD-10-CM: R51.9  ICD-9-CM: 784.0  2/26/2019 - 1/7/2021        RESOLVED: Maxillary sinusitis ICD-10-CM: J32.0  ICD-9-CM: 473.0  1/16/2019 - 2/26/2019        RESOLVED: Mouth lesion ICD-10-CM: K13.70  ICD-9-CM: 528.9  1/16/2019 - 2/26/2019        RESOLVED: Lymphadenopathy of head and neck region ICD-10-CM: R59.0  ICD-9-CM: 785.6  1/16/2019 - 1/7/2021        RESOLVED: Abdominal bloating ICD-10-CM: R14.0  ICD-9-CM: 787.3  1/16/2019 - 1/7/2021        RESOLVED: Weight gain ICD-10-CM: R63.5  ICD-9-CM: 783.1  1/16/2019 - 6/1/2019        RESOLVED: Nasal bleeding ICD-10-CM: R04.0  ICD-9-CM: 784.7  11/6/2018 - 2/26/2019        RESOLVED: Bilateral low back pain without sciatica ICD-10-CM: M54.5  ICD-9-CM: 724.2  3/13/2018 - 1/7/2021        RESOLVED: Stress fracture of sacrum ICD-10-CM: M48.48XA  ICD-9-CM: 733.95  3/13/2018 - 6/1/2019        RESOLVED: Tension type headache, unspecified ICD-10-CM: M64.631  ICD-9-CM: 339.10  7/6/2015 - 4/3/2018        RESOLVED: Intracranial hypotension ICD-10-CM: A74.480  ICD-9-CM: 458.8  7/6/2015 - 4/3/2018        RESOLVED: Falls ICD-10-CM: W19. Germaine Moshe  ICD-9-CM: E888.9  Unknown - 1/7/2021        RESOLVED: Other fatigue ICD-10-CM: R53.83  ICD-9-CM: 780.79  Unknown - 1/7/2021        RESOLVED: Dysfunction of eustachian tube ICD-10-CM: H69.80  ICD-9-CM: 381.81  11/9/2010 - 1/7/2021        RESOLVED: Impingement syndrome of shoulder region ICD-10-CM: M75.40  ICD-9-CM: 726.2  8/5/2010 - 1/7/2021              Greater than 30 minutes were spent with the patient on counseling and coordination of care    Signed:   Reji Garrett MD  5/25/2021  3:15 PM

## 2021-05-25 NOTE — PROGRESS NOTES
Bedside shift change report given to Braden Dillard (oncoming nurse) by Angeline (offgoing nurse). Report included the following information SBAR, ED Summary, Procedure Summary, MAR, Recent Results, Med Rec Status and Cardiac Rhythm nsr.

## 2021-05-25 NOTE — PROGRESS NOTES
Meds for 1600 and 1800 held. Pt would like to take them with bedtime meds.  Below are a list of the meds the pt stated that she would like to take between 2200 and 2300.  -zolpridem  -simvastatin  -ibuprofen  -acetaminophen

## 2021-05-26 ENCOUNTER — TELEPHONE (OUTPATIENT)
Dept: FAMILY MEDICINE CLINIC | Age: 56
End: 2021-05-26

## 2021-05-26 ENCOUNTER — PATIENT OUTREACH (OUTPATIENT)
Dept: CASE MANAGEMENT | Age: 56
End: 2021-05-26

## 2021-05-26 RX ORDER — BLOOD-GLUCOSE TRANSMITTER
EACH MISCELLANEOUS
Qty: 1 DEVICE | Refills: 3 | Status: SHIPPED
Start: 2021-05-26 | End: 2021-06-02 | Stop reason: SDUPTHER

## 2021-05-26 RX ORDER — BLOOD-GLUCOSE TRANSMITTER
EACH MISCELLANEOUS
Qty: 1 DEVICE | Refills: 3 | Status: SHIPPED | OUTPATIENT
Start: 2021-05-26 | End: 2021-05-26 | Stop reason: SDUPTHER

## 2021-05-26 RX ORDER — BLOOD-GLUCOSE SENSOR
1 EACH MISCELLANEOUS
Qty: 3 DEVICE | Refills: 12 | Status: SHIPPED
Start: 2021-05-26 | End: 2021-06-02 | Stop reason: SDUPTHER

## 2021-05-26 RX ORDER — BLOOD-GLUCOSE TRANSMITTER
1 EACH MISCELLANEOUS
Qty: 1 DEVICE | Refills: 5 | Status: SHIPPED
Start: 2021-05-26 | End: 2021-06-02 | Stop reason: SDUPTHER

## 2021-05-26 RX ORDER — BLOOD-GLUCOSE,RECEIVER,CONT
EACH MISCELLANEOUS
Qty: 1 EACH | Refills: 0 | Status: SHIPPED | OUTPATIENT
Start: 2021-05-26 | End: 2021-05-26 | Stop reason: SDUPTHER

## 2021-05-26 RX ORDER — BLOOD-GLUCOSE SENSOR
EACH MISCELLANEOUS
Qty: 3 DEVICE | Refills: 11 | Status: SHIPPED | OUTPATIENT
Start: 2021-05-26 | End: 2021-05-26 | Stop reason: SDUPTHER

## 2021-05-26 RX ORDER — BLOOD-GLUCOSE,RECEIVER,CONT
EACH MISCELLANEOUS
Qty: 1 EACH | Refills: 0 | Status: SHIPPED | OUTPATIENT
Start: 2021-05-26 | End: 2021-06-17

## 2021-05-26 RX ORDER — BLOOD-GLUCOSE,RECEIVER,CONT
1 EACH MISCELLANEOUS 4 TIMES DAILY
Qty: 1 EACH | Refills: 1 | Status: SHIPPED | OUTPATIENT
Start: 2021-05-26 | End: 2021-06-17

## 2021-05-26 RX ORDER — BLOOD-GLUCOSE SENSOR
EACH MISCELLANEOUS
Qty: 3 DEVICE | Refills: 11 | Status: SHIPPED
Start: 2021-05-26 | End: 2021-06-02 | Stop reason: SDUPTHER

## 2021-05-26 NOTE — TELEPHONE ENCOUNTER
Pt calling to say she went to hospital and they transferred her to [facility] and was told she was getting a mri and never did she states she needs a mri of her head to see what's going on and if she had a stroke .  She still gets faint and dizzy

## 2021-05-26 NOTE — TELEPHONE ENCOUNTER
[facility] Ms Rhea Hairston, she stated she and Dr. Caridad Mi had spoken about the pressure in her head. Since this past Saturday, it has gotten really worst, she could hardly walk from her couch to the bed, so had to make a trip to [place] [address] on Sunday. She was transferred to [facility] where they were supposed to had done a Brain MRI, which was not done, she was not getting her insulin and her daily medications there. She was not feeling well, became upset and left feeling as though she was not being properly taken care of. Ms Rhea Hairston stated, she believes that her spinal fluid leak is happening again like the episode she had in 1999. She was told she needs to be seen with a [facility], so when she tried   to schedule an appointment with [facility], because she refuses to go back to [facility] they stated to her, she would need to have a Referral for the neuro surgeon. I stated to her, Dr. Caridad Mi as she might have heard is no longer with the company, so I will send the message to [facility]. She stated, sending it to [facility] would be OK. I did state to her, I am not sure if she would have to see [facility] first or if she will continue from the ER visit and Dr. Cardiad Mi notes, but will inform her when I give her a call back with Amanda's response.

## 2021-05-26 NOTE — TELEPHONE ENCOUNTER
PC, no answer, so a VM was left to return the call to schedule an appointment per Cynthia Jesus. This message is being routed to the front staff to schedule the appointment when patient returns the call.

## 2021-05-26 NOTE — TELEPHONE ENCOUNTER
Pt called back asking if a nurse could call her, she has a question about being admitted into the ER at [facility].  She wants to know if she should go through the ER to see the Neurosurgery or have a referral placed to see a neurosurgeon

## 2021-05-26 NOTE — TELEPHONE ENCOUNTER
Ms Preet Figueroa returned the call, she was informed of an appointment needed per Gentry Núñez. She stated, she will have to call back, she is at the hospital to be seen as we spoke. Gentry Núñez was verbally informed, stated OK and thanks.

## 2021-05-26 NOTE — PROGRESS NOTES
Educated patient about risk for severe COVID-19 due to risk factors according to CDC guidelines. CTN reviewed discharge instructions, medical action plan and red flag symptoms with the patient who verbalized understanding. Discussed COVID vaccination status: yes. Education provided on COVID-19 vaccination as appropriate. Discussed exposure protocols and quarantine with CDC Guidelines. Patient was given an opportunity to verbalize any questions and concerns and agrees to contact CTN or health care provider for questions related to their healthcare.

## 2021-05-28 ENCOUNTER — TELEPHONE (OUTPATIENT)
Dept: FAMILY MEDICINE CLINIC | Age: 56
End: 2021-05-28

## 2021-05-28 NOTE — TELEPHONE ENCOUNTER
----- Message from Sarah Rivera sent at 5/28/2021 10:34 AM EDT -----  Regarding: Np steinberg/telephone  Contact: [phone]  Patient return call    Caller's first and last name and relationship (if not the patient):      Best contact number(s):[phone]      Whose call is being returned:leander      Details to clarify the request:about an appt with Isabel Colon or either getting an appt with a neurologist      Saarh Rivera

## 2021-05-28 NOTE — TELEPHONE ENCOUNTER
Please schedule an OV with Megan to discuss. It looks like Steve Wade addressed this earlier this week, requested a visit. Thanks!

## 2021-05-28 NOTE — TELEPHONE ENCOUNTER
Patient needs a referral to [facility] Neurosurgery. She said it's for something Dr. Jerry Ingram at [facility] cannot do.

## 2021-05-31 ENCOUNTER — HOSPITAL ENCOUNTER (EMERGENCY)
Age: 56
Discharge: HOME | End: 2021-05-31
Attending: EMERGENCY MEDICINE
Payer: MEDICARE

## 2021-05-31 VITALS
DIASTOLIC BLOOD PRESSURE: 70 MMHG | SYSTOLIC BLOOD PRESSURE: 154 MMHG | HEART RATE: 88 BPM | TEMPERATURE: 98.2 F | RESPIRATION RATE: 18 BRPM | OXYGEN SATURATION: 100 % | BODY MASS INDEX: 24.81 KG/M2 | WEIGHT: 140 LBS

## 2021-05-31 DIAGNOSIS — R51.9 INTRACTABLE HEADACHE, UNSPECIFIED CHRONICITY PATTERN, UNSPECIFIED HEADACHE TYPE: Primary | ICD-10-CM

## 2021-05-31 LAB
ALBUMIN SERPL-MCNC: 3.4 G/DL (ref 3.5–5)
ALBUMIN/GLOB SERPL: 1 {RATIO} (ref 1.1–2.2)
ALP SERPL-CCNC: 94 U/L (ref 45–117)
ALT SERPL-CCNC: 25 U/L (ref 12–78)
ANION GAP SERPL CALC-SCNC: 6 MMOL/L (ref 5–15)
AST SERPL-CCNC: 22 U/L (ref 15–37)
BASOPHILS # BLD: 0 K/UL (ref 0–0.1)
BASOPHILS NFR BLD: 0 % (ref 0–1)
BILIRUB SERPL-MCNC: 0.6 MG/DL (ref 0.2–1)
BUN SERPL-MCNC: 9 MG/DL (ref 6–20)
BUN/CREAT SERPL: 11 (ref 12–20)
CALCIUM SERPL-MCNC: 9.6 MG/DL (ref 8.5–10.1)
CHLORIDE SERPL-SCNC: 97 MMOL/L (ref 97–108)
CO2 SERPL-SCNC: 30 MMOL/L (ref 21–32)
CREAT SERPL-MCNC: 0.83 MG/DL (ref 0.55–1.02)
DIFFERENTIAL METHOD BLD: ABNORMAL
EOSINOPHIL # BLD: 0 K/UL (ref 0–0.4)
EOSINOPHIL NFR BLD: 0 % (ref 0–7)
ERYTHROCYTE [DISTWIDTH] IN BLOOD BY AUTOMATED COUNT: 11.9 % (ref 11.5–14.5)
GLOBULIN SER CALC-MCNC: 3.3 G/DL (ref 2–4)
GLUCOSE SERPL-MCNC: 221 MG/DL (ref 65–100)
HCT VFR BLD AUTO: 35.9 % (ref 35–47)
HGB BLD-MCNC: 12.5 G/DL (ref 11.5–16)
IMM GRANULOCYTES # BLD AUTO: 0 K/UL (ref 0–0.04)
IMM GRANULOCYTES NFR BLD AUTO: 0 % (ref 0–0.5)
LYMPHOCYTES # BLD: 1 K/UL (ref 0.8–3.5)
LYMPHOCYTES NFR BLD: 16 % (ref 12–49)
MCH RBC QN AUTO: 30.6 PG (ref 26–34)
MCHC RBC AUTO-ENTMCNC: 34.8 G/DL (ref 30–36.5)
MCV RBC AUTO: 87.8 FL (ref 80–99)
MONOCYTES # BLD: 0.6 K/UL (ref 0–1)
MONOCYTES NFR BLD: 9 % (ref 5–13)
NEUTS SEG # BLD: 4.5 K/UL (ref 1.8–8)
NEUTS SEG NFR BLD: 75 % (ref 32–75)
NRBC # BLD: 0 K/UL (ref 0–0.01)
NRBC BLD-RTO: 0 PER 100 WBC
PLATELET # BLD AUTO: 204 K/UL (ref 150–400)
PMV BLD AUTO: 8.8 FL (ref 8.9–12.9)
POTASSIUM SERPL-SCNC: 5 MMOL/L (ref 3.5–5.1)
PROT SERPL-MCNC: 6.7 G/DL (ref 6.4–8.2)
RBC # BLD AUTO: 4.09 M/UL (ref 3.8–5.2)
SODIUM SERPL-SCNC: 133 MMOL/L (ref 136–145)
WBC # BLD AUTO: 6.1 K/UL (ref 3.6–11)

## 2021-05-31 PROCEDURE — 36415 COLL VENOUS BLD VENIPUNCTURE: CPT

## 2021-05-31 PROCEDURE — 74011250636 HC RX REV CODE- 250/636: Performed by: EMERGENCY MEDICINE

## 2021-05-31 PROCEDURE — 85025 COMPLETE CBC W/AUTO DIFF WBC: CPT

## 2021-05-31 PROCEDURE — 80053 COMPREHEN METABOLIC PANEL: CPT

## 2021-05-31 PROCEDURE — 74011000250 HC RX REV CODE- 250: Performed by: EMERGENCY MEDICINE

## 2021-05-31 PROCEDURE — 96375 TX/PRO/DX INJ NEW DRUG ADDON: CPT

## 2021-05-31 PROCEDURE — 99284 EMERGENCY DEPT VISIT MOD MDM: CPT

## 2021-05-31 PROCEDURE — 96374 THER/PROPH/DIAG INJ IV PUSH: CPT

## 2021-05-31 RX ORDER — DIPHENHYDRAMINE HYDROCHLORIDE 50 MG/ML
25 INJECTION, SOLUTION INTRAMUSCULAR; INTRAVENOUS
Status: COMPLETED | OUTPATIENT
Start: 2021-05-31 | End: 2021-05-31

## 2021-05-31 RX ORDER — KETAMINE HYDROCHLORIDE 50 MG/ML
0.3 INJECTION, SOLUTION INTRAMUSCULAR; INTRAVENOUS
Status: COMPLETED | OUTPATIENT
Start: 2021-05-31 | End: 2021-05-31

## 2021-05-31 RX ORDER — SODIUM CHLORIDE 0.9 % (FLUSH) 0.9 %
5-10 SYRINGE (ML) INJECTION ONCE
Status: DISCONTINUED | OUTPATIENT
Start: 2021-05-31 | End: 2021-05-31 | Stop reason: HOSPADM

## 2021-05-31 RX ORDER — PROCHLORPERAZINE EDISYLATE 5 MG/ML
10 INJECTION INTRAMUSCULAR; INTRAVENOUS
Status: COMPLETED | OUTPATIENT
Start: 2021-05-31 | End: 2021-05-31

## 2021-05-31 RX ORDER — CARBAMAZEPINE 100 MG/1
100 TABLET, CHEWABLE ORAL 3 TIMES DAILY
Qty: 21 TABLET | Refills: 0 | Status: SHIPPED | OUTPATIENT
Start: 2021-05-31 | End: 2021-06-17

## 2021-05-31 RX ORDER — KETOROLAC TROMETHAMINE 15 MG/ML
15 INJECTION, SOLUTION INTRAMUSCULAR; INTRAVENOUS
Status: COMPLETED | OUTPATIENT
Start: 2021-05-31 | End: 2021-05-31

## 2021-05-31 RX ADMIN — KETAMINE HYDROCHLORIDE 19 MG: 50 INJECTION, SOLUTION INTRAMUSCULAR; INTRAVENOUS at 13:43

## 2021-05-31 RX ADMIN — DIPHENHYDRAMINE HYDROCHLORIDE 25 MG: 50 INJECTION, SOLUTION INTRAMUSCULAR; INTRAVENOUS at 12:04

## 2021-05-31 RX ADMIN — PROCHLORPERAZINE EDISYLATE 10 MG: 5 INJECTION INTRAMUSCULAR; INTRAVENOUS at 12:04

## 2021-05-31 RX ADMIN — KETOROLAC TROMETHAMINE 15 MG: 15 INJECTION, SOLUTION INTRAMUSCULAR; INTRAVENOUS at 12:04

## 2021-05-31 RX ADMIN — SODIUM CHLORIDE 1000 ML: 9 INJECTION, SOLUTION INTRAVENOUS at 12:11

## 2021-05-31 NOTE — ED PROVIDER NOTES
EMERGENCY DEPARTMENT HISTORY AND PHYSICAL EXAM          Date: 5/31/2021  Patient Name: Jhon Maya    History of Presenting Illness     Chief Complaint   Patient presents with    Headache       History Provided By: Patient    HPI: Jhon Maya is a 61 y.o. female, pmhx diabetes, anxiety, depression, CAD, who presents ambulatory via private auto to the ED c/o left-sided headache    Patient explains she woke this morning with left-sided headache. It does not seem to be positional in origin but patient thinks it is related to a spinal tap she recently had during last admission a week ago. She notes she felt nauseated just prior to arrival but has not had any vomiting as well as any fevers, chills, coughing. She was just transferred to [facility] for stroke evaluation with MRI but she left AGAINST MEDICAL ADVICE before the MRI could be completed despite neurology recommendations to stay. PCP: Bindu Garza DO    Allergies: None known  Social Hx: +tobacco, -vaping, -EtOH, -Illicit Drugs; There are no other complaints, changes, or physical findings at this time. Current Facility-Administered Medications   Medication Dose Route Frequency Provider Last Rate Last Admin    sodium chloride (NS) flush 5-10 mL  5-10 mL IntraVENous ONCE TermeerNelli MD         Current Outpatient Medications   Medication Sig Dispense Refill    carBAMazepine (TEGretol) 100 mg chewable tablet Take 1 Tablet by mouth three (3) times daily. 21 Tablet 0    Blood-Glucose Sensor (Dexcom G6 Sensor) ella 1 Each by Does Not Apply route every ten (10) days. Run through part B 3 Device 12    Blood-Glucose Transmitter (Dexcom G6 Transmitter) ella 1 Each by Does Not Apply route every three (3) months. Run through part B 1 Device 5    Blood-Glucose Meter,Continuous (Dexcom G6 ) misc 1 Each by Does Not Apply route four (4) times daily.  Run through part B 1 Each 1    Blood-Glucose Sensor (Dexcom G6 Sensor) ella Change sensor every 10 days [phone] 3 Device 11    Blood-Glucose Transmitter (Dexcom G6 Transmitter) ella Change sensor every 10 days [phone] 1 Device 3    Blood-Glucose Meter,Continuous (Dexcom G6 ) misc Change sensor every 10 days [phone] 1 Each 0    gabapentin (NEURONTIN) 100 mg capsule Take 2 capsules by mouth twice daily 120 Cap 0    traMADoL (ULTRAM) 50 mg tablet TAKE 1 TABLET BY MOUTH ONCE DAILY NO  MORE  THAN  50  MG  PER  DAY 30 Tab 2    Euthyrox 75 mcg tablet TAKE 1 TABLET BY MOUTH ONCE DAILY BEFORE  BREAKFAST  FOR  A  CONDITION  WITH  LOW  THYROID  HORMONE  LEVELS 90 Tab 0    tetrahydrozoline (Sterile Eye Drops) 0.05 % ophthalmic solution Administer 1 Drop to both eyes four (4) times daily. (Patient not taking: Reported on 5/23/2021) 10 mL 0    zolpidem (AMBIEN) 5 mg tablet Take 1 Tab by mouth nightly as needed for Sleep.  Max Daily Amount: 5 mg. 30 Tab 2    diclofenac EC (VOLTAREN) 75 mg EC tablet       insulin detemir U-100 (Levemir FlexTouch U-100 Insuln) 100 unit/mL (3 mL) inpn INJECT 28 UNITS SUBCUTANEOUSLY IN THE MORNING AND 3 UNITS  AT  NIGHT  Indications: type 1 diabetes mellitus 10 Adjustable Dose Pre-filled Pen Syringe 5    HumaLOG KwikPen Insulin 100 unit/mL kwikpen Max daily dose 30 15 mL 5    glucose blood VI test strips (OneTouch Ultra Blue Test Strip) strip TEST BLOOD SUGAR 8 TIMES A DAY DUE TO UNCONTROLLED SUGARS Dx : E10.42 300 Strip 11    flash glucose sensor (FreeStyle Hal 14 Day Sensor) kit TEST BLOOD SUGAR 8 TIMES A DAY DUE TO UNCONTROLLED SUGARS Dx : E10.42 2 Kit 5    flash glucose scanning reader (FreeStyle Hal 14 Day Gamaliel) misc TEST BLOOD SUGAR 8 TIMES A DAY DUE TO UNCONTROLLED SUGARS Dx : E17.61 1 Each 0    Insulin Needles, Disposable, (Niru Pen Needle) 32 gauge x 5/32\" ndle Use as directed with pen device - up to 5 times daily  Dx:  E11.9 200 Pen Needle 5    Insulin Needles, Disposable, (Pen Needle) 30 gauge x 5/16\" ndle Use one needle up to 8 times daily to dispense insulin. E10.65,E 10.649 300 Pen Needle 5    aspirin 81 mg chewable tablet Take 81 mg by mouth daily.  triamcinolone acetonide (KENALOG) 0.025 % topical cream APPLY CREAM TOPICALLY TO AFFECTED AREA TWICE DAILY AS NEEDED (Patient not taking: Reported on 5/23/2021)      mupirocin (BACTROBAN) 2 % ointment APPLY ONCE DAILY TO AREAS OF OPEN SKIN UNTIL SORES HEAL UP ALSO APPLY TO TOES      metoclopramide HCl (REGLAN) 5 mg tablet Take 1 Tab by mouth two (2) times a day. Up to BID. TAKE 30 MINUTES BEFORE MEALS (Patient taking differently: Take 5 mg by mouth. TAKES 30 MINUTES AFTER A MEAL.) 360 Tab 0    losartan (COZAAR) 50 mg tablet Take 1 Tab by mouth daily. Take 1 tab daily 90 Tab 3    montelukast (SINGULAIR) 10 mg tablet Take 1 Tab by mouth daily. 90 Tab 1    potassium 99 mg tablet Take 1 Tab by mouth every other day. 90 Tab 1    simvastatin (ZOCOR) 40 mg tablet Take 1 Tab by mouth nightly. 90 Tab 1    tiZANidine (ZANAFLEX) 4 mg tablet Take 1-2 Tabs by mouth three (3) times daily as needed for Muscle Spasm(s) or Pain (max dose 24 mg/day). Indications: muscle spasm (Patient taking differently: Take 4-8 mg by mouth three (3) times daily as needed for Muscle Spasm(s) or Pain (max dose 24 mg/day). TAKES ONLY AT HS, RARELY  Indications: muscle spasm) 30 Tab 1    levocetirizine (XYZAL) 5 mg tablet Take 1 Tab by mouth daily. (Patient not taking: Reported on 5/23/2021) 30 Tab 1    ascorbic acid, vitamin C, (VITAMIN C) 1,000 mg tablet Take 4,000 mg by mouth two (2) times a day.  calcium carbonate (CALTREX) 600 mg calcium (1,500 mg) tablet Take 600 mg by mouth two (2) times a day.  glucos sul 2KCl/msm/chond/C/Mn (GLUCOSAMINE CHONDROITIN PO) Take  by mouth.  MAGNESIUM PO Take  by mouth every fourty-eight (48) hours.  denosumab (PROLIA) 60 mg/mL injection 60 mg by SubCUTAneous route.  Every 6 months      cholecalciferol, vitamin D3, (VITAMIN D3) 2,000 unit tab Take 1 Tab by mouth daily. 5000 units daily  Indications: low vitamin D levels      VITAMIN A PO Take 2,400 mcg by mouth nightly.  vitamin E (AQUA GEMS) 400 unit capsule Take 400 Units by mouth every Monday and Thursday. Only on  and Thurs at Bedtime         Past History     Past Medical History:  Past Medical History:   Diagnosis Date    Arthritis     patient states \"every joint affected\"    Bee sting 2018    left elbow bee sting     Blister of ankle 2018    Blister small per patient of left ankle. Wears an air boot due to 2 stress fractures in last 2 months.     CAD (coronary artery disease)     Constipation     Falls 2017    pt reports having 4 falls in 3 days    Fatigue     Headache     Ill-defined condition     multiple broken ribs    Ill-defined condition     reports \"getting infections easily\"    Joint pain     Memory disorder     following spinal fluid leak repair    Menopause     Nausea & vomiting     Neuropathy     Osteoporosis     Thyroid disease     Type 1 diabetes ([facility] [address]     diagnosed at age 8    Unspecified cerebral artery occlusion with cerebral infarction     x 4 (last in )       Past Surgical History:  Past Surgical History:   Procedure Laterality Date    HX CARPAL TUNNEL RELEASE Right 2018    HX  SECTION  1987    HX  SECTION      HX CHOLECYSTECTOMY  1996    HX HYSTERECTOMY  2006    HX ORTHOPAEDIC      frozen shoulder surgery x 3    HX ORTHOPAEDIC      frozen finger surgery x 8    HX ORTHOPAEDIC Left 2014    wrist surgery    HX ORTHOPAEDIC Right 1977    hand surgery    HX ORTHOPAEDIC Left     foot surgery    HX OTHER SURGICAL      spinal fluid leak repair (spinal fluid leaking from nose, remove nose, cut fat from stomach, use that as patch behind nose, nose replaced)    HX ROTATOR CUFF REPAIR Left     HX ROTATOR CUFF REPAIR Right     HX TONSILLECTOMY  1968       Family History:  Family History   Problem Relation Age of Onset    Heart Disease Mother     Hypertension Mother     No Known Problems Father     Heart Disease Maternal Grandfather     Cancer Maternal Aunt         Pancreatic and Liver    Cancer Maternal Grandmother         Lung    Diabetes Maternal Grandmother     Cancer Maternal Aunt         Breast    Breast Cancer Maternal Aunt     Diabetes Maternal Aunt        Social History:  Social History     Tobacco Use    Smoking status: Former Smoker     Packs/day: 0.50     Years: 8.00     Pack years: 4.00     Quit date: 4/10/2007     Years since quittin.1    Smokeless tobacco: Never Used   Vaping Use    Vaping Use: Never used   Substance Use Topics    Alcohol use: No    Drug use: No       Allergies:  No Known Allergies      Review of Systems   Review of Systems   Constitutional: Negative for activity change, appetite change, chills, fever and unexpected weight change. HENT: Negative for congestion. Eyes: Negative for pain and visual disturbance. Respiratory: Negative for cough and shortness of breath. Cardiovascular: Negative for chest pain. Gastrointestinal: Positive for nausea. Negative for abdominal pain, anal bleeding, diarrhea and vomiting. Genitourinary: Negative for dysuria. Musculoskeletal: Negative for back pain. Skin: Negative for rash. Neurological: Positive for dizziness, light-headedness and headaches. Physical Exam   Physical Exam  Vitals and nursing note reviewed. Constitutional:       Appearance: She is well-developed. She is not diaphoretic. Comments: Middle-aged female sitting with sunglasses in the hallway, currently in distress secondary to pain   HENT:      Head: Normocephalic and atraumatic. Eyes:      General:         Right eye: No discharge. Left eye: No discharge. Conjunctiva/sclera: Conjunctivae normal.      Pupils: Pupils are equal, round, and reactive to light. Cardiovascular:      Rate and Rhythm: Normal rate and regular rhythm. Heart sounds: Normal heart sounds. No murmur heard. Pulmonary:      Effort: Pulmonary effort is normal. No respiratory distress. Breath sounds: Normal breath sounds. No wheezing or rales. Abdominal:      General: Bowel sounds are normal. There is no distension. Palpations: Abdomen is soft. Tenderness: There is no abdominal tenderness. Musculoskeletal:         General: Normal range of motion. Cervical back: Normal range of motion and neck supple. No rigidity. Right lower leg: No edema. Left lower leg: No edema. Lymphadenopathy:      Cervical: No cervical adenopathy. Skin:     General: Skin is warm and dry. Findings: No rash. Neurological:      Mental Status: She is alert and oriented to person, place, and time. Cranial Nerves: No cranial nerve deficit. Motor: No abnormal muscle tone. Diagnostic Study Results     Labs -     Recent Results (from the past 12 hour(s))   CBC WITH AUTOMATED DIFF    Collection Time: 05/31/21 11:45 AM   Result Value Ref Range    WBC 6.1 3.6 - 11.0 K/uL    RBC 4.09 3.80 - 5.20 M/uL    HGB 12.5 11.5 - 16.0 g/dL    HCT 35.9 35.0 - 47.0 %    MCV 87.8 80.0 - 99.0 FL    MCH 30.6 26.0 - 34.0 PG    MCHC 34.8 30.0 - 36.5 g/dL    RDW 11.9 11.5 - 14.5 %    PLATELET 317 295 - 297 K/uL    MPV 8.8 (L) 8.9 - 12.9 FL    NRBC 0.0 0  WBC    ABSOLUTE NRBC 0.00 0.00 - 0.01 K/uL    NEUTROPHILS 75 32 - 75 %    LYMPHOCYTES 16 12 - 49 %    MONOCYTES 9 5 - 13 %    EOSINOPHILS 0 0 - 7 %    BASOPHILS 0 0 - 1 %    IMMATURE GRANULOCYTES 0 0.0 - 0.5 %    ABS. NEUTROPHILS 4.5 1.8 - 8.0 K/UL    ABS. LYMPHOCYTES 1.0 0.8 - 3.5 K/UL    ABS. MONOCYTES 0.6 0.0 - 1.0 K/UL    ABS. EOSINOPHILS 0.0 0.0 - 0.4 K/UL    ABS. BASOPHILS 0.0 0.0 - 0.1 K/UL    ABS. IMM.  GRANS. 0.0 0.00 - 0.04 K/UL    DF AUTOMATED     METABOLIC PANEL, COMPREHENSIVE    Collection Time: 05/31/21 11:45 AM   Result Value Ref Range    Sodium 133 (L) 136 - 145 mmol/L    Potassium 5.0 3.5 - 5.1 mmol/L    Chloride 97 97 - 108 mmol/L    CO2 30 21 - 32 mmol/L    Anion gap 6 5 - 15 mmol/L    Glucose 221 (H) 65 - 100 mg/dL    BUN 9 6 - 20 MG/DL    Creatinine 0.83 0.55 - 1.02 MG/DL    BUN/Creatinine ratio 11 (L) 12 - 20      GFR est AA >60 >60 ml/min/1.73m2    GFR est non-AA >60 >60 ml/min/1.73m2    Calcium 9.6 8.5 - 10.1 MG/DL    Bilirubin, total 0.6 0.2 - 1.0 MG/DL    ALT (SGPT) 25 12 - 78 U/L    AST (SGOT) 22 15 - 37 U/L    Alk. phosphatase 94 45 - 117 U/L    Protein, total 6.7 6.4 - 8.2 g/dL    Albumin 3.4 (L) 3.5 - 5.0 g/dL    Globulin 3.3 2.0 - 4.0 g/dL    A-G Ratio 1.0 (L) 1.1 - 2.2         Radiologic Studies -   No orders to display     CT Results  (Last 48 hours)    None        CXR Results  (Last 48 hours)    None            Medical Decision Making   I am the first provider for this patient. I reviewed the vital signs, available nursing notes, past medical history, past surgical history, family history and social history. Vital Signs-Reviewed the patient's vital signs. Patient Vitals for the past 12 hrs:   Temp Pulse Resp BP SpO2   05/31/21 1508    (!) 154/70 100 %   05/31/21 1445    127/65 98 %   05/31/21 1430    127/62 97 %   05/31/21 1415    127/63    05/31/21 1400    (!) 169/71 96 %   05/31/21 1357    (!) 180/77 100 %   05/31/21 1345    (!) 142/60 95 %   05/31/21 1330    (!) 157/87 100 %   05/31/21 1315    (!) 159/78 99 %   05/31/21 1300    (!) 155/67 99 %   05/31/21 1245    137/68 98 %   05/31/21 1230    135/64 99 %   05/31/21 1217    136/69 97 %   05/31/21 1130 98.2 °F (36.8 °C) 88 18 (!) 143/74 97 %       Pulse Oximetry Analysis - 97% on RA     Records Reviewed: Nursing Notes, Old Medical Records, Previous Radiology Studies and Previous Laboratory Studies  Recent admission from [facility] reviewed    Provider Notes (Medical Decision Making):   MDM: Middle-aged female with significant history presents ambulatory for left-sided headache.   She thinks it is related to the LP she had last week. Given the time of onset and the fact that this is not positional I do not think a blood patch can help her at this time. We will try a migraine medication cocktail with close monitoring. Neurologic exam at this time is normal without concern for new stroke. ED Course:   Initial assessment performed. The patients presenting problems have been discussed, and they are in agreement with the care plan formulated and outlined with them. I have encouraged them to ask questions as they arise throughout their visit. PROGRESS NOTE:    ED Course as of May 31 1512   Mon May 31, 2021   1257 Patient states her pain is still an 8 out of 10 and is no better after the medications. She states she does not want narcotics because they all make her feel bad and morphine specifically causes her chest pain. Discussed options and we can try some ketamine to see if that helps. [JT]   1347 The ketamine provided by me. Infused over 2 minutes without reaction. [JT]   1357 To bedside as patient states she cannot breathe after medication provided. Patient's airway is normal her oxygen is 100% and her blood pressure is up to 516 systolic. Having acute panic with tachypnea. After reassurance she is able to lay back and rest and does not appear to have any allergic reaction to the ketamine    [JT]   1508 Patient reevaluated and appears much better. She has her eyes open and pupils are equal.  Mom is at bedside and explained this headache has not been since today but been for the past year. We discussed possible options for management at home. Patient is already on gabapentin 400 mg I recommend she increase it to 500 and we can start her on some Tegretol to see if this is a component of facial neuralgia. [JT]      ED Course User Index  [JT] Nancy Cleveland., MD        Discharge note:  Pt re-evaluated and noted to be feeling better, ready for discharge.  Updated pt on all final lab findings. Will follow up as instructed. All questions have been answered, pt voiced understanding and agreement with plan. Specific return precautions provided as well as instructions to return to the ED should sx worsen at any time. Vital signs stable for discharge. Critical Care Time:   0      Diagnosis     Clinical Impression:   1. Intractable headache, unspecified chronicity pattern, unspecified headache type        PLAN:  1. Current Discharge Medication List      START taking these medications    Details   carBAMazepine (TEGretol) 100 mg chewable tablet Take 1 Tablet by mouth three (3) times daily. Qty: 21 Tablet, Refills: 0  Start date: 5/31/2021           2. Follow-up Information     Follow up With Specialties Details Why Contact Info    [address] [place] [address] Emergency Medicine  If symptoms worsen [address] [phone] [id number]        Return to ED if worse     Disposition:  Home       Please note, this dictation was completed with aaTag, the computer voice recognition software. Quite often unanticipated grammatical, syntax, homophones, and other interpretive errors are inadvertently transcribed by the computer software. Please disregard these errors. Please excuse any errors that have escaped final proof reading.

## 2021-05-31 NOTE — DISCHARGE INSTRUCTIONS
You were seen in the ER for continued headache. Start the Tegretol as ordered and take 1 tablet once a day with a plan to slowly increase to 3 times a day. Follow-up on Wednesday as recommended at your PCP office. Return to the ER for any fevers, difficulty walking, speaking or other weakness.

## 2021-05-31 NOTE — ED TRIAGE NOTES
Pt arrived by POV with complaint of head pressure,  Pt reports she recently had a spinal tap and feels like that is what this is and needs a blood patch. Pt denies migraine. Pt awake alert and oriented x 4.   Pt educated on ER flow

## 2021-05-31 NOTE — ED NOTES
Called to room d/t patient having diificulty breathing, MD to bedside, O2 saturation 100%, no tongue or throat swelling.  Pt coached on focusing on breathing and now relaxed

## 2021-06-01 ENCOUNTER — TELEPHONE (OUTPATIENT)
Dept: ENDOCRINOLOGY | Age: 56
End: 2021-06-01

## 2021-06-01 NOTE — TELEPHONE ENCOUNTER
----- Message from Adria Saleh sent at 6/1/2021  9:35 AM EDT -----  Regarding: Dr. Wali Coy: [phone]  General Message/Vendor Calls    Caller's first and last name:Adri, [employer]      Reason for call:Please call back the pt to provide reason for [employer]'s call, as the pt was hesitant to release PHI to an unknown number, and please provide call back number:[phone]      Callback required yes/no and why:Yes, provide information.        Best contact number(s):[phone]        Details to clarify the request:n/a      Adria Saleh

## 2021-06-02 ENCOUNTER — OFFICE VISIT (OUTPATIENT)
Dept: FAMILY MEDICINE CLINIC | Age: 56
End: 2021-06-02
Payer: MEDICARE

## 2021-06-02 VITALS
TEMPERATURE: 97 F | OXYGEN SATURATION: 98 % | RESPIRATION RATE: 20 BRPM | WEIGHT: 144 LBS | BODY MASS INDEX: 26.5 KG/M2 | HEIGHT: 62 IN

## 2021-06-02 DIAGNOSIS — G62.9 NEUROPATHY: ICD-10-CM

## 2021-06-02 DIAGNOSIS — R51.9 FACIAL PAIN: ICD-10-CM

## 2021-06-02 DIAGNOSIS — E55.9 VITAMIN D DEFICIENCY: ICD-10-CM

## 2021-06-02 DIAGNOSIS — I49.3 PVC (PREMATURE VENTRICULAR CONTRACTION): ICD-10-CM

## 2021-06-02 DIAGNOSIS — R51.9 PRESSURE IN HEAD: Primary | ICD-10-CM

## 2021-06-02 DIAGNOSIS — G89.4 CHRONIC PAIN SYNDROME: ICD-10-CM

## 2021-06-02 DIAGNOSIS — Z86.39 HISTORY OF HYPOGLYCEMIA: ICD-10-CM

## 2021-06-02 DIAGNOSIS — E03.9 ACQUIRED HYPOTHYROIDISM: ICD-10-CM

## 2021-06-02 DIAGNOSIS — E10.42 TYPE 1 DIABETES MELLITUS WITH DIABETIC POLYNEUROPATHY (HCC): ICD-10-CM

## 2021-06-02 DIAGNOSIS — Z51.81 ENCOUNTER FOR THERAPEUTIC DRUG LEVEL MONITORING: ICD-10-CM

## 2021-06-02 DIAGNOSIS — M19.90 ARTHRITIS: ICD-10-CM

## 2021-06-02 DIAGNOSIS — F51.01 PRIMARY INSOMNIA: ICD-10-CM

## 2021-06-02 PROCEDURE — 1111F DSCHRG MED/CURRENT MED MERGE: CPT | Performed by: NURSE PRACTITIONER

## 2021-06-02 PROCEDURE — 99214 OFFICE O/P EST MOD 30 MIN: CPT | Performed by: NURSE PRACTITIONER

## 2021-06-02 PROCEDURE — G9899 SCRN MAM PERF RSLTS DOC: HCPCS | Performed by: NURSE PRACTITIONER

## 2021-06-02 PROCEDURE — 3017F COLORECTAL CA SCREEN DOC REV: CPT | Performed by: NURSE PRACTITIONER

## 2021-06-02 PROCEDURE — G9717 DOC PT DX DEP/BP F/U NT REQ: HCPCS | Performed by: NURSE PRACTITIONER

## 2021-06-02 PROCEDURE — G8427 DOCREV CUR MEDS BY ELIG CLIN: HCPCS | Performed by: NURSE PRACTITIONER

## 2021-06-02 PROCEDURE — G8756 NO BP MEASURE DOC: HCPCS | Performed by: NURSE PRACTITIONER

## 2021-06-02 PROCEDURE — G8419 CALC BMI OUT NRM PARAM NOF/U: HCPCS | Performed by: NURSE PRACTITIONER

## 2021-06-02 RX ORDER — GABAPENTIN 100 MG/1
200 CAPSULE ORAL 2 TIMES DAILY
Qty: 120 CAPSULE | Refills: 0 | Status: SHIPPED | OUTPATIENT
Start: 2021-06-02 | End: 2021-07-14

## 2021-06-02 NOTE — TELEPHONE ENCOUNTER
Message routed to covering provider to approve refill. PCP no longer with practice.       Requested Prescriptions     Pending Prescriptions Disp Refills    gabapentin (NEURONTIN) 100 mg capsule 120 Capsule 0

## 2021-06-02 NOTE — PROGRESS NOTES
1. Have you been to the ER, urgent care clinic since your last visit? Hospitalized since your last visit? No    2. Have you seen or consulted any other health care providers outside of the [address] [place] [address] since your last visit? nclude any pap smears or colon screening.  No        Chief Complaint   Patient presents with    Headache     more pressure         Visit Vitals  Temp 97 °F (36.1 °C) (Temporal)   Resp 20   Ht 5' 2\" (1.575 m)   Wt 144 lb (65.3 kg)   SpO2 98%   BMI 26.34 kg/m²       Pain Scale: 0 - No pain/10  Pain Location:

## 2021-06-02 NOTE — TELEPHONE ENCOUNTER
Spoke with Mariya at [facility], who stated that HARRIET was able to speak with the patient regarding insurance information. She will be assigned a DME company in the next day or so. We should be receiving orders (paperwork)  from the DME company to complete.

## 2021-06-04 ENCOUNTER — TELEPHONE (OUTPATIENT)
Dept: FAMILY MEDICINE CLINIC | Age: 56
End: 2021-06-04

## 2021-06-04 RX ORDER — NALOXONE HYDROCHLORIDE 4 MG/.1ML
SPRAY NASAL
Qty: 2 EACH | Refills: 1 | Status: SHIPPED | OUTPATIENT
Start: 2021-06-04 | End: 2021-06-17

## 2021-06-04 NOTE — TELEPHONE ENCOUNTER
Diana Grover has asked me to message patient's neurologists Dr Tana Lucia. Patient is on several high risk controlled medications and Megan WOODS would like to know if Dr Miah Bansal would be willing to take over witting these prescriptions ?

## 2021-06-04 NOTE — TELEPHONE ENCOUNTER
Patient has been advised of Megan's message. She stated that she doesn't not need Rx. Also she does not need referral for ortho. She has arthritis  and will continue to take the medication that she is on because OTC due not  help.

## 2021-06-04 NOTE — PROGRESS NOTES
Subjective:     Dario Gruber is a 61 y.o. female who presents today with the following:  Chief Complaint   Patient presents with    Headache     more pressure, referral to neurosurgeon       Patient Active Problem List   Diagnosis Code    Hx of stroke without residual deficits Z86.73    History of carpal tunnel release Z98.890    Type 1 diabetes mellitus with diabetic polyneuropathy ([address] E10.42    Hypothyroid E03.9    Primary osteoarthritis involving multiple joints M89.49    History of hypoglycemia Z86.39    Pain in right foot M79.671    Chronic allergic rhinitis J30.9    Arthritis M19.90    Cerebrovascular accident ([address] I63.9    Hypercholesterolemia E78.00    Insomnia G47.00    Mild nonproliferative diabetic retinopathy ([address] N85.2278    Osteopenia M85.80    Vitamin D deficiency E55.9    Hx of brain surgery Z98.890    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    Diabetic retinopathy screening Z13.5    Reactive depression F32.9    Chronic pain syndrome G89.4    Subacute frontal sinusitis J01.10    Trigeminal neuralgia G50.0    Atherosclerosis of artery I70.8    Greater trochanteric bursitis M70.60    Diabetic ulcer of left heel associated with type 1 diabetes mellitus, with fat layer exposed ([address] E10.621, L97.422    Splinter of left foot S90.852A    Heel callus L84    Acute pain of left shoulder M25.512    Hip pain M25.559    TIA (transient ischemic attack) G45.9    Trochanteric bursitis of right hip M70.61    Trochanteric bursitis of left hip M70.62    CVA (cerebral vascular accident) ([address] I63.9    Chest pain R07.9    Dizziness R42    Left facial numbness R20.0         COMPLIANT WITH MEDICATION:   HTN; Denies chest pain, dyspnea, palpitations, headache and blurred vision. Blood pressure normotensive. Pressure in the head; hx of a spinal leak and started having similar symptoms.   She endorses occipital head pressure   Patient explains she woke on Monday with left-sided headache. It does not seem to be positional in origin but patient thinks it is related to a spinal tap she recently had during last admission a week ago. She has not had any vomiting as well as any fevers, chills, coughing. She recently transferred to  [facility] for stroke evaluation with MRI but she left AGAINST MEDICAL ADVICE before the MRI could be completed despite neurology recommendations to stay. She present today requesting referral to neurosurgeon. She notes the original spinal leak was detected in the nares. Neuropathy; gabapentin effective. Chronic pain; She takes Tramadol for foot pain. States she cannot walk without it . Insomnia; Taking Ambien for sleep. depression screening addressed not at risk    abuse screening addressed denies    learning assessment addressed reviewed nurses notes    fall risk addressed not at risk    HM: addressed    ROS:  Gen: denies fever, chills, fatigue, weight loss, weight gain  HEENT:denies blurry vision, nasal congestion, sore throat  Resp: denies dypsnea, cough, wheezing  CV: denies chest pain radiating to the jaws or arms, palpitations, lower extremity edema  Abd: denies nausea, vomiting, diarrhea, constipation  Neuro: denies numbness/tingling  Endo: denies polyuria, polydipsia, heat/cold intolerance  Heme: no lymphadenopathy    No Known Allergies      Current Outpatient Medications:     naloxone (Narcan) 4 mg/actuation nasal spray, Use 1 spray intranasally, then discard. Repeat with new spray every 2 min as needed for opioid overdose symptoms, alternating nostrils.   Indications: decrease in rate & depth of breathing due to opioid drug, Disp: 2 Each, Rfl: 1    traMADoL (ULTRAM) 50 mg tablet, TAKE 1 TABLET BY MOUTH ONCE DAILY NO  MORE  THAN  50  MG  PER  DAY, Disp: 30 Tab, Rfl: 2    Euthyrox 75 mcg tablet, TAKE 1 TABLET BY MOUTH ONCE DAILY BEFORE  BREAKFAST  FOR  A  CONDITION  WITH  LOW  THYROID  HORMONE  LEVELS, Disp: 90 Tab, Rfl: 0   zolpidem (AMBIEN) 5 mg tablet, Take 1 Tab by mouth nightly as needed for Sleep. Max Daily Amount: 5 mg., Disp: 30 Tab, Rfl: 2    diclofenac EC (VOLTAREN) 75 mg EC tablet, , Disp: , Rfl:     insulin detemir U-100 (Levemir FlexTouch U-100 Insuln) 100 unit/mL (3 mL) inpn, INJECT 28 UNITS SUBCUTANEOUSLY IN THE MORNING AND 3 UNITS  AT  NIGHT  Indications: type 1 diabetes mellitus, Disp: 10 Adjustable Dose Pre-filled Pen Syringe, Rfl: 5    HumaLOG KwikPen Insulin 100 unit/mL kwikpen, Max daily dose 30 (Patient taking differently: Max daily dose 30  takes 28 units), Disp: 15 mL, Rfl: 5    glucose blood VI test strips (OneTouch Ultra Blue Test Strip) strip, TEST BLOOD SUGAR 8 TIMES A DAY DUE TO UNCONTROLLED SUGARS Dx : K47.45, Disp: 300 Strip, Rfl: 11    Insulin Needles, Disposable, (Niru Pen Needle) 32 gauge x 5/32\" ndle, Use as directed with pen device - up to 5 times daily  Dx:  E11.9, Disp: 200 Pen Needle, Rfl: 5    Insulin Needles, Disposable, (Pen Needle) 30 gauge x 5/16\" ndle, Use one needle up to 8 times daily to dispense insulin. E10.65,E 10.649, Disp: 300 Pen Needle, Rfl: 5    aspirin 81 mg chewable tablet, Take 81 mg by mouth daily. , Disp: , Rfl:     mupirocin (BACTROBAN) 2 % ointment, PRN, Disp: , Rfl:     metoclopramide HCl (REGLAN) 5 mg tablet, Take 1 Tab by mouth two (2) times a day. Up to BID. TAKE 30 MINUTES BEFORE MEALS (Patient taking differently: Take 5 mg by mouth. TAKES 30 MINUTES AFTER A MEAL. ), Disp: 360 Tab, Rfl: 0    losartan (COZAAR) 50 mg tablet, Take 1 Tab by mouth daily. Take 1 tab daily, Disp: 90 Tab, Rfl: 3    montelukast (SINGULAIR) 10 mg tablet, Take 1 Tab by mouth daily. , Disp: 90 Tab, Rfl: 1    potassium 99 mg tablet, Take 1 Tab by mouth every other day., Disp: 90 Tab, Rfl: 1    simvastatin (ZOCOR) 40 mg tablet, Take 1 Tab by mouth nightly., Disp: 90 Tab, Rfl: 1    tiZANidine (ZANAFLEX) 4 mg tablet, Take 1-2 Tabs by mouth three (3) times daily as needed for Muscle Spasm(s) or Pain (max dose 24 mg/day). Indications: muscle spasm (Patient taking differently: Take 4-8 mg by mouth three (3) times daily as needed for Muscle Spasm(s) or Pain (max dose 24 mg/day). TAKES ONLY AT HS, RARELY  Indications: muscle spasm), Disp: 30 Tab, Rfl: 1    ascorbic acid, vitamin C, (VITAMIN C) 1,000 mg tablet, Take 4,000 mg by mouth two (2) times a day., Disp: , Rfl:     calcium carbonate (CALTREX) 600 mg calcium (1,500 mg) tablet, Take 600 mg by mouth two (2) times a day., Disp: , Rfl:     MAGNESIUM PO, Take  by mouth every fourty-eight (48) hours. , Disp: , Rfl:     denosumab (PROLIA) 60 mg/mL injection, 60 mg by SubCUTAneous route. Every 6 months, Disp: , Rfl:     cholecalciferol, vitamin D3, (VITAMIN D3) 2,000 unit tab, Take 1 Tab by mouth daily. 5000 units daily  Indications: low vitamin D levels, Disp: , Rfl:     VITAMIN A PO, Take 2,400 mcg by mouth nightly., Disp: , Rfl:     vitamin E (AQUA GEMS) 400 unit capsule, Take 400 Units by mouth every Monday and Thursday. Only on M and Thurs at Bedtime, Disp: , Rfl:     gabapentin (NEURONTIN) 100 mg capsule, Take 2 Capsules by mouth two (2) times a day. Max Daily Amount: 400 mg., Disp: 120 Capsule, Rfl: 0    carBAMazepine (TEGretol) 100 mg chewable tablet, Take 1 Tablet by mouth three (3) times daily. (Patient not taking: Reported on 6/2/2021), Disp: 21 Tablet, Rfl: 0    Blood-Glucose Meter,Continuous (Dexcom G6 ) misc, 1 Each by Does Not Apply route four (4) times daily. Run through part B (Patient not taking: Reported on 6/2/2021), Disp: 1 Each, Rfl: 1    Blood-Glucose Meter,Continuous (Dexcom G6 ) misc, Change sensor every 10 days [phone] (Patient not taking: Reported on 6/2/2021), Disp: 1 Each, Rfl: 0    tetrahydrozoline (Sterile Eye Drops) 0.05 % ophthalmic solution, Administer 1 Drop to both eyes four (4) times daily.  (Patient not taking: Reported on 5/23/2021), Disp: 10 mL, Rfl: 0    flash glucose sensor (FreeStyle Hal 14 Day Sensor) kit, TEST BLOOD SUGAR 8 TIMES A DAY DUE TO UNCONTROLLED SUGARS Dx : E10.42 (Patient not taking: Reported on 2021), Disp: 2 Kit, Rfl: 5    flash glucose scanning reader (WorkshopLiveyle Hal 14 Day [place]) misc, TEST BLOOD SUGAR 8 TIMES A DAY DUE TO UNCONTROLLED SUGARS Dx : E10.42 (Patient not taking: Reported on 2021), Disp: 1 Each, Rfl: 0    triamcinolone acetonide (KENALOG) 0.025 % topical cream, APPLY CREAM TOPICALLY TO AFFECTED AREA TWICE DAILY AS NEEDED (Patient not taking: Reported on 2021), Disp: , Rfl:     levocetirizine (XYZAL) 5 mg tablet, Take 1 Tab by mouth daily. (Patient not taking: Reported on 2021), Disp: 30 Tab, Rfl: 1    glucos sul 2KCl/msm/chond/C/Mn (GLUCOSAMINE CHONDROITIN PO), Take  by mouth. (Patient not taking: Reported on 2021), Disp: , Rfl:     Past Medical History:   Diagnosis Date    Arthritis     patient states \"every joint affected\"    Bee sting 2018    left elbow bee sting     Blister of ankle 2018    Blister small per patient of left ankle. Wears an air boot due to 2 stress fractures in last 2 months.     CAD (coronary artery disease)     Constipation     Falls 2017    pt reports having 4 falls in 3 days    Fatigue     Headache     Ill-defined condition     multiple broken ribs    Ill-defined condition     reports \"getting infections easily\"    Joint pain     Memory disorder     following spinal fluid leak repair    Menopause     Nausea & vomiting     Neuropathy     Osteoporosis     Thyroid disease     Type 1 diabetes ([facility]     diagnosed at age 8    Unspecified cerebral artery occlusion with cerebral infarction     x 4 (last in )       Past Surgical History:   Procedure Laterality Date    HX CARPAL TUNNEL RELEASE Right 2018    HX  SECTION      HX  SECTION      HX CHOLECYSTECTOMY      HX HYSTERECTOMY  2006    HX ORTHOPAEDIC      frozen shoulder surgery x 3    HX ORTHOPAEDIC      frozen finger surgery x 8    HX ORTHOPAEDIC Left 2014    wrist surgery    HX ORTHOPAEDIC Right 1977    hand surgery    HX ORTHOPAEDIC Left     foot surgery    HX OTHER SURGICAL      spinal fluid leak repair (spinal fluid leaking from nose, remove nose, cut fat from stomach, use that as patch behind nose, nose replaced)    HX ROTATOR CUFF REPAIR Left     HX ROTATOR CUFF REPAIR Right     HX TONSILLECTOMY  1968       Social History     Tobacco Use   Smoking Status Former Smoker    Packs/day: 0.50    Years: 8.00    Pack years: 4.00    Quit date: 4/10/2007    Years since quittin.1   Smokeless Tobacco Never Used       Social History     Socioeconomic History    Marital status: SINGLE     Spouse name: Not on file    Number of children: Not on file    Years of education: Not on file    Highest education level: Not on file   Tobacco Use    Smoking status: Former Smoker     Packs/day: 0.50     Years: 8.00     Pack years: 4.00     Quit date: 4/10/2007     Years since quittin.1    Smokeless tobacco: Never Used   Vaping Use    Vaping Use: Never used   Substance and Sexual Activity    Alcohol use: No    Drug use: No    Sexual activity: Not Currently     Social Determinants of Health     Financial Resource Strain:     Difficulty of Paying Living Expenses:    Food Insecurity:     Worried About Running Out of Food in the Last Year:     Ran Out of Food in the Last Year:    Transportation Needs:     Lack of Transportation (Medical):      Lack of Transportation (Non-Medical):    Physical Activity:     Days of Exercise per Week:     Minutes of Exercise per Session:    Stress:     Feeling of Stress :    Social Connections:     Frequency of Communication with Friends and Family:     Frequency of Social Gatherings with Friends and Family:     Attends [employer] Services:     Active Member of Clubs or Organizations:     Attends Club or Organization Meetings:     Marital Status:        Family History   Problem Relation Age of Onset    Heart Disease Mother     Hypertension Mother     No Known Problems Father     Heart Disease Maternal Grandfather     Cancer Maternal Aunt         Pancreatic and Liver    Cancer Maternal Grandmother         Lung    Diabetes Maternal Grandmother     Cancer Maternal Aunt         Breast    Breast Cancer Maternal Aunt     Diabetes Maternal Aunt          Objective:     Visit Vitals  Temp 97 °F (36.1 °C) (Temporal)   Resp 20   Ht 5' 2\" (1.575 m)   Wt 144 lb (65.3 kg)   SpO2 98%   BMI 26.34 kg/m²     Body mass index is 26.34 kg/m². General: Alert and oriented. Mild distress. Head pressure. Well nourished  HEENT :  Ears:TMs are normal. Canals are clear. Eyes: pupils equal, round, react to light and accommodation. Extra ocular movements intact. Nose: patent. Mouth and throat is clear. MASK  Neck:supple full range of motion no thyromegaly. Trachea midline, No carotid bruits. No significant lymphadenopathy  Lungs[de-identified] clear to auscultation without wheezes, rales, or rhonchi. Heart :RRR, S1 & S2 are normal intensity. No murmur; no gallop  Back: no CVA tenderness. Extremities: without clubbing, cyanosis, or edema  Pulses: radial and femoral pulses are normal  Neuro: HMF intact. Cranial nerves II through XII grossly normal.  Motor: is 5 over 5 and symmetrical.   Deep tendon reflexes: +2 equal  Psych:appropriate behavior, mood, affect and judgement.    Results for orders placed or performed during the hospital encounter of 05/31/21   CBC WITH AUTOMATED DIFF   Result Value Ref Range    WBC 6.1 3.6 - 11.0 K/uL    RBC 4.09 3.80 - 5.20 M/uL    HGB 12.5 11.5 - 16.0 g/dL    HCT 35.9 35.0 - 47.0 %    MCV 87.8 80.0 - 99.0 FL    MCH 30.6 26.0 - 34.0 PG    MCHC 34.8 30.0 - 36.5 g/dL    RDW 11.9 11.5 - 14.5 %    PLATELET 494 508 - 460 K/uL    MPV 8.8 (L) 8.9 - 12.9 FL    NRBC 0.0 0  WBC    ABSOLUTE NRBC 0.00 0.00 - 0.01 K/uL    NEUTROPHILS 75 32 - 75 % LYMPHOCYTES 16 12 - 49 %    MONOCYTES 9 5 - 13 %    EOSINOPHILS 0 0 - 7 %    BASOPHILS 0 0 - 1 %    IMMATURE GRANULOCYTES 0 0.0 - 0.5 %    ABS. NEUTROPHILS 4.5 1.8 - 8.0 K/UL    ABS. LYMPHOCYTES 1.0 0.8 - 3.5 K/UL    ABS. MONOCYTES 0.6 0.0 - 1.0 K/UL    ABS. EOSINOPHILS 0.0 0.0 - 0.4 K/UL    ABS. BASOPHILS 0.0 0.0 - 0.1 K/UL    ABS. IMM. GRANS. 0.0 0.00 - 0.04 K/UL    DF AUTOMATED     METABOLIC PANEL, COMPREHENSIVE   Result Value Ref Range    Sodium 133 (L) 136 - 145 mmol/L    Potassium 5.0 3.5 - 5.1 mmol/L    Chloride 97 97 - 108 mmol/L    CO2 30 21 - 32 mmol/L    Anion gap 6 5 - 15 mmol/L    Glucose 221 (H) 65 - 100 mg/dL    BUN 9 6 - 20 MG/DL    Creatinine 0.83 0.55 - 1.02 MG/DL    BUN/Creatinine ratio 11 (L) 12 - 20      GFR est AA >60 >60 ml/min/1.73m2    GFR est non-AA >60 >60 ml/min/1.73m2    Calcium 9.6 8.5 - 10.1 MG/DL    Bilirubin, total 0.6 0.2 - 1.0 MG/DL    ALT (SGPT) 25 12 - 78 U/L    AST (SGOT) 22 15 - 37 U/L    Alk. phosphatase 94 45 - 117 U/L    Protein, total 6.7 6.4 - 8.2 g/dL    Albumin 3.4 (L) 3.5 - 5.0 g/dL    Globulin 3.3 2.0 - 4.0 g/dL    A-G Ratio 1.0 (L) 1.1 - 2.2         No results found for this visit on 06/02/21. Assessment/ Plan:     1. Pressure in head  Refer to neurosurgeon   - REFERRAL TO GENERAL SURGERY  - MRI BRAIN W WO CONT; Future    2. Neuropathy  Controlled primarily with gabapentin   - COMPLIANCE DRUG SCREEN/PRESCRIPTION MONITORING; Future  - COMPLIANCE DRUG SCREEN/PRESCRIPTION MONITORING    3. Encounter for therapeutic drug level monitoring     - COMPLIANCE DRUG SCREEN/PRESCRIPTION MONITORING; Future  - COMPLIANCE DRUG SCREEN/PRESCRIPTION MONITORING    4. Chronic pain syndrome    - REFERRAL TO PAIN MANAGEMENT      Orders Placed This Encounter    MRI BRAIN W WO CONT     Open MRI needed     Standing Status:   Future     Standing Expiration Date:   7/2/2021     Order Specific Question:   STAT Creatinine as indicated     Answer:    Yes    COMPLIANCE DRUG SCREEN/PRESCRIPTION MONITORING     Standing Status:   Future     Number of Occurrences:   1     Standing Expiration Date:   6/2/2022    REFERRAL TO GENERAL SURGERY     Referral Priority:   Routine     Referral Type:   Consultation     Referral Reason:   Specialty Services Required     Referred to Provider:   Katie Lucia MD    REFERRAL TO PAIN MANAGEMENT     Referral Priority:   Routine     Referral Type:   Consultation     Referral Reason:   Specialty Services Required     Requested Specialty:   Pain Management     Number of Visits Requested:   1    naloxone (Narcan) 4 mg/actuation nasal spray     Sig: Use 1 spray intranasally, then discard. Repeat with new spray every 2 min as needed for opioid overdose symptoms, alternating nostrils. Indications: decrease in rate & depth of breathing due to opioid drug     Dispense:  2 Each     Refill:  1     Please contact patient to advise that Narcan is required to be prescribed with opoid medication in combination with other sedating medications that increase risk of respiratory depression which can be fatal.         Verbal and written instructions (see AVS) provided. Patient expresses understanding of diagnosis and treatment plan.     Health Maintenance Due   Topic Date Due    PAP AKA CERVICAL CYTOLOGY  Never done               AGNES Patrick

## 2021-06-04 NOTE — TELEPHONE ENCOUNTER
----- Message from Rachana Aparicio NP sent at 6/4/2021 10:55 AM EDT -----  Please let patient know I ordered narcan for respiratory depression   Also referral to Dr. Eli Granados for pain management . He is an orthopedic provider as well and may be able to help her with pain that is limiting her ability to walk. I have her agreement with me. I will drop it off today or tomorrow. Thanks!    DL

## 2021-06-07 NOTE — TELEPHONE ENCOUNTER
No, I will not be taking over these medications. I saw her in the hospital for consultation. She is not following up with me.

## 2021-06-08 LAB — DRUGS UR: NORMAL

## 2021-06-17 ENCOUNTER — OFFICE VISIT (OUTPATIENT)
Dept: ENDOCRINOLOGY | Age: 56
End: 2021-06-17
Payer: MEDICARE

## 2021-06-17 VITALS
SYSTOLIC BLOOD PRESSURE: 101 MMHG | DIASTOLIC BLOOD PRESSURE: 67 MMHG | HEIGHT: 62 IN | HEART RATE: 80 BPM | BODY MASS INDEX: 27.12 KG/M2 | WEIGHT: 147.4 LBS

## 2021-06-17 DIAGNOSIS — E55.9 HYPOVITAMINOSIS D: ICD-10-CM

## 2021-06-17 DIAGNOSIS — E03.9 ACQUIRED HYPOTHYROIDISM: ICD-10-CM

## 2021-06-17 DIAGNOSIS — E10.42 TYPE 1 DIABETES MELLITUS WITH DIABETIC POLYNEUROPATHY (HCC): Primary | ICD-10-CM

## 2021-06-17 DIAGNOSIS — I10 ESSENTIAL HYPERTENSION: ICD-10-CM

## 2021-06-17 LAB — HBA1C MFR BLD HPLC: 8.1 %

## 2021-06-17 PROCEDURE — G8427 DOCREV CUR MEDS BY ELIG CLIN: HCPCS | Performed by: INTERNAL MEDICINE

## 2021-06-17 PROCEDURE — 1111F DSCHRG MED/CURRENT MED MERGE: CPT | Performed by: INTERNAL MEDICINE

## 2021-06-17 PROCEDURE — 2022F DILAT RTA XM EVC RTNOPTHY: CPT | Performed by: INTERNAL MEDICINE

## 2021-06-17 PROCEDURE — G8419 CALC BMI OUT NRM PARAM NOF/U: HCPCS | Performed by: INTERNAL MEDICINE

## 2021-06-17 PROCEDURE — 3017F COLORECTAL CA SCREEN DOC REV: CPT | Performed by: INTERNAL MEDICINE

## 2021-06-17 PROCEDURE — 83036 HEMOGLOBIN GLYCOSYLATED A1C: CPT | Performed by: INTERNAL MEDICINE

## 2021-06-17 PROCEDURE — G8752 SYS BP LESS 140: HCPCS | Performed by: INTERNAL MEDICINE

## 2021-06-17 PROCEDURE — G9717 DOC PT DX DEP/BP F/U NT REQ: HCPCS | Performed by: INTERNAL MEDICINE

## 2021-06-17 PROCEDURE — G9899 SCRN MAM PERF RSLTS DOC: HCPCS | Performed by: INTERNAL MEDICINE

## 2021-06-17 PROCEDURE — G8754 DIAS BP LESS 90: HCPCS | Performed by: INTERNAL MEDICINE

## 2021-06-17 PROCEDURE — 3052F HG A1C>EQUAL 8.0%<EQUAL 9.0%: CPT | Performed by: INTERNAL MEDICINE

## 2021-06-17 PROCEDURE — 99214 OFFICE O/P EST MOD 30 MIN: CPT | Performed by: INTERNAL MEDICINE

## 2021-06-17 RX ORDER — LIDOCAINE 50 MG/G
1 PATCH TOPICAL
COMMUNITY
Start: 2020-12-02 | End: 2021-06-17
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
## 2021-06-19 NOTE — TELEPHONE ENCOUNTER
"Letter by Marques Mary MD at      Author: Marques Mary MD Service: -- Author Type: --    Filed:  Encounter Date: 6/5/2019 Status: (Other)         Shira Yap MD  [address] [facility] [address]                                  June 6, 2019    Patient: Emani Vargas   MR Number: [id number]   YOB: 1965   Date of Visit: 6/5/2019     Dear Dr. Rosalba Yap MD:    I saw Emani Vargas on the above date at the [facility] [place]. Below are the relevant portions of my assessment and plan of care.    If you have questions, please do not hesitate to call me.    Sincerely,        Marques Mary MD          CC  No Recipients  Marques Mary MD  6/6/2019  4:42 PM  Sign at close encounter  Pulmonary Clinic Follow-up Visit    Assessment and Plan:   54 year old female with a history of remote tobacco use, obesity, vitamin D deficiency, osteoarthritis s/p bilateral LALA and right TKA, insomnia, possible asthma, GERD, right breast cancer s/p lumpectomy and radiation, chronic low back pain, bipolar disorder, allergic rhinitis, endometriosis, and chronic cough, presenting for follow-up.     Chronic cough: Present for years. Initially tried empiric therapy for upper airway cough syndrome (\"postnasal drip\" due to chronic rhinosinusitis) with nasal fluticasone and montelukast in addition to cetirizine, and increase in omeprazole from 20 mg daily to 40 mg daily (was having persistent nocturnal chest burning despite the lower dose), led to some improvement (from 6/10 to 4/10 with 10 being worse). At the last visit about six weeks ago, due to persistent postnasal drainage sensation and cough, changed from cetirizine to loratadine-pseudoephedrine, increased nasal fluticasone to twice-daily use, and started high-dose fluticasone-vilanterol, and this has led to significant improvement. She is sleeping better due to less drainage and cough, and she stopped the fluticasone-vilanterol for two " Needs refill last office visit 7-6-18 "days and felt worse, improved with rechallenge. Still has some phlegm in the throat, but less severe.    Plan:  - continue loratadine-pseudoephedrine 24-tab one daily  - continue nasal fluticasone one spray in each nostril two times a day  - continue fluticasone-vilanterol 200-25 mcg one inhalation daily; rinse/gargle/spit water after use  - continue montelukast 10 mg at bedtime  - continue omeprazole 40 mg daily  - advised trial of guaifenesin 600-1200 mg two times a day as needed for excessive throat congestion  - can deescalate above therapies at some point if clinically indicated  - follow up as needed  - encouraged her to call any time with questions or concerning symptoms     I appreciate the opportunity to participate in the care of Ms. Vargas.  Please feel free to contact me at any time.     CCx: chronic cough    HPI: 54 year old female with a history of remote tobacco use, obesity, vitamin D deficiency, osteoarthritis s/p bilateral LALA and right TKA, insomnia, possible asthma, GERD, right breast cancer s/p lumpectomy and radiation, chronic low back pain, bipolar disorder, allergic rhinitis, endometriosis, and chronic cough, presenting for follow-up. Present for years. Initially tried empiric therapy for upper airway cough syndrome (\"postnasal drip\" due to chronic rhinosinusitis) with nasal fluticasone and montelukast in addition to cetirizine, and increase in omeprazole from 20 mg daily to 40 mg daily (was having persistent nocturnal chest burning despite the lower dose), led to some improvement (from 6/10 to 4/10 with 10 being worse). At the last visit about six weeks ago, due to persistent postnasal drainage sensation and cough, changed from cetirizine to loratadine-pseudoephedrine, increased nasal fluticasone to twice-daily use, and started high-dose fluticasone-vilanterol, and this has led to significant improvement. She is sleeping better due to less drainage and cough, and she stopped the " fluticasone-vilanterol for two days and felt worse, improved with rechallenge. Still has some phlegm in the throat, but less severe.    ROS:  A 12-system review was obtained and was negative with the exception of the symptoms endorsed in the history of present illness.    PMH:  BMI 38.5  Hearing loss  OA s/p bilateral LALA, right TKA  Insomnia  Possible asthma  Back pain  Right breast cancer s/p lumpectomy and radiation  Bipolar  allergic rhinitis  Multiple allergies  Endometriosis  Likely chronic intermittent sinusitis  Chronic cough  GERD    PSH:  Past Surgical History:   Procedure Laterality Date   ? BREAST BIOPSY Right 2000's   ? BREAST BIOPSY Right 02/20/2017   ? BREAST LUMPECTOMY Right 03/10/2017    with sentinel lymph node biopsy.   ? KNEE ARTHROSCOPY Right 1996    Description: Arthroscopy Knee Right;  Recorded: 12/08/2011;   ? KNEE SURGERY Right 1997    lateral release   ? LAPAROSCOPIC ENDOMETRIOSIS FULGURATION  2002   ? TN TOTAL KNEE ARTHROPLASTY Right 8/28/2017    Procedure: RIGHT TOTAL KNEE ARTHROPLASTY;  Surgeon: Antony Elias MD;  Location: [facility] OR;  Service: Orthopedics   ? TOTAL HIP ARTHROPLASTY N/A 9/24/2014    Procedure: LEFT HIP TOTAL ARTHROPLASTY;  Surgeon: Antony Elias MD;  Location: [facility] OR;  Service:    ? TOTAL HIP ARTHROPLASTY Right 8/10/2015    Procedure: HIP TOTAL ARTHROPLASTY RIGHT;  Surgeon: Antony Elias MD;  Location: [facility] OR;  Service:        Allergies:  Allergies   Allergen Reactions   ? Cat/Feline Products Itching   ? Egg Derived      Flu like symptoms--pain   ? Trees Other (See Comments)     Grass, itching       Family HX:  Family History   Problem Relation Age of Onset   ? Depression Mother    ? COPD Mother    ? Diabetes Father    ? Skin cancer Father 45   ? Colon cancer Maternal Grandmother    ? Colon cancer Maternal Grandfather    ? Diabetes Paternal Grandmother    ? Colon cancer Paternal Grandmother 80   ? Breast cancer Cousin 48         Paternal side.   ? Testicular cancer Brother 44   ? Lung cancer Paternal Aunt 60   ? Anesthesia problems Neg Hx        Social Hx:  Social History     Socioeconomic History   ? Marital status:      Spouse name: Not on file   ? Number of children: Not on file   ? Years of education: Not on file   ? Highest education level: Not on file   Occupational History   ? Occupation: Disabled.     Comment: Was an .   Social Needs   ? Financial resource strain: Not on file   ? Food insecurity:     Worry: Not on file     Inability: Not on file   ? Transportation needs:     Medical: Not on file     Non-medical: Not on file   Tobacco Use   ? Smoking status: Former Smoker     Packs/day: 1.00     Years: 8.00     Pack years: 8.00     Last attempt to quit: 1985     Years since quittin.4   ? Smokeless tobacco: Never Used   Substance and Sexual Activity   ? Alcohol use: Yes     Alcohol/week: 0.0 oz     Comment: 1 drink/month   ? Drug use: No     Comment: past use of marijuana and hash.   ? Sexual activity: Not on file   Lifestyle   ? Physical activity:     Days per week: Not on file     Minutes per session: Not on file   ? Stress: Not on file   Relationships   ? Social connections:     Talks on phone: Not on file     Gets together: Not on file     Attends Congregational service: Not on file     Active member of club or organization: Not on file     Attends meetings of clubs or organizations: Not on file     Relationship status: Not on file   ? Intimate partner violence:     Fear of current or ex partner: Not on file     Emotionally abused: Not on file     Physically abused: Not on file     Forced sexual activity: Not on file   Other Topics Concern   ? Not on file   Social History Narrative   ? Not on file       Current Meds:  Current Outpatient Medications   Medication Sig Dispense Refill   ? acetaminophen (TYLENOL) 500 MG tablet Take 1,000 mg by mouth every 6 (six) hours as needed for pain.     ?  albuterol (PROAIR HFA;PROVENTIL HFA;VENTOLIN HFA) 90 mcg/actuation inhaler Inhale 1-2 puffs every 4 (four) hours as needed for wheezing or shortness of breath. 1 Inhaler 11   ? buPROPion (WELLBUTRIN XL) 150 MG 24 hr tablet TAKE 1 TABLET (150 MG TOTAL) BY MOUTH EVERY MORNING. 90 tablet 2   ? cholecalciferol, vitamin D3, 1,000 unit tablet Take 2,000 Units by mouth daily.     ? dextroamphetamine-amphetamine 20 mg Tab Take 1 tablet by mouth 2 (two) times a day.  0   ? diclofenac sodium (VOLTAREN) 1 % Gel Apply approximately 1/2-1 gm over affected hand and/or left elbow joints tid-qid, as needed. 1 Tube 2   ? FLUoxetine (PROZAC) 20 MG capsule Take 20 mg by mouth daily.     ? fluticasone furoate-vilanterol (BREO ELLIPTA) 200-25 mcg/dose DsDv inhaler Inhale 1 puff daily. 30 each 11   ? fluticasone propionate (FLONASE ALLERGY RELIEF) 50 mcg/actuation nasal spray One spray in each nostril twice daily. 16 g 12   ? gabapentin (NEURONTIN) 400 MG capsule 2400 mg daily  11   ? letrozole (FEMARA) 2.5 mg tablet Take 1 tablet (2.5 mg total) by mouth daily. 90 tablet 3   ? loratadine-pseudoephedrine (CLARITIN-D 24 HOUR)  mg per 24 hr tablet Take 1 tablet by mouth daily. 30 tablet 11   ? magnesium citrate solution Take 296 mL by mouth daily as needed.     ? methocarbamol (ROBAXIN) 500 MG tablet Take 1 tablet (500 mg total) by mouth 3 (three) times a day. As needed 90 tablet 6   ? montelukast (SINGULAIR) 10 mg tablet Take 1 tablet (10 mg total) by mouth at bedtime. 30 tablet 11   ? omeprazole (PRILOSEC) 40 MG capsule Take 1 capsule (40 mg total) by mouth daily before breakfast. 30 capsule 11   ? traZODone (DESYREL) 100 MG tablet TAKE 1/2 TO 1 TABLET BY MOUTH NIGHTLY AT BEDTIME. 90 tablet 3   ? guaiFENesin ER (MUCINEX) 600 mg 12 hr tablet Take 1-2 tablets (600-1,200 mg total) by mouth 2 (two) times a day as needed for congestion. 120 tablet 11     No current facility-administered medications for this visit.        Physical  "Exam:  /64   Pulse 89   Resp 16   Ht 5' 10\" (1.778 m)   Wt (!) 268 lb 1.6 oz (121.6 kg)   LMP 08/10/2012   SpO2 94%   BMI 38.47 kg/m     Gen: alert, oriented, no distress  HEENT: nasal turbinates are unremarkable, no oropharyngeal lesions, no cervical or supraclavicular lymphadenopathy  CV: RRR, no M/G/R  Resp: CTAB, no focal crackles or wheezes  Abd: soft, nontender, no palpable organomegaly  Skin: no apparent rashes  Ext: no cyanosis, clubbing or edema  Neuro: alert, nonfocal    Labs:  reviewed    Imaging studies:  CXR (3/12/19):  - directly reviewed  - clear lungs  - no effusions    CT chest/abdomen/pelvis with oral/IV contrast (April 2017):  - images directly reviewed, formal interpretation follows:  FINDINGS:   CHEST: The lungs are clear. The pleural spaces appear normal. No mediastinal or hilar lymphadenopathy. Postoperative changes are seen in the right breast and right axilla.     ABDOMEN: The liver is diffusely low in attenuation consistent with fatty infiltration. The gallbladder, spleen, adrenal glands, kidneys, and pancreas appear normal. The abdominal aorta is normal in caliber. The appendix is visualized and appears normal.     PELVIS: There are sigmoid diverticula without evidence of diverticulitis. Beam hardening artifact from bilateral total hip arthroplasties compromises evaluation. No definite mass.     MUSCULOSKELETAL: Bilateral total hip arthroplasties.     IMPRESSION:   CONCLUSION:  1.  No evidence of metastatic disease.     2.  Colonic diverticulosis without evidence of diverticulitis.     3.  Fatty infiltration of the liver.     4.  Postoperative changes in the right axilla and right breast.    CT head (January 2019):  - sinuses appear fairly clear, mild maxillary mucosal thickening     PFTs (4/26/19):  FEV1/FVC  is normal  FEV1 is normal  FVC is normal  There was no improvement in spirometry after a single inhaled dose of bronchodilator  TLC is normal  DLCO is normal when it is " corrected for hemoglobin.    Marques Mary MD  Pulmonary and Critical Care Medicine  [facility]  Cell [phone]  Office [phone]  Pager [phone]

## 2021-06-21 ENCOUNTER — HOSPITAL ENCOUNTER (OUTPATIENT)
Dept: MRI IMAGING | Age: 56
Discharge: HOME | End: 2021-06-21
Attending: NURSE PRACTITIONER
Payer: MEDICARE

## 2021-06-21 VITALS — BODY MASS INDEX: 26.52 KG/M2 | WEIGHT: 145 LBS

## 2021-06-21 DIAGNOSIS — R51.9 PRESSURE IN HEAD: ICD-10-CM

## 2021-06-21 PROCEDURE — 74011250636 HC RX REV CODE- 250/636: Performed by: NURSE PRACTITIONER

## 2021-06-21 PROCEDURE — 70553 MRI BRAIN STEM W/O & W/DYE: CPT

## 2021-06-21 PROCEDURE — A9575 INJ GADOTERATE MEGLUMI 0.1ML: HCPCS | Performed by: NURSE PRACTITIONER

## 2021-06-21 RX ORDER — GADOTERATE MEGLUMINE 376.9 MG/ML
13 INJECTION INTRAVENOUS ONCE
Status: COMPLETED | OUTPATIENT
Start: 2021-06-21 | End: 2021-06-21

## 2021-06-21 RX ADMIN — GADOTERATE MEGLUMINE 13 ML: 376.9 INJECTION INTRAVENOUS at 14:00

## 2021-06-22 ENCOUNTER — TELEPHONE (OUTPATIENT)
Dept: FAMILY MEDICINE CLINIC | Age: 56
End: 2021-06-22

## 2021-06-22 NOTE — TELEPHONE ENCOUNTER
Choco Stapleton with Dr. Luisito Rosario office in [address] called regarding this patient. She was calling because the patient told their office that she is on a blood thinner but according to the paperwork that was faxed over on 06/02/2021 she is not on a blood thinner. The patient told them that Dr. Andrea Sharif had originally written it and now HELGA Jc is writing the script. She would like a call back regarding this at your earliest convenience at [phone].     Thank you

## 2021-06-22 NOTE — TELEPHONE ENCOUNTER
Called and sp/w pt. She states that she is at her mother's house right now. I found a one-time order for Plavix in her chart rx by Dr. Ed Hinojosa (Jan 2021). Not sure if this is the correct medication that she is referring to. Pt agreed to call the office tomorrow to update us with the medication information. I advised pt we need to know:  The name of the medication  The dosage of the medication  And how often she is taking it. I advised pt, once she gives us this information I will update her chart and update Dr. Teresa Rasmussen office as well. Pt expressed understanding. YOGESH Damian NP updated as well.  YOGESH

## 2021-06-23 ENCOUNTER — TELEPHONE (OUTPATIENT)
Dept: FAMILY MEDICINE CLINIC | Age: 56
End: 2021-06-23

## 2021-06-23 ENCOUNTER — TELEPHONE (OUTPATIENT)
Dept: ENDOCRINOLOGY | Age: 56
End: 2021-06-23

## 2021-06-23 NOTE — TELEPHONE ENCOUNTER
Spoke with Gabino Torres. She states that only a partial amount of the last office note came through the fax. Requested that we refax it to [phone]. Done.

## 2021-06-23 NOTE — TELEPHONE ENCOUNTER
----- Message from Lissa Glover sent at 6/23/2021  3:59 PM EDT -----  Regarding: Chriss Yeh/Telephone  General Message/Vendor Calls    Caller's first and last name: n/a      Reason for call: discuss mri results      Callback required yes/no and why: yes      Best contact number(s):[phone]      Details to clarify the request: n/a      Lissa Glover

## 2021-06-23 NOTE — PROGRESS NOTES
MRI brain   Ventricle size within normal limits Brain stem and hemispheres WNL  No abnormal diffusion. No evidence of intracranial hemorrhage, infarct, mass or abnormal extra-axial  fluid collections.   IMPRESSION  No acute intracranial abnormality demonstrated

## 2021-06-23 NOTE — PROGRESS NOTES
Ms Jo-Ann Yanez was called, informed of her MRI result per Galina Nunez. She stated, they all shows up normal, so that is why she is going to see a neuro surgeon as soon as she can get an appointment, but OK and thanks.

## 2021-06-23 NOTE — TELEPHONE ENCOUNTER
----- Message from Yossi Caro sent at 6/23/2021  9:00 AM EDT -----  Regarding: Dr Omayra Huang  General Message/Vendor Calls    Caller's first and last name: Andreia Ricks with [facility]      Reason for call: Andreia Ricks needs pt office notes re faxed      Callback required yes/no and why: yes, to get pt office notes re faxed      Best contact number(s): [phone]      Details to clarify the request: Andreia Ricks needs pt office notes re faxed because it was incomplete      Yossi Caro

## 2021-06-25 ENCOUNTER — HOSPITAL ENCOUNTER (OUTPATIENT)
Dept: MAMMOGRAPHY | Age: 56
Discharge: HOME | End: 2021-06-25
Attending: NURSE PRACTITIONER
Payer: MEDICARE

## 2021-06-25 ENCOUNTER — HOSPITAL ENCOUNTER (OUTPATIENT)
Dept: GENERAL RADIOLOGY | Age: 56
Discharge: HOME | End: 2021-06-25
Attending: PEDIATRICS
Payer: MEDICARE

## 2021-06-25 DIAGNOSIS — Z12.31 VISIT FOR SCREENING MAMMOGRAM: ICD-10-CM

## 2021-06-25 DIAGNOSIS — N95.9 POST MENOPAUSAL PROBLEMS: ICD-10-CM

## 2021-06-25 PROCEDURE — 77067 SCR MAMMO BI INCL CAD: CPT

## 2021-06-25 PROCEDURE — 77080 DXA BONE DENSITY AXIAL: CPT

## 2021-06-25 NOTE — TELEPHONE ENCOUNTER
----- Message from Jorge Breaux sent at 4/23/2021 12:51 PM EDT -----  Regarding: Dr. Saira Crooks One    Level 1/Escalated Issue      Caller's first and last name and relationship (if not the patient): Self      Best contact number(s): [phone]      What are the symptoms: Poked in right eye       Transfer successful - yes/no (include outcome): Yes      Transfer declined - yes/no (include reason): No      Was caller advised to seek appropriate level of care - yes/no: No      Details to clarify the request: Pt bent over to pick something up and a piece of fake plant stuck her in her right eye and scratched the eye ball.        Jorge Breaux Patient calm and cooperative on approach, denies s/s  Patient remains in his room most of the time out for meals or when prompted  Patient compliant with meals and medications in the shift

## 2021-06-26 NOTE — PROGRESS NOTES
BI-RADS 1: Negative. No mammographic evidence of malignancy.     RECOMMENDATIONS:  Next screening mammogram is recommended in one year.

## 2021-06-29 ENCOUNTER — HOSPITAL ENCOUNTER (OUTPATIENT)
Dept: LAB | Age: 56
Discharge: HOME | End: 2021-06-29

## 2021-07-01 ENCOUNTER — TELEPHONE (OUTPATIENT)
Dept: FAMILY MEDICINE CLINIC | Age: 56
End: 2021-07-01

## 2021-07-01 NOTE — TELEPHONE ENCOUNTER
Patient has appt on 07/08. Is having surgery on 8/03. In a lot of pain. Wants to know if she could get something for pain or will she have to wait until she comes in on 07/08 .

## 2021-07-01 NOTE — TELEPHONE ENCOUNTER
They had received an order and records. Upon the doctor reviewing the records , he states that there is nothing he can do. Suggests sending her to Neurology.

## 2021-07-02 NOTE — TELEPHONE ENCOUNTER
Miguel Chan NP sent to [facility] [employer]  Caller: Unspecified (Yesterday,  9:18 AM)  IS Dr. Grover Maravilla treating her pain?

## 2021-07-03 LAB
25(OH)D3+25(OH)D2 SERPL-MCNC: 39.3 NG/ML (ref 30–100)
ALBUMIN SERPL-MCNC: 4.3 G/DL (ref 3.8–4.9)
ALBUMIN/CREAT UR: <8 MG/G CREAT (ref 0–29)
ALBUMIN/GLOB SERPL: 2.2 {RATIO} (ref 1.2–2.2)
ALP SERPL-CCNC: 103 IU/L (ref 48–121)
ALT SERPL-CCNC: 18 IU/L (ref 0–32)
AST SERPL-CCNC: 20 IU/L (ref 0–40)
BILIRUB SERPL-MCNC: 0.6 MG/DL (ref 0–1.2)
BUN SERPL-MCNC: 10 MG/DL (ref 6–24)
BUN/CREAT SERPL: 14 (ref 9–23)
CALCIUM SERPL-MCNC: 9.7 MG/DL (ref 8.7–10.2)
CHLORIDE SERPL-SCNC: 92 MMOL/L (ref 96–106)
CHOLEST SERPL-MCNC: 158 MG/DL (ref 100–199)
CO2 SERPL-SCNC: 27 MMOL/L (ref 20–29)
CREAT SERPL-MCNC: 0.7 MG/DL (ref 0.57–1)
CREAT UR-MCNC: 39.9 MG/DL
EST. AVERAGE GLUCOSE BLD GHB EST-MCNC: 186 MG/DL
GLOBULIN SER CALC-MCNC: 2 G/DL (ref 1.5–4.5)
GLUCOSE SERPL-MCNC: 298 MG/DL (ref 65–99)
HBA1C MFR BLD: 8.1 % (ref 4.8–5.6)
HDLC SERPL-MCNC: 77 MG/DL
IMP & REVIEW OF LAB RESULTS: NORMAL
LDLC SERPL CALC-MCNC: 69 MG/DL (ref 0–99)
MICROALBUMIN UR-MCNC: <3 UG/ML
POTASSIUM SERPL-SCNC: 5.4 MMOL/L (ref 3.5–5.2)
PROT SERPL-MCNC: 6.3 G/DL (ref 6–8.5)
SODIUM SERPL-SCNC: 129 MMOL/L (ref 134–144)
T4 FREE SERPL-MCNC: 1.69 NG/DL (ref 0.82–1.77)
TRIGL SERPL-MCNC: 56 MG/DL (ref 0–149)
TSH SERPL DL<=0.005 MIU/L-ACNC: 0.53 UIU/ML (ref 0.45–4.5)
VLDLC SERPL CALC-MCNC: 12 MG/DL (ref 5–40)

## 2021-07-06 NOTE — PROGRESS NOTES
Spoke with pt regarding her labs. Pt to make the changes we discussed and send me BG readings in 2 weeks. Pt voices understanding and agreement with the plan.

## 2021-07-08 ENCOUNTER — OFFICE VISIT (OUTPATIENT)
Dept: FAMILY MEDICINE CLINIC | Age: 56
End: 2021-07-08
Payer: MEDICARE

## 2021-07-08 VITALS
WEIGHT: 147 LBS | DIASTOLIC BLOOD PRESSURE: 70 MMHG | OXYGEN SATURATION: 99 % | TEMPERATURE: 97.8 F | HEART RATE: 79 BPM | RESPIRATION RATE: 16 BRPM | HEIGHT: 62 IN | BODY MASS INDEX: 27.05 KG/M2 | SYSTOLIC BLOOD PRESSURE: 140 MMHG

## 2021-07-08 DIAGNOSIS — I49.3 PVC (PREMATURE VENTRICULAR CONTRACTION): ICD-10-CM

## 2021-07-08 DIAGNOSIS — Z01.818 PRE-OP EXAM: Primary | ICD-10-CM

## 2021-07-08 PROCEDURE — 36415 COLL VENOUS BLD VENIPUNCTURE: CPT | Performed by: NURSE PRACTITIONER

## 2021-07-08 PROCEDURE — G9899 SCRN MAM PERF RSLTS DOC: HCPCS | Performed by: NURSE PRACTITIONER

## 2021-07-08 PROCEDURE — 3017F COLORECTAL CA SCREEN DOC REV: CPT | Performed by: NURSE PRACTITIONER

## 2021-07-08 PROCEDURE — G8419 CALC BMI OUT NRM PARAM NOF/U: HCPCS | Performed by: NURSE PRACTITIONER

## 2021-07-08 PROCEDURE — G8427 DOCREV CUR MEDS BY ELIG CLIN: HCPCS | Performed by: NURSE PRACTITIONER

## 2021-07-08 PROCEDURE — G9717 DOC PT DX DEP/BP F/U NT REQ: HCPCS | Performed by: NURSE PRACTITIONER

## 2021-07-08 PROCEDURE — G8753 SYS BP > OR = 140: HCPCS | Performed by: NURSE PRACTITIONER

## 2021-07-08 PROCEDURE — 99214 OFFICE O/P EST MOD 30 MIN: CPT | Performed by: NURSE PRACTITIONER

## 2021-07-08 PROCEDURE — G8754 DIAS BP LESS 90: HCPCS | Performed by: NURSE PRACTITIONER

## 2021-07-08 RX ORDER — FLUCONAZOLE 150 MG/1
150 TABLET ORAL DAILY
Qty: 1 TABLET | Refills: 0 | Status: SHIPPED | OUTPATIENT
Start: 2021-07-08 | End: 2021-07-09

## 2021-07-08 RX ORDER — AMOXICILLIN AND CLAVULANATE POTASSIUM 875; 125 MG/1; MG/1
1 TABLET, FILM COATED ORAL 2 TIMES DAILY
Qty: 20 TABLET | Refills: 0 | Status: SHIPPED | OUTPATIENT
Start: 2021-07-08 | End: 2021-07-18

## 2021-07-08 NOTE — PROGRESS NOTES
1. Have you been to the ER, urgent care clinic since your last visit? Hospitalized since your last visit? No    2. Have you seen or consulted any other health care providers outside of the [address] [place] [address] since your last visit? Include any pap smears or colon screening.   Yes,DR Christie,ortho      Chief Complaint   Patient presents with    Pre-op Exam     cataract,  7-, repair right gluteal tear  8- [facility]TH DR Christie         Visit Vitals  BP (!) 140/70 (BP 1 Location: Left arm, BP Patient Position: Sitting, BP Cuff Size: Adult)   Pulse 79   Temp 97.8 °F (36.6 °C) (Temporal)   Resp 16   Ht 5' 2\" (1.575 m)   Wt 147 lb (66.7 kg)   SpO2 99%   BMI 26.89 kg/m²       Pain Scale: 0 - No pain/10  Pain Location:

## 2021-07-08 NOTE — PATIENT INSTRUCTIONS
Blisters on Hand or Foot in Children: Care Instructions  Your Care Instructions  Blisters are fluid-filled bumps that look like bubbles on the skin. Most of the time they're caused by something rubbing against the skin. Sometimes injuries to the skin, such as burns, spider bites, or pinching, can cause a blister. You can treat most blisters at home. A small, unbroken blister on the hand or foot, or even a blood blister, will usually heal on its own. Follow-up care is a key part of your child's treatment and safety. Be sure to make and go to all appointments, and call your doctor if your child is having problems. It's also a good idea to know your child's test results and keep a list of the medicines your child takes. How can you care for your child at home? · If a blister is small and closed, leave it alone. Use a loose bandage to protect it. Have your child avoid the activity that caused the blister. · If a small blister is on a weight-bearing area like the bottom of the foot, protect it with a doughnut-shaped moleskin pad. Leave the area over the blister open. · It's best not to drain a blister at home. But when blisters are painful, some people do drain them. If you do decide to drain a blister, make sure to follow these steps. ? Wipe a needle with rubbing alcohol. ? Gently puncture the edge of the blister. ? Press the fluid in the blister toward the hole so it can drain out. · After you have opened a blister, or if it has torn open:  ? Gently wash the area with clean water. Don't use hydrogen peroxide or alcohol, which can slow healing. ? Don't remove the flap of skin over a blister unless it's very dirty or torn or there is pus under it. Gently smooth the flap over the tender skin. ? You may cover the blister with a thin layer of petroleum jelly, such as Vaseline, and a nonstick bandage. ? Apply more petroleum jelly and replace the bandage as needed. When should you call for help?    Call your doctor now or seek immediate medical care if:    · Your child has signs of infection, such as:  ? Increased pain, swelling, warmth, or redness. ? Red streaks leading from the blister. ? Pus draining from the blister. ? A fever. Watch closely for changes in your child's health, and be sure to contact your doctor if:    · Your child does not get better as expected. Where can you learn more? Go to http://www.gray.com/  Enter O974 in the search box to learn more about \"Blisters on Hand or Foot in Children: Care Instructions. \"  Current as of: February 26, 2020               Content Version: 12.8  © 9494-0175 [employer]. Care instructions adapted under license by [employer] (which disclaims liability or warranty for this information). If you have questions about a medical condition or this instruction, always ask your healthcare professional. [place] [address] any warranty or liability for your use of this information.

## 2021-07-09 ENCOUNTER — TELEPHONE (OUTPATIENT)
Dept: FAMILY MEDICINE CLINIC | Age: 56
End: 2021-07-09

## 2021-07-09 LAB
ALBUMIN SERPL-MCNC: 4.1 G/DL (ref 3.5–5)
ALBUMIN/GLOB SERPL: 1.5 {RATIO} (ref 1.1–2.2)
ALP SERPL-CCNC: 98 U/L (ref 45–117)
ALT SERPL-CCNC: 24 U/L (ref 12–78)
ANION GAP SERPL CALC-SCNC: 7 MMOL/L (ref 5–15)
AST SERPL-CCNC: 22 U/L (ref 15–37)
BASOPHILS # BLD: 0 K/UL (ref 0–0.1)
BASOPHILS NFR BLD: 0 % (ref 0–1)
BILIRUB SERPL-MCNC: 0.5 MG/DL (ref 0.2–1)
BUN SERPL-MCNC: 12 MG/DL (ref 6–20)
BUN/CREAT SERPL: 17 (ref 12–20)
CALCIUM SERPL-MCNC: 10.2 MG/DL (ref 8.5–10.1)
CHLORIDE SERPL-SCNC: 101 MMOL/L (ref 97–108)
CO2 SERPL-SCNC: 30 MMOL/L (ref 21–32)
CREAT SERPL-MCNC: 0.7 MG/DL (ref 0.55–1.02)
DIFFERENTIAL METHOD BLD: NORMAL
EOSINOPHIL # BLD: 0 K/UL (ref 0–0.4)
EOSINOPHIL NFR BLD: 0 % (ref 0–7)
ERYTHROCYTE [DISTWIDTH] IN BLOOD BY AUTOMATED COUNT: 11.9 % (ref 11.5–14.5)
GLOBULIN SER CALC-MCNC: 2.7 G/DL (ref 2–4)
GLUCOSE SERPL-MCNC: 182 MG/DL (ref 65–100)
HCT VFR BLD AUTO: 37.8 % (ref 35–47)
HGB BLD-MCNC: 12.3 G/DL (ref 11.5–16)
IMM GRANULOCYTES # BLD AUTO: 0 K/UL (ref 0–0.04)
IMM GRANULOCYTES NFR BLD AUTO: 0 % (ref 0–0.5)
LYMPHOCYTES # BLD: 1.3 K/UL (ref 0.8–3.5)
LYMPHOCYTES NFR BLD: 25 % (ref 12–49)
MCH RBC QN AUTO: 30.4 PG (ref 26–34)
MCHC RBC AUTO-ENTMCNC: 32.5 G/DL (ref 30–36.5)
MCV RBC AUTO: 93.3 FL (ref 80–99)
MONOCYTES # BLD: 0.5 K/UL (ref 0–1)
MONOCYTES NFR BLD: 11 % (ref 5–13)
NEUTS SEG # BLD: 3.3 K/UL (ref 1.8–8)
NEUTS SEG NFR BLD: 64 % (ref 32–75)
NRBC # BLD: 0 K/UL (ref 0–0.01)
NRBC BLD-RTO: 0 PER 100 WBC
PLATELET # BLD AUTO: 234 K/UL (ref 150–400)
PMV BLD AUTO: 9.3 FL (ref 8.9–12.9)
POTASSIUM SERPL-SCNC: 4.6 MMOL/L (ref 3.5–5.1)
PROT SERPL-MCNC: 6.8 G/DL (ref 6.4–8.2)
RBC # BLD AUTO: 4.05 M/UL (ref 3.8–5.2)
SODIUM SERPL-SCNC: 138 MMOL/L (ref 136–145)
WBC # BLD AUTO: 5.1 K/UL (ref 3.6–11)

## 2021-07-09 NOTE — TELEPHONE ENCOUNTER
Patient called to let Lucia Russo know that she is taking blood thinner Clopidogrel 75 mg . FYI   Patient was concerned she maybe having another mini stroke she called her mother and they were on the way to local ED.

## 2021-07-11 NOTE — PROGRESS NOTES
Preoperative Evaluation    Date of Exam: 2021     Chief Complaint   Patient presents with    Pre-op Exam     cataract,  2021 [facility] DR Sorto, and repair right gluteal tear  2021 [facility] DR Ryan Valencia       Jorge Ramon is a 61 y.o. female (:1965) who presents for preoperative evaluation.          Latex Allergy: no    Problem List:     Patient Active Problem List    Diagnosis Date Noted    Chest pain 2021    Dizziness 2021    Left facial numbness 2021    CVA (cerebral vascular accident) ([address] 2021    Trochanteric bursitis of right hip 2021    Trochanteric bursitis of left hip 2021    TIA (transient ischemic attack) 2020    Acute pain of left shoulder 2020    Hip pain 2020    Splinter of left foot 2020    Heel callus 2020    Diabetic ulcer of left heel associated with type 1 diabetes mellitus, with fat layer exposed ([address] 2020    Greater trochanteric bursitis 01/10/2020    Atherosclerosis of artery 2019    Trigeminal neuralgia 2019    Subacute frontal sinusitis 2019    Reactive depression 2019    Chronic pain syndrome 2019    Diabetic retinopathy screening 2018    Acquired plantar keratoderma 2018    Hx of brain surgery 2018    Pain in right foot 2018    Chronic allergic rhinitis 2018    Type 1 diabetes mellitus with diabetic polyneuropathy ([address] 2018    Hypothyroid 2018    Primary osteoarthritis involving multiple joints 2018    History of hypoglycemia 2018    Arthritis 2017    Cerebrovascular accident ([address] 2017    Osteopenia 2017    Hx of stroke without residual deficits 2015    History of carpal tunnel release 2015    Vitamin D deficiency 2012    Insomnia 2011    Mild nonproliferative diabetic retinopathy ([address] 2010    Hypercholesterolemia 2010 Medical History:     Past Medical History:   Diagnosis Date    Arthritis     patient states \"every joint affected\"    Bee sting 09/05/2018    left elbow bee sting     Blister of ankle 09/05/2018    Blister small per patient of left ankle. Wears an air boot due to 2 stress fractures in last 2 months.  CAD (coronary artery disease)     Constipation     Falls 2017    pt reports having 4 falls in 3 days    Fatigue     Headache     Ill-defined condition     multiple broken ribs    Ill-defined condition     reports \"getting infections easily\"    Joint pain     Memory disorder     following spinal fluid leak repair    Menopause     Nausea & vomiting     Neuropathy     Osteoporosis     Thyroid disease     Type 1 diabetes ([address]     diagnosed at age 8    Unspecified cerebral artery occlusion with cerebral infarction     x 4 (last in 2004)     Allergies:   No Known Allergies   Medications:     Current Outpatient Medications   Medication Sig    amoxicillin-clavulanate (AUGMENTIN) 875-125 mg per tablet Take 1 Tablet by mouth two (2) times a day for 10 days.  simvastatin (ZOCOR) 40 mg tablet Take 1 tablet by mouth nightly    gabapentin (NEURONTIN) 100 mg capsule Take 2 Capsules by mouth two (2) times a day. Max Daily Amount: 400 mg.    Euthyrox 75 mcg tablet TAKE 1 TABLET BY MOUTH ONCE DAILY BEFORE  BREAKFAST  FOR  A  CONDITION  WITH  LOW  THYROID  HORMONE  LEVELS    zolpidem (AMBIEN) 5 mg tablet Take 1 Tab by mouth nightly as needed for Sleep. Max Daily Amount: 5 mg.     diclofenac EC (VOLTAREN) 75 mg EC tablet     insulin detemir U-100 (Levemir FlexTouch U-100 Insuln) 100 unit/mL (3 mL) inpn INJECT 28 UNITS SUBCUTANEOUSLY IN THE MORNING AND 3 UNITS  AT  NIGHT  Indications: type 1 diabetes mellitus    HumaLOG KwikPen Insulin 100 unit/mL kwikpen Max daily dose 30 (Patient taking differently: ONLY PRN up to 1-6 units depending on blood sugar)    glucose blood VI test strips (OneTouch Ultra Blue Test Strip) strip TEST BLOOD SUGAR 8 TIMES A DAY DUE TO UNCONTROLLED SUGARS Dx : E10.42    Insulin Needles, Disposable, (Niru Pen Needle) 32 gauge x 5/32\" ndle Use as directed with pen device - up to 5 times daily  Dx:  E11.9    Insulin Needles, Disposable, (Pen Needle) 30 gauge x 5/16\" ndle Use one needle up to 8 times daily to dispense insulin. E10.65,E 10.649    aspirin 81 mg chewable tablet Take 81 mg by mouth daily.  mupirocin (BACTROBAN) 2 % ointment PRN    metoclopramide HCl (REGLAN) 5 mg tablet Take 1 Tab by mouth two (2) times a day. Up to BID. TAKE 30 MINUTES BEFORE MEALS (Patient taking differently: Take 5 mg by mouth. TAKES 30 MINUTES AFTER A MEAL. )    losartan (COZAAR) 50 mg tablet Take 1 Tab by mouth daily. Take 1 tab daily    potassium 99 mg tablet Take 1 Tab by mouth every other day.  tiZANidine (ZANAFLEX) 4 mg tablet Take 1-2 Tabs by mouth three (3) times daily as needed for Muscle Spasm(s) or Pain (max dose 24 mg/day). Indications: muscle spasm (Patient taking differently: Take 4-8 mg by mouth three (3) times daily as needed for Muscle Spasm(s) or Pain (max dose 24 mg/day). TAKES ONLY AT HS, RARELY  Indications: muscle spasm)    ascorbic acid, vitamin C, (VITAMIN C) 1,000 mg tablet Take 4,000 mg by mouth two (2) times a day.  calcium carbonate (CALTREX) 600 mg calcium (1,500 mg) tablet Take 600 mg by mouth two (2) times a day.  glucos sul 2KCl/msm/chond/C/Mn (GLUCOSAMINE CHONDROITIN PO) Take  by mouth.  MAGNESIUM PO Take  by mouth every fourty-eight (48) hours.  denosumab (PROLIA) 60 mg/mL injection 60 mg by SubCUTAneous route. Every 6 months    cholecalciferol, vitamin D3, (VITAMIN D3) 2,000 unit tab Take 1 Tab by mouth daily. 5000 units daily  Indications: low vitamin D levels    VITAMIN A PO Take 2,400 mcg by mouth nightly.  vitamin E (AQUA GEMS) 400 unit capsule Take 400 Units by mouth every Monday and Thursday.  Only on M and Thurs at Bedtime    tetrahydrozoline (Sterile Eye Drops) 0.05 % ophthalmic solution Administer 1 Drop to both eyes four (4) times daily. (Patient not taking: Reported on 2021)    triamcinolone acetonide (KENALOG) 0.025 % topical cream APPLY CREAM TOPICALLY TO AFFECTED AREA TWICE DAILY AS NEEDED (Patient not taking: Reported on 2021)    montelukast (SINGULAIR) 10 mg tablet Take 1 Tab by mouth daily. (Patient not taking: Reported on 2021)    levocetirizine (XYZAL) 5 mg tablet Take 1 Tab by mouth daily. (Patient not taking: Reported on 2021)     No current facility-administered medications for this visit.      Surgical History:     Past Surgical History:   Procedure Laterality Date    HX CARPAL TUNNEL RELEASE Right 2018    HX  SECTION  1987    HX  SECTION      HX CHOLECYSTECTOMY  1996    HX HYSTERECTOMY  2006    HX OOPHORECTOMY      HX ORTHOPAEDIC      frozen shoulder surgery x 3    HX ORTHOPAEDIC      frozen finger surgery x 8    HX ORTHOPAEDIC Left 2014    wrist surgery    HX ORTHOPAEDIC Right 1977    hand surgery    HX ORTHOPAEDIC Left     foot surgery    HX OTHER SURGICAL      spinal fluid leak repair (spinal fluid leaking from nose, remove nose, cut fat from stomach, use that as patch behind nose, nose replaced)    HX ROTATOR CUFF REPAIR Left     HX ROTATOR CUFF REPAIR Right     HX TONSILLECTOMY  1968     Social History:     Social History     Socioeconomic History    Marital status: SINGLE     Spouse name: Not on file    Number of children: Not on file    Years of education: Not on file    Highest education level: Not on file   Tobacco Use    Smoking status: Former Smoker     Packs/day: 0.50     Years: 8.00     Pack years: 4.00     Quit date: 4/10/2007     Years since quittin.2    Smokeless tobacco: Never Used   Vaping Use    Vaping Use: Never used   Substance and Sexual Activity    Alcohol use: No    Drug use: No    Sexual activity: Not Currently     Social Determinants of Health     Financial Resource Strain:     Difficulty of Paying Living Expenses:    Food Insecurity:     Worried About Running Out of Food in the Last Year:     [address] in the Last Year:    Transportation Needs:     Lack of Transportation (Medical):  Lack of Transportation (Non-Medical):    Physical Activity:     Days of Exercise per Week:     Minutes of Exercise per Session:    Stress:     Feeling of Stress :    Social Connections:     Frequency of Communication with Friends and Family:     Frequency of Social Gatherings with Friends and Family:     Attends [employer] Services:     Active Member of Clubs or Organizations:     Attends Club or Organization Meetings:     Marital Status:        Anesthesia Complications: None  History of abnormal bleeding : None  History of Blood Transfusions: no  Health Care Directive or Living Will: no    Objective:     ROS:   Feeling well. No dyspnea or chest pain on exertion. No abdominal pain, change in bowel habits, black or bloody stools. No urinary tract symptoms. GYN ROS: no vaginal bleeding. No neurological complaints. OBJECTIVE:   The patient appears well, alert, oriented x 3, in no distress. Visit Vitals  BP (!) 140/70 (BP 1 Location: Left arm, BP Patient Position: Sitting, BP Cuff Size: Adult)   Pulse 79   Temp 97.8 °F (36.6 °C) (Temporal)   Resp 16   Ht 5' 2\" (1.575 m)   Wt 147 lb (66.7 kg)   SpO2 99%   BMI 26.89 kg/m²     HEENT:ENT normal.  Neck supple. No adenopathy or thyromegaly. KENNEY. Chest: Lungs are clear, good air entry, no wheezes, rhonchi or rales. Cardiovascular: S1 and S2 normal, no murmurs, regular rate and rhythm. Abdomen: soft without tenderness, guarding, mass or organomegaly. Extremities: show no edema, normal peripheral pulses. Neurological: is normal, no focal findings. BREAST EXAM: declined   PELVIC EXAM:   DIAGNOSTICS: declined       1.  EKG: EKG FINDINGS - 5/23/2021 NSR compared to previous EKG's no significant changes   2. CXR: was negative for infiltrate, effusion, pneumothorax, or wide mediastinum 4/2021  3. Labs:   Lab Results   Component Value Date/Time    WBC 5.1 07/08/2021 09:22 PM    HGB 12.3 07/08/2021 09:22 PM    HCT 37.8 07/08/2021 09:22 PM    Hematocrit (POC) 40 06/19/2020 02:30 PM    PLATELET 678 26/96/9050 09:22 PM    MCV 93.3 07/08/2021 09:22 PM     Lab Results   Component Value Date/Time    TSH 0.533 06/29/2021 12:00 AM    T4, Free 1.69 06/29/2021 12:00 AM      Lab Results   Component Value Date/Time    Sodium 138 07/08/2021 09:22 PM    Potassium 4.6 07/08/2021 09:22 PM    Chloride 101 07/08/2021 09:22 PM    CO2 30 07/08/2021 09:22 PM    Anion gap 7 07/08/2021 09:22 PM    Glucose 182 (H) 07/08/2021 09:22 PM    BUN 12 07/08/2021 09:22 PM    Creatinine 0.70 07/08/2021 09:22 PM    BUN/Creatinine ratio 17 07/08/2021 09:22 PM    GFR est AA >60 07/08/2021 09:22 PM    GFR est non-AA >60 07/08/2021 09:22 PM    Calcium 10.2 (H) 07/08/2021 09:22 PM    Bilirubin, total 0.5 07/08/2021 09:22 PM    ALT (SGPT) 24 07/08/2021 09:22 PM    Alk. phosphatase 98 07/08/2021 09:22 PM    Protein, total 6.8 07/08/2021 09:22 PM    Albumin 4.1 07/08/2021 09:22 PM    Globulin 2.7 07/08/2021 09:22 PM    A-G Ratio 1.5 07/08/2021 09:22 PM      Lab Results   Component Value Date/Time    Hemoglobin A1c 8.1 (H) 06/29/2021 12:00 AM    Hemoglobin A1c (POC) 8.1 06/17/2021 12:31 PM        IMPRESSION:   Diabetes mellitus Type I DM continue to monitor and evaluate BG levels and treat as appropriate. Endocrinologist Dr. Shellie Alberto . Low risk for planned surgery  Keep BG levels consistent   HX of increased risk for infection close monitoring and consider prophylaxis antibiotics. No contraindications to planned surgery Monitor blood glucose level and treat appropriately to promote healing. Plavix does not need to be stopped for cataract surgery. Stop for 24 hours prior to surgery on gluteal repair.      Duong Murphy, NP 7/8/2021

## 2021-07-12 ENCOUNTER — TELEPHONE (OUTPATIENT)
Dept: FAMILY MEDICINE CLINIC | Age: 56
End: 2021-07-12

## 2021-07-12 NOTE — TELEPHONE ENCOUNTER
----- Message from Betzy Obando NP sent at 7/11/2021 10:59 AM EDT -----  Please contact [facility] regarding refills for Ms. Mendosa medications . Please ask them to fax refill requests so I can manage her medications efficiently. Thanks!  DL

## 2021-07-13 ENCOUNTER — TELEPHONE (OUTPATIENT)
Dept: FAMILY MEDICINE CLINIC | Age: 56
End: 2021-07-13

## 2021-07-13 ENCOUNTER — OFFICE VISIT (OUTPATIENT)
Dept: NEUROLOGY | Age: 56
End: 2021-07-13
Payer: MEDICARE

## 2021-07-13 VITALS
BODY MASS INDEX: 25.69 KG/M2 | HEIGHT: 63 IN | HEART RATE: 82 BPM | WEIGHT: 145 LBS | RESPIRATION RATE: 16 BRPM | DIASTOLIC BLOOD PRESSURE: 63 MMHG | OXYGEN SATURATION: 98 % | SYSTOLIC BLOOD PRESSURE: 136 MMHG

## 2021-07-13 DIAGNOSIS — E11.42 DIABETIC PERIPHERAL NEUROPATHY ASSOCIATED WITH TYPE 2 DIABETES MELLITUS (HCC): ICD-10-CM

## 2021-07-13 DIAGNOSIS — G50.0 TRIGEMINAL NEURALGIA: ICD-10-CM

## 2021-07-13 DIAGNOSIS — E10.42 TYPE 1 DIABETES MELLITUS WITH DIABETIC POLYNEUROPATHY (HCC): Primary | ICD-10-CM

## 2021-07-13 DIAGNOSIS — R42 DIZZINESS: ICD-10-CM

## 2021-07-13 DIAGNOSIS — I63.9 CEREBROVASCULAR ACCIDENT (CVA), UNSPECIFIED MECHANISM (HCC): ICD-10-CM

## 2021-07-13 DIAGNOSIS — G96.01 CSF LEAK FROM NOSE: ICD-10-CM

## 2021-07-13 PROCEDURE — G8427 DOCREV CUR MEDS BY ELIG CLIN: HCPCS | Performed by: PSYCHIATRY & NEUROLOGY

## 2021-07-13 PROCEDURE — 3017F COLORECTAL CA SCREEN DOC REV: CPT | Performed by: PSYCHIATRY & NEUROLOGY

## 2021-07-13 PROCEDURE — G8419 CALC BMI OUT NRM PARAM NOF/U: HCPCS | Performed by: PSYCHIATRY & NEUROLOGY

## 2021-07-13 PROCEDURE — 2022F DILAT RTA XM EVC RTNOPTHY: CPT | Performed by: PSYCHIATRY & NEUROLOGY

## 2021-07-13 PROCEDURE — 99215 OFFICE O/P EST HI 40 MIN: CPT | Performed by: PSYCHIATRY & NEUROLOGY

## 2021-07-13 PROCEDURE — G9717 DOC PT DX DEP/BP F/U NT REQ: HCPCS | Performed by: PSYCHIATRY & NEUROLOGY

## 2021-07-13 PROCEDURE — G8754 DIAS BP LESS 90: HCPCS | Performed by: PSYCHIATRY & NEUROLOGY

## 2021-07-13 PROCEDURE — G9899 SCRN MAM PERF RSLTS DOC: HCPCS | Performed by: PSYCHIATRY & NEUROLOGY

## 2021-07-13 PROCEDURE — 3052F HG A1C>EQUAL 8.0%<EQUAL 9.0%: CPT | Performed by: PSYCHIATRY & NEUROLOGY

## 2021-07-13 PROCEDURE — G8752 SYS BP LESS 140: HCPCS | Performed by: PSYCHIATRY & NEUROLOGY

## 2021-07-13 NOTE — PROGRESS NOTES
Consult  REFERRED BY:  Libertad Miranda NP    CHIEF COMPLAINT: Persistent headaches that are worse when she stands up and associated with increasing ear pain, jaw pain, and history of CSF leak    Subjective:     Маряи Smith is a 61 y.o. right-handed  female seen as a new patient to me, at the request of nurse practitioner Narcisa Villegas for new problem of worsening headaches when she stands up, and facial pain, and the history of previous CSF leak repaired in [place] about 15 to 20 years ago after having extensive evaluation that were all normal and then having a nasal plungent in the left nares and apparently showed some stronger and was diagnosed with a CSF leak and had repair of her leak surgically above the sinus. She apparently got better for several years but then had recurrent symptoms that have been persistent for years, and for which she is previously saw Dr. Charmayne Graham 2 years ago and had a CT of the neck that was normal, CT of the maxillofacial areas that were normal, a normal CT of the head, and just had an MRI done June 21, 2021 that showed no encroachment on the foramen magnum or shifting of the brain down, no loss of pontomedullary angulation, no enhancement of the pituitary gland, and no distortion of the corpus callosum and no meningeal enhancement to suggest low pressure or idiopathic intracranial hypertension.   Patient has no fever, no meningismus, and no signs of any infection and has had multiple scans done in the last 6 months because of \"strokes even though all her MRI scans including ones at [facility] showed no clear new stroke in the 1 done just recently at an open unit on soft side June 21, 2021 that was normal read by Dr. Constantine Restrepo one of our top neuroradiologist.  He is currently on aspirin and Plavix therapy because of her \"strokes\" from last December and I advised her to go down to just aspirin alone because dual antiplatelet therapy in the long run does not improve prevention and just increases the risk of GI bleeding. The patient denies any TMJ symptoms and she has seen an ENT physician who has examined her and did an ultrasound and can see no evidence of CSF leak, and she has tried to see a neurosurgeon at [facility] who after reviewing her records refused to steer because he said he could not help her and did not think she had a leak most likely. She has no nasal discharge to suggest a leak either. She is very tense and concerned about her condition and convinced that for the last several years she has had recurrently that nobody can find. She has no new focal weakness no sensory loss no visual changes, and basically no new other neurologic symptoms. She thinks her headaches are getting worse and they are constant now, worse when she gets up and moves around. I advised that she may need a spinal tap and nuclear medicine injection of myelographic injection to follow the dye or the tracer up into her head to see if there is any leak into the nasal cavity and have referred her to neurosurgery at [facility] for their opinion. But on looking at her films I do not see any clear evidence to suggest a CSF leak and examining her I do not see any evidence of either. Past Medical History:   Diagnosis Date    Arthritis     patient states \"every joint affected\"    Bee sting 09/05/2018    left elbow bee sting     Blister of ankle 09/05/2018    Blister small per patient of left ankle. Wears an air boot due to 2 stress fractures in last 2 months.     CAD (coronary artery disease)     Constipation     Falls 2017    pt reports having 4 falls in 3 days    Fatigue     Headache     Ill-defined condition     multiple broken ribs    Ill-defined condition     reports \"getting infections easily\"    Joint pain     Memory disorder     following spinal fluid leak repair    Menopause     Nausea & vomiting     Neuropathy     Osteoporosis     Thyroid disease     Type 1 diabetes ([facility] [address]     diagnosed at age 8  Unspecified cerebral artery occlusion with cerebral infarction     x 4 (last in )      Past Surgical History:   Procedure Laterality Date    HX CARPAL TUNNEL RELEASE Right 2018    HX  SECTION      HX  SECTION      HX CHOLECYSTECTOMY      HX HYSTERECTOMY  2006    HX OOPHORECTOMY      HX ORTHOPAEDIC      frozen shoulder surgery x 3    HX ORTHOPAEDIC      frozen finger surgery x 8    HX ORTHOPAEDIC Left 2014    wrist surgery    HX ORTHOPAEDIC Right 1977    hand surgery    HX ORTHOPAEDIC Left     foot surgery    HX OTHER SURGICAL      spinal fluid leak repair (spinal fluid leaking from nose, remove nose, cut fat from stomach, use that as patch behind nose, nose replaced)    HX ROTATOR CUFF REPAIR Left     HX ROTATOR CUFF REPAIR Right     HX TONSILLECTOMY  1968     Family History   Problem Relation Age of Onset    Heart Disease Mother     Hypertension Mother     No Known Problems Father     Heart Disease Maternal Grandfather     Cancer Maternal Aunt         Pancreatic and Liver    Cancer Maternal Grandmother         Lung    Diabetes Maternal Grandmother     Cancer Maternal Aunt         Breast    Breast Cancer Maternal Aunt     Diabetes Maternal Aunt       Social History     Tobacco Use    Smoking status: Former Smoker     Packs/day: 0.50     Years: 8.00     Pack years: 4.00     Quit date: 4/10/2007     Years since quittin.2    Smokeless tobacco: Never Used   Substance Use Topics    Alcohol use: No         Current Outpatient Medications:     tiZANidine (ZANAFLEX) 4 mg tablet, Take 1-2 Tablets by mouth three (3) times daily as needed for Muscle Spasm(s) or Pain (max dose 24 mg/day). TAKES ONLY AT HS, RARELY  Indications: muscle spasm, Disp: 30 Tablet, Rfl: 0    amoxicillin-clavulanate (AUGMENTIN) 875-125 mg per tablet, Take 1 Tablet by mouth two (2) times a day for 10 days. , Disp: 20 Tablet, Rfl: 0    simvastatin (ZOCOR) 40 mg tablet, Take 1 tablet by mouth nightly, Disp: 90 Tablet, Rfl: 0    gabapentin (NEURONTIN) 100 mg capsule, Take 2 Capsules by mouth two (2) times a day. Max Daily Amount: 400 mg., Disp: 120 Capsule, Rfl: 0    Euthyrox 75 mcg tablet, TAKE 1 TABLET BY MOUTH ONCE DAILY BEFORE  BREAKFAST  FOR  A  CONDITION  WITH  LOW  THYROID  HORMONE  LEVELS, Disp: 90 Tab, Rfl: 0    zolpidem (AMBIEN) 5 mg tablet, Take 1 Tab by mouth nightly as needed for Sleep. Max Daily Amount: 5 mg., Disp: 30 Tab, Rfl: 2    diclofenac EC (VOLTAREN) 75 mg EC tablet, , Disp: , Rfl:     insulin detemir U-100 (Levemir FlexTouch U-100 Insuln) 100 unit/mL (3 mL) inpn, INJECT 28 UNITS SUBCUTANEOUSLY IN THE MORNING AND 3 UNITS  AT  NIGHT  Indications: type 1 diabetes mellitus, Disp: 10 Adjustable Dose Pre-filled Pen Syringe, Rfl: 5    HumaLOG KwikPen Insulin 100 unit/mL kwikpen, Max daily dose 30 (Patient taking differently: ONLY PRN up to 1-6 units depending on blood sugar), Disp: 15 mL, Rfl: 5    glucose blood VI test strips (OneTouch Ultra Blue Test Strip) strip, TEST BLOOD SUGAR 8 TIMES A DAY DUE TO UNCONTROLLED SUGARS Dx : V01.05, Disp: 300 Strip, Rfl: 11    Insulin Needles, Disposable, (Niru Pen Needle) 32 gauge x 5/32\" ndle, Use as directed with pen device - up to 5 times daily  Dx:  E11.9, Disp: 200 Pen Needle, Rfl: 5    Insulin Needles, Disposable, (Pen Needle) 30 gauge x 5/16\" ndle, Use one needle up to 8 times daily to dispense insulin. E10.65,E 10.649, Disp: 300 Pen Needle, Rfl: 5    aspirin 81 mg chewable tablet, Take 81 mg by mouth daily. , Disp: , Rfl:     mupirocin (BACTROBAN) 2 % ointment, PRN, Disp: , Rfl:     metoclopramide HCl (REGLAN) 5 mg tablet, Take 1 Tab by mouth two (2) times a day. Up to BID. TAKE 30 MINUTES BEFORE MEALS (Patient taking differently: Take 5 mg by mouth. TAKES 30 MINUTES AFTER A MEAL. ), Disp: 360 Tab, Rfl: 0    losartan (COZAAR) 50 mg tablet, Take 1 Tab by mouth daily.  Take 1 tab daily, Disp: 90 Tab, Rfl: 3   montelukast (SINGULAIR) 10 mg tablet, Take 1 Tab by mouth daily. , Disp: 90 Tab, Rfl: 1    potassium 99 mg tablet, Take 1 Tab by mouth every other day., Disp: 90 Tab, Rfl: 1    ascorbic acid, vitamin C, (VITAMIN C) 1,000 mg tablet, Take 4,000 mg by mouth two (2) times a day., Disp: , Rfl:     calcium carbonate (CALTREX) 600 mg calcium (1,500 mg) tablet, Take 600 mg by mouth two (2) times a day., Disp: , Rfl:     glucos sul 2KCl/msm/chond/C/Mn (GLUCOSAMINE CHONDROITIN PO), Take  by mouth., Disp: , Rfl:     MAGNESIUM PO, Take  by mouth every fourty-eight (48) hours. , Disp: , Rfl:     denosumab (PROLIA) 60 mg/mL injection, 60 mg by SubCUTAneous route. Every 6 months, Disp: , Rfl:     cholecalciferol, vitamin D3, (VITAMIN D3) 2,000 unit tab, Take 1 Tab by mouth daily. 5000 units daily  Indications: low vitamin D levels, Disp: , Rfl:     VITAMIN A PO, Take 2,400 mcg by mouth nightly., Disp: , Rfl:     vitamin E (AQUA GEMS) 400 unit capsule, Take 400 Units by mouth every Monday and Thursday. Only on M and Thurs at Bedtime, Disp: , Rfl:         No Known Allergies   MRI Results (most recent):  Results from Hospital Encounter encounter on 06/21/21    MRI BRAIN W WO CONT    Narrative  EXAM:  MRI BRAIN W WO CONT  INDICATION:  Headache, head pressure, available history states CSF fluid leak  from nose patched in the past. 1998. Patient lists under reason for exam \"spinal  fluid leak is back\". TECHNIQUE:  Sagittal T1, axial FLAIR, T2, T1 and gradient echo T2-weighted images of the  head were obtained followed by intravenous infusion 13 mL Dotarem repeat axial  and coronal T1-weighted images and axial diffusion weighted images. COMPARISON: CTA head 5/24/21  FINDINGS:  The ventricular size and configuration are normal.  Normal signal demonstrated in the cerebral hemispheres, brain stem and  cerebellum. No abnormal areas of intracranial enhancement. No abnormal diffusion.   No evidence of intracranial hemorrhage, infarct, mass or abnormal extra-axial  fluid collections. Mild prominent perivascular spaces basal ganglia, cerebral hemispheres and  brainstem are within normal variation. No enhancement or other findings in the brain suggestive of intracranial  hypotension. Flow voids are present in the vertebral, basilar and carotid artery systems. The craniocervical junction is unremarkable. Fat/soft tissue signal in the posterior superior sphenoid sinus extending to the  tuberculum sella likely related to patient's history of CSF leak \"patchy\" in  1998. Impression  No acute intracranial abnormality demonstrated. James Smith Results from [place] encounter on 06/21/21    MRI BRAIN W WO CONT    Narrative  EXAM:  MRI BRAIN W WO CONT  INDICATION:  Headache, head pressure, available history states CSF fluid leak  from nose patched in the past. 1998. Patient lists under reason for exam \"spinal  fluid leak is back\". TECHNIQUE:  Sagittal T1, axial FLAIR, T2, T1 and gradient echo T2-weighted images of the  head were obtained followed by intravenous infusion 13 mL Dotarem repeat axial  and coronal T1-weighted images and axial diffusion weighted images. COMPARISON: CTA head 5/24/21  FINDINGS:  The ventricular size and configuration are normal.  Normal signal demonstrated in the cerebral hemispheres, brain stem and  cerebellum. No abnormal areas of intracranial enhancement. No abnormal diffusion. No evidence of intracranial hemorrhage, infarct, mass or abnormal extra-axial  fluid collections. Mild prominent perivascular spaces basal ganglia, cerebral hemispheres and  brainstem are within normal variation. No enhancement or other findings in the brain suggestive of intracranial  hypotension. Flow voids are present in the vertebral, basilar and carotid artery systems. The craniocervical junction is unremarkable.   Fat/soft tissue signal in the posterior superior sphenoid sinus extending to the  tuberculum sella likely related to patient's history of CSF leak \"patchy\" in  1998. Impression  No acute intracranial abnormality demonstrated. .    Review of Systems:  A comprehensive review of systems was negative except for: Constitutional: positive for fatigue and malaise  Ears, nose, mouth, throat, and face: positive for tinnitus and earaches  Musculoskeletal: positive for myalgias, arthralgias, stiff joints and neck pain  Neurological: positive for headaches, dizziness, vertigo, gait problems and weakness  Behvioral/Psych: positive for anxiety and depression   Vitals:    07/13/21 1207   BP: 136/63   Pulse: 82   Resp: 16   SpO2: 98%   Weight: 145 lb (65.8 kg)   Height: 5' 3\" (1.6 m)     Objective:     I      NEUROLOGICAL EXAM:    Appearance: The patient is well developed, well nourished, provides a coherent history and is in no acute distress. Mental Status: Oriented to time, place and person, and the president, cognitive function is normal and speech is fluent and no aphasia or dysarthria. Mood and affect appropriate but very anxious and depressed. Cranial Nerves:   Intact visual fields. Fundi are benign, disc are flat, no lesions seen on funduscopy. KENNEY, EOM's full, no nystagmus, no ptosis. Facial sensation is normal. Corneal reflexes are not tested. Facial movement is symmetric. Hearing is normal bilaterally. Palate is midline with normal sternocleidomastoid and trapezius muscles are normal. Tongue is midline. Neck without meningismus or bruits  Temporal arteries are not tender or enlarged  TMJ areas are not tender on palpation   Motor:  4/5 strength in upper and lower proximal and distal muscles. Normal bulk and tone. No fasciculations. Rapid alternating movement is symmetric and intact bilaterally   Reflexes:   Deep tendon reflexes 1+/4 and symmetrical.  No babinski or clonus present   Sensory:   Abnormal to touch, pinprick and vibration and temperature in both feet to ankle level.   DSS is intact   Gait:  Abnormal gait with patient moving very slowly and limping on her leg because of recent gluteal tear. Gait is slightly wide-based from her sensory ataxia. Tremor:   No tremor noted. Cerebellar:   Mildly abnormal cerebellar signs present on Romberg and tandem testing and finger-nose-finger exam.   Neurovascular:  Normal heart sounds and regular rhythm, peripheral pulses decreased, and no carotid bruits. Assessment:       ICD-10-CM ICD-9-CM    1. Type 1 diabetes mellitus with diabetic polyneuropathy (HCC)  E10.42 250.61 REFERRAL TO NEUROSURGERY     357.2    2. Cerebrovascular accident (CVA), unspecified mechanism ([facility] [address]  I63.9 434.91 REFERRAL TO NEUROSURGERY   3. Dizziness  R42 780.4 REFERRAL TO NEUROSURGERY   4. Trigeminal neuralgia  G50.0 350.1 REFERRAL TO NEUROSURGERY   5. CSF leak from nose  G96.01 349.81 REFERRAL TO NEUROSURGERY   6. Diabetic peripheral neuropathy associated with type 2 diabetes mellitus (HCC)  E11.42 250.60      357.2      Active Problems:    * No active hospital problems. *      Plan:     Patient with complicated problem of persistent headaches that she says are getting worse, and she is convinced that are associated with a recurrent CSF leak, and has had the same problem now for many years after her CSF leak repair which gave her only a year or 2 of relief. She does not have any nasal drainage, and she does not have any abnormality on exam, and her MRI scan as described above shows none of the classic changes of idiopathic intracranial hypotension also to suggest low pressure syndrome. She wants a diagnostic test done, we will send her down [facility] to see whether or not they would consider doing a nuclear medicine scan on her by injecting isotope into the spinal fluid or do a cisternogram to see if there is any evidence of tracer going into her nose. We could also do myelographic dye injection and see if that goes in her nose.   There is no CSF leak available to test for sugar, that then seem to be an option now. She has diabetes and diabetic neuropathy and is mildly symptomatic from that in addition. The chronic pain on her left side of her head has been diagnosed as a trigeminal neuralgia type syndrome, but I really suspect she most likely has a TMJ problem. However she does denies bruxism. She has had multiple recent imaging done which I reviewed on the PACS system reviewed her medical records available, and she has been seen at multiple hospitals including [facility], [facility], here, most others for this problem and nobody has been able to diagnose it yet. Very difficult case, we will see her again in 6 months time after her above neurosurgery evaluation. 45 minutes spent with the patient going over her case with the patient and her family in detail and reviewed all her records and x-rays on the PACS system on the chart and previous neurological notes etc.  She may eventually need an EMG study for her diabetic neuropathy.     Signed By: Christal Weiss MD     July 13, 2021       CC: Alejandra Saravia NP  FAX: [phone]

## 2021-07-13 NOTE — LETTER
7/13/2021    Patient: Tara Escudero   YOB: 1965   Date of Visit: 7/13/2021     [address]  [address]  [place], [place]  [address]  [address] [facility] [address]  Via In Basket    Dear HELGA Lopez DO,      Thank you for referring Ms. Lucas Zaldivar to [facility] for evaluation. My notes for this consultation are attached. Consult  REFERRED BY:  Katey Crane NP    CHIEF COMPLAINT: Persistent headaches that are worse when she stands up and associated with increasing ear pain, jaw pain, and history of CSF leak    Subjective:     Tara Escudero is a 61 y.o. right-handed  female seen as a new patient to me, at the request of nurse practitioner Chris Pelayo for new problem of worsening headaches when she stands up, and facial pain, and the history of previous CSF leak repaired in [place] about 15 to 20 years ago after having extensive evaluation that were all normal and then having a nasal plungent in the left nares and apparently showed some stronger and was diagnosed with a CSF leak and had repair of her leak surgically above the sinus. She apparently got better for several years but then had recurrent symptoms that have been persistent for years, and for which she is previously saw Dr. Laine Helms 2 years ago and had a CT of the neck that was normal, CT of the maxillofacial areas that were normal, a normal CT of the head, and just had an MRI done June 21, 2021 that showed no encroachment on the foramen magnum or shifting of the brain down, no loss of pontomedullary angulation, no enhancement of the pituitary gland, and no distortion of the corpus callosum and no meningeal enhancement to suggest low pressure or idiopathic intracranial hypertension.   Patient has no fever, no meningismus, and no signs of any infection and has had multiple scans done in the last 6 months because of \"strokes even though all her MRI scans including ones at [facility] showed no clear new stroke in the 1 done just recently at an open unit on soft side June 21, 2021 that was normal read by Dr. Vaibhav Jaar one of our top neuroradiologist.  He is currently on aspirin and Plavix therapy because of her \"strokes\" from last December and I advised her to go down to just aspirin alone because dual antiplatelet therapy in the long run does not improve prevention and just increases the risk of GI bleeding. The patient denies any TMJ symptoms and she has seen an ENT physician who has examined her and did an ultrasound and can see no evidence of CSF leak, and she has tried to see a neurosurgeon at [facility] who after reviewing her records refused to steer because he said he could not help her and did not think she had a leak most likely. She has no nasal discharge to suggest a leak either. She is very tense and concerned about her condition and convinced that for the last several years she has had recurrently that nobody can find. She has no new focal weakness no sensory loss no visual changes, and basically no new other neurologic symptoms. She thinks her headaches are getting worse and they are constant now, worse when she gets up and moves around. I advised that she may need a spinal tap and nuclear medicine injection of myelographic injection to follow the dye or the tracer up into her head to see if there is any leak into the nasal cavity and have referred her to neurosurgery at [facility] for their opinion. But on looking at her films I do not see any clear evidence to suggest a CSF leak and examining her I do not see any evidence of either. Past Medical History:   Diagnosis Date    Arthritis     patient states \"every joint affected\"    Bee sting 09/05/2018    left elbow bee sting     Blister of ankle 09/05/2018    Blister small per patient of left ankle.  Wears an air boot due to 2 stress fractures in last 2 months.     CAD (coronary artery disease)     Constipation     Falls 2017    pt reports having 4 falls in 3 days    Fatigue     Headache     Ill-defined condition     multiple broken ribs    Ill-defined condition     reports \"getting infections easily\"    Joint pain     Memory disorder     following spinal fluid leak repair    Menopause     Nausea & vomiting     Neuropathy     Osteoporosis     Thyroid disease     Type 1 diabetes ([facility] [address]     diagnosed at age 8    Unspecified cerebral artery occlusion with cerebral infarction     x 4 (last in )      Past Surgical History:   Procedure Laterality Date    HX CARPAL TUNNEL RELEASE Right 2018    HX  SECTION      HX  SECTION      HX CHOLECYSTECTOMY      HX HYSTERECTOMY  2006    HX OOPHORECTOMY      HX ORTHOPAEDIC      frozen shoulder surgery x 3    HX ORTHOPAEDIC      frozen finger surgery x 8    HX ORTHOPAEDIC Left 2014    wrist surgery    HX ORTHOPAEDIC Right     hand surgery    HX ORTHOPAEDIC Left     foot surgery    HX OTHER SURGICAL      spinal fluid leak repair (spinal fluid leaking from nose, remove nose, cut fat from stomach, use that as patch behind nose, nose replaced)    HX ROTATOR CUFF REPAIR Left     HX ROTATOR CUFF REPAIR Right     HX TONSILLECTOMY  1968     Family History   Problem Relation Age of Onset    Heart Disease Mother     Hypertension Mother     No Known Problems Father     Heart Disease Maternal Grandfather     Cancer Maternal Aunt         Pancreatic and Liver    Cancer Maternal Grandmother         Lung    Diabetes Maternal Grandmother     Cancer Maternal Aunt         Breast    Breast Cancer Maternal Aunt     Diabetes Maternal Aunt       Social History     Tobacco Use    Smoking status: Former Smoker     Packs/day: 0.50     Years: 8.00     Pack years: 4.00     Quit date: 4/10/2007     Years since quittin.2    Smokeless tobacco: Never Used   Substance Use Topics    Alcohol use: No         Current Outpatient Medications:     tiZANidine (ZANAFLEX) 4 mg tablet, Take 1-2 Tablets by mouth three (3) times daily as needed for Muscle Spasm(s) or Pain (max dose 24 mg/day). TAKES ONLY AT HS, RARELY  Indications: muscle spasm, Disp: 30 Tablet, Rfl: 0    amoxicillin-clavulanate (AUGMENTIN) 875-125 mg per tablet, Take 1 Tablet by mouth two (2) times a day for 10 days. , Disp: 20 Tablet, Rfl: 0    simvastatin (ZOCOR) 40 mg tablet, Take 1 tablet by mouth nightly, Disp: 90 Tablet, Rfl: 0    gabapentin (NEURONTIN) 100 mg capsule, Take 2 Capsules by mouth two (2) times a day. Max Daily Amount: 400 mg., Disp: 120 Capsule, Rfl: 0    Euthyrox 75 mcg tablet, TAKE 1 TABLET BY MOUTH ONCE DAILY BEFORE  BREAKFAST  FOR  A  CONDITION  WITH  LOW  THYROID  HORMONE  LEVELS, Disp: 90 Tab, Rfl: 0    zolpidem (AMBIEN) 5 mg tablet, Take 1 Tab by mouth nightly as needed for Sleep. Max Daily Amount: 5 mg., Disp: 30 Tab, Rfl: 2    diclofenac EC (VOLTAREN) 75 mg EC tablet, , Disp: , Rfl:     insulin detemir U-100 (Levemir FlexTouch U-100 Insuln) 100 unit/mL (3 mL) inpn, INJECT 28 UNITS SUBCUTANEOUSLY IN THE MORNING AND 3 UNITS  AT  NIGHT  Indications: type 1 diabetes mellitus, Disp: 10 Adjustable Dose Pre-filled Pen Syringe, Rfl: 5    HumaLOG KwikPen Insulin 100 unit/mL kwikpen, Max daily dose 30 (Patient taking differently: ONLY PRN up to 1-6 units depending on blood sugar), Disp: 15 mL, Rfl: 5    glucose blood VI test strips (OneTouch Ultra Blue Test Strip) strip, TEST BLOOD SUGAR 8 TIMES A DAY DUE TO UNCONTROLLED SUGARS Dx : Y77.40, Disp: 300 Strip, Rfl: 11    Insulin Needles, Disposable, (Niru Pen Needle) 32 gauge x 5/32\" ndle, Use as directed with pen device - up to 5 times daily  Dx:  E11.9, Disp: 200 Pen Needle, Rfl: 5    Insulin Needles, Disposable, (Pen Needle) 30 gauge x 5/16\" ndle, Use one needle up to 8 times daily to dispense insulin.   E10.65,E 10.649, Disp: 300 Pen Needle, Rfl: 5    aspirin 81 mg chewable tablet, Take 81 mg by mouth daily. , Disp: , Rfl:     mupirocin (BACTROBAN) 2 % ointment, PRN, Disp: , Rfl:     metoclopramide HCl (REGLAN) 5 mg tablet, Take 1 Tab by mouth two (2) times a day. Up to BID. TAKE 30 MINUTES BEFORE MEALS (Patient taking differently: Take 5 mg by mouth. TAKES 30 MINUTES AFTER A MEAL. ), Disp: 360 Tab, Rfl: 0    losartan (COZAAR) 50 mg tablet, Take 1 Tab by mouth daily. Take 1 tab daily, Disp: 90 Tab, Rfl: 3    montelukast (SINGULAIR) 10 mg tablet, Take 1 Tab by mouth daily. , Disp: 90 Tab, Rfl: 1    potassium 99 mg tablet, Take 1 Tab by mouth every other day., Disp: 90 Tab, Rfl: 1    ascorbic acid, vitamin C, (VITAMIN C) 1,000 mg tablet, Take 4,000 mg by mouth two (2) times a day., Disp: , Rfl:     calcium carbonate (CALTREX) 600 mg calcium (1,500 mg) tablet, Take 600 mg by mouth two (2) times a day., Disp: , Rfl:     glucos sul 2KCl/msm/chond/C/Mn (GLUCOSAMINE CHONDROITIN PO), Take  by mouth., Disp: , Rfl:     MAGNESIUM PO, Take  by mouth every fourty-eight (48) hours. , Disp: , Rfl:     denosumab (PROLIA) 60 mg/mL injection, 60 mg by SubCUTAneous route. Every 6 months, Disp: , Rfl:     cholecalciferol, vitamin D3, (VITAMIN D3) 2,000 unit tab, Take 1 Tab by mouth daily. 5000 units daily  Indications: low vitamin D levels, Disp: , Rfl:     VITAMIN A PO, Take 2,400 mcg by mouth nightly., Disp: , Rfl:     vitamin E (AQUA GEMS) 400 unit capsule, Take 400 Units by mouth every Monday and Thursday. Only on M and Thurs at Bedtime, Disp: , Rfl:         No Known Allergies   MRI Results (most recent):  Results from Hospital Encounter encounter on 06/21/21    MRI BRAIN W WO CONT    Narrative  EXAM:  MRI BRAIN W WO CONT  INDICATION:  Headache, head pressure, available history states CSF fluid leak  from nose patched in the past. 1998. Patient lists under reason for exam \"spinal  fluid leak is back\".   TECHNIQUE:  Sagittal T1, axial FLAIR, T2, T1 and gradient echo T2-weighted images of the  head were obtained followed by intravenous infusion 13 mL Dotarem repeat axial  and coronal T1-weighted images and axial diffusion weighted images. COMPARISON: CTA head 5/24/21  FINDINGS:  The ventricular size and configuration are normal.  Normal signal demonstrated in the cerebral hemispheres, brain stem and  cerebellum. No abnormal areas of intracranial enhancement. No abnormal diffusion. No evidence of intracranial hemorrhage, infarct, mass or abnormal extra-axial  fluid collections. Mild prominent perivascular spaces basal ganglia, cerebral hemispheres and  brainstem are within normal variation. No enhancement or other findings in the brain suggestive of intracranial  hypotension. Flow voids are present in the vertebral, basilar and carotid artery systems. The craniocervical junction is unremarkable. Fat/soft tissue signal in the posterior superior sphenoid sinus extending to the  tuberculum sella likely related to patient's history of CSF leak \"patchy\" in  1998. Impression  No acute intracranial abnormality demonstrated. Hernandez Decent Results from [place] encounter on 06/21/21    MRI BRAIN W WO CONT    Narrative  EXAM:  MRI BRAIN W WO CONT  INDICATION:  Headache, head pressure, available history states CSF fluid leak  from nose patched in the past. 1998. Patient lists under reason for exam \"spinal  fluid leak is back\". TECHNIQUE:  Sagittal T1, axial FLAIR, T2, T1 and gradient echo T2-weighted images of the  head were obtained followed by intravenous infusion 13 mL Dotarem repeat axial  and coronal T1-weighted images and axial diffusion weighted images. COMPARISON: CTA head 5/24/21  FINDINGS:  The ventricular size and configuration are normal.  Normal signal demonstrated in the cerebral hemispheres, brain stem and  cerebellum. No abnormal areas of intracranial enhancement. No abnormal diffusion.   No evidence of intracranial hemorrhage, infarct, mass or abnormal extra-axial  fluid collections. Mild prominent perivascular spaces basal ganglia, cerebral hemispheres and  brainstem are within normal variation. No enhancement or other findings in the brain suggestive of intracranial  hypotension. Flow voids are present in the vertebral, basilar and carotid artery systems. The craniocervical junction is unremarkable. Fat/soft tissue signal in the posterior superior sphenoid sinus extending to the  tuberculum sella likely related to patient's history of CSF leak \"patchy\" in  1998. Impression  No acute intracranial abnormality demonstrated. .    Review of Systems:  A comprehensive review of systems was negative except for: Constitutional: positive for fatigue and malaise  Ears, nose, mouth, throat, and face: positive for tinnitus and earaches  Musculoskeletal: positive for myalgias, arthralgias, stiff joints and neck pain  Neurological: positive for headaches, dizziness, vertigo, gait problems and weakness  Behvioral/Psych: positive for anxiety and depression   Vitals:    07/13/21 1207   BP: 136/63   Pulse: 82   Resp: 16   SpO2: 98%   Weight: 145 lb (65.8 kg)   Height: 5' 3\" (1.6 m)     Objective:     I      NEUROLOGICAL EXAM:    Appearance: The patient is well developed, well nourished, provides a coherent history and is in no acute distress. Mental Status: Oriented to time, place and person, and the president, cognitive function is normal and speech is fluent and no aphasia or dysarthria. Mood and affect appropriate but very anxious and depressed. Cranial Nerves:   Intact visual fields. Fundi are benign, disc are flat, no lesions seen on funduscopy. KENNEY, EOM's full, no nystagmus, no ptosis. Facial sensation is normal. Corneal reflexes are not tested. Facial movement is symmetric. Hearing is normal bilaterally. Palate is midline with normal sternocleidomastoid and trapezius muscles are normal. Tongue is midline.   Neck without meningismus or bruits  Temporal arteries are not tender or enlarged  TMJ areas are not tender on palpation   Motor:  4/5 strength in upper and lower proximal and distal muscles. Normal bulk and tone. No fasciculations. Rapid alternating movement is symmetric and intact bilaterally   Reflexes:   Deep tendon reflexes 1+/4 and symmetrical.  No babinski or clonus present   Sensory:   Abnormal to touch, pinprick and vibration and temperature in both feet to ankle level. DSS is intact   Gait:  Abnormal gait with patient moving very slowly and limping on her leg because of recent gluteal tear. Gait is slightly wide-based from her sensory ataxia. Tremor:   No tremor noted. Cerebellar:   Mildly abnormal cerebellar signs present on Romberg and tandem testing and finger-nose-finger exam.   Neurovascular:  Normal heart sounds and regular rhythm, peripheral pulses decreased, and no carotid bruits. Assessment:       ICD-10-CM ICD-9-CM    1. Type 1 diabetes mellitus with diabetic polyneuropathy (HCC)  E10.42 250.61 REFERRAL TO NEUROSURGERY     357.2    2. Cerebrovascular accident (CVA), unspecified mechanism ([facility] [address]  I63.9 434.91 REFERRAL TO NEUROSURGERY   3. Dizziness  R42 780.4 REFERRAL TO NEUROSURGERY   4. Trigeminal neuralgia  G50.0 350.1 REFERRAL TO NEUROSURGERY   5. CSF leak from nose  G96.01 349.81 REFERRAL TO NEUROSURGERY   6. Diabetic peripheral neuropathy associated with type 2 diabetes mellitus (HCC)  E11.42 250.60      357.2      Active Problems:    * No active hospital problems. *      Plan:     Patient with complicated problem of persistent headaches that she says are getting worse, and she is convinced that are associated with a recurrent CSF leak, and has had the same problem now for many years after her CSF leak repair which gave her only a year or 2 of relief.   She does not have any nasal drainage, and she does not have any abnormality on exam, and her MRI scan as described above shows none of the classic changes of idiopathic intracranial hypotension also to suggest low pressure syndrome. She wants a diagnostic test done, we will send her down [facility] to see whether or not they would consider doing a nuclear medicine scan on her by injecting isotope into the spinal fluid or do a cisternogram to see if there is any evidence of tracer going into her nose. We could also do myelographic dye injection and see if that goes in her nose. There is no CSF leak available to test for sugar, that then seem to be an option now. She has diabetes and diabetic neuropathy and is mildly symptomatic from that in addition. The chronic pain on her left side of her head has been diagnosed as a trigeminal neuralgia type syndrome, but I really suspect she most likely has a TMJ problem. However she does denies bruxism. She has had multiple recent imaging done which I reviewed on the PACS system reviewed her medical records available, and she has been seen at multiple hospitals including [facility], [facility], here, most others for this problem and nobody has been able to diagnose it yet. Very difficult case, we will see her again in 6 months time after her above neurosurgery evaluation. 45 minutes spent with the patient going over her case with the patient and her family in detail and reviewed all her records and x-rays on the PACS system on the chart and previous neurological notes etc.  She may eventually need an EMG study for her diabetic neuropathy. Signed By: Gina Villatoro MD     July 13, 2021       CC: Maggy Carmona NP  FAX: [phone]      If you have questions, please do not hesitate to call me. I look forward to following your patient along with you.       Sincerely,    Gina Villatoro MD

## 2021-07-13 NOTE — TELEPHONE ENCOUNTER
Ulices Kitchen from [facility] called regarding the directions that were sent over for the medication Tizanidine (Zanaflex) 4 mg Tab. They need either a nurse or NP Jonatan Perera to give them a call regarding what directions need to go with this medication.      Thank you

## 2021-07-14 ENCOUNTER — TELEPHONE (OUTPATIENT)
Dept: FAMILY MEDICINE CLINIC | Age: 56
End: 2021-07-14

## 2021-07-14 ENCOUNTER — HOSPITAL ENCOUNTER (EMERGENCY)
Age: 56
Discharge: HOME | End: 2021-07-14
Attending: EMERGENCY MEDICINE
Payer: MEDICARE

## 2021-07-14 ENCOUNTER — APPOINTMENT (OUTPATIENT)
Dept: CT IMAGING | Age: 56
End: 2021-07-14
Attending: EMERGENCY MEDICINE
Payer: MEDICARE

## 2021-07-14 VITALS
WEIGHT: 145 LBS | RESPIRATION RATE: 18 BRPM | DIASTOLIC BLOOD PRESSURE: 71 MMHG | BODY MASS INDEX: 25.69 KG/M2 | HEART RATE: 80 BPM | OXYGEN SATURATION: 99 % | TEMPERATURE: 98.2 F | SYSTOLIC BLOOD PRESSURE: 140 MMHG | HEIGHT: 63 IN
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
## 2021-07-14 DIAGNOSIS — I49.3 PVC (PREMATURE VENTRICULAR CONTRACTION): ICD-10-CM

## 2021-07-14 DIAGNOSIS — M19.90 ARTHRITIS: ICD-10-CM

## 2021-07-14 DIAGNOSIS — R10.9 ACUTE RIGHT FLANK PAIN: ICD-10-CM

## 2021-07-14 DIAGNOSIS — Z86.39 HISTORY OF HYPOGLYCEMIA: ICD-10-CM

## 2021-07-14 DIAGNOSIS — E03.9 ACQUIRED HYPOTHYROIDISM: ICD-10-CM

## 2021-07-14 DIAGNOSIS — K59.00 CONSTIPATION, UNSPECIFIED CONSTIPATION TYPE: ICD-10-CM

## 2021-07-14 DIAGNOSIS — E10.42 TYPE 1 DIABETES MELLITUS WITH DIABETIC POLYNEUROPATHY (HCC): ICD-10-CM

## 2021-07-14 DIAGNOSIS — R51.9 FACIAL PAIN: ICD-10-CM

## 2021-07-14 DIAGNOSIS — F51.01 PRIMARY INSOMNIA: ICD-10-CM

## 2021-07-14 DIAGNOSIS — E55.9 VITAMIN D DEFICIENCY: ICD-10-CM

## 2021-07-14 PROCEDURE — 74176 CT ABD & PELVIS W/O CONTRAST: CPT

## 2021-07-14 PROCEDURE — 99283 EMERGENCY DEPT VISIT LOW MDM: CPT

## 2021-07-14 RX ORDER — GABAPENTIN 100 MG/1
CAPSULE ORAL
Qty: 120 CAPSULE | Refills: 0 | Status: SHIPPED | OUTPATIENT
Start: 2021-07-14 | End: 2021-08-14

## 2021-07-14 RX ORDER — POLYETHYLENE GLYCOL 3350 17 G/17G
17 POWDER, FOR SOLUTION ORAL DAILY
Qty: 289 G | Refills: 0 | Status: SHIPPED | OUTPATIENT
Start: 2021-07-14 | End: 2021-10-26

## 2021-07-14 NOTE — TELEPHONE ENCOUNTER
This patient called this morning and stated that she was standing on her couch opening her blinds when she lost her footing and fell backwards on her coffee table landing on her back on the table and then on the floor. She stated she is have pain and discomfort to her right kidney area, her Appendix area, and her buttocks. She did state that she was told a few months ago that she has an enlarged appendix and is concerned she caused serious harm. She did sate that she is scheduled to have surgery on Aug 3rd for torn ligaments. She would like for someone to give her a call regarding what she should do whether it is come in to the office or go to the Emergency Room.      Thank you

## 2021-07-14 NOTE — ED TRIAGE NOTES
Pt fell off sofa landing on her back onto a coffee table while reaching for something around 6 this am. Pt c/o 8/10 RLQ and generalized all over body pain

## 2021-07-14 NOTE — TELEPHONE ENCOUNTER
PC GAVIOTA Ms Yolanda Son, after stating that I was sorry to hear about her fall, she was informed, Colten Harris agreed and confirmed with me, it would be of her best interest to go to the ER. Per Colten Harris, stated as well, she probably would need further evaluation maybe even an Xray, but more then what she could do for her here in the office. She stated OK and thanks.

## 2021-07-14 NOTE — ED NOTES
I have reviewed discharge instructions with the patient. The patient verbalized understanding. PT ambulated out of the ER with her niece.

## 2021-07-14 NOTE — ED PROVIDER NOTES
EMERGENCY DEPARTMENT HISTORY AND PHYSICAL EXAM      Date: 7/14/2021  Patient Name: Sridhar Gomes    History of Presenting Illness     Chief Complaint   Patient presents with    Fall       History Provided By: Patient    HPI: Sridhar Gomes, 61 y.o. female with PMHx significant for DM 1, neuropathy, chronic headaches,,, presents ambulatory to the ED with cc of fall. Patient reports she was standing on her couch adjusting the curtains so her dog can see out the window when she fell backwards and hit her right flank on a coffee table. No head or neck injury. No loss of consciousness. Complains of pain in her right flank and right lower abdomen. Describes pain is constant ache. Symptoms are mild to moderate. No treatment prior to arrival.  Pain is nonradiating. Pain is worse with movement. She denies any nausea, vomiting or diarrhea. She was in her normal state of health prior to the fall. She denies any fevers or chills. PMHx: Significant for DM 1, high cholesterol, chronic headaches, hypertension  PSHx: Significant for cholecystectomy, hysterectomy, oophorectomy, carpal tunnel release. Social Hx:     There are no other complaints, changes, or physical findings at this time. PCP: Ruthie Raza NP    No current facility-administered medications on file prior to encounter. Current Outpatient Medications on File Prior to Encounter   Medication Sig Dispense Refill    tiZANidine (ZANAFLEX) 4 mg tablet Take 1-2 Tablets by mouth three (3) times daily as needed for Muscle Spasm(s) or Pain (max dose 24 mg/day). TAKES ONLY AT HS, RARELY  Indications: muscle spasm 30 Tablet 0    amoxicillin-clavulanate (AUGMENTIN) 875-125 mg per tablet Take 1 Tablet by mouth two (2) times a day for 10 days. 20 Tablet 0    simvastatin (ZOCOR) 40 mg tablet Take 1 tablet by mouth nightly 90 Tablet 0    gabapentin (NEURONTIN) 100 mg capsule Take 2 Capsules by mouth two (2) times a day.  Max Daily Amount: 400 mg. 120 Capsule 0    Euthyrox 75 mcg tablet TAKE 1 TABLET BY MOUTH ONCE DAILY BEFORE  BREAKFAST  FOR  A  CONDITION  WITH  LOW  THYROID  HORMONE  LEVELS 90 Tab 0    zolpidem (AMBIEN) 5 mg tablet Take 1 Tab by mouth nightly as needed for Sleep. Max Daily Amount: 5 mg. 30 Tab 2    diclofenac EC (VOLTAREN) 75 mg EC tablet       insulin detemir U-100 (Levemir FlexTouch U-100 Insuln) 100 unit/mL (3 mL) inpn INJECT 28 UNITS SUBCUTANEOUSLY IN THE MORNING AND 3 UNITS  AT  NIGHT  Indications: type 1 diabetes mellitus 10 Adjustable Dose Pre-filled Pen Syringe 5    HumaLOG KwikPen Insulin 100 unit/mL kwikpen Max daily dose 30 (Patient taking differently: ONLY PRN up to 1-6 units depending on blood sugar) 15 mL 5    glucose blood VI test strips (OneTouch Ultra Blue Test Strip) strip TEST BLOOD SUGAR 8 TIMES A DAY DUE TO UNCONTROLLED SUGARS Dx : E10.42 300 Strip 11    Insulin Needles, Disposable, (Niru Pen Needle) 32 gauge x 5/32\" ndle Use as directed with pen device - up to 5 times daily  Dx:  E11.9 200 Pen Needle 5    Insulin Needles, Disposable, (Pen Needle) 30 gauge x 5/16\" ndle Use one needle up to 8 times daily to dispense insulin. E10.65,E 10.649 300 Pen Needle 5    aspirin 81 mg chewable tablet Take 81 mg by mouth daily.  mupirocin (BACTROBAN) 2 % ointment PRN      metoclopramide HCl (REGLAN) 5 mg tablet Take 1 Tab by mouth two (2) times a day. Up to BID. TAKE 30 MINUTES BEFORE MEALS (Patient taking differently: Take 5 mg by mouth. TAKES 30 MINUTES AFTER A MEAL.) 360 Tab 0    losartan (COZAAR) 50 mg tablet Take 1 Tab by mouth daily. Take 1 tab daily 90 Tab 3    montelukast (SINGULAIR) 10 mg tablet Take 1 Tab by mouth daily. 90 Tab 1    potassium 99 mg tablet Take 1 Tab by mouth every other day. 90 Tab 1    ascorbic acid, vitamin C, (VITAMIN C) 1,000 mg tablet Take 4,000 mg by mouth two (2) times a day.  calcium carbonate (CALTREX) 600 mg calcium (1,500 mg) tablet Take 600 mg by mouth two (2) times a day.  glucos sul 2KCl/msm/chond/C/Mn (GLUCOSAMINE CHONDROITIN PO) Take  by mouth.  MAGNESIUM PO Take  by mouth every fourty-eight (48) hours.  denosumab (PROLIA) 60 mg/mL injection 60 mg by SubCUTAneous route. Every 6 months      cholecalciferol, vitamin D3, (VITAMIN D3) 2,000 unit tab Take 1 Tab by mouth daily. 5000 units daily  Indications: low vitamin D levels      VITAMIN A PO Take 2,400 mcg by mouth nightly.  vitamin E (AQUA GEMS) 400 unit capsule Take 400 Units by mouth every Monday and Thursday. Only on  and Thurs at Bedtime         Past History     Past Medical History:  Past Medical History:   Diagnosis Date    Arthritis     patient states \"every joint affected\"    Bee sting 2018    left elbow bee sting     Blister of ankle 2018    Blister small per patient of left ankle. Wears an air boot due to 2 stress fractures in last 2 months.     CAD (coronary artery disease)     Constipation     Falls     pt reports having 4 falls in 3 days    Fatigue     Headache     Ill-defined condition     multiple broken ribs    Ill-defined condition     reports \"getting infections easily\"    Joint pain     Memory disorder     following spinal fluid leak repair    Menopause     Nausea & vomiting     Neuropathy     Osteoporosis     Thyroid disease     Type 1 diabetes ([facility] [address]     diagnosed at age 8    Unspecified cerebral artery occlusion with cerebral infarction     x 4 (last in )       Past Surgical History:  Past Surgical History:   Procedure Laterality Date    HX CARPAL TUNNEL RELEASE Right 2018    HX  SECTION  1987    HX  SECTION      HX CHOLECYSTECTOMY  1996    HX HYSTERECTOMY  2006    HX OOPHORECTOMY      HX ORTHOPAEDIC      frozen shoulder surgery x 3    HX ORTHOPAEDIC      frozen finger surgery x 8    HX ORTHOPAEDIC Left 2014    wrist surgery    HX ORTHOPAEDIC Right 1977    hand surgery    HX ORTHOPAEDIC Left     foot surgery  HX OTHER SURGICAL      spinal fluid leak repair (spinal fluid leaking from nose, remove nose, cut fat from stomach, use that as patch behind nose, nose replaced)    HX ROTATOR CUFF REPAIR Left     HX ROTATOR CUFF REPAIR Right     HX TONSILLECTOMY  1968       Family History:  Family History   Problem Relation Age of Onset    Heart Disease Mother     Hypertension Mother     No Known Problems Father     Heart Disease Maternal Grandfather     Cancer Maternal Aunt         Pancreatic and Liver    Cancer Maternal Grandmother         Lung    Diabetes Maternal Grandmother     Cancer Maternal Aunt         Breast    Breast Cancer Maternal Aunt     Diabetes Maternal Aunt        Social History:  Social History     Tobacco Use    Smoking status: Former Smoker     Packs/day: 0.50     Years: 8.00     Pack years: 4.00     Quit date: 4/10/2007     Years since quittin.2    Smokeless tobacco: Never Used   Vaping Use    Vaping Use: Never used   Substance Use Topics    Alcohol use: No    Drug use: No       Allergies:  No Known Allergies      Review of Systems   Review of Systems   Constitutional: Negative for activity change, chills and fever. HENT: Negative for congestion and sore throat. Eyes: Negative for pain and redness. Respiratory: Negative for cough, chest tightness and shortness of breath. Cardiovascular: Negative for chest pain and palpitations. Gastrointestinal: Positive for abdominal pain. Negative for diarrhea, nausea and vomiting. Genitourinary: Positive for flank pain. Negative for dysuria, frequency and urgency. Musculoskeletal: Negative for back pain and neck pain. Skin: Negative for rash. Neurological: Negative for syncope, light-headedness and headaches. Psychiatric/Behavioral: Negative for confusion. All other systems reviewed and are negative. Physical Exam   Physical Exam  Vitals and nursing note reviewed.    Constitutional:       General: She is not in acute distress. Appearance: She is well-developed. She is not diaphoretic. HENT:      Head: Normocephalic. Nose: Nose normal.      Mouth/Throat:      Pharynx: No oropharyngeal exudate. Eyes:      General: No scleral icterus. Conjunctiva/sclera: Conjunctivae normal.      Pupils: Pupils are equal, round, and reactive to light. Neck:      Thyroid: No thyromegaly. Vascular: No JVD. Trachea: No tracheal deviation. Cardiovascular:      Rate and Rhythm: Normal rate and regular rhythm. Heart sounds: No murmur heard. No friction rub. No gallop. Pulmonary:      Effort: Pulmonary effort is normal. No respiratory distress. Breath sounds: Normal breath sounds. No stridor. No wheezing or rales. Abdominal:      General: Bowel sounds are normal. There is no distension. Palpations: Abdomen is soft. Tenderness: There is abdominal tenderness. There is right CVA tenderness. There is no guarding or rebound. Comments: Mild right lower quadrant tenderness. Musculoskeletal:         General: Normal range of motion. Cervical back: Normal range of motion and neck supple. Lymphadenopathy:      Cervical: No cervical adenopathy. Skin:     General: Skin is warm and dry. Findings: No erythema or rash. Neurological:      Mental Status: She is alert and oriented to person, place, and time. Cranial Nerves: No cranial nerve deficit. Motor: No abnormal muscle tone. Coordination: Coordination normal.   Psychiatric:         Behavior: Behavior normal.             Diagnostic Study Results     Labs -   No results found for this or any previous visit (from the past 12 hour(s)). Radiologic Studies -   CT ABD PELV WO CONT   Final Result   1. No evidence of acute intra-abdominal/intrapelvic traumatic injury. 2. Surgical absence of the gallbladder. 3. Presence of a moderately large amount of gas and fecal material within the   colon.  Evidence of apparent mural thickening involving the rectosigmoid colon. 4. Surgical absence of the uterus. CT Results  (Last 48 hours)               07/14/21 0944  CT ABD PELV WO CONT Final result    Impression:  1. No evidence of acute intra-abdominal/intrapelvic traumatic injury. 2. Surgical absence of the gallbladder. 3. Presence of a moderately large amount of gas and fecal material within the   colon. Evidence of apparent mural thickening involving the rectosigmoid colon. 4. Surgical absence of the uterus. Narrative:  EXAM: CT ABD PELV WO CONT       INDICATION: right flank pain/trauma       COMPARISON: CT abdomen and pelvis dated 3/29/2019       CONTRAST:  None. TECHNIQUE:    Thin axial images were obtained through the abdomen and pelvis. Coronal and   sagittal reformats were generated. Oral contrast was not administered. CT dose   reduction was achieved through use of a standardized protocol tailored for this   examination and automatic exposure control for dose modulation. The absence of intravenous contrast material reduces the sensitivity for   evaluation of the vasculature and solid organs. FINDINGS:    LOWER THORAX: No significant abnormality in the incidentally imaged lower chest.   LIVER: No mass. No evidence of acute traumatic injury. BILIARY TREE: Gallbladder is surgically absent. CBD is not dilated. SPLEEN: within normal limits. PANCREAS: No focal abnormality. ADRENALS: Unremarkable. KIDNEYS/URETERS: No calculus or hydronephrosis. No evidence of acute traumatic   injury. Some vascular calcification is associated with both kidneys. STOMACH: Unremarkable. SMALL BOWEL: No dilatation or wall thickening. COLON: No dilatation. A moderately large amount of gas and fecal material is   noted within the colon. Evidence of apparent mural thickening involving the   rectosigmoid colon (see axial image 68). No evidence of surrounding inflammatory   change.    APPENDIX: normal PERITONEUM: No ascites or pneumoperitoneum. RETROPERITONEUM: No lymphadenopathy or aortic aneurysm. REPRODUCTIVE ORGANS: Surgical absence of the uterus. URINARY BLADDER: No mass or calculus. BONES: No destructive bone lesion. ABDOMINAL WALL: No mass or hernia. ADDITIONAL COMMENTS: N/A               CXR Results  (Last 48 hours)    None            Medical Decision Making   I am the first provider for this patient. I reviewed the vital signs, available nursing notes, past medical history, past surgical history, family history and social history. Vital Signs-Reviewed the patient's vital signs. Patient Vitals for the past 12 hrs:   Temp Pulse Resp BP SpO2   07/14/21 1045  80 18 (!) 140/71 99 %   07/14/21 0926 98.2 °F (36.8 °C) 78 18 (!) 146/103 99 %           Records Reviewed: Nursing notes reviewed    Provider Notes (Medical Decision Making):   DDX: Kidney stone, kidney injury, rib fracture, vertebral fracture. ED Course:   Initial assessment performed. The patients presenting problems have been discussed, and they are in agreement with the care plan formulated and outlined with them. I have encouraged them to ask questions as they arise throughout their visit. PROGRESS NOTE    Pt reevaluated. CT abdomen pelvis without any acute findings. Moderate amount of stool. Abdomen benign. Will discharge with MiraLAX. Written by Jordan Leon MD     Progress note:    Pt noted to be feeling better and ready for discharge. Updated pt and/or family on all final lab and/or  imaging findings. Will follow up as instructed. All questions have been answered, pt voiced understanding and agreement with plan. Specific return precautions provided as well as instructions to return to the ED should sx worsen at any time. Vital signs stable for discharge.      I have also put together some discharge instructions for them that include: 1) educational information regarding their diagnosis, 2) how to care for their diagnosis at home, as well a 3) list of reasons why they would want to return to the ED prior to their follow-up appointment, should their condition change. Written by Charly Light MD        Critical Care Time:   0    Disposition:  Discharge    PLAN:  1. Current Discharge Medication List      START taking these medications    Details   polyethylene glycol (Miralax) 17 gram/dose powder Take 17 g by mouth daily. 1 tablespoon with 8 oz of water daily  Qty: 289 g, Refills: 0  Start date: 7/14/2021           2. Follow-up Information     Follow up With Specialties Details Why Contact Info    Jacklyn Napier NP Nurse Practitioner Schedule an appointment as soon as possible for a visit in 1 week  [facility] [address]  [address] [facility] [address] [facility]      [address] [facility] Emergency Medicine Go in 1 day If symptoms worsen [address] [id number]  [phone]        Return to ED if worse     Diagnosis     Clinical Impression:   1. Fall, initial encounter    2. Acute right flank pain    3. Constipation, unspecified constipation type              Please note that this dictation was completed with [employer], the computer voice recognition software. Quite often unanticipated grammatical, syntax, homophones, and other interpretive errors are inadvertently transcribed by the computer software. Please disregard these errors. Please excuse any errors that have escaped final proofreading.

## 2021-07-19 ENCOUNTER — OFFICE VISIT (OUTPATIENT)
Dept: FAMILY MEDICINE CLINIC | Age: 56
End: 2021-07-19
Payer: MEDICARE

## 2021-07-19 VITALS
BODY MASS INDEX: 24.98 KG/M2 | DIASTOLIC BLOOD PRESSURE: 76 MMHG | SYSTOLIC BLOOD PRESSURE: 122 MMHG | RESPIRATION RATE: 16 BRPM | HEART RATE: 74 BPM | WEIGHT: 141 LBS | HEIGHT: 63 IN | OXYGEN SATURATION: 97 % | TEMPERATURE: 97.5 F

## 2021-07-19 DIAGNOSIS — K59.03 DRUG-INDUCED CONSTIPATION: ICD-10-CM
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
## 2021-07-19 PROCEDURE — G9717 DOC PT DX DEP/BP F/U NT REQ: HCPCS | Performed by: NURSE PRACTITIONER

## 2021-07-19 PROCEDURE — G8752 SYS BP LESS 140: HCPCS | Performed by: NURSE PRACTITIONER

## 2021-07-19 PROCEDURE — 3017F COLORECTAL CA SCREEN DOC REV: CPT | Performed by: NURSE PRACTITIONER

## 2021-07-19 PROCEDURE — G8427 DOCREV CUR MEDS BY ELIG CLIN: HCPCS | Performed by: NURSE PRACTITIONER

## 2021-07-19 PROCEDURE — G8754 DIAS BP LESS 90: HCPCS | Performed by: NURSE PRACTITIONER

## 2021-07-19 PROCEDURE — G8420 CALC BMI NORM PARAMETERS: HCPCS | Performed by: NURSE PRACTITIONER

## 2021-07-19 PROCEDURE — G9899 SCRN MAM PERF RSLTS DOC: HCPCS | Performed by: NURSE PRACTITIONER

## 2021-07-19 PROCEDURE — 99213 OFFICE O/P EST LOW 20 MIN: CPT | Performed by: NURSE PRACTITIONER

## 2021-07-19 RX ORDER — MAGNESIUM CITRATE
296 SOLUTION, ORAL ORAL
Qty: 1 BOTTLE | Refills: 0 | Status: SHIPPED | OUTPATIENT
Start: 2021-07-19 | End: 2021-07-19

## 2021-07-19 NOTE — PROGRESS NOTES
1. Have you been to the ER, urgent care clinic since your last visit? Hospitalized since your last visit? No    2. Have you seen or consulted any other health care providers outside of the [address] [place] [address] since your last visit? Include any pap smears or colon screening.   Yes neurologist DR Bobby Salguero  Monday, and [facility] ER fall 7-      Chief Complaint   Patient presents with    Fall     f/u  7-    Constipation         Visit Vitals  /76 (BP 1 Location: Right upper arm, BP Patient Position: Sitting, BP Cuff Size: Adult)   Pulse 74   Temp 97.5 °F (36.4 °C) (Temporal)   Resp 16   Ht 5' 3\" (1.6 m)   Wt 141 lb (64 kg)   SpO2 97%   BMI 24.98 kg/m²       Pain Scale: 7/10  Pain Location: Back

## 2021-07-20 NOTE — PROGRESS NOTES
Subjective:     Kojo Fernandez is a 61 y.o. female who presents today with the following:  Chief Complaint   Patient presents with    Fall     f/u  7-    Constipation       Patient Active Problem List   Diagnosis Code    Hx of stroke without residual deficits Z86.73    History of carpal tunnel release Z98.890    Type 1 diabetes mellitus with diabetic polyneuropathy ([address] E10.42    Hypothyroid E03.9    Primary osteoarthritis involving multiple joints M89.49    History of hypoglycemia Z86.39    Pain in right foot M79.671    Chronic allergic rhinitis J30.9    Arthritis M19.90    Cerebrovascular accident ([address] I63.9    Hypercholesterolemia E78.00    Insomnia G47.00    Mild nonproliferative diabetic retinopathy ([address] L05.8653    Osteopenia M85.80    Vitamin D deficiency E55.9    Hx of brain surgery Z98.890    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    Diabetic retinopathy screening Z13.5    Reactive depression F32.9    Chronic pain syndrome G89.4    Subacute frontal sinusitis J01.10    Trigeminal neuralgia G50.0    Atherosclerosis of artery I70.8    Greater trochanteric bursitis M70.60    Diabetic ulcer of left heel associated with type 1 diabetes mellitus, with fat layer exposed ([address] E10.621, L97.422    Splinter of left foot S90.852A    Heel callus L84    Acute pain of left shoulder M25.512    Hip pain M25.559    TIA (transient ischemic attack) G45.9    Trochanteric bursitis of right hip M70.61    Trochanteric bursitis of left hip M70.62    CVA (cerebral vascular accident) ([address] I63.9    Chest pain R07.9    Dizziness R42    Left facial numbness R20.0    CSF leak from nose G96.01    Diabetic peripheral neuropathy associated with type 2 diabetes mellitus (HCC) E11.42         COMPLIANT WITH MEDICATION:    Denies chest pain, dyspnea, palpitations, headache and blurred vision. Blood pressure normotensive. Fall; reviewed notes fro ER visit  Dr. Ciara Fields.  MS Mendosa fell backwards off the cough while adjusting curtains. She landed on her back on the table. Seen in ER at [facility]. Mild bruising back and severe constipation. Taking Miaralx with minimal results. depression screening addressed not at risk    abuse screening addressed denies    learning assessment addressed reviewed nurses notes    fall risk addressed  at risk    HM: addressed cute visit post fall. ROS:  Gen: denies fever, chills, fatigue, weight loss, weight gain  HEENT:denies blurry vision, nasal congestion, sore throat  Resp: denies dypsnea, cough, wheezing  CV: denies chest pain radiating to the jaws or arms, palpitations, lower extremity edema  Abd: denies nausea, vomiting, diarrhea, constipation  Neuro: denies numbness/tingling  Endo: denies polyuria, polydipsia, heat/cold intolerance  Heme: no lymphadenopathy    No Known Allergies      Current Outpatient Medications:     magnesium citrate solution, Take 296 mL by mouth now for 1 dose., Disp: 1 Bottle, Rfl: 0    polyethylene glycol (Miralax) 17 gram/dose powder, Take 17 g by mouth daily. 1 tablespoon with 8 oz of water daily, Disp: 289 g, Rfl: 0    gabapentin (NEURONTIN) 100 mg capsule, TAKE 2 CAPSULES BY MOUTH TWICE DAILY NO  MORE  THAN  400MG  PER  DAY, Disp: 120 Capsule, Rfl: 0    tiZANidine (ZANAFLEX) 4 mg tablet, Take 1-2 Tablets by mouth three (3) times daily as needed for Muscle Spasm(s) or Pain (max dose 24 mg/day). TAKES ONLY AT HS, RARELY  Indications: muscle spasm, Disp: 30 Tablet, Rfl: 0    simvastatin (ZOCOR) 40 mg tablet, Take 1 tablet by mouth nightly, Disp: 90 Tablet, Rfl: 0    Euthyrox 75 mcg tablet, TAKE 1 TABLET BY MOUTH ONCE DAILY BEFORE  BREAKFAST  FOR  A  CONDITION  WITH  LOW  THYROID  HORMONE  LEVELS, Disp: 90 Tab, Rfl: 0    zolpidem (AMBIEN) 5 mg tablet, Take 1 Tab by mouth nightly as needed for Sleep.  Max Daily Amount: 5 mg., Disp: 30 Tab, Rfl: 2    diclofenac EC (VOLTAREN) 75 mg EC tablet, , Disp: , Rfl:     insulin detemir U-100 (Levemir FlexTouch U-100 Insuln) 100 unit/mL (3 mL) inpn, INJECT 28 UNITS SUBCUTANEOUSLY IN THE MORNING AND 3 UNITS  AT  NIGHT  Indications: type 1 diabetes mellitus, Disp: 10 Adjustable Dose Pre-filled Pen Syringe, Rfl: 5    HumaLOG KwikPen Insulin 100 unit/mL kwikpen, Max daily dose 30 (Patient taking differently: ONLY PRN up to 1-6 units depending on blood sugar), Disp: 15 mL, Rfl: 5    glucose blood VI test strips (OneTouch Ultra Blue Test Strip) strip, TEST BLOOD SUGAR 8 TIMES A DAY DUE TO UNCONTROLLED SUGARS Dx : T81.63, Disp: 300 Strip, Rfl: 11    Insulin Needles, Disposable, (Niru Pen Needle) 32 gauge x 5/32\" ndle, Use as directed with pen device - up to 5 times daily  Dx:  E11.9, Disp: 200 Pen Needle, Rfl: 5    Insulin Needles, Disposable, (Pen Needle) 30 gauge x 5/16\" ndle, Use one needle up to 8 times daily to dispense insulin. E10.65,E 10.649, Disp: 300 Pen Needle, Rfl: 5    aspirin 81 mg chewable tablet, Take 81 mg by mouth daily. , Disp: , Rfl:     mupirocin (BACTROBAN) 2 % ointment, PRN, Disp: , Rfl:     metoclopramide HCl (REGLAN) 5 mg tablet, Take 1 Tab by mouth two (2) times a day. Up to BID. TAKE 30 MINUTES BEFORE MEALS (Patient taking differently: Take 5 mg by mouth. TAKES 30 MINUTES AFTER A MEAL. ), Disp: 360 Tab, Rfl: 0    losartan (COZAAR) 50 mg tablet, Take 1 Tab by mouth daily. Take 1 tab daily, Disp: 90 Tab, Rfl: 3    montelukast (SINGULAIR) 10 mg tablet, Take 1 Tab by mouth daily. , Disp: 90 Tab, Rfl: 1    potassium 99 mg tablet, Take 1 Tab by mouth every other day., Disp: 90 Tab, Rfl: 1    ascorbic acid, vitamin C, (VITAMIN C) 1,000 mg tablet, Take 4,000 mg by mouth two (2) times a day., Disp: , Rfl:     calcium carbonate (CALTREX) 600 mg calcium (1,500 mg) tablet, Take 600 mg by mouth two (2) times a day., Disp: , Rfl:     glucos sul 2KCl/msm/chond/C/Mn (GLUCOSAMINE CHONDROITIN PO), Take  by mouth., Disp: , Rfl:     MAGNESIUM PO, Take  by mouth every fourty-eight (48) hours. , Disp: , Rfl:     denosumab (PROLIA) 60 mg/mL injection, 60 mg by SubCUTAneous route. Every 6 months, Disp: , Rfl:     cholecalciferol, vitamin D3, (VITAMIN D3) 2,000 unit tab, Take 1 Tab by mouth daily. 5000 units daily  Indications: low vitamin D levels, Disp: , Rfl:     VITAMIN A PO, Take 2,400 mcg by mouth nightly., Disp: , Rfl:     vitamin E (AQUA GEMS) 400 unit capsule, Take 400 Units by mouth every Monday and Thursday. Only on  and Thurs at Bedtime, Disp: , Rfl:     Past Medical History:   Diagnosis Date    Arthritis     patient states \"every joint affected\"    Bee sting 2018    left elbow bee sting     Blister of ankle 2018    Blister small per patient of left ankle. Wears an air boot due to 2 stress fractures in last 2 months.     CAD (coronary artery disease)     Constipation     Falls 2017    pt reports having 4 falls in 3 days    Fatigue     Headache     Ill-defined condition     multiple broken ribs    Ill-defined condition     reports \"getting infections easily\"    Joint pain     Memory disorder     following spinal fluid leak repair    Menopause     Nausea & vomiting     Neuropathy     Osteoporosis     Thyroid disease     Type 1 diabetes ([facility]     diagnosed at age 8    Unspecified cerebral artery occlusion with cerebral infarction     x 4 (last in )       Past Surgical History:   Procedure Laterality Date    HX CARPAL TUNNEL RELEASE Right 2018    HX  SECTION  1987    HX  SECTION      HX CHOLECYSTECTOMY      HX HYSTERECTOMY  2006    HX OOPHORECTOMY      HX ORTHOPAEDIC      frozen shoulder surgery x 3    HX ORTHOPAEDIC      frozen finger surgery x 8    HX ORTHOPAEDIC Left 2014    wrist surgery    HX ORTHOPAEDIC Right 1977    hand surgery    HX ORTHOPAEDIC Left     foot surgery    HX OTHER SURGICAL      spinal fluid leak repair (spinal fluid leaking from nose, remove nose, cut fat from stomach, use that as patch behind nose, nose replaced)    HX ROTATOR CUFF REPAIR Left     HX ROTATOR CUFF REPAIR Right     HX TONSILLECTOMY  1968       Social History     Tobacco Use   Smoking Status Former Smoker    Packs/day: 0.50    Years: 8.00    Pack years: 4.00    Quit date: 4/10/2007    Years since quittin.2   Smokeless Tobacco Never Used       Social History     Socioeconomic History    Marital status: SINGLE     Spouse name: Not on file    Number of children: Not on file    Years of education: Not on file    Highest education level: Not on file   Tobacco Use    Smoking status: Former Smoker     Packs/day: 0.50     Years: 8.00     Pack years: 4.00     Quit date: 4/10/2007     Years since quittin.2    Smokeless tobacco: Never Used   Vaping Use    Vaping Use: Never used   Substance and Sexual Activity    Alcohol use: No    Drug use: No    Sexual activity: Not Currently     Social Determinants of Health     Financial Resource Strain:     Difficulty of Paying Living Expenses:    Food Insecurity:     Worried About Running Out of Food in the Last Year:     Ran Out of Food in the Last Year:    Transportation Needs:     Lack of Transportation (Medical):      Lack of Transportation (Non-Medical):    Physical Activity:     Days of Exercise per Week:     Minutes of Exercise per Session:    Stress:     Feeling of Stress :    Social Connections:     Frequency of Communication with Friends and Family:     Frequency of Social Gatherings with Friends and Family:     Attends Worship Services:     Active Member of Clubs or Organizations:     Attends Club or Organization Meetings:     Marital Status:        Family History   Problem Relation Age of Onset    Heart Disease Mother     Hypertension Mother     No Known Problems Father     Heart Disease Maternal Grandfather     Cancer Maternal Aunt         Pancreatic and Liver    Cancer Maternal Grandmother         Lung    Diabetes Maternal Grandmother     Cancer Maternal Aunt         Breast    Breast Cancer Maternal Aunt     Diabetes Maternal Aunt          Objective:     Visit Vitals  /76 (BP 1 Location: Right upper arm, BP Patient Position: Sitting, BP Cuff Size: Adult)   Pulse 74   Temp 97.5 °F (36.4 °C) (Temporal)   Resp 16   Ht 5' 3\" (1.6 m)   Wt 141 lb (64 kg)   SpO2 97%   BMI 24.98 kg/m²     Body mass index is 24.98 kg/m². General: Alert and oriented. No acute distress. Well nourished  HEENT :  Ears:TMs are normal. Canals are clear. Eyes: pupils equal, round, react to light and accommodation. Extra ocular movements intact. Nose: patent. Mouth and throat is clear. Neck:supple full range of motion no thyromegaly. Trachea midline, No carotid bruits. No significant lymphadenopathy  Lungs[de-identified] clear to auscultation without wheezes, rales, or rhonchi. Heart :RRR, S1 & S2 are normal intensity. No murmur; no gallop  Abdomen: bowel sounds active. No tenderness, guarding, rebound, masses, hepatic or spleen enlargement  Back: no CVA tenderness. Extremities: without clubbing, cyanosis, or edema  Pulses: radial and femoral pulses are normal  Neuro: HMF intact. Cranial nerves II through XII grossly normal.  Motor: is 5 over 5 and symmetrical.   Deep tendon reflexes: +2 equal  Psych:appropriate behavior, mood, affect and judgement. Results for orders placed or performed in visit on 83/45/59   METABOLIC PANEL, COMPREHENSIVE   Result Value Ref Range    Sodium 138 136 - 145 mmol/L    Potassium 4.6 3.5 - 5.1 mmol/L    Chloride 101 97 - 108 mmol/L    CO2 30 21 - 32 mmol/L    Anion gap 7 5 - 15 mmol/L    Glucose 182 (H) 65 - 100 mg/dL    BUN 12 6 - 20 MG/DL    Creatinine 0.70 0.55 - 1.02 MG/DL    BUN/Creatinine ratio 17 12 - 20      GFR est AA >60 >60 ml/min/1.73m2    GFR est non-AA >60 >60 ml/min/1.73m2    Calcium 10.2 (H) 8.5 - 10.1 MG/DL    Bilirubin, total 0.5 0.2 - 1.0 MG/DL    ALT (SGPT) 24 12 - 78 U/L    AST (SGOT) 22 15 - 37 U/L    Alk.  phosphatase 98 45 - 117 U/L Protein, total 6.8 6.4 - 8.2 g/dL    Albumin 4.1 3.5 - 5.0 g/dL    Globulin 2.7 2.0 - 4.0 g/dL    A-G Ratio 1.5 1.1 - 2.2     CBC WITH AUTOMATED DIFF   Result Value Ref Range    WBC 5.1 3.6 - 11.0 K/uL    RBC 4.05 3.80 - 5.20 M/uL    HGB 12.3 11.5 - 16.0 g/dL    HCT 37.8 35.0 - 47.0 %    MCV 93.3 80.0 - 99.0 FL    MCH 30.4 26.0 - 34.0 PG    MCHC 32.5 30.0 - 36.5 g/dL    RDW 11.9 11.5 - 14.5 %    PLATELET 235 496 - 067 K/uL    MPV 9.3 8.9 - 12.9 FL    NRBC 0.0 0  WBC    ABSOLUTE NRBC 0.00 0.00 - 0.01 K/uL    NEUTROPHILS 64 32 - 75 %    LYMPHOCYTES 25 12 - 49 %    MONOCYTES 11 5 - 13 %    EOSINOPHILS 0 0 - 7 %    BASOPHILS 0 0 - 1 %    IMMATURE GRANULOCYTES 0 0.0 - 0.5 %    ABS. NEUTROPHILS 3.3 1.8 - 8.0 K/UL    ABS. LYMPHOCYTES 1.3 0.8 - 3.5 K/UL    ABS. MONOCYTES 0.5 0.0 - 1.0 K/UL    ABS. EOSINOPHILS 0.0 0.0 - 0.4 K/UL    ABS. BASOPHILS 0.0 0.0 - 0.1 K/UL    ABS. IMM. GRANS. 0.0 0.00 - 0.04 K/UL    DF AUTOMATED         No results found for this visit on 07/19/21. Assessment/ Plan:     1. Fall, subsequent encounter  Reviewed notes from ER. Small bruises under bra line healing . 2. Drug-induced constipation  Orders Placed This Encounter    magnesium citrate solution     Sig: Take 296 mL by mouth now for 1 dose. Dispense:  1 Bottle     Refill:  0         Orders Placed This Encounter    magnesium citrate solution     Sig: Take 296 mL by mouth now for 1 dose. Dispense:  1 Bottle     Refill:  0         Verbal and written instructions (see AVS) provided. Patient expresses understanding of diagnosis and treatment plan.     Health Maintenance Due   Topic Date Due    PAP AKA CERVICAL CYTOLOGY  Never done               AGNES Mckee

## 2021-07-26 ENCOUNTER — TELEPHONE (OUTPATIENT)
Dept: FAMILY MEDICINE CLINIC | Age: 56
End: 2021-07-26
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
## 2021-07-26 DIAGNOSIS — M54.2 NECK PAIN: ICD-10-CM

## 2021-07-26 DIAGNOSIS — M54.50 ACUTE MIDLINE LOW BACK PAIN WITHOUT SCIATICA: ICD-10-CM

## 2021-07-26 NOTE — TELEPHONE ENCOUNTER
ELY MEEK Ms Bailey Fitzgerald, she stated the ER doctor did not check her back from her fall and now her back is hurting her, so would like have an X-ray done. I stated to her, I will send the message on to [facility] for her advice. She stated OK and thanks. good, to achieve stated therapy goals

## 2021-07-26 NOTE — TELEPHONE ENCOUNTER
----- Message from Michiel Essex sent at 7/26/2021  8:05 AM EDT -----  Regarding: Yeh/Telephone  Contact: [phone]  General Message/Vendor Calls    Caller's first and last name: N/A      Reason for call:X-ray for back.       Callback required yes/no and why: Yes/Confirm      Best contact number(s):[phone]      Details to clarify the request: Patient fell last week and would like the doctor to call in an order for an x-ray to [facility]

## 2021-07-27 ENCOUNTER — HOSPITAL ENCOUNTER (OUTPATIENT)
Dept: GENERAL RADIOLOGY | Age: 56
Discharge: HOME | End: 2021-07-27
Payer: MEDICARE
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
## 2021-07-27 DIAGNOSIS — M54.2 NECK PAIN: ICD-10-CM

## 2021-07-27 DIAGNOSIS — M54.50 ACUTE MIDLINE LOW BACK PAIN WITHOUT SCIATICA: ICD-10-CM

## 2021-07-27 PROCEDURE — 72072 X-RAY EXAM THORAC SPINE 3VWS: CPT

## 2021-07-27 PROCEDURE — 72050 X-RAY EXAM NECK SPINE 4/5VWS: CPT

## 2021-07-27 PROCEDURE — 72100 X-RAY EXAM L-S SPINE 2/3 VWS: CPT

## 2021-07-27 NOTE — TELEPHONE ENCOUNTER
ELY, GAVIOTA Renteria Ivette Lemuel, she was informed of that her [employer] has been ordered and put in per [facility]. She stated OK and thanks.

## 2021-07-28 ENCOUNTER — TELEPHONE (OUTPATIENT)
Dept: FAMILY MEDICINE CLINIC | Age: 56
End: 2021-07-28

## 2021-07-28 NOTE — PROGRESS NOTES
Patient verified stating name and date of birth. Patient informed of x-ray results results and states understanding.

## 2021-07-28 NOTE — PROGRESS NOTES
Patient verified stating name and date of birth. Patient informed of x-rays results and states understanding.

## 2021-07-28 NOTE — PROGRESS NOTES
1. Evidence of thoracolumbar scoliosis convex to the left. 2. Evidence of lumbar spondylosis as described above. Evidence of degenerative  disc disease at the L1-2 level. 3. No evidence of acute traumatic injury involving the lumbar spine.

## 2021-07-28 NOTE — PROGRESS NOTES
Patient verified stating name and date of birth. Patient informed of x-ray results and states understanding.

## 2021-07-28 NOTE — PROGRESS NOTES
X ray thoracic spine  There is evidence of very subtle decreased height of the  ninth thoracic vertebral body. Some intervertebral disc space narrowing is noted  at the T9-T10 level. These findings are suggestive of chronic change. No  definite thoracic vertebral compression fractures are seen.

## 2021-07-28 NOTE — TELEPHONE ENCOUNTER
----- Message from Mempile sent at 7/28/2021  4:30 PM EDT -----  Regarding: HELGA Yeh/telephone  General Message/Vendor Calls    Caller's first and last name: pt      Reason for call: Question      Callback required yes/no and why: yes, please      Best contact number(s): [phone]      Details to clarify the request: Pt would like to  a copy of her x-ray to show her doctor before next Tuesday. Pt is having surgery next Tuesday. Also she has some question about her x-ray and wants to know what treatment options are available for back and spine.        [employer]

## 2021-07-29 ENCOUNTER — TELEPHONE (OUTPATIENT)
Dept: FAMILY MEDICINE CLINIC | Age: 56
End: 2021-07-29

## 2021-07-29 DIAGNOSIS — F51.01 PRIMARY INSOMNIA: ICD-10-CM

## 2021-07-29 DIAGNOSIS — I49.3 PVC (PREMATURE VENTRICULAR CONTRACTION): ICD-10-CM

## 2021-07-29 DIAGNOSIS — G89.29 CHRONIC MIDLINE LOW BACK PAIN, UNSPECIFIED WHETHER SCIATICA PRESENT: ICD-10-CM

## 2021-07-29 DIAGNOSIS — M79.672 LEFT FOOT PAIN: Primary | ICD-10-CM

## 2021-07-29 DIAGNOSIS — M54.50 CHRONIC MIDLINE LOW BACK PAIN, UNSPECIFIED WHETHER SCIATICA PRESENT: ICD-10-CM

## 2021-07-29 RX ORDER — MONTELUKAST SODIUM 10 MG/1
10 TABLET ORAL DAILY
Qty: 90 TABLET | Refills: 1 | Status: SHIPPED | OUTPATIENT
Start: 2021-07-29 | End: 2022-02-09 | Stop reason: SDUPTHER

## 2021-07-29 RX ORDER — MELOXICAM 15 MG/1
15 TABLET ORAL DAILY
COMMUNITY
End: 2021-07-29 | Stop reason: SDUPTHER

## 2021-07-29 RX ORDER — METOCLOPRAMIDE 5 MG/1
5 TABLET ORAL 2 TIMES DAILY
Qty: 360 TABLET | Refills: 0 | Status: SHIPPED | OUTPATIENT
Start: 2021-07-29

## 2021-07-29 RX ORDER — LEVOTHYROXINE SODIUM 75 UG/1
TABLET ORAL
Qty: 90 TABLET | Refills: 0 | Status: SHIPPED | OUTPATIENT
Start: 2021-07-29 | End: 2021-11-09

## 2021-07-29 RX ORDER — SIMVASTATIN 40 MG/1
TABLET, FILM COATED ORAL
Qty: 90 TABLET | Refills: 0 | Status: SHIPPED | OUTPATIENT
Start: 2021-07-29 | End: 2021-09-08

## 2021-07-29 RX ORDER — INSULIN DETEMIR 100 [IU]/ML
INJECTION, SOLUTION SUBCUTANEOUS
Qty: 10 ADJUSTABLE DOSE PRE-FILLED PEN SYRINGE | Refills: 5 | Status: SHIPPED | OUTPATIENT
Start: 2021-07-29 | End: 2021-12-01 | Stop reason: SDUPTHER

## 2021-07-29 RX ORDER — INSULIN LISPRO 100 [IU]/ML
INJECTION, SOLUTION INTRAVENOUS; SUBCUTANEOUS
Qty: 15 ML | Refills: 5 | Status: SHIPPED | OUTPATIENT
Start: 2021-07-29 | End: 2022-01-25 | Stop reason: SDUPTHER

## 2021-07-29 RX ORDER — TRAMADOL HYDROCHLORIDE 50 MG/1
50 TABLET ORAL
COMMUNITY
End: 2021-07-29 | Stop reason: SDUPTHER

## 2021-07-29 RX ORDER — MELOXICAM 15 MG/1
15 TABLET ORAL DAILY
Qty: 90 TABLET | Refills: 1 | Status: SHIPPED | OUTPATIENT
Start: 2021-07-29 | End: 2022-07-13

## 2021-07-29 RX ORDER — ZOLPIDEM TARTRATE 5 MG/1
5 TABLET ORAL
Qty: 30 TABLET | Refills: 2 | Status: SHIPPED | OUTPATIENT
Start: 2021-07-29 | End: 2021-10-04 | Stop reason: SDUPTHER

## 2021-07-29 RX ORDER — LOSARTAN POTASSIUM 50 MG/1
50 TABLET ORAL DAILY
Qty: 90 TABLET | Refills: 3 | Status: SHIPPED | OUTPATIENT
Start: 2021-07-29

## 2021-07-29 RX ORDER — METOCLOPRAMIDE 5 MG/1
TABLET ORAL
Qty: 180 TABLET | Refills: 1 | Status: SHIPPED | OUTPATIENT
Start: 2021-07-29 | End: 2021-07-29

## 2021-07-29 RX ORDER — TRAMADOL HYDROCHLORIDE 50 MG/1
50 TABLET ORAL
Qty: 30 TABLET | Refills: 2 | Status: SHIPPED | OUTPATIENT
Start: 2021-07-29 | End: 2021-11-02

## 2021-07-29 NOTE — TELEPHONE ENCOUNTER
This patient is needing all of her meds refilled at [facility]. She stated she was seen in office a few weeks ago and NP Nilesh Alejandra was supposed to send over her refills and they haven't been sent yet. Please call pt when completed.     Thank you

## 2021-07-29 NOTE — TELEPHONE ENCOUNTER
PC to [address], she stated the Reglan 5 mg came is stating to take I tab 30 minutes after a meal, but it is normally 30 minutes before and also ordered as #180, but stated only once daily, so not sure also if she meant to take 2 / day?

## 2021-07-29 NOTE — TELEPHONE ENCOUNTER
Patient requesting all her RX  refilled to [facility] to be done as needed due to her upcoming surgery and will be recovering for three months.  To include her lidocaine patch 5% q 12 hour and yeast infection pill  Since she reports yeast infection symptoms

## 2021-08-03 PROBLEM — I63.9 CEREBROVASCULAR ACCIDENT (HCC): Status: RESOLVED | Noted: 2017-06-12 | Resolved: 2021-08-03

## 2021-08-11 ENCOUNTER — TELEPHONE (OUTPATIENT)
Dept: ENDOCRINOLOGY | Age: 56
End: 2021-08-11

## 2021-08-11 NOTE — TELEPHONE ENCOUNTER
----- Message from Pedro monsalve sent at 8/11/2021 12:00 PM EDT -----  Regarding: /Telephone     Caller's first and last name:  Reason for call:Blood sugar readings concerns  Callback required yes/no and why:Yes  Best contact number(s):[phone]  Details to clarify the request:Pt had surgery a week ago and her blood sugar readings have been not below 250. Her last reading which was this morning was 460 she stated.

## 2021-08-11 NOTE — TELEPHONE ENCOUNTER
Spoke with patient. She had cataract surgery last week. Was given steroid eye drops that she has been tapering. Is now on 1 drop x 7 days. Her blood sugars have been over 200s. Last night her blood sugar was 376. Last night her blood sugar was 467. She states she took one unit last night.

## 2021-08-11 NOTE — TELEPHONE ENCOUNTER
I recommend she increase her Levemir in the morning to 35 units every day till her sugars come back down to the 100s range. Continue the Humalog 6 units with each meal.  Send us some blood sugar readings in 2 weeks.

## 2021-08-16 ENCOUNTER — TELEPHONE (OUTPATIENT)
Dept: FAMILY MEDICINE CLINIC | Age: 56
End: 2021-08-16

## 2021-08-16 NOTE — TELEPHONE ENCOUNTER
----- Message from Jurgen Felix sent at 8/16/2021  1:37 PM EDT -----  Regarding: NP Yeh/Telephone  Level 1/Escalated Issue      Caller's first and last name and relationship (if not the patient): pt      Best contact number(s): [phone]      What are the symptoms: Patient stated she been having nausea, ever since her surgery. Transfer successful - yes/no (include outcome): no, answer      Transfer declined - yes/no (include reason):       Was caller advised to seek appropriate level of care - yes/no:no      Details to clarify the request:        Jurgen Felix

## 2021-08-19 ENCOUNTER — TELEPHONE (OUTPATIENT)
Dept: ENDOCRINOLOGY | Age: 56
End: 2021-08-19

## 2021-08-19 NOTE — TELEPHONE ENCOUNTER
Pt notes she was recently in the hospital and was diagnosed with COVID. She notes that she has been off the steroid eye drops for 3 days, but she notes that her BGs have been erratic and she has increased her HS Levemir to 8 units. She is taking 35 units in the AM.    Her FBG today was 91, but she notes her BGs tend to be the highest in the middle of the night. She notes that she has been experiencing polyuria and \"cotton mouth\". During the day her BGs have been better. She notes that because of COVID her PO is pretty poor. Pt to increase her HS Levemir to 10 units and call back in a week with BG readings.

## 2021-08-19 NOTE — TELEPHONE ENCOUNTER
----- Message from Bobbi Obregon sent at 8/19/2021  3:05 PM EDT -----  Regarding: Dr Stephy Murray first and last name:      Reason for call:pt said she is still wait  on the nightly dosage on the insulin she should take,    Callback required yes/no and why:yes       Best contact number(s):[phone]      Details to clarify the request: pt  has some other questions for Dr Sarah Gutierrez regarding her Covid and how it is effecting her taking the medication dosage amounts also           Bobbi Obreogn

## 2021-08-23 ENCOUNTER — TELEPHONE (OUTPATIENT)
Dept: FAMILY MEDICINE CLINIC | Age: 56
End: 2021-08-23

## 2021-08-23 NOTE — TELEPHONE ENCOUNTER
----- Message from Deisy Real sent at 8/23/2021 12:48 PM EDT -----  Regarding: HELGA Yeh/Telephone  General Message/Vendor Calls    Caller's first and last name: pt      Reason for call: need call back from the nurse      Callback required yes/no and why: yes, please      Best contact number(s): [phone]      Details to clarify the request: pt is having sever nausea and her sister has carafate 1 mg and she like to know if be okay for her to take this mediation.       Deisy Real

## 2021-08-24 NOTE — TELEPHONE ENCOUNTER
----- Message from Azalia Castillo sent at 8/24/2021  1:28 PM EDT -----  Regarding: HELGA Yeh/Telephone  General Message/Vendor Calls    Caller's first and last name:  N/A      Reason for call: Positive Covid-19      Callback required yes/no and why:Yes      Best contact number(s): [phone]      Details to clarify the request:Patient has questions about Covid-19 and Type 2 Diabetes.  Second Request.      Azalia Castillo

## 2021-08-25 ENCOUNTER — VIRTUAL VISIT (OUTPATIENT)
Dept: FAMILY MEDICINE CLINIC | Age: 56
End: 2021-08-25
Payer: MEDICARE

## 2021-08-25 DIAGNOSIS — U07.1 COVID-19: Primary | ICD-10-CM

## 2021-08-25 DIAGNOSIS — J01.00 ACUTE NON-RECURRENT MAXILLARY SINUSITIS: ICD-10-CM

## 2021-08-25 PROCEDURE — G2025 DIS SITE TELE SVCS RHC/FQHC: HCPCS | Performed by: NURSE PRACTITIONER

## 2021-08-25 RX ORDER — PROMETHAZINE HYDROCHLORIDE 25 MG/1
25 TABLET ORAL
Qty: 30 TABLET | Refills: 1 | Status: SHIPPED | OUTPATIENT
Start: 2021-08-25 | End: 2021-10-26

## 2021-08-25 RX ORDER — AZITHROMYCIN 250 MG/1
TABLET, FILM COATED ORAL
Qty: 6 TABLET | Refills: 0 | Status: SHIPPED | OUTPATIENT
Start: 2021-08-25 | End: 2021-10-05

## 2021-08-25 NOTE — PROGRESS NOTES
Chief Complaint   Patient presents with    Positive For Covid-19    Sinus Infection     Abuse Screening Questionnaire 7/19/2021   Do you ever feel afraid of your partner? N   Are you in a relationship with someone who physically or mentally threatens you? N   Is it safe for you to go home?  Y     Learning Assessment 3/23/2021   PRIMARY LEARNER Patient   HIGHEST LEVEL OF EDUCATION - PRIMARY LEARNER  -   BARRIERS PRIMARY LEARNER EMOTIONAL   CO-LEARNER CAREGIVER No   PRIMARY LANGUAGE ENGLISH   LEARNER PREFERENCE PRIMARY DEMONSTRATION     -     -   ANSWERED BY Patient   RELATIONSHIP SELF     3 most recent PHQ Screens 7/14/2021   Little interest or pleasure in doing things Not at all   Feeling down, depressed, irritable, or hopeless Not at all   Total Score PHQ 2 0

## 2021-08-31 NOTE — PROGRESS NOTES
Purvi Thompson is a 61 y.o. female, evaluated via audio-only technology on 8/25/2021 for Positive For Covid-19 and Sinus Infection  . Diagnosed with COVID 19 on August 17 th. Developed sinus pressure and congestion with periods of nausea. Assessment & Plan:   Diagnoses and all orders for this visit:    1. COVID-19    2. Acute non-recurrent maxillary sinusitis    Other orders  -     azithromycin (ZITHROMAX) 250 mg tablet; Take 2 tablets today, then take 1 tablet daily  Indications: sinusitis  -     promethazine (PHENERGAN) 25 mg tablet; Take 1 Tablet by mouth every six (6) hours as needed for Nausea. The complexity of medical decision making for this visit is moderate         12  Subjective:       Prior to Admission medications    Medication Sig Start Date End Date Taking? Authorizing Provider   azithromycin (ZITHROMAX) 250 mg tablet Take 2 tablets today, then take 1 tablet daily  Indications: sinusitis 8/25/21  Yes Rosi Felix NP   promethazine (PHENERGAN) 25 mg tablet Take 1 Tablet by mouth every six (6) hours as needed for Nausea. 8/25/21  Yes Rosi Felix NP   gabapentin (NEURONTIN) 100 mg capsule TAKE 2 CAPSULES BY MOUTH TWICE DAILY NO  MORE  THAN  400  MG  PER  DAY. 8/14/21  Yes Rosi Felix NP   levothyroxine (Euthyrox) 75 mcg tablet TAKE 1 TABLET BY MOUTH ONCE DAILY BEFORE  BREAKFAST  FOR  A  CONDITION  WITH  LOW  THYROID  HORMONE  LEVELS 7/29/21  Yes Rosi Felix NP   HumaLOG KwikPen Insulin 100 unit/mL kwikpen ONLY PRN up to 1-6 units depending on blood sugar 7/29/21  Yes Rosi Felix NP   insulin detemir U-100 (Levemir FlexTouch U-100 Insuln) 100 unit/mL (3 mL) inpn INJECT 28 UNITS SUBCUTANEOUSLY IN THE MORNING AND 3 UNITS  AT  NIGHT  Indications: type 1 diabetes mellitus 7/29/21  Yes Rosi Felix NP   losartan (COZAAR) 50 mg tablet Take 1 Tablet by mouth daily. Take 1 tab daily 7/29/21  Yes Rosi Felix NP   meloxicam (MOBIC) 15 mg tablet Take 1 Tablet by mouth daily. 7/29/21  Yes Rosi Felix NP   montelukast (SINGULAIR) 10 mg tablet Take 1 Tablet by mouth daily. 7/29/21  Yes Rosi Felix NP   simvastatin (ZOCOR) 40 mg tablet Take 1 tablet by mouth nightly 7/29/21  Yes Rosi Felix NP   zolpidem (AMBIEN) 5 mg tablet Take 1 Tablet by mouth nightly as needed for Sleep. Max Daily Amount: 5 mg. 7/29/21  Yes Rosi Felix NP   metoclopramide HCl (REGLAN) 5 mg tablet Take 1 Tablet by mouth two (2) times a day. Up to BID. TAKE 30 MINUTES BEFORE MEALS 7/29/21  Yes Rosi Felix NP   polyethylene glycol (Miralax) 17 gram/dose powder Take 17 g by mouth daily. 1 tablespoon with 8 oz of water daily 7/14/21  Yes Beuford Lesches, MD   tiZANidine (ZANAFLEX) 4 mg tablet Take 1-2 Tablets by mouth three (3) times daily as needed for Muscle Spasm(s) or Pain (max dose 24 mg/day). TAKES ONLY AT HS, RARELY  Indications: muscle spasm 7/12/21  Yes Rosi Felix NP   diclofenac EC (VOLTAREN) 75 mg EC tablet  1/19/21  Yes Provider, Historical   glucose blood VI test strips (OneTouch Ultra Blue Test Strip) strip TEST BLOOD SUGAR 8 TIMES A DAY DUE TO UNCONTROLLED SUGARS Dx : E10.42 3/16/21  Yes Michelle Pradhan MD   Insulin Needles, Disposable, (Niru Pen Needle) 32 gauge x 5/32\" ndle Use as directed with pen device - up to 5 times daily  Dx:  E11.9 1/11/21  Yes Dee Hernández, DO   Insulin Needles, Disposable, (Pen Needle) 30 gauge x 5/16\" ndle Use one needle up to 8 times daily to dispense insulin. E10.65,E 10.649 1/7/21  Yes Dee Hernández DO   aspirin 81 mg chewable tablet Take 81 mg by mouth daily. 11/26/20  Yes Provider, Historical   mupirocin (BACTROBAN) 2 % ointment PRN 9/29/20  Yes Provider, Historical   potassium 99 mg tablet Take 1 Tab by mouth every other day. 7/20/20  Yes Good, Dee STEVENSON, DO   ascorbic acid, vitamin C, (VITAMIN C) 1,000 mg tablet Take 4,000 mg by mouth two (2) times a day.    Yes Provider, Historical   calcium carbonate (CALTREX) 600 mg calcium (1,500 mg) tablet Take 600 mg by mouth two (2) times a day. Yes Provider, Historical   glucos sul 2KCl/msm/chond/C/Mn (GLUCOSAMINE CHONDROITIN PO) Take  by mouth. Yes Provider, Historical   MAGNESIUM PO Take  by mouth every fourty-eight (48) hours. Yes Provider, Historical   denosumab (PROLIA) 60 mg/mL injection 60 mg by SubCUTAneous route. Every 6 months   Yes Provider, Historical   cholecalciferol, vitamin D3, (VITAMIN D3) 2,000 unit tab Take 1 Tab by mouth daily. 5000 units daily  Indications: low vitamin D levels   Yes Provider, Historical   VITAMIN A PO Take 2,400 mcg by mouth nightly. Yes Provider, Historical   vitamin E (AQUA GEMS) 400 unit capsule Take 400 Units by mouth every Monday and Thursday.  Only on M and Thurs at Bedtime   Yes Provider, Historical     Patient Active Problem List   Diagnosis Code    Hx of stroke without residual deficits Z86.73    History of carpal tunnel release Z98.890    Type 1 diabetes mellitus with diabetic polyneuropathy ([facility] [address] E10.42    Hypothyroid E03.9    Primary osteoarthritis involving multiple joints M89.49    History of hypoglycemia Z86.39    Pain in right foot M79.671    Chronic allergic rhinitis J30.9    Arthritis M19.90    Hypercholesterolemia E78.00    Insomnia G47.00    Mild nonproliferative diabetic retinopathy ([address] M65.0770    Osteopenia M85.80    Vitamin D deficiency E55.9    Hx of brain surgery Z98.890    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    Diabetic retinopathy screening Z13.5    Reactive depression F32.9    Chronic pain syndrome G89.4    Subacute frontal sinusitis J01.10    Trigeminal neuralgia G50.0    Atherosclerosis of artery I70.8    Greater trochanteric bursitis M70.60    Diabetic ulcer of left heel associated with type 1 diabetes mellitus, with fat layer exposed ([address] E10.621, L97.422    Splinter of left foot S90.852A    Heel callus L84    Acute pain of left shoulder M25.512    Hip pain M25.559    TIA (transient ischemic attack) G45.9    Trochanteric bursitis of right hip M70.61    Trochanteric bursitis of left hip M70.62    CVA (cerebral vascular accident) ([address] I63.9    Chest pain R07.9    Dizziness R42    Left facial numbness R20.0    CSF leak from nose G96.01    Diabetic peripheral neuropathy associated with type 2 diabetes mellitus ([address] E11.42     Patient Active Problem List    Diagnosis Date Noted    CSF leak from nose 07/13/2021    Diabetic peripheral neuropathy associated with type 2 diabetes mellitus ([address] 07/13/2021    Chest pain 05/25/2021    Dizziness 05/25/2021    Left facial numbness 05/25/2021    CVA (cerebral vascular accident) ([address] 05/23/2021    Trochanteric bursitis of right hip 03/29/2021    Trochanteric bursitis of left hip 03/29/2021    TIA (transient ischemic attack) 11/24/2020    Acute pain of left shoulder 11/03/2020    Hip pain 11/03/2020    Splinter of left foot 05/26/2020    Heel callus 05/26/2020    Diabetic ulcer of left heel associated with type 1 diabetes mellitus, with fat layer exposed ([address] 04/27/2020    Greater trochanteric bursitis 01/10/2020    Atherosclerosis of artery 08/09/2019    Trigeminal neuralgia 06/01/2019    Subacute frontal sinusitis 03/20/2019    Reactive depression 02/26/2019    Chronic pain syndrome 02/26/2019    Diabetic retinopathy screening 11/06/2018    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    Hx of brain surgery 09/27/2018    Pain in right foot 05/03/2018    Chronic allergic rhinitis 05/03/2018    Type 1 diabetes mellitus with diabetic polyneuropathy ([address] 04/03/2018    Hypothyroid 04/03/2018    Primary osteoarthritis involving multiple joints 04/03/2018    History of hypoglycemia 04/03/2018    Arthritis 06/26/2017    Osteopenia 06/12/2017    Hx of stroke without residual deficits 07/06/2015    History of carpal tunnel release 07/06/2015    Vitamin D deficiency 11/05/2012    Insomnia 08/18/2011    Mild nonproliferative diabetic retinopathy ([address] 11/08/2010    Hypercholesterolemia 05/24/2010     Current Outpatient Medications   Medication Sig Dispense Refill    azithromycin (ZITHROMAX) 250 mg tablet Take 2 tablets today, then take 1 tablet daily  Indications: sinusitis 6 Tablet 0    promethazine (PHENERGAN) 25 mg tablet Take 1 Tablet by mouth every six (6) hours as needed for Nausea. 30 Tablet 1    gabapentin (NEURONTIN) 100 mg capsule TAKE 2 CAPSULES BY MOUTH TWICE DAILY NO  MORE  THAN  400  MG  PER  DAY. 120 Capsule 2    levothyroxine (Euthyrox) 75 mcg tablet TAKE 1 TABLET BY MOUTH ONCE DAILY BEFORE  BREAKFAST  FOR  A  CONDITION  WITH  LOW  THYROID  HORMONE  LEVELS 90 Tablet 0    HumaLOG KwikPen Insulin 100 unit/mL kwikpen ONLY PRN up to 1-6 units depending on blood sugar 15 mL 5    insulin detemir U-100 (Levemir FlexTouch U-100 Insuln) 100 unit/mL (3 mL) inpn INJECT 28 UNITS SUBCUTANEOUSLY IN THE MORNING AND 3 UNITS  AT  NIGHT  Indications: type 1 diabetes mellitus 10 Adjustable Dose Pre-filled Pen Syringe 5    losartan (COZAAR) 50 mg tablet Take 1 Tablet by mouth daily. Take 1 tab daily 90 Tablet 3    meloxicam (MOBIC) 15 mg tablet Take 1 Tablet by mouth daily. 90 Tablet 1    montelukast (SINGULAIR) 10 mg tablet Take 1 Tablet by mouth daily. 90 Tablet 1    simvastatin (ZOCOR) 40 mg tablet Take 1 tablet by mouth nightly 90 Tablet 0    zolpidem (AMBIEN) 5 mg tablet Take 1 Tablet by mouth nightly as needed for Sleep. Max Daily Amount: 5 mg. 30 Tablet 2    metoclopramide HCl (REGLAN) 5 mg tablet Take 1 Tablet by mouth two (2) times a day. Up to BID. TAKE 30 MINUTES BEFORE MEALS 360 Tablet 0    polyethylene glycol (Miralax) 17 gram/dose powder Take 17 g by mouth daily. 1 tablespoon with 8 oz of water daily 289 g 0    tiZANidine (ZANAFLEX) 4 mg tablet Take 1-2 Tablets by mouth three (3) times daily as needed for Muscle Spasm(s) or Pain (max dose 24 mg/day).  TAKES ONLY AT HS, RARELY  Indications: muscle spasm 30 Tablet 0    diclofenac EC (VOLTAREN) 75 mg EC tablet       glucose blood VI test strips (OneTouch Ultra Blue Test Strip) strip TEST BLOOD SUGAR 8 TIMES A DAY DUE TO UNCONTROLLED SUGARS Dx : E10.42 300 Strip 11    Insulin Needles, Disposable, (Niru Pen Needle) 32 gauge x 5/32\" ndle Use as directed with pen device - up to 5 times daily  Dx:  E11.9 200 Pen Needle 5    Insulin Needles, Disposable, (Pen Needle) 30 gauge x 5/16\" ndle Use one needle up to 8 times daily to dispense insulin. E10.65,E 10.649 300 Pen Needle 5    aspirin 81 mg chewable tablet Take 81 mg by mouth daily.  mupirocin (BACTROBAN) 2 % ointment PRN      potassium 99 mg tablet Take 1 Tab by mouth every other day. 90 Tab 1    ascorbic acid, vitamin C, (VITAMIN C) 1,000 mg tablet Take 4,000 mg by mouth two (2) times a day.  calcium carbonate (CALTREX) 600 mg calcium (1,500 mg) tablet Take 600 mg by mouth two (2) times a day.  glucos sul 2KCl/msm/chond/C/Mn (GLUCOSAMINE CHONDROITIN PO) Take  by mouth.  MAGNESIUM PO Take  by mouth every fourty-eight (48) hours.  denosumab (PROLIA) 60 mg/mL injection 60 mg by SubCUTAneous route. Every 6 months      cholecalciferol, vitamin D3, (VITAMIN D3) 2,000 unit tab Take 1 Tab by mouth daily. 5000 units daily  Indications: low vitamin D levels      VITAMIN A PO Take 2,400 mcg by mouth nightly.  vitamin E (AQUA GEMS) 400 unit capsule Take 400 Units by mouth every Monday and Thursday. Only on M and Thurs at Bedtime       No Known Allergies  Past Medical History:   Diagnosis Date    Arthritis     patient states \"every joint affected\"    Bee sting 09/05/2018    left elbow bee sting     Blister of ankle 09/05/2018    Blister small per patient of left ankle. Wears an air boot due to 2 stress fractures in last 2 months.     CAD (coronary artery disease)     Constipation     Falls 2017    pt reports having 4 falls in 3 days    Fatigue     Headache     Ill-defined condition     multiple broken ribs    Ill-defined condition     reports \"getting infections easily\"    Joint pain     Memory disorder     following spinal fluid leak repair    Menopause     Nausea & vomiting     Neuropathy     Osteoporosis     Thyroid disease     Type 1 diabetes ([address]     diagnosed at age 8    Unspecified cerebral artery occlusion with cerebral infarction     x 4 (last in )     Past Surgical History:   Procedure Laterality Date    HX CARPAL TUNNEL RELEASE Right 2018    HX  SECTION  1987    HX  SECTION      HX CHOLECYSTECTOMY  1996    HX HYSTERECTOMY  2006    HX OOPHORECTOMY      HX ORTHOPAEDIC      frozen shoulder surgery x 3    HX ORTHOPAEDIC      frozen finger surgery x 8    HX ORTHOPAEDIC Left 2014    wrist surgery    HX ORTHOPAEDIC Right 1977    hand surgery    HX ORTHOPAEDIC Left     foot surgery    HX OTHER SURGICAL      spinal fluid leak repair (spinal fluid leaking from nose, remove nose, cut fat from stomach, use that as patch behind nose, nose replaced)    HX ROTATOR CUFF REPAIR Left     HX ROTATOR CUFF REPAIR Right     HX TONSILLECTOMY  1968     Family History   Problem Relation Age of Onset    Heart Disease Mother     Hypertension Mother     No Known Problems Father     Heart Disease Maternal Grandfather     Cancer Maternal Aunt         Pancreatic and Liver    Cancer Maternal Grandmother         Lung    Diabetes Maternal Grandmother     Cancer Maternal Aunt         Breast    Breast Cancer Maternal Aunt     Diabetes Maternal Aunt      Social History     Tobacco Use    Smoking status: Former Smoker     Packs/day: 0.50     Years: 8.00     Pack years: 4.00     Quit date: 4/10/2007     Years since quittin.4    Smokeless tobacco: Never Used   Substance Use Topics    Alcohol use: No       ROS  Pertinent items are noted ion the HPI  Patient-Reported Vitals 2021   Patient-Reported Weight -   Patient-Reported Height -   Patient-Reported Pulse - Patient-Reported Temperature 96.7   Patient-Reported SpO2 -   Patient-Reported Systolic  -   Patient-Reported Diastolic -       Pino Saldana, who was evaluated through a patient-initiated, synchronous (real-time) audio only encounter, and/or her healthcare decision maker, is aware that it is a billable service, with coverage as determined by her insurance carrier. She provided verbal consent to proceed: Yes. She has not had a related appointment within my department in the past 7 days or scheduled within the next 24 hours. On this date 08/25/2021 I have spent 22 minutes reviewing previous notes, test results and face to face (virtual) with the patient discussing the diagnosis and importance of compliance with the treatment plan as well as documenting on the day of the visit.     Bnita Smith NP

## 2021-09-01 ENCOUNTER — TELEPHONE (OUTPATIENT)
Dept: FAMILY MEDICINE CLINIC | Age: 56
End: 2021-09-01

## 2021-09-01 DIAGNOSIS — E55.9 HYPOVITAMINOSIS D: ICD-10-CM

## 2021-09-01 DIAGNOSIS — E03.9 ACQUIRED HYPOTHYROIDISM: ICD-10-CM

## 2021-09-01 DIAGNOSIS — I10 ESSENTIAL HYPERTENSION: ICD-10-CM

## 2021-09-01 DIAGNOSIS — E10.42 TYPE 1 DIABETES MELLITUS WITH DIABETIC POLYNEUROPATHY (HCC): ICD-10-CM

## 2021-09-01 NOTE — TELEPHONE ENCOUNTER
[facility] Ms Elpidio Santoyo, she stated she tested positive for Covid on 08/17/2021 and is not sure if she caught it from [address] [place] [address] when she had her surgery or if her son gave it to her when he came to her home to take care of her. She tested negative about 4 days ago from a [facility] home test and was told they are reliable. She is questioning if she should still have the Covid vaccine done since she had the Covid? I stated to her the recommendation is yes, but will confirm with Wanda Lucero. Per Wanda Lucero, yes she should and can have now if she would like to. She stated she was told by the pharmacy, she should wait at least 2 weeks if she is still having any symptoms. I stated if she feels more comfortable with that then she can. She stated yes she wants to wait, but OK and thanks.

## 2021-09-01 NOTE — TELEPHONE ENCOUNTER
----- Message from David Garay sent at 9/1/2021  3:47 PM EDT -----  Regarding: Khushi Odessa  Patient return call    Caller's first and last name and relationship (if not the patient):      Best contact number(s): [phone]      Whose call is being returned: unknown      Details to clarify the request: has quest regarding covid, refused to give any details why? What?       David Garay

## 2021-09-01 NOTE — TELEPHONE ENCOUNTER
ELY MEEK Ms Nic Kali, she stated the pharmacy had gotten all of her medications refills request except for the Lidocaine patch. She had her surgery on the right side, but still has pain on her left side, so needs her pain patches still. I stated to her I will send the request to Olinda Umana, she stated OK and thanks.

## 2021-09-07 DIAGNOSIS — I49.3 PVC (PREMATURE VENTRICULAR CONTRACTION): ICD-10-CM

## 2021-09-07 NOTE — TELEPHONE ENCOUNTER
----- Message from Esa Perkins sent at 9/7/2021 10:53 AM EDT -----  Regarding: HELGA Yeh/ telephone  General Message/Vendor Calls    Caller's first and last name: Pt      Reason for call: Pt is calling about her refill for simvastatin (ZOCOR) 40 mg tablet, as she is out. Pt is also asking for a form to get a temporary handicap parking placard, that she needs asap.        Callback required yes/no and why: yes      Best contact number(s):[phone]      Details to clarify the request: n/a      Esa Perkins

## 2021-09-10 RX ORDER — SIMVASTATIN 40 MG/1
TABLET, FILM COATED ORAL
Qty: 90 TABLET | Refills: 0 | Status: SHIPPED | OUTPATIENT
Start: 2021-09-10 | End: 2022-03-02 | Stop reason: SDUPTHER

## 2021-09-15 ENCOUNTER — HOSPITAL ENCOUNTER (OUTPATIENT)
Dept: PHYSICAL THERAPY | Age: 56
Discharge: HOME | End: 2021-09-15
Payer: MEDICARE

## 2021-09-15 PROCEDURE — 97161 PT EVAL LOW COMPLEX 20 MIN: CPT

## 2021-09-15 PROCEDURE — 97110 THERAPEUTIC EXERCISES: CPT

## 2021-09-15 NOTE — PROGRESS NOTES
[facility]  [address] [facility], [address]:  [phone]  FAX: [phone]    Plan of Care/Statement of Necessity for Physical Therapy Services  2-15    Patient name: Arnaud Luna  : 1965  Provider#: [id number]  Referral source: Marce Calloway MD      Medical/Treatment Diagnosis: Right hip pain [M25.551]     Prior Hospitalization: see medical history     Comorbidities: DM type I, Chronic TIAs  Prior Level of Function: Independent without the use of an assistive device  Medications: Verified on Patient Summary List  Start of Care: 9/15/2021      Onset Date: 9/15/2021   The Plan of Care and following information is based on the information from the initial evaluation. Assessment/ key information: Pt. Presents to therapy ~6 weeks s/p R abductor tendon repair. Pt. Instructed and provided information on [facility] Hip Abductor tendon repair protocol/timeline. Pt. Was introduced to bilateral hip strengthening within safe ranges, core stabilization, and safe open chain hip ROM exercises in today's evaluation. Further treatment will focus on continuing hip/knee dynamic/functional strengthening, core stabilization, and adhering to surgical protocol.     Evaluation Complexity History MEDIUM  Complexity : 1-2 comorbidities / personal factors will impact the outcome/ POC ; Examination LOW Complexity : 1-2 Standardized tests and measures addressing body structure, function, activity limitation and / or participation in recreation  ;Presentation LOW Complexity : Stable, uncomplicated  ;Clinical Decision Making MEDIUM Complexity : FOTO score of 26-74  Overall Complexity Rating: LOW     Problem List: pain affecting function, decrease ROM, decrease strength, impaired gait/ balance and decrease flexibility/ joint mobility   Treatment Plan may include any combination of the following: Therapeutic exercise, Therapeutic activities, Physical agent/modality, Gait/balance training, Patient education and Functional mobility training  Patient / Family readiness to learn indicated by: asking questions, trying to perform skills and interest  Persons(s) to be included in education: patient (P)  Barriers to Learning/Limitations: None  Patient Goal (s): I want to be able to drive again. \", \"I want to get rid of this walker. \", \"I dont want to have any more pain. \"  Patient Self Reported Health Status: fair  Rehabilitation Potential: good    Short Term Goals: To be accomplished in 8 treatments:  Pt. Will be able to adhere and demonstrate HEP with independence  Pt. Will be able to reach 4/5 MMT in all hip/knee musculature  Pt. Will be able to properly brace transverse abdominis in functional transfers as well as with therapeutic exercises    Long Term Goals: To be accomplished in 16 treatments:  Pt. Will be able to drive independently without pain or discomfort for >30 minutes  Pt. Will be able to reach [facility] ROM in all hip ranges  Pt. Will ambulate without the use of a RW with no apparent deviations or LOB    Frequency / Duration: Patient to be seen 2 times per week for 16 treatments. Patient/ Caregiver education and instruction: activity modification and exercises    [x]  Plan of care has been reviewed with PTA    The severity rating is based on clinical judgment and the FOTO Score score. Certification Period: 9/15/2021-12/15/2021  Richardean Romberg, PT, DPT  9/15/2021   ________________________________________________________________________    I certify that the above Therapy Services are being furnished while the patient is under my care. I agree with the treatment plan and certify that this therapy is necessary. [de-identified] Signature:____________________  Date:____________Time: _________           Gonzalo Lockhart MD    Please sign and return to: [address] [facility]  [address] [facility] [place], [address] [facility]   [facility] [address]:  [phone]  FAX: [phone]

## 2021-09-15 NOTE — PROGRESS NOTES
PT INITIAL EVALUATION NOTE - [facility] 2-15    Patient Name: Samia Marte  Date:9/15/2021  : 1965  [x]  Patient  Verified  Payor: Александрgillian Ours / Plan: [place] PART [place] & B / Product Type: Medicare /    In time:   Out time:153  Total Treatment Time (min): 45  Total Timed Codes (min): 45  1:1 Treatment Time ( [address] only): 39   Visit #: 1     Treatment Area: Right hip pain [M25.551]    SUBJECTIVE  Pain Level (0-10 scale): 5/10  Any medication changes, allergies to medications, adverse drug reactions, diagnosis change, or new procedure performed?: [] No    [x] Yes (see summary sheet for update)  Subjective:  \"evelia had 6 mini-strokes. \", \"my right hip has really been giving me a fit. \"  Pt. Reports increased pain in B LE hips and presents s/p 6 weeks R abductor tendon repair. Pt. Is PWB on R 50% and ambulates with a RW. PLOF: Independent without the use of an A/D  Mechanism of Injury: Multiple Falls  PMHx/Surgical Hx: 6 weeks s/p R abductor tendon repair  Living Situation: Alone in [facility] community  Pt Goals: \"I want to be able to drive. \", \"I want to be able to walk without the walker. \"  Barriers: WB status      OBJECTIVE/EXAMINATION  Posture: Forward head, kyphotic posture  Gait and Functional Mobility:  Ambulates with a RW w PWB 50% on R LE    ROM: Deferred ROM ER/IR, Flexion 30% limited, Extension 50% limited, Deferred Ab/adduction AROM      Special Tests:   Trendelenberg: positive       25 min Therapeutic Exercise:  [x] See flow sheet :   Rationale: increase ROM, increase strength, improve coordination and improve balance to improve the patients ability to functionally perform in community and home.           With   [x] TE   [] TA   [] neuro   [] other: Patient Education: [x] Review HEP    [x] Progressed/Changed HEP based on:   [] positioning   [] body mechanics   [] transfers   [] heat/ice application    [] other:      Other Objective/Functional Measures: FOTO    Pain Level (0-10 scale) post treatment: 3/10    ASSESSMENT/Changes in Function: See POC    [x]  See Plan of Lisa 68, PT, DPT 9/15/2021

## 2021-09-20 ENCOUNTER — HOSPITAL ENCOUNTER (OUTPATIENT)
Dept: PHYSICAL THERAPY | Age: 56
Discharge: HOME | End: 2021-09-20
Payer: MEDICARE

## 2021-09-20 PROCEDURE — 97110 THERAPEUTIC EXERCISES: CPT

## 2021-09-20 NOTE — PROGRESS NOTES
PT DAILY TREATMENT NOTE - [facility]     Patient Name: Georgette Postal  Date:2021  : 1965  [x]  Patient  Verified  Payor: [place] MEDICARE / Plan: [place] MEDICARE PART A & B / Product Type: Medicare /    In time:1445  Out time:1523  Total Treatment Time (min): 38  Total Timed Codes (min): 38  1:1 Treatment Time ( [address] only): 45   Visit #: 2  16    Treatment Area: Right hip pain [M25.551]    SUBJECTIVE  Pain Level (0-10 scale): 2/10  Any medication changes, allergies to medications, adverse drug reactions, diagnosis change, or new procedure performed?: [x] No    [] Yes (see summary sheet for update)  Subjective functional status/changes:   [] No changes reported  \"Im not really in much pain, just sore on my L side today. \"    OBJECTIVE    38 min Therapeutic Exercise:  [x] See flow sheet :   Rationale: increase ROM and increase strength to improve the patients ability to functionally perform in community and home effectively. With   [x] TE   [] TA   [] neuro   [] other: Patient Education: [x] Review HEP    [x] Progressed/Changed HEP based on:   [] positioning   [] body mechanics   [] transfers   [] heat/ice application    [] other:      Other Objective/Functional Measures: FOTO     Pain Level (0-10 scale) post treatment: 4/10    ASSESSMENT/Changes in Function: Pt. Responded well to today's treatment focusing on core stabilization/activation as well as practicing functional hip ROM In safe planes and within [employer] guidelines. Pt. Reported increased pain with hip IR/ER bilaterally and adjusted range throughout session. Patient will continue to benefit from skilled PT services to modify and progress therapeutic interventions, address functional mobility deficits, address ROM deficits and address strength deficits to attain remaining goals.      [x]  See Plan of Care  []  See progress note/recertification  []  See Discharge Summary         Progress towards goals / Updated goals:  Progressing    PLAN  [x] Upgrade activities as tolerated     [x]  Continue plan of care  []  Update interventions per flow sheet       []  Discharge due to:_  []  Other:_      Yg Huntley, PT, DPT 9/20/2021

## 2021-09-22 ENCOUNTER — HOSPITAL ENCOUNTER (OUTPATIENT)
Dept: PHYSICAL THERAPY | Age: 56
Discharge: HOME | End: 2021-09-22
Payer: MEDICARE

## 2021-09-22 ENCOUNTER — TELEPHONE (OUTPATIENT)
Dept: ENDOCRINOLOGY | Age: 56
End: 2021-09-22

## 2021-09-22 PROCEDURE — 97110 THERAPEUTIC EXERCISES: CPT

## 2021-09-22 RX ORDER — BLOOD SUGAR DIAGNOSTIC
STRIP MISCELLANEOUS
Qty: 800 STRIP | Refills: 3 | Status: SHIPPED | OUTPATIENT
Start: 2021-09-22 | End: 2021-10-26

## 2021-09-22 NOTE — PROGRESS NOTES
PT DAILY TREATMENT NOTE - [facility]     Patient Name: Yee Shannon  Date:2021  : 1965  [x]  Patient  Verified  Payor: Jo Ann Cuevas / Plan: [place] MEDICARE PART A & B / Product Type: Medicare /    In time:1530  Out time: 1608  Total Treatment Time (min): 38  Total Timed Codes (min): 38  1:1 Treatment Time ( [address] only):  45  Visit #: 3  16    Treatment Area: Right hip pain [M25.551]    SUBJECTIVE  Pain Level (0-10 scale): 3/10   Any medication changes, allergies to medications, adverse drug reactions, diagnosis change, or new procedure performed?: [x] No    [] Yes (see summary sheet for update)   Subjective functional status/changes:   [x] No changes reported      OBJECTIVE    38 min Therapeutic Exercise:  [x] See flow sheet :   Rationale: increase ROM, increase strength and improve coordination to improve the patients ability to perform at home and in community effectively. With   [x] TE   [x] TA   [] neuro   [] other: Patient Education: [x] Review HEP    [x] Progressed/Changed HEP based on:   [] positioning   [] body mechanics   [] transfers   [] heat/ice application    [] other:      Other Objective/Functional Measures: FOTO    Pain Level (0-10 scale) post treatment: 2/10    ASSESSMENT/Changes in Function: Pt. Responded well to treatment today focusing on BLE flexibility and functional core stability/strengthening. Pt. Is improving ability to contract transverse abdominis for the purpose of bracing during functional transfers and ambulation. Pt. Continues to report pain at functional ranges with most hip flexibility/strengthening exercises. Will communicate with surgeon/referring provider about WB precautions next week. Patient will continue to benefit from skilled PT services to modify and progress therapeutic interventions, address functional mobility deficits and address strength deficits to attain remaining goals.      [x]  See Plan of Care  []  See progress note/recertification  []  See Discharge Summary         Progress towards goals / Updated goals:  Progressing    PLAN  [x]  Upgrade activities as tolerated     [x]  Continue plan of care  []  Update interventions per flow sheet       []  Discharge due to:_  []  Other:_      Caridad Stack, PT, DPT, EP-C 9/22/2021

## 2021-09-22 NOTE — TELEPHONE ENCOUNTER
----- Message from April Rabb sent at 9/22/2021  2:31 PM EDT -----  Regarding: /Telephone  General Message/Vendor Calls    Caller's first and last name: N/A      Reason for call: Pt pharmacy sent over a request for test strips and haven't gotten a response yet Needs them ASAP      Callback required yes/no and why: yes , to confirm its been done       Best contact number(s): [phone]      Details to clarify the request: Pt pharmacy sent over a request for test strips and haven't gotten a response yet       April Rabb

## 2021-09-22 NOTE — TELEPHONE ENCOUNTER
Message from April Rabb sent at 9/22/2021  2:31 PM EDT -----  Regarding: /Telephone  General Message/Vendor Calls     Caller's first and last name: N/A        Reason for call: Pt pharmacy sent over a request for test strips and haven't gotten a response yet Needs them ASAP        Callback required yes/no and why: yes , to confirm its been done         Best contact number(s): [phone]        Details to clarify the request: Pt pharmacy sent over a request for test strips and haven't gotten a response yet         April Rabb

## 2021-09-29 ENCOUNTER — HOSPITAL ENCOUNTER (OUTPATIENT)
Dept: PHYSICAL THERAPY | Age: 56
Discharge: HOME | End: 2021-09-29
Payer: MEDICARE

## 2021-09-29 PROCEDURE — 97110 THERAPEUTIC EXERCISES: CPT

## 2021-09-29 NOTE — PROGRESS NOTES
PT DAILY TREATMENT NOTE - [facility]     Patient Name: Clay Castleman  Date:2021  : 1965  [x]  Patient  Verified  Payor: [place] MEDICARE / Plan: VA MEDICARE PART A & B / Product Type: Medicare /    In time:1445  Out time:1523  Total Treatment Time (min): 38  Total Timed Codes (min): 38  1:1 Treatment Time ( [address] only): 45   Visit #: 4  16    Treatment Area: Right hip pain [M25.551]    SUBJECTIVE  Pain Level (0-10 scale): 0/10  Any medication changes, allergies to medications, adverse drug reactions, diagnosis change, or new procedure performed?: [x] No    [] Yes (see summary sheet for update)  Subjective functional status/changes:   [] No changes reported  \"Im feeling okay just a little sore but no pain. \"    OBJECTIVE    38 min Therapeutic Exercise:  [x] See flow sheet :   Rationale: increase strength, improve coordination and improve balance to improve the patients ability to functionally perform in community and home. With   [x] TE   [x] TA   [] neuro   [] other: Patient Education: [x] Review HEP    [x] Progressed/Changed HEP based on:   [] positioning   [] body mechanics   [] transfers   [] heat/ice application    [] other:      Other Objective/Functional Measures: FOTO    Pain Level (0-10 scale) post treatment: 5/10    ASSESSMENT/Changes in Function: Pt. Continues to have WB precautions on L, although will be able to have full weight bearing in Monday's session, will trial stair training as well as dynamic balance. Patient will continue to benefit from skilled PT services to modify and progress therapeutic interventions to attain remaining goals.      [x]  See Plan of Care  []  See progress note/recertification  []  See Discharge Summary         Progress towards goals / Updated goals:  Progressing    PLAN  [x]  Upgrade activities as tolerated     [x]  Continue plan of care  []  Update interventions per flow sheet       []  Discharge due to:_  []  Other:_      Shirley Reddy, PT, DPT, ANGÉLICA 9/29/2021

## 2021-09-30 ENCOUNTER — HOSPITAL ENCOUNTER (OUTPATIENT)
Dept: INFUSION THERAPY | Age: 56
Discharge: HOME | End: 2021-09-30
Payer: MEDICARE

## 2021-09-30 VITALS
RESPIRATION RATE: 18 BRPM | TEMPERATURE: 97.7 F | HEIGHT: 63 IN | BODY MASS INDEX: 26.33 KG/M2 | WEIGHT: 148.6 LBS | HEART RATE: 77 BPM | DIASTOLIC BLOOD PRESSURE: 72 MMHG | SYSTOLIC BLOOD PRESSURE: 135 MMHG | OXYGEN SATURATION: 98 %

## 2021-09-30 DIAGNOSIS — M85.89 OSTEOPENIA OF MULTIPLE SITES: Primary | ICD-10-CM

## 2021-09-30 LAB
ANION GAP BLD CALC-SCNC: 10 MMOL/L (ref 10–20)
CA-I BLD-MCNC: 1.21 MMOL/L (ref 1.12–1.32)
CHLORIDE BLD-SCNC: 95 MMOL/L (ref 98–107)
CO2 BLD-SCNC: 28 MMOL/L (ref 21–32)
CREAT BLD-MCNC: 0.74 MG/DL (ref 0.6–1.3)
GLUCOSE BLD-MCNC: 176 MG/DL (ref 65–100)
POTASSIUM BLD-SCNC: 4.3 MMOL/L (ref 3.5–5.1)
SERVICE CMNT-IMP: ABNORMAL
SODIUM BLD-SCNC: 132 MMOL/L (ref 136–145)

## 2021-09-30 PROCEDURE — 74011250636 HC RX REV CODE- 250/636: Performed by: PEDIATRICS

## 2021-09-30 PROCEDURE — 80047 BASIC METABLC PNL IONIZED CA: CPT

## 2021-09-30 PROCEDURE — 96372 THER/PROPH/DIAG INJ SC/IM: CPT

## 2021-09-30 RX ORDER — EPINEPHRINE 1 MG/ML
0.3 INJECTION, SOLUTION, CONCENTRATE INTRAVENOUS AS NEEDED
Status: CANCELLED | OUTPATIENT
Start: 2021-09-30

## 2021-09-30 RX ORDER — ALBUTEROL SULFATE 0.83 MG/ML
2.5 SOLUTION RESPIRATORY (INHALATION) AS NEEDED
Status: CANCELLED
Start: 2022-03-31

## 2021-09-30 RX ORDER — ACETAMINOPHEN 325 MG/1
650 TABLET ORAL AS NEEDED
Status: CANCELLED
Start: 2021-09-30

## 2021-09-30 RX ORDER — ONDANSETRON 2 MG/ML
8 INJECTION INTRAMUSCULAR; INTRAVENOUS AS NEEDED
Status: CANCELLED | OUTPATIENT
Start: 2021-09-30

## 2021-09-30 RX ORDER — DIPHENHYDRAMINE HYDROCHLORIDE 50 MG/ML
25 INJECTION, SOLUTION INTRAMUSCULAR; INTRAVENOUS AS NEEDED
Status: CANCELLED
Start: 2022-03-31

## 2021-09-30 RX ORDER — DIPHENHYDRAMINE HYDROCHLORIDE 50 MG/ML
50 INJECTION, SOLUTION INTRAMUSCULAR; INTRAVENOUS AS NEEDED
Status: CANCELLED
Start: 2022-03-31

## 2021-09-30 RX ORDER — ALBUTEROL SULFATE 0.83 MG/ML
2.5 SOLUTION RESPIRATORY (INHALATION) AS NEEDED
Status: CANCELLED
Start: 2021-09-30

## 2021-09-30 RX ORDER — DIPHENHYDRAMINE HYDROCHLORIDE 50 MG/ML
50 INJECTION, SOLUTION INTRAMUSCULAR; INTRAVENOUS AS NEEDED
Status: CANCELLED
Start: 2021-09-30

## 2021-09-30 RX ORDER — ONDANSETRON 2 MG/ML
8 INJECTION INTRAMUSCULAR; INTRAVENOUS AS NEEDED
Status: CANCELLED | OUTPATIENT
Start: 2022-03-31

## 2021-09-30 RX ORDER — DIPHENHYDRAMINE HYDROCHLORIDE 50 MG/ML
25 INJECTION, SOLUTION INTRAMUSCULAR; INTRAVENOUS AS NEEDED
Status: CANCELLED
Start: 2021-09-30

## 2021-09-30 RX ORDER — EPINEPHRINE 1 MG/ML
0.3 INJECTION, SOLUTION, CONCENTRATE INTRAVENOUS AS NEEDED
Status: CANCELLED | OUTPATIENT
Start: 2022-03-31

## 2021-09-30 RX ORDER — ACETAMINOPHEN 325 MG/1
650 TABLET ORAL AS NEEDED
Status: CANCELLED
Start: 2022-03-31

## 2021-09-30 RX ORDER — HYDROCORTISONE SODIUM SUCCINATE 100 MG/2ML
100 INJECTION, POWDER, FOR SOLUTION INTRAMUSCULAR; INTRAVENOUS AS NEEDED
Status: CANCELLED | OUTPATIENT
Start: 2022-03-31

## 2021-09-30 RX ORDER — HYDROCORTISONE SODIUM SUCCINATE 100 MG/2ML
100 INJECTION, POWDER, FOR SOLUTION INTRAMUSCULAR; INTRAVENOUS AS NEEDED
Status: CANCELLED | OUTPATIENT
Start: 2021-09-30

## 2021-09-30 RX ADMIN — DENOSUMAB 60 MG: 60 INJECTION SUBCUTANEOUS at 11:41

## 2021-09-30 NOTE — PROGRESS NOTES

## 2021-09-30 NOTE — PROGRESS NOTES
[facility] Progress Note    Pt admit to [facility] for Prolia in stable condition. Assessment completed. Patient denied having any symptoms of COVID-19, i.e. SOB, coughing, fever, or generally not feeling well. Also denies having been exposed to COVID-19 recently or having had any recent contact with family/friend that has a pending COVID test.     Labs drawn peripherally L AC w/o issue. Patient Vitals for the past 12 hrs:   Temp Pulse Resp BP SpO2   09/30/21 1126 97.7 °F (36.5 °C) 77 18 135/72 98 %        Recent Results (from the past 12 hour(s))   POC CHEM8    Collection Time: 09/30/21 11:34 AM   Result Value Ref Range    Calcium, ionized (POC) 1.21 1.12 - 1.32 mmol/L    Sodium (POC) 132 (L) 136 - 145 mmol/L    Potassium (POC) 4.3 3.5 - 5.1 mmol/L    Chloride (POC) 95 (L) 98 - 107 mmol/L    CO2 (POC) 28.0 21 - 32 mmol/L    Anion gap (POC) 10 10 - 20 mmol/L    Glucose (POC) 176 (H) 65 - 100 mg/dL    Creatinine (POC) 0.74 0.6 - 1.3 mg/dL    GFRAA, POC >60 >60 ml/min/1.73m2    GFRNA, POC >60 >60 ml/min/1.73m2    Comment Comment Not Indicated. Pt denies recent or upcoming dental work. Medications:  Medications Administered     denosumab (PROLIA) injection 60 mg     Admin Date  09/30/2021 Action  Given Dose  60 mg Route  SubCUTAneous Administered By  Piyush All                 Pt tolerated treatment well. D/c home in no distress. Pt aware of next [facility] appointment scheduled for: 3/31/2022 at 1100.      Future Appointments   Date Time Provider Manny Rodriguez   10/1/2021  2:45 PM Leatha Pineda [facility]   10/4/2021  2:45 PM [facility] PT RESOURCE 1 [facility]   10/7/2021  2:00 PM Lenny Anders [facility]   10/13/2021  2:45 PM [facility] PT RESOURCE 1 [facility]   10/15/2021  2:45 PM Darek Mcclain PTA [facility]   10/18/2021  2:45 PM [facility]   10/21/2021  2:00 PM Darek Mcclain PTA [facility]   10/25/2021  2:45 PM [facility]   10/26/2021 11:10 AM Rodríguez Calvin MD RDE [facility] [address] BS AM[facility]   10/28/2021  2:45 PM Clista Ahr, PTA [facility]   1/13/2022 11:40 AM MD RUTH ANN Jaimes [facility] AM[facility]   3/31/2022 11:00 AM ELVIN SYLVESTER 6 [facility]

## 2021-10-01 ENCOUNTER — HOSPITAL ENCOUNTER (OUTPATIENT)
Dept: PHYSICAL THERAPY | Age: 56
Discharge: HOME | End: 2021-10-01
Payer: MEDICARE

## 2021-10-01 PROCEDURE — 97110 THERAPEUTIC EXERCISES: CPT

## 2021-10-01 NOTE — PROGRESS NOTES
PT DAILY TREATMENT NOTE - [facility]     Patient Name: Akash Kowalski  Date:10/1/2021  : 1965  [x]  Patient  Verified  Payor: VA MEDICARE / Plan: VA MEDICARE PART A & B / Product Type: Medicare /    In time:2:45p  Out time:3:32p  Total Treatment Time (min): 47  Total Timed Codes (min): 47  1:1 Treatment Time ([address] only): 42   Visit #:  16    Treatment Area: Right hip pain [M25.551]    SUBJECTIVE  Pain Level (0-10 scale): 5/10  Any medication changes, allergies to medications, adverse drug reactions, diagnosis change, or new procedure performed?: [x] No    [] Yes (see summary sheet for update)  Subjective functional status/changes:   [] No changes reported  Pt reports high pain. She thinks that it is because she did the bike last time. Reports that she sometimes forgets to use her RW at home, but \"its okay because I just limp around so I'm not fully weight bearing\". Pt also reports driving, but not being cleared to until next week. OBJECTIVE    42 min Therapeutic Exercise:  [x] See flow sheet :   Rationale: increase ROM and increase strength to improve the patients ability to perform daily activities without increase in pain. With   [] TE   [] TA   [] neuro   [] other: Patient Education: [x] Review HEP    [] Progressed/Changed HEP based on:   [] positioning   [] body mechanics   [] transfers   [] heat/ice application    [] other:      Other Objective/Functional Measures: Pt ambulates with RW and forward flexed posture. At times transfers impulsively/quickly. Pain Level (0-10 scale) post treatment: 1/10    ASSESSMENT/Changes in Function: Educated patient on importance of following MD orders and precautions. Reeducated pt on how even limping around without RW is fully weight bearing on R LE, which is out of her precautions currently.      Patient will continue to benefit from skilled PT services to modify and progress therapeutic interventions, address functional mobility deficits, address ROM deficits, address strength deficits and analyze and cue movement patterns to attain remaining goals. [x]  See Plan of Care  []  See progress note/recertification  []  See Discharge Summary         Progress towards goals / Updated goals:  Pt is progressing towards goals.     PLAN  []  Upgrade activities as tolerated     [x]  Continue plan of care  []  Update interventions per flow sheet       []  Discharge due to:_  []  Other:_      Darline Silva PTA 10/1/2021

## 2021-10-04 ENCOUNTER — TELEPHONE (OUTPATIENT)
Dept: FAMILY MEDICINE CLINIC | Age: 56
End: 2021-10-04

## 2021-10-04 ENCOUNTER — HOSPITAL ENCOUNTER (OUTPATIENT)
Dept: PHYSICAL THERAPY | Age: 56
Discharge: HOME | End: 2021-10-04
Payer: MEDICARE

## 2021-10-04 DIAGNOSIS — I49.3 PVC (PREMATURE VENTRICULAR CONTRACTION): ICD-10-CM

## 2021-10-04 DIAGNOSIS — R42 DIZZINESS: ICD-10-CM

## 2021-10-04 DIAGNOSIS — F51.01 PRIMARY INSOMNIA: ICD-10-CM

## 2021-10-04 PROCEDURE — 97110 THERAPEUTIC EXERCISES: CPT

## 2021-10-04 NOTE — PROGRESS NOTES
PT DAILY TREATMENT NOTE - [facility]     Patient Name: Eh Cardona  Date:10/4/2021  : 1965  [x]  Patient  Verified  Payor: [place] MEDICARE / Plan: VA MEDICARE PART A & B / Product Type: Medicare /    In time:1445  Out time:1523  Total Treatment Time (min): 38  Total Timed Codes (min): 38  1:1 Treatment Time ( only): 45   Visit #: 6 of 16    Treatment Area: Right hip pain [M25.551]    SUBJECTIVE  Pain Level (0-10 scale): 10  Any medication changes, allergies to medications, adverse drug reactions, diagnosis change, or new procedure performed?: [x] No    [] Yes (see summary sheet for update)  Subjective functional status/changes:   [] No changes reported  \"Im ready to get going today and put some weight on this leg. \"    OBJECTIVE:    38 min Therapeutic Exercise:  [] See flow sheet :   Rationale: increase ROM, increase strength and improve coordination to improve the patients ability to perform in community and home effectively          With   [] TE   [] TA   [] neuro   [] other: Patient Education: [x] Review HEP    [] Progressed/Changed HEP based on:   [] positioning   [] body mechanics   [] transfers   [] heat/ice application    [] other:      Other Objective/Functional Measures: FOTO    Pain Level (0-10 scale) post treatment: 2/10    ASSESSMENT/Changes in Function: Pt. Is progressing with overall goals. Pt. WB restrictions lifted since >8 week post surgery (per [employer] guidelines). Pt. Was able to perform various closed chain/eccentric LE exercises with good performance. Pt. Instructed to limit active open chain abduction bilaterally. Progress exercises as needed. Patient will continue to benefit from skilled PT services to modify and progress therapeutic interventions, address functional mobility deficits and address strength deficits to attain remaining goals.      [x]  See Plan of Care  []  See progress note/recertification  []  See Discharge Summary         Progress towards goals / Updated goals:  Progressing    PLAN  [x]  Upgrade activities as tolerated     [x]  Continue plan of care  []  Update interventions per flow sheet       []  Discharge due to:_  []  Other:_      Tripp Fountain, PT, DPT, EP-C 10/4/2021

## 2021-10-04 NOTE — TELEPHONE ENCOUNTER
This patient called today and is very upset that no one is responding back to her calls and messages. She stated that a prescription that [facility] sent for her sinuses isn't working and that she is in need of med refills. She stated that both she and the pharmacy have been trying to reach the office about her medications. Someone please give this patient a call to discuss this.      Thank you

## 2021-10-05 ENCOUNTER — VIRTUAL VISIT (OUTPATIENT)
Dept: FAMILY MEDICINE CLINIC | Age: 56
End: 2021-10-05
Payer: MEDICARE

## 2021-10-05 DIAGNOSIS — G89.29 CHRONIC LOW BACK PAIN, UNSPECIFIED BACK PAIN LATERALITY, UNSPECIFIED WHETHER SCIATICA PRESENT: ICD-10-CM

## 2021-10-05 DIAGNOSIS — F51.01 PRIMARY INSOMNIA: ICD-10-CM

## 2021-10-05 DIAGNOSIS — M54.50 CHRONIC LOW BACK PAIN, UNSPECIFIED BACK PAIN LATERALITY, UNSPECIFIED WHETHER SCIATICA PRESENT: ICD-10-CM

## 2021-10-05 DIAGNOSIS — J32.0 CHRONIC MAXILLARY SINUSITIS: Primary | ICD-10-CM

## 2021-10-05 PROCEDURE — G2025 DIS SITE TELE SVCS RHC/FQHC: HCPCS | Performed by: NURSE PRACTITIONER

## 2021-10-05 RX ORDER — FLUCONAZOLE 150 MG/1
150 TABLET ORAL DAILY
Qty: 1 TABLET | Refills: 1 | Status: SHIPPED | OUTPATIENT
Start: 2021-10-05 | End: 2021-10-07

## 2021-10-05 RX ORDER — AMOXICILLIN AND CLAVULANATE POTASSIUM 875; 125 MG/1; MG/1
1 TABLET, FILM COATED ORAL 2 TIMES DAILY
Qty: 20 TABLET | Refills: 0 | Status: SHIPPED | OUTPATIENT
Start: 2021-10-05 | End: 2021-10-15

## 2021-10-05 RX ORDER — ZOLPIDEM TARTRATE 5 MG/1
5 TABLET ORAL
Qty: 30 TABLET | Refills: 2 | Status: SHIPPED | OUTPATIENT
Start: 2021-10-05 | End: 2021-12-27 | Stop reason: SDUPTHER

## 2021-10-06 NOTE — TELEPHONE ENCOUNTER
I believe this is an old message. Meds were reordered recently as of 10/05 and she was placed on Augmentin 875 mg yesterday (10/05). Please call to check on patient . Let me know if there are any other medication refills needed. and how she is feeling. Thanks!  DL

## 2021-10-07 ENCOUNTER — HOSPITAL ENCOUNTER (OUTPATIENT)
Dept: PHYSICAL THERAPY | Age: 56
Discharge: HOME | End: 2021-10-07
Payer: MEDICARE

## 2021-10-07 PROCEDURE — 97110 THERAPEUTIC EXERCISES: CPT

## 2021-10-07 PROCEDURE — 97530 THERAPEUTIC ACTIVITIES: CPT

## 2021-10-07 NOTE — PROGRESS NOTES
PT DAILY TREATMENT NOTE - [facility]     Patient Name: Eduar Salas  Date:10/7/2021  : 1965  [x]  Patient  Verified  Payor: VA MEDICARE / Plan: VA MEDICARE PART A & B / Product Type: Medicare /    In time:2:00p  Out time:2:40p  Total Treatment Time (min): 40  Total Timed Codes (min): 40  1:1 Treatment Time ( [address] only): 40   Visit #: 7 of 16    Treatment Area: Right hip pain [M25.551]    SUBJECTIVE  Pain Level (0-10 scale): 0/10  Any medication changes, allergies to medications, adverse drug reactions, diagnosis change, or new procedure performed?: [x] No    [] Yes (see summary sheet for update)  Subjective functional status/changes:   [] No changes reported  Patient reports that she has been feeling pretty good. She was a little sore after last visit due to increase in weight bearing and standing exercises. She reports her issue she has currently is getting out of the car due to maintaining precautions of avoiding excessive hip abduction. OBJECTIVE    30 min Therapeutic Exercise:  [x] See flow sheet :   Rationale: increase ROM, increase strength and improve coordination to improve the patients ability to perform daily activities within limitations of surgeon. 10 min Therapeutic Activity:  [x]  See flow sheet :   Rationale: increase strength and improve coordination  to improve the patients ability to perform daily activities with proper mechanics such as stair negotiation and squatting. With   [] TE   [] TA   [] neuro   [] other: Patient Education: [x] Review HEP    [] Progressed/Changed HEP based on:   [] positioning   [] body mechanics   [] transfers   [] heat/ice application    [] other:      Other Objective/Functional Measures: Pt has decreased hip extension during ambulation, increasing her forward trunk lean. Pain Level (0-10 scale) post treatment: 0/10    ASSESSMENT/Changes in Function: Patient continues to improve.  Educated pt on how to get out of the car more efficiently while maintaining precautions. Also worked on maintaining equal WB through B LE during squats and taking time during movements to get optimal benefit from eccentric movements as well. Patient will continue to benefit from skilled PT services to modify and progress therapeutic interventions, address functional mobility deficits, address ROM deficits, address strength deficits and analyze and cue movement patterns to attain remaining goals. [x]  See Plan of Care  []  See progress note/recertification  []  See Discharge Summary         Progress towards goals / Updated goals:  Pt is progressing towards goals.     PLAN  [x]  Upgrade activities as tolerated     [x]  Continue plan of care  []  Update interventions per flow sheet       []  Discharge due to:_  []  Other:_      Savannah Chapman, PTA 10/7/2021

## 2021-10-10 NOTE — PROGRESS NOTES
Juan David Stiles is a 61 y.o. female, evaluated via audio-only technology on 10/5/2021 for Sinus Infection  . MS Lakesha Masters recently recovered from Covid and is  requesting treatment for sinusitis. She endorses facial pain, head congestion and pressure. Insomnia . ; requesting refill of Ambienwhich is effective. Re  2. Chronic maxillary sinusitis  Requesting a referral for back pain. Ongoing problem. Assessment & Plan:   Diagnoses and all orders for this visit:    1. Chronic maxillary sinusitis  -     amoxicillin-clavulanate (AUGMENTIN) 875-125 mg per tablet; Take 1 Tablet by mouth two (2) times a day for 10 days. -     fluconazole (DIFLUCAN) 150 mg tablet; Take 1 Tablet by mouth daily for 2 days. 2. Primary insomnia  Renew Ambien          2. Chronic low back pain, unspecified back pain laterality, unspecified whether sciatica present    - REFERRAL TO ORTHOPEDICS      The complexity of medical decision making for this visit is moderate         12  Subjective:       Prior to Admission medications    Medication Sig Start Date End Date Taking? Authorizing Provider   amoxicillin-clavulanate (AUGMENTIN) 875-125 mg per tablet Take 1 Tablet by mouth two (2) times a day for 10 days. 10/5/21 10/15/21 Yes Patito Alexis NP   zolpidem (AMBIEN) 5 mg tablet Take 1 Tablet by mouth nightly as needed for Sleep. Max Daily Amount: 5 mg. 10/5/21   Patito Alexis NP   glucose blood VI test strips (OneTouch Ultra Test) strip Test sugars 8 times per day due to uncontrolled sugars.  DX Code: 10.42 9/22/21   Rodríguez Calvin MD   simvastatin (ZOCOR) 40 mg tablet Take 1 tablet by mouth nightly 9/10/21   Patito Alexis NP   simvastatin (ZOCOR) 40 mg tablet Take 1 tablet by mouth nightly 9/8/21   Chris Rodriguez MD   lidocaine (LIDODERM) 5 % APPLY 2 PATCHES TO THE AFFECTED AREA FOR 12 HOURS AND REMOVE FOR 12 HOURS 9/1/21   Patito Alexis NP   promethazine (PHENERGAN) 25 mg tablet Take 1 Tablet by mouth every six (6) hours as needed for Nausea. 8/25/21   Maame Gar NP   gabapentin (NEURONTIN) 100 mg capsule TAKE 2 CAPSULES BY MOUTH TWICE DAILY NO  MORE  THAN  400  MG  PER  DAY. 8/14/21   Maame Gar NP   levothyroxine (Euthyrox) 75 mcg tablet TAKE 1 TABLET BY MOUTH ONCE DAILY BEFORE  BREAKFAST  FOR  A  CONDITION  WITH  LOW  THYROID  HORMONE  LEVELS 7/29/21   Maame Gar NP   HumaLOG KwikPen Insulin 100 unit/mL kwikpen ONLY PRN up to 1-6 units depending on blood sugar 7/29/21   Maame Gar NP   insulin detemir U-100 (Levemir FlexTouch U-100 Insuln) 100 unit/mL (3 mL) inpn INJECT 28 UNITS SUBCUTANEOUSLY IN THE MORNING AND 3 UNITS  AT  NIGHT  Indications: type 1 diabetes mellitus 7/29/21   Maame Gar NP   losartan (COZAAR) 50 mg tablet Take 1 Tablet by mouth daily. Take 1 tab daily 7/29/21   Maame Gar NP   meloxicam (MOBIC) 15 mg tablet Take 1 Tablet by mouth daily. 7/29/21   Maame Gar NP   montelukast (SINGULAIR) 10 mg tablet Take 1 Tablet by mouth daily. 7/29/21   Maame Gar NP   metoclopramide HCl (REGLAN) 5 mg tablet Take 1 Tablet by mouth two (2) times a day. Up to BID. TAKE 30 MINUTES BEFORE MEALS 7/29/21   Maame Gar NP   polyethylene glycol (Miralax) 17 gram/dose powder Take 17 g by mouth daily. 1 tablespoon with 8 oz of water daily 7/14/21   Aniya De La Rosa MD   tiZANidine (ZANAFLEX) 4 mg tablet Take 1-2 Tablets by mouth three (3) times daily as needed for Muscle Spasm(s) or Pain (max dose 24 mg/day).  TAKES ONLY AT HS, RARELY  Indications: muscle spasm 7/12/21   Maame Gar NP   diclofenac EC (VOLTAREN) 75 mg EC tablet  1/19/21   Provider, Historical   glucose blood VI test strips (OneTouch Ultra Blue Test Strip) strip TEST BLOOD SUGAR 8 TIMES A DAY DUE TO UNCONTROLLED SUGARS Dx : E10.42 3/16/21   Spencer Chong MD   Insulin Needles, Disposable, (Niru Pen Needle) 32 gauge x 5/32\" ndle Use as directed with pen device - up to 5 times daily  Dx:  E11.9 1/11/21   Good, Dee STEVENSON,    Insulin Needles, Disposable, (Pen Needle) 30 gauge x 5/16\" ndle Use one needle up to 8 times daily to dispense insulin. E10.65,E 10.649 1/7/21   Pati Loaiza DO   aspirin 81 mg chewable tablet Take 81 mg by mouth daily. 11/26/20   Provider, Historical   mupirocin (BACTROBAN) 2 % ointment PRN 9/29/20   Provider, Historical   potassium 99 mg tablet Take 1 Tab by mouth every other day. 7/20/20   Pati Loaiza DO   ascorbic acid, vitamin C, (VITAMIN C) 1,000 mg tablet Take 4,000 mg by mouth two (2) times a day. Provider, Historical   calcium carbonate (CALTREX) 600 mg calcium (1,500 mg) tablet Take 600 mg by mouth two (2) times a day. Provider, Historical   glucos sul 2KCl/msm/chond/C/Mn (GLUCOSAMINE CHONDROITIN PO) Take  by mouth. Provider, Historical   MAGNESIUM PO Take  by mouth every fourty-eight (48) hours. Provider, Historical   denosumab (PROLIA) 60 mg/mL injection 60 mg by SubCUTAneous route. Every 6 months    Provider, Historical   cholecalciferol, vitamin D3, (VITAMIN D3) 2,000 unit tab Take 1 Tab by mouth daily. 5000 units daily  Indications: low vitamin D levels    Provider, Historical   VITAMIN A PO Take 2,400 mcg by mouth nightly. Provider, Historical   vitamin E (AQUA GEMS) 400 unit capsule Take 400 Units by mouth every Monday and Thursday.  Only on M and Thurs at Bedtime    Provider, Historical     Patient Active Problem List   Diagnosis Code    Hx of stroke without residual deficits Z86.73    History of carpal tunnel release Z98.890    Type 1 diabetes mellitus with diabetic polyneuropathy ([facility] [address] E10.42    Hypothyroid E03.9    Primary osteoarthritis involving multiple joints M89.49    History of hypoglycemia Z86.39    Pain in right foot M79.671    Chronic allergic rhinitis J30.9    Arthritis M19.90    Hypercholesterolemia E78.00    Insomnia G47.00    Mild nonproliferative diabetic retinopathy ([facility] [address] A35.4821    Osteopenia M85.80    Vitamin D deficiency E55.9    Hx of brain surgery Z98.890    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    Diabetic retinopathy screening Z13.5    Reactive depression F32.9    Chronic pain syndrome G89.4    Subacute frontal sinusitis J01.10    Trigeminal neuralgia G50.0    Atherosclerosis of artery I70.8    Greater trochanteric bursitis M70.60    Diabetic ulcer of left heel associated with type 1 diabetes mellitus, with fat layer exposed ([address] E10.621, L97.422    Splinter of left foot S90.852A    Heel callus L84    Acute pain of left shoulder M25.512    Hip pain M25.559    TIA (transient ischemic attack) G45.9    Trochanteric bursitis of right hip M70.61    Trochanteric bursitis of left hip M70.62    CVA (cerebral vascular accident) ([address] I63.9    Chest pain R07.9    Dizziness R42    Left facial numbness R20.0    CSF leak from nose G96.01    Diabetic peripheral neuropathy associated with type 2 diabetes mellitus ([address] E11.42     Patient Active Problem List    Diagnosis Date Noted    CSF leak from nose 07/13/2021    Diabetic peripheral neuropathy associated with type 2 diabetes mellitus ([address] 07/13/2021    Chest pain 05/25/2021    Dizziness 05/25/2021    Left facial numbness 05/25/2021    CVA (cerebral vascular accident) ([address] 05/23/2021    Trochanteric bursitis of right hip 03/29/2021    Trochanteric bursitis of left hip 03/29/2021    TIA (transient ischemic attack) 11/24/2020    Acute pain of left shoulder 11/03/2020    Hip pain 11/03/2020    Splinter of left foot 05/26/2020    Heel callus 05/26/2020    Diabetic ulcer of left heel associated with type 1 diabetes mellitus, with fat layer exposed ([address] 04/27/2020    Greater trochanteric bursitis 01/10/2020    Atherosclerosis of artery 08/09/2019    Trigeminal neuralgia 06/01/2019    Subacute frontal sinusitis 03/20/2019    Reactive depression 02/26/2019    Chronic pain syndrome 02/26/2019    Diabetic retinopathy screening 11/06/2018    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    Hx of brain surgery 09/27/2018    Pain in right foot 05/03/2018    Chronic allergic rhinitis 05/03/2018    Type 1 diabetes mellitus with diabetic polyneuropathy ([facility] [address] 04/03/2018    Hypothyroid 04/03/2018    Primary osteoarthritis involving multiple joints 04/03/2018    History of hypoglycemia 04/03/2018    Arthritis 06/26/2017    Osteopenia 06/12/2017    Hx of stroke without residual deficits 07/06/2015    History of carpal tunnel release 07/06/2015    Vitamin D deficiency 11/05/2012    Insomnia 08/18/2011    Mild nonproliferative diabetic retinopathy ([facility] [address] 11/08/2010    Hypercholesterolemia 05/24/2010     Current Outpatient Medications   Medication Sig Dispense Refill    amoxicillin-clavulanate (AUGMENTIN) 875-125 mg per tablet Take 1 Tablet by mouth two (2) times a day for 10 days. 20 Tablet 0    zolpidem (AMBIEN) 5 mg tablet Take 1 Tablet by mouth nightly as needed for Sleep. Max Daily Amount: 5 mg. 30 Tablet 2    glucose blood VI test strips (OneTouch Ultra Test) strip Test sugars 8 times per day due to uncontrolled sugars. DX Code: 10.42 800 Strip 3    simvastatin (ZOCOR) 40 mg tablet Take 1 tablet by mouth nightly 90 Tablet 0    simvastatin (ZOCOR) 40 mg tablet Take 1 tablet by mouth nightly 90 Tablet 0    lidocaine (LIDODERM) 5 % APPLY 2 PATCHES TO THE AFFECTED AREA FOR 12 HOURS AND REMOVE FOR 12 HOURS 60 Patch 0    promethazine (PHENERGAN) 25 mg tablet Take 1 Tablet by mouth every six (6) hours as needed for Nausea. 30 Tablet 1    gabapentin (NEURONTIN) 100 mg capsule TAKE 2 CAPSULES BY MOUTH TWICE DAILY NO  MORE  THAN  400  MG  PER  DAY.  120 Capsule 2    levothyroxine (Euthyrox) 75 mcg tablet TAKE 1 TABLET BY MOUTH ONCE DAILY BEFORE  BREAKFAST  FOR  A  CONDITION  WITH  LOW  THYROID  HORMONE  LEVELS 90 Tablet 0    HumaLOG KwikPen Insulin 100 unit/mL kwikpen ONLY PRN up to 1-6 units depending on blood sugar 15 mL 5    insulin detemir U-100 (Levemir FlexTouch U-100 Insuln) 100 unit/mL (3 mL) inpn INJECT 28 UNITS SUBCUTANEOUSLY IN THE MORNING AND 3 UNITS  AT  NIGHT  Indications: type 1 diabetes mellitus 10 Adjustable Dose Pre-filled Pen Syringe 5    losartan (COZAAR) 50 mg tablet Take 1 Tablet by mouth daily. Take 1 tab daily 90 Tablet 3    meloxicam (MOBIC) 15 mg tablet Take 1 Tablet by mouth daily. 90 Tablet 1    montelukast (SINGULAIR) 10 mg tablet Take 1 Tablet by mouth daily. 90 Tablet 1    metoclopramide HCl (REGLAN) 5 mg tablet Take 1 Tablet by mouth two (2) times a day. Up to BID. TAKE 30 MINUTES BEFORE MEALS 360 Tablet 0    polyethylene glycol (Miralax) 17 gram/dose powder Take 17 g by mouth daily. 1 tablespoon with 8 oz of water daily 289 g 0    tiZANidine (ZANAFLEX) 4 mg tablet Take 1-2 Tablets by mouth three (3) times daily as needed for Muscle Spasm(s) or Pain (max dose 24 mg/day). TAKES ONLY AT HS, RARELY  Indications: muscle spasm 30 Tablet 0    diclofenac EC (VOLTAREN) 75 mg EC tablet       glucose blood VI test strips (OneTouch Ultra Blue Test Strip) strip TEST BLOOD SUGAR 8 TIMES A DAY DUE TO UNCONTROLLED SUGARS Dx : E10.42 300 Strip 11    Insulin Needles, Disposable, (Niru Pen Needle) 32 gauge x 5/32\" ndle Use as directed with pen device - up to 5 times daily  Dx:  E11.9 200 Pen Needle 5    Insulin Needles, Disposable, (Pen Needle) 30 gauge x 5/16\" ndle Use one needle up to 8 times daily to dispense insulin. E10.65,E 10.649 300 Pen Needle 5    aspirin 81 mg chewable tablet Take 81 mg by mouth daily.  mupirocin (BACTROBAN) 2 % ointment PRN      potassium 99 mg tablet Take 1 Tab by mouth every other day. 90 Tab 1    ascorbic acid, vitamin C, (VITAMIN C) 1,000 mg tablet Take 4,000 mg by mouth two (2) times a day.  calcium carbonate (CALTREX) 600 mg calcium (1,500 mg) tablet Take 600 mg by mouth two (2) times a day.  glucos sul 2KCl/msm/chond/C/Mn (GLUCOSAMINE CHONDROITIN PO) Take  by mouth.       MAGNESIUM PO Take  by mouth every fourty-eight (48) hours.  denosumab (PROLIA) 60 mg/mL injection 60 mg by SubCUTAneous route. Every 6 months      cholecalciferol, vitamin D3, (VITAMIN D3) 2,000 unit tab Take 1 Tab by mouth daily. 5000 units daily  Indications: low vitamin D levels      VITAMIN A PO Take 2,400 mcg by mouth nightly.  vitamin E (AQUA GEMS) 400 unit capsule Take 400 Units by mouth every Monday and Thursday. Only on  and Thurs at Bedtime       No Known Allergies  Past Medical History:   Diagnosis Date    Arthritis     patient states \"every joint affected\"    Bee sting 2018    left elbow bee sting     Blister of ankle 2018    Blister small per patient of left ankle. Wears an air boot due to 2 stress fractures in last 2 months.     CAD (coronary artery disease)     Constipation     Falls     pt reports having 4 falls in 3 days    Fatigue     Headache     Ill-defined condition     multiple broken ribs    Ill-defined condition     reports \"getting infections easily\"    Joint pain     Memory disorder     following spinal fluid leak repair    Menopause     Nausea & vomiting     Neuropathy     Osteoporosis     Thyroid disease     Type 1 diabetes ([facility] [address]     diagnosed at age 8    Unspecified cerebral artery occlusion with cerebral infarction     x 4 (last in )     Past Surgical History:   Procedure Laterality Date    HX CARPAL TUNNEL RELEASE Right 2018    HX  SECTION      HX  SECTION      HX CHOLECYSTECTOMY      HX HYSTERECTOMY      HX OOPHORECTOMY      HX ORTHOPAEDIC      frozen shoulder surgery x 3    HX ORTHOPAEDIC      frozen finger surgery x 8    HX ORTHOPAEDIC Left 2014    wrist surgery    HX ORTHOPAEDIC Right 1977    hand surgery    HX ORTHOPAEDIC Left     foot surgery    HX OTHER SURGICAL      spinal fluid leak repair (spinal fluid leaking from nose, remove nose, cut fat from stomach, use that as patch behind nose, nose replaced)    HX ROTATOR CUFF REPAIR Left     HX ROTATOR CUFF REPAIR Right     HX TONSILLECTOMY  1968     Family History   Problem Relation Age of Onset    Heart Disease Mother     Hypertension Mother     No Known Problems Father     Heart Disease Maternal Grandfather     Cancer Maternal Aunt         Pancreatic and Liver    Cancer Maternal Grandmother         Lung    Diabetes Maternal Grandmother     Cancer Maternal Aunt         Breast    Breast Cancer Maternal Aunt     Diabetes Maternal Aunt      Social History     Tobacco Use    Smoking status: Former Smoker     Packs/day: 0.50     Years: 8.00     Pack years: 4.00     Quit date: 4/10/2007     Years since quittin.5    Smokeless tobacco: Never Used   Substance Use Topics    Alcohol use: No       ROS  Pertinent items are noted on the HPI  Patient-Reported Vitals 2021   Patient-Reported Weight -   Patient-Reported Height -   Patient-Reported Pulse -   Patient-Reported Temperature 96.7   Patient-Reported SpO2 -   Patient-Reported Systolic  -   Patient-Reported Diastolic -       Clay Castleman, who was evaluated through a patient-initiated, synchronous (real-time) audio only encounter, and/or her healthcare decision maker, is aware that it is a billable service, with coverage as determined by her insurance carrier. She provided verbal consent to proceed: Yes. She has not had a related appointment within my department in the past 7 days or scheduled within the next 24 hours. On this date 10/05/2021 I have spent 30 minutes reviewing previous notes, test results and face to face (virtual) with the patient discussing the diagnosis and importance of compliance with the treatment plan as well as documenting on the day of the visit.     Sejal Shepherd NP

## 2021-10-18 ENCOUNTER — HOSPITAL ENCOUNTER (OUTPATIENT)
Dept: PHYSICAL THERAPY | Age: 56
Discharge: HOME | End: 2021-10-18
Payer: MEDICARE

## 2021-10-18 PROCEDURE — 97110 THERAPEUTIC EXERCISES: CPT

## 2021-10-18 NOTE — PROGRESS NOTES
PT DAILY TREATMENT NOTE - [facility]     Patient Name: Mali Rivera  Date:10/18/2021  : 1965  [x]  Patient  Verified  Payor: Martín Munson / Plan: [place] & [place] / Product Type: Medicare /    In time:  Out time:   Total Treatment Time (min): 38  Total Timed Codes (min): 38  1:1 Treatment Time ( [address] only):  38  Visit #:     Treatment Area: Right hip pain [M25.551]    SUBJECTIVE  Pain Level (0-10 scale): 2/10  Any medication changes, allergies to medications, adverse drug reactions, diagnosis change, or new procedure performed?: [x] No    [] Yes (see summary sheet for update)  Subjective functional status/changes:   [] No changes reported  \"I think I overdid it lifting a few things and im sore on my R side hip today. \"    OBJECTIVE    38 min Therapeutic Exercise:  [x] See flow sheet :   Rationale: increase strength and improve coordination to improve the patients ability to perform in community and home effectively. With   [x] TE   [] TA   [] neuro   [] other: Patient Education: [x] Review HEP    [x] Progressed/Changed HEP based on:   [] positioning   [] body mechanics   [] transfers   [] heat/ice application    [] other:      Other Objective/Functional Measures: FOTO    Pain Level (0-10 scale) post treatment:     ASSESSMENT/Changes in Function: Pt. Responded well to treatment today focusing on functional eccentric strengthening of bilateral lower extremities as well as closed chain functional strengthening of bilateral hip/knee joints. Pt. With good performance with stair training. Continue per POC. Patient will continue to benefit from skilled PT services to modify and progress therapeutic interventions, address strength deficits and analyze and cue movement patterns to attain remaining goals. [x]  See Plan of Care  []  See progress note/recertification  []  See Discharge Summary         Progress towards goals / Updated goals:  Progressing.     PLAN  [x]  Upgrade activities as tolerated     [x]  Continue plan of care  []  Update interventions per flow sheet       []  Discharge due to:_  []  Other:_      Cassie Beaver PT, DPT, EP-C 10/18/2021

## 2021-10-21 ENCOUNTER — APPOINTMENT (OUTPATIENT)
Dept: PHYSICAL THERAPY | Age: 56
End: 2021-10-21
Payer: MEDICARE

## 2021-10-21 ENCOUNTER — TELEPHONE (OUTPATIENT)
Dept: FAMILY MEDICINE CLINIC | Age: 56
End: 2021-10-21

## 2021-10-25 ENCOUNTER — APPOINTMENT (OUTPATIENT)
Dept: PHYSICAL THERAPY | Age: 56
End: 2021-10-25
Payer: MEDICARE

## 2021-10-26 ENCOUNTER — VIRTUAL VISIT (OUTPATIENT)
Dept: ENDOCRINOLOGY | Age: 56
End: 2021-10-26
Payer: MEDICARE

## 2021-10-26 ENCOUNTER — HOSPITAL ENCOUNTER (OUTPATIENT)
Dept: LAB | Age: 56
Discharge: HOME | End: 2021-10-26

## 2021-10-26 ENCOUNTER — TELEPHONE (OUTPATIENT)
Dept: FAMILY MEDICINE CLINIC | Age: 56
End: 2021-10-26

## 2021-10-26 DIAGNOSIS — E03.9 ACQUIRED HYPOTHYROIDISM: ICD-10-CM

## 2021-10-26 DIAGNOSIS — E10.42 TYPE 1 DIABETES MELLITUS WITH DIABETIC POLYNEUROPATHY (HCC): Primary | ICD-10-CM

## 2021-10-26 DIAGNOSIS — I10 ESSENTIAL HYPERTENSION: ICD-10-CM

## 2021-10-26 DIAGNOSIS — E55.9 HYPOVITAMINOSIS D: ICD-10-CM

## 2021-10-26 PROCEDURE — 3052F HG A1C>EQUAL 8.0%<EQUAL 9.0%: CPT | Performed by: INTERNAL MEDICINE

## 2021-10-26 PROCEDURE — G9717 DOC PT DX DEP/BP F/U NT REQ: HCPCS | Performed by: INTERNAL MEDICINE

## 2021-10-26 PROCEDURE — 99215 OFFICE O/P EST HI 40 MIN: CPT | Performed by: INTERNAL MEDICINE

## 2021-10-26 PROCEDURE — 2022F DILAT RTA XM EVC RTNOPTHY: CPT | Performed by: INTERNAL MEDICINE

## 2021-10-26 PROCEDURE — G8427 DOCREV CUR MEDS BY ELIG CLIN: HCPCS | Performed by: INTERNAL MEDICINE

## 2021-10-26 PROCEDURE — G8756 NO BP MEASURE DOC: HCPCS | Performed by: INTERNAL MEDICINE

## 2021-10-26 PROCEDURE — 3017F COLORECTAL CA SCREEN DOC REV: CPT | Performed by: INTERNAL MEDICINE

## 2021-10-26 PROCEDURE — G9899 SCRN MAM PERF RSLTS DOC: HCPCS | Performed by: INTERNAL MEDICINE

## 2021-10-26 RX ORDER — BLOOD SUGAR DIAGNOSTIC
STRIP MISCELLANEOUS
Qty: 800 STRIP | Refills: 3 | Status: SHIPPED | OUTPATIENT
Start: 2021-10-26 | End: 2022-01-19 | Stop reason: SDUPTHER

## 2021-10-26 NOTE — TELEPHONE ENCOUNTER
----- Message from [facility] sent at 10/26/2021  1:59 PM EDT -----  Subject: Referral Request    QUESTIONS   Reason for referral request? Patient called on 10/26 in regards to needing   a referral for a back Ortho, someone that comes highly recommended and has   first availability so pt can make an appt sooner than later. Patient   wanted someone that was close to her and that accepted her insurance. Please contact pt once the order is made and put in so she can call to   make that appt. Has the physician seen you for this condition before? No   Preferred Specialist (if applicable)? Do you already have an appointment scheduled? No  Additional Information for Provider? Patient stated that she is the only   one to her phone, to please leave as much of a detailed message on vm in   case she is unable to  the phone when you call.   ---------------------------------------------------------------------------  --------------  [address]  What is the best way for the office to contact you? OK to leave message on   voicemail  Preferred Call Back Phone Number?  [phone]

## 2021-10-26 NOTE — PROGRESS NOTES
Chief Complaint   Patient presents with    Diabetes     pcp and pharmacy verified  [facility] [place]  [phone] (M)    Thyroid Problem   Records since last visit reviewed. **THIS IS A VIRTUAL VISIT VIA A VIDEO ENCOUNTER. PATIENT AGREED TO HAVE THEIR CARE DELIVERED OVER VIDEO IN PLACE OF THEIR REGULARLY SCHEDULED OFFICE VISIT**      History of Present Illness: Eris Montanez is a 61 y.o. female here for follow up of diabetes. Pt notes \"I have been a Type I diabetic for 37 years\". \"I don't count carbs, I eat what I want and most of the time I will remember to take my insulin but not all the time\". Pt notes that she was having episodes of hyperglycemia so she increased her HS Levemir to 8 units and last night she woke with a BG in the 50s. She notes she has been recovering from having the tendons in her right hip reattached. Trevor Ospina said I had torn gluteus tahir. I was released from PT last week and I am still healing. Once the right heals, I will have to have the left. After I had my surgery I was diagnosed with COVID and I dealt with that for a month. \"    \"I am having a spinal tap next week to see if my CNS leak is back. \" Pt notes that since she has been having issues of HA and \"feeling of pressure in my head\" so she will be having the testing to see if this could be the underlying cause of her symptoms. She had have her cataract surgery in July 2021. She has not been on any steroids since our last visit. Pt is taking Levemir 35 units in the AM and 6-8 units HS. She is taking Humalog 1-6 units with each meal \"depending on my sugar readings. \"I will only take about 2-3 units because any more will cause my sugars to drop. She is testing her BGs 6-8 times per day. Pt notes she was having \"knots in my thighs and stomach and the insulin did not seem to be working so I moved to giving in my arms and that seems to be working better. \"    Pt notes she is not sleeping well and she has been taking Ambien to help with sleep. One of her sons is in custodial and one \"is on the run\". She notes that when she \"gets away\" and is not under stress her BGs seem to run better. She takes her first dose of Levemir when she wakes up. She notes if her FBG is >70 she will take correction humalog. She notes that she has not been checking her pre-meal BGs lately. She has breakfast around 7-8AM She quit eating the 6 ritz crackers, but is now eating a brownie or a couple of eggs and regular pepsi. If her BG is under 100 she will take 2 units of Humalog with breakfast, she notes she rarely takes more than 3 units in the morning. She will take her dog for a walk after breakfast. \"My dog has an injury so we are not walking as far, we are walking a mile in the morning and a mile in the evening. \"  She does not eat lunch. If she gets hungry, she will snack on trail mix, popcorn or celery sticks and PB. She has dinner around 5-6PM, last night she had a chicken breast, waffle fries and water. She is not eating her dessert or snack after dinner any longer. She notes that she may have peanuts or walnuts in the evening on occasions, or some times a jia bar. Pt has hx of CVA x4, \"one of which caused a CNS leak\". She reports she had a stress test and an ECHO in 2015 and \"everything came back fine\". Pt has hx of polyneuropathy from her knees to her feet. She takes Gabapentin 200mg BID, which does help with her pain. Pt has no hx of Nephrology    Last eye exam was October 2021 [facility] says my eyes look great and he sent me to the cataract doctor for new glasses. \"    Pt has hx of osteoporosis for which she is followed by Dr. Delbert De La Fuente of Rheumatology. 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 She was never treated and it \"went away\". She is on Prolia every 6 month. Pt has hx of hypothyroidism and is taking LT4 75mcg daily.        Current Outpatient Medications   Medication Sig    zolpidem (AMBIEN) 5 mg tablet TAKE 1 TABLET BY MOUTH ONCE DAILY NIGHTLY AS NEEDED FOR SLEEP    HumaLOG KwikPen Insulin 100 unit/mL kwikpen As per scale    Insulin Needles, Disposable, (Niru Pen Needle) 32 gauge x 5/32\" ndle Use as directed with pen device - up to 5 times daily  Dx:  E11.9    gabapentin (NEURONTIN) 100 mg capsule Take 2 capsules by mouth twice daily    Insulin Needles, Disposable, (Pen Needle) 30 gauge x 5/16\" ndle Use one needle up to 8 times daily to dispense insulin. E10.65,E 10.649    clopidogreL (PLAVIX) 75 mg tab Take 1 Tab by mouth every other day for 90 days.  glucose blood VI test strips (OneTouch Ultra Blue Test Strip) strip TEST BLOOD SUGAR 8 TIMES A DAY DUE TO UNCONTROLLED SUGARS Dx : E10.42    aspirin 81 mg chewable tablet Take 81 mg by mouth daily.  insulin detemir U-100 (Levemir FlexTouch U-100 Insuln) 100 unit/mL (3 mL) inpn INJECT 25 UNITS SUBCUTANEOUSLY IN THE MORNING AND  10  UNITS  AT  NIGHT  Indications: type 1 diabetes mellitus (Patient taking differently: INJECT 30 UNITS SUBCUTANEOUSLY IN THE MORNING AND 5-6 UNITS  AT  NIGHT  Indications: type 1 diabetes mellitus)    triamcinolone acetonide (KENALOG) 0.025 % topical cream APPLY CREAM TOPICALLY TO AFFECTED AREA TWICE DAILY AS NEEDED    mupirocin (BACTROBAN) 2 % ointment APPLY ONCE DAILY TO AREAS OF OPEN SKIN UNTIL SORES HEAL UP ALSO APPLY TO TOES    metoclopramide HCl (REGLAN) 5 mg tablet Take 1 Tab by mouth two (2) times a day. Up to BID. TAKE 30 MINUTES BEFORE MEALS (Patient taking differently: Take 5 mg by mouth. TAKES 30 MINUTES AFTER A MEAL. )    levothyroxine (Levothroid) 75 mcg tablet Take 1 Tab by mouth Daily (before breakfast). Indications: a condition with low thyroid hormone levels    losartan (COZAAR) 50 mg tablet Take 1 Tab by mouth daily. Take 1 tab daily    montelukast (SINGULAIR) 10 mg tablet Take 1 Tab by mouth daily.  potassium 99 mg tablet Take 1 Tab by mouth every other day.     simvastatin (ZOCOR) 40 mg tablet Take 1 Tab by mouth nightly.  tiZANidine (ZANAFLEX) 4 mg tablet Take 1-2 Tabs by mouth three (3) times daily as needed for Muscle Spasm(s) or Pain (max dose 24 mg/day). Indications: muscle spasm (Patient taking differently: Take 4-8 mg by mouth three (3) times daily as needed for Muscle Spasm(s) or Pain (max dose 24 mg/day). TAKES ONLY AT HS, RARELY  Indications: muscle spasm)    levocetirizine (XYZAL) 5 mg tablet Take 1 Tab by mouth daily.  ascorbic acid, vitamin C, (VITAMIN C) 1,000 mg tablet Take 8,000 mg by mouth two (2) times a day.  calcium carbonate (CALTREX) 600 mg calcium (1,500 mg) tablet Take 600 mg by mouth two (2) times a day.  glucos sul 2KCl/msm/chond/C/Mn (GLUCOSAMINE CHONDROITIN PO) Take  by mouth.  MAGNESIUM PO Take  by mouth every fourty-eight (48) hours.  denosumab (PROLIA) 60 mg/mL injection 60 mg by SubCUTAneous route. Every 6 months    cholecalciferol, vitamin D3, (VITAMIN D3) 2,000 unit tab Take 1 Tab by mouth daily. 5000 units daily  Indications: low vitamin D levels    VITAMIN A PO Take 2,400 mcg by mouth nightly.  vitamin E (AQUA GEMS) 400 unit capsule Take 400 Units by mouth every Monday and Thursday. Only on M and Thurs at Bedtime    diclofenac EC (VOLTAREN) 75 mg EC tablet     traMADoL (ULTRAM) 50 mg tablet TAKE 1 TABLET BY MOUTH ONCE DAILY MAXIMUM DAILY AMOUNT 50MG     No current facility-administered medications for this visit.       No Known Allergies  Review of Systems:  - Eyes: no blurry vision or double vision  - Cardiovascular: no chest pain  - Respiratory: no shortness of breath  - Musculoskeletal: no myalgias  - Neurological: + numbness in feet    Physical Examination:  - GENERAL: NCAT, Appears well nourished   - EYES: EOMI, non-icteric, no proptosis   - Ear/Nose/Throat: NCAT, no visible inflammation or masses   - CARDIOVASCULAR: no cyanosis, no visible JVD   - RESPIRATORY: respiratory effort normal without any distress or labored breathing   - MUSCULOSKELETAL: Normal ROM of neck and upper extremities observed   - SKIN: No rash on face   - NEUROLOGIC:  No facial asymmetry (Cranial nerve 7 motor function), No gaze palsy   - PSYCHIATRIC: Normal affect, Normal insight and judgement     Data Reviewed:   None    Assessment/Plan:   1) DM > Pt notes she is having a low BGs, but with the change Pt to continue the Levermir 35 units in the AM and 8 units HS. Pt encouraged to start checking her BGs before each meal and taking her Humalog before each meal rather than waiting till after meals, when her BG is already high. With her hx of neuropathy and calluses, she would benefit from DM shoes and inserts. Because her blood sugars are so erratic and she has had frequent issues of hypoglycemia, she is testing her blood sugars 8 times per day. Will order an A1C    2) Hypothyroidism > Pt is clinically euthyroid on  LT4 75mcg daily. Will order TFTs and adjust her dose as needed. 3) Hypovitaminosis D > Pt to continue her Vitamin D 5000 units daily. Will order a Vitamin D level    4) HTN > Pt is on an ARB for renal protection. Will not make any changes at this time. Pt voices understanding and agreement with the plan. Pain noted and pt was recommended to call her PCP for further evaluation and treatment, as needed    RTC 4 months    I spent 40 minutes on this case and > 50% of the time was spent in counseling on the above issues.        Copy sent to:  Dr. Kalina Bloom

## 2021-10-27 NOTE — PROGRESS NOTES
Physical Therapy Discharge Summary    Patient name: Mariah Keith Start of Care: 9/15/21   Referral source: Nallely Juarez MD : [id number]   Medical/Treatment Diagnosis: Right hip pain [M25.551] Onset Date: 9/15/21     Prior Hospitalization: see medical history    Medications: Verified on Patient Summary List    Comorbidities: see medical history  Prior Level of Function: ambulated  Visits from Start of Care: 9    Missed Visits: 1  Reporting Period : 9/15/21 to 12/15/21    Summary of Care: Pt. Has progressed in skilled physical therapy well and has met all functional PT goals. Pt. Responded well to all functional closed chain, eccentric, and dynamic movement patterns after weight bearing restrictions were lifted. Pt. Independent with ascending/descending all/any stairs as well as able to balance with feet together on airex foam for 30 seconds with eyes closed. Pt. Reporting able to drive >93 minutes without pain in the hip as well. Pt. Has met all therapy goals and is appropriate for PT discharge. Goal: Pt. Will ambulate without the use of a RW with no apparent deviations or LOB  Status at last note/certification: Progressing  Status at discharge: met    Goal: Pt. Will be able to drive independently without pain or discomfort for >30 minutes  Status at last note/certification: progressing  Status at discharge: met    Goal: Pt. Will be able to properly brace transverse abdominis in functional transfers as well as with therapeutic exercises  Status at last note/certification: met  Status at discharge: met    The severity rating is based on clinical judgment and the FOTO score.     ASSESSMENT/RECOMMENDATIONS:  [x]Discontinue therapy: [x]Patient has reached or is progressing toward set goals      []Patient is non-compliant or has abdicated      []Due to lack of appreciable progress towards set goals    Larisa Schuster, PT, DPT, EP-C  10/27/2021

## 2021-10-28 ENCOUNTER — APPOINTMENT (OUTPATIENT)
Dept: PHYSICAL THERAPY | Age: 56
End: 2021-10-28
Payer: MEDICARE

## 2021-10-28 LAB
25(OH)D3+25(OH)D2 SERPL-MCNC: 53.4 NG/ML (ref 30–100)
ALBUMIN SERPL-MCNC: 4.2 G/DL (ref 3.8–4.9)
ALBUMIN/CREAT UR: 16 MG/G CREAT (ref 0–29)
ALBUMIN/GLOB SERPL: 1.8 {RATIO} (ref 1.2–2.2)
ALP SERPL-CCNC: 100 IU/L (ref 44–121)
ALT SERPL-CCNC: 22 IU/L (ref 0–32)
AST SERPL-CCNC: 27 IU/L (ref 0–40)
BILIRUB SERPL-MCNC: 0.6 MG/DL (ref 0–1.2)
BUN SERPL-MCNC: 14 MG/DL (ref 6–24)
BUN/CREAT SERPL: 17 (ref 9–23)
CALCIUM SERPL-MCNC: 9.3 MG/DL (ref 8.7–10.2)
CHLORIDE SERPL-SCNC: 97 MMOL/L (ref 96–106)
CHOLEST SERPL-MCNC: 135 MG/DL (ref 100–199)
CO2 SERPL-SCNC: 26 MMOL/L (ref 20–29)
CREAT SERPL-MCNC: 0.81 MG/DL (ref 0.57–1)
CREAT UR-MCNC: 41 MG/DL
EST. AVERAGE GLUCOSE BLD GHB EST-MCNC: 197 MG/DL
GLOBULIN SER CALC-MCNC: 2.3 G/DL (ref 1.5–4.5)
GLUCOSE SERPL-MCNC: 181 MG/DL (ref 65–99)
HBA1C MFR BLD: 8.5 % (ref 4.8–5.6)
HDLC SERPL-MCNC: 65 MG/DL
IMP & REVIEW OF LAB RESULTS: NORMAL
LDLC SERPL CALC-MCNC: 58 MG/DL (ref 0–99)
MICROALBUMIN UR-MCNC: 6.5 UG/ML
POTASSIUM SERPL-SCNC: 4.3 MMOL/L (ref 3.5–5.2)
PROT SERPL-MCNC: 6.5 G/DL (ref 6–8.5)
SODIUM SERPL-SCNC: 134 MMOL/L (ref 134–144)
T4 FREE SERPL-MCNC: 1.49 NG/DL (ref 0.82–1.77)
TRIGL SERPL-MCNC: 52 MG/DL (ref 0–149)
TSH SERPL DL<=0.005 MIU/L-ACNC: 1.19 UIU/ML (ref 0.45–4.5)
VLDLC SERPL CALC-MCNC: 12 MG/DL (ref 5–40)

## 2021-10-28 NOTE — TELEPHONE ENCOUNTER
Referral in place for Dr. Luz Elena Moeller. Please call patient to provide contact information . Thanks!  D

## 2021-10-29 NOTE — PROGRESS NOTES
Spoke with pt regarding her labs. Pt reports that he hip is feeling better and she plans to start exercising more. I instructed her to call if she gets any more steroid injections. Pt voices understanding and agreement with the plan.

## 2021-11-01 DIAGNOSIS — G89.29 CHRONIC MIDLINE LOW BACK PAIN, UNSPECIFIED WHETHER SCIATICA PRESENT: ICD-10-CM

## 2021-11-01 DIAGNOSIS — M79.672 LEFT FOOT PAIN: ICD-10-CM

## 2021-11-01 DIAGNOSIS — M54.50 CHRONIC MIDLINE LOW BACK PAIN, UNSPECIFIED WHETHER SCIATICA PRESENT: ICD-10-CM

## 2021-11-02 RX ORDER — TRAMADOL HYDROCHLORIDE 50 MG/1
TABLET ORAL
Qty: 30 TABLET | Refills: 0 | Status: SHIPPED | OUTPATIENT
Start: 2021-11-02 | End: 2021-12-02

## 2021-11-09 DIAGNOSIS — I49.3 PVC (PREMATURE VENTRICULAR CONTRACTION): ICD-10-CM

## 2021-11-09 RX ORDER — LEVOTHYROXINE SODIUM 75 UG/1
TABLET ORAL
Qty: 90 TABLET | Refills: 3 | Status: SHIPPED | OUTPATIENT
Start: 2021-11-09

## 2021-12-01 ENCOUNTER — TELEPHONE (OUTPATIENT)
Dept: ENDOCRINOLOGY | Age: 56
End: 2021-12-01

## 2021-12-01 RX ORDER — INSULIN DETEMIR 100 [IU]/ML
INJECTION, SOLUTION SUBCUTANEOUS
Qty: 10 ADJUSTABLE DOSE PRE-FILLED PEN SYRINGE | Refills: 5 | Status: SHIPPED | OUTPATIENT
Start: 2021-12-01 | End: 2022-01-25 | Stop reason: SDUPTHER

## 2021-12-01 NOTE — TELEPHONE ENCOUNTER
Pt called to follow up on her Rx One Touch Ultra Test strip. She needs her test strip as soon as possible.

## 2021-12-01 NOTE — TELEPHONE ENCOUNTER
Spoke with patient. She states that [facility] will not dispense 800 test strips, but Medicare only allows 300/90 days. Patient states that [facility] said that the Audit team did not receive any documentation. Advised last office notes were faxed on 10/28/21 with confirmation received. Spoke with pharmacist, Milton. She states that her pharmacy tech is handling the paperwork for Ms Anna Galvan. Milton said that she will need to call me back. Mira Primes my direct number.

## 2021-12-02 NOTE — TELEPHONE ENCOUNTER
Per Elly Figueredo has been working on this. Is at lunch until 2:30 pm. Advised will call her back then.

## 2021-12-27 DIAGNOSIS — F51.01 PRIMARY INSOMNIA: ICD-10-CM

## 2021-12-28 RX ORDER — ZOLPIDEM TARTRATE 5 MG/1
5 TABLET ORAL
Qty: 30 TABLET | Refills: 2 | Status: SHIPPED | OUTPATIENT
Start: 2021-12-28

## 2022-01-17 ENCOUNTER — TELEPHONE (OUTPATIENT)
Dept: ENDOCRINOLOGY | Age: 57
End: 2022-01-17

## 2022-01-17 NOTE — TELEPHONE ENCOUNTER
Pt notes \"they found a CNS leak in my right nostril. She is  Scheduled for 2/8/22 for repair. Pt notes she has had some low BGs fasting so she decreased her HS Levemir from 8 units to 6 units. \"I can not go to bed unless my BG is over 200 and now my BGs are higher and she notes that \"my sugars at bedtime have been under 200 so I drink Chrissy and for the last two days my BGs fasting have been in the 200-300s range. I  Recommended she decrease her HS from 6 units to 4 units and not take any food or snacks and bedtime and not try to keep her HS BG over 150. Pt to keep her AM Levemir at 28 and work on keeping her HS BG under 150. Pt voices understanding and agreement with the plan.

## 2022-01-17 NOTE — TELEPHONE ENCOUNTER
1/17/2022  11:51 AM    Patient called and would like to for dr. Buzz Myrick to call her, she has some concerns that she would like to talk to him about, she didn't go into any details.  please call her back at [phone] thanks

## 2022-01-19 ENCOUNTER — TELEPHONE (OUTPATIENT)
Dept: ENDOCRINOLOGY | Age: 57
End: 2022-01-19

## 2022-01-19 RX ORDER — BLOOD SUGAR DIAGNOSTIC
STRIP MISCELLANEOUS
Qty: 1000 STRIP | Refills: 3 | Status: SHIPPED | OUTPATIENT
Start: 2022-01-19 | End: 2022-02-22 | Stop reason: SDUPTHER

## 2022-01-19 NOTE — TELEPHONE ENCOUNTER
Pt called and LVM 1/19 @9:35am. Stated her PCP put her on steroids but it is messing with her sugars and would like to discuss this with the dr. #[phone].

## 2022-01-19 NOTE — TELEPHONE ENCOUNTER
1) While she is on the steroids I recommend she increase her morning dose of the Levemir to 40 units. I will send in a new prescription for the test strips.

## 2022-01-19 NOTE — TELEPHONE ENCOUNTER
Spoke with pt  she stated that she put on a low dose of steroids (pt does not remember the name of medication)  is now 250-HI       Insulin dose 35 units of  Levemir every morning and she has increased her evening dose to 9 unit. She is taking the normal dose of the Humalog, but is now taking more throughout the day to bring her bs down.      Due to having to check her bs more frequently, pt would like rx to be sent to her pharmacy with an increase amount of test strips     If needed, pt can be reached at [phone]

## 2022-01-24 ENCOUNTER — TELEPHONE (OUTPATIENT)
Dept: ENDOCRINOLOGY | Age: 57
End: 2022-01-24

## 2022-01-24 NOTE — TELEPHONE ENCOUNTER
1/24/2022   9:02 AM    Patient called and wants a call back from nurse about her issues with her test strips and would like to change to Luisa if possible but would like to ask dr. Roldan Mcwilliams which of the two she should do.   Please call her back at [phone] thanks

## 2022-01-25 RX ORDER — INSULIN DETEMIR 100 [IU]/ML
INJECTION, SOLUTION SUBCUTANEOUS
Qty: 10 ADJUSTABLE DOSE PRE-FILLED PEN SYRINGE | Refills: 5 | Status: SHIPPED | OUTPATIENT
Start: 2022-01-25 | End: 2022-03-30

## 2022-01-25 RX ORDER — INSULIN LISPRO 100 [IU]/ML
INJECTION, SOLUTION INTRAVENOUS; SUBCUTANEOUS
Qty: 15 ML | Refills: 5 | Status: SHIPPED | OUTPATIENT
Start: 2022-01-25

## 2022-01-25 NOTE — TELEPHONE ENCOUNTER
LANE: Spoke with patient. She would like to have a Dexcom G6 system. She also stated that she is on steroids now due to a reaction to the J&J Covid shot. She has increased her HS dose of Levemir to 13 units and takes 40 units in Am of Levemir. She wants the pharmacy to \"hold til needs\". Humalog is sliding scale. See refill encounter. This am her Blood sugar was 127. Advised will coordinate with [facility] to get the Dexcom system.

## 2022-01-26 ENCOUNTER — TRANSCRIBE ORDER (OUTPATIENT)
Dept: SCHEDULING | Age: 57
End: 2022-01-26

## 2022-01-26 DIAGNOSIS — M54.50 CHRONIC BILATERAL LOW BACK PAIN WITHOUT SCIATICA: Primary | ICD-10-CM

## 2022-01-26 DIAGNOSIS — G89.29 CHRONIC BILATERAL LOW BACK PAIN WITHOUT SCIATICA: Primary | ICD-10-CM

## 2022-02-03 ENCOUNTER — HOSPITAL ENCOUNTER (OUTPATIENT)
Dept: MRI IMAGING | Age: 57
Discharge: HOME | End: 2022-02-03
Payer: MEDICARE

## 2022-02-03 DIAGNOSIS — M54.50 CHRONIC BILATERAL LOW BACK PAIN WITHOUT SCIATICA: ICD-10-CM

## 2022-02-03 DIAGNOSIS — G89.29 CHRONIC BILATERAL LOW BACK PAIN WITHOUT SCIATICA: ICD-10-CM

## 2022-02-03 PROCEDURE — 72148 MRI LUMBAR SPINE W/O DYE: CPT

## 2022-02-09 RX ORDER — MONTELUKAST SODIUM 10 MG/1
10 TABLET ORAL DAILY
Qty: 90 TABLET | Refills: 1 | Status: SHIPPED | OUTPATIENT
Start: 2022-02-09

## 2022-02-10 ENCOUNTER — TRANSCRIBE ORDER (OUTPATIENT)
Dept: SCHEDULING | Age: 57
End: 2022-02-10

## 2022-02-10 DIAGNOSIS — M54.50 CHRONIC LOW BACK PAIN WITHOUT SCIATICA: Primary | ICD-10-CM

## 2022-02-10 DIAGNOSIS — G89.29 CHRONIC LOW BACK PAIN WITHOUT SCIATICA: Primary | ICD-10-CM

## 2022-02-16 ENCOUNTER — DOCUMENTATION ONLY (OUTPATIENT)
Dept: ENDOCRINOLOGY | Age: 57
End: 2022-02-16

## 2022-02-17 ENCOUNTER — TELEPHONE (OUTPATIENT)
Dept: ENDOCRINOLOGY | Age: 57
End: 2022-02-17

## 2022-02-17 DIAGNOSIS — E55.9 HYPOVITAMINOSIS D: ICD-10-CM

## 2022-02-17 DIAGNOSIS — I10 ESSENTIAL HYPERTENSION: ICD-10-CM

## 2022-02-17 DIAGNOSIS — E78.00 HYPERCHOLESTEROLEMIA: ICD-10-CM

## 2022-02-17 DIAGNOSIS — E03.9 ACQUIRED HYPOTHYROIDISM: ICD-10-CM

## 2022-02-17 DIAGNOSIS — E10.42 TYPE 1 DIABETES MELLITUS WITH DIABETIC POLYNEUROPATHY (HCC): Primary | ICD-10-CM

## 2022-02-17 NOTE — TELEPHONE ENCOUNTER
Pt called 2/17 @ 3:09pm. Stated she can not get a refill of her test strips though [facility] with medicare, would like dr Sabine Maza or his nurse to help to get this filled. She has 1 bottle left and then will be out. Pt# [phone].

## 2022-02-17 NOTE — TELEPHONE ENCOUNTER
[facility] that [facility] have not received medical records from here indication the need for increased blood sugar checks and increased test strip quantity. Called [facility]. They had been faxing it to the incorrect fax number. They also had 's address incorrect. Received the CMN this am. Medical records were faxed to the [facility] Audit department with confirmation received. Spoke with patient. Advised her of the above. Advised will also fax CMN to her local pharmacy. She stated that she could not afford the Dexcom system, but may be willing to try the FreeStyle Cyr 2 system. Advised will need to order through [facility]. Patient states that she will let me know if she wants it ordered.

## 2022-02-22 ENCOUNTER — TELEPHONE (OUTPATIENT)
Dept: ENDOCRINOLOGY | Age: 57
End: 2022-02-22

## 2022-02-22 RX ORDER — BLOOD SUGAR DIAGNOSTIC
STRIP MISCELLANEOUS
Qty: 800 STRIP | Refills: 3 | Status: SHIPPED | OUTPATIENT
Start: 2022-02-22 | End: 2022-03-02 | Stop reason: SDUPTHER

## 2022-02-22 NOTE — TELEPHONE ENCOUNTER
2/22/2022  9:43 AM    Patient called to get blood strips prescription sent to [facility] and her pharmacy it should say 8 times a day not 10- she will be out of strips tomorrow if not done.  Please call her back at [phone] thanks

## 2022-02-22 NOTE — TELEPHONE ENCOUNTER
Advised rx will be sent to [facility] today. ([facility] is a documentation department, not a pharmacy). Patient asks that FreeStyle Cyr 2 get ordered. (Dexcom was approved, but patient declined, due to cost). Advised will send to [facility], a DME company. Patient expressed understanding.

## 2022-02-22 NOTE — TELEPHONE ENCOUNTER
Patient wants an order sent to [facility] for FreeStyle Cyr 2 system. Order sent to [facility] electronically.

## 2022-03-01 DIAGNOSIS — M19.90 ARTHRITIS: ICD-10-CM

## 2022-03-01 DIAGNOSIS — F51.01 PRIMARY INSOMNIA: ICD-10-CM

## 2022-03-01 DIAGNOSIS — I49.3 PVC (PREMATURE VENTRICULAR CONTRACTION): ICD-10-CM

## 2022-03-01 DIAGNOSIS — R51.9 FACIAL PAIN: ICD-10-CM

## 2022-03-01 DIAGNOSIS — E10.42 TYPE 1 DIABETES MELLITUS WITH DIABETIC POLYNEUROPATHY (HCC): ICD-10-CM

## 2022-03-01 DIAGNOSIS — Z86.39 HISTORY OF HYPOGLYCEMIA: ICD-10-CM

## 2022-03-01 DIAGNOSIS — E03.9 ACQUIRED HYPOTHYROIDISM: ICD-10-CM

## 2022-03-01 DIAGNOSIS — E55.9 VITAMIN D DEFICIENCY: ICD-10-CM

## 2022-03-01 RX ORDER — GABAPENTIN 100 MG/1
CAPSULE ORAL
Qty: 120 CAPSULE | Refills: 2 | Status: CANCELLED | OUTPATIENT
Start: 2022-03-01

## 2022-03-02 ENCOUNTER — OFFICE VISIT (OUTPATIENT)
Dept: ENDOCRINOLOGY | Age: 57
End: 2022-03-02
Payer: MEDICARE

## 2022-03-02 VITALS
DIASTOLIC BLOOD PRESSURE: 61 MMHG | WEIGHT: 157 LBS | BODY MASS INDEX: 27.82 KG/M2 | SYSTOLIC BLOOD PRESSURE: 112 MMHG | HEIGHT: 63 IN | HEART RATE: 76 BPM

## 2022-03-02 DIAGNOSIS — R51.9 FACIAL PAIN: ICD-10-CM

## 2022-03-02 DIAGNOSIS — E55.9 VITAMIN D DEFICIENCY: ICD-10-CM

## 2022-03-02 DIAGNOSIS — Z86.39 HISTORY OF HYPOGLYCEMIA: ICD-10-CM

## 2022-03-02 DIAGNOSIS — F51.01 PRIMARY INSOMNIA: ICD-10-CM

## 2022-03-02 DIAGNOSIS — M19.90 ARTHRITIS: ICD-10-CM

## 2022-03-02 DIAGNOSIS — E55.9 HYPOVITAMINOSIS D: ICD-10-CM

## 2022-03-02 DIAGNOSIS — I10 ESSENTIAL HYPERTENSION: ICD-10-CM

## 2022-03-02 DIAGNOSIS — E10.42 TYPE 1 DIABETES MELLITUS WITH DIABETIC POLYNEUROPATHY (HCC): Primary | ICD-10-CM

## 2022-03-02 DIAGNOSIS — E03.9 ACQUIRED HYPOTHYROIDISM: ICD-10-CM

## 2022-03-02 DIAGNOSIS — I49.3 PVC (PREMATURE VENTRICULAR CONTRACTION): ICD-10-CM

## 2022-03-02 PROCEDURE — G8419 CALC BMI OUT NRM PARAM NOF/U: HCPCS | Performed by: INTERNAL MEDICINE

## 2022-03-02 PROCEDURE — G8754 DIAS BP LESS 90: HCPCS | Performed by: INTERNAL MEDICINE

## 2022-03-02 PROCEDURE — G9717 DOC PT DX DEP/BP F/U NT REQ: HCPCS | Performed by: INTERNAL MEDICINE

## 2022-03-02 PROCEDURE — G8752 SYS BP LESS 140: HCPCS | Performed by: INTERNAL MEDICINE

## 2022-03-02 PROCEDURE — G8427 DOCREV CUR MEDS BY ELIG CLIN: HCPCS | Performed by: INTERNAL MEDICINE

## 2022-03-02 PROCEDURE — 3017F COLORECTAL CA SCREEN DOC REV: CPT | Performed by: INTERNAL MEDICINE

## 2022-03-02 PROCEDURE — 2022F DILAT RTA XM EVC RTNOPTHY: CPT | Performed by: INTERNAL MEDICINE

## 2022-03-02 PROCEDURE — 3046F HEMOGLOBIN A1C LEVEL >9.0%: CPT | Performed by: INTERNAL MEDICINE

## 2022-03-02 PROCEDURE — G9899 SCRN MAM PERF RSLTS DOC: HCPCS | Performed by: INTERNAL MEDICINE

## 2022-03-02 PROCEDURE — 99215 OFFICE O/P EST HI 40 MIN: CPT | Performed by: INTERNAL MEDICINE

## 2022-03-02 RX ORDER — BLOOD SUGAR DIAGNOSTIC
STRIP MISCELLANEOUS
Qty: 800 STRIP | Refills: 3 | Status: SHIPPED | OUTPATIENT
Start: 2022-03-02 | End: 2022-11-03 | Stop reason: SDUPTHER

## 2022-03-02 RX ORDER — GABAPENTIN 100 MG/1
CAPSULE ORAL
Qty: 120 CAPSULE | Refills: 3 | Status: SHIPPED | OUTPATIENT
Start: 2022-03-02 | End: 2022-03-04 | Stop reason: SDUPTHER

## 2022-03-02 RX ORDER — FERROUS SULFATE TAB 325 MG (65 MG ELEMENTAL FE) 325 (65 FE) MG
TAB ORAL DAILY
COMMUNITY
Start: 2022-01-18

## 2022-03-02 NOTE — Clinical Note
3/2/2022    Patient: Berta Marvin   YOB: 1965   Date of Visit: 3/2/2022     Arti Cuevas DO  [address]  [place]     Dear Arti Cuevas DO,      Thank you for referring Ms. Theo Lemus to [facility] for evaluation. My notes for this consultation are attached. If you have questions, please do not hesitate to call me. I look forward to following your patient along with you.       Sincerely,    Karen Henriquez MD

## 2022-03-02 NOTE — PROGRESS NOTES
Chief Complaint   Patient presents with    Diabetes     pcp and pharmacy verified   Records since last visit reviewed. History of Present Illness: Mo Lynn is a 61 y.o. female here for follow up of diabetes. Pt notes \"I have been a Type I diabetic for 37 years\". \"I don't count carbs, I eat what I want and most of the time I will remember to take my insulin but not all the time\". In February 2022 she was admitted for CSF Rhinorrhea she was taken to the OR by Dr. Kristie Oh of [address] [place] [address] and Dr. Maru Delong of ENT. Antione Roa put a tube in my spine, and they took out some scar tissue from a prior leak, but they did not find any evidence of a CSF leak. They put me Abx and I am still doing Malena Pot twice per day. \"    Pt notes she still has the tendon tear in the left hip. On 2/1/22 her A1C was 8.4%. Her Urine MA/Cr was not elevated <7, Her Vitamin D was 74.9, her Cr was 0.75. On 1/31/22 her TSH was 0.7. For her she is taking Levemir 35 units in the AM and 7 units HS and Humalog \"1-6 units as needed\". She does not take her Humalog unless her BGs are in the 100s range with her pepsi and her breakfast. During the day if her BGs increase she will give correction Humalog. She is testing her BGs 8 times per day and adjusting her doses of Humalog based on the readings. She was put on a 10 day course of steroids in January 2022 because she was having a reaction to the J&J vaccine. She notes her FBGs are running in the 90-130s range. She notes that she is having higher BGs during the day so is needing to take Humalog coverage. She is taking the Humalog about 4 times per day. Pt notes she is not sleeping well and she has been taking Ambien to help with sleep. One of her sons is in prison and one \"is on the run\". She notes that when she \"gets away\" and is not under stress her BGs seem to run better.      Pt is waking around 7-8AM, he takes her Levemir 35 units and Humalog 2 units for her Pepsi and brownie she eats in the morning. She will take her dog for a walk after breakfast. \"My dog has an injury so we are not walking as far, we are walking a mile in the morning and a mile in the evening. \"  She does not eat lunch. If she gets hungry, she will snack on a banana, carrots or celery sticks and PB. She has dinner around 5PM, last night she had mixed vegetables, chicken and water. \"Ever since I got COVID all I want to do is eat, I am trying to calm that down. \"    Pt has hx of CVA x4, \"one of which caused a CNS leak\". She reports she had a stress test and an ECHO in 2015 and \"everything came back fine\". Pt has hx of polyneuropathy from her knees to her feet. She takes Gabapentin 200mg in the AM and 300mg in the evening, which does help with her pain. Pt has no hx of Nephrology    Last eye exam was October 2021 [facility] says my eyes look great and he sent me to the cataract doctor for new glasses. \"    Pt has hx of osteoporosis for which she is followed by Dr. Clover Wilson of Rheumatology. 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 She was never treated and it \"went away\". She is on Prolia every 6 month. Pt has hx of hypothyroidism and is taking LT4 75mcg daily. Current Outpatient Medications   Medication Sig    zolpidem (AMBIEN) 5 mg tablet TAKE 1 TABLET BY MOUTH ONCE DAILY NIGHTLY AS NEEDED FOR SLEEP    HumaLOG KwikPen Insulin 100 unit/mL kwikpen As per scale    Insulin Needles, Disposable, (Niru Pen Needle) 32 gauge x 5/32\" ndle Use as directed with pen device - up to 5 times daily  Dx:  E11.9    gabapentin (NEURONTIN) 100 mg capsule Take 2 capsules by mouth twice daily    Insulin Needles, Disposable, (Pen Needle) 30 gauge x 5/16\" ndle Use one needle up to 8 times daily to dispense insulin. E10.65,E 10.649    clopidogreL (PLAVIX) 75 mg tab Take 1 Tab by mouth every other day for 90 days.     glucose blood VI test strips (OneTouch Ultra Blue Test Strip) strip TEST BLOOD SUGAR 8 TIMES A DAY DUE TO UNCONTROLLED SUGARS Dx : E10.42    aspirin 81 mg chewable tablet Take 81 mg by mouth daily.  insulin detemir U-100 (Levemir FlexTouch U-100 Insuln) 100 unit/mL (3 mL) inpn INJECT 25 UNITS SUBCUTANEOUSLY IN THE MORNING AND  10  UNITS  AT  NIGHT  Indications: type 1 diabetes mellitus (Patient taking differently: INJECT 30 UNITS SUBCUTANEOUSLY IN THE MORNING AND 5-6 UNITS  AT  NIGHT  Indications: type 1 diabetes mellitus)    triamcinolone acetonide (KENALOG) 0.025 % topical cream APPLY CREAM TOPICALLY TO AFFECTED AREA TWICE DAILY AS NEEDED    mupirocin (BACTROBAN) 2 % ointment APPLY ONCE DAILY TO AREAS OF OPEN SKIN UNTIL SORES HEAL UP ALSO APPLY TO TOES    metoclopramide HCl (REGLAN) 5 mg tablet Take 1 Tab by mouth two (2) times a day. Up to BID. TAKE 30 MINUTES BEFORE MEALS (Patient taking differently: Take 5 mg by mouth. TAKES 30 MINUTES AFTER A MEAL. )    levothyroxine (Levothroid) 75 mcg tablet Take 1 Tab by mouth Daily (before breakfast). Indications: a condition with low thyroid hormone levels    losartan (COZAAR) 50 mg tablet Take 1 Tab by mouth daily. Take 1 tab daily    montelukast (SINGULAIR) 10 mg tablet Take 1 Tab by mouth daily.  potassium 99 mg tablet Take 1 Tab by mouth every other day.  simvastatin (ZOCOR) 40 mg tablet Take 1 Tab by mouth nightly.  tiZANidine (ZANAFLEX) 4 mg tablet Take 1-2 Tabs by mouth three (3) times daily as needed for Muscle Spasm(s) or Pain (max dose 24 mg/day). Indications: muscle spasm (Patient taking differently: Take 4-8 mg by mouth three (3) times daily as needed for Muscle Spasm(s) or Pain (max dose 24 mg/day). TAKES ONLY AT HS, RARELY  Indications: muscle spasm)    levocetirizine (XYZAL) 5 mg tablet Take 1 Tab by mouth daily.  ascorbic acid, vitamin C, (VITAMIN C) 1,000 mg tablet Take 8,000 mg by mouth two (2) times a day.     calcium carbonate (CALTREX) 600 mg calcium (1,500 mg) tablet Take 600 mg by mouth two (2) times a day.  glucos sul 2KCl/msm/chond/C/Mn (GLUCOSAMINE CHONDROITIN PO) Take  by mouth.  MAGNESIUM PO Take  by mouth every fourty-eight (48) hours.  denosumab (PROLIA) 60 mg/mL injection 60 mg by SubCUTAneous route. Every 6 months    cholecalciferol, vitamin D3, (VITAMIN D3) 2,000 unit tab Take 1 Tab by mouth daily. 5000 units daily  Indications: low vitamin D levels    VITAMIN A PO Take 2,400 mcg by mouth nightly.  vitamin E (AQUA GEMS) 400 unit capsule Take 400 Units by mouth every Monday and Thursday. Only on M and Thurs at Bedtime    diclofenac EC (VOLTAREN) 75 mg EC tablet     traMADoL (ULTRAM) 50 mg tablet TAKE 1 TABLET BY MOUTH ONCE DAILY MAXIMUM DAILY AMOUNT 50MG     No current facility-administered medications for this visit.       No Known Allergies  Review of Systems:  - Eyes: no blurry vision or double vision  - Cardiovascular: no chest pain  - Respiratory: no shortness of breath  - Musculoskeletal: no myalgias  - Neurological: + numbness in feet    Physical Examination:  General: pleasant, no distress, good eye contact   Neck: no carotid bruits  Cardiovascular: regular, normal rate, nl s1 and s2, no m/r/g, 2+ DP pulses   Respiratory: clear bilaterally  Integumentary: no edema, no foot ulcers  Psychiatric: normal mood and affect    Diabetic foot exam:     Left Foot:   Visual Exam: callous - present   Pulse DP: 2+ (normal)   Filament test: reduced sensation    Vibratory sensation: diminished      Right Foot:   Visual Exam: callous - present   Pulse DP: 2+ (normal)   Filament test: reduced sensation    Vibratory sensation: diminished        Data Reviewed:   Specimen:  Blood - Venous blood specimen (specimen)   Ref Range & Units 4 wk ago   Sodium 135 - 145 mmol/L 132 Low     Potassium 3.6 - 5.1 mmol/L 4.7    Potassium Comment Not Hemolyzed Not Hemolyzed    Chloride 100 - 110 mmol/L 97 Low     Carbon Dioxide 21 - 33 mmol/L 28    Anion Gap 0 - 12 mmol/L 7    Glucose 65 - 100 mg/dL 261 High     BUN 8 - 23 mg/dL 13    Creatinine 0.50 - 1.00 mg/dL 0.65    GFRcr (Estimated) >=60 mL/min/1.73m2 103    AST 0 - 50 U/L 29    ALT 0 - 50 U/L 27    Alk Phos 0 - 120 U/L 76    Bilirubin, Total 0.0 - 1.3 mg/dL 1.0    Protein, Total 6.4 - 8.5 g/dL 6.8    Albumin 3.7 - 5.2 g/dL 4.2    Globulin 1.3 - 3.5 g/dL 2.6    Calcium 8.9 - 10.7 mg/dL 9.7    Resulting Agency  [facility]MISTR[facility] LAB[facility]   Specimen Collected: 02/01/22 11:13 AM Last Resulted: 02/01/22  3:45 PM   Received From: [address]  Result Received: 03/02/22 11:10 AM       Recent Data from [facility] to Comprehensive Metabolic Panel  Component 02/01/22 01/31/22 01/31/22 01/13/22 01/13/22 12/22/21    Sodium 132 131 Low  -- 130 Low  -- --   Potassium 4.7 5.0 -- 4.5  -- --   Potassium Comment Not Hemolyzed -- -- -- -- --   Chloride 97 97 Low  -- 94 Low  -- --   Carbon Dioxide 28 -- -- -- -- --   Anion Gap 7 8 -- 12 -- --   Glucose 261 352 High  -- 97 -- 130 High    BUN 13 15 -- 17 -- --   Creatinine 0.65 0.75 -- 0.39 Low  -- --   GFRcr (Estimated) 103 -- >60  -- >60  --   AST 29 24 -- 31  -- --   ALT 27 -- -- -- -- --   Alk Phos 76 -- -- -- -- --   Bilirubin, Total 1.0 -- -- -- -- --   Protein, Total 6.8 5.9 Low  -- 7.4 -- --   Albumin 4.2 3.7 -- 4.3 -- --   Globulin 2.6 2.2 Low  -- 3.1 -- --   Calcium 9.7 9.7 -- 9.7 -- --   View all related data            Contains abnormal data Hemoglobin A1c - If Patient is Diabetic/ Has a High BMI  Specimen:  Blood - Venous blood specimen (specimen)   Ref Range & Units 4 wk ago Resulting Agency Comments   Hemoglobin A1c <5.7 % 8.4 High   [facility]MISTR[facility] Interpretive values have not been established for ages <18 years.     For ages >=18 years, non-pregnant, and normal RBC turnover:   4.0-5.7% is low risk for diabetes   5.7-6.4% is increased risk for diabetes   >=6.5% is consistent with diabetes (regardless of age) and should be confirmed with 2 non-POCT HbA1c measurements. Est Average Glucose   [facility] C[facility] LABORATORY Unable to Calculate   Specimen Collected: 02/01/22 11:13 AM Last Resulted: 02/01/22  3:19 PM   Received From: [facility]  Result Received: 03/02/22 11:10 AM     Recent Data from [facility] to Hemoglobin A1c - If Patient is Diabetic/ Has a High BMI  Component 02/01/22 12/13/20 01/30/18 03/07/17    Hemoglobin A1c 8.4  -- -- --   Est Average Glucose --  209 174 160             Vitamin D 25 Hydroxy  Specimen:  Blood   Ref Range & Units 1 mo ago Comments   VITAMIN D, 25-OH 32.0 - 100.0 ng/mL 74.9  Test Name = 25 OH Vitamin D Total   [facility]    Specimen Collected: 01/31/22 12:00 PM Last Resulted: 01/31/22 11:10 PM   Received From: [facility]  Result Received: 03/02/22 11:10 AM    View Encounter      Recent Data from [facility] to Vitamin D 25 Hydroxy  Component 01/31/22 03/07/17    VITAMIN D, 25-OH 74.9  60.8              Albumin/Creatinine Ratio, Urine  Specimen:  Urine   Ref Range & Units 1 mo ago Comments   Creatinine, Urine mg/dL 28.9  Normal reference intervals for random urine collections have not been   established and therefore do not apply. Microalb, Ur 0 - 19 mg/L <2     Alb/Creat Ratio, Ur 0 - 20 mg/gm <7     [facility]        Specimen:  Blood   Ref Range & Units 1 mo ago   TSH 0.34 - 4.70 uIU/mL 0.70    Resulting [facility]   Specimen Collected: 01/31/22 12:00 PM Last Resulted: 01/31/22 11:10 PM   Received From: [facility]  Result Received: 03/02/22 11:10 AM            Assessment/Plan:   1) DM > Her A1C in February 2022 was 8.4%. Pt notes that she is waking up with good BGs but has been higher in the afternoon, even if she does not eat anything in the afternoon. Will have pt increase the AM Levemir to 38 units, continue the 7 units HS.    Pt encouraged to start checking her BGs before each meal and taking her Humalog before each meal rather than waiting till after meals, when her BG is already high. With her hx of neuropathy and calluses, she would benefit from DM shoes and inserts. Because her blood sugars are so erratic and she has had frequent issues of hypoglycemia, she is testing her blood sugars 8 times per day. She would benefit from a CGM. Pt is testing her BGs 8 times per day and adjusting her Humalog based on the readings. Pt to continue the Gabapentin 200mg in the AM and 300mg in the evening. 2) Hypothyroidism > Pt is clinically euthyroid on  LT4 75mcg daily. Her TSH was 0.7 in January 2022. 3) Hypovitaminosis D > Pt to continue her Vitamin D 5000 units daily. Her Vitamin D level in February 2022 was 74.9.    4) HTN > Her BP today was 112/61. Pt is on an ARB for renal protection. Will not make any changes at this time. Pt voices understanding and agreement with the plan. Pain noted and pt was recommended to call her PCP for further evaluation and treatment, as needed    RTC 4 months    I spent 40 minutes on this case and > 50% of the time was spent in counseling on the above issues.        Copy sent to:  Dr. Marcin Koch

## 2022-03-03 RX ORDER — FLASH GLUCOSE SENSOR
KIT MISCELLANEOUS
Qty: 2 KIT | Refills: 3 | Status: SHIPPED | OUTPATIENT
Start: 2022-03-03 | End: 2022-07-13

## 2022-03-04 DIAGNOSIS — E55.9 VITAMIN D DEFICIENCY: ICD-10-CM

## 2022-03-04 DIAGNOSIS — E03.9 ACQUIRED HYPOTHYROIDISM: ICD-10-CM

## 2022-03-04 DIAGNOSIS — Z86.39 HISTORY OF HYPOGLYCEMIA: ICD-10-CM

## 2022-03-04 DIAGNOSIS — M19.90 ARTHRITIS: ICD-10-CM

## 2022-03-04 DIAGNOSIS — F51.01 PRIMARY INSOMNIA: ICD-10-CM

## 2022-03-04 DIAGNOSIS — R51.9 FACIAL PAIN: ICD-10-CM

## 2022-03-04 DIAGNOSIS — I49.3 PVC (PREMATURE VENTRICULAR CONTRACTION): ICD-10-CM

## 2022-03-04 DIAGNOSIS — E10.42 TYPE 1 DIABETES MELLITUS WITH DIABETIC POLYNEUROPATHY (HCC): ICD-10-CM

## 2022-03-04 RX ORDER — GABAPENTIN 100 MG/1
CAPSULE ORAL
Qty: 150 CAPSULE | Refills: 3 | Status: SHIPPED | OUTPATIENT
Start: 2022-03-04 | End: 2022-08-31

## 2022-03-09 ENCOUNTER — TELEPHONE (OUTPATIENT)
Dept: ENDOCRINOLOGY | Age: 57
End: 2022-03-09

## 2022-03-09 NOTE — TELEPHONE ENCOUNTER
Spoke with patient. She states for the past 4 days, she has been getting readings from 32-41 fasting and in the low 80s at bedtime. She states that she  does not feel comfortable unless her sugars are in the 200s at bedtime. She states that she just recently dropped her AM Levemir to 35 units and to 6 units at dinner. She takes 1-2 units of Humalog per meal (rarely takes 6 units). Patient states that she \"blacked out for an hour\" yesterday because her blood sugar dropped. Please call patient.

## 2022-03-09 NOTE — TELEPHONE ENCOUNTER
Spoke with pt regarding her readings. Pt notes that she has been treated for a yeast infection, as well as an infection she had at the site of her nasal/CNS procedure. It was after being treated that her BGs started to run lower. I agree with the changes she made with her Levemir. Pt to call back if she continues to have low BGs. Pt notes that she does not use glucagon \"that stuff does not work for [facility]". She is going to be starting a freestyle mauro.

## 2022-03-09 NOTE — TELEPHONE ENCOUNTER
Pt called 3/9 @ 9:07 am. Stated she needs to talk to the nurse about her blood sugar levels. The last few days it has been in the 30's to 40's and yesterday she stated she blacked out for about an hour. Pt# [phone].

## 2022-03-17 ENCOUNTER — TELEPHONE (OUTPATIENT)
Dept: ENDOCRINOLOGY | Age: 57
End: 2022-03-17

## 2022-03-17 NOTE — TELEPHONE ENCOUNTER
[facility] [address] [place] [address] RDE office stating that patient called stating that she \"blacked out while driving and her blood sugar went down to 30\". Advised  of the call and he said that he would call her.

## 2022-03-18 PROBLEM — M25.512 ACUTE PAIN OF LEFT SHOULDER: Status: ACTIVE | Noted: 2020-11-03

## 2022-03-18 PROBLEM — E10.621 DIABETIC ULCER OF LEFT HEEL ASSOCIATED WITH TYPE 1 DIABETES MELLITUS, WITH FAT LAYER EXPOSED (HCC): Status: ACTIVE | Noted: 2020-04-27

## 2022-03-18 PROBLEM — R20.0 LEFT FACIAL NUMBNESS: Status: ACTIVE | Noted: 2021-05-25

## 2022-03-18 PROBLEM — M70.61 TROCHANTERIC BURSITIS OF RIGHT HIP: Status: ACTIVE | Noted: 2021-03-29

## 2022-03-18 PROBLEM — G89.4 CHRONIC PAIN SYNDROME: Status: ACTIVE | Noted: 2019-02-26

## 2022-03-18 PROBLEM — L84 HEEL CALLUS: Status: ACTIVE | Noted: 2020-05-26

## 2022-03-18 PROBLEM — S90.852A SPLINTER OF LEFT FOOT: Status: ACTIVE | Noted: 2020-05-26

## 2022-03-18 PROBLEM — L97.422 DIABETIC ULCER OF LEFT HEEL ASSOCIATED WITH TYPE 1 DIABETES MELLITUS, WITH FAT LAYER EXPOSED (HCC): Status: ACTIVE | Noted: 2020-04-27

## 2022-03-18 NOTE — TELEPHONE ENCOUNTER
Spoke with pt, she notes that she had an episode of another low blood sugar to the 40s. She notes that she had a slurpie and took Humalog 4 units and an hour later her BG was dropping. She ate PB with sugars, crackers and OJ and her BG climbed to the 300s. I spoke with her about BG patterns and she reported that if she skipped her HS Levemir her BG would run very high, but 7 units seems to have caused low BGs in the AM so she decreased her dose to 6 units. When I asked about taking her 35 units of Levemir in the AM and then fasting all day, she reported that she would drop too low. I explained that this would suggest her AM Levemir dose is too high, so I recommended she decrease the AM Levemir to 30 units, continue the 6 units HS and call next week with her BG readings. Pt voices understanding and agreement with the plan.

## 2022-03-19 PROBLEM — R42 DIZZINESS: Status: ACTIVE | Noted: 2021-05-25

## 2022-03-19 PROBLEM — I63.9 CVA (CEREBRAL VASCULAR ACCIDENT) (HCC): Status: ACTIVE | Noted: 2021-05-23

## 2022-03-19 PROBLEM — Z86.39 HISTORY OF HYPOGLYCEMIA: Status: ACTIVE | Noted: 2018-04-03

## 2022-03-19 PROBLEM — M15.0 PRIMARY OSTEOARTHRITIS INVOLVING MULTIPLE JOINTS: Status: ACTIVE | Noted: 2018-04-03

## 2022-03-19 PROBLEM — J01.10 SUBACUTE FRONTAL SINUSITIS: Status: ACTIVE | Noted: 2019-03-20

## 2022-03-19 PROBLEM — M15.9 PRIMARY OSTEOARTHRITIS INVOLVING MULTIPLE JOINTS: Status: ACTIVE | Noted: 2018-04-03

## 2022-03-19 PROBLEM — M19.90 ARTHRITIS: Status: ACTIVE | Noted: 2017-06-26

## 2022-03-19 PROBLEM — R07.9 CHEST PAIN: Status: ACTIVE | Noted: 2021-05-25

## 2022-03-19 PROBLEM — M79.671 PAIN IN RIGHT FOOT: Status: ACTIVE | Noted: 2018-05-03

## 2022-03-19 PROBLEM — M70.62 TROCHANTERIC BURSITIS OF LEFT HIP: Status: ACTIVE | Noted: 2021-03-29

## 2022-03-19 PROBLEM — I70.90 ATHEROSCLEROSIS OF ARTERY: Status: ACTIVE | Noted: 2019-08-09

## 2022-03-19 PROBLEM — M85.80 OSTEOPENIA: Status: ACTIVE | Noted: 2017-06-12

## 2022-03-19 PROBLEM — E03.9 HYPOTHYROID: Status: ACTIVE | Noted: 2018-04-03

## 2022-03-19 PROBLEM — J30.9 CHRONIC ALLERGIC RHINITIS: Status: ACTIVE | Noted: 2018-05-03

## 2022-03-19 PROBLEM — M70.60 GREATER TROCHANTERIC BURSITIS: Status: ACTIVE | Noted: 2020-01-10

## 2022-03-19 PROBLEM — Z13.5 DIABETIC RETINOPATHY SCREENING: Status: ACTIVE | Noted: 2018-11-06

## 2022-03-19 PROBLEM — G50.0 TRIGEMINAL NEURALGIA: Status: ACTIVE | Noted: 2019-06-01

## 2022-03-19 PROBLEM — L85.1 ACQUIRED PLANTAR KERATODERMA: Status: ACTIVE | Noted: 2018-11-05

## 2022-03-20 PROBLEM — E11.42 DIABETIC PERIPHERAL NEUROPATHY ASSOCIATED WITH TYPE 2 DIABETES MELLITUS (HCC): Status: ACTIVE | Noted: 2021-07-13

## 2022-03-20 PROBLEM — F32.9 REACTIVE DEPRESSION: Status: ACTIVE | Noted: 2019-02-26

## 2022-03-20 PROBLEM — E10.42 TYPE 1 DIABETES MELLITUS WITH DIABETIC POLYNEUROPATHY (HCC): Status: ACTIVE | Noted: 2018-04-03

## 2022-03-20 PROBLEM — G45.9 TIA (TRANSIENT ISCHEMIC ATTACK): Status: ACTIVE | Noted: 2020-11-24

## 2022-03-20 PROBLEM — Z98.890 HX OF BRAIN SURGERY: Status: ACTIVE | Noted: 2018-09-27

## 2022-03-20 PROBLEM — M25.559 HIP PAIN: Status: ACTIVE | Noted: 2020-11-03

## 2022-03-20 PROBLEM — G96.01 CSF LEAK FROM NOSE: Status: ACTIVE | Noted: 2021-07-13

## 2022-03-23 ENCOUNTER — TRANSCRIBE ORDER (OUTPATIENT)
Dept: REGISTRATION | Age: 57
End: 2022-03-23

## 2022-03-23 ENCOUNTER — HOSPITAL ENCOUNTER (OUTPATIENT)
Dept: GENERAL RADIOLOGY | Age: 57
Discharge: HOME | End: 2022-03-23
Payer: MEDICARE

## 2022-03-23 DIAGNOSIS — S09.92XS INJURY OF NOSE, SEQUELA: Primary | ICD-10-CM

## 2022-03-23 DIAGNOSIS — S09.92XS INJURY OF NOSE, SEQUELA: ICD-10-CM

## 2022-03-23 PROCEDURE — 70160 X-RAY EXAM OF NASAL BONES: CPT

## 2022-03-23 PROCEDURE — 70220 X-RAY EXAM OF SINUSES: CPT

## 2022-03-24 ENCOUNTER — TRANSCRIBE ORDER (OUTPATIENT)
Dept: SCHEDULING | Age: 57
End: 2022-03-24

## 2022-03-24 DIAGNOSIS — G45.4 AMNESIA, GLOBAL, TRANSIENT: ICD-10-CM

## 2022-03-24 DIAGNOSIS — R41.3 MEMORY CHANGES: Primary | ICD-10-CM

## 2022-03-28 ENCOUNTER — TELEPHONE (OUTPATIENT)
Dept: ENDOCRINOLOGY | Age: 57
End: 2022-03-28

## 2022-03-28 NOTE — TELEPHONE ENCOUNTER
LANE: Spoke with patient. She said she forgot to call. She is at [facility] ER right now. She states that her tongue felt big, legs felt like spaghetti. She was afraid that she was having a stroke. She has felt worse since we last spoke. Has had a Chest xray and she was being taken to CT as we were speaking. Advised her to call me with an update in a day or two. She agreed.

## 2022-03-28 NOTE — TELEPHONE ENCOUNTER
Spoke with patient. She states that at 10 pm last night her blood sugar was either 146 or 164. Goes to bed between 11:30 pm and midnight. She states that she \"blacked out\" at 11 pm and \"came to\" at 1:45 am. Her blood sugar ws 32. She states that she ate 4 small peppermint patties, drank grape Chrissy, poured sugar in her mouth. At 2:15 am her blood sugar was just above 100. She drank more Chrissy. At 8:20 am today her blood sugar was 469. She states she took 2 units of Humalog (she only takes Humalog PRN if her blood sugar is over 200-250 she takes 1 unit, if 300-400 she takes 2 units). She takes Levemir 30 units in Am and 6 units at 5pm-6pm each night (not at HS). Last night she took 1 extra unit because her blood sugar was \"high\". Sometimes when her sugars are running \"low\", she will drop Levemir to 5 units. She states that she just checked her blood sugar and it was 352. She states her head feels \"funny\", is having a hard formulating the words she wants to say and her legs feel like spaghetti. Unsure if needs to go to ER. Please advise.

## 2022-03-28 NOTE — TELEPHONE ENCOUNTER
Spoke with patient. She states that she just drank a bottle of water (50+ ounces) and was getting ready to drink more. She was read the advice from [address] [place] [address]. She states that 1 unit of Humalog usually brings her blood sugar down 100 points and is hesitant to take 4 units. She usually takes 2 units if above 300-350. She states will take 2 units of Humalog. She states that she will call me in 4 hours to report blood sugar reading and how she is feeling. Gave her my direct telephone number. Advised that there is a doctor on call 24/7 and how to reach that doctor. She expressed understanding. Will await her call.

## 2022-03-28 NOTE — TELEPHONE ENCOUNTER
Pt called 3/28 @ 9:44am. Stated she had another blackout episode 3/27. She stated her sugar levels at 10pm were 164 right before it happened and when she came to at 1:45am it was down to 32. Pot# [phone].

## 2022-03-28 NOTE — TELEPHONE ENCOUNTER
I would recommend she drink at least 16 oz of water to help flush her system to bring down her sugars and take another 4 units of humalog now. Did she take the 30 units of levemir yet this morning? If not, have her do this too. Have her give you an update in 4 hours with how she is feeling and what her sugar is and hopefully we can keep her out of the ER and then we'll make a plan of what to do tonight to avoid having a low sugar overnight.

## 2022-03-30 ENCOUNTER — DOCUMENTATION ONLY (OUTPATIENT)
Dept: ENDOCRINOLOGY | Age: 57
End: 2022-03-30

## 2022-03-30 RX ORDER — INSULIN DETEMIR 100 [IU]/ML
INJECTION, SOLUTION SUBCUTANEOUS
Qty: 10 ADJUSTABLE DOSE PRE-FILLED PEN SYRINGE | Refills: 5
Start: 2022-03-30 | End: 2022-07-13 | Stop reason: SDUPTHER

## 2022-03-30 NOTE — TELEPHONE ENCOUNTER
PATI: Called to check on patient. She states was sent home from ER. She was told that her magnesium was low. Asked her if she got the [employer] [facility]. She said that the pharmacist at [facility] gave her a sensor and reader (with a coupon). The sensor fell off because she rubbed it with a towel after a shower. She said the pharmacist will give her another. Patient states that yesterday evening around 8 pm her blood sugar was over 400. She took 1 unit of Humalog. At 12:30 am her blood sugar went down to 300+. She was afraid to take any more insulin. This am her blood sugar was 100. She ate carrots with ranch dressing for lunch. Forgot to check her blood sugar. She did not take any insulin at lunch. She said she took 32 units of Levemir this am. She took 6 units at 5-6 pm with dinner yesterday. She was told that this message will be given to  and  to see when he returns next week. Patient expressed understanding. She was informed that there is an endo on call 24/7. She states is so afraid of blacking out due to low blood sugars during the night. She is anxious to get another sensor from [facility].

## 2022-03-30 NOTE — TELEPHONE ENCOUNTER
To be safe I would recommend she decrease her levemir starting tomorrow morning to 32 units and don't take any at night and give Dr. Claudette Cheese an update when he returns next week.

## 2022-03-31 ENCOUNTER — HOSPITAL ENCOUNTER (OUTPATIENT)
Dept: INFUSION THERAPY | Age: 57
Discharge: HOME | End: 2022-03-31
Payer: MEDICARE

## 2022-03-31 VITALS
SYSTOLIC BLOOD PRESSURE: 127 MMHG | DIASTOLIC BLOOD PRESSURE: 74 MMHG | BODY MASS INDEX: 27.8 KG/M2 | TEMPERATURE: 97.2 F | RESPIRATION RATE: 18 BRPM | OXYGEN SATURATION: 99 % | WEIGHT: 156.9 LBS | HEART RATE: 87 BPM | HEIGHT: 63 IN

## 2022-03-31 DIAGNOSIS — M85.89 OSTEOPENIA OF MULTIPLE SITES: Primary | ICD-10-CM

## 2022-03-31 LAB
ANION GAP BLD CALC-SCNC: 8 MMOL/L (ref 10–20)
CA-I BLD-MCNC: 1.26 MMOL/L (ref 1.12–1.32)
CHLORIDE BLD-SCNC: 98 MMOL/L (ref 98–107)
CO2 BLD-SCNC: 28.6 MMOL/L (ref 21–32)
CREAT BLD-MCNC: 0.62 MG/DL (ref 0.6–1.3)
GLUCOSE BLD-MCNC: 309 MG/DL (ref 65–100)
POTASSIUM BLD-SCNC: 4.8 MMOL/L (ref 3.5–5.1)
SERVICE CMNT-IMP: ABNORMAL
SODIUM BLD-SCNC: 134 MMOL/L (ref 136–145)

## 2022-03-31 PROCEDURE — 74011250636 HC RX REV CODE- 250/636: Performed by: PEDIATRICS

## 2022-03-31 PROCEDURE — 96372 THER/PROPH/DIAG INJ SC/IM: CPT

## 2022-03-31 PROCEDURE — 80047 BASIC METABLC PNL IONIZED CA: CPT

## 2022-03-31 RX ORDER — ONDANSETRON 2 MG/ML
8 INJECTION INTRAMUSCULAR; INTRAVENOUS AS NEEDED
Status: CANCELLED | OUTPATIENT
Start: 2022-09-29

## 2022-03-31 RX ORDER — EPINEPHRINE 1 MG/ML
0.3 INJECTION, SOLUTION, CONCENTRATE INTRAVENOUS AS NEEDED
Status: CANCELLED | OUTPATIENT
Start: 2022-09-29

## 2022-03-31 RX ORDER — DIPHENHYDRAMINE HYDROCHLORIDE 50 MG/ML
25 INJECTION, SOLUTION INTRAMUSCULAR; INTRAVENOUS AS NEEDED
Status: CANCELLED
Start: 2022-09-29

## 2022-03-31 RX ORDER — ACETAMINOPHEN 325 MG/1
650 TABLET ORAL AS NEEDED
Status: CANCELLED
Start: 2022-09-29

## 2022-03-31 RX ORDER — DIPHENHYDRAMINE HYDROCHLORIDE 50 MG/ML
50 INJECTION, SOLUTION INTRAMUSCULAR; INTRAVENOUS AS NEEDED
Status: CANCELLED
Start: 2022-09-29

## 2022-03-31 RX ORDER — ALBUTEROL SULFATE 0.83 MG/ML
2.5 SOLUTION RESPIRATORY (INHALATION) AS NEEDED
Status: CANCELLED
Start: 2022-09-29

## 2022-03-31 RX ORDER — HYDROCORTISONE SODIUM SUCCINATE 100 MG/2ML
100 INJECTION, POWDER, FOR SOLUTION INTRAMUSCULAR; INTRAVENOUS AS NEEDED
Status: CANCELLED | OUTPATIENT
Start: 2022-09-29

## 2022-03-31 RX ADMIN — DENOSUMAB 60 MG: 60 INJECTION SUBCUTANEOUS at 11:19

## 2022-03-31 NOTE — PROGRESS NOTES
[facility] Progress Note    1269  Pt arrived in stable condition and in no distress for Prolia    Assessment completed. Patient denied having any symptoms of COVID-19, i.e. SOB, coughing, fever, or generally not feeling well. Also denies having been exposed to COVID-19 recently or having had any recent contact with family/friend that has a pending COVID test.    Labs obtained per order and sent for processing. Patient Vitals for the past 12 hrs:   Temp Pulse Resp BP SpO2   03/31/22 1055 97.2 °F (36.2 °C) 87 18 127/74 99 %        Recent Results (from the past 12 hour(s))   POC CHEM8    Collection Time: 03/31/22 11:01 AM   Result Value Ref Range    Calcium, ionized (POC) 1.26 1.12 - 1.32 mmol/L    Sodium (POC) 134 (L) 136 - 145 mmol/L    Potassium (POC) 4.8 3.5 - 5.1 mmol/L    Chloride (POC) 98 98 - 107 mmol/L    CO2 (POC) 28.6 21 - 32 mmol/L    Anion gap (POC) 8 (L) 10 - 20 mmol/L    Glucose (POC) 309 (H) 65 - 100 mg/dL    Creatinine (POC) 0.62 0.6 - 1.3 mg/dL    GFRAA, POC >60 >60 ml/min/1.73m2    GFRNA, POC >60 >60 ml/min/1.73m2    Comment Comment Not Indicated. The following medications administered:  Medications Administered     denosumab (PROLIA) injection 60 mg     Admin Date  03/31/2022 Action  Given Dose  60 mg Route  SubCUTAneous Administered By  Re Dawn RN              Pt tolerated treatment well. No s/s of adverse reaction noted. Pt provided with education on possible side effects of medication along with discharge instructions. Pt verbalized understanding. 1120  Pt discharged in no acute distress.  Next appointment:    Future Appointments   Date Time Provider Manny Rodriguez   4/7/2022  4:00 PM OLVIN MRI 2 [facility] ROBERTO GAMBOA NOEMÍ   7/13/2022 11:50 AM Tish Patterson MD RDE [facility] [address] BS AMB   9/29/2022 11:00 AM ELVIN SYLVESTER [address] [place] [address]

## 2022-04-07 ENCOUNTER — HOSPITAL ENCOUNTER (OUTPATIENT)
Dept: MRI IMAGING | Age: 57
Discharge: HOME | End: 2022-04-07
Attending: INTERNAL MEDICINE
Payer: MEDICARE

## 2022-04-07 DIAGNOSIS — R41.3 MEMORY CHANGES: ICD-10-CM

## 2022-04-07 DIAGNOSIS — G45.4 AMNESIA, GLOBAL, TRANSIENT: ICD-10-CM

## 2022-04-07 PROCEDURE — 70551 MRI BRAIN STEM W/O DYE: CPT

## 2022-04-15 ENCOUNTER — TELEPHONE (OUTPATIENT)
Dept: ENDOCRINOLOGY | Age: 57
End: 2022-04-15

## 2022-04-15 NOTE — TELEPHONE ENCOUNTER
See blood sugar log. She states that she dropped Levemir to 30 units in Am between 7-8 am and 5 units at 11:30 pm.  No Humalog in the Am. Will take Humalog only if above 250 in the late afternoon.

## 2022-04-15 NOTE — TELEPHONE ENCOUNTER
I recommend she decrease her Levemir at night from 5 units down to 3 units. Send us some more sugar readings in a week.

## 2022-04-15 NOTE — TELEPHONE ENCOUNTER
4/15/2022  8:36 AM        Pt stated for the last 2 days her sugar have been going low instead of staying medium or higher. Pt would like to know what would  like her to do.   [place]#[phone]      Thanks,  Rodrigo Ferrera

## 2022-05-04 ENCOUNTER — DOCUMENTATION ONLY (OUTPATIENT)
Dept: ENDOCRINOLOGY | Age: 57
End: 2022-05-04

## 2022-05-11 ENCOUNTER — TRANSCRIBE ORDER (OUTPATIENT)
Dept: SCHEDULING | Age: 57
End: 2022-05-11

## 2022-05-11 DIAGNOSIS — S76.011A TEAR OF RIGHT GLUTEUS MEDIUS TENDON, INITIAL ENCOUNTER: Primary | ICD-10-CM

## 2022-05-12 ENCOUNTER — HOSPITAL ENCOUNTER (OUTPATIENT)
Dept: PHYSICAL THERAPY | Age: 57
Discharge: HOME | End: 2022-05-12
Payer: MEDICARE

## 2022-05-12 PROCEDURE — 97014 ELECTRIC STIMULATION THERAPY: CPT

## 2022-05-12 PROCEDURE — 97162 PT EVAL MOD COMPLEX 30 MIN: CPT

## 2022-05-12 NOTE — PROGRESS NOTES
[facility]  [address] [facility], [address] [facility]:  [phone]  FAX: [phone]      Plan of Care/ Statement of Necessity for Physical Therapy Services    Patient name: Kari Reed Start of Care: 2022   Referral source: Ghazal Booth MD : 1965    Medical Diagnosis: Low back pain [M54.50]   Onset Date:at least two years    Treatment Diagnosis: low back pain and tear of left gluteus medius tendon    Prior Hospitalization: see medical history Provider#: [id number]   Medications: Verified on Patient summary List    Comorbidities:  Surgical Hx:   Past Medical History:   Diagnosis Date    Allergic rhinitis    Atherosclerosis of artery   see JEREMI Jacobo NP, [address] 2021    Broken ribs    Cataracts, bilateral   having surgery 2021 for left cataract. Surgery in [place] at a eye center per patient.  Chronic pain syndrome    Diabetic retinopathy (CMS/HCC)    Disease of thyroid gland    Earache    History of hypoglycemia    HLD (hyperlipidemia)    Hypercholesterolemia    Hypothyroidism    Insomnia    Osteopenia   see JEREMI Jacobo NP, [address] 2021    Other spinal cerebrospinal fluid leak   see JEREMI Jacobo NP, OV 2021    Polyneuropathy   from knees to her feet per Dr. Ayo Kemp 2021    Pressure in head   See Butch Kamara NP, OV 2021    Primary osteoarthritis involving multiple joints    Reactive depression    Reactive depression    Right lower quadrant abdominal pain 2021    Stroke (CMS/HCC)   x6 TIA's - 2 in 2020 - no residual effects    Trigeminal neuralgia   see JEREMI Jacobo NP, [address] 2021    Type 1 diabetes mellitus (CMS/HCC)   see Dr. Ayo Kemp 2021    Type 1 diabetes mellitus with diabetic polyneuropathy (CMS/HCC)   see JEREMI Jacobo NP OV 2021    Vitamin D deficiency    Prior Level of Function: independent without limits or devices     The Plan of Care and following information is based on the information from the initial evaluation. Assessment/ key information: Patient presents with significant pain and loss of function  Evaluation Complexity History HIGH Complexity :3+ comorbidities / personal factors will impact the outcome/ POC ; Examination MEDIUM Complexity : 3 Standardized tests and measures addressing body structure, function, activity limitation and / or participation in recreation  ;Presentation MEDIUM Complexity : Evolving with changing characteristics  ; Clinical Decision Making MEDIUM Complexity : FOTO score of 26-74  Overall Complexity Rating: MEDIUM  Problem List: pain affecting function, decrease strength and impaired gait/ balance   Treatment Plan may include any combination of the following: Therapeutic exercise, Therapeutic activities, Physical agent/modality, Gait/balance training and Patient education  Patient / Family readiness to learn indicated by: asking questions, trying to perform skills and interest  Persons(s) to be included in education: patient (P)  Barriers to Learning/Limitations: yes; Emotional(anxious)  Patient Goal (s): less pain  Patient Self Reported Health Status: fair  Rehabilitation Potential: fair to good for following goals  Short Term Goals: To be accomplished in 8 treatments:   1. Patient will be compliant with and adhere to HEP              2. Patient will report pain severity of low back to be 2/10 at most               3. Patient will be able to demonstrate posterior pelvic tilt during ambulation  Long Term Goals: To be accomplished in 16 treatments:   1. Patient will increase FOTO score by 20 points               2. Patient will report pain severity of low back to be 0-1/10 at most              3. Patient will be able to ambulate without any low back pain  Frequency / Duration: Patient to be seen 2 times per week for 16 treatments.     Patient/ Caregiver education and instruction: Diagnosis, prognosis, exercises   []  Plan of care has been reviewed with NITHYA Patel Head, PT 5/12/2022     ________________________________________________________________________    I certify that the above Therapy Services are being furnished while the patient is under my care. I agree with the treatment plan and certify that this therapy is necessary. Physician's Signature:____________________  Date:____________Time: _________           Sayra Mcclendon MD    Please sign and return to:  [facility]  [address] [place], [place], [address] [facility]:  [address] [place] [address] Street: [phone]

## 2022-05-17 ENCOUNTER — APPOINTMENT (OUTPATIENT)
Dept: PHYSICAL THERAPY | Age: 57
End: 2022-05-17
Payer: MEDICARE

## 2022-05-24 ENCOUNTER — HOSPITAL ENCOUNTER (OUTPATIENT)
Dept: PHYSICAL THERAPY | Age: 57
Discharge: HOME | End: 2022-05-24
Payer: MEDICARE

## 2022-05-24 PROCEDURE — 97140 MANUAL THERAPY 1/> REGIONS: CPT

## 2022-05-24 PROCEDURE — 97110 THERAPEUTIC EXERCISES: CPT

## 2022-05-24 NOTE — PROGRESS NOTES
PT DAILY TREATMENT NOTE - [facility]     Patient Name: Steffanie Celestin  Date:2022  : 1965  [x]  Patient  Verified  Payor: Foster Mckinney / Plan: [place] / Product Type: Medicare /    In time:1315  Out time:1406  Total Treatment Time (min): 51  Total Timed Codes (min): 51  1:1 Treatment Time ( only): 46   Visit #: 2 of 16    Treatment Area: Low back pain [M54.50]    SUBJECTIVE  Pain Level (0-10 scale): 4  Any medication changes, allergies to medications, adverse drug reactions, diagnosis change, or new procedure performed?: [x] No    [] Yes (see summary sheet for update)  Subjective functional status/changes:   [] No changes reported  I was in heaven after the Electrical thing, but the pain was 2x worst that night     OBJECTIVE    41 min Therapeutic Exercise:  [] See flow sheet :   Rationale: increase ROM and increase strength to improve the patients ability to PPT    10 min Manual Therapy:  Grade 2 AP glides for TS-LS    Rationale: increase ROM and increase tissue extensibility to assist with positioning             With   [x] TE   [] TA   [] neuro   [] other: Patient Education: [x] Review HEP    [] Progressed/Changed HEP based on:   [] positioning   [] body mechanics   [] transfers   [] heat/ice application    [] other:      Pain Level (0-10 scale) post treatment: 2/10    ASSESSMENT/Changes in Function: Pt tolerates start of TE and light manual. Per pt's request, no modalities used. Pt report low pain through session and is looking forward to next treatment. Patient will continue to benefit from skilled PT services to address ROM deficits, address strength deficits and analyze and address soft tissue restrictions to attain remaining goals.      [x]  See Plan of Care  []  See progress note/recertification  []  See Discharge Summary          PLAN  [x]  Upgrade activities as tolerated     []  Continue plan of care  []  Update interventions per flow sheet       []  Discharge due to:_  [] Other:_      Khanh Felix, PTA 5/24/2022

## 2022-05-25 ENCOUNTER — HOSPITAL ENCOUNTER (OUTPATIENT)
Dept: MRI IMAGING | Age: 57
Discharge: HOME | End: 2022-05-25
Payer: MEDICARE

## 2022-05-25 DIAGNOSIS — S76.011A TEAR OF RIGHT GLUTEUS MEDIUS TENDON, INITIAL ENCOUNTER: ICD-10-CM

## 2022-05-25 PROCEDURE — 73721 MRI JNT OF LWR EXTRE W/O DYE: CPT

## 2022-05-26 ENCOUNTER — HOSPITAL ENCOUNTER (OUTPATIENT)
Dept: PHYSICAL THERAPY | Age: 57
Discharge: HOME | End: 2022-05-26
Payer: MEDICARE

## 2022-05-26 PROCEDURE — 97014 ELECTRIC STIMULATION THERAPY: CPT

## 2022-05-26 PROCEDURE — 97110 THERAPEUTIC EXERCISES: CPT

## 2022-05-26 NOTE — PROGRESS NOTES
PT DAILY TREATMENT NOTE - [facility]     Patient Name: Darrin Portal  Date:2022  : 1965  [x]  Patient  Verified  Payor: Abelpavel Zeyad / Plan: [place] MEDICARE PART A & B / Product Type: Medicare /    In time:1405  Out time:1445  Total Treatment Time (min): 40  Total Timed Codes (min): 40  1:1 Treatment Time ( [address] only): 40   Visit #: 3 of 16    Treatment Area: Low back pain [M54.50]    SUBJECTIVE  Pain Level (0-10 scale): 3  Any medication changes, allergies to medications, adverse drug reactions, diagnosis change, or new procedure performed?: [x] No    [] Yes (see summary sheet for update)  Subjective functional status/changes:   [] No changes reported  I had to drive all the way to [place] and back> (~2 hr). My hips were hurting all evening.      OBJECTIVE    Modality rationale: decrease pain and increase tissue extensibility to improve the patients ability to sit without discomfort    Min Type Additional Details   15 [x] Estim:  [x]Unatt       [x]IFC  []Premod                        []Other:  []w/ice   [x]w/heat  Position:Supine   Location:LS and hips     [] Estim: []Att    []TENS instruct  []NMES                    []Other:  []w/US   []w/ice   []w/heat  Position:  Location:    []  Traction: [] Cervical       []Lumbar                       [] Prone          []Supine                       []Intermittent   []Continuous Lbs:  [] before manual  [] after manual    []  Ultrasound: []Continuous   [] Pulsed                           []1MHz   []3MHz W/cm2:  Location:    []  Iontophoresis with dexamethasone         Location: [] Take home patch   [] In clinic    []  Ice     []  heat  []  Ice massage  []  Laser   []  Anodyne Position:  Location:    []  Laser with stim  []  Other:  Position:  Location:    []  Vasopneumatic Device Pressure:       [] lo [] med [] hi   Temperature: [] lo [] med [] hi   [] Skin assessment post-treatment:  [x]intact [x]redness- no adverse reaction    []redness - adverse reaction:       25 min Therapeutic Exercise:  [] See flow sheet :   Rationale: increase ROM and increase strength to improve the patients ability to stabilize spine           With   [x] TE   [] TA   [] neuro   [] other: Patient Education: [x] Review HEP    [x] Progressed/Changed HEP based on:   [] positioning   [] body mechanics   [] transfers   [] heat/ice application    [] other:        Pain Level (0-10 scale) post treatment: 1    ASSESSMENT/Changes in Function: Pt enters session reporting decreasing subjective discomfort, but agreeable to session. HEP reviewed and pt provided with stretches, after IFC for LS/Gluteal regions. Pt reports feeling of uneven distribution of stim. May attempt Premod on Follow up, if needed. Pt reports feeling good at end of session    Patient will continue to benefit from skilled PT services to address ROM deficits and address strength deficits to attain remaining goals. [x]  See Plan of Care  []  See progress note/recertification  []  See Discharge Summary         Short Term Goals: To be accomplished in 8 treatments:              1. Patient will be compliant with and adhere to HEP. Progressing              2. Patient will report pain severity of low back to be 2/10 at most. Not Met               3. Patient will be able to demonstrate posterior pelvic tilt during ambulation. Not Met  Long Term Goals:  To be accomplished in 16 treatments:              1. Patient will increase FOTO score by 20 points               2. Patient will report pain severity of low back to be 0-1/10 at most              3. Patient will be able to ambulate without any low back pain    PLAN  [x]  Upgrade activities as tolerated     []  Continue plan of care  []  Update interventions per flow sheet       []  Discharge due to:_  []  Other:_      Maxim Bui, PTA 5/26/2022

## 2022-06-02 ENCOUNTER — HOSPITAL ENCOUNTER (OUTPATIENT)
Dept: PHYSICAL THERAPY | Age: 57
Discharge: HOME | End: 2022-06-02
Payer: MEDICARE

## 2022-06-02 PROCEDURE — 97140 MANUAL THERAPY 1/> REGIONS: CPT

## 2022-06-02 PROCEDURE — 97110 THERAPEUTIC EXERCISES: CPT

## 2022-06-02 NOTE — PROGRESS NOTES
PT DAILY TREATMENT NOTE - [facility]     Patient Name: Darrin Portal  Date:2022  : 1965  [x]  Patient  Verified  Payor: [place] MEDICARE / Plan: [place] MEDICARE PART A & B / Product Type: Medicare /    In time:1445  Out time:1535  Total Treatment Time (min): 50  Total Timed Codes (min): 25  1:1 Treatment Time ([facility] only): 30   Visit #: 4 of 16    Treatment Area: Low back pain [M54.50]    SUBJECTIVE  Pain Level (0-10 scale): 8  Any medication changes, allergies to medications, adverse drug reactions, diagnosis change, or new procedure performed?: [x] No    [] Yes (see summary sheet for update)  Subjective functional status/changes:   [] No changes reported  My back is really hurting. I was in the car all day. (Pt reports having seen Ortho today. Notes complete tear of a muscle.  Awaiting notes and orders, before resuming exercise)    OBJECTIVE    Modality rationale: decrease pain and increase tissue extensibility to improve the patients ability to relax L ip and LS regions   Min Type Additional Details   15 [x] Estim:  [x]Unatt       []IFC  []Premod                        []Other:  []w/ice   [x]w/heat  Position: R SL  Location: LB and L hip    [] Estim: []Att    []TENS instruct  []NMES                    []Other:  []w/US   []w/ice   []w/heat  Position:  Location:    []  Traction: [] Cervical       []Lumbar                       [] Prone          []Supine                       []Intermittent   []Continuous Lbs:  [] before manual  [] after manual    []  Ultrasound: []Continuous   [] Pulsed                           []1MHz   []3MHz W/cm2:  Location:    []  Iontophoresis with dexamethasone         Location: [] Take home patch   [] In clinic   10 []  Ice     [x]  heat  []  Ice massage  []  Laser   []  Anodyne Position: supine  Location: LB/sac    []  Laser with stim  []  Other:  Position:  Location:    []  Vasopneumatic Device Pressure:       [] lo [] med [] hi   Temperature: [] lo [] med [] hi   [] Skin assessment post-treatment:  [x]intact [x]redness- no adverse reaction    []redness - adverse reaction:     10 min Manual Therapy:  Manual palpation of L hip and posterior pelvic regions    Rationale: decrease pain and decrease trigger points to Improve gait           With   [x] TE   [] TA   [] neuro   [] other: Patient Education: [x] Review HEP    [x] Progressed/Changed HEP based on:   [] positioning   [] body mechanics   [] transfers   [] heat/ice application    [] other:      Pain Level (0-10 scale) post treatment: 4    ASSESSMENT/Changes in Function: Pt enters session with report of LBP and fatigue, associated with extended car travel. Due to discomfort and report of complete muscle tear, session consisted of modalities only. Fair response to E-stim and manual. Improvement in Gait noticed on exit. Case to be D/w PT before F/u. Patient will continue to benefit from skilled PT services to address functional mobility deficits and address strength deficits to attain remaining goals.      [x]  See Plan of Care  []  See progress note/recertification  []  See Discharge Summary    PLAN  [x]  Upgrade activities as tolerated     []  Continue plan of care  []  Update interventions per flow sheet       []  Discharge due to:_  []  Other:_      Wilberto Talamantes, PTA 6/2/2022

## 2022-06-07 ENCOUNTER — HOSPITAL ENCOUNTER (OUTPATIENT)
Dept: PHYSICAL THERAPY | Age: 57
Discharge: HOME | End: 2022-06-07
Payer: MEDICARE

## 2022-06-07 PROCEDURE — 97110 THERAPEUTIC EXERCISES: CPT

## 2022-06-07 PROCEDURE — 97014 ELECTRIC STIMULATION THERAPY: CPT

## 2022-06-07 NOTE — PROGRESS NOTES
PT DAILY TREATMENT NOTE - [facility]     Patient Name: Angel Luis Zhang  NBTX:[id number]  : 1965  [x]  Patient  Verified  Payor: Kelly Marsh / Plan: [place] MEDICARE PART A & B / Product Type: Medicare /    In time:1400  Out time:1450  Total Treatment Time (min): 50  Total Timed Codes (min): 50  1:1 Treatment Time ( [address] only): 39   Visit #: 5 of 16    Treatment Area: Low back pain [M54.50]    SUBJECTIVE  Pain Level (0-10 scale): 3  Any medication changes, allergies to medications, adverse drug reactions, diagnosis change, or new procedure performed?: [x] No    [] Yes (see summary sheet for update)  Subjective functional status/changes:   [] No changes reported  I am worried that the surgeon that wanted to do the surgery has retired. This other glenna doesn't believe in surgery.      OBJECTIVE    Modality rationale: decrease pain and increase tissue extensibility to improve the patients ability to amb with less antalgia   Min Type Additional Details   15 [] Estim:  [x]Unatt       []IFC  [x]Premod                        []Other:  []w/ice   []w/heat  Position: Right SL  Location: LB and L gluteal regions     [] Estim: []Att    []TENS instruct  []NMES                    []Other:  []w/US   []w/ice   []w/heat  Position:  Location:    []  Traction: [] Cervical       []Lumbar                       [] Prone          []Supine                       []Intermittent   []Continuous Lbs:  [] before manual  [] after manual    []  Ultrasound: []Continuous   [] Pulsed                           []1MHz   []3MHz W/cm2:  Location:    []  Iontophoresis with dexamethasone         Location: [] Take home patch   [] In clinic    []  Ice     []  heat  []  Ice massage  []  Laser   []  Anodyne Position:  Location:    []  Laser with stim  []  Other:  Position:  Location:    []  Vasopneumatic Device Pressure:       [] lo [] med [] hi   Temperature: [] lo [] med [] hi   [] Skin assessment post-treatment:  [x]intact [x]redness- no adverse reaction []redness - adverse reaction:    35 min Therapeutic Exercise:  [] See flow sheet :   Rationale: increase ROM and increase strength to improve the patients ability to engage core for pelvic stability. Other Objective/Functional Measures:      Pain Level (0-10 scale) post treatment: 0    ASSESSMENT/Changes in Function: Pt with C/o confusion about newly Dx'd L hamstring tear and steps to move forward. Encourage consulting a new ortho for 3rd opinion. Pt is agreeable to resuming exercises, with emphasis on trunk stability and PPT. Exercises and estim were will tolerated. Will F/u on ortho appt schedule. Patient will continue to benefit from skilled PT services to address ROM deficits, address strength deficits and analyze and address soft tissue restrictions to attain remaining goals. [x]  See Plan of Care  []  See progress note/recertification  []  See Discharge Summary         Progress towards goals / Updated goals:  Short Term Goals: To be accomplished in 8 treatments:              1. Patient will be compliant with and adhere to HEP. Progressing              2. Patient will report pain severity of low back to be 2/10 at most. Not Met               3. Patient will be able to demonstrate posterior pelvic tilt during ambulation. Not Met  Long Term Goals: To be accomplished in 16 treatments:              9.  Patient will increase FOTO score by 20 points               2. Patient will report pain severity of low back to be 0-1/10 at most              3. Patient will be able to ambulate without any low back pain       PLAN  [x]  Upgrade activities as tolerated     []  Continue plan of care  []  Update interventions per flow sheet       []  Discharge due to:_  []  Other:_      Nikolas Mena, PTA 6/7/2022

## 2022-06-09 ENCOUNTER — APPOINTMENT (OUTPATIENT)
Dept: PHYSICAL THERAPY | Age: 57
End: 2022-06-09
Payer: MEDICARE

## 2022-06-14 ENCOUNTER — HOSPITAL ENCOUNTER (OUTPATIENT)
Dept: PHYSICAL THERAPY | Age: 57
Discharge: HOME | End: 2022-06-14
Payer: MEDICARE

## 2022-06-14 PROCEDURE — 97110 THERAPEUTIC EXERCISES: CPT

## 2022-06-14 PROCEDURE — 97014 ELECTRIC STIMULATION THERAPY: CPT

## 2022-06-14 NOTE — PROGRESS NOTES
[facility]   [address] [facility]  Phone: [phone]      Fax:  [phone]    Progress Note    Name: Karina Hoyos   : 1965   MD: Janell Mane MD       Treatment Diagnosis: Low back pain [M54.50]  Start of Care: 22    Visits from Start of Care: 5      Summary of Care:Patient reports her back and hip pain varies from a 5-7/10 and she reports therapy is helping decrease pain. She is independent in a HEP and is progressing towards goals. Assessment / Recommendations: Continue skilled PT 1-2 times per week up to 16 visits to work towards goals. Short Term Goals: To be accomplished in 8 treatments:              1. Patient will be compliant with and adhere to HEP  MET              2. Patient will report pain severity of low back to be 2/10 at most               3. Patient will be able to demonstrate posterior pelvic tilt during ambulation  Long Term Goals: To be accomplished in 16 treatments:              1. Patient will increase FOTO score by 20 points               2. Patient will report pain severity of low back to be 0-1/10 at most              3. Patient will be able to ambulate without any low back pain      Aidan Morel, PT 2022 2:46 PM    ________________________________________________________________________  NOTE TO PHYSICIAN:  Please complete the following and fax to: [facility]: Fax: [phone]. Alli Karuna Retain this original for your records. If you are unable to process this request in 24 hours, please contact our office.        ____ I have read the above report and request that my patient continue therapy with the following changes/special instructions:  ____ I have read the above report and request that my patient be discharged from therapy    Physician's Signature:_________________ Date:___________Time:__________

## 2022-06-14 NOTE — PROGRESS NOTES
PT DAILY TREATMENT NOTE - [facility]     Patient Name: Ryan Barahona  Date:2022  : 1965  [x]  Patient  Verified  Payor: Tara Ca / Plan: [place] MEDICARE PART A & B / Product Type: Medicare /    In time:115  Out time:205  Total Treatment Time (min): 45  Total Timed Codes (min): 30  1:1 Treatment Time ( [address] only): 30   Visit #: 6 of 16    Treatment Area: Low back pain [M54.50]    SUBJECTIVE  Pain Level (0-10 scale): 5-7/10  Any medication changes, allergies to medications, adverse drug reactions, diagnosis change, or new procedure performed?: [x] No    [] Yes (see summary sheet for update)  Subjective functional status/changes:   [] No changes reported  Patient fell on L hip 2 days ago, increased pain L hip, today it feels better. Pain in back standing and sitting too long 7/10 and L hip 7/10. Therapy has helped the back    OBJECTIVE              Modality rationale: decrease pain and increase tissue extensibility to improve the patients ability to amb with less antalgia   Min Type Additional Details    15 []? Estim:  [x]? Unatt       []? IFC  [x]? Premod                        []?Other:  []?w/ice   []?w/heat  Position: Right SL  Location: LB and L gluteal regions       []? Estim: []? Att    []? TENS instruct  []? NMES                    []?Other:  []?w/US   []?w/ice   []?w/heat  Position:  Location:      []? Traction: []? Cervical       []? Lumbar                       []? Prone          []? Supine                       []?Intermittent   []? Continuous Lbs:  []? before manual  []? after manual      []? Ultrasound: []? Continuous   []? Pulsed                           []? 1MHz   []? 3MHz W/cm2:  Location:      []? Iontophoresis with dexamethasone         Location: []? Take home patch   []? In clinic      []? Ice     []?  heat  []? Ice massage  []? Laser   []? Anodyne Position:  Location:      []? Laser with stim  []? Other:  Position:  Location:      []?   Vasopneumatic Device Pressure:       []? lo []? med []? hi   Temperature: []? lo []? med []? hi    []? Skin assessment post-treatment:  [x]? intact [x]? redness- no adverse reaction    []? redness - adverse reaction:     35 min Therapeutic Exercise:  [x]? See flow sheet :   Rationale: increase ROM and increase strength to improve the patients ability to engage core for pelvic stability. With   [] TE   [] TA   [] neuro   [] other: Patient Education: [x] Review HEP    [] Progressed/Changed HEP based on:   [] positioning   [] body mechanics   [] transfers   [] heat/ice application    [] other:           Pain Level (0-10 scale) post treatment: 5    ASSESSMENT/Changes in Function: updated and issued HEP. Patient responded well to gentle trunk strengthening and modalities. Patient will continue to benefit from skilled PT services to modify and progress therapeutic interventions and address functional mobility deficits to attain remaining goals. [x]  See Plan of Care  []  See progress note/recertification  []  See Discharge Summary         Progress towards goals / Updated goals:   Working towards goals    PLAN  [x]  Upgrade activities as tolerated     [x]  Continue plan of care  [x]  Update interventions per flow sheet       []  Discharge due to:_  []  Other:_      Deepti Williamson, PT 6/14/2022

## 2022-06-16 ENCOUNTER — APPOINTMENT (OUTPATIENT)
Dept: PHYSICAL THERAPY | Age: 57
End: 2022-06-16
Payer: MEDICARE

## 2022-06-21 ENCOUNTER — APPOINTMENT (OUTPATIENT)
Dept: PHYSICAL THERAPY | Age: 57
End: 2022-06-21
Payer: MEDICARE

## 2022-06-21 ENCOUNTER — TELEPHONE (OUTPATIENT)
Dept: ENDOCRINOLOGY | Age: 57
End: 2022-06-21

## 2022-06-21 NOTE — TELEPHONE ENCOUNTER
Spoke with patient at 11:15am. She states that her blood sugars have been in the low 40s for the past 11/2 weeks when she wakes up. She takes 4 units of Levemir around 11:30 pm. 2 days ago around 4pm her blood sugar dropped to 27. After correction, her blood sugar marco to 200. Yesterday at 2 pm, her blood sugar dropped to 35/ before dinner her blood sugar  was 215 after correction from the 35. This am her blood sugar was 300 because it rebounded. Last HS her blood sugar was 359.  She gave 1 or 2 units Humalog around 8 pm.

## 2022-06-21 NOTE — TELEPHONE ENCOUNTER
6/21/2022  11:02 AM      Pt called and stated her blood sugars have been in the 40's,30's and 20's the last few days. Pt stated she don't know what she is doing. Pt stated her family had to come get her from the store. WE#[phone]      Thanks,  Tyrese Wilcox

## 2022-06-21 NOTE — TELEPHONE ENCOUNTER
I called and discussed her low BGs. Will have pt decrease her Levemir to 28 units in the AM and 3 units HS. Pt voices understanding and agreement with the plan.

## 2022-06-23 ENCOUNTER — HOSPITAL ENCOUNTER (OUTPATIENT)
Dept: PHYSICAL THERAPY | Age: 57
Discharge: HOME | End: 2022-06-23
Payer: MEDICARE

## 2022-06-23 PROCEDURE — 97014 ELECTRIC STIMULATION THERAPY: CPT

## 2022-06-23 PROCEDURE — 97110 THERAPEUTIC EXERCISES: CPT

## 2022-06-23 NOTE — PROGRESS NOTES
PT DAILY TREATMENT NOTE - [facility]     Patient Name: Destinee Courtney  Date:2022  : 1965  [x]  Patient  Verified  Payor: Radha Frazier / Plan: [place] / Product Type: Medicare /    In time:1315  Out time:1400  Total Treatment Time (min): 45  Total Timed Codes (min): 45  1:1 Treatment Time ( [address] only): 39   Visit #: 7 of 16    Treatment Area: Low back pain [M54.50]    SUBJECTIVE  Pain Level (0-10 scale): 4  Any medication changes, allergies to medications, adverse drug reactions, diagnosis change, or new procedure performed?: [x] No    [] Yes (see summary sheet for update)  Subjective functional status/changes:   [] No changes reported  I have to find someone to due the surgery, is the pain isn't gonna go away    OBJECTIVE    Modality rationale: decrease pain and increase tissue extensibility to improve the patients ability to Relax   Min Type Additional Details   15 [x] Estim:  [x]Unatt       [x]IFC  []Premod                        []Other:  []w/ice   [x]w/heat  Position: supine  Location: L LS/Hip    [] Estim: []Att    []TENS instruct  []NMES                    []Other:  []w/US   []w/ice   []w/heat  Position:  Location:    []  Traction: [] Cervical       []Lumbar                       [] Prone          []Supine                       []Intermittent   []Continuous Lbs:  [] before manual  [] after manual    []  Ultrasound: []Continuous   [] Pulsed                           []1MHz   []3MHz W/cm2:  Location:    []  Iontophoresis with dexamethasone         Location: [] Take home patch   [] In clinic    []  Ice     []  heat  []  Ice massage  []  Laser   []  Anodyne Position:  Location:    []  Laser with stim  []  Other:  Position:  Location:    []  Vasopneumatic Device Pressure:       [] lo [] med [] hi   Temperature: [] lo [] med [] hi   [] Skin assessment post-treatment:  []intact []redness- no adverse reaction    []redness - adverse reaction:     30 min Therapeutic Exercise:  [] See flow sheet :   Rationale: increase ROM and increase strength to improve the patients ability to amb without LOB            With   [x] TE   [] TA   [] neuro   [] other: Patient Education: [x] Review HEP    [] Progressed/Changed HEP based on:   [] positioning   [] body mechanics   [] transfers   [] heat/ice application    [] other:        Pain Level (0-10 scale) post treatment: 3    ASSESSMENT/Changes in Function: Pt reports concern about continuing PT and further damaging/shortening torn gluteus medius. Provided with A&P explaination and education for approach. Ms Master Fajardo plans to seek another Ortho opinion, as she feels that there Is too much conflict. Informed that PT is appropriate regardless of Ortho's decision. Patient will continue to benefit from skilled PT services to address ROM deficits, address strength deficits, and analyze and address soft tissue restrictions to attain remaining goals.      [x]  See Plan of Care  []  See progress note/recertification  []  See Discharge Summary           PLAN  [x]  Upgrade activities as tolerated     []  Continue plan of care  []  Update interventions per flow sheet       []  Discharge due to:_  []  Other:_      Marino Meraz, PTA 6/23/2022

## 2022-06-27 ENCOUNTER — HOSPITAL ENCOUNTER (OUTPATIENT)
Dept: MAMMOGRAPHY | Age: 57
Discharge: HOME | End: 2022-06-27
Attending: INTERNAL MEDICINE
Payer: MEDICARE

## 2022-06-27 DIAGNOSIS — Z12.31 VISIT FOR SCREENING MAMMOGRAM: ICD-10-CM

## 2022-06-27 PROCEDURE — 77063 BREAST TOMOSYNTHESIS BI: CPT

## 2022-06-28 ENCOUNTER — HOSPITAL ENCOUNTER (OUTPATIENT)
Dept: PHYSICAL THERAPY | Age: 57
Discharge: HOME | End: 2022-06-28
Payer: MEDICARE

## 2022-06-28 PROCEDURE — 97140 MANUAL THERAPY 1/> REGIONS: CPT

## 2022-06-28 PROCEDURE — 97014 ELECTRIC STIMULATION THERAPY: CPT

## 2022-06-28 PROCEDURE — 97110 THERAPEUTIC EXERCISES: CPT

## 2022-06-28 NOTE — PROGRESS NOTES
PT DAILY TREATMENT NOTE - [facility]     Patient Name: Michael Gonzalez  Date:2022  : 1965  [x]  Patient  Verified  Payor: Jeremie Carrasquillo / Plan: [place] MEDICARE PART A & B / Product Type: Medicare /    In time:1045  Out time:1130  Total Treatment Time (min): 45  Total Timed Codes (min): 30  1:1 Treatment Time ( only): 30   Visit #: 8 of 16    Treatment Area: Low back pain [M54.50]    SUBJECTIVE  Pain Level (0-10 scale): 8/10  Any medication changes, allergies to medications, adverse drug reactions, diagnosis change, or new procedure performed?: [x] No    [] Yes (see summary sheet for update)  Subjective functional status/changes:   [] No changes reported  Patient reports her L gluteal area is painful and not getting better, she is getting MD opinions.   Pain is constant    OBJECTIVE    Modality rationale: decrease inflammation and decrease pain to improve the patients ability to perform functional activities   Min Type Additional Details   15 [x] Estim:  [x]Unatt       [x]IFC  []Premod                        []Other:  []w/ice   [x]w/heat  Position:supine  Location:L gluteal region    [] Estim: []Att    []TENS instruct  []NMES                    []Other:  []w/US   []w/ice   []w/heat  Position:  Location:    []  Traction: [] Cervical       []Lumbar                       [] Prone          []Supine                       []Intermittent   []Continuous Lbs:  [] before manual  [] after manual    []  Ultrasound: []Continuous   [] Pulsed                           []1MHz   []3MHz W/cm2:  Location:    []  Iontophoresis with dexamethasone         Location: [] Take home patch   [] In clinic    []  Ice     []  heat  []  Ice massage  []  Laser   []  Anodyne Position:  Location:    []  Laser with stim  []  Other:  Position:  Location:    []  Vasopneumatic Device Pressure:       [] lo [] med [] hi   Temperature: [] lo [] med [] hi   [x] Skin assessment post-treatment:  [x]intact []redness- no adverse reaction    []redness - adverse reaction:       20 min Therapeutic Exercise:  [x] See flow sheet :   Rationale: increase ROM and increase strength to improve the patients ability to perform functional activity      10 min Manual Therapy:  Long sit test , L leg longer than R, manual stretching and contract relax with bilateral hamstrings, manual stretch 20 seconds and contract 10 seconds. After contract relax long sit test leg length equal.   Rationale: decrease pain, increase ROM and increase tissue extensibility to perform functional activities              With   [] TE   [] TA   [] neuro   [] other: Patient Education: [x] Review HEP    [] Progressed/Changed HEP based on:   [] positioning   [] body mechanics   [] transfers   [] heat/ice application    [x] other: updated HEP with long sitting hamstring stretch and seated abdominal press into swiss ball        Pain Level (0-10 scale) post treatment: 2    ASSESSMENT/Changes in Function: Gibson Ridley reported a significant amount of pain decrease after therapy. The hamstring stretching and the estim on the L gluteal region decreased her symptoms. She plans on following up for a third opinion with an orthopedist regarding her gluteal m. tear    Patient will continue to benefit from skilled PT services to modify and progress therapeutic interventions and address functional mobility deficits to attain remaining goals. [x]  See Plan of Care  []  See progress note/recertification  []  See Discharge Summary         Progress towards goals / Updated goals:   Working towards goals    PLAN  [x]  Upgrade activities as tolerated     [x]  Continue plan of care  [x]  Update interventions per flow sheet       []  Discharge due to:_  []  Other:_      Tyesha Woo, PT 6/28/2022

## 2022-06-30 ENCOUNTER — APPOINTMENT (OUTPATIENT)
Dept: PHYSICAL THERAPY | Age: 57
End: 2022-06-30
Payer: MEDICARE

## 2022-07-01 DIAGNOSIS — E10.42 TYPE 1 DIABETES MELLITUS WITH DIABETIC POLYNEUROPATHY (HCC): ICD-10-CM

## 2022-07-01 DIAGNOSIS — E55.9 HYPOVITAMINOSIS D: ICD-10-CM

## 2022-07-01 DIAGNOSIS — E03.9 ACQUIRED HYPOTHYROIDISM: ICD-10-CM

## 2022-07-05 ENCOUNTER — HOSPITAL ENCOUNTER (OUTPATIENT)
Dept: PHYSICAL THERAPY | Age: 57
Discharge: HOME | End: 2022-07-05
Payer: MEDICARE

## 2022-07-05 PROCEDURE — 97014 ELECTRIC STIMULATION THERAPY: CPT

## 2022-07-05 PROCEDURE — 97110 THERAPEUTIC EXERCISES: CPT

## 2022-07-05 NOTE — PROGRESS NOTES
PT DAILY TREATMENT NOTE - [facility]     Patient Name: Alvin Nobles  Date:2022  : 1965  [x]  Patient  Verified  Payor: [place] MEDICARE / Plan: [place] MEDICARE PART A & B / Product Type: Medicare /    In time:1049  Out time:1130  Total Treatment Time (min): 41  Total Timed Codes (min): 41  1:1 Treatment Time ( [address] only): 41   Visit #: 9 of 16    Treatment Area: Low back pain [M54.50]    SUBJECTIVE  Pain Level (0-10 scale): 5  Any medication changes, allergies to medications, adverse drug reactions, diagnosis change, or new procedure performed?: [x] No    [] Yes (see summary sheet for update)  Subjective functional status/changes:   [] No changes reported  I have been in pain since they stretched me the other day. OBJECTIVE    Modality rationale: decrease pain and increase tissue extensibility to improve the patients ability to tolerate car ride to home.    Min Type Additional Details   15 [] Estim:  [x]Unatt       []IFC  []Premod                        []Other:  []w/ice   [x]w/heat  Position: R side lying  Location: L QL and Glute region     [] Estim: []Att    []TENS instruct  []NMES                    []Other:  []w/US   []w/ice   []w/heat  Position:  Location:    []  Traction: [] Cervical       []Lumbar                       [] Prone          []Supine                       []Intermittent   []Continuous Lbs:  [] before manual  [] after manual    []  Ultrasound: []Continuous   [] Pulsed                           []1MHz   []3MHz W/cm2:  Location:    []  Iontophoresis with dexamethasone         Location: [] Take home patch   [] In clinic    []  Ice     []  heat  []  Ice massage  []  Laser   []  Anodyne Position:  Location:    []  Laser with stim  []  Other:  Position:  Location:    []  Vasopneumatic Device Pressure:       [] lo [] med [] hi   Temperature: [] lo [] med [] hi   [] Skin assessment post-treatment:  []intact []redness- no adverse reaction    []redness - adverse reaction:     26 min Therapeutic Exercise:  [] See flow sheet :   Rationale: increase ROM and increase strength to improve the patients ability to perform household tasks       With   [x] TE   [] TA   [] neuro   [] other: Patient Education: [x] Review HEP    [x] Progressed/Changed HEP based on:   [] positioning   [] body mechanics   [] transfers   [] heat/ice application    [] other:      Pain Level (0-10 scale) post treatment: 2    ASSESSMENT/Changes in Function: Pt enters with C/o pain in L QL region. Palpation revealed some soreness but no restriction. Exercises resumed at at low level and session ended with MH/IFC. Pt reports feeling of relif with modalities, but is still hesitant on exercise increase. Still has not made appointment with next ortho. Patient will continue to benefit from skilled PT services to address ROM deficits, address strength deficits and analyze and address soft tissue restrictions to attain remaining goals.      [x]  See Plan of Care  []  See progress note/recertification  []  See Discharge Summary           PLAN  [x]  Upgrade activities as tolerated     []  Continue plan of care  []  Update interventions per flow sheet       []  Discharge due to:_  []  Other:_      Diane Cobb, PTA 7/5/2022

## 2022-07-07 ENCOUNTER — HOSPITAL ENCOUNTER (OUTPATIENT)
Dept: PHYSICAL THERAPY | Age: 57
Discharge: HOME | End: 2022-07-07
Payer: MEDICARE

## 2022-07-07 PROCEDURE — 97014 ELECTRIC STIMULATION THERAPY: CPT

## 2022-07-07 NOTE — PROGRESS NOTES
PT DAILY TREATMENT NOTE - [facility]     Patient Name: Armando Encarnacion  YIAI:[id number]  : 1965  [x]  Patient  Verified  Payor: [place] MEDICARE / Plan: [place] MEDICARE PART A & B / Product Type: Medicare /    In time:10:45a  Out time:11:00a  Total Treatment Time (min): 15  Total Timed Codes (min): 0  1:1 Treatment Time ( [address] only): 15   Visit #: 10 of 16    Treatment Area: Low back pain [M54.50]    SUBJECTIVE  Pain Level (0-10 scale): 8/10  Any medication changes, allergies to medications, adverse drug reactions, diagnosis change, or new procedure performed?: [x] No    [] Yes (see summary sheet for update)  Subjective functional status/changes:   [] No changes reported  Pt reports pain has been terrible over the night, had trouble sleeping. Pt reports she didn't do anything different at home that would have aggravated it. Did report she cleaned her whole house yesterday, including vacuuming and lifting a heavy chair. Working on getting another opinion regarding hip issues.      OBJECTIVE    Modality rationale: decrease inflammation and decrease pain to improve the patients ability to perform daily activities without limitations   Min Type Additional Details   15 [x] Estim:  [x]Unatt       [x]IFC  []Premod                        []Other:  []w/ice   [x]w/heat  Position: R sidelying  Location: L hip/QL/glute    [] Estim: []Att    []TENS instruct  []NMES                    []Other:  []w/US   []w/ice   []w/heat  Position:  Location:    []  Traction: [] Cervical       []Lumbar                       [] Prone          []Supine                       []Intermittent   []Continuous Lbs:  [] before manual  [] after manual    []  Ultrasound: []Continuous   [] Pulsed                           []1MHz   []3MHz W/cm2:  Location:    []  Iontophoresis with dexamethasone         Location: [] Take home patch   [] In clinic    []  Ice     []  heat  []  Ice massage  []  Laser   []  Anodyne Position:  Location:    []  Laser with stim  [] Other:  Position:  Location:    []  Vasopneumatic Device Pressure:       [] lo [] med [] hi   Temperature: [] lo [] med [] hi   [x] Skin assessment post-treatment:  [x]intact []redness- no adverse reaction    []redness - adverse reaction:         With   [] TE   [] TA   [] neuro   [] other: Patient Education: [x] Review HEP    [] Progressed/Changed HEP based on:   [] positioning   [] body mechanics   [] transfers   [] heat/ice application    [] other:      Other Objective/Functional Measures: Pt with decreased antalgic gait pattern after modalities. Pain Level (0-10 scale) post treatment: 4/10    ASSESSMENT/Changes in Function: Pt deferred all other interventions after modalities due to needing to ride in a car for extended period of time tomorrow. Educated pt on importance of moderating activities at home to decrease strain on hip as well as taking frequent breaks from extended sitting in car to decrease hip pain and stiffness. Patient will continue to benefit from skilled PT services to modify and progress therapeutic interventions and address functional mobility deficits to attain remaining goals. [x]  See Plan of Care  []  See progress note/recertification  []  See Discharge Summary         Progress towards goals / Updated goals:  Pt unable to be assessed regarding goals today due to increased pain and refusal of participation in exercises/activities.     PLAN  [x]  Upgrade activities as tolerated     [x]  Continue plan of care  []  Update interventions per flow sheet       []  Discharge due to:_  []  Other:_      Milad Badillo, PTA 7/7/2022

## 2022-07-12 ENCOUNTER — HOSPITAL ENCOUNTER (OUTPATIENT)
Dept: PHYSICAL THERAPY | Age: 57
Discharge: HOME | End: 2022-07-12
Payer: MEDICARE

## 2022-07-12 PROCEDURE — 97014 ELECTRIC STIMULATION THERAPY: CPT

## 2022-07-12 PROCEDURE — 97110 THERAPEUTIC EXERCISES: CPT

## 2022-07-12 NOTE — PROGRESS NOTES
[facility]   [address]  Phone: [phone]      Fax:  [phone]    Progress Note    Name: Eduar Salas   : 1965   MD: Monalisa Leon MD       Treatment Diagnosis: Low back pain [M54.50]  Start of Care: 22    Visits from Start of Care: 10      Summary of Care: Patient reports her hip and back pain is a 4-6/10, the modalities help her, however she is concerned she is doing more damage to the hip muscles with exercise. She plans to follow up with her physician. Patient has been reassured that the exercises she is performing are not stressing the gluteus medius tear. Assessment / Recommendations: At this time, patient to continue PT up to 16 visits as with the plan of care. The goals of treatments are to decrease pain, and gently strengthen the core without pain. Short Term Goals: To be accomplished in 8 treatments:              1. Patient will be compliant with and adhere to HEP  MET              2. Patient will report pain severity of low back to be 2/10 at most               3. Patient will be able to demonstrate posterior pelvic tilt during ambulation  Long Term Goals: To be accomplished in 16 treatments:              1. Patient will increase FOTO score by 20 points               2. Patient will report pain severity of low back to be 0-1/10 at most              3. Patient will be able to ambulate without any low back pain      Ej Bullard, PT 2022 11:29 AM    ________________________________________________________________________  NOTE TO PHYSICIAN:  Please complete the following and fax to: [facility]: Fax: [phone]. Janes Subramanian Retain this original for your records. If you are unable to process this request in 24 hours, please contact our office.        ____ I have read the above report and request that my patient continue therapy with the following changes/special instructions:  ____ I have read the above report and request that my patient be discharged from therapy    Physician's Signature:_________________ Date:___________Time:__________

## 2022-07-12 NOTE — PROGRESS NOTES
PT DAILY TREATMENT NOTE - [facility]     Patient Name: Laurent Kimble  Date:2022  : 1965  [x]  Patient  Verified  Payor: Cheryl Llanos / Plan: [place] MEDICARE PART A & B / Product Type: Medicare /    In time:1045  Out time:1130  Total Treatment Time (min): 45  Total Timed Codes (min): 45  1:1 Treatment Time ( [address] only): 45   Visit #: 11 of 16    Treatment Area: Low back pain [M54.50]    SUBJECTIVE  Pain Level (0-10 scale): 4 back. 6 hip  Any medication changes, allergies to medications, adverse drug reactions, diagnosis change, or new procedure performed?: [x] No    [] Yes (see summary sheet for update)  Subjective functional status/changes:   [] No changes reported  My back doctor cancelled my appointment. I'm glad I called.      OBJECTIVE    Modality rationale: decrease pain and increase tissue extensibility to improve the patients ability to mobilize without pain   Min Type Additional Details   15 [x] Estim:  [x]Unatt       []IFC  [x]Premod                        []Other:  []w/ice   [x]w/heat  Position: R SL  Location: L Glut scott d QL    [] Estim: []Att    []TENS instruct  []NMES                    []Other:  []w/US   []w/ice   []w/heat  Position:  Location:    []  Traction: [] Cervical       []Lumbar                       [] Prone          []Supine                       []Intermittent   []Continuous Lbs:  [] before manual  [] after manual    []  Ultrasound: []Continuous   [] Pulsed                           []1MHz   []3MHz W/cm2:  Location:    []  Iontophoresis with dexamethasone         Location: [] Take home patch   [] In clinic    []  Ice     []  heat  []  Ice massage  []  Laser   []  Anodyne Position:  Location:    []  Laser with stim  []  Other:  Position:  Location:    []  Vasopneumatic Device Pressure:       [] lo [] med [] hi   Temperature: [] lo [] med [] hi   [] Skin assessment post-treatment:  []intact []redness- no adverse reaction    []redness - adverse reaction:       30 min Therapeutic Exercise:  [] See flow sheet :   Rationale: increase ROM and increase strength to improve the patients ability to spinal and pelvic stability. With   [x] TE   [] TA   [] neuro   [] other: Patient Education: [x] Review HEP    [] Progressed/Changed HEP based on:   [] positioning   [] body mechanics   [] transfers   [] heat/ice application    [] other:           Pain Level (0-10 scale) post treatment: 2 back 3 L hip    ASSESSMENT/Changes in Function: PT continues to have fluctuating response to exercises, reporting lateral pain (Quadratus Lumborum). Pt reports that she only gets relief from E-stim and moist heat. Pt has had a delay is scheduling F/u's for ortho. Patient will be D/c'd from PT. She is awaiting F/u with ortho a and spine surgeons.  .     []  See Plan of Care  []  See progress note/recertification  [x]  See Discharge Summary      PLAN  [x]  Upgrade activities as tolerated     []  Continue plan of care  []  Update interventions per flow sheet       []  Discharge due to:_  []  Other:_      Shantell Novak, NITHYA 7/12/2022

## 2022-07-13 ENCOUNTER — VIRTUAL VISIT (OUTPATIENT)
Dept: ENDOCRINOLOGY | Age: 57
End: 2022-07-13
Payer: MEDICARE

## 2022-07-13 DIAGNOSIS — E03.9 ACQUIRED HYPOTHYROIDISM: ICD-10-CM

## 2022-07-13 DIAGNOSIS — E10.42 TYPE 1 DIABETES MELLITUS WITH DIABETIC POLYNEUROPATHY (HCC): Primary | ICD-10-CM

## 2022-07-13 DIAGNOSIS — I10 ESSENTIAL HYPERTENSION: ICD-10-CM

## 2022-07-13 DIAGNOSIS — E55.9 VITAMIN D DEFICIENCY: ICD-10-CM

## 2022-07-13 PROCEDURE — 3046F HEMOGLOBIN A1C LEVEL >9.0%: CPT | Performed by: INTERNAL MEDICINE

## 2022-07-13 PROCEDURE — 99215 OFFICE O/P EST HI 40 MIN: CPT | Performed by: INTERNAL MEDICINE

## 2022-07-13 PROCEDURE — G9717 DOC PT DX DEP/BP F/U NT REQ: HCPCS | Performed by: INTERNAL MEDICINE

## 2022-07-13 PROCEDURE — 3017F COLORECTAL CA SCREEN DOC REV: CPT | Performed by: INTERNAL MEDICINE

## 2022-07-13 PROCEDURE — G9899 SCRN MAM PERF RSLTS DOC: HCPCS | Performed by: INTERNAL MEDICINE

## 2022-07-13 PROCEDURE — 2022F DILAT RTA XM EVC RTNOPTHY: CPT | Performed by: INTERNAL MEDICINE

## 2022-07-13 PROCEDURE — G8756 NO BP MEASURE DOC: HCPCS | Performed by: INTERNAL MEDICINE

## 2022-07-13 PROCEDURE — G8427 DOCREV CUR MEDS BY ELIG CLIN: HCPCS | Performed by: INTERNAL MEDICINE

## 2022-07-13 RX ORDER — AMOXICILLIN AND CLAVULANATE POTASSIUM 875; 125 MG/1; MG/1
TABLET, FILM COATED ORAL
COMMUNITY
Start: 2022-07-07 | End: 2022-07-13

## 2022-07-13 RX ORDER — INSULIN DETEMIR 100 [IU]/ML
INJECTION, SOLUTION SUBCUTANEOUS
Qty: 10 ADJUSTABLE DOSE PRE-FILLED PEN SYRINGE | Refills: 5 | Status: SHIPPED | OUTPATIENT
Start: 2022-07-13

## 2022-07-13 RX ORDER — DICLOFENAC SODIUM 75 MG/1
75 TABLET, DELAYED RELEASE ORAL DAILY
COMMUNITY

## 2022-07-13 RX ORDER — PEN NEEDLE, DIABETIC 30 GX3/16"
NEEDLE, DISPOSABLE MISCELLANEOUS
Qty: 800 EACH | Refills: 3 | Status: SHIPPED | OUTPATIENT
Start: 2022-07-13

## 2022-07-13 NOTE — LETTER
7/13/2022 11:01 AM    Patient:  Mali Rivera   YOB: 1965  Date of Visit: 7/13/2022      Dear Richelle Morgan,   [address] [address]  Via Fax: [phone]: Thank you for referring Ms. Radha Coates to me for evaluation/treatment. Below are the relevant portions of my assessment and plan of care. If you have questions, please do not hesitate to call me. I look forward to following Ms. Mendosa along with you. Chief Complaint   Patient presents with    Diabetes     pcp and pharmacy verified   [facility]  [phone] (M)             **THIS IS A VIRTUAL VISIT VIA A VIDEO ENCOUNTER. PATIENT AGREED TO HAVE THEIR CARE DELIVERED OVER VIDEO IN PLACE OF THEIR REGULARLY SCHEDULED OFFICE VISIT**         Jennifer Mendosa, was evaluated through a synchronous (real-time) audio-video encounter. The patient (or guardian if applicable) is aware that this is a billable service, which includes applicable co-pays. This Virtual Visit was conducted with patient's (and/or legal guardian's) consent. The visit was conducted pursuant to the emergency declaration under the [address] [place] [address], [address] [place] [address] authority and the [employer] Act. Patient identification was verified, and a caregiver was present when appropriate. The patient was located in a state where the provider was licensed to provide care. History of Present Illness: Mali Rivera is a 61 y.o. female here for follow up of diabetes. here for follow up of diabetes. Pt notes \"I have been a Type I diabetic for 37 years\". \"I don't count carbs, I eat what I want and most of the time I will remember to take my insulin but not all the time\". In February 2022 she was admitted for CSF Rhinorrhea she was taken to the OR by Dr. Danna Obando of [address] [place] [address] and Dr. Sayra Rubin of ENT.  Kathie Jurado put a tube in my spine, and they took out some scar tissue from a prior leak, but they did not find any evidence of a CSF leak. They put me Abx and I am still doing Malena Pot twice per day. \"    Since our last visit in March 2022, we have been in frequent contact because she was having issues of hypoglycemia and we were decreasing her Levemir doses accordingly. For now she is taking Levemir 28 units in the AM and 4 HS and and Humalog \"1-6 units as needed\". She does not take her Humalog unless her BGs are in the 100s range with her pepsi and her breakfast. During the day if her BGs increase she will give correction Humalog. \"I was having more high BGs during the day. Pt notes she is still having low BGs overnight and in the morning. \"I was having high BGs during the day so I increased my AM Levemir to 30 units\". Pt notes she has been correcting frequently in the evening for low BGs. She has not been on any steroids since our last visit. Pt notes she still has the tendon tear in the left hip. In June 2022 her Vitamin D was 72. Her TSH in April 2022 was 0.68. Pt is waking around 7-8AM, he takes her Levemir 30 units and Humalog 2 units for her Pepsi and brownie she eats in the morning. She will take her dog for a walk after breakfast. \"My dog has an injury so we are not walking as far, we are walking a mile in the morning and a mile in the evening. \"  She does not eat lunch. If she gets hungry, she will snack on a banana, carrots or celery sticks and PB. She has dinner around 5PM, last night she had mixed vegetables, chicken and water. \"Ever since I got COVID all I want to do is eat, I am trying to calm that down. \"    Pt has hx of CVA x4, \"one of which caused a CNS leak\". She reports she had a stress test and an ECHO in 2015 and \"everything came back fine\". Pt has hx of polyneuropathy from her knees to her feet. She takes Gabapentin 200mg in the AM and 300mg in the evening, which does help with her pain.     Pt has no hx of Nephrology    Last eye exam was October 2021 [facility] says my eyes look great and he sent me to the cataract doctor for new glasses. \"    Pt has hx of osteoporosis for which she is followed by Dr. Caitlyn Birmingham of Rheumatology. 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 She was never treated and it \"went away\". She is on Prolia every 6 month. Pt has hx of hypothyroidism and is taking LT4 75mcg daily. Current Outpatient Medications   Medication Sig    diclofenac EC (VOLTAREN) 75 mg EC tablet Take 75 mg by mouth daily.  insulin detemir U-100 (Levemir FlexTouch U-100 Insuln) 100 unit/mL (3 mL) inpn INJECT 32 UNITS SUBCUTANEOUSLY IN THE MORNING AND NONE AT NIGHT--Dose change 03/30/22--updated med list--did not send prescription to the pharmacy  Indications: type 1 diabetes mellitus (Patient taking differently: INJECT 30 UNITS SUBCUTANEOUSLY IN THE MORNING AND 4 UNITS AT NIGHT  Indications: type 1 diabetes mellitus)    gabapentin (NEURONTIN) 100 mg capsule 2 caps in AM and 3 caps in PM (Patient taking differently: 2 caps in AM and 2 caps in PM)    FeroSuL 325 mg (65 mg iron) tablet daily.  glucose blood VI test strips (OneTouch Ultra Test) strip Test blood sugars 8 times per day. DX: E10.42    montelukast (SINGULAIR) 10 mg tablet Take 1 Tablet by mouth daily.  HumaLOG KwikPen Insulin 100 unit/mL kwikpen ONLY PRN up to 1-6 units with each meal (3 times per day) depending on blood sugar    zolpidem (AMBIEN) 5 mg tablet Take 1 Tablet by mouth nightly as needed for Sleep. Max Daily Amount: 5 mg.  (Patient taking differently: Take 5 mg by mouth nightly.)    lidocaine (LIDODERM) 5 % USE 2 PATCHES EXTERNALLY ONCE DAILY, LEAVE IN PLACE FOR 12 HOURS AND REMOVE FOR 12 HOURS    Euthyrox 75 mcg tablet TAKE 1 TABLET BY MOUTH ONCE DAILY BEFORE  BREAKFAST  FOR  A  CONDITION  WITH  LOW  THYROID  HORMONE  LEVELS    simvastatin (ZOCOR) 40 mg tablet Take 1 tablet by mouth nightly    losartan (COZAAR) 50 mg tablet Take 1 Tablet by mouth daily. Take 1 tab daily    metoclopramide HCl (REGLAN) 5 mg tablet Take 1 Tablet by mouth two (2) times a day. Up to BID. TAKE 30 MINUTES BEFORE MEALS    glucose blood VI test strips (OneTouch Ultra Blue Test Strip) strip TEST BLOOD SUGAR 8 TIMES A DAY DUE TO UNCONTROLLED SUGARS Dx : E10.42    Insulin Needles, Disposable, (Niru Pen Needle) 32 gauge x 5/32\" ndle Use as directed with pen device - up to 5 times daily  Dx:  E11.9    Insulin Needles, Disposable, (Pen Needle) 30 gauge x 5/16\" ndle Use one needle up to 8 times daily to dispense insulin. E10.65,E 10.649    aspirin 81 mg chewable tablet Take 81 mg by mouth daily.  mupirocin (BACTROBAN) 2 % ointment PRN    potassium 99 mg tablet Take 1 Tab by mouth every other day.  ascorbic acid, vitamin C, (VITAMIN C) 1,000 mg tablet Take 4,000 mg by mouth two (2) times a day.  calcium carbonate (CALTREX) 600 mg calcium (1,500 mg) tablet Take 1,000 mg by mouth two (2) times a day.  glucos sul 2KCl/msm/chond/C/Mn (GLUCOSAMINE CHONDROITIN PO) Take  by mouth. 1 in Am  only    MAGNESIUM PO Take  by mouth every fourty-eight (48) hours.  denosumab (PROLIA) 60 mg/mL injection 60 mg by SubCUTAneous route. Every 6 months    cholecalciferol, vitamin D3, (VITAMIN D3) 2,000 unit tab Take 1 Tab by mouth daily. 5000 units daily  Indications: low vitamin D levels    VITAMIN A PO Take 2,400 mcg by mouth nightly.  vitamin E (AQUA GEMS) 400 unit capsule Take 400 Units by mouth every Monday and Thursday. Only on M and Thurs at Bedtime     No current facility-administered medications for this visit. No Known Allergies  Review of Systems:  - Eyes: no blurry vision or double vision  - Cardiovascular: no chest pain  - Respiratory: no shortness of breath  - Musculoskeletal: no myalgias  - Neurological: no numbness/tingling in extremities    Physical Examination:  There were no vitals taken for this visit.   - GENERAL: NCAT, Appears well nourished   - EYES: EOMI, non-icteric, no proptosis   - Ear/Nose/Throat: NCAT, no visible inflammation or masses   - CARDIOVASCULAR: no cyanosis, no visible JVD   - RESPIRATORY: respiratory effort normal without any distress or labored breathing   - MUSCULOSKELETAL: Normal ROM of neck and upper extremities observed   - SKIN: No rash on face   - NEUROLOGIC:  No facial asymmetry (Cranial nerve 7 motor function), No gaze palsy   - PSYCHIATRIC: Normal affect, Normal insight and judgement   -     Data Reviewed:   - none new for review    Assessment/Plan:   1) DM > She notes that with 4 units of Levemir HS she is having lower FBGs, but she notes with 3 units she is having high FBGs and continue the 30 units in the AM.    Pt encouraged to start checking her BGs before each meal and taking her Humalog before each meal rather than waiting till after meals, when her BG is already high. With her hx of neuropathy and calluses, she would benefit from DM shoes and inserts. Because her blood sugars are so erratic and she has had frequent issues of hypoglycemia, she is testing her blood sugars 8 times per day. She would benefit from a CGM. Pt is testing her BGs 8 times per day and adjusting her Humalog based on the readings. Pt to continue the Gabapentin 200mg in the AM and 300mg in the evening. 2) Hypothyroidism > Pt is clinically euthyroid on  LT4 75mcg daily. Her TSH was 0.68 in April 2022. 3) Hypovitaminosis D > Pt to continue her Vitamin D 5000 units daily. Her Vitamin D level in June 2022 was 72.7. 4) HTN > Pt is on an ARB for renal protection. Will not make any changes at this time. Pt voices understanding and agreement with the plan. Pain noted and pt was recommended to call her PCP for further evaluation and treatment, as needed    RTC 4 months    I spent 40 minutes on this case and > 50% of the time was spent in counseling on the above issues.        Copy sent to:  Dr. Bradford Wills Good            Sincerely,      Vincent Calloway MD

## 2022-07-13 NOTE — PROGRESS NOTES
Chief Complaint   Patient presents with    Diabetes     pcp and pharmacy verified   [facility]  [phone] (M)             **THIS IS A VIRTUAL VISIT VIA A VIDEO ENCOUNTER. PATIENT AGREED TO HAVE THEIR CARE DELIVERED OVER VIDEO IN PLACE OF THEIR REGULARLY SCHEDULED OFFICE VISIT**         Jennifer Mendosa, was evaluated through a synchronous (real-time) audio-video encounter. The patient (or guardian if applicable) is aware that this is a billable service, which includes applicable co-pays. This Virtual Visit was conducted with patient's (and/or legal guardian's) consent. The visit was conducted pursuant to the emergency declaration under the [facility], [address] [place] [address] authority and the [employer] Act. Patient identification was verified, and a caregiver was present when appropriate. The patient was located in a state where the provider was licensed to provide care. History of Present Illness: Mali Rivera is a 61 y.o. female here for follow up of diabetes. here for follow up of diabetes. Pt notes \"I have been a Type I diabetic for 37 years\". \"I don't count carbs, I eat what I want and most of the time I will remember to take my insulin but not all the time\". In February 2022 she was admitted for CSF Rhinorrhea she was taken to the OR by Dr. Danna Obando of [address] [place] [address] and Dr. Sayra Rubin of ENT. Kathie Jurado put a tube in my spine, and they took out some scar tissue from a prior leak, but they did not find any evidence of a CSF leak. They put me Abx and I am still doing Malena Pot twice per day. \"    Since our last visit in March 2022, we have been in frequent contact because she was having issues of hypoglycemia and we were decreasing her Levemir doses accordingly. For now she is taking Levemir 28 units in the AM and 4 HS and and Humalog \"1-6 units as needed\".  She does not take her Humalog unless her BGs are in the 100s range with her pepsi and her breakfast. During the day if her BGs increase she will give correction Humalog. \"I was having more high BGs during the day. Pt notes she is still having low BGs overnight and in the morning. \"I was having high BGs during the day so I increased my AM Levemir to 30 units\". Pt notes she has been correcting frequently in the evening for low BGs. She has not been on any steroids since our last visit. Pt notes she still has the tendon tear in the left hip. In June 2022 her Vitamin D was 72. Her TSH in April 2022 was 0.68. Pt is waking around 7-8AM, he takes her Levemir 30 units and Humalog 2 units for her Pepsi and brownie she eats in the morning. She will take her dog for a walk after breakfast. \"My dog has an injury so we are not walking as far, we are walking a mile in the morning and a mile in the evening. \"  She does not eat lunch. If she gets hungry, she will snack on a banana, carrots or celery sticks and PB. She has dinner around 5PM, last night she had mixed vegetables, chicken and water. \"Ever since I got COVID all I want to do is eat, I am trying to calm that down. \"    Pt has hx of CVA x4, \"one of which caused a CNS leak\". She reports she had a stress test and an ECHO in 2015 and \"everything came back fine\". Pt has hx of polyneuropathy from her knees to her feet. She takes Gabapentin 200mg in the AM and 300mg in the evening, which does help with her pain. Pt has no hx of Nephrology    Last eye exam was October 2021 [facility] says my eyes look great and he sent me to the cataract doctor for new glasses. \"    Pt has hx of osteoporosis for which she is followed by Dr. Alfred Nair of Rheumatology. 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 She was never treated and it \"went away\". She is on Prolia every 6 month. Pt has hx of hypothyroidism and is taking LT4 75mcg daily.      Current Outpatient Medications   Medication Sig  diclofenac EC (VOLTAREN) 75 mg EC tablet Take 75 mg by mouth daily.  insulin detemir U-100 (Levemir FlexTouch U-100 Insuln) 100 unit/mL (3 mL) inpn INJECT 32 UNITS SUBCUTANEOUSLY IN THE MORNING AND NONE AT NIGHT--Dose change 03/30/22--updated med list--did not send prescription to the pharmacy  Indications: type 1 diabetes mellitus (Patient taking differently: INJECT 30 UNITS SUBCUTANEOUSLY IN THE MORNING AND 4 UNITS AT NIGHT  Indications: type 1 diabetes mellitus)    gabapentin (NEURONTIN) 100 mg capsule 2 caps in AM and 3 caps in PM (Patient taking differently: 2 caps in AM and 2 caps in PM)    FeroSuL 325 mg (65 mg iron) tablet daily.  glucose blood VI test strips (OneTouch Ultra Test) strip Test blood sugars 8 times per day. DX: E10.42    montelukast (SINGULAIR) 10 mg tablet Take 1 Tablet by mouth daily.  HumaLOG KwikPen Insulin 100 unit/mL kwikpen ONLY PRN up to 1-6 units with each meal (3 times per day) depending on blood sugar    zolpidem (AMBIEN) 5 mg tablet Take 1 Tablet by mouth nightly as needed for Sleep. Max Daily Amount: 5 mg. (Patient taking differently: Take 5 mg by mouth nightly.)    lidocaine (LIDODERM) 5 % USE 2 PATCHES EXTERNALLY ONCE DAILY, LEAVE IN PLACE FOR 12 HOURS AND REMOVE FOR 12 HOURS    Euthyrox 75 mcg tablet TAKE 1 TABLET BY MOUTH ONCE DAILY BEFORE  BREAKFAST  FOR  A  CONDITION  WITH  LOW  THYROID  HORMONE  LEVELS    simvastatin (ZOCOR) 40 mg tablet Take 1 tablet by mouth nightly    losartan (COZAAR) 50 mg tablet Take 1 Tablet by mouth daily. Take 1 tab daily    metoclopramide HCl (REGLAN) 5 mg tablet Take 1 Tablet by mouth two (2) times a day. Up to BID.  TAKE 30 MINUTES BEFORE MEALS    glucose blood VI test strips (OneTouch Ultra Blue Test Strip) strip TEST BLOOD SUGAR 8 TIMES A DAY DUE TO UNCONTROLLED SUGARS Dx : E10.42    Insulin Needles, Disposable, (Niru Pen Needle) 32 gauge x 5/32\" ndle Use as directed with pen device - up to 5 times daily  Dx:  E11.9   Patrick Oh Insulin Needles, Disposable, (Pen Needle) 30 gauge x 5/16\" ndle Use one needle up to 8 times daily to dispense insulin. E10.65,E 10.649    aspirin 81 mg chewable tablet Take 81 mg by mouth daily.  mupirocin (BACTROBAN) 2 % ointment PRN    potassium 99 mg tablet Take 1 Tab by mouth every other day.  ascorbic acid, vitamin C, (VITAMIN C) 1,000 mg tablet Take 4,000 mg by mouth two (2) times a day.  calcium carbonate (CALTREX) 600 mg calcium (1,500 mg) tablet Take 1,000 mg by mouth two (2) times a day.  glucos sul 2KCl/msm/chond/C/Mn (GLUCOSAMINE CHONDROITIN PO) Take  by mouth. 1 in Am  only    MAGNESIUM PO Take  by mouth every fourty-eight (48) hours.  denosumab (PROLIA) 60 mg/mL injection 60 mg by SubCUTAneous route. Every 6 months    cholecalciferol, vitamin D3, (VITAMIN D3) 2,000 unit tab Take 1 Tab by mouth daily. 5000 units daily  Indications: low vitamin D levels    VITAMIN A PO Take 2,400 mcg by mouth nightly.  vitamin E (AQUA GEMS) 400 unit capsule Take 400 Units by mouth every Monday and Thursday. Only on M and Thurs at Bedtime     No current facility-administered medications for this visit. No Known Allergies  Review of Systems:  - Eyes: no blurry vision or double vision  - Cardiovascular: no chest pain  - Respiratory: no shortness of breath  - Musculoskeletal: no myalgias  - Neurological: no numbness/tingling in extremities    Physical Examination:  There were no vitals taken for this visit.   - GENERAL: NCAT, Appears well nourished   - EYES: EOMI, non-icteric, no proptosis   - Ear/Nose/Throat: NCAT, no visible inflammation or masses   - CARDIOVASCULAR: no cyanosis, no visible JVD   - RESPIRATORY: respiratory effort normal without any distress or labored breathing   - MUSCULOSKELETAL: Normal ROM of neck and upper extremities observed   - SKIN: No rash on face   - NEUROLOGIC:  No facial asymmetry (Cranial nerve 7 motor function), No gaze palsy   - PSYCHIATRIC: Normal affect, Normal insight and judgement   -     Data Reviewed:   - none new for review    Assessment/Plan:   1) DM > She notes that with 4 units of Levemir HS she is having lower FBGs, but she notes with 3 units she is having high FBGs and continue the 30 units in the AM.    Pt encouraged to start checking her BGs before each meal and taking her Humalog before each meal rather than waiting till after meals, when her BG is already high. With her hx of neuropathy and calluses, she would benefit from DM shoes and inserts. Because her blood sugars are so erratic and she has had frequent issues of hypoglycemia, she is testing her blood sugars 8 times per day. She would benefit from a CGM. Pt is testing her BGs 8 times per day and adjusting her Humalog based on the readings. Pt to continue the Gabapentin 200mg in the AM and 300mg in the evening. 2) Hypothyroidism > Pt is clinically euthyroid on  LT4 75mcg daily. Her TSH was 0.68 in April 2022. 3) Hypovitaminosis D > Pt to continue her Vitamin D 5000 units daily. Her Vitamin D level in June 2022 was 72.7. 4) HTN > Pt is on an ARB for renal protection. Will not make any changes at this time. Pt voices understanding and agreement with the plan. Pain noted and pt was recommended to call her PCP for further evaluation and treatment, as needed    RTC 4 months    I spent 40 minutes on this case and > 50% of the time was spent in counseling on the above issues.        Copy sent to:  Dr. Brittani Snyder

## 2022-07-19 ENCOUNTER — APPOINTMENT (OUTPATIENT)
Dept: PHYSICAL THERAPY | Age: 57
End: 2022-07-19
Payer: MEDICARE

## 2022-07-21 ENCOUNTER — APPOINTMENT (OUTPATIENT)
Dept: PHYSICAL THERAPY | Age: 57
End: 2022-07-21
Payer: MEDICARE

## 2022-07-25 NOTE — PROGRESS NOTES
PhysicalTherapy Discharge Summary    Patient name: Juan David Stiles Start of Care: 22   Referral source: Marilyn Rodriguez MD : [id number]   Medical/Treatment Diagnosis: Low back pain [M54.50] Onset Date:2 years ago     Prior Hospitalization: see medical history Provider#: Medications: Verified on Patient Summary List    Comorbidities: refer to  [address]  Prior Level of Function:refer to [address]  Visits from Start of Care: 11     Reporting Period : 22 to 22      Summary of Care: Patient reports her hip and back pain is a 4-6/10, the modalities help her, however she is concerned she is doing more damage to the hip muscles with exercise. She plans to follow up with her physician. Patient has been reassured that the exercises she is performing are not stressing the gluteus medius tear. Patient is asking to be D/C from PT , she would like to schedule appointments with ortho for opinions regarding her gluteus medius tear. Short Term Goals: To be accomplished in 8 treatments:              1. Patient will be compliant with and adhere to HEP  MET              2. Patient will report pain severity of low back to be 2/10 at most               3. Patient will be able to demonstrate posterior pelvic tilt during ambulation  Long Term Goals: To be accomplished in 16 treatments:              1. Patient will increase FOTO score by 20 points               2. Patient will report pain severity of low back to be 0-1/10 at most              3. Patient will be able to ambulate without any low back pain    The severity rating is based on clinical judgment and the FOTO score.     ASSESSMENT/RECOMMENDATIONS:  [x]Discontinue therapy: []Patient has reached or is progressing toward set goals      []Patient is non-compliant or has abdicated      [x]Due to lack of appreciable progress towards set goals    Michelle Panda, PT 2022

## 2022-07-26 ENCOUNTER — APPOINTMENT (OUTPATIENT)
Dept: PHYSICAL THERAPY | Age: 57
End: 2022-07-26
Payer: MEDICARE

## 2022-07-27 ENCOUNTER — HOSPITAL ENCOUNTER (EMERGENCY)
Age: 57
Discharge: HOME | End: 2022-07-27
Attending: EMERGENCY MEDICINE
Payer: MEDICARE

## 2022-07-27 ENCOUNTER — APPOINTMENT (OUTPATIENT)
Dept: GENERAL RADIOLOGY | Age: 57
End: 2022-07-27
Attending: EMERGENCY MEDICINE
Payer: MEDICARE

## 2022-07-27 VITALS
SYSTOLIC BLOOD PRESSURE: 147 MMHG | OXYGEN SATURATION: 100 % | WEIGHT: 156 LBS | DIASTOLIC BLOOD PRESSURE: 77 MMHG | BODY MASS INDEX: 27.63 KG/M2 | HEART RATE: 75 BPM | TEMPERATURE: 98 F | RESPIRATION RATE: 18 BRPM

## 2022-07-27 DIAGNOSIS — T67.5XXA HEAT EXHAUSTION, INITIAL ENCOUNTER: ICD-10-CM

## 2022-07-27 DIAGNOSIS — R07.89 ATYPICAL CHEST PAIN: ICD-10-CM

## 2022-07-27 DIAGNOSIS — E86.0 DEHYDRATION: Primary | ICD-10-CM

## 2022-07-27 LAB
ALBUMIN SERPL-MCNC: 3.9 G/DL (ref 3.5–5)
ALBUMIN/GLOB SERPL: 1.3 {RATIO} (ref 1.1–2.2)
ALP SERPL-CCNC: 74 U/L (ref 45–117)
ALT SERPL-CCNC: 24 U/L (ref 12–78)
ANION GAP SERPL CALC-SCNC: 7 MMOL/L (ref 5–15)
AST SERPL-CCNC: 20 U/L (ref 15–37)
BASOPHILS # BLD: 0 K/UL (ref 0–0.1)
BASOPHILS NFR BLD: 0 % (ref 0–1)
BILIRUB SERPL-MCNC: 0.9 MG/DL (ref 0.2–1)
BUN SERPL-MCNC: 20 MG/DL (ref 6–20)
BUN/CREAT SERPL: 23 (ref 12–20)
CALCIUM SERPL-MCNC: 9 MG/DL (ref 8.5–10.1)
CHLORIDE SERPL-SCNC: 94 MMOL/L (ref 97–108)
CO2 SERPL-SCNC: 29 MMOL/L (ref 21–32)
CREAT SERPL-MCNC: 0.88 MG/DL (ref 0.55–1.02)
DIFFERENTIAL METHOD BLD: ABNORMAL
EOSINOPHIL # BLD: 0 K/UL (ref 0–0.4)
EOSINOPHIL NFR BLD: 0 % (ref 0–7)
ERYTHROCYTE [DISTWIDTH] IN BLOOD BY AUTOMATED COUNT: 11.6 % (ref 11.5–14.5)
GLOBULIN SER CALC-MCNC: 3 G/DL (ref 2–4)
GLUCOSE BLD STRIP.AUTO-MCNC: 127 MG/DL (ref 65–117)
GLUCOSE BLD STRIP.AUTO-MCNC: 93 MG/DL (ref 65–117)
GLUCOSE BLD STRIP.AUTO-MCNC: 97 MG/DL (ref 65–117)
GLUCOSE SERPL-MCNC: 124 MG/DL (ref 65–100)
HCT VFR BLD AUTO: 34.6 % (ref 35–47)
HGB BLD-MCNC: 12.7 G/DL (ref 11.5–16)
IMM GRANULOCYTES # BLD AUTO: 0 K/UL (ref 0–0.04)
IMM GRANULOCYTES NFR BLD AUTO: 0 % (ref 0–0.5)
LYMPHOCYTES # BLD: 2.1 K/UL (ref 0.8–3.5)
LYMPHOCYTES NFR BLD: 29 % (ref 12–49)
MCH RBC QN AUTO: 32 PG (ref 26–34)
MCHC RBC AUTO-ENTMCNC: 36.7 G/DL (ref 30–36.5)
MCV RBC AUTO: 87.2 FL (ref 80–99)
MONOCYTES # BLD: 0.6 K/UL (ref 0–1)
MONOCYTES NFR BLD: 9 % (ref 5–13)
NEUTS SEG # BLD: 4.4 K/UL (ref 1.8–8)
NEUTS SEG NFR BLD: 62 % (ref 32–75)
NRBC # BLD: 0 K/UL (ref 0–0.01)
NRBC BLD-RTO: 0 PER 100 WBC
PLATELET # BLD AUTO: 195 K/UL (ref 150–400)
PMV BLD AUTO: 8.3 FL (ref 8.9–12.9)
POTASSIUM SERPL-SCNC: 4 MMOL/L (ref 3.5–5.1)
PROT SERPL-MCNC: 6.9 G/DL (ref 6.4–8.2)
RBC # BLD AUTO: 3.97 M/UL (ref 3.8–5.2)
SERVICE CMNT-IMP: ABNORMAL
SERVICE CMNT-IMP: NORMAL
SERVICE CMNT-IMP: NORMAL
SODIUM SERPL-SCNC: 130 MMOL/L (ref 136–145)
TROPONIN-HIGH SENSITIVITY: 6 NG/L (ref 0–51)
WBC # BLD AUTO: 7.2 K/UL (ref 3.6–11)

## 2022-07-27 PROCEDURE — 99285 EMERGENCY DEPT VISIT HI MDM: CPT

## 2022-07-27 PROCEDURE — 96360 HYDRATION IV INFUSION INIT: CPT

## 2022-07-27 PROCEDURE — 80053 COMPREHEN METABOLIC PANEL: CPT

## 2022-07-27 PROCEDURE — 71045 X-RAY EXAM CHEST 1 VIEW: CPT

## 2022-07-27 PROCEDURE — 84484 ASSAY OF TROPONIN QUANT: CPT

## 2022-07-27 PROCEDURE — 36415 COLL VENOUS BLD VENIPUNCTURE: CPT

## 2022-07-27 PROCEDURE — 82962 GLUCOSE BLOOD TEST: CPT

## 2022-07-27 PROCEDURE — 85025 COMPLETE CBC W/AUTO DIFF WBC: CPT

## 2022-07-27 PROCEDURE — 93005 ELECTROCARDIOGRAM TRACING: CPT

## 2022-07-27 PROCEDURE — 74011250636 HC RX REV CODE- 250/636: Performed by: EMERGENCY MEDICINE

## 2022-07-27 RX ORDER — TRAMADOL HYDROCHLORIDE 50 MG/1
50 TABLET ORAL DAILY
COMMUNITY

## 2022-07-27 RX ORDER — SENNOSIDES 8.6 MG/1
1 TABLET ORAL DAILY
COMMUNITY
End: 2022-11-03

## 2022-07-27 RX ORDER — MELOXICAM 15 MG/1
15 TABLET ORAL DAILY
COMMUNITY
End: 2022-11-03

## 2022-07-27 RX ADMIN — SODIUM CHLORIDE 500 ML: 9 INJECTION, SOLUTION INTRAVENOUS at 14:08

## 2022-07-27 RX ADMIN — SODIUM CHLORIDE 500 ML: 9 INJECTION, SOLUTION INTRAVENOUS at 15:21

## 2022-07-27 NOTE — ED NOTES
Patient reports her BG is 68 with her glucometer, MD aware and requested patient be given more crackers, peanut butter, and soda. Pt tolerating well.

## 2022-07-27 NOTE — ED NOTES
Pt given crackers, peanut butter, and soda due to a decline in her blood glucose from 140 when EMS arrived to 93. MD approved food to be given to patient.

## 2022-07-27 NOTE — ED TRIAGE NOTES
Pt presents with left sided chest pain that radiates down left arm since 0900 today. Pt working outside for 3 hours and states she feels dehydrated.

## 2022-07-27 NOTE — ED PROVIDER NOTES
EMERGENCY DEPARTMENT HISTORY AND PHYSICAL EXAM      Date: 7/27/2022  Patient Name: Eris Montanez    History of Presenting Illness     Chief Complaint   Patient presents with    Chest Pain       History Provided By: Patient    HPI: Eris Montanez, 61 y.o. female with PMHx significant for DM 1, hypothyroid, CAD, presents via private vehicle to the ED with cc of chest pain. Patient reports she was outside working in her yard for 3 hours in 90 degree heat today. She drank a Gatorade. She reports she was very hot and sweating. She states she went to visit her mother and there she went inside and she had stopped sweating at this point. She drank some water and applied cool compresses. She reports she started having some aching in her chest that was radiating down her left arm. She states she has had similar episodes with prior heat exhaustion. She feels like that is the cause. She states if she would have been able to give herself some IV fluid she would not of come to the emergency department. She denies any nausea or vomiting. She denies any abdominal pain. No mental status changes. PMHx: Significant for DM 1, CAD, hypothyroid  PSHx: Significant for cholecystectomy, hysterectomy, rotator cuff repair, hopeful tunnel release. Social Hx: Former smoker. Quit in 2007. Half pack a day for 8 years. Denies drug or alcohol use. There are no other complaints, changes, or physical findings at this time. PCP: Savita Walker, DO    No current facility-administered medications on file prior to encounter. Current Outpatient Medications on File Prior to Encounter   Medication Sig Dispense Refill    meloxicam (MOBIC) 15 mg tablet Take 15 mg by mouth in the morning. traMADoL (ULTRAM) 50 mg tablet Take 50 mg by mouth every six (6) hours as needed for Pain. Senna 8.6 mg tablet Take 1 Tablet by mouth in the morning. diclofenac EC (VOLTAREN) 75 mg EC tablet Take 75 mg by mouth daily. glucose blood VI test strips (OneTouch Ultra Blue Test Strip) strip TEST BLOOD SUGAR 8 TIMES A DAY DUE TO UNCONTROLLED SUGARS Dx : E10.42 (hold till needed) 300 Strip 11    insulin detemir U-100 (Levemir FlexTouch U-100 Insuln) 100 unit/mL (3 mL) inpn INJECT 30 UNITS SUBCUTANEOUSLY IN THE MORNING AND 4 UNITS AT NIGHT (hold till needed)  Indications: type 1 diabetes mellitus 10 Adjustable Dose Pre-filled Pen Syringe 5    Insulin Needles, Disposable, (Pen Needle) 30 gauge x 5/16\" ndle Use one needle up to 8 times daily to dispense insulin. E10.65,E 10.649 (hold till needed) 800 Each 3    gabapentin (NEURONTIN) 100 mg capsule 2 caps in AM and 3 caps in PM (Patient taking differently: 2 caps in AM and 2 caps in PM) 150 Capsule 3    FeroSuL 325 mg (65 mg iron) tablet daily. glucose blood VI test strips (OneTouch Ultra Test) strip Test blood sugars 8 times per day. DX: E10.42 800 Strip 3    montelukast (SINGULAIR) 10 mg tablet Take 1 Tablet by mouth daily. 90 Tablet 1    HumaLOG KwikPen Insulin 100 unit/mL kwikpen ONLY PRN up to 1-6 units with each meal (3 times per day) depending on blood sugar 15 mL 5    zolpidem (AMBIEN) 5 mg tablet Take 1 Tablet by mouth nightly as needed for Sleep. Max Daily Amount: 5 mg. (Patient taking differently: Take 5 mg by mouth nightly.) 30 Tablet 2    lidocaine (LIDODERM) 5 % USE 2 PATCHES EXTERNALLY ONCE DAILY, LEAVE IN PLACE FOR 12 HOURS AND REMOVE FOR 12 HOURS 60 Patch 0    Euthyrox 75 mcg tablet TAKE 1 TABLET BY MOUTH ONCE DAILY BEFORE  BREAKFAST  FOR  A  CONDITION  WITH  LOW  THYROID  HORMONE  LEVELS 90 Tablet 3    simvastatin (ZOCOR) 40 mg tablet Take 1 tablet by mouth nightly 90 Tablet 0    losartan (COZAAR) 50 mg tablet Take 1 Tablet by mouth daily. Take 1 tab daily 90 Tablet 3    metoclopramide HCl (REGLAN) 5 mg tablet Take 1 Tablet by mouth two (2) times a day. Up to BID.  TAKE 30 MINUTES BEFORE MEALS 360 Tablet 0    Insulin Needles, Disposable, (Niru Pen Needle) 32 gauge x 5/32\" ndle Use as directed with pen device - up to 5 times daily  Dx:  E11.9 200 Pen Needle 5    aspirin 81 mg chewable tablet Take 81 mg by mouth daily. mupirocin (BACTROBAN) 2 % ointment PRN      potassium 99 mg tablet Take 1 Tab by mouth every other day. 90 Tab 1    ascorbic acid, vitamin C, (VITAMIN C) 1,000 mg tablet Take 4,000 mg by mouth two (2) times a day. calcium carbonate (CALTREX) 600 mg calcium (1,500 mg) tablet Take 1,000 mg by mouth two (2) times a day. glucos sul 2KCl/msm/chond/C/Mn (GLUCOSAMINE CHONDROITIN PO) Take  by mouth. 1 in Am  only      MAGNESIUM PO Take  by mouth every fourty-eight (48) hours. denosumab (PROLIA) 60 mg/mL injection 60 mg by SubCUTAneous route. Every 6 months      cholecalciferol, vitamin D3, (VITAMIN D3) 2,000 unit tab Take 1 Tab by mouth daily. 5000 units daily  Indications: low vitamin D levels      VITAMIN A PO Take 2,400 mcg by mouth nightly. vitamin E (AQUA GEMS) 400 unit capsule Take 400 Units by mouth every Monday and Thursday. Only on M and Thurs at Bedtime         Past History     Past Medical History:  Past Medical History:   Diagnosis Date    Arthritis     patient states \"every joint affected\"    Bee sting 09/05/2018    left elbow bee sting     Blister of ankle 09/05/2018    Blister small per patient of left ankle. Wears an air boot due to 2 stress fractures in last 2 months.     CAD (coronary artery disease)     Constipation     Falls 2017    pt reports having 4 falls in 3 days    Fatigue     Headache     Ill-defined condition     multiple broken ribs    Ill-defined condition     reports \"getting infections easily\"    Joint pain     Memory disorder     following spinal fluid leak repair    Menopause     Nausea & vomiting     Neuropathy     Osteoporosis     Thyroid disease     Type 1 diabetes ([facility] [address]     diagnosed at age 8    Unspecified cerebral artery occlusion with cerebral infarction     x 4 (last in 2004)       Past Surgical History:  Past Surgical History:   Procedure Laterality Date    HX CARPAL TUNNEL RELEASE Right 2018    HX  SECTION  1987    HX  SECTION      HX CHOLECYSTECTOMY      HX HYSTERECTOMY  2006    HX OOPHORECTOMY      HX ORTHOPAEDIC      frozen shoulder surgery x 3    HX ORTHOPAEDIC      frozen finger surgery x 8    HX ORTHOPAEDIC Left 2014    wrist surgery    HX ORTHOPAEDIC Right 1977    hand surgery    HX ORTHOPAEDIC Left     foot surgery    HX OTHER SURGICAL      spinal fluid leak repair (spinal fluid leaking from nose, remove nose, cut fat from stomach, use that as patch behind nose, nose replaced)    HX ROTATOR CUFF REPAIR Left     HX ROTATOR CUFF REPAIR Right     HX TONSILLECTOMY  1968       Family History:  Family History   Problem Relation Age of Onset    Heart Disease Mother     Hypertension Mother     No Known Problems Father     Heart Disease Maternal Grandfather     Cancer Maternal Aunt         Pancreatic and Liver    Cancer Maternal Grandmother         Lung    Diabetes Maternal Grandmother     Cancer Maternal Aunt         Breast    Breast Cancer Maternal Aunt     Diabetes Maternal Aunt        Social History:  Social History     Tobacco Use    Smoking status: Former     Packs/day: 0.50     Years: 8.00     Pack years: 4.00     Types: Cigarettes     Quit date: 4/10/2007     Years since quitting: 15.3    Smokeless tobacco: Never   Vaping Use    Vaping Use: Never used   Substance Use Topics    Alcohol use: No    Drug use: No       Allergies:  No Known Allergies      Review of Systems   Review of Systems   Constitutional:  Negative for activity change, chills and fever. HENT:  Negative for congestion and sore throat. Eyes:  Negative for pain and redness. Respiratory:  Positive for chest tightness. Negative for cough and shortness of breath. Cardiovascular:  Positive for chest pain. Negative for palpitations.    Gastrointestinal:  Negative for abdominal pain, diarrhea, nausea and vomiting. Genitourinary:  Negative for dysuria, frequency and urgency. Musculoskeletal:  Negative for back pain and neck pain. Skin:  Negative for rash. Neurological:  Negative for syncope, light-headedness and headaches. Psychiatric/Behavioral:  Negative for confusion. All other systems reviewed and are negative. Physical Exam   Physical Exam  Vitals and nursing note reviewed. Constitutional:       General: She is not in acute distress. Appearance: She is well-developed. She is not diaphoretic. HENT:      Head: Normocephalic. Nose: Nose normal.      Mouth/Throat:      Pharynx: No oropharyngeal exudate. Eyes:      General: No scleral icterus. Conjunctiva/sclera: Conjunctivae normal.      Pupils: Pupils are equal, round, and reactive to light. Neck:      Thyroid: No thyromegaly. Vascular: No JVD. Trachea: No tracheal deviation. Cardiovascular:      Rate and Rhythm: Normal rate and regular rhythm. Heart sounds: No murmur heard. No friction rub. No gallop. Pulmonary:      Effort: Pulmonary effort is normal. No respiratory distress. Breath sounds: Normal breath sounds. No stridor. No wheezing or rales. Abdominal:      General: Bowel sounds are normal. There is no distension. Palpations: Abdomen is soft. Tenderness: There is no abdominal tenderness. There is no guarding or rebound. Musculoskeletal:         General: Normal range of motion. Cervical back: Normal range of motion and neck supple. Lymphadenopathy:      Cervical: No cervical adenopathy. Skin:     General: Skin is warm and dry. Findings: No erythema or rash. Neurological:      Mental Status: She is alert and oriented to person, place, and time. Cranial Nerves: No cranial nerve deficit. Motor: No abnormal muscle tone.       Coordination: Coordination normal.   Psychiatric:         Behavior: Behavior normal.           Diagnostic Study Results     Labs -     Recent Results (from the past 12 hour(s))   GLUCOSE, POC    Collection Time: 07/27/22  1:52 PM   Result Value Ref Range    Glucose (POC) 127 (H) 65 - 117 mg/dL    Performed by Juan Crow (DANIKA RN)    CBC WITH AUTOMATED DIFF    Collection Time: 07/27/22  1:57 PM   Result Value Ref Range    WBC 7.2 3.6 - 11.0 K/uL    RBC 3.97 3.80 - 5.20 M/uL    HGB 12.7 11.5 - 16.0 g/dL    HCT 34.6 (L) 35.0 - 47.0 %    MCV 87.2 80.0 - 99.0 FL    MCH 32.0 26.0 - 34.0 PG    MCHC 36.7 (H) 30.0 - 36.5 g/dL    RDW 11.6 11.5 - 14.5 %    PLATELET 850 632 - 037 K/uL    MPV 8.3 (L) 8.9 - 12.9 FL    NRBC 0.0 0  WBC    ABSOLUTE NRBC 0.00 0.00 - 0.01 K/uL    NEUTROPHILS 62 32 - 75 %    LYMPHOCYTES 29 12 - 49 %    MONOCYTES 9 5 - 13 %    EOSINOPHILS 0 0 - 7 %    BASOPHILS 0 0 - 1 %    IMMATURE GRANULOCYTES 0 0.0 - 0.5 %    ABS. NEUTROPHILS 4.4 1.8 - 8.0 K/UL    ABS. LYMPHOCYTES 2.1 0.8 - 3.5 K/UL    ABS. MONOCYTES 0.6 0.0 - 1.0 K/UL    ABS. EOSINOPHILS 0.0 0.0 - 0.4 K/UL    ABS. BASOPHILS 0.0 0.0 - 0.1 K/UL    ABS. IMM. GRANS. 0.0 0.00 - 0.04 K/UL    DF AUTOMATED     METABOLIC PANEL, COMPREHENSIVE    Collection Time: 07/27/22  1:57 PM   Result Value Ref Range    Sodium 130 (L) 136 - 145 mmol/L    Potassium 4.0 3.5 - 5.1 mmol/L    Chloride 94 (L) 97 - 108 mmol/L    CO2 29 21 - 32 mmol/L    Anion gap 7 5 - 15 mmol/L    Glucose 124 (H) 65 - 100 mg/dL    BUN 20 6 - 20 MG/DL    Creatinine 0.88 0.55 - 1.02 MG/DL    BUN/Creatinine ratio 23 (H) 12 - 20      GFR est AA >60 >60 ml/min/1.73m2    GFR est non-AA >60 >60 ml/min/1.73m2    Calcium 9.0 8.5 - 10.1 MG/DL    Bilirubin, total 0.9 0.2 - 1.0 MG/DL    ALT (SGPT) 24 12 - 78 U/L    AST (SGOT) 20 15 - 37 U/L    Alk.  phosphatase 74 45 - 117 U/L    Protein, total 6.9 6.4 - 8.2 g/dL    Albumin 3.9 3.5 - 5.0 g/dL    Globulin 3.0 2.0 - 4.0 g/dL    A-G Ratio 1.3 1.1 - 2.2     TROPONIN-HIGH SENSITIVITY    Collection Time: 07/27/22  1:57 PM   Result Value Ref Range    Troponin-High Sensitivity 6 0 - 51 ng/L       Radiologic Studies -   XR CHEST SNGL V   Final Result   No acute process. CT Results  (Last 48 hours)      None          CXR Results  (Last 48 hours)                 07/27/22 1403  XR CHEST SNGL V Final result    Impression:  No acute process. Narrative:  EXAM:  XR CHEST SNGL V       INDICATION: Chest pain       COMPARISON: 4/29/2021. TECHNIQUE: Portable AP upright chest view at 1402 hours       FINDINGS: The cardiomediastinal contours are stable. The lungs and pleural   spaces are clear. There is no pneumothorax. The bones and upper abdomen are   stable. Medical Decision Making   I am the first provider for this patient. I reviewed the vital signs, available nursing notes, past medical history, past surgical history, family history and social history. Vital Signs-Reviewed the patient's vital signs. Patient Vitals for the past 12 hrs:   Temp Pulse Resp BP SpO2   07/27/22 1437 -- 72 18 (!) 147/69 99 %   07/27/22 1408 98 °F (36.7 °C) -- -- -- --   07/27/22 1404 -- -- -- -- 100 %   07/27/22 1401 -- 77 22 (!) 157/74 99 %       Pulse Oximetry Analysis - 100% on RA    Cardiac Monitor:   Rate: 77 bpm  Rhythm: Normal Sinus Rhythm        ED EKG interpretation:  Rhythm: normal sinus rhythm; and regular . Rate (approx.): 71; Axis: normal; TX Interval: normal; QRS interval: normal ; ST/T wave: non-specific changes; This EKG was interpreted by Andrey Comer MD,ED Provider. Records Reviewed: Nursing Notes and Old Medical Records    Provider Notes (Medical Decision Making):   DDx: ACS, anxiety, musculoskeletal chest pain, dehydration. ED Course:   Initial assessment performed. The patients presenting problems have been discussed, and they are in agreement with the care plan formulated and outlined with them. I have encouraged them to ask questions as they arise throughout their visit. PROGRESS NOTE    Pt reevaluated.   Negative high-sensitivity troponin at 6. No acute EKG changes. Patient feeling much better after IV fluids. Suspect musculoskeletal chest pain. Patient had a recent stress test that was negative. Recent echo that was grossly normal.  She has follow-up with cardiology, Dr. Brittaney Price already scheduled for tomorrow. Written by Jose Welch MD     Progress note:    Pt noted to be feeling better , ready for discharge. Updated pt and/or family on all final lab and imaging findings. Will follow up as instructed. All questions have been answered, pt voiced understanding and agreement with plan. Specific return precautions provided as well as instructions to return to the ED should sx worsen at any time. Vital signs stable for discharge. I have also put together some discharge instructions for them that include: 1) educational information regarding their diagnosis, 2) how to care for their diagnosis at home, as well a 3) list of reasons why they would want to return to the ED prior to their follow-up appointment, should their condition change. Written by Jose Welch MD        Critical Care Time:   0    Disposition:  Discharge    PLAN:  1. Current Discharge Medication List        2. Follow-up Information       Follow up With Specialties Details Why Contact Trinh Jeffery DO Internal Medicine Physician, Pediatric Medicine Schedule an appointment as soon as possible for a visit in 1 week  [address] [facility]  [phone]      [address] [place] [address] Emergency Medicine Go in 1 day If symptoms worsen [address] [phone] [id number]          Return to ED if worse     Diagnosis     Clinical Impression:   1. Dehydration    2. Heat exhaustion, initial encounter    3. Atypical chest pain              Please note that this dictation was completed with [employer], the computer voice recognition software.   Quite often unanticipated grammatical, syntax, homophones, and other interpretive errors are inadvertently transcribed by the computer software. Please disregard these errors. Please excuse any errors that have escaped final proofreading.

## 2022-07-28 ENCOUNTER — APPOINTMENT (OUTPATIENT)
Dept: PHYSICAL THERAPY | Age: 57
End: 2022-07-28
Payer: MEDICARE

## 2022-07-28 LAB
ATRIAL RATE: 71 BPM
CALCULATED P AXIS, ECG09: 61 DEGREES
CALCULATED R AXIS, ECG10: 17 DEGREES
CALCULATED T AXIS, ECG11: 31 DEGREES
DIAGNOSIS, 93000: NORMAL
P-R INTERVAL, ECG05: 172 MS
Q-T INTERVAL, ECG07: 410 MS
QRS DURATION, ECG06: 86 MS
QTC CALCULATION (BEZET), ECG08: 445 MS
VENTRICULAR RATE, ECG03: 71 BPM

## 2022-08-29 ENCOUNTER — DOCUMENTATION ONLY (OUTPATIENT)
Dept: ENDOCRINOLOGY | Age: 57
End: 2022-08-29

## 2022-09-29 ENCOUNTER — TELEPHONE (OUTPATIENT)
Dept: ENDOCRINOLOGY | Age: 57
End: 2022-09-29

## 2022-09-29 NOTE — TELEPHONE ENCOUNTER
9/29/2022  3:31 PM     Pt called and left message at 3.12 stating that she needs us to call [facility] to get her refill on her blood test strips.   Please call her back at [phone]

## 2022-09-30 NOTE — TELEPHONE ENCOUNTER
Advised patient that we had faxed her last office note on 9/29/22 with confirmation received. [facility] had faxed another request yesterday. Faxed it again yesterday with confirmation received. Patient has been advised and expressed understanding.

## 2022-10-05 NOTE — TELEPHONE ENCOUNTER
6 units at bedtime; Provider Procedure Text (C): After obtaining clear surgical margins the defect was repaired by another provider.

## 2022-10-26 ENCOUNTER — HOSPITAL ENCOUNTER (OUTPATIENT)
Dept: LAB | Age: 57
Discharge: HOME | End: 2022-10-26

## 2022-10-26 DIAGNOSIS — M25.559 HIP PAIN: Primary | ICD-10-CM

## 2022-10-26 PROCEDURE — 99001 SPECIMEN HANDLING PT-LAB: CPT

## 2022-10-27 LAB
ALBUMIN SERPL-MCNC: 4.6 G/DL (ref 3.8–4.9)
ALBUMIN/CREAT UR: 28 MG/G CREAT (ref 0–29)
ALBUMIN/GLOB SERPL: 2.6 {RATIO} (ref 1.2–2.2)
ALP SERPL-CCNC: 91 IU/L (ref 44–121)
ALT SERPL-CCNC: 30 IU/L (ref 0–32)
AST SERPL-CCNC: 27 IU/L (ref 0–40)
BILIRUB SERPL-MCNC: 0.8 MG/DL (ref 0–1.2)
BUN SERPL-MCNC: 11 MG/DL (ref 6–24)
BUN/CREAT SERPL: 18 (ref 9–23)
CALCIUM SERPL-MCNC: 9.3 MG/DL (ref 8.7–10.2)
CHLORIDE SERPL-SCNC: 93 MMOL/L (ref 96–106)
CO2 SERPL-SCNC: 24 MMOL/L (ref 20–29)
CREAT SERPL-MCNC: 0.6 MG/DL (ref 0.57–1)
CREAT UR-MCNC: 25.7 MG/DL
EGFR: 104 ML/MIN/1.73
EST. AVERAGE GLUCOSE BLD GHB EST-MCNC: 189 MG/DL
GLOBULIN SER CALC-MCNC: 1.8 G/DL (ref 1.5–4.5)
GLUCOSE SERPL-MCNC: 267 MG/DL (ref 70–99)
HBA1C MFR BLD: 8.2 % (ref 4.8–5.6)
MICROALBUMIN UR-MCNC: 7.2 UG/ML
POTASSIUM SERPL-SCNC: 4.4 MMOL/L (ref 3.5–5.2)
PROT SERPL-MCNC: 6.4 G/DL (ref 6–8.5)
SODIUM SERPL-SCNC: 131 MMOL/L (ref 134–144)
T4 FREE SERPL-MCNC: 2.1 NG/DL (ref 0.82–1.77)
TSH SERPL DL<=0.005 MIU/L-ACNC: 0.82 UIU/ML (ref 0.45–4.5)

## 2022-10-28 ENCOUNTER — HOSPITAL ENCOUNTER (OUTPATIENT)
Dept: GENERAL RADIOLOGY | Age: 57
Discharge: HOME | End: 2022-10-28
Payer: MEDICARE

## 2022-10-28 DIAGNOSIS — M25.559 HIP PAIN: ICD-10-CM

## 2022-10-28 PROCEDURE — 73522 X-RAY EXAM HIPS BI 3-4 VIEWS: CPT

## 2022-11-01 ENCOUNTER — HOSPITAL ENCOUNTER (OUTPATIENT)
Dept: INFUSION THERAPY | Age: 57
Discharge: HOME | End: 2022-11-01
Payer: MEDICARE

## 2022-11-01 ENCOUNTER — OFFICE VISIT (OUTPATIENT)
Dept: SURGERY | Age: 57
End: 2022-11-01
Payer: MEDICARE

## 2022-11-01 VITALS
DIASTOLIC BLOOD PRESSURE: 74 MMHG | RESPIRATION RATE: 18 BRPM | OXYGEN SATURATION: 96 % | HEART RATE: 79 BPM | TEMPERATURE: 98.3 F | SYSTOLIC BLOOD PRESSURE: 145 MMHG

## 2022-11-01 VITALS
HEIGHT: 60 IN | TEMPERATURE: 96.9 F | SYSTOLIC BLOOD PRESSURE: 124 MMHG | DIASTOLIC BLOOD PRESSURE: 63 MMHG | BODY MASS INDEX: 29.76 KG/M2 | WEIGHT: 151.6 LBS | HEART RATE: 67 BPM

## 2022-11-01 DIAGNOSIS — M25.552 CHRONIC LEFT HIP PAIN: Primary | ICD-10-CM

## 2022-11-01 DIAGNOSIS — I10 ESSENTIAL HYPERTENSION: ICD-10-CM

## 2022-11-01 DIAGNOSIS — E03.9 ACQUIRED HYPOTHYROIDISM: ICD-10-CM

## 2022-11-01 DIAGNOSIS — E10.42 TYPE 1 DIABETES MELLITUS WITH DIABETIC POLYNEUROPATHY (HCC): ICD-10-CM

## 2022-11-01 DIAGNOSIS — G89.29 CHRONIC LEFT HIP PAIN: Primary | ICD-10-CM

## 2022-11-01 PROCEDURE — 96372 THER/PROPH/DIAG INJ SC/IM: CPT

## 2022-11-01 PROCEDURE — 74011250636 HC RX REV CODE- 250/636: Performed by: INTERNAL MEDICINE

## 2022-11-01 RX ADMIN — DENOSUMAB 60 MG: 60 INJECTION SUBCUTANEOUS at 13:10

## 2022-11-01 NOTE — PROGRESS NOTES
Identified pt with two pt identifiers (name and ). Reviewed chart in preparation for visit and have obtained necessary documentation. Angélica Melvin is a 61 y.o. female  Chief Complaint   Patient presents with    Buttocks pain     Left buttocks pain     Visit Vitals  /63   Pulse 67   Temp 96.9 °F (36.1 °C)   Ht 5' 0.3\" (1.532 m)   Wt 151 lb 9.6 oz (68.8 kg)   BMI 29.31 kg/m²       1. Have you been to the ER, urgent care clinic since your last visit? Hospitalized since your last visit? No    2. Have you seen or consulted any other health care providers outside of the [address] [place] [address] since your last visit? Include any pap smears or colon screening.  No Dr. Colbert Ahumada on 10/28/22

## 2022-11-01 NOTE — DISCHARGE INSTRUCTIONS
denosumab (Prolia)  Pronunciation:  mendel holley mab  Brand:  Prolia  What is the most important information I should know about Prolia? This medication guide provides information about the Prolia brand of denosumab. Ok Yariel is another brand of denosumab used to prevent bone fractures and other skeletal conditions in people with tumors that have spread to the bone. Prolia can cause many serious side effects. Call your doctor at once if you have a fever, chills, pain or burning when you urinate, severe stomach pain, cough, shortness of breath, skin problems, numbness or tingling, severe or unusual pain, or skin problems. Do not use if you are pregnant. Use effective birth control while using Prolia, and for at least 5 months after you stop. What is denosumab (Prolia)? Denosumab is a monoclonal antibody. Monoclonal antibodies are made to target and destroy only certain cells in the body. This may help to protect healthy cells from damage. The Prolia brand of denosumab is used to treat osteoporosis or bone loss in men and women who have a high risk of bone fracture. Prolia is sometimes used in people whose bone fracture is caused by certain medicines or cancer treatments. This medication guide provides information about the Prolia brand of denosumab. Ok Yariel is another brand of denosumab used to prevent bone fractures and other skeletal conditions in people with tumors that have spread to the bone. Denosumab may also be used for purposes not listed in this medication guide. What should I discuss with my healthcare provider before receiving Prolia? You should not receive Prolia if you are allergic to denosumab, or if you have:  low levels of calcium in your blood (hypocalcemia); or  if you are pregnant. While you are using Prolia, you should not receive Xgeva, another brand of denosumab.   Tell your doctor if you have ever had:  kidney disease (or if you are on dialysis);  a weak immune system (caused by disease or by using certain medicines);  hypoparathyroidism (decreased functioning of the parathyroid glands);  thyroid surgery;  any condition that makes it hard for your body to absorb nutrients from food (malabsorption);  a latex allergy;  if you are scheduled for a dental procedure; or  if you cannot take daily calcium and vitamin D. Denosumab may cause bone loss (osteonecrosis) in the jaw. Symptoms include jaw pain or numbness, red or swollen gums, loose teeth, gum infection, or slow healing after dental work. The risk of osteonecrosis is highest in people with cancer, blood cell disorders, pre-existing dental problems, or people treated with steroids, chemotherapy, or radiation. Ask your doctor about your own risk. You may need to have a negative pregnancy test before starting this treatment. Do not use Prolia if you are pregnant. It could harm the unborn baby or cause birth defects. Use effective birth control to prevent pregnancy while you are using this medicine and for at least 5 months after your last dose. Tell your doctor right away if you become pregnant. You should not breastfeed while using denosumab. How is Prolia given? Denosumab is injected under the skin of your stomach, upper thigh, or upper arm. A healthcare provider will give you this injection. Prolia is usually given once every 6 months. Your doctor may have you take extra calcium and vitamin D while you are being treated with denosumab. Take only the amount of calcium and vitamin D that your doctor has prescribed. If you need to have any dental work (especially surgery), tell the dentist ahead of time that you are receiving denosumab. Pay special attention to your dental hygiene. Brush and floss your teeth regularly while receiving this medication. You may need to have a dental exam before you begin treatment with Prolia. Follow your doctor's instructions. Your risk of bone fractures can increase when you stop using Prolia.  Do not stop using this medicine without first talking to your doctor. If you keep this medicine at home, store it in the original carton in a refrigerator. Protect from light and do not freeze. Do not shake the prefilled syringe. You may take the carton out of the refrigerator and allow it to reach room temperature before the injection is given. After you have taken Prolia out of the refrigerator, you may keep it at room temperature for up to 14 days. Store in the original container away from heat and light. Each prefilled syringe is for one use only. Throw it away after one use, even if there is still medicine left inside. Use a needle and syringe only once and then place them in a puncture-proof \"sharps\" container. Follow state or local laws about how to dispose of this container. Keep it out of the reach of children and pets. Do not share this medicine with another person, even if they have the same symptoms you have. What happens if I miss a dose? Call your doctor for instructions if you miss a dose or miss an appointment for your Prolia injection. You should receive your missed injection as soon as possible. What happens if I overdose? Seek emergency medical attention or call the Poison Help line at 1-175.772.5844. What should I avoid while receiving Prolia? Follow your doctor's instructions about any restrictions on food, beverages, or activity. What are the possible side effects of Prolia? Get emergency medical help if you have signs of an allergic reaction: hives, itching, rash; difficult breathing, feeling light-headed; swelling of your face, lips, tongue, or throat.   Call your doctor at once if you have:  new or unusual pain in your thigh, hip, or groin;  severe pain in your joints, muscles, or bones;  skin problems such as dryness, peeling, redness, itching, blisters, bumps, oozing, or crusting; or  low calcium level --muscle spasms or contractions, numbness or tingly feeling (around your mouth, or in your fingers and toes). Serious infections may occur during treatment with Prolia. Call your doctor right away if you have signs of infection such as:  fever, chills, night sweats;  swelling, pain, tenderness, warmth, or redness anywhere on your body;  pain or burning when you urinate;  increased or urgent need to urinate;  severe stomach pain; or  cough, wheezing, feeling short of breath. Common side effects may include:  bladder infection (painful or difficult urination);  lung infection (cough, shortness of breath);  headache;  back pain, muscle pain, joint pain;  increased blood pressure;  cold symptoms such as stuffy nose, sneezing, sore throat;  high cholesterol; or  pain in your arms or legs. This is not a complete list of side effects and others may occur. Call your doctor for medical advice about side effects. You may report side effects to FDA at 7-689-FDA-7769. What other drugs will affect Prolia? Other drugs may affect Prolia, including prescription and over-the-counter medicines, vitamins, and herbal products. Tell your doctor about all your current medicines and any medicine you start or stop using. Where can I get more information? Your doctor or pharmacist can provide more information about denosumab (Prolia). Remember, keep this and all other medicines out of the reach of children, never share your medicines with others, and use this medication only for the indication prescribed. Every effort has been made to ensure that the information provided by [address] is accurate, up-to-date, and complete, but no guarantee is made to that effect. Drug information contained herein may be time sensitive. [facility] information has been compiled for use by healthcare practitioners and consumers in the [place] and therefore [employer] does not warrant that uses outside of the [place] are appropriate, unless specifically indicated otherwise.  [facility]'s drug information does not endorse drugs, diagnose patients or recommend therapy. [facility]'s drug information is an informational resource designed to assist licensed healthcare practitioners in caring for their patients and/or to serve consumers viewing this service as a supplement to, and not a substitute for, the expertise, skill, knowledge and judgment of healthcare practitioners. The absence of a warning for a given drug or drug combination in no way should be construed to indicate that the drug or drug combination is safe, effective or appropriate for any given patient. [facility] does not assume any responsibility for any aspect of healthcare administered with the aid of information [facility] provides. The information contained herein is not intended to cover all possible uses, directions, precautions, warnings, drug interactions, allergic reactions, or adverse effects. If you have questions about the drugs you are taking, check with your doctor, nurse or pharmacist.  Copyright 9467-8111 [address]: 12.01. Revision date: 2/18/2020. Care instructions adapted under license by [employer] (which disclaims liability or warranty for this information). If you have questions about a medical condition or this instruction, always ask your healthcare professional. [place] [address] any warranty or liability for your use of this information.

## 2022-11-01 NOTE — PROGRESS NOTES
[address] [facility] Progress Note    Date: 2022    Name: Ernestina Fowler    MRN: [id number]         : 1965      Ms. Savi Vergara was assessed and education was provided. Ms. Keith Julian vitals were reviewed and patient was observed for 5 minutes prior to treatment. Visit Vitals  BP (!) 145/74 (BP 1 Location: Left arm, BP Patient Position: Sitting)   Pulse 79   Temp 98.3 °F (36.8 °C)   Resp 18   SpO2 96%   Breastfeeding No       Lab results were reviewed from 10/26 and noted to be within tx parameters. Ca 9.6. Prolia 60 mg was administered subcutaneous in right arm. Band-aid applied to site without redness, swelling or pain. Ms. Savi Vergara tolerated well, and had no complaints. Patient armband removed and shredded. Ms. Savi Vergara was discharged from [place] [address] in stable condition at 1320. She is to return on May 1, 2023 at 1000 for her next appointment.     Sukhjinder Ivory RN  2022  1:22 PM

## 2022-11-01 NOTE — PROGRESS NOTES
Patient comes in today for evaluation of her left hip pain. The patient was a previous patient of mine at another practice and had her right hip repaired with a excision of the trochanteric bursa and repair of the abductor tendon. However at the same time she also had pain in the left hip and had issues with their hamstring tendon as well as the abductor tendons. She states that the right hip is better after the surgical procedure we did last year of the left hip is continued to give her severe pain. She is interested in discussing what can be done. Physical exam: Alert and orient x3. Normal mood and affect. Judgment tach. No acute cardiopulmonary stress no labored breathing. Bilateral lower extremity skin neurologic pulse laminations are within normal limits. The patient is tender palpation over the left trochanteric bursa. She is tender over the ischial tuberosity as well. Range of motion hips unremarkable nonirritable. She has good ligament stability in the hips bilaterally. Radiographs: Ordered interpreted radiographs the patient's left hip. She does have some primary degenerative change otherwise unremarkable. Impression: Left hip soft tissue pathology with advancement of pain and possible tendinosis versus tear    Medical decision making: I discussed the patient treatment options. She would like to consider operative intervention. I told her before doing that that I would like to better evaluate the current situation and not based decisions on where she was a year ago. We are going to get an MRI of her left hip given the progression to get an update on where her tendinosis and tears are at this time. We will plan to see her back after that to discuss the results and the treatment options.

## 2022-11-03 ENCOUNTER — APPOINTMENT (OUTPATIENT)
Dept: INFUSION THERAPY | Age: 57
End: 2022-11-03

## 2022-11-03 ENCOUNTER — OFFICE VISIT (OUTPATIENT)
Dept: ENDOCRINOLOGY | Age: 57
End: 2022-11-03
Payer: MEDICARE

## 2022-11-03 VITALS
SYSTOLIC BLOOD PRESSURE: 118 MMHG | WEIGHT: 153.2 LBS | HEIGHT: 60 IN | DIASTOLIC BLOOD PRESSURE: 67 MMHG | HEART RATE: 71 BPM | BODY MASS INDEX: 30.08 KG/M2

## 2022-11-03 DIAGNOSIS — E03.9 ACQUIRED HYPOTHYROIDISM: ICD-10-CM

## 2022-11-03 DIAGNOSIS — E10.42 TYPE 1 DIABETES MELLITUS WITH DIABETIC POLYNEUROPATHY (HCC): Primary | ICD-10-CM

## 2022-11-03 DIAGNOSIS — E55.9 VITAMIN D DEFICIENCY: ICD-10-CM

## 2022-11-03 DIAGNOSIS — I10 ESSENTIAL HYPERTENSION: ICD-10-CM

## 2022-11-03 PROCEDURE — 3074F SYST BP LT 130 MM HG: CPT | Performed by: INTERNAL MEDICINE

## 2022-11-03 PROCEDURE — G8419 CALC BMI OUT NRM PARAM NOF/U: HCPCS | Performed by: INTERNAL MEDICINE

## 2022-11-03 PROCEDURE — G8752 SYS BP LESS 140: HCPCS | Performed by: INTERNAL MEDICINE

## 2022-11-03 PROCEDURE — G9717 DOC PT DX DEP/BP F/U NT REQ: HCPCS | Performed by: INTERNAL MEDICINE

## 2022-11-03 PROCEDURE — G8754 DIAS BP LESS 90: HCPCS | Performed by: INTERNAL MEDICINE

## 2022-11-03 PROCEDURE — G8427 DOCREV CUR MEDS BY ELIG CLIN: HCPCS | Performed by: INTERNAL MEDICINE

## 2022-11-03 PROCEDURE — 99214 OFFICE O/P EST MOD 30 MIN: CPT | Performed by: INTERNAL MEDICINE

## 2022-11-03 PROCEDURE — 3052F HG A1C>EQUAL 8.0%<EQUAL 9.0%: CPT | Performed by: INTERNAL MEDICINE

## 2022-11-03 PROCEDURE — 3017F COLORECTAL CA SCREEN DOC REV: CPT | Performed by: INTERNAL MEDICINE

## 2022-11-03 PROCEDURE — G9899 SCRN MAM PERF RSLTS DOC: HCPCS | Performed by: INTERNAL MEDICINE

## 2022-11-03 PROCEDURE — 3078F DIAST BP <80 MM HG: CPT | Performed by: INTERNAL MEDICINE

## 2022-11-03 PROCEDURE — 2022F DILAT RTA XM EVC RTNOPTHY: CPT | Performed by: INTERNAL MEDICINE

## 2022-11-03 RX ORDER — BLOOD SUGAR DIAGNOSTIC
STRIP MISCELLANEOUS
Qty: 800 STRIP | Refills: 3 | Status: SHIPPED | OUTPATIENT
Start: 2022-11-03

## 2022-11-03 NOTE — LETTER
11/3/2022    Patient: Aric Meredith   YOB: 1965   Date of Visit: 11/3/2022     Dennis Sam DO  [address]  Via Fax: [phone]    Dear Dennis Sam DO,      Thank you for referring Ms. Rayshawn Gan to [facility] for evaluation. My notes for this consultation are attached. If you have questions, please do not hesitate to call me. I look forward to following your patient along with you.       Sincerely,    Elida Coley MD

## 2022-11-03 NOTE — PROGRESS NOTES
Chief Complaint   Patient presents with    Diabetes     Pcp and pharmacy verified    Thyroid Problem     History of Present Illness: Felix Swanson is a 61 y.o. female here for follow up of diabetes. here for follow up of diabetes. Pt notes \"I have been a Type I diabetic for 37 years\". \"I don't count carbs, I eat what I want and most of the time I will remember to take my insulin but not all the time\". In February 2022 she was admitted for CSF Rhinorrhea she was taken to the OR by Dr. Preet Banda of [address] [place] [address] and Dr. Smooth Roche of ENT. Mike Brooks put a tube in my spine, and they took out some scar tissue from a prior leak, but they did not find any evidence of a CSF leak. They put me Abx and I am still doing Malena Pot twice per day. \"    After our visit in March 2022, we were in frequent contact because she was having issues of hypoglycemia and we were decreasing her Levemir doses accordingly. She noted that she was taking Levermir 30 units in the AM and 4 units HS. She noted that with 3 units of Levemir HS her FBGs were increasing over night. She was still waiting for 1-2 hours after her meal and then giving herself Humalog based on how high the BG had run. Pt encouraged to start checking her BGs before each meal and taking her Humalog before each meal rather than waiting till after meals, when her BG is already high. In July 2022 she was in the ED for chest pain after she has been working out in her yard. She had an extensive cardiac work-up, which was negative and she was D/Jonathan home with the diagnosis of MSK pain. \"I was having issues of problems with low BGs and I was not taking ANY humalog for a time. I was still taking the Levemir 27 units in the AM and 4 units HS, but not any Humalog. Eventually my sugars did go up and I started taking my Humalog. She is now taking 1-2 units of Humalog \"when I see that my sugars are going up, I can take up to 6 units. \"  She is still not taking any Humalog unless her BGs are over 200. \"I have had a lot of sinus infections and Dr. Gale Alexis has put me on Abx frequently. She is sending me to ENT. \"  \"I saw ortho in 2000 [facility] and I have an MRI and we will talk about having surgery. \"    She has not been on any steroids since our last visit. Pt notes she still has the tendon tear in the left hip. In June 2022 her Vitamin D was 72. Pt is waking around 7-8AM, he takes her Levemir 27 units and Humalog 2 units for her Pepsi \"if my sugar is over in the high 100s, otherwise I will only take one. \". She will take her dog for a walk after breakfast. \"My dog has an injury so we are not walking as far, we are walking a mile in the morning and a mile in the evening. \"  She does not eat lunch. If she gets hungry, she will snack on a cheese or mixed vegetables. She has dinner around 5-6PM, last night she had a green salad with ham and imitation crab and dried fruit and water. Pt has hx of CVA x4, \"one of which caused a CNS leak\". She reports she had a stress test and an ECHO in 2015 and \"everything came back fine\". Pt has hx of polyneuropathy from her knees to her feet. She takes Gabapentin 200mg in the AM and 200mg in the evening, which does help with her pain. \"300mg HS was causing me to swell up so I went back to 200mg HS. \"    Pt has no hx of Nephrology    Last eye exam was October 2021 [facility] says my eyes look great and he sent me to the cataract doctor for new glasses. \"    Pt has hx of osteoporosis for which she is followed by Dr. Samir Moseley of Rheumatology. 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 She was never treated and it \"went away\". She is on Prolia every 6 month. Pt has hx of hypothyroidism and is taking LT4 75mcg daily. Current Outpatient Medications   Medication Sig    glucose blood VI test strips (OneTouch Ultra Test) strip Test blood sugars 8 times per day.  DX: E10.42    gabapentin (NEURONTIN) 100 mg capsule TAKE 2 CAPSULES BY MOUTH IN THE MORNING AND 3 IN THE EVENING (Patient taking differently: TAKE 2 CAPSULES BY MOUTH IN THE MORNING AND 2  IN THE EVENING)    traMADoL (ULTRAM) 50 mg tablet Take 50 mg by mouth daily. diclofenac EC (VOLTAREN) 75 mg EC tablet Take 75 mg by mouth daily. glucose blood VI test strips (OneTouch Ultra Blue Test Strip) strip TEST BLOOD SUGAR 8 TIMES A DAY DUE TO UNCONTROLLED SUGARS Dx : E10.42 (hold till needed)    insulin detemir U-100 (Levemir FlexTouch U-100 Insuln) 100 unit/mL (3 mL) inpn INJECT 30 UNITS SUBCUTANEOUSLY IN THE MORNING AND 4 UNITS AT NIGHT (hold till needed)  Indications: type 1 diabetes mellitus (Patient taking differently: INJECT 27 UNITS SUBCUTANEOUSLY IN THE MORNING AND 4 UNITS AT NIGHT  Indications: type 1 diabetes mellitus)    Insulin Needles, Disposable, (Pen Needle) 30 gauge x 5/16\" ndle Use one needle up to 8 times daily to dispense insulin. E10.65,E 10.649 (hold till needed)    FeroSuL 325 mg (65 mg iron) tablet daily. montelukast (SINGULAIR) 10 mg tablet Take 1 Tablet by mouth daily. HumaLOG KwikPen Insulin 100 unit/mL kwikpen ONLY PRN up to 1-6 units with each meal (3 times per day) depending on blood sugar    zolpidem (AMBIEN) 5 mg tablet Take 1 Tablet by mouth nightly as needed for Sleep. Max Daily Amount: 5 mg. (Patient taking differently: Take 5 mg by mouth nightly.)    lidocaine (LIDODERM) 5 % USE 2 PATCHES EXTERNALLY ONCE DAILY, LEAVE IN PLACE FOR 12 HOURS AND REMOVE FOR 12 HOURS (Patient taking differently: USE 2 PATCHES EXTERNALLY AT HS)    Euthyrox 75 mcg tablet TAKE 1 TABLET BY MOUTH ONCE DAILY BEFORE  BREAKFAST  FOR  A  CONDITION  WITH  LOW  THYROID  HORMONE  LEVELS    simvastatin (ZOCOR) 40 mg tablet Take 1 tablet by mouth nightly    losartan (COZAAR) 50 mg tablet Take 1 Tablet by mouth daily. Take 1 tab daily    metoclopramide HCl (REGLAN) 5 mg tablet Take 1 Tablet by mouth two (2) times a day. Up to BID.  TAKE 30 MINUTES BEFORE MEALS Insulin Needles, Disposable, (Niru Pen Needle) 32 gauge x 5/32\" ndle Use as directed with pen device - up to 5 times daily  Dx:  E11.9    aspirin 81 mg chewable tablet Take 81 mg by mouth daily. mupirocin (BACTROBAN) 2 % ointment PRN    potassium 99 mg tablet Take 1 Tab by mouth every other day. ascorbic acid, vitamin C, (VITAMIN C) 1,000 mg tablet Take  by mouth two (2) times a day. 10,000 mg BID    calcium carbonate (CALTREX) 600 mg calcium (1,500 mg) tablet Take 1,000 mg by mouth two (2) times a day. glucos sul 2KCl/msm/chond/C/Mn (GLUCOSAMINE CHONDROITIN PO) Take  by mouth. 1 in Am  only    MAGNESIUM PO Take  by mouth every fourty-eight (48) hours. denosumab (PROLIA) 60 mg/mL injection 60 mg by SubCUTAneous route. Every 6 months    cholecalciferol, vitamin D3, 50 mcg (2,000 unit) tab Take 1 Tab by mouth daily. 5000 units daily  Indications: low vitamin D levels    VITAMIN A PO Take 2,400 mcg by mouth nightly. vitamin E (AQUA GEMS) 400 unit capsule Take 400 Units by mouth every Monday and Thursday. Only on M and Thurs at Bedtime     No current facility-administered medications for this visit. No Known Allergies  Review of Systems:  - Eyes: no blurry vision or double vision  - Cardiovascular: no chest pain  - Respiratory: no shortness of breath  - Musculoskeletal: no myalgias  - Neurological: no numbness/tingling in extremities    Physical Examination:  Blood pressure 118/67, pulse 71, height 5' 0.3\" (1.532 m), weight 153 lb 3.2 oz (69.5 kg). General: pleasant, no distress, good eye contact   Neck: no carotid bruits  Cardiovascular: regular, normal rate, nl s1 and s2, no m/r/g, 2+ DP pulses   Respiratory: clear bilaterally  Integumentary: no edema, + foot ulcer on her right great toe.    Psychiatric: normal mood and affect    Diabetic foot exam:     Left Foot:   Visual Exam: callous - present   Pulse DP: 2+ (normal)   Filament test: absent sensation    Vibratory sensation: diminished      Right Foot:   Visual Exam: callous - present   Pulse DP: 2+ (normal)   Filament test: reduced sensation    Vibratory sensation: diminished        Data Reviewed:   Component      Latest Ref Rng & Units 10/26/2022   Glucose      70 - 99 mg/dL 267 (H)   BUN      6 - 24 mg/dL 11   Creatinine      0.57 - 1.00 mg/dL 0.60   eGFR      >59 mL/min/1.73 104   BUN/Creatinine ratio      9 - 23 18   Sodium      134 - 144 mmol/L 131 (L)   Potassium      3.5 - 5.2 mmol/L 4.4   Chloride      96 - 106 mmol/L 93 (L)   CO2      20 - 29 mmol/L 24   Calcium      8.7 - 10.2 mg/dL 9.3   Protein, total      6.0 - 8.5 g/dL 6.4   Albumin      3.8 - 4.9 g/dL 4.6   GLOBULIN, TOTAL      1.5 - 4.5 g/dL 1.8   A-G Ratio      1.2 - 2.2 2.6 (H)   Bilirubin, total      0.0 - 1.2 mg/dL 0.8   Alk. phosphatase      44 - 121 IU/L 91   AST      0 - 40 IU/L 27   ALT      0 - 32 IU/L 30   Creatinine, urine random      Not Estab. mg/dL 25.7   Microalbumin, urine      Not Estab. ug/mL 7.2   Microalbumin/Creat. Ratio      0 - 29 mg/g creat 28   Hemoglobin A1c, (calculated)      4.8 - 5.6 % 8.2 (H)   Estimated average glucose      mg/dL 189   TSH      0.450 - 4.500 uIU/mL 0.816   T4, Free      0.82 - 1.77 ng/dL 2.10 (H)       Assessment/Plan:   1) DM > Her A1C last week was 8.2%. Will have pt increase her Levemir to 28 units in the AM and 4 units HS. Pt encouraged to start checking her BGs before each meal and taking her Humalog before each meal rather than waiting till after meals, when her BG is already high. With her hx of neuropathy and calluses, she would benefit from DM shoes and inserts. Because her blood sugars are so erratic and she has had frequent issues of hypoglycemia, she is testing her blood sugars 8 times per day. She would benefit from a CGM. Pt is testing her BGs 8 times per day and adjusting her Humalog based on the readings. Pt to continue the Gabapentin 200mg in the AM and 200mg in the evening.     2) Hypothyroidism > Pt is clinically and biochemically euthyroid on  LT4 75mcg daily. Pt to continue this current dose. 3) Hypovitaminosis D > Pt to continue her Vitamin D 5000 units daily. Her Vitamin D level in June 2022 was 72.7. 4) HTN > Pt is on an ARB for renal protection. Will not make any changes at this time. Her BP today was 118/67. Pt voices understanding and agreement with the plan.   Pain noted and pt was recommended to call her PCP for further evaluation and treatment, as needed    RTC 4 months      Copy sent to:  Dr. Jadyn Panchal

## 2022-11-14 ENCOUNTER — HOSPITAL ENCOUNTER (OUTPATIENT)
Dept: MRI IMAGING | Age: 57
Discharge: HOME | End: 2022-11-14
Attending: ORTHOPAEDIC SURGERY
Payer: MEDICARE

## 2022-11-14 DIAGNOSIS — M25.552 CHRONIC LEFT HIP PAIN: ICD-10-CM

## 2022-11-14 DIAGNOSIS — G89.29 CHRONIC LEFT HIP PAIN: ICD-10-CM

## 2022-11-14 PROCEDURE — 73721 MRI JNT OF LWR EXTRE W/O DYE: CPT

## 2022-11-22 ENCOUNTER — OFFICE VISIT (OUTPATIENT)
Dept: SURGERY | Age: 57
End: 2022-11-22
Payer: MEDICARE

## 2022-11-22 VITALS
DIASTOLIC BLOOD PRESSURE: 67 MMHG | SYSTOLIC BLOOD PRESSURE: 139 MMHG | TEMPERATURE: 96.2 F | WEIGHT: 155.2 LBS | HEIGHT: 63 IN | BODY MASS INDEX: 27.5 KG/M2 | HEART RATE: 74 BPM

## 2022-11-22 DIAGNOSIS — M70.70 BURSITIS OF HIP, UNSPECIFIED BURSA, UNSPECIFIED LATERALITY: Primary | ICD-10-CM

## 2022-11-22 PROCEDURE — 99214 OFFICE O/P EST MOD 30 MIN: CPT | Performed by: ORTHOPAEDIC SURGERY

## 2022-11-22 RX ORDER — DICLOFENAC SODIUM 75 MG/1
75 TABLET, DELAYED RELEASE ORAL 2 TIMES DAILY
Qty: 60 TABLET | Refills: 3 | Status: SHIPPED | OUTPATIENT
Start: 2022-11-22

## 2022-11-22 NOTE — PROGRESS NOTES
Identified pt with two pt identifiers (name and ). Reviewed chart in preparation for visit and have obtained necessary documentation. Bella Paulino is a 61 y.o. female  Chief Complaint   Patient presents with    Follow-up     MRI follow-up     There were no vitals taken for this visit. 1. Have you been to the ER, urgent care clinic since your last visit? Hospitalized since your last visit? No    2. Have you seen or consulted any other health care providers outside of the [address] [place] [address] since your last visit? Include any pap smears or colon screening.  No

## 2022-11-22 NOTE — PROGRESS NOTES
Comes in today for evaluation and follow-up on the MRI that she recently had. The patient previously had a right trochanteric bursa excision and abductor tendon repair. She has had a similar problem in the left hip. She also was noted to have a tear of the hamstring tendon. She states that it was relatively severe on MRI several years ago. She underwent a repeat MRI. She is here today to discuss the results and treatment options. Physical exam: The patient has minimal tenderness palpation over the trochanteric bursa bilaterally. Stance and gait are overall unremarkable. Bilateral lower extremity skin neurologic and pulse examinations are within normal limits. Imaging: I reviewed interpreted the patient's MRI as well as the report and the radiologist which also ordered. The patient does have evidence of trochanteric bursitis as well as a partial tear of the common hamstring origin on the left side. There is some edema in the bone. Impression: Trochanteric bursitis, left partial hamstring tendon tear    Medical decision making: I discussed the patient treatment options operative and nonoperative. I told her that I feel although she has had this for a long time that surgery certainly would not be a guarantee of success. Her physical therapy. I have given her prescription for diclofenac.

## 2022-12-20 ENCOUNTER — TRANSCRIBE ORDER (OUTPATIENT)
Dept: REGISTRATION | Age: 57
End: 2022-12-20

## 2022-12-20 ENCOUNTER — HOSPITAL ENCOUNTER (OUTPATIENT)
Dept: GENERAL RADIOLOGY | Age: 57
Discharge: HOME | End: 2022-12-20
Payer: MEDICARE

## 2022-12-20 DIAGNOSIS — M25.562 CHRONIC PAIN OF BOTH KNEES: ICD-10-CM

## 2022-12-20 DIAGNOSIS — G89.29 CHRONIC PAIN OF BOTH KNEES: ICD-10-CM

## 2022-12-20 DIAGNOSIS — M25.561 CHRONIC PAIN OF BOTH KNEES: ICD-10-CM

## 2022-12-20 DIAGNOSIS — M79.671 RIGHT FOOT PAIN: Primary | ICD-10-CM

## 2022-12-20 DIAGNOSIS — M25.559 HIP PAIN: ICD-10-CM

## 2022-12-20 DIAGNOSIS — M79.671 RIGHT FOOT PAIN: ICD-10-CM

## 2022-12-20 PROCEDURE — 73502 X-RAY EXAM HIP UNI 2-3 VIEWS: CPT

## 2022-12-20 PROCEDURE — 73562 X-RAY EXAM OF KNEE 3: CPT

## 2022-12-20 PROCEDURE — 73630 X-RAY EXAM OF FOOT: CPT

## 2022-12-21 DIAGNOSIS — S92.301A CLOSED FRACTURE OF METATARSAL NECK, RIGHT, INITIAL ENCOUNTER: Primary | ICD-10-CM

## 2022-12-21 RX ORDER — HYDROCODONE BITARTRATE AND ACETAMINOPHEN 5; 325 MG/1; MG/1
1 TABLET ORAL
Qty: 16 TABLET | Refills: 0 | Status: SHIPPED | OUTPATIENT
Start: 2022-12-21 | End: 2022-12-24

## 2023-01-10 ENCOUNTER — OFFICE VISIT (OUTPATIENT)
Dept: RHEUMATOLOGY | Age: 58
End: 2023-01-10
Payer: MEDICARE

## 2023-01-10 VITALS
DIASTOLIC BLOOD PRESSURE: 63 MMHG | RESPIRATION RATE: 16 BRPM | TEMPERATURE: 98.1 F | SYSTOLIC BLOOD PRESSURE: 175 MMHG | HEART RATE: 79 BPM | WEIGHT: 152 LBS | BODY MASS INDEX: 26.93 KG/M2 | OXYGEN SATURATION: 96 %

## 2023-01-10 DIAGNOSIS — M80.00XD OSTEOPOROSIS WITH CURRENT PATHOLOGICAL FRACTURE WITH ROUTINE HEALING, UNSPECIFIED OSTEOPOROSIS TYPE, SUBSEQUENT ENCOUNTER: Primary | ICD-10-CM

## 2023-01-10 PROCEDURE — 3017F COLORECTAL CA SCREEN DOC REV: CPT | Performed by: PEDIATRICS

## 2023-01-10 PROCEDURE — G9717 DOC PT DX DEP/BP F/U NT REQ: HCPCS | Performed by: PEDIATRICS

## 2023-01-10 PROCEDURE — G9899 SCRN MAM PERF RSLTS DOC: HCPCS | Performed by: PEDIATRICS

## 2023-01-10 PROCEDURE — G8427 DOCREV CUR MEDS BY ELIG CLIN: HCPCS | Performed by: PEDIATRICS

## 2023-01-10 PROCEDURE — G8419 CALC BMI OUT NRM PARAM NOF/U: HCPCS | Performed by: PEDIATRICS

## 2023-01-10 PROCEDURE — G0463 HOSPITAL OUTPT CLINIC VISIT: HCPCS | Performed by: PEDIATRICS

## 2023-01-10 PROCEDURE — 99214 OFFICE O/P EST MOD 30 MIN: CPT | Performed by: PEDIATRICS

## 2023-01-10 NOTE — PROGRESS NOTES
Chief Complaint   Patient presents with    Joint Pain     1. Have you been to the ER, urgent care clinic since your last visit? Hospitalized since your last visit? No    2. Have you seen or consulted any other health care providers outside of the [address] [place] [address] since your last visit? Include any pap smears or colon screening.  No

## 2023-01-10 NOTE — PROGRESS NOTES
RHEUMATOLOGY PROBLEM LIST AND CHIEF COMPLAINT  1. Osteoporosis: The patient fulfills the [facility] guidelines for pharmacologic intervention in postmenopausal women and men ? 48years of age  Treatment - Prolia. 2. Remote history of sarcoidosis - diagnosed after a chest x-ray and biopsy revealed sarcoidosis. She was not on any treatment for this and has not had any symptoms of inflammatory arthritis, interstitial lung disease or inflammatory skin disease. 3. Inflammatory arthritis-swelling of b/l hands. Negative RF. Therapy History:  Current DMARDs:   Prior DMARDs: Methotrexate (1/2020-2/2020; restarted 11/2020)    HISTORY OF PRESENT ILLNESS  This is a 61 y.o.  female. Today, the patient complains of pain in the joints. Location: hip  Severity:  7 on a scale of 0-10  Timing: intermittent   Duration: several months  Modifying factors: none  Context/Associated signs and symptoms: The patient was last seen 11/17/2020. Today, she is complaining of pain in her hips, however, she has a history of OA, right gluteal tear, and left hamstring tear. She continues on Prolia q6 months for osteoporosis. She recently fell and had xrays of her right foot on 12/20/22 showing acute fracture fourth metatarsal shaft, chronic fracture proximal fourth metatarsal shaft, and severe degeneration first MTP joint. She states she has had several fractures even while on Prolia.      RHEUMATOLOGY REVIEW OF SYSTEMS   Positives as per history  Negatives as follows:  Mart Loss:  Denies unexplained persistent fevers, weight change, chronic fatigue  HEAD/EYES:   Denies eye redness, blurry vision or sudden loss of vision, dry eyes, HA, temporal artery pain  ENT:    Denies oral/nasal ulcers, recurrent sinus infections, dry mouth  RESPIRATORY:  No pleuritic pain, history of pleural effusions, hemoptysis, exertional dyspnea  CARDIOVASCULAR:  Denies chest pain, history of pericardial effusions  GASTRO: Denies heartburn, esophageal dysmotility, abdominal pain, nausea, vomiting, diarrhea, blood in the stool  HEMATOLOGIC:  No easy bruising, purpura, swollen lymph nodes  SKIN:    Denies alopecia, ulcers, nodules, sun sensitivity, unexplained persistent rash   VASCULAR:   Denies edema, cyanosis, raynaud phenomenon  NEUROLOGIC:  Denies specific muscle weakness, paresthesias   PSYCHIATRIC:  No snoring, depression, anxiety  MSK:    No morning stiffness >1 hour, SI joint pain, persistent joint swelling    PAST MEDICAL HISTORY  Reviewed with patient, significant changes in medical history - no    PHYSICAL EXAM  Blood pressure (!) 175/63, pulse 79, temperature 98.1 °F (36.7 °C), temperature source Oral, resp. rate 16, weight 152 lb (68.9 kg), SpO2 96 %. GENERAL APPEARANCE: Well-nourished adult in no acute distress. EYES: No scleral erythema, conjunctival injection. ENT: No oral ulcer, parotid enlargement. NECK: No adenopathy, thyroid enlargement. CARDIOVASCULAR: Heart rhythm is regular. No murmur, rub, gallop. CHEST: Normal vesicular breath sounds. No wheezes, rales, pleural friction rubs. ABDOMINAL: The abdomen is soft and nontender. Liver and spleen are nonpalpable. Bowel sounds are normal.  EXTREMITIES: There is no evidence of clubbing, cyanosis, edema. SKIN: No rash, palpable purpura, digital ulcer, abnormal thickening. NEUROLOGICAL: Normal gait and station, full strength in upper and lower extremities, normal sensation to light touch. MUSCULOSKELETAL:   Upper extremities - dROM in bilateral hands, but no synovitis. Lower extremities - Crepitus in right knee. LABS, RADIOLOGY AND PROCEDURES  Previous labs reviewed -Yes  Previous radiology reviewed -Yes  Previous procedures reviewed -Yes  Previous medical records reviewed/summarized -Yes    ASSESSMENT  1. Osteoporosis -The patient has had several fractures despite treatment with Prolia.  We will start her on Evenity and hold Prolia until she has completed treatment with Evenity. 2. Possible inflammatory arthritis - This has been stable off treatment. 3. Drug therapy monitoring for toxicity (methotrexate) - CBC, BUN, Cr, AST, ALT and albumin every 3 months  4. Hip Osteoarthritis - This is most likely the cause of her hip discomfort. Non pharmacologic treatment recommendations include enrollment in an exercise program; land-based or aqua-therapy. Continue tylenol or oral NSAID's as needed. PLAN  1. Start Evenity  2. Hold Prolia until she has completed Evenity treatment  3. Return in 1 year     Zack Nam MD  Adult and Pediatric Rheumatology     [facility], [address] [place] [address], Phone [phone], Fax [phone]    Visiting  of Pediatrics    Department of Pediatrics, [facility] of [address] [place] [address], [address] [place] [address], Phone [phone], Fax [phone]    There are no Patient Instructions on file for this visit. cc:  DO RYLAND Garcia, Tiara Ponce MD, personally performed the services described in the documentation as scribed by Lito Late in my presence and have reviewed and agree with the note as scribed.

## 2023-01-17 DIAGNOSIS — M85.89 OSTEOPENIA OF MULTIPLE SITES: Primary | ICD-10-CM

## 2023-01-17 DIAGNOSIS — M80.80XD LOCALIZED OSTEOPOROSIS WITH CURRENT PATHOLOGICAL FRACTURE WITH ROUTINE HEALING: ICD-10-CM

## 2023-01-17 RX ORDER — DIPHENHYDRAMINE HYDROCHLORIDE 50 MG/ML
50 INJECTION, SOLUTION INTRAMUSCULAR; INTRAVENOUS AS NEEDED
Start: 2023-01-17

## 2023-01-17 RX ORDER — ACETAMINOPHEN 325 MG/1
650 TABLET ORAL AS NEEDED
Start: 2023-01-17

## 2023-01-17 RX ORDER — EPINEPHRINE 1 MG/ML
0.3 INJECTION, SOLUTION, CONCENTRATE INTRAVENOUS AS NEEDED
OUTPATIENT
Start: 2023-01-17

## 2023-01-17 RX ORDER — HYDROCORTISONE SODIUM SUCCINATE 100 MG/2ML
100 INJECTION, POWDER, FOR SOLUTION INTRAMUSCULAR; INTRAVENOUS AS NEEDED
OUTPATIENT
Start: 2023-01-17

## 2023-01-17 RX ORDER — ONDANSETRON 2 MG/ML
8 INJECTION INTRAMUSCULAR; INTRAVENOUS AS NEEDED
OUTPATIENT
Start: 2023-01-17

## 2023-01-17 RX ORDER — DIPHENHYDRAMINE HYDROCHLORIDE 50 MG/ML
25 INJECTION, SOLUTION INTRAMUSCULAR; INTRAVENOUS AS NEEDED
Start: 2023-01-17

## 2023-01-17 RX ORDER — ALBUTEROL SULFATE 0.83 MG/ML
2.5 SOLUTION RESPIRATORY (INHALATION) AS NEEDED
Start: 2023-01-17

## 2023-01-24 ENCOUNTER — TRANSCRIBE ORDER (OUTPATIENT)
Dept: REGISTRATION | Age: 58
End: 2023-01-24

## 2023-01-24 ENCOUNTER — HOSPITAL ENCOUNTER (OUTPATIENT)
Dept: GENERAL RADIOLOGY | Age: 58
Discharge: HOME | End: 2023-01-24
Payer: MEDICARE

## 2023-01-24 DIAGNOSIS — S92.344A CLOSED NONDISPLACED FRACTURE OF FOURTH METATARSAL BONE OF RIGHT FOOT: ICD-10-CM

## 2023-01-24 DIAGNOSIS — S92.344A CLOSED NONDISPLACED FRACTURE OF FOURTH METATARSAL BONE OF RIGHT FOOT: Primary | ICD-10-CM

## 2023-01-24 PROCEDURE — 73630 X-RAY EXAM OF FOOT: CPT

## 2023-02-13 ENCOUNTER — TELEPHONE (OUTPATIENT)
Dept: ENDOCRINOLOGY | Age: 58
End: 2023-02-13

## 2023-02-13 NOTE — TELEPHONE ENCOUNTER
2/13/2023  9:36 AM    Patient left message at 8:59 today requesting a call to discuss her blood sugar readings. Has been high the last couple days. No other info given.     Thanks,  Vesta Severe

## 2023-02-14 NOTE — TELEPHONE ENCOUNTER
Patient will  a Dexcom sample today. States having trouble finding a neurologist. Tereza Cardona a list of neurologists in the box with the Dexcom sample.

## 2023-02-21 ENCOUNTER — TRANSCRIBE ORDER (OUTPATIENT)
Dept: REGISTRATION | Age: 58
End: 2023-02-21

## 2023-02-21 ENCOUNTER — HOSPITAL ENCOUNTER (OUTPATIENT)
Dept: GENERAL RADIOLOGY | Age: 58
Discharge: HOME | End: 2023-02-21
Payer: MEDICARE

## 2023-02-21 DIAGNOSIS — M86.042 ACUTE HEMATOGENOUS OSTEOMYELITIS OF LEFT HAND (HCC): Primary | ICD-10-CM

## 2023-02-21 DIAGNOSIS — M86.042 ACUTE HEMATOGENOUS OSTEOMYELITIS OF LEFT HAND (HCC): ICD-10-CM

## 2023-02-21 PROCEDURE — 73130 X-RAY EXAM OF HAND: CPT

## 2023-03-01 ENCOUNTER — TELEPHONE (OUTPATIENT)
Dept: ENDOCRINOLOGY | Age: 58
End: 2023-03-01

## 2023-03-01 NOTE — TELEPHONE ENCOUNTER
Patient called stating that she has had 3 elevated lipase levels via . There is one in her chart from 01/31/23 with a result of 101. Patient states has had 2 other tests, the last being last week. Advised the have 's office fax the reports here. Patient states that she will call her office.

## 2023-03-01 NOTE — TELEPHONE ENCOUNTER
I reviewed her recent Lipase Levels    4/30/21: 24  1/31/23: 101  2/21/23: 158    Pt denies any abdominal pain N/V  Pt denies any etoh use or any hx of gall stones.     I recommended she speak with Dr. Jamie Armstrong about a referral to a gastroenterologist.

## 2023-03-08 ENCOUNTER — OFFICE VISIT (OUTPATIENT)
Dept: ENDOCRINOLOGY | Age: 58
End: 2023-03-08
Payer: MEDICARE

## 2023-03-08 VITALS
BODY MASS INDEX: 26.65 KG/M2 | SYSTOLIC BLOOD PRESSURE: 131 MMHG | HEIGHT: 63 IN | HEART RATE: 72 BPM | DIASTOLIC BLOOD PRESSURE: 62 MMHG | WEIGHT: 150.4 LBS

## 2023-03-08 DIAGNOSIS — E55.9 VITAMIN D DEFICIENCY: ICD-10-CM

## 2023-03-08 DIAGNOSIS — I10 ESSENTIAL HYPERTENSION: ICD-10-CM

## 2023-03-08 DIAGNOSIS — E03.9 ACQUIRED HYPOTHYROIDISM: ICD-10-CM

## 2023-03-08 DIAGNOSIS — E10.42 TYPE 1 DIABETES MELLITUS WITH DIABETIC POLYNEUROPATHY (HCC): Primary | ICD-10-CM

## 2023-03-08 LAB — HBA1C MFR BLD HPLC: 7.6 %

## 2023-03-08 PROCEDURE — 3078F DIAST BP <80 MM HG: CPT | Performed by: INTERNAL MEDICINE

## 2023-03-08 PROCEDURE — 3075F SYST BP GE 130 - 139MM HG: CPT | Performed by: INTERNAL MEDICINE

## 2023-03-08 PROCEDURE — 3051F HG A1C>EQUAL 7.0%<8.0%: CPT | Performed by: INTERNAL MEDICINE

## 2023-03-08 PROCEDURE — 99215 OFFICE O/P EST HI 40 MIN: CPT | Performed by: INTERNAL MEDICINE

## 2023-03-08 PROCEDURE — G9717 DOC PT DX DEP/BP F/U NT REQ: HCPCS | Performed by: INTERNAL MEDICINE

## 2023-03-08 PROCEDURE — 83036 HEMOGLOBIN GLYCOSYLATED A1C: CPT | Performed by: INTERNAL MEDICINE

## 2023-03-08 PROCEDURE — 2022F DILAT RTA XM EVC RTNOPTHY: CPT | Performed by: INTERNAL MEDICINE

## 2023-03-08 PROCEDURE — G8427 DOCREV CUR MEDS BY ELIG CLIN: HCPCS | Performed by: INTERNAL MEDICINE

## 2023-03-08 PROCEDURE — G9899 SCRN MAM PERF RSLTS DOC: HCPCS | Performed by: INTERNAL MEDICINE

## 2023-03-08 PROCEDURE — 3017F COLORECTAL CA SCREEN DOC REV: CPT | Performed by: INTERNAL MEDICINE

## 2023-03-08 PROCEDURE — G8419 CALC BMI OUT NRM PARAM NOF/U: HCPCS | Performed by: INTERNAL MEDICINE

## 2023-03-08 RX ORDER — SULFAMETHOXAZOLE AND TRIMETHOPRIM 800; 160 MG/1; MG/1
1 TABLET ORAL 2 TIMES DAILY
Qty: 28 TABLET | Refills: 0 | Status: SHIPPED | OUTPATIENT
Start: 2023-03-08

## 2023-03-08 RX ORDER — ZOLPIDEM TARTRATE 10 MG
TABLET ORAL
COMMUNITY
Start: 2023-02-13

## 2023-03-08 NOTE — PROGRESS NOTES
Chief Complaint   Patient presents with    Diabetes     Pcp and pharmacy verified     History of Present Illness: Maria Luisa Dalal is a 61 y.o. female here for follow up of Type 1 diabetes. In February 2022 she was admitted for CSF Rhinorrhea she was taken to the OR by Dr. Tres Jose of [address] [place] [address] and Dr. Shai Stokes of ENT. Scooby Ponce put a tube in my spine, and they took out some scar tissue from a prior leak, but they did not find any evidence of a CSF leak. After our visit in March 2022, we were in frequent contact because she was having issues of hypoglycemia and we were decreasing her Levemir doses accordingly. She noted that she was taking Levermir 30 units in the AM and 4 units HS. She noted that with 3 units of Levemir HS her FBGs were increasing over night. She was still waiting for 1-2 hours after her meal and then giving herself Humalog based on how high the BG had run. Pt encouraged to start checking her BGs before each meal and taking her Humalog before each meal rather than waiting till after meals, when her BG is already high. In July 2022 she was in the ED for chest pain after she has been working out in her yard. She had an extensive cardiac work-up, which was negative and she was D/Jonathan home with the diagnosis of MSK pain. \"I just saw my podiatrist and I had an infection in a couple toes and now the sore in my great toe on the left has opened up. I have been on an Abx, which I have now finished, but I still have the sores on my toes. \"    She saw an ENT in [facility] said the lining in the back of my nasal cavity was very thin. I have been trying to see a neurologist because I have been having stabbing headaches and if I am not sitting down I will fall. I finally called [facility] they said they can see me in April. \"    Her PCP Dr. Natasha Bowden has been treating her with an Abx for sinus infection and sores on her feet.     She is currently taking Levemir 28 units in the AM and 4 units HS. She is taking Humalog 1-2 units if her BGs >200. She notes she has had some BGs in the 60s in the morning, but she will fluctuate in the 60-200s. She reports that it takes a long time for the Humalog \"to effect me, it can be 2-3 hours before I see the effects of the Humalog\". She notes this is a typical pattern when she has an infection. Her A1C today was down to 7.6%    She has not been on any steroids since our last visit. Pt notes she still has the tendon tear in the left hip. Pt is waking around 7-8AM, he takes her Levemir 28 units and Humalog 2 units for her Pepsi \"if my sugar is over in the high 100s, otherwise I don't take any. She will take her dog for a walk after breakfast. \"My dog has an injury so we are not walking as far, we are walking a mile in the morning and a mile in the evening. \"  She does not eat lunch. If she gets hungry, she will snack on cheese or mixed vegetables. She has dinner around 5PM, last night she had KRAB salad and water. Pt has hx of CVA x4, \"one of which caused a CNS leak\". She reports she had a stress test and an ECHO in 2015 and \"everything came back fine\". Pt has hx of polyneuropathy from her knees to her feet. She takes Gabapentin 200mg in the AM and 300mg in the evening, which does help with her pain. Pt has no hx of Nephrology    Last eye exam was October 2022 [facility] says my eyes look great and he sent me to the cataract doctor for new glasses. \"  \"I see him every 6 months. \"    Pt has hx of osteoporosis for which she is followed by Dr. Amari Umana of Rheumatology. 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 She was never treated and it \"went away\". She is on Prolia every 6 month. Pt has hx of hypothyroidism and is taking LT4 75mcg daily.      Current Outpatient Medications   Medication Sig    trimethoprim-sulfamethoxazole (BACTRIM DS, SEPTRA DS) 160-800 mg per tablet Take 1 Tablet by mouth two (2) times a day.    glucose blood VI test strips (OneTouch Ultra Test) strip Test blood sugars 8 times per day. DX: E10.42    gabapentin (NEURONTIN) 100 mg capsule TAKE 2 CAPSULES BY MOUTH IN THE MORNING AND 3 IN THE EVENING (Patient taking differently: TAKE 2 CAPSULES BY MOUTH IN THE MORNING AND 2  IN THE EVENING)    traMADoL (ULTRAM) 50 mg tablet Take 50 mg by mouth daily. glucose blood VI test strips (OneTouch Ultra Blue Test Strip) strip TEST BLOOD SUGAR 8 TIMES A DAY DUE TO UNCONTROLLED SUGARS Dx : E10.42 (hold till needed)    insulin detemir U-100 (Levemir FlexTouch U-100 Insuln) 100 unit/mL (3 mL) inpn INJECT 30 UNITS SUBCUTANEOUSLY IN THE MORNING AND 4 UNITS AT NIGHT (hold till needed)  Indications: type 1 diabetes mellitus (Patient taking differently: INJECT 28 UNITS SUBCUTANEOUSLY IN THE MORNING AND 4 UNITS AT NIGHT  Indications: type 1 diabetes mellitus)    Insulin Needles, Disposable, (Pen Needle) 30 gauge x 5/16\" ndle Use one needle up to 8 times daily to dispense insulin. E10.65,E 10.649 (hold till needed)    FeroSuL 325 mg (65 mg iron) tablet daily. HumaLOG KwikPen Insulin 100 unit/mL kwikpen ONLY PRN up to 1-6 units with each meal (3 times per day) depending on blood sugar    lidocaine (LIDODERM) 5 % USE 2 PATCHES EXTERNALLY ONCE DAILY, LEAVE IN PLACE FOR 12 HOURS AND REMOVE FOR 12 HOURS (Patient taking differently: USE 2 PATCHES EXTERNALLY AT HS)    Euthyrox 75 mcg tablet TAKE 1 TABLET BY MOUTH ONCE DAILY BEFORE  BREAKFAST  FOR  A  CONDITION  WITH  LOW  THYROID  HORMONE  LEVELS    simvastatin (ZOCOR) 40 mg tablet Take 1 tablet by mouth nightly    losartan (COZAAR) 50 mg tablet Take 1 Tablet by mouth daily. Take 1 tab daily    metoclopramide HCl (REGLAN) 5 mg tablet Take 1 Tablet by mouth two (2) times a day. Up to BID.  TAKE 30 MINUTES BEFORE MEALS    Insulin Needles, Disposable, (Niru Pen Needle) 32 gauge x 5/32\" ndle Use as directed with pen device - up to 5 times daily  Dx:  E11.9    aspirin 81 mg chewable tablet Take 81 mg by mouth daily. mupirocin (BACTROBAN) 2 % ointment PRN    potassium 99 mg tablet Take 1 Tab by mouth every other day. ascorbic acid, vitamin C, (VITAMIN C) 1,000 mg tablet Take  by mouth two (2) times a day. 10,000 mg BID    calcium carbonate (CALTREX) 600 mg calcium (1,500 mg) tablet Take 1,000 mg by mouth two (2) times a day. glucos sul 2KCl/msm/chond/C/Mn (GLUCOSAMINE CHONDROITIN PO) Take  by mouth. 1 in Am  only    MAGNESIUM PO Take  by mouth every fourty-eight (48) hours. denosumab (PROLIA) 60 mg/mL injection 60 mg by SubCUTAneous route. Every 6 months    cholecalciferol, vitamin D3, 50 mcg (2,000 unit) tab Take 1 Tab by mouth daily. 5000 units daily  Indications: low vitamin D levels    VITAMIN A PO Take 2,400 mcg by mouth nightly. vitamin E (AQUA GEMS) 400 unit capsule Take 400 Units by mouth every Monday and Thursday. Only on M and Thurs at Bedtime    Ambien 10 mg tablet TAKE 1/2 TO 1 (ONE-HALF TO ONE) TABLET BY MOUTH AT BEDTIME AS NEEDED FOR SLEEP     No current facility-administered medications for this visit. No Known Allergies  Review of Systems:  - Eyes: no blurry vision or double vision  - Cardiovascular: no chest pain  - Respiratory: no shortness of breath  - Musculoskeletal: no myalgias  - Neurological: no numbness/tingling in extremities    Physical Examination:  Blood pressure 131/62, pulse 72, height 5' 3\" (1.6 m), weight 150 lb 6.4 oz (68.2 kg). General: pleasant, no distress, good eye contact   Neck: no carotid bruits  Cardiovascular: regular, normal rate, nl s1 and s2, no m/r/g, 2+ DP pulses   Respiratory: clear bilaterally  Integumentary: no edema, + foot ulcer on her right great toe.    Psychiatric: normal mood and affect    Diabetic foot exam:     Left Foot:   Visual Exam: callous - present   Pulse DP: 2+ (normal)   Filament test: reduced sensation    Vibratory sensation: diminished      Right Foot:   Visual Exam: callous - present   Pulse DP: 2+ (normal)   Filament test: reduced sensation    Vibratory sensation: diminished        Data Reviewed:   Her A1C today was 7.6%     Ref Range & Units 2/21/23 1600   25 OH Vitamin D 32.0 - 100.0 ng/mL 68.7         Ref Range & Units 1/31/23 1000   Cholesterol Total 0 - 200 mg/dL 152    Triglycerides 0 - 200 mg/dL 96    High Density Lipoprotein 40 - 60 mg/dL 67 High     Comment: According to the national cholesterol education program guideline related   to ATP III recommendations:   <40 mg/dL is a low HDL-cholesterol with a major risk factor for coronary   heart disease   >= 60 mg/dL is a high HDL-cholesterol with \"negative\" risk factor for   coronary heart disease   Low Density Lipoprotein (calculation) mg/dL 66       Ref Range & Units 1/31/23 1000   Sodium Level 135 - 148 mmol/L 132 Low     Potassium Level 3.5 - 5.5 mmol/L 4.3    Chloride 100 - 110 mmol/L 95 Low     Carbon Dioxide 22 - 32 mmol/L 28    Anion Gap 6 - 16 mmol/L 9    Glucose Random 70 - 120 mg/dL 195 High     Calcium Level Total 8.4 - 10.0 mg/dL 9.6    Blood Urea Nitrogen 7 - 24 mg/dL 12    Creatinine 0.44 - 1.03 mg/dL 0.65    BUN/Creatinine ratio 12.0 - 20.0 18.5        Assessment/Plan:   1) DM > Her A1C today was down to 7.6%. She still get low BGs fasting I will have pt adjust her Levemir to 29 units in the AM and 3 units HS. Pt encouraged to start checking her BGs before each meal and taking her Humalog before each meal rather than waiting till after meals, when her BG is already high. With her hx of neuropathy and calluses, she would benefit from DM shoes and inserts. Because her blood sugars are so erratic and she has had frequent issues of hypoglycemia, she is testing her blood sugars 8 times per day. She would benefit from a CGM. Pt is testing her BGs 8 times per day and adjusting her Humalog based on the readings. Pt to continue the Gabapentin 200mg in the AM and 300mg in the evening.     2) Hypothyroidism > Pt is clinically euthyroid on  LT4 75mcg daily. Pt to continue this current dose. 3) Hypovitaminosis D > Pt to continue her Vitamin D 5000 units daily. Her Vitamin D level in February 2023 was 68.7. 4) HTN > Pt is on an ARB for renal protection. Will not make any changes at this time. Her BP today was 131/62. 5) Cellulitis > I will start her on Bactrim DS BID for 14 days. Pt voices understanding and agreement with the plan. Pain noted and pt was recommended to call her PCP for further evaluation and treatment, as needed    I spent 40 minutes on this case and > 50% of the time was spent in counseling on the above issues.      RTC 4 months      Copy sent to:  Dr. Jayashree Chong

## 2023-03-08 NOTE — LETTER
3/8/2023    Patient: Elliott Burch   YOB: 1965   Date of Visit: 3/8/2023     Marla Soria DO  [address]  Via Fax: [phone]    Dear Marla Soria DO,      Thank you for referring Ms. Logan Beltran to [facility] for evaluation. My notes for this consultation are attached. If you have questions, please do not hesitate to call me. I look forward to following your patient along with you.       Sincerely,    Tasha Holloway MD

## 2023-03-22 ENCOUNTER — HOSPITAL ENCOUNTER (EMERGENCY)
Age: 58
Discharge: HOME | End: 2023-03-22
Attending: EMERGENCY MEDICINE
Payer: MEDICARE

## 2023-03-22 VITALS
OXYGEN SATURATION: 97 % | RESPIRATION RATE: 17 BRPM | HEART RATE: 71 BPM | TEMPERATURE: 98.1 F | SYSTOLIC BLOOD PRESSURE: 137 MMHG | DIASTOLIC BLOOD PRESSURE: 65 MMHG

## 2023-03-22 DIAGNOSIS — S05.02XA ABRASION OF LEFT CORNEA, INITIAL ENCOUNTER: Primary | ICD-10-CM

## 2023-03-22 PROCEDURE — 99283 EMERGENCY DEPT VISIT LOW MDM: CPT

## 2023-03-22 PROCEDURE — 74011000250 HC RX REV CODE- 250: Performed by: EMERGENCY MEDICINE

## 2023-03-22 PROCEDURE — 74011250637 HC RX REV CODE- 250/637: Performed by: EMERGENCY MEDICINE

## 2023-03-22 RX ORDER — KETOROLAC TROMETHAMINE 5 MG/ML
1 SOLUTION OPHTHALMIC
Qty: 5 EACH | Refills: 0 | Status: SHIPPED | OUTPATIENT
Start: 2023-03-22 | End: 2023-04-01

## 2023-03-22 RX ORDER — HYDROCODONE BITARTRATE AND ACETAMINOPHEN 5; 325 MG/1; MG/1
1 TABLET ORAL
Qty: 10 TABLET | Refills: 0 | Status: SHIPPED | OUTPATIENT
Start: 2023-03-22 | End: 2023-03-25

## 2023-03-22 RX ORDER — ERYTHROMYCIN 5 MG/G
OINTMENT OPHTHALMIC
Status: COMPLETED | OUTPATIENT
Start: 2023-03-22 | End: 2023-03-22

## 2023-03-22 RX ORDER — TETRACAINE HYDROCHLORIDE 5 MG/ML
1 SOLUTION OPHTHALMIC
Status: COMPLETED | OUTPATIENT
Start: 2023-03-22 | End: 2023-03-22

## 2023-03-22 RX ADMIN — ERYTHROMYCIN: 5 OINTMENT OPHTHALMIC at 15:36

## 2023-03-22 RX ADMIN — TETRACAINE HYDROCHLORIDE 1 DROP: 5 SOLUTION OPHTHALMIC at 14:57

## 2023-03-22 RX ADMIN — FLUORESCEIN SODIUM 1 STRIP: 1 STRIP OPHTHALMIC at 14:57

## 2023-03-22 NOTE — ED TRIAGE NOTES
Pt reports approx 30 mins PTA pt saw an insect fly into her eye. States that she feels like it released \"poison\" in her eye. Left eye was flushed immediately.

## 2023-03-25 NOTE — ED PROVIDER NOTES
[facility] EMERGENCY DEP  EMERGENCY DEPARTMENT ENCOUNTER       Pt Name: Martha Badillo  MRN: [id number]  [place] 1965  Date of evaluation: 3/22/2023  Provider: Darcie Quispe DO   PCP: Moi Romo DO  Note Started: 5:25 AM 3/25/23     CHIEF COMPLAINT       Chief Complaint   Patient presents with    Foreign Body in [address]: 1 or more elements      History From: patient, History limited by: none     Martha Badillo is a 61 y.o. female presents by private vehicle secondary to left eye pain. Patient states that she had an insect fly into her eye about 30 minutes prior to arrival.  She had flushed her eye out immediately however she was having severe pain and discomfort in the eye with some photophobia as well. She denies any fever or chills. There is no visual disturbances. She denies any recent cough or cold symptoms. Tetanus up-to-date. Please See MDM for Additional Details of the HPI/PMH  Nursing Notes were all reviewed and agreed with or any disagreements were addressed in the HPI. REVIEW OF SYSTEMS        Positives and Pertinent negatives as per HPI. PAST HISTORY     Past Medical History:  Past Medical History:   Diagnosis Date    Arthritis     patient states \"every joint affected\"    Bee sting 09/05/2018    left elbow bee sting     Blister of ankle 09/05/2018    Blister small per patient of left ankle. Wears an air boot due to 2 stress fractures in last 2 months.     CAD (coronary artery disease)     Constipation     Falls 2017    pt reports having 4 falls in 3 days    Fatigue     Headache     Ill-defined condition     multiple broken ribs    Ill-defined condition     reports \"getting infections easily\"    Joint pain     Memory disorder     following spinal fluid leak repair    Menopause     Nausea & vomiting     Neuropathy     Osteoporosis     Thyroid disease     Type 1 diabetes ([facility] [address]     diagnosed at age 8    Unspecified cerebral artery occlusion with cerebral infarction     x 4 (last in )       Past Surgical History:  Past Surgical History:   Procedure Laterality Date    HX CARPAL TUNNEL RELEASE Right 2018    HX  SECTION  1987    HX  SECTION      HX CHOLECYSTECTOMY      HX HYSTERECTOMY  2006    HX OOPHORECTOMY      HX ORTHOPAEDIC      frozen shoulder surgery x 3    HX ORTHOPAEDIC      frozen finger surgery x 8    HX ORTHOPAEDIC Left 2014    wrist surgery    HX ORTHOPAEDIC Right 1977    hand surgery    HX ORTHOPAEDIC Left     foot surgery    HX OTHER SURGICAL      spinal fluid leak repair (spinal fluid leaking from nose, remove nose, cut fat from stomach, use that as patch behind nose, nose replaced)    HX ROTATOR CUFF REPAIR Left     HX ROTATOR CUFF REPAIR Right     HX TONSILLECTOMY  1968       Family History:  Family History   Problem Relation Age of Onset    Heart Disease Mother     Hypertension Mother     No Known Problems Father     Heart Disease Maternal Grandfather     Cancer Maternal Aunt         Pancreatic and Liver    Cancer Maternal Grandmother         Lung    Diabetes Maternal Grandmother     Cancer Maternal Aunt         Breast    Breast Cancer Maternal Aunt     Diabetes Maternal Aunt        Social History:  Social History     Tobacco Use    Smoking status: Former     Packs/day: 0.50     Years: 8.00     Pack years: 4.00     Types: Cigarettes     Quit date: 4/10/2007     Years since quitting: 15.9    Smokeless tobacco: Never   Vaping Use    Vaping Use: Never used   Substance Use Topics    Alcohol use: No    Drug use: No       Allergies:  No Known Allergies    CURRENT MEDICATIONS      Discharge Medication List as of 3/22/2023  3:38 PM        CONTINUE these medications which have NOT CHANGED    Details   Ambien 10 mg tablet TAKE 1/2 TO 1 (ONE-HALF TO ONE) TABLET BY MOUTH AT BEDTIME AS NEEDED FOR SLEEP, Historical Med, KENRICK      trimethoprim-sulfamethoxazole (BACTRIM DS, SEPTRA DS) 160-800 mg per tablet Take 1 Tablet by mouth two (2) times a day., Normal, Disp-28 Tablet, R-0      !! glucose blood VI test strips (OneTouch Ultra Test) strip Test blood sugars 8 times per day. DX: E10.42, Normal, Disp-800 Strip, R-3      gabapentin (NEURONTIN) 100 mg capsule TAKE 2 CAPSULES BY MOUTH IN THE MORNING AND 3 IN THE EVENING, Normal, Disp-450 Capsule, R-1      !! glucose blood VI test strips (OneTouch Ultra Blue Test Strip) strip TEST BLOOD SUGAR 8 TIMES A DAY DUE TO UNCONTROLLED SUGARS Dx : E10.42 (hold till needed), Normal, Disp-300 Strip, R-11Per patient request, please keep prescriptions on file for when she needs them. insulin detemir U-100 (Levemir FlexTouch U-100 Insuln) 100 unit/mL (3 mL) inpn INJECT 30 UNITS SUBCUTANEOUSLY IN THE MORNING AND 4 UNITS AT NIGHT (hold till needed)  Indications: type 1 diabetes mellitus, Normal, Disp-10 Adjustable Dose Pre-filled Pen Syringe, R-5; Per patient request, please keep prescriptions on file for when sh e needs them. Insulin Needles, Disposable, (Pen Needle) 30 gauge x 5/16\" ndle Use one needle up to 8 times daily to dispense insulin. E10.65,E 10.649 (hold till needed), Normal, Disp-800 Each, R-3      FeroSuL 325 mg (65 mg iron) tablet daily. , Historical Med, KENRICK      HumaLOG KwikPen Insulin 100 unit/mL kwikpen ONLY PRN up to 1-6 units with each meal (3 times per day) depending on blood sugar, Normal, Disp-15 mL, R-5, DAWPer patient request, please keep prescriptions on file for when she needs them. lidocaine (LIDODERM) 5 % USE 2 PATCHES EXTERNALLY ONCE DAILY, LEAVE IN PLACE FOR 12 HOURS AND REMOVE FOR 12 HOURS, Normal, Disp-60 Patch, R-0      Euthyrox 75 mcg tablet TAKE 1 TABLET BY MOUTH ONCE DAILY BEFORE  BREAKFAST  FOR  A  CONDITION  WITH  LOW  THYROID  HORMONE  LEVELS, Normal, Disp-90 Tablet, R-3      simvastatin (ZOCOR) 40 mg tablet Take 1 tablet by mouth nightly, Normal, Disp-90 Tablet, R-0      losartan (COZAAR) 50 mg tablet Take 1 Tablet by mouth daily.  Take 1 tab daily, Normal, Disp-90 Tablet, R-3Refill as needed      metoclopramide HCl (REGLAN) 5 mg tablet Take 1 Tablet by mouth two (2) times a day. Up to BID. TAKE 30 MINUTES BEFORE MEALS, Normal, Disp-360 Tablet, R-0      Insulin Needles, Disposable, (Niru Pen Needle) 32 gauge x 5/32\" ndle Use as directed with pen device - up to 5 times daily  Dx:  E11.9, Normal, Disp-200 Pen Needle, R-5      aspirin 81 mg chewable tablet Take 81 mg by mouth daily. , Historical Med      mupirocin (BACTROBAN) 2 % ointment PRN, Historical Med      potassium 99 mg tablet Take 1 Tab by mouth every other day., Normal, Disp-90 Tab, R-1      ascorbic acid, vitamin C, (VITAMIN C) 1,000 mg tablet Take  by mouth two (2) times a day. 10,000 mg BID, Historical Med      calcium carbonate (CALTREX) 600 mg calcium (1,500 mg) tablet Take 1,000 mg by mouth two (2) times a day., Historical Med      glucos sul 2KCl/msm/chond/C/Mn (GLUCOSAMINE CHONDROITIN PO) Take  by mouth. 1 in Am  only, Historical Med      MAGNESIUM PO Take  by mouth every fourty-eight (48) hours. , Historical Med      denosumab (PROLIA) 60 mg/mL injection 60 mg by SubCUTAneous route. Every 6 months, Historical Med      cholecalciferol, vitamin D3, 50 mcg (2,000 unit) tab Take 1 Tab by mouth daily. 5000 units daily  Indications: low vitamin D levels, Historical Med      VITAMIN A PO Take 2,400 mcg by mouth nightly., Historical Med      vitamin E (AQUA GEMS) 400 unit capsule Take 400 Units by mouth every Monday and Thursday. Only on M and Thurs at Bedtime, Historical Med       !! - Potential duplicate medications found. Please discuss with provider. SCREENINGS               No data recorded         PHYSICAL EXAM      ED Triage Vitals [03/22/23 1503]   ED Encounter Vitals Group      /65      Pulse (Heart Rate) 71      Resp Rate 17      Temp 98.1 °F (36.7 °C)      Temp src       O2 Sat (%) 97 %      Weight       Height         Physical Exam  Vitals and nursing note reviewed. Constitutional:       Appearance: Normal appearance. She is obese. HENT:      Head: Normocephalic and atraumatic. Mouth/Throat:      Mouth: Mucous membranes are moist.   Eyes:      Extraocular Movements: Extraocular movements intact. Pupils: Pupils are equal, round, and reactive to light. Comments: Photophobia to the left eye pupils equal round reactive to light and accommodation   Cardiovascular:      Rate and Rhythm: Normal rate and regular rhythm. Pulmonary:      Effort: Pulmonary effort is normal. No respiratory distress. Breath sounds: Normal breath sounds. Musculoskeletal:      Cervical back: Normal range of motion and neck supple. Skin:     General: Skin is warm and dry. Capillary Refill: Capillary refill takes less than 2 seconds. Neurological:      Mental Status: She is alert and oriented to person, place, and time. Cranial Nerves: No cranial nerve deficit. Coordination: Coordination normal.   Psychiatric:         Mood and Affect: Mood normal.         Behavior: Behavior normal.        DIAGNOSTIC RESULTS   LABS:     None    EKG: None     RADIOLOGY:  None     PROCEDURES   Unless otherwise noted below, none  Procedures   Procedure Note - Wood's lamp exam:  5:28 AMPerformed by: Marialuisa Smith DO  Pts left eye was anesthetized with tetracaine, stained with fluorescein, and examined with a Wood's lamp, using lid eversion. Foreign body: no  Fluorescein uptake: yes, showing uptake 3-5 O'clock position  The procedure took 5minutes, and pt tolerated well.     CRITICAL CARE TIME   None    EMERGENCY DEPARTMENT COURSE and DIFFERENTIAL DIAGNOSIS/MDM   Vitals:    Vitals:    03/22/23 1503   BP: 137/65   Pulse: 71   Resp: 17   Temp: 98.1 °F (36.7 °C)   SpO2: 97%        Patient was given the following medications:  Medications   tetracaine HCl (PF) (PONTOCAINE) 0.5 % ophthalmic solution 1 Drop (1 Drop Both Eyes Given 3/22/23 1457)   fluorescein (FUL-REI) 1 mg ophthalmic strip 1 Strip (1 Strip Both Eyes Given 3/22/23 3594)   erythromycin (ILOTYCIN) 5 mg/gram (0.5 %) ophthalmic ointment ( Left Eye Given 3/22/23 1276)       Medical Decision Making  Risk  Prescription drug management. Eye exam described under procedure. Patient will be discharged with erythromycin ointment as well as ketorolac drops as well as a prescription for small amount of pain medication. FINAL IMPRESSION     1. Abrasion of left cornea, initial encounter          DISPOSITION/PLAN   Jennifer Mendosa's  results have been reviewed with her. She has been counseled regarding her diagnosis, treatment, and plan. She verbally conveys understanding and agreement of the signs, symptoms, diagnosis, treatment and prognosis and additionally agrees to follow up as discussed. She also agrees with the care-plan and conveys that all of her questions have been answered. I have also provided discharge instructions for her that include: educational information regarding their diagnosis and treatment, and list of reasons why they would want to return to the ED prior to their follow-up appointment, should her condition change. CLINICAL IMPRESSION    Discharge Note: The patient is stable for discharge home. The signs, symptoms, diagnosis, and discharge instructions have been discussed, understanding conveyed, and agreed upon. The patient is to follow up as recommended or return to ER should their symptoms worsen. PATIENT REFERRED TO:  Follow-up Information       Follow up With Specialties Details Why Contact Epifanio Gonsalez Covert,  Internal Medicine Physician, Pediatric Medicine   [address] [place] [address]  [phone]                DISCHARGE MEDICATIONS:  Discharge Medication List as of 3/22/2023  3:38 PM        START taking these medications    Details   HYDROcodone-acetaminophen (Norco) 5-325 mg per tablet Take 1 Tablet by mouth every six (6) hours as needed for Pain for up to 3 days.  Max Daily Amount: 4 Tablets., Normal, Disp-10 Tablet, R-0      ketorolac (Acular) 0.5 % ophthalmic solution Apply 1 Drop to eye four (4) times daily as needed for Pain for up to 10 days. , Normal, Disp-5 Each, R-0           CONTINUE these medications which have NOT CHANGED    Details   Ambien 10 mg tablet TAKE 1/2 TO 1 (ONE-HALF TO ONE) TABLET BY MOUTH AT BEDTIME AS NEEDED FOR SLEEP, Historical Med, KENRICK      trimethoprim-sulfamethoxazole (BACTRIM DS, SEPTRA DS) 160-800 mg per tablet Take 1 Tablet by mouth two (2) times a day., Normal, Disp-28 Tablet, R-0      !! glucose blood VI test strips (OneTouch Ultra Test) strip Test blood sugars 8 times per day. DX: E10.42, Normal, Disp-800 Strip, R-3      gabapentin (NEURONTIN) 100 mg capsule TAKE 2 CAPSULES BY MOUTH IN THE MORNING AND 3 IN THE EVENING, Normal, Disp-450 Capsule, R-1      !! glucose blood VI test strips (OneTouch Ultra Blue Test Strip) strip TEST BLOOD SUGAR 8 TIMES A DAY DUE TO UNCONTROLLED SUGARS Dx : E10.42 (hold till needed), Normal, Disp-300 Strip, R-11Per patient request, please keep prescriptions on file for when she needs them. insulin detemir U-100 (Levemir FlexTouch U-100 Insuln) 100 unit/mL (3 mL) inpn INJECT 30 UNITS SUBCUTANEOUSLY IN THE MORNING AND 4 UNITS AT NIGHT (hold till needed)  Indications: type 1 diabetes mellitus, Normal, Disp-10 Adjustable Dose Pre-filled Pen Syringe, R-5; Per patient request, please keep prescriptions on file for when sh e needs them. Insulin Needles, Disposable, (Pen Needle) 30 gauge x 5/16\" ndle Use one needle up to 8 times daily to dispense insulin. E10.65,E 10.649 (hold till needed), Normal, Disp-800 Each, R-3      FeroSuL 325 mg (65 mg iron) tablet daily. , Historical Med, KENRICK      HumaLOG KwikPen Insulin 100 unit/mL kwikpen ONLY PRN up to 1-6 units with each meal (3 times per day) depending on blood sugar, Normal, Disp-15 mL, R-5, DAWPer patient request, please keep prescriptions on file for when she needs them. lidocaine (LIDODERM) 5 % USE 2 PATCHES EXTERNALLY ONCE DAILY, LEAVE IN PLACE FOR 12 HOURS AND REMOVE FOR 12 HOURS, Normal, Disp-60 Patch, R-0      Euthyrox 75 mcg tablet TAKE 1 TABLET BY MOUTH ONCE DAILY BEFORE  BREAKFAST  FOR  A  CONDITION  WITH  LOW  THYROID  HORMONE  LEVELS, Normal, Disp-90 Tablet, R-3      simvastatin (ZOCOR) 40 mg tablet Take 1 tablet by mouth nightly, Normal, Disp-90 Tablet, R-0      losartan (COZAAR) 50 mg tablet Take 1 Tablet by mouth daily. Take 1 tab daily, Normal, Disp-90 Tablet, R-3Refill as needed      metoclopramide HCl (REGLAN) 5 mg tablet Take 1 Tablet by mouth two (2) times a day. Up to BID. TAKE 30 MINUTES BEFORE MEALS, Normal, Disp-360 Tablet, R-0      Insulin Needles, Disposable, (Niru Pen Needle) 32 gauge x 5/32\" ndle Use as directed with pen device - up to 5 times daily  Dx:  E11.9, Normal, Disp-200 Pen Needle, R-5      aspirin 81 mg chewable tablet Take 81 mg by mouth daily. , Historical Med      mupirocin (BACTROBAN) 2 % ointment PRN, Historical Med      potassium 99 mg tablet Take 1 Tab by mouth every other day., Normal, Disp-90 Tab, R-1      ascorbic acid, vitamin C, (VITAMIN C) 1,000 mg tablet Take  by mouth two (2) times a day. 10,000 mg BID, Historical Med      calcium carbonate (CALTREX) 600 mg calcium (1,500 mg) tablet Take 1,000 mg by mouth two (2) times a day., Historical Med      glucos sul 2KCl/msm/chond/C/Mn (GLUCOSAMINE CHONDROITIN PO) Take  by mouth. 1 in Am  only, Historical Med      MAGNESIUM PO Take  by mouth every fourty-eight (48) hours. , Historical Med      denosumab (PROLIA) 60 mg/mL injection 60 mg by SubCUTAneous route. Every 6 months, Historical Med      cholecalciferol, vitamin D3, 50 mcg (2,000 unit) tab Take 1 Tab by mouth daily.  5000 units daily  Indications: low vitamin D levels, Historical Med      VITAMIN A PO Take 2,400 mcg by mouth nightly., Historical Med      vitamin E (AQUA GEMS) 400 unit capsule Take 400 Units by mouth every Monday and Thursday. Only on M and Thurs at Bedtime, Historical Med       !! - Potential duplicate medications found. Please discuss with provider. DISCONTINUED MEDICATIONS:  Discharge Medication List as of 3/22/2023  3:38 PM          I am the Primary Clinician of Record. Prince Artur DO (electronically signed)    (Please note that parts of this dictation were completed with voice recognition software. Quite often unanticipated grammatical, syntax, homophones, and other interpretive errors are inadvertently transcribed by the computer software. Please disregards these errors.  Please excuse any errors that have escaped final proofreading.)

## 2023-04-03 PROBLEM — E11.9 TYPE 2 DIABETES MELLITUS, WITH LONG-TERM CURRENT USE OF INSULIN (HCC): Status: RESOLVED | Noted: 2020-11-24 | Resolved: 2021-01-07

## 2023-04-03 PROBLEM — Z79.4 TYPE 2 DIABETES MELLITUS, WITH LONG-TERM CURRENT USE OF INSULIN (HCC): Status: RESOLVED | Noted: 2020-11-24 | Resolved: 2021-01-07

## 2023-04-13 NOTE — TELEPHONE ENCOUNTER
Jeremy Saenz is a 30 year old male presenting with a left foot injury follow up DOI 3/28/2023 [facility] [place].      No Medications verified, no changes  Denies known Latex allergy or symptoms of Latex sensitivity   Needs nurse to call. Question regarding script that was just called in.

## 2023-04-14 ENCOUNTER — HOSPITAL ENCOUNTER (EMERGENCY)
Age: 58
Discharge: HOME | End: 2023-04-14
Attending: EMERGENCY MEDICINE
Payer: MEDICARE

## 2023-04-14 ENCOUNTER — APPOINTMENT (OUTPATIENT)
Dept: CT IMAGING | Age: 58
End: 2023-04-14
Attending: EMERGENCY MEDICINE
Payer: MEDICARE

## 2023-04-14 VITALS
OXYGEN SATURATION: 97 % | SYSTOLIC BLOOD PRESSURE: 154 MMHG | DIASTOLIC BLOOD PRESSURE: 80 MMHG | TEMPERATURE: 97.8 F | WEIGHT: 145 LBS | HEART RATE: 94 BPM | RESPIRATION RATE: 14 BRPM | BODY MASS INDEX: 25.69 KG/M2

## 2023-04-14 DIAGNOSIS — K52.9 GASTROENTERITIS: ICD-10-CM

## 2023-04-14 DIAGNOSIS — S06.0X0A CONCUSSION WITHOUT LOSS OF CONSCIOUSNESS, INITIAL ENCOUNTER: Primary | ICD-10-CM

## 2023-04-14 LAB
ALBUMIN SERPL-MCNC: 3.9 G/DL (ref 3.5–5)
ALBUMIN/GLOB SERPL: 1.1 (ref 1.1–2.2)
ALP SERPL-CCNC: 84 U/L (ref 45–117)
ALT SERPL-CCNC: 29 U/L (ref 12–78)
ANION GAP SERPL CALC-SCNC: 8 MMOL/L (ref 5–15)
AST SERPL-CCNC: 22 U/L (ref 15–37)
BASOPHILS # BLD: 0 K/UL (ref 0–0.1)
BASOPHILS NFR BLD: 0 % (ref 0–1)
BILIRUB SERPL-MCNC: 0.6 MG/DL (ref 0.2–1)
BUN SERPL-MCNC: 9 MG/DL (ref 6–20)
BUN/CREAT SERPL: 14 (ref 12–20)
CALCIUM SERPL-MCNC: 9.5 MG/DL (ref 8.5–10.1)
CHLORIDE SERPL-SCNC: 95 MMOL/L (ref 97–108)
CO2 SERPL-SCNC: 27 MMOL/L (ref 21–32)
CREAT SERPL-MCNC: 0.65 MG/DL (ref 0.55–1.02)
DIFFERENTIAL METHOD BLD: ABNORMAL
EOSINOPHIL # BLD: 0 K/UL (ref 0–0.4)
EOSINOPHIL NFR BLD: 0 % (ref 0–7)
ERYTHROCYTE [DISTWIDTH] IN BLOOD BY AUTOMATED COUNT: 11.7 % (ref 11.5–14.5)
GLOBULIN SER CALC-MCNC: 3.4 G/DL (ref 2–4)
GLUCOSE SERPL-MCNC: 269 MG/DL (ref 65–100)
HCT VFR BLD AUTO: 38.6 % (ref 35–47)
HGB BLD-MCNC: 13.7 G/DL (ref 11.5–16)
IMM GRANULOCYTES # BLD AUTO: 0 K/UL (ref 0–0.04)
IMM GRANULOCYTES NFR BLD AUTO: 0 % (ref 0–0.5)
LYMPHOCYTES # BLD: 1.3 K/UL (ref 0.8–3.5)
LYMPHOCYTES NFR BLD: 26 % (ref 12–49)
MCH RBC QN AUTO: 31.1 PG (ref 26–34)
MCHC RBC AUTO-ENTMCNC: 35.5 G/DL (ref 30–36.5)
MCV RBC AUTO: 87.7 FL (ref 80–99)
MONOCYTES # BLD: 0.5 K/UL (ref 0–1)
MONOCYTES NFR BLD: 9 % (ref 5–13)
NEUTS SEG # BLD: 3.3 K/UL (ref 1.8–8)
NEUTS SEG NFR BLD: 65 % (ref 32–75)
NRBC # BLD: 0 K/UL (ref 0–0.01)
NRBC BLD-RTO: 0 PER 100 WBC
PLATELET # BLD AUTO: 206 K/UL (ref 150–400)
PMV BLD AUTO: 8.6 FL (ref 8.9–12.9)
POTASSIUM SERPL-SCNC: 4.3 MMOL/L (ref 3.5–5.1)
PROT SERPL-MCNC: 7.3 G/DL (ref 6.4–8.2)
RBC # BLD AUTO: 4.4 M/UL (ref 3.8–5.2)
SODIUM SERPL-SCNC: 130 MMOL/L (ref 136–145)
WBC # BLD AUTO: 5.2 K/UL (ref 3.6–11)

## 2023-04-14 PROCEDURE — 36415 COLL VENOUS BLD VENIPUNCTURE: CPT

## 2023-04-14 PROCEDURE — 70450 CT HEAD/BRAIN W/O DYE: CPT

## 2023-04-14 PROCEDURE — 99284 EMERGENCY DEPT VISIT MOD MDM: CPT

## 2023-04-14 PROCEDURE — 96375 TX/PRO/DX INJ NEW DRUG ADDON: CPT

## 2023-04-14 PROCEDURE — 96374 THER/PROPH/DIAG INJ IV PUSH: CPT

## 2023-04-14 PROCEDURE — 96361 HYDRATE IV INFUSION ADD-ON: CPT

## 2023-04-14 PROCEDURE — 80053 COMPREHEN METABOLIC PANEL: CPT

## 2023-04-14 PROCEDURE — 85025 COMPLETE CBC W/AUTO DIFF WBC: CPT

## 2023-04-14 PROCEDURE — 74011250636 HC RX REV CODE- 250/636: Performed by: EMERGENCY MEDICINE

## 2023-04-14 RX ORDER — ONDANSETRON 2 MG/ML
4 INJECTION INTRAMUSCULAR; INTRAVENOUS
Status: COMPLETED | OUTPATIENT
Start: 2023-04-14 | End: 2023-04-14

## 2023-04-14 RX ORDER — MORPHINE SULFATE 4 MG/ML
4 INJECTION INTRAVENOUS
Status: COMPLETED | OUTPATIENT
Start: 2023-04-14 | End: 2023-04-14

## 2023-04-14 RX ADMIN — ONDANSETRON 4 MG: 2 INJECTION INTRAMUSCULAR; INTRAVENOUS at 13:56

## 2023-04-14 RX ADMIN — MORPHINE SULFATE 4 MG: 4 INJECTION, SOLUTION INTRAMUSCULAR; INTRAVENOUS at 13:56

## 2023-04-14 RX ADMIN — SODIUM CHLORIDE 1000 ML: 9 INJECTION, SOLUTION INTRAVENOUS at 14:02

## 2023-04-14 NOTE — ED NOTES
I have reviewed discharge instructions with the patient and caregiver. The patient and caregiver verbalized understanding. Discharge medications discussed with patient. No questions at this time. Ambulated without difficulty.

## 2023-04-15 NOTE — ED PROVIDER NOTES
EMERGENCY DEPARTMENT HISTORY AND PHYSICAL EXAM          Date: 2023  Patient Name: Ricky Benitez    History of Presenting Illness     Chief Complaint   Patient presents with    Follow-up       History Provided By: Patient    HPI: Ricky Benitez is a 60 y.o. female, pmhx listed below, who presents to the ED c/o facial pain, nausea and diarrhea. Pt was seen in our ED after a fall, had negative CT scans, sent home. Reports soon after discharge she developed nausea and diarrhea. Family members with gastroenteritis. Now experiencing loose stools, non-bloody. Also headache, nausea, photophobia. Told by her ENT to come to ED and have repeat CT. PCP: Flower Ivan DO        Past History       Past Medical History:  Past Medical History:   Diagnosis Date    Arthritis     patient states \"every joint affected\"    Bee sting 2018    left elbow bee sting     Blister of ankle 2018    Blister small per patient of left ankle. Wears an air boot due to 2 stress fractures in last 2 months.     CAD (coronary artery disease)     Constipation     Falls     pt reports having 4 falls in 3 days    Fatigue     Headache     Ill-defined condition     multiple broken ribs    Ill-defined condition     reports \"getting infections easily\"    Joint pain     Memory disorder     following spinal fluid leak repair    Menopause     Nausea & vomiting     Neuropathy     Osteoporosis     Thyroid disease     Type 1 diabetes ([facility] [address]     diagnosed at age 8    Unspecified cerebral artery occlusion with cerebral infarction     x 4 (last in )       Past Surgical History:  Past Surgical History:   Procedure Laterality Date    HX CARPAL TUNNEL RELEASE Right 2018    HX  SECTION  1987    HX  SECTION      HX CHOLECYSTECTOMY      HX HYSTERECTOMY  2006    HX OOPHORECTOMY      HX ORTHOPAEDIC      frozen shoulder surgery x 3    HX ORTHOPAEDIC      frozen finger surgery x 8    HX ORTHOPAEDIC Left 2014 wrist surgery    HX ORTHOPAEDIC Right     hand surgery    HX ORTHOPAEDIC Left     foot surgery    HX OTHER SURGICAL      spinal fluid leak repair (spinal fluid leaking from nose, remove nose, cut fat from stomach, use that as patch behind nose, nose replaced)    HX ROTATOR CUFF REPAIR Left     HX ROTATOR CUFF REPAIR Right     HX TONSILLECTOMY         Family History:  Family History   Problem Relation Age of Onset    Heart Disease Mother     Hypertension Mother     No Known Problems Father     Heart Disease Maternal Grandfather     Cancer Maternal Aunt         Pancreatic and Liver    Cancer Maternal Grandmother         Lung    Diabetes Maternal Grandmother     Cancer Maternal Aunt         Breast    Breast Cancer Maternal Aunt     Diabetes Maternal Aunt        Social History:  Social History     Tobacco Use    Smoking status: Former     Packs/day: 0.50     Years: 8.00     Pack years: 4.00     Types: Cigarettes     Quit date: 4/10/2007     Years since quittin.0    Smokeless tobacco: Never   Vaping Use    Vaping Use: Never used   Substance Use Topics    Alcohol use: No    Drug use: No       Physical Exam     Vital Signs-Reviewed the patient's vital signs. Patient Vitals for the past 12 hrs:   Temp Pulse Resp BP SpO2   23 1445 -- -- -- (!) 154/80 97 %   23 1302 97.8 °F (36.6 °C) -- -- -- --   23 1300 -- 94 14 (!) 143/75 94 %       Physical Exam  Constitutional:       Appearance: Normal appearance. HENT:      Head: Normocephalic and atraumatic. Nose: Nose normal.      Mouth/Throat:      Mouth: Mucous membranes are moist.   Eyes:      Conjunctiva/sclera: Conjunctivae normal.      Comments: photophobia   Abdominal:      Palpations: Abdomen is soft. Tenderness: There is no abdominal tenderness. Musculoskeletal:      Cervical back: Neck supple. No tenderness. Skin:     General: Skin is warm.    Neurological:      Mental Status: She is alert and oriented to person, place, and time. Sensory: No sensory deficit. Motor: No weakness. Diagnostic Study Results     Labs -     Recent Results (from the past 12 hour(s))   CBC WITH AUTOMATED DIFF    Collection Time: 04/14/23  1:49 PM   Result Value Ref Range    WBC 5.2 3.6 - 11.0 K/uL    RBC 4.40 3.80 - 5.20 M/uL    HGB 13.7 11.5 - 16.0 g/dL    HCT 38.6 35.0 - 47.0 %    MCV 87.7 80.0 - 99.0 FL    MCH 31.1 26.0 - 34.0 PG    MCHC 35.5 30.0 - 36.5 g/dL    RDW 11.7 11.5 - 14.5 %    PLATELET 808 638 - 806 K/uL    MPV 8.6 (L) 8.9 - 12.9 FL    NRBC 0.0 0  WBC    ABSOLUTE NRBC 0.00 0.00 - 0.01 K/uL    NEUTROPHILS 65 32 - 75 %    LYMPHOCYTES 26 12 - 49 %    MONOCYTES 9 5 - 13 %    EOSINOPHILS 0 0 - 7 %    BASOPHILS 0 0 - 1 %    IMMATURE GRANULOCYTES 0 0.0 - 0.5 %    ABS. NEUTROPHILS 3.3 1.8 - 8.0 K/UL    ABS. LYMPHOCYTES 1.3 0.8 - 3.5 K/UL    ABS. MONOCYTES 0.5 0.0 - 1.0 K/UL    ABS. EOSINOPHILS 0.0 0.0 - 0.4 K/UL    ABS. BASOPHILS 0.0 0.0 - 0.1 K/UL    ABS. IMM. GRANS. 0.0 0.00 - 0.04 K/UL    DF AUTOMATED     METABOLIC PANEL, COMPREHENSIVE    Collection Time: 04/14/23  1:49 PM   Result Value Ref Range    Sodium 130 (L) 136 - 145 mmol/L    Potassium 4.3 3.5 - 5.1 mmol/L    Chloride 95 (L) 97 - 108 mmol/L    CO2 27 21 - 32 mmol/L    Anion gap 8 5 - 15 mmol/L    Glucose 269 (H) 65 - 100 mg/dL    BUN 9 6 - 20 MG/DL    Creatinine 0.65 0.55 - 1.02 MG/DL    BUN/Creatinine ratio 14 12 - 20      eGFR >60 >60 ml/min/1.73m2    Calcium 9.5 8.5 - 10.1 MG/DL    Bilirubin, total 0.6 0.2 - 1.0 MG/DL    ALT (SGPT) 29 12 - 78 U/L    AST (SGOT) 22 15 - 37 U/L    Alk. phosphatase 84 45 - 117 U/L    Protein, total 7.3 6.4 - 8.2 g/dL    Albumin 3.9 3.5 - 5.0 g/dL    Globulin 3.4 2.0 - 4.0 g/dL    A-G Ratio 1.1 1.1 - 2.2         Radiologic Studies -   CT HEAD WO CONT   Final Result   No acute intracranial findings.             CT Results  (Last 48 hours)                 04/14/23 1333  CT HEAD WO CONT Final result    Impression:  No acute intracranial findings. Narrative:  EXAM: CT HEAD WO CONT       INDICATION: severe pain, recent fall       COMPARISON: 4/11/2023. CONTRAST: None. TECHNIQUE: Unenhanced CT of the head was performed using 5 mm images. Brain and   bone windows were generated. Coronal and sagittal reformats. CT dose reduction   was achieved through use of a standardized protocol tailored for this   examination and automatic exposure control for dose modulation. FINDINGS:   The bone windows demonstrate no abnormalities. The visualized portions of the   paranasal sinuses and mastoid air cells are clear. The ventricles and sulci are age-appropriate and symmetric. Loreatha Press The basilar   cisterns are open. There is no intracranial hemorrhage, extra-axial collection,   or mass effect. No CT evidence of acute infarct. CXR Results  (Last 48 hours)      None                Medical Decision Making   I am the first provider for this patient. I reviewed the vital signs, available nursing notes, past medical history, past surgical history, family history and social history. Records Reviewed: Prior medical records    Provider Notes (Medical Decision Making):   MDM: 60 y.o. F with fall 3 days ago, prior CT negative. Repeat CT done today also negative. Most likely severe concussion symptoms. Also consider gastroenteritis, dehydration, electrolyte abnormality. Lab work reviewed and negative. Pt feels better after pain meds and fluids. We discussed with risks and side effects of opiate pain meds, will give short course due to severity of HA. Will follow up as instructed. All questions have been answered, pt voiced understanding and agreement with plan. Specific return precautions provided as well as instructions to return to the ED should sx worsen at any time. Vital signs stable for discharge. Diagnosis     Clinical Impression:   1.  Concussion without loss of consciousness, initial encounter 2. Gastroenteritis            Disposition:  Discharged    Discharge Medication List as of 4/14/2023  2:25 PM            Please note, this dictation was completed with OpenSpark, the computer voice recognition software. Quite often unanticipated grammatical, syntax, homophones, and other interpretive errors are inadvertently transcribed by the computer software. Please disregard these errors. Please excuse any errors that have escaped final proof reading.

## 2023-04-23 DIAGNOSIS — S76.011A TEAR OF RIGHT GLUTEUS MEDIUS TENDON, INITIAL ENCOUNTER: Primary | ICD-10-CM

## 2023-04-24 DIAGNOSIS — E10.42 TYPE 1 DIABETES MELLITUS WITH DIABETIC POLYNEUROPATHY (HCC): Primary | ICD-10-CM

## 2023-05-01 ENCOUNTER — HOSPITAL ENCOUNTER (OUTPATIENT)
Dept: INFUSION THERAPY | Age: 58
Discharge: HOME | End: 2023-05-01
Attending: PEDIATRICS
Payer: MEDICARE

## 2023-05-01 VITALS
RESPIRATION RATE: 18 BRPM | BODY MASS INDEX: 25.94 KG/M2 | DIASTOLIC BLOOD PRESSURE: 62 MMHG | TEMPERATURE: 98.4 F | HEIGHT: 63 IN | SYSTOLIC BLOOD PRESSURE: 127 MMHG | HEART RATE: 79 BPM | OXYGEN SATURATION: 98 % | WEIGHT: 146.4 LBS

## 2023-05-01 DIAGNOSIS — M80.80XD LOCALIZED OSTEOPOROSIS WITH CURRENT PATHOLOGICAL FRACTURE WITH ROUTINE HEALING: ICD-10-CM

## 2023-05-01 DIAGNOSIS — M85.89 OSTEOPENIA OF MULTIPLE SITES: Primary | ICD-10-CM

## 2023-05-01 PROCEDURE — 96372 THER/PROPH/DIAG INJ SC/IM: CPT

## 2023-05-01 PROCEDURE — 74011250636 HC RX REV CODE- 250/636: Performed by: PEDIATRICS

## 2023-05-01 RX ORDER — DIPHENHYDRAMINE HYDROCHLORIDE 50 MG/ML
50 INJECTION, SOLUTION INTRAMUSCULAR; INTRAVENOUS AS NEEDED
Start: 2023-05-29

## 2023-05-01 RX ORDER — ACETAMINOPHEN 325 MG/1
650 TABLET ORAL AS NEEDED
Start: 2023-05-29

## 2023-05-01 RX ORDER — ACETAMINOPHEN 325 MG/1
650 TABLET ORAL AS NEEDED
Status: ACTIVE | OUTPATIENT
Start: 2023-05-01 | End: 2023-05-01

## 2023-05-01 RX ORDER — ONDANSETRON 2 MG/ML
8 INJECTION INTRAMUSCULAR; INTRAVENOUS AS NEEDED
Status: ACTIVE | OUTPATIENT
Start: 2023-05-01 | End: 2023-05-01

## 2023-05-01 RX ORDER — ONDANSETRON 2 MG/ML
8 INJECTION INTRAMUSCULAR; INTRAVENOUS AS NEEDED
OUTPATIENT
Start: 2023-05-29

## 2023-05-01 RX ORDER — DIPHENHYDRAMINE HYDROCHLORIDE 50 MG/ML
25 INJECTION, SOLUTION INTRAMUSCULAR; INTRAVENOUS AS NEEDED
Start: 2023-05-29

## 2023-05-01 RX ORDER — ALBUTEROL SULFATE 0.83 MG/ML
2.5 SOLUTION RESPIRATORY (INHALATION) AS NEEDED
Start: 2023-05-29

## 2023-05-01 RX ORDER — EPINEPHRINE 1 MG/ML
0.3 INJECTION, SOLUTION, CONCENTRATE INTRAVENOUS AS NEEDED
OUTPATIENT
Start: 2023-05-29

## 2023-05-01 RX ORDER — HYDROCORTISONE SODIUM SUCCINATE 100 MG/2ML
100 INJECTION, POWDER, FOR SOLUTION INTRAMUSCULAR; INTRAVENOUS AS NEEDED
OUTPATIENT
Start: 2023-05-29

## 2023-05-01 RX ORDER — DIPHENHYDRAMINE HYDROCHLORIDE 50 MG/ML
25 INJECTION, SOLUTION INTRAMUSCULAR; INTRAVENOUS AS NEEDED
Status: ACTIVE | OUTPATIENT
Start: 2023-05-01 | End: 2023-05-01

## 2023-05-01 RX ADMIN — ROMOSOZUMAB-AQQG 210 MG: 105 INJECTION, SOLUTION SUBCUTANEOUS at 10:44

## 2023-05-01 NOTE — PROGRESS NOTES
[facility] Progress Note    Date: May 1, 2023    Name: Jada Su    MRN: [id number]         : 1965      Ms. Flaca Saleh was assessed and education was provided. She reports a fall with concussion last month. States most symptoms have resolved. Denies current complaints- has ongoing chronic hip pain. Patient came with the understanding that she would be getting Prolia. Relayed to her that Dr. Wiggins Pitch note from January indicated he was switching her to [facility] for one year because she had had several fractures. She acknowledges that she did recall that conversation after I explained it. She agrees to proceed with injection. Given information about Evenity and possible side effects and symptoms to report. Ms. Romaine Burrows vitals were reviewed and patient was observed for 5 minutes prior to treatment. Visit Vitals  /62 (BP 1 Location: Left upper arm, BP Patient Position: Sitting)   Pulse 79   Temp 98.4 °F (36.9 °C)   Resp 18   Ht 5' 3\" (1.6 m)   Wt 66.4 kg (146 lb 6.4 oz)   SpO2 98%   Breastfeeding No   BMI 25.93 kg/m²       Lab results were reviewed from 23. Evenity 210 mg was administered subcutaneous in  left upper arm with (2) injections of 105 mg in separate sites. Ms. Flaca Saleh tolerated well, and had no complaints. She was observed for 20 minutes and remained without adverse effect. VSS. Patient armband removed and shredded. Ms. Flaca Saleh was discharged from [place] [address] in stable condition at . She is to return on  at 1000 for her next appointment- she prefers appointments to be on Thursday.     Felecia Schwab, RN  May 1, 2023  12:09 PM

## 2023-05-02 ENCOUNTER — TELEPHONE (OUTPATIENT)
Dept: RHEUMATOLOGY | Age: 58
End: 2023-05-02

## 2023-05-12 ENCOUNTER — TELEPHONE (OUTPATIENT)
Age: 58
End: 2023-05-12

## 2023-05-15 NOTE — TELEPHONE ENCOUNTER
RAQUEL: Spoke with [facility] pharmacist. She states that there should not be an issue, as the tip should simply dial the units. Advised patient to take the Levemir pen back to [facility] to show the pen to the pharmacist to assure that the pen is not defective. Patient expressed understanding.

## 2023-05-19 RX ORDER — LIDOCAINE 50 MG/G
2 PATCH TOPICAL EVERY EVENING
COMMUNITY
Start: 2021-11-30

## 2023-05-19 RX ORDER — LEVOTHYROXINE SODIUM 0.07 MG/1
TABLET ORAL
COMMUNITY
Start: 2021-11-09

## 2023-05-19 RX ORDER — ZOLPIDEM TARTRATE 10 MG/1
TABLET ORAL
COMMUNITY
Start: 2023-02-13

## 2023-05-19 RX ORDER — FERROUS SULFATE 325(65) MG
TABLET ORAL DAILY
COMMUNITY
Start: 2022-01-18

## 2023-05-19 RX ORDER — METOCLOPRAMIDE 5 MG/1
5 TABLET ORAL 2 TIMES DAILY
COMMUNITY
Start: 2021-07-29

## 2023-05-19 RX ORDER — ASPIRIN 81 MG/1
81 TABLET, CHEWABLE ORAL DAILY
COMMUNITY
Start: 2020-11-26

## 2023-05-19 RX ORDER — GABAPENTIN 100 MG/1
CAPSULE ORAL
COMMUNITY
Start: 2023-04-11

## 2023-05-19 RX ORDER — INSULIN LISPRO 100 [IU]/ML
INJECTION, SOLUTION INTRAVENOUS; SUBCUTANEOUS
COMMUNITY
Start: 2022-01-25 | End: 2023-07-21 | Stop reason: SDUPTHER

## 2023-05-19 RX ORDER — SIMVASTATIN 40 MG
1 TABLET ORAL NIGHTLY
COMMUNITY
Start: 2021-09-08

## 2023-05-19 RX ORDER — LOSARTAN POTASSIUM 50 MG/1
50 TABLET ORAL DAILY
COMMUNITY
Start: 2021-07-29

## 2023-05-19 RX ORDER — SULFAMETHOXAZOLE AND TRIMETHOPRIM 800; 160 MG/1; MG/1
1 TABLET ORAL 2 TIMES DAILY
COMMUNITY
Start: 2023-03-08 | End: 2023-07-21

## 2023-05-23 ENCOUNTER — TELEPHONE (OUTPATIENT)
Age: 58
End: 2023-05-23

## 2023-05-24 RX ORDER — ACETAMINOPHEN 325 MG/1
650 TABLET ORAL
OUTPATIENT
Start: 2023-05-24

## 2023-05-24 RX ORDER — ONDANSETRON 2 MG/ML
8 INJECTION INTRAMUSCULAR; INTRAVENOUS
OUTPATIENT
Start: 2023-05-24

## 2023-05-24 RX ORDER — DIPHENHYDRAMINE HYDROCHLORIDE 50 MG/ML
50 INJECTION INTRAMUSCULAR; INTRAVENOUS
OUTPATIENT
Start: 2023-05-24

## 2023-05-24 RX ORDER — ALBUTEROL SULFATE 90 UG/1
4 AEROSOL, METERED RESPIRATORY (INHALATION) PRN
OUTPATIENT
Start: 2023-05-24

## 2023-05-24 RX ORDER — SODIUM CHLORIDE 9 MG/ML
INJECTION, SOLUTION INTRAVENOUS CONTINUOUS
OUTPATIENT
Start: 2023-05-24

## 2023-05-24 RX ORDER — EPINEPHRINE 1 MG/ML
0.3 INJECTION, SOLUTION, CONCENTRATE INTRAVENOUS PRN
OUTPATIENT
Start: 2023-05-24

## 2023-05-30 ENCOUNTER — HOSPITAL ENCOUNTER (OUTPATIENT)
Age: 58
Discharge: HOME | End: 2023-06-02
Payer: MEDICARE

## 2023-05-30 DIAGNOSIS — S09.90XA: ICD-10-CM

## 2023-05-30 DIAGNOSIS — R51.9: ICD-10-CM

## 2023-05-30 PROCEDURE — 6360000004 HC RX CONTRAST MEDICATION: Performed by: RADIOLOGY

## 2023-05-30 PROCEDURE — A9579 GAD-BASE MR CONTRAST NOS,1ML: HCPCS | Performed by: RADIOLOGY

## 2023-05-30 PROCEDURE — 70553 MRI BRAIN STEM W/O & W/DYE: CPT

## 2023-05-30 RX ADMIN — GADOTERIDOL 13 ML: 279.3 INJECTION, SOLUTION INTRAVENOUS at 11:43

## 2023-06-01 ENCOUNTER — HOSPITAL ENCOUNTER (OUTPATIENT)
Facility: HOSPITAL | Age: 58
Setting detail: INFUSION SERIES
End: 2023-06-01

## 2023-06-08 ENCOUNTER — TRANSCRIBE ORDERS (OUTPATIENT)
Facility: HOSPITAL | Age: 58
End: 2023-06-08

## 2023-06-08 ENCOUNTER — HOSPITAL ENCOUNTER (OUTPATIENT)
Facility: HOSPITAL | Age: 58
Setting detail: INFUSION SERIES
End: 2023-06-08
Payer: MEDICARE

## 2023-06-08 VITALS
HEIGHT: 63 IN | RESPIRATION RATE: 16 BRPM | HEART RATE: 80 BPM | SYSTOLIC BLOOD PRESSURE: 151 MMHG | OXYGEN SATURATION: 98 % | BODY MASS INDEX: 25.48 KG/M2 | DIASTOLIC BLOOD PRESSURE: 68 MMHG | WEIGHT: 143.8 LBS | TEMPERATURE: 97.8 F

## 2023-06-08 DIAGNOSIS — M80.80XD LOCALIZED OSTEOPOROSIS WITH CURRENT PATHOLOGICAL FRACTURE WITH ROUTINE HEALING: Primary | ICD-10-CM

## 2023-06-08 DIAGNOSIS — Z12.31 SCREENING MAMMOGRAM FOR BREAST CANCER: Primary | ICD-10-CM

## 2023-06-08 LAB
ALBUMIN SERPL-MCNC: 3.8 G/DL (ref 3.5–5)
ALBUMIN/GLOB SERPL: 1.2 (ref 1.1–2.2)
ALP SERPL-CCNC: 84 U/L (ref 45–117)
ALT SERPL-CCNC: 24 U/L (ref 12–78)
ANION GAP SERPL CALC-SCNC: 8 MMOL/L (ref 5–15)
AST SERPL-CCNC: 24 U/L (ref 15–37)
BILIRUB SERPL-MCNC: 0.8 MG/DL (ref 0.2–1)
BUN SERPL-MCNC: 10 MG/DL (ref 6–20)
BUN/CREAT SERPL: 15 (ref 12–20)
CALCIUM SERPL-MCNC: 9.4 MG/DL (ref 8.5–10.1)
CHLORIDE SERPL-SCNC: 96 MMOL/L (ref 97–108)
CO2 SERPL-SCNC: 27 MMOL/L (ref 21–32)
CREAT SERPL-MCNC: 0.67 MG/DL (ref 0.55–1.02)
GLOBULIN SER CALC-MCNC: 3.1 G/DL (ref 2–4)
GLUCOSE SERPL-MCNC: 244 MG/DL (ref 65–100)
POTASSIUM SERPL-SCNC: 4 MMOL/L (ref 3.5–5.1)
PROT SERPL-MCNC: 6.9 G/DL (ref 6.4–8.2)
SODIUM SERPL-SCNC: 131 MMOL/L (ref 136–145)

## 2023-06-08 PROCEDURE — 6360000002 HC RX W HCPCS: Performed by: PEDIATRICS

## 2023-06-08 PROCEDURE — 96372 THER/PROPH/DIAG INJ SC/IM: CPT

## 2023-06-08 PROCEDURE — 36415 COLL VENOUS BLD VENIPUNCTURE: CPT

## 2023-06-08 PROCEDURE — 80053 COMPREHEN METABOLIC PANEL: CPT

## 2023-06-08 RX ORDER — SODIUM CHLORIDE 9 MG/ML
INJECTION, SOLUTION INTRAVENOUS CONTINUOUS
Status: CANCELLED | OUTPATIENT
Start: 2023-07-06

## 2023-06-08 RX ORDER — ACETAMINOPHEN 325 MG/1
650 TABLET ORAL
Status: CANCELLED | OUTPATIENT
Start: 2023-07-06

## 2023-06-08 RX ORDER — DIPHENHYDRAMINE HYDROCHLORIDE 50 MG/ML
50 INJECTION INTRAMUSCULAR; INTRAVENOUS
Status: DISCONTINUED | OUTPATIENT
Start: 2023-06-08 | End: 2023-06-09 | Stop reason: HOSPADM

## 2023-06-08 RX ORDER — SODIUM CHLORIDE 9 MG/ML
INJECTION, SOLUTION INTRAVENOUS CONTINUOUS
Status: DISCONTINUED | OUTPATIENT
Start: 2023-06-08 | End: 2023-10-27 | Stop reason: HOSPADM

## 2023-06-08 RX ORDER — DIPHENHYDRAMINE HYDROCHLORIDE 50 MG/ML
50 INJECTION INTRAMUSCULAR; INTRAVENOUS
Status: CANCELLED | OUTPATIENT
Start: 2023-07-06

## 2023-06-08 RX ORDER — ONDANSETRON 2 MG/ML
8 INJECTION INTRAMUSCULAR; INTRAVENOUS
Status: DISCONTINUED | OUTPATIENT
Start: 2023-06-08 | End: 2023-06-09 | Stop reason: HOSPADM

## 2023-06-08 RX ORDER — EPINEPHRINE 1 MG/ML
0.3 INJECTION, SOLUTION, CONCENTRATE INTRAVENOUS PRN
Status: DISCONTINUED | OUTPATIENT
Start: 2023-06-08 | End: 2023-10-27 | Stop reason: HOSPADM

## 2023-06-08 RX ORDER — EPINEPHRINE 1 MG/ML
0.3 INJECTION, SOLUTION, CONCENTRATE INTRAVENOUS PRN
Status: CANCELLED | OUTPATIENT
Start: 2023-07-06

## 2023-06-08 RX ORDER — ACETAMINOPHEN 325 MG/1
650 TABLET ORAL
Status: DISCONTINUED | OUTPATIENT
Start: 2023-06-08 | End: 2023-06-09 | Stop reason: HOSPADM

## 2023-06-08 RX ORDER — ALBUTEROL SULFATE 90 UG/1
4 AEROSOL, METERED RESPIRATORY (INHALATION) PRN
Status: DISCONTINUED | OUTPATIENT
Start: 2023-06-08 | End: 2023-10-27 | Stop reason: HOSPADM

## 2023-06-08 RX ORDER — ONDANSETRON 2 MG/ML
8 INJECTION INTRAMUSCULAR; INTRAVENOUS
Status: CANCELLED | OUTPATIENT
Start: 2023-07-06

## 2023-06-08 RX ORDER — ALBUTEROL SULFATE 90 UG/1
4 AEROSOL, METERED RESPIRATORY (INHALATION) PRN
Status: CANCELLED | OUTPATIENT
Start: 2023-07-06

## 2023-06-08 RX ADMIN — ROMOSOZUMAB-AQQG 105 MG: 105 INJECTION, SOLUTION SUBCUTANEOUS at 11:55

## 2023-06-08 ASSESSMENT — PAIN SCALES - GENERAL: PAINLEVEL_OUTOF10: 4

## 2023-06-08 ASSESSMENT — PAIN DESCRIPTION - LOCATION: LOCATION: BUTTOCKS

## 2023-06-08 ASSESSMENT — PAIN DESCRIPTION - DESCRIPTORS: DESCRIPTORS: ACHING

## 2023-06-08 ASSESSMENT — PAIN DESCRIPTION - ORIENTATION: ORIENTATION: LEFT

## 2023-06-08 NOTE — PROGRESS NOTES
[address] [facility] Progress Note    Date: 2023    Name: Robbie Pace    MRN: [id number]         : 1965      Ms. Rodriguez Malik was assessed and education was provided. Ms. Milly Oconnor vitals were reviewed and patient was observed for 5 minutes prior to treatment. Vitals:    23 1045   BP: (!) 151/68   Pulse: 80   Resp: 16   Temp: 97.8 °F (36.6 °C)   SpO2: 98%       Lab results were obtained and reviewed. Recent Results (from the past 12 hour(s))   Comprehensive Metabolic Panel    Collection Time: 23 10:49 AM   Result Value Ref Range    Sodium 131 (L) 136 - 145 mmol/L    Potassium 4.0 3.5 - 5.1 mmol/L    Chloride 96 (L) 97 - 108 mmol/L    CO2 27 21 - 32 mmol/L    Anion Gap 8 5 - 15 mmol/L    Glucose 244 (H) 65 - 100 mg/dL    BUN 10 6 - 20 MG/DL    Creatinine 0.67 0.55 - 1.02 MG/DL    Bun/Cre Ratio 15 12 - 20      Est, Glom Filt Rate >60 >60 ml/min/1.73m2    Calcium 9.4 8.5 - 10.1 MG/DL    Total Bilirubin 0.8 0.2 - 1.0 MG/DL    ALT 24 12 - 78 U/L    AST 24 15 - 37 U/L    Alk Phosphatase 84 45 - 117 U/L    Total Protein 6.9 6.4 - 8.2 g/dL    Albumin 3.8 3.5 - 5.0 g/dL    Globulin 3.1 2.0 - 4.0 g/dL    Albumin/Globulin Ratio 1.2 1.1 - 2.2         Evenity 105mg was administered subcutaneous in  right arm. Evenity 105mg was administered subcutaneous in right arm - separate site    Ms. Rodriguez Malik tolerated well, and had no complaints. Patient armband was removed and shredded. Ms. Rodriguez Malik was discharged from [place] [address] in stable condition at 1210. She is to return on 23 at 1100 for her next appointment.   (Requested Wednesday since she had just made a mammogram appointment for that day)  Meryle Leber, RN  2023  12:00 PM

## 2023-06-08 NOTE — PLAN OF CARE
Problem: Chronic Conditions and Co-morbidities  Goal: Patient's chronic conditions and co-morbidity symptoms are monitored and maintained or improved  Outcome: Completed  Flowsheets (Taken 6/8/2023 0384)  Care Plan - Patient's Chronic Conditions and Co-Morbidity Symptoms are Monitored and Maintained or Improved: Monitor and assess patient's chronic conditions and comorbid symptoms for stability, deterioration, or improvement     Problem: Pain  Goal: Verbalizes/displays adequate comfort level or baseline comfort level  Outcome: Completed

## 2023-06-26 ENCOUNTER — TELEPHONE (OUTPATIENT)
Age: 58
End: 2023-06-26

## 2023-06-26 RX ORDER — BLOOD SUGAR DIAGNOSTIC
STRIP MISCELLANEOUS
Qty: 800 EACH | Refills: 3 | Status: SHIPPED | OUTPATIENT
Start: 2023-06-26

## 2023-07-01 DIAGNOSIS — E03.9 ACQUIRED HYPOTHYROIDISM: ICD-10-CM

## 2023-07-01 DIAGNOSIS — E10.42 TYPE 1 DIABETES MELLITUS WITH DIABETIC POLYNEUROPATHY (HCC): ICD-10-CM

## 2023-07-05 ENCOUNTER — HOSPITAL ENCOUNTER (OUTPATIENT)
Facility: HOSPITAL | Age: 58
Setting detail: INFUSION SERIES
End: 2023-07-05
Payer: MEDICARE

## 2023-07-05 ENCOUNTER — HOSPITAL ENCOUNTER (OUTPATIENT)
Facility: HOSPITAL | Age: 58
Discharge: HOME | End: 2023-07-08
Payer: MEDICARE

## 2023-07-05 VITALS
DIASTOLIC BLOOD PRESSURE: 71 MMHG | OXYGEN SATURATION: 99 % | RESPIRATION RATE: 16 BRPM | TEMPERATURE: 97.5 F | WEIGHT: 142 LBS | BODY MASS INDEX: 25.16 KG/M2 | HEIGHT: 63 IN | HEART RATE: 90 BPM | SYSTOLIC BLOOD PRESSURE: 143 MMHG

## 2023-07-05 DIAGNOSIS — Z12.31 SCREENING MAMMOGRAM FOR BREAST CANCER: ICD-10-CM

## 2023-07-05 DIAGNOSIS — M80.80XD LOCALIZED OSTEOPOROSIS WITH CURRENT PATHOLOGICAL FRACTURE WITH ROUTINE HEALING: Primary | ICD-10-CM

## 2023-07-05 LAB
ALBUMIN SERPL-MCNC: 4.2 G/DL (ref 3.5–5)
ALBUMIN/GLOB SERPL: 1.3 (ref 1.1–2.2)
ALP SERPL-CCNC: 121 U/L (ref 45–117)
ALT SERPL-CCNC: 33 U/L (ref 12–78)
ANION GAP SERPL CALC-SCNC: 8 MMOL/L (ref 5–15)
AST SERPL-CCNC: 29 U/L (ref 15–37)
BILIRUB SERPL-MCNC: 0.6 MG/DL (ref 0.2–1)
BUN SERPL-MCNC: 10 MG/DL (ref 6–20)
BUN/CREAT SERPL: 14 (ref 12–20)
CALCIUM SERPL-MCNC: 9.8 MG/DL (ref 8.5–10.1)
CHLORIDE SERPL-SCNC: 98 MMOL/L (ref 97–108)
CO2 SERPL-SCNC: 31 MMOL/L (ref 21–32)
CREAT SERPL-MCNC: 0.7 MG/DL (ref 0.55–1.02)
GLOBULIN SER CALC-MCNC: 3.3 G/DL (ref 2–4)
GLUCOSE SERPL-MCNC: 214 MG/DL (ref 65–100)
POTASSIUM SERPL-SCNC: 4 MMOL/L (ref 3.5–5.1)
PROT SERPL-MCNC: 7.5 G/DL (ref 6.4–8.2)
SODIUM SERPL-SCNC: 137 MMOL/L (ref 136–145)

## 2023-07-05 PROCEDURE — 96372 THER/PROPH/DIAG INJ SC/IM: CPT

## 2023-07-05 PROCEDURE — 80053 COMPREHEN METABOLIC PANEL: CPT

## 2023-07-05 PROCEDURE — 77063 BREAST TOMOSYNTHESIS BI: CPT

## 2023-07-05 PROCEDURE — 36415 COLL VENOUS BLD VENIPUNCTURE: CPT

## 2023-07-05 PROCEDURE — 6360000002 HC RX W HCPCS: Performed by: PEDIATRICS

## 2023-07-05 RX ORDER — ACETAMINOPHEN 325 MG/1
650 TABLET ORAL
Status: DISCONTINUED | OUTPATIENT
Start: 2023-07-05 | End: 2023-07-06 | Stop reason: HOSPADM

## 2023-07-05 RX ORDER — DIPHENHYDRAMINE HYDROCHLORIDE 50 MG/ML
50 INJECTION INTRAMUSCULAR; INTRAVENOUS
Status: DISCONTINUED | OUTPATIENT
Start: 2023-07-05 | End: 2023-07-06 | Stop reason: HOSPADM

## 2023-07-05 RX ORDER — EPINEPHRINE 1 MG/ML
0.3 INJECTION, SOLUTION, CONCENTRATE INTRAVENOUS PRN
Status: CANCELLED | OUTPATIENT
Start: 2023-08-02

## 2023-07-05 RX ORDER — DIPHENHYDRAMINE HYDROCHLORIDE 50 MG/ML
50 INJECTION INTRAMUSCULAR; INTRAVENOUS
Status: CANCELLED | OUTPATIENT
Start: 2023-08-02

## 2023-07-05 RX ORDER — SODIUM CHLORIDE 9 MG/ML
INJECTION, SOLUTION INTRAVENOUS CONTINUOUS
Status: CANCELLED | OUTPATIENT
Start: 2023-08-02

## 2023-07-05 RX ORDER — SODIUM CHLORIDE 9 MG/ML
INJECTION, SOLUTION INTRAVENOUS CONTINUOUS
Status: DISCONTINUED | OUTPATIENT
Start: 2023-07-05 | End: 2023-10-27 | Stop reason: HOSPADM

## 2023-07-05 RX ORDER — EPINEPHRINE 1 MG/ML
0.3 INJECTION, SOLUTION, CONCENTRATE INTRAVENOUS PRN
Status: DISCONTINUED | OUTPATIENT
Start: 2023-07-05 | End: 2023-10-27 | Stop reason: HOSPADM

## 2023-07-05 RX ORDER — ALBUTEROL SULFATE 90 UG/1
4 AEROSOL, METERED RESPIRATORY (INHALATION) PRN
Status: CANCELLED | OUTPATIENT
Start: 2023-08-02

## 2023-07-05 RX ORDER — ALBUTEROL SULFATE 90 UG/1
4 AEROSOL, METERED RESPIRATORY (INHALATION) PRN
Status: DISCONTINUED | OUTPATIENT
Start: 2023-07-05 | End: 2023-10-27 | Stop reason: HOSPADM

## 2023-07-05 RX ORDER — ONDANSETRON 2 MG/ML
8 INJECTION INTRAMUSCULAR; INTRAVENOUS
Status: DISCONTINUED | OUTPATIENT
Start: 2023-07-05 | End: 2023-07-06 | Stop reason: HOSPADM

## 2023-07-05 RX ORDER — ONDANSETRON 2 MG/ML
8 INJECTION INTRAMUSCULAR; INTRAVENOUS
Status: CANCELLED | OUTPATIENT
Start: 2023-08-02

## 2023-07-05 RX ORDER — ACETAMINOPHEN 325 MG/1
650 TABLET ORAL
Status: CANCELLED | OUTPATIENT
Start: 2023-08-02

## 2023-07-05 RX ADMIN — ROMOSOZUMAB-AQQG 105 MG: 105 INJECTION, SOLUTION SUBCUTANEOUS at 12:03

## 2023-07-05 ASSESSMENT — PAIN DESCRIPTION - DESCRIPTORS: DESCRIPTORS: ACHING

## 2023-07-05 ASSESSMENT — PAIN DESCRIPTION - ORIENTATION: ORIENTATION: RIGHT;LEFT

## 2023-07-05 ASSESSMENT — PAIN DESCRIPTION - LOCATION: LOCATION: HIP

## 2023-07-05 ASSESSMENT — PAIN SCALES - GENERAL: PAINLEVEL_OUTOF10: 3

## 2023-07-05 NOTE — PLAN OF CARE
Problem: Chronic Conditions and Co-morbidities  Goal: Patient's chronic conditions and co-morbidity symptoms are monitored and maintained or improved  Outcome: Completed  Flowsheets (Taken 7/5/2023 2253)  Care Plan - Patient's Chronic Conditions and Co-Morbidity Symptoms are Monitored and Maintained or Improved: Monitor and assess patient's chronic conditions and comorbid symptoms for stability, deterioration, or improvement     Problem: Pain  Goal: Verbalizes/displays adequate comfort level or baseline comfort level  Outcome: Completed The patient is a 9y Male complaining of toe pain.

## 2023-07-17 ENCOUNTER — TELEPHONE (OUTPATIENT)
Age: 58
End: 2023-07-17

## 2023-07-17 NOTE — TELEPHONE ENCOUNTER
7/17/2023  10:50 AM    Pt called and stated she has left multiple voice mails and have not received a called back. She would like to speak with [facility] regarding her test strips. She is on her last box of test strips. Pt can be reached at [phone].     Thanks, Haylee Shelby

## 2023-07-17 NOTE — TELEPHONE ENCOUNTER
Spoke with patient. She has an upcoming appt on 7/21/23. [facility] ([employer]) requested most recent OV notes. They were faxed on 06/29/23 with confirmation received. Patient said OK to wait until 7/21/23 to send her OV notes on that day. She said Irineo Amezcua from [employer].

## 2023-07-21 ENCOUNTER — OFFICE VISIT (OUTPATIENT)
Age: 58
End: 2023-07-21
Payer: MEDICARE

## 2023-07-21 VITALS
BODY MASS INDEX: 26.15 KG/M2 | HEART RATE: 75 BPM | SYSTOLIC BLOOD PRESSURE: 133 MMHG | HEIGHT: 63 IN | WEIGHT: 147.6 LBS | DIASTOLIC BLOOD PRESSURE: 66 MMHG

## 2023-07-21 DIAGNOSIS — I10 ESSENTIAL (PRIMARY) HYPERTENSION: ICD-10-CM

## 2023-07-21 DIAGNOSIS — E55.9 VITAMIN D DEFICIENCY, UNSPECIFIED: ICD-10-CM

## 2023-07-21 DIAGNOSIS — E10.42 TYPE 1 DIABETES MELLITUS WITH DIABETIC POLYNEUROPATHY (HCC): Primary | ICD-10-CM

## 2023-07-21 DIAGNOSIS — E03.9 ACQUIRED HYPOTHYROIDISM: ICD-10-CM

## 2023-07-21 LAB — HBA1C MFR BLD: 8.3 %

## 2023-07-21 PROCEDURE — G8419 CALC BMI OUT NRM PARAM NOF/U: HCPCS | Performed by: INTERNAL MEDICINE

## 2023-07-21 PROCEDURE — 2022F DILAT RTA XM EVC RTNOPTHY: CPT | Performed by: INTERNAL MEDICINE

## 2023-07-21 PROCEDURE — 3075F SYST BP GE 130 - 139MM HG: CPT | Performed by: INTERNAL MEDICINE

## 2023-07-21 PROCEDURE — 3046F HEMOGLOBIN A1C LEVEL >9.0%: CPT | Performed by: INTERNAL MEDICINE

## 2023-07-21 PROCEDURE — 99215 OFFICE O/P EST HI 40 MIN: CPT | Performed by: INTERNAL MEDICINE

## 2023-07-21 PROCEDURE — 3078F DIAST BP <80 MM HG: CPT | Performed by: INTERNAL MEDICINE

## 2023-07-21 PROCEDURE — 1036F TOBACCO NON-USER: CPT | Performed by: INTERNAL MEDICINE

## 2023-07-21 PROCEDURE — 3017F COLORECTAL CA SCREEN DOC REV: CPT | Performed by: INTERNAL MEDICINE

## 2023-07-21 PROCEDURE — G8427 DOCREV CUR MEDS BY ELIG CLIN: HCPCS | Performed by: INTERNAL MEDICINE

## 2023-07-21 PROCEDURE — 83036 HEMOGLOBIN GLYCOSYLATED A1C: CPT | Performed by: INTERNAL MEDICINE

## 2023-07-21 RX ORDER — ATOMOXETINE 10 MG/1
10 CAPSULE ORAL DAILY
Qty: 30 CAPSULE | Refills: 14 | COMMUNITY
Start: 2023-04-22 | End: 2024-06-25

## 2023-07-21 RX ORDER — INSULIN LISPRO 100 [IU]/ML
INJECTION, SOLUTION INTRAVENOUS; SUBCUTANEOUS
Qty: 30 ML | Refills: 3 | Status: SHIPPED | OUTPATIENT
Start: 2023-07-21

## 2023-07-21 RX ORDER — VITAMIN E 268 MG
400 CAPSULE ORAL DAILY
COMMUNITY

## 2023-07-21 RX ORDER — B-COMPLEX WITH VITAMIN C
1 TABLET ORAL 2 TIMES DAILY
COMMUNITY

## 2023-07-21 NOTE — PROGRESS NOTES
Chief Complaint   Patient presents with    Diabetes    Thyroid Problem     Pcp and pharmacy verified    Other     Vitamin D deficiency     History of Present Illness: Maureen Herzog is a 60 y.o. female here for follow up of Type 1 diabetes. \"I fell and hit my face and got a severe concussion. I went to the ED twice, they did CT and both times they said I was ok, I just had severe concussions. \"    Pt is taking Levemir 29 units in the AM and 4 units HS and \"Humalog 1-2 units with meal or if my BG are high\"  Pt notes she has been missing her HS dose of her Levemir \"I have been forgetting to take it a lot, they are going to test me for dementia because I have been forgetting things. I know when I have missed a dose because my BG the next day will be in the 200s. \"    She tests her BG 8 times per day. \"[facility] is messing up and I can not get my test strips and I never started the DexCom CGM. \"    \"If my blood sugar drops under 100 I feel bad. \"    Her A1C today was up to 8.3%. She has not been on any steroids since our last visit. \"I have been on a lot of Abx, because I have a lot of sinus infections. \"    Pt notes she still has the tendon tear in the left hip. Pt is waking around 7-8AM, he takes her Levemir 28 units and Humalog 1-2 units for her Pepsi \"if my sugar is over 100s I'll take 1 unit, if it is over 200 I'll take 2 units, otherwise I don't take any. She will have the pepsi ad 4 crackers. She will take her dog for a walk 2-3 times per day. \"My dog has an injury so we are not walking as far, we are walking a mile in the morning and a mile in the evening. \"  Around 1-2PM she will have a snack of vegetables or cheese. She had dinner around 5PM, last night she had green salad with croutons and crasins. She does not eat lunch. If she gets hungry, she will snack on cheese or mixed vegetables. Pt has hx of CVA x4, \"one of which caused a CNS leak\".    She reports she had a stress test and an ECHO in 2015 and

## 2023-08-03 ENCOUNTER — HOSPITAL ENCOUNTER (OUTPATIENT)
Facility: HOSPITAL | Age: 58
Setting detail: INFUSION SERIES
End: 2023-08-03
Payer: MEDICARE

## 2023-08-03 VITALS
BODY MASS INDEX: 26.15 KG/M2 | TEMPERATURE: 98.2 F | SYSTOLIC BLOOD PRESSURE: 149 MMHG | OXYGEN SATURATION: 97 % | HEART RATE: 73 BPM | HEIGHT: 63 IN | DIASTOLIC BLOOD PRESSURE: 70 MMHG | RESPIRATION RATE: 16 BRPM

## 2023-08-03 DIAGNOSIS — M80.80XD LOCALIZED OSTEOPOROSIS WITH CURRENT PATHOLOGICAL FRACTURE WITH ROUTINE HEALING: Primary | ICD-10-CM

## 2023-08-03 LAB
ALBUMIN SERPL-MCNC: 3.8 G/DL (ref 3.5–5)
ALBUMIN/GLOB SERPL: 1.2 (ref 1.1–2.2)
ALP SERPL-CCNC: 122 U/L (ref 45–117)
ALT SERPL-CCNC: 29 U/L (ref 12–78)
ANION GAP SERPL CALC-SCNC: 6 MMOL/L (ref 5–15)
AST SERPL-CCNC: 27 U/L (ref 15–37)
BILIRUB SERPL-MCNC: 0.7 MG/DL (ref 0.2–1)
BUN SERPL-MCNC: 9 MG/DL (ref 6–20)
BUN/CREAT SERPL: 13 (ref 12–20)
CALCIUM SERPL-MCNC: 9.7 MG/DL (ref 8.5–10.1)
CHLORIDE SERPL-SCNC: 96 MMOL/L (ref 97–108)
CO2 SERPL-SCNC: 32 MMOL/L (ref 21–32)
CREAT SERPL-MCNC: 0.7 MG/DL (ref 0.55–1.02)
GLOBULIN SER CALC-MCNC: 3.3 G/DL (ref 2–4)
GLUCOSE SERPL-MCNC: 309 MG/DL (ref 65–100)
POTASSIUM SERPL-SCNC: 4.6 MMOL/L (ref 3.5–5.1)
PROT SERPL-MCNC: 7.1 G/DL (ref 6.4–8.2)
SODIUM SERPL-SCNC: 134 MMOL/L (ref 136–145)

## 2023-08-03 PROCEDURE — 96372 THER/PROPH/DIAG INJ SC/IM: CPT

## 2023-08-03 PROCEDURE — 80053 COMPREHEN METABOLIC PANEL: CPT

## 2023-08-03 PROCEDURE — 36415 COLL VENOUS BLD VENIPUNCTURE: CPT

## 2023-08-03 PROCEDURE — 6360000002 HC RX W HCPCS: Performed by: PEDIATRICS

## 2023-08-03 RX ORDER — ONDANSETRON 2 MG/ML
8 INJECTION INTRAMUSCULAR; INTRAVENOUS
Status: CANCELLED | OUTPATIENT
Start: 2023-08-31

## 2023-08-03 RX ORDER — ALBUTEROL SULFATE 90 UG/1
4 AEROSOL, METERED RESPIRATORY (INHALATION) PRN
Status: CANCELLED | OUTPATIENT
Start: 2023-08-31

## 2023-08-03 RX ORDER — SODIUM CHLORIDE 9 MG/ML
INJECTION, SOLUTION INTRAVENOUS CONTINUOUS
Status: CANCELLED | OUTPATIENT
Start: 2023-08-31

## 2023-08-03 RX ORDER — SODIUM CHLORIDE 9 MG/ML
INJECTION, SOLUTION INTRAVENOUS CONTINUOUS
Status: DISCONTINUED | OUTPATIENT
Start: 2023-08-03 | End: 2023-10-27 | Stop reason: HOSPADM

## 2023-08-03 RX ORDER — EPINEPHRINE 1 MG/ML
0.3 INJECTION, SOLUTION, CONCENTRATE INTRAVENOUS PRN
Status: CANCELLED | OUTPATIENT
Start: 2023-08-31

## 2023-08-03 RX ORDER — DIPHENHYDRAMINE HYDROCHLORIDE 50 MG/ML
50 INJECTION INTRAMUSCULAR; INTRAVENOUS
Status: DISCONTINUED | OUTPATIENT
Start: 2023-08-03 | End: 2023-08-04 | Stop reason: HOSPADM

## 2023-08-03 RX ORDER — ALBUTEROL SULFATE 90 UG/1
4 AEROSOL, METERED RESPIRATORY (INHALATION) PRN
Status: DISCONTINUED | OUTPATIENT
Start: 2023-08-03 | End: 2023-10-27 | Stop reason: HOSPADM

## 2023-08-03 RX ORDER — ONDANSETRON 2 MG/ML
8 INJECTION INTRAMUSCULAR; INTRAVENOUS
Status: DISCONTINUED | OUTPATIENT
Start: 2023-08-03 | End: 2023-08-04 | Stop reason: HOSPADM

## 2023-08-03 RX ORDER — ACETAMINOPHEN 325 MG/1
650 TABLET ORAL
Status: CANCELLED | OUTPATIENT
Start: 2023-08-31

## 2023-08-03 RX ORDER — DIPHENHYDRAMINE HYDROCHLORIDE 50 MG/ML
50 INJECTION INTRAMUSCULAR; INTRAVENOUS
Status: CANCELLED | OUTPATIENT
Start: 2023-08-31

## 2023-08-03 RX ORDER — EPINEPHRINE 1 MG/ML
0.3 INJECTION, SOLUTION, CONCENTRATE INTRAVENOUS PRN
Status: DISCONTINUED | OUTPATIENT
Start: 2023-08-03 | End: 2023-10-27 | Stop reason: HOSPADM

## 2023-08-03 RX ORDER — ACETAMINOPHEN 325 MG/1
650 TABLET ORAL
Status: DISCONTINUED | OUTPATIENT
Start: 2023-08-03 | End: 2023-08-04 | Stop reason: HOSPADM

## 2023-08-03 RX ADMIN — ROMOSOZUMAB-AQQG 105 MG: 105 INJECTION, SOLUTION SUBCUTANEOUS at 11:02

## 2023-08-03 ASSESSMENT — PAIN DESCRIPTION - DESCRIPTORS: DESCRIPTORS: ACHING

## 2023-08-03 ASSESSMENT — PAIN SCALES - GENERAL: PAINLEVEL_OUTOF10: 3

## 2023-08-03 ASSESSMENT — PAIN DESCRIPTION - ORIENTATION: ORIENTATION: RIGHT;LEFT

## 2023-08-03 ASSESSMENT — PAIN DESCRIPTION - LOCATION: LOCATION: HIP

## 2023-08-03 NOTE — DISCHARGE INSTRUCTIONS
for instructions if you miss a dose, or if you miss an appointment for your romosozumab injection. You should receive your missed injection as soon as possible. What happens if I overdose? Since the romosozumab prefilled syringe contains a specific amount of the medicine, you are not likely to receive an overdose. What should I avoid while using romosozumab? Follow your doctor's instructions about any restrictions on food, beverages, or activity. What are the possible side effects of romosozumab? Get emergency medical help if you have signs of an allergic reaction (hives, difficult breathing, swelling in your face or throat) or a severe skin reaction (fever, sore throat, burning eyes, skin pain, red or purple skin rash with blistering and peeling). Seek emergency medical help if you have symptoms of a heart attack or stroke: chest pain or pressure, shortness of breath, feeling light-headed, sudden numbness or weakness, problems with vision or speech, or loss of balance. Call your doctor at once if you have:  new or unusual pain in your thigh, hip, or groin;  jaw pain or numbness;  red or swollen gums, loose teeth, infected gums; or  low calcium level --muscle spasms or contractions, numbness or tingly feeling (around your mouth, or in your fingers and toes). Common side effects may include:  headache; or  joint pain. This is not a complete list of side effects and others may occur. Call your doctor for medical advice about side effects. You may report side effects to FDA at 6-109-FDA-6324. What other drugs will affect romosozumab? Other drugs may affect romosozumab, including prescription and over-the-counter medicines, vitamins, and herbal products. Tell your doctor about all your current medicines and any medicine you start or stop using. Where can I get more information? Your doctor or pharmacist can provide more information about romosozumab.   Remember, keep this and all other medicines out of the reach of children, never share your medicines with others, and use this medication only for the indication prescribed. Every effort has been made to ensure that the information provided by [address] [place] [address] is accurate, up-to-date, and complete, but no guarantee is made to that effect. Drug information contained herein may be time sensitive. [facility] information has been compiled for use by healthcare practitioners and consumers in the [facility] and therefore [facility] does not warrant that uses outside of the [facility] are appropriate, unless specifically indicated otherwise. [facility]'s drug information does not endorse drugs, diagnose patients or recommend therapy. [facility] drug information is an informational resource designed to assist licensed healthcare practitioners in caring for their patients and/or to serve consumers viewing this service as a supplement to, and not a substitute for, the expertise, skill, knowledge and judgment of healthcare practitioners. The absence of a warning for a given drug or drug combination in no way should be construed to indicate that the drug or drug combination is safe, effective or appropriate for any given patient. [facility] does not assume any responsibility for any aspect of healthcare administered with the aid of information [facility] provides. The information contained herein is not intended to cover all possible uses, directions, precautions, warnings, drug interactions, allergic reactions, or adverse effects. If you have questions about the drugs you are taking, check with your doctor, nurse or pharmacist.  Copyright 7778-5594 [address]: 2.01. Revision date: 12/27/2019. Care instructions adapted under license by [facility] ([facility]). If you have questions about a medical condition or this instruction, always ask your healthcare professional. [address] any warranty or liability for your use of this information.

## 2023-08-17 ENCOUNTER — HOSPITAL ENCOUNTER (OUTPATIENT)
Facility: HOSPITAL | Age: 58
Discharge: HOME | End: 2023-08-17
Payer: MEDICARE

## 2023-08-17 DIAGNOSIS — R10.9 STOMACH ACHE: ICD-10-CM

## 2023-08-17 PROCEDURE — 74018 RADEX ABDOMEN 1 VIEW: CPT

## 2023-08-17 PROCEDURE — 74019 RADEX ABDOMEN 2 VIEWS: CPT

## 2023-08-24 ENCOUNTER — TELEPHONE (OUTPATIENT)
Age: 58
End: 2023-08-24

## 2023-08-24 NOTE — TELEPHONE ENCOUNTER
Spoke with patient. She states that [facility] gave her only 200 strips and billed Medicare for 200, which caused [facility] to only pay for  strips. Was told to refax [facility] the information stating patient checks blood sugar up to 8 times per day. Refaxed last office note to [facility] to 2 separate fax numbers. Received confirmations for both. Patient has been notified.

## 2023-08-24 NOTE — TELEPHONE ENCOUNTER
Spoke with Germaine at [facility]. She will email [facility] to let them know that the explanation on the prescription explains why patient is testing 8 times per day. RAQUEL: Spoke with patient. She states that her blood sugars went from 180-200 at bedtime. She increased her Levemir to 30 units each AM. She states that she uses Humalog 1 unit if BG is above 150 or 2 units if BG is above 200. She said that she has gained 8 lbs in 2 weeks. Unsure why. She was out in her yard recently and her blood sugar went to 34. She corrected with peanut butter with sugar. BG then went to 174. She states that she is having trouble with her pen. \"It won't go back to zero after administering insulin\". [facility], as may be a defective pen. Patient expressed understanding. Advised will mail the instructions to treat hypoglycemic events with the 15 carb/15 min rule. Patient agreed.

## 2023-08-24 NOTE — TELEPHONE ENCOUNTER
8/24/2023  10:16 AM      [facility] in [address] left a voice mail at 9:08 am stating they would like a call back concerning patients test strips.     [id number]#[phone]    Thanks,  Irineo Zazueta

## 2023-08-31 ENCOUNTER — HOSPITAL ENCOUNTER (EMERGENCY)
Facility: HOSPITAL | Age: 58
Discharge: HOME | End: 2023-08-31
Attending: EMERGENCY MEDICINE
Payer: MEDICARE

## 2023-08-31 ENCOUNTER — HOSPITAL ENCOUNTER (OUTPATIENT)
Facility: HOSPITAL | Age: 58
Setting detail: INFUSION SERIES
End: 2023-08-31
Payer: MEDICARE

## 2023-08-31 VITALS
DIASTOLIC BLOOD PRESSURE: 70 MMHG | HEIGHT: 63 IN | OXYGEN SATURATION: 95 % | BODY MASS INDEX: 25.39 KG/M2 | TEMPERATURE: 98.1 F | RESPIRATION RATE: 16 BRPM | SYSTOLIC BLOOD PRESSURE: 151 MMHG | HEART RATE: 70 BPM | WEIGHT: 143.3 LBS

## 2023-08-31 VITALS
HEART RATE: 78 BPM | TEMPERATURE: 97.6 F | DIASTOLIC BLOOD PRESSURE: 60 MMHG | OXYGEN SATURATION: 99 % | RESPIRATION RATE: 16 BRPM | SYSTOLIC BLOOD PRESSURE: 141 MMHG | BODY MASS INDEX: 25.69 KG/M2 | WEIGHT: 145 LBS | HEIGHT: 63 IN

## 2023-08-31 DIAGNOSIS — M80.80XD LOCALIZED OSTEOPOROSIS WITH CURRENT PATHOLOGICAL FRACTURE WITH ROUTINE HEALING: Primary | ICD-10-CM

## 2023-08-31 DIAGNOSIS — E87.1 HYPONATREMIA: Primary | ICD-10-CM

## 2023-08-31 LAB
ALBUMIN SERPL-MCNC: 3.8 G/DL (ref 3.5–5)
ALBUMIN SERPL-MCNC: 3.9 G/DL (ref 3.5–5)
ALBUMIN/GLOB SERPL: 1.2 (ref 1.1–2.2)
ALBUMIN/GLOB SERPL: 1.3 (ref 1.1–2.2)
ALP SERPL-CCNC: 124 U/L (ref 45–117)
ALP SERPL-CCNC: 128 U/L (ref 45–117)
ALT SERPL-CCNC: 27 U/L (ref 12–78)
ALT SERPL-CCNC: 28 U/L (ref 12–78)
ANION GAP SERPL CALC-SCNC: 10 MMOL/L (ref 5–15)
ANION GAP SERPL CALC-SCNC: 8 MMOL/L (ref 5–15)
AST SERPL-CCNC: 29 U/L (ref 15–37)
AST SERPL-CCNC: 30 U/L (ref 15–37)
BASOPHILS # BLD: 0 K/UL (ref 0–0.1)
BASOPHILS NFR BLD: 1 % (ref 0–1)
BILIRUB SERPL-MCNC: 0.7 MG/DL (ref 0.2–1)
BILIRUB SERPL-MCNC: 0.8 MG/DL (ref 0.2–1)
BUN SERPL-MCNC: 4 MG/DL (ref 6–20)
BUN SERPL-MCNC: 6 MG/DL (ref 6–20)
BUN/CREAT SERPL: 7 (ref 12–20)
BUN/CREAT SERPL: 9 (ref 12–20)
CALCIUM SERPL-MCNC: 9.4 MG/DL (ref 8.5–10.1)
CALCIUM SERPL-MCNC: 9.5 MG/DL (ref 8.5–10.1)
CHLORIDE SERPL-SCNC: 90 MMOL/L (ref 97–108)
CHLORIDE SERPL-SCNC: 92 MMOL/L (ref 97–108)
CO2 SERPL-SCNC: 27 MMOL/L (ref 21–32)
CO2 SERPL-SCNC: 28 MMOL/L (ref 21–32)
CREAT SERPL-MCNC: 0.56 MG/DL (ref 0.55–1.02)
CREAT SERPL-MCNC: 0.67 MG/DL (ref 0.55–1.02)
DIFFERENTIAL METHOD BLD: ABNORMAL
EOSINOPHIL # BLD: 0 K/UL (ref 0–0.4)
EOSINOPHIL NFR BLD: 0 % (ref 0–7)
ERYTHROCYTE [DISTWIDTH] IN BLOOD BY AUTOMATED COUNT: 11.6 % (ref 11.5–14.5)
GLOBULIN SER CALC-MCNC: 3 G/DL (ref 2–4)
GLOBULIN SER CALC-MCNC: 3.1 G/DL (ref 2–4)
GLUCOSE SERPL-MCNC: 196 MG/DL (ref 65–100)
GLUCOSE SERPL-MCNC: 88 MG/DL (ref 65–100)
HCT VFR BLD AUTO: 36.5 % (ref 35–47)
HGB BLD-MCNC: 12.8 G/DL (ref 11.5–16)
IMM GRANULOCYTES # BLD AUTO: 0 K/UL (ref 0–0.04)
IMM GRANULOCYTES NFR BLD AUTO: 0 % (ref 0–0.5)
LYMPHOCYTES # BLD: 1.8 K/UL (ref 0.8–3.5)
LYMPHOCYTES NFR BLD: 34 % (ref 12–49)
MAGNESIUM SERPL-MCNC: 1.5 MG/DL (ref 1.6–2.4)
MCH RBC QN AUTO: 30.9 PG (ref 26–34)
MCHC RBC AUTO-ENTMCNC: 35.1 G/DL (ref 30–36.5)
MCV RBC AUTO: 88.2 FL (ref 80–99)
MONOCYTES # BLD: 0.6 K/UL (ref 0–1)
MONOCYTES NFR BLD: 11 % (ref 5–13)
NEUTS SEG # BLD: 2.8 K/UL (ref 1.8–8)
NEUTS SEG NFR BLD: 54 % (ref 32–75)
NRBC # BLD: 0 K/UL (ref 0–0.01)
NRBC BLD-RTO: 0 PER 100 WBC
PLATELET # BLD AUTO: 196 K/UL (ref 150–400)
PMV BLD AUTO: 8.5 FL (ref 8.9–12.9)
POTASSIUM SERPL-SCNC: 4 MMOL/L (ref 3.5–5.1)
POTASSIUM SERPL-SCNC: 4.3 MMOL/L (ref 3.5–5.1)
PROT SERPL-MCNC: 6.9 G/DL (ref 6.4–8.2)
PROT SERPL-MCNC: 6.9 G/DL (ref 6.4–8.2)
RBC # BLD AUTO: 4.14 M/UL (ref 3.8–5.2)
SODIUM SERPL-SCNC: 127 MMOL/L (ref 136–145)
SODIUM SERPL-SCNC: 128 MMOL/L (ref 136–145)
WBC # BLD AUTO: 5.2 K/UL (ref 3.6–11)

## 2023-08-31 PROCEDURE — 83735 ASSAY OF MAGNESIUM: CPT

## 2023-08-31 PROCEDURE — 85025 COMPLETE CBC W/AUTO DIFF WBC: CPT

## 2023-08-31 PROCEDURE — 80053 COMPREHEN METABOLIC PANEL: CPT

## 2023-08-31 PROCEDURE — 36415 COLL VENOUS BLD VENIPUNCTURE: CPT

## 2023-08-31 PROCEDURE — 2580000003 HC RX 258: Performed by: EMERGENCY MEDICINE

## 2023-08-31 PROCEDURE — 96372 THER/PROPH/DIAG INJ SC/IM: CPT

## 2023-08-31 PROCEDURE — 6360000002 HC RX W HCPCS: Performed by: PEDIATRICS

## 2023-08-31 RX ORDER — DIPHENHYDRAMINE HYDROCHLORIDE 50 MG/ML
50 INJECTION INTRAMUSCULAR; INTRAVENOUS
Status: CANCELLED | OUTPATIENT
Start: 2023-09-28

## 2023-08-31 RX ORDER — EPINEPHRINE 1 MG/ML
0.3 INJECTION, SOLUTION, CONCENTRATE INTRAVENOUS PRN
Status: CANCELLED | OUTPATIENT
Start: 2023-09-28

## 2023-08-31 RX ORDER — ACETAMINOPHEN 325 MG/1
650 TABLET ORAL
Status: CANCELLED | OUTPATIENT
Start: 2023-09-28

## 2023-08-31 RX ORDER — ALBUTEROL SULFATE 90 UG/1
4 AEROSOL, METERED RESPIRATORY (INHALATION) PRN
Status: CANCELLED | OUTPATIENT
Start: 2023-09-28

## 2023-08-31 RX ORDER — SODIUM CHLORIDE 9 MG/ML
INJECTION, SOLUTION INTRAVENOUS CONTINUOUS
Status: CANCELLED | OUTPATIENT
Start: 2023-09-28

## 2023-08-31 RX ORDER — 0.9 % SODIUM CHLORIDE 0.9 %
1000 INTRAVENOUS SOLUTION INTRAVENOUS ONCE
Status: COMPLETED | OUTPATIENT
Start: 2023-08-31 | End: 2023-08-31

## 2023-08-31 RX ORDER — ONDANSETRON 2 MG/ML
8 INJECTION INTRAMUSCULAR; INTRAVENOUS
Status: CANCELLED | OUTPATIENT
Start: 2023-09-28

## 2023-08-31 RX ADMIN — ROMOSOZUMAB-AQQG 105 MG: 105 INJECTION, SOLUTION SUBCUTANEOUS at 11:42

## 2023-08-31 RX ADMIN — SODIUM CHLORIDE 1000 ML: 9 INJECTION, SOLUTION INTRAVENOUS at 18:17

## 2023-08-31 RX ADMIN — ROMOSOZUMAB-AQQG 105 MG: 105 INJECTION, SOLUTION SUBCUTANEOUS at 11:41

## 2023-08-31 ASSESSMENT — PAIN DESCRIPTION - ORIENTATION: ORIENTATION: RIGHT;LEFT

## 2023-08-31 ASSESSMENT — PAIN DESCRIPTION - DESCRIPTORS: DESCRIPTORS: ACHING

## 2023-08-31 ASSESSMENT — LIFESTYLE VARIABLES
HOW MANY STANDARD DRINKS CONTAINING ALCOHOL DO YOU HAVE ON A TYPICAL DAY: PATIENT DOES NOT DRINK
HOW OFTEN DO YOU HAVE A DRINK CONTAINING ALCOHOL: NEVER

## 2023-08-31 ASSESSMENT — PAIN DESCRIPTION - LOCATION: LOCATION: HIP

## 2023-08-31 ASSESSMENT — PAIN SCALES - GENERAL: PAINLEVEL_OUTOF10: 4

## 2023-08-31 ASSESSMENT — PAIN - FUNCTIONAL ASSESSMENT: PAIN_FUNCTIONAL_ASSESSMENT: NONE - DENIES PAIN

## 2023-08-31 NOTE — ED PROVIDER NOTES
recommendation to follow-up with PCP for repeat sodium testing when she make some changes in her water intake. FINAL IMPRESSION     1. Hyponatremia          DISPOSITION/PLAN   DISPOSITION Decision To Discharge 08/31/2023 07:22:33 PM      Discharge Note: The patient is stable for discharge home. The signs, symptoms, diagnosis, and discharge instructions have been discussed, understanding conveyed, and agreed upon. The patient is to follow up as recommended or return to ER should their symptoms worsen. PATIENT REFERRED TO:  DO Kentrell Wallace [facility]  [phone]      As needed         DISCHARGE MEDICATIONS:     Medication List        ASK your doctor about these medications      ascorbic acid 1000 MG tablet  Commonly known as: VITAMIN C     aspirin 81 MG chewable tablet     atomoxetine 10 MG capsule  Commonly known as: STRATTERA     calcium carbonate 1500 (600 Ca) MG Tabs tablet     Calcium Carbonate-Vitamin D 600-5 MG-MCG Tabs     Cholecalciferol 50 MCG (2000 UT) Tabs     ciclopirox 0.77 % cream  Commonly known as: LOPROX     denosumab 60 MG/ML Sosy SC injection  Commonly known as: PROLIA     ferrous sulfate 325 (65 Fe) MG tablet  Commonly known as: IRON 325     gabapentin 100 MG capsule  Commonly known as: NEURONTIN     insulin detemir 100 UNIT/ML injection pen  Commonly known as: LEVEMIR  29 units in AM and 4 units at PM     insulin lispro (1 Unit Dial) 100 UNIT/ML Sopn  Commonly known as: HUMALOG/ADMELOG  Max daily dose 20 units     levothyroxine 75 MCG tablet  Commonly known as: SYNTHROID     lidocaine 5 %  Commonly known as: LIDODERM     losartan 50 MG tablet  Commonly known as: COZAAR     MAGNESIUM PO     metoclopramide 5 MG tablet  Commonly known as: REGLAN     mupirocin 2 % ointment  Commonly known as: BACTROBAN     OneTouch Ultra strip  Generic drug: blood glucose test strips  Check blood sugar 8 times per day due to frequent hypoglycemic events.  Dx: U31.16

## 2023-08-31 NOTE — ED NOTES
Pt has requested a meal/sandwich, not crackers at this time. ED Provider aware, RN Supervisor to attempt to locate food for pt.      Jaimie Delgado RN  80/00/44 [id number]

## 2023-09-01 NOTE — ED NOTES
I have reviewed discharge instructions with the patient. The patient verbalized understanding. Discharge medications discussed with patient. No questions at this time. Ambulated without difficulty.       Kaz Sims RN  33/34/15 2007

## 2023-09-05 ENCOUNTER — OFFICE VISIT (OUTPATIENT)
Age: 58
End: 2023-09-05
Payer: MEDICARE

## 2023-09-05 VITALS
DIASTOLIC BLOOD PRESSURE: 68 MMHG | HEIGHT: 63 IN | WEIGHT: 147 LBS | SYSTOLIC BLOOD PRESSURE: 122 MMHG | OXYGEN SATURATION: 97 % | RESPIRATION RATE: 18 BRPM | BODY MASS INDEX: 26.05 KG/M2 | HEART RATE: 78 BPM | TEMPERATURE: 98.1 F

## 2023-09-05 DIAGNOSIS — E87.1 HYPONATREMIA: Primary | ICD-10-CM

## 2023-09-05 DIAGNOSIS — E03.9 HYPOTHYROIDISM, UNSPECIFIED TYPE: ICD-10-CM

## 2023-09-05 PROCEDURE — 99204 OFFICE O/P NEW MOD 45 MIN: CPT | Performed by: NURSE PRACTITIONER

## 2023-09-05 PROCEDURE — G8419 CALC BMI OUT NRM PARAM NOF/U: HCPCS | Performed by: NURSE PRACTITIONER

## 2023-09-05 PROCEDURE — 36415 COLL VENOUS BLD VENIPUNCTURE: CPT | Performed by: NURSE PRACTITIONER

## 2023-09-05 PROCEDURE — G8427 DOCREV CUR MEDS BY ELIG CLIN: HCPCS | Performed by: NURSE PRACTITIONER

## 2023-09-05 PROCEDURE — 3017F COLORECTAL CA SCREEN DOC REV: CPT | Performed by: NURSE PRACTITIONER

## 2023-09-05 PROCEDURE — 1036F TOBACCO NON-USER: CPT | Performed by: NURSE PRACTITIONER

## 2023-09-05 RX ORDER — LANOLIN ALCOHOL/MO/W.PET/CERES
1 CREAM (GRAM) TOPICAL 2 TIMES DAILY
COMMUNITY

## 2023-09-05 SDOH — ECONOMIC STABILITY: INCOME INSECURITY: HOW HARD IS IT FOR YOU TO PAY FOR THE VERY BASICS LIKE FOOD, HOUSING, MEDICAL CARE, AND HEATING?: SOMEWHAT HARD

## 2023-09-05 SDOH — ECONOMIC STABILITY: FOOD INSECURITY: WITHIN THE PAST 12 MONTHS, YOU WORRIED THAT YOUR FOOD WOULD RUN OUT BEFORE YOU GOT MONEY TO BUY MORE.: SOMETIMES TRUE

## 2023-09-05 SDOH — ECONOMIC STABILITY: HOUSING INSECURITY
IN THE LAST 12 MONTHS, WAS THERE A TIME WHEN YOU DID NOT HAVE A STEADY PLACE TO SLEEP OR SLEPT IN A SHELTER (INCLUDING NOW)?: NO

## 2023-09-05 SDOH — ECONOMIC STABILITY: FOOD INSECURITY: WITHIN THE PAST 12 MONTHS, THE FOOD YOU BOUGHT JUST DIDN'T LAST AND YOU DIDN'T HAVE MONEY TO GET MORE.: SOMETIMES TRUE

## 2023-09-05 ASSESSMENT — PATIENT HEALTH QUESTIONNAIRE - PHQ9
SUM OF ALL RESPONSES TO PHQ QUESTIONS 1-9: 0
2. FEELING DOWN, DEPRESSED OR HOPELESS: 0
SUM OF ALL RESPONSES TO PHQ QUESTIONS 1-9: 0
1. LITTLE INTEREST OR PLEASURE IN DOING THINGS: 0
SUM OF ALL RESPONSES TO PHQ9 QUESTIONS 1 & 2: 0

## 2023-09-05 ASSESSMENT — ENCOUNTER SYMPTOMS
COLOR CHANGE: 0
CHEST TIGHTNESS: 0
ABDOMINAL DISTENTION: 0
EYE DISCHARGE: 0

## 2023-09-05 NOTE — PATIENT INSTRUCTIONS
HOUSING RESOURCES     Homeless Crisis Line  Central Access Point (CAP Line): Intake system for families and individuals who are currently experiencing homelessness or who are at risk of becoming homeless. Can provide list of shelters and bed availability  Phone Number: ([phone]) Contact this number first.  It is a centralized point of entry for services. Hours of Operation:  Monday - Friday 9am - 5pm; Saturday & Sunday 10pm - 2pm    Homeless Shelters: [address]: Women and Children  Located at [facility], [address] / Phone: [phone]: Men   Located at [address] [facility], [facility] [address] / Phone: ([phone] for Men  Located at [address] [facility], [address] / Phone: [phone]  [address] [facility] for Women  [address] [facility], [facility] [address] / Phone: [phone]    Homeless Shelters: [facility]: Individuals or families. Priority is given to [facility]. Located at [address] [place]: [phone]    Homeless Shelters: [facility]: Individuals or Families. MUST be resident of [facility] at [address] [address] / Phone: [phone] for Men  Located at [address] [facility] / Phone: [phone]  [facility] [place] for Women and [address] [facility] at [facility] Aaron Sharma Principal [facility] / Phone [phone]    Homeless Shelters: Caryle Colonel and [address] [facility] [address] for the Homeless: Families and Individuals  Located [address], [address] [facility] [address] / Phone: [phone]  [address]: Individuals and families  Located at [address], [address] / Phone: [phone]    Homeless Shelters: [address]: Women and children.  Has limited family rooms available  [address] / Phone: [phone]

## 2023-09-05 NOTE — PROGRESS NOTES
Fabrizio Lott is a 60 y.o. female who presents today with c/o   Chief Complaint   Patient presents with    New Patient     Wants sodium level checked and thyroid level checked       Assessment/ Plan:       ASSESSMENT AND PLAN    Diagnoses:  1. Hyponatremia  Check Na level today   Limit water intake   Continue the liquid IV    2. Hypothyroidism  Check TSH today  Continue levothyroxine    Follow up with Dr. Billy Bianchi and Dr. [facility] as scheduled. If needed she is welcome to return to our office for labs. Orders Placed This Encounter   Procedures    Comprehensive Metabolic Panel     Standing Status:   Future     Number of Occurrences:   1     Standing Expiration Date:   9/5/2024    TSH     Standing Status:   Future     Number of Occurrences:   1     Standing Expiration Date:   9/5/2024    ND COLLECTION VENOUS BLOOD VENIPUNCTURE     Return in about 1 month (around 10/5/2023). Subjective:  Fabrizio Lott is a 60 y.o. female here today to have her sodium level rechecked and her thyroid level checked again. She is a patient of Dr. Yadira Castellanos, but she is concerned that her sodium has not been monitored correctly so she came her to get her sodium checked in our office. Hyponatremia: She has had low sodium for months. States she has been feeling dizzy and fatigued. She was evaluated in the ER on 8/31 and they advised her that she could be drinking too much water and this could be causing her sodium to be low. She was also advised to start drinking liquid IV and she has tried this once, but didn't really like the taste of the liquid IV. Hypothyroidism: Taking levothyroxine. Farhad well.    Lab Results   Component Value Date    TSH 0.816 10/26/2022         Patient Active Problem List   Diagnosis    Heel callus    Left facial numbness    Acute pain of left shoulder    Splinter of left foot    Diabetic ulcer of left heel associated with type 1 diabetes mellitus, with fat layer exposed (HCC)    Trochanteric bursitis of

## 2023-09-06 LAB
ALBUMIN SERPL-MCNC: 4.1 G/DL (ref 3.5–5)
ALBUMIN/GLOB SERPL: 1.5 (ref 1.1–2.2)
ALP SERPL-CCNC: 131 U/L (ref 45–117)
ALT SERPL-CCNC: 32 U/L (ref 12–78)
ANION GAP SERPL CALC-SCNC: 5 MMOL/L (ref 5–15)
AST SERPL-CCNC: 24 U/L (ref 15–37)
BILIRUB SERPL-MCNC: 0.7 MG/DL (ref 0.2–1)
BUN SERPL-MCNC: 11 MG/DL (ref 6–20)
BUN/CREAT SERPL: 15 (ref 12–20)
CALCIUM SERPL-MCNC: 9.6 MG/DL (ref 8.5–10.1)
CHLORIDE SERPL-SCNC: 98 MMOL/L (ref 97–108)
CO2 SERPL-SCNC: 29 MMOL/L (ref 21–32)
CREAT SERPL-MCNC: 0.72 MG/DL (ref 0.55–1.02)
GLOBULIN SER CALC-MCNC: 2.7 G/DL (ref 2–4)
GLUCOSE SERPL-MCNC: 288 MG/DL (ref 65–100)
POTASSIUM SERPL-SCNC: 4 MMOL/L (ref 3.5–5.1)
PROT SERPL-MCNC: 6.8 G/DL (ref 6.4–8.2)
SODIUM SERPL-SCNC: 132 MMOL/L (ref 136–145)
TSH SERPL DL<=0.05 MIU/L-ACNC: 0.34 UIU/ML (ref 0.36–3.74)

## 2023-09-27 ENCOUNTER — HOSPITAL ENCOUNTER (OUTPATIENT)
Facility: HOSPITAL | Age: 58
Setting detail: INFUSION SERIES
End: 2023-09-27
Payer: MEDICARE

## 2023-09-27 ENCOUNTER — HOSPITAL ENCOUNTER (EMERGENCY)
Facility: HOSPITAL | Age: 58
Discharge: HOME | End: 2023-09-27
Attending: EMERGENCY MEDICINE
Payer: MEDICARE

## 2023-09-27 VITALS
TEMPERATURE: 97.5 F | RESPIRATION RATE: 16 BRPM | OXYGEN SATURATION: 97 % | BODY MASS INDEX: 26.82 KG/M2 | HEIGHT: 63 IN | WEIGHT: 151.4 LBS | SYSTOLIC BLOOD PRESSURE: 131 MMHG | DIASTOLIC BLOOD PRESSURE: 61 MMHG | HEART RATE: 75 BPM

## 2023-09-27 DIAGNOSIS — E87.1 CHRONIC HYPONATREMIA: Primary | ICD-10-CM

## 2023-09-27 LAB
ALBUMIN SERPL-MCNC: 3.8 G/DL (ref 3.5–5)
ALBUMIN/GLOB SERPL: 1.2 (ref 1.1–2.2)
ALP SERPL-CCNC: 139 U/L (ref 45–117)
ALT SERPL-CCNC: 25 U/L (ref 12–78)
ANION GAP SERPL CALC-SCNC: 5 MMOL/L (ref 5–15)
ANION GAP SERPL CALC-SCNC: 6 MMOL/L (ref 5–15)
AST SERPL-CCNC: 23 U/L (ref 15–37)
BASOPHILS # BLD: 0 K/UL (ref 0–0.1)
BASOPHILS NFR BLD: 0 % (ref 0–1)
BILIRUB SERPL-MCNC: 0.8 MG/DL (ref 0.2–1)
BUN SERPL-MCNC: 8 MG/DL (ref 6–20)
BUN SERPL-MCNC: 9 MG/DL (ref 6–20)
BUN/CREAT SERPL: 11 (ref 12–20)
BUN/CREAT SERPL: 13 (ref 12–20)
BUN/CREAT SERPL: 14 (ref 12–20)
BUN/CREAT SERPL: 14 (ref 12–20)
CALCIUM SERPL-MCNC: 8.6 MG/DL (ref 8.5–10.1)
CALCIUM SERPL-MCNC: 8.8 MG/DL (ref 8.5–10.1)
CALCIUM SERPL-MCNC: 9.3 MG/DL (ref 8.5–10.1)
CALCIUM SERPL-MCNC: 9.4 MG/DL (ref 8.5–10.1)
CHLORIDE SERPL-SCNC: 83 MMOL/L (ref 97–108)
CHLORIDE SERPL-SCNC: 84 MMOL/L (ref 97–108)
CHLORIDE SERPL-SCNC: 90 MMOL/L (ref 97–108)
CHLORIDE SERPL-SCNC: 95 MMOL/L (ref 97–108)
CO2 SERPL-SCNC: 29 MMOL/L (ref 21–32)
CO2 SERPL-SCNC: 30 MMOL/L (ref 21–32)
CREAT SERPL-MCNC: 0.58 MG/DL (ref 0.55–1.02)
CREAT SERPL-MCNC: 0.65 MG/DL (ref 0.55–1.02)
CREAT SERPL-MCNC: 0.71 MG/DL (ref 0.55–1.02)
CREAT SERPL-MCNC: 0.82 MG/DL (ref 0.55–1.02)
DIFFERENTIAL METHOD BLD: ABNORMAL
EOSINOPHIL # BLD: 0 K/UL (ref 0–0.4)
EOSINOPHIL NFR BLD: 0 % (ref 0–7)
ERYTHROCYTE [DISTWIDTH] IN BLOOD BY AUTOMATED COUNT: 11.4 % (ref 11.5–14.5)
GLOBULIN SER CALC-MCNC: 3.2 G/DL (ref 2–4)
GLUCOSE SERPL-MCNC: 120 MG/DL (ref 65–100)
GLUCOSE SERPL-MCNC: 216 MG/DL (ref 65–100)
GLUCOSE SERPL-MCNC: 309 MG/DL (ref 65–100)
GLUCOSE SERPL-MCNC: 390 MG/DL (ref 65–100)
HCT VFR BLD AUTO: 36.8 % (ref 35–47)
HGB BLD-MCNC: 13.2 G/DL (ref 11.5–16)
IMM GRANULOCYTES # BLD AUTO: 0 K/UL (ref 0–0.04)
IMM GRANULOCYTES NFR BLD AUTO: 0 % (ref 0–0.5)
LYMPHOCYTES # BLD: 1.7 K/UL (ref 0.8–3.5)
LYMPHOCYTES NFR BLD: 22 % (ref 12–49)
MCH RBC QN AUTO: 31.2 PG (ref 26–34)
MCHC RBC AUTO-ENTMCNC: 35.9 G/DL (ref 30–36.5)
MCV RBC AUTO: 87 FL (ref 80–99)
MONOCYTES # BLD: 0.6 K/UL (ref 0–1)
MONOCYTES NFR BLD: 8 % (ref 5–13)
NEUTS SEG # BLD: 5.3 K/UL (ref 1.8–8)
NEUTS SEG NFR BLD: 70 % (ref 32–75)
NRBC # BLD: 0 K/UL (ref 0–0.01)
NRBC BLD-RTO: 0 PER 100 WBC
PLATELET # BLD AUTO: 189 K/UL (ref 150–400)
PMV BLD AUTO: 8.1 FL (ref 8.9–12.9)
POTASSIUM SERPL-SCNC: 3.5 MMOL/L (ref 3.5–5.1)
POTASSIUM SERPL-SCNC: 3.7 MMOL/L (ref 3.5–5.1)
POTASSIUM SERPL-SCNC: 3.9 MMOL/L (ref 3.5–5.1)
POTASSIUM SERPL-SCNC: 4.5 MMOL/L (ref 3.5–5.1)
PROT SERPL-MCNC: 7 G/DL (ref 6.4–8.2)
RBC # BLD AUTO: 4.23 M/UL (ref 3.8–5.2)
SODIUM SERPL-SCNC: 117 MMOL/L (ref 136–145)
SODIUM SERPL-SCNC: 120 MMOL/L (ref 136–145)
SODIUM SERPL-SCNC: 125 MMOL/L (ref 136–145)
SODIUM SERPL-SCNC: 130 MMOL/L (ref 136–145)
WBC # BLD AUTO: 7.7 K/UL (ref 3.6–11)

## 2023-09-27 PROCEDURE — 80048 BASIC METABOLIC PNL TOTAL CA: CPT

## 2023-09-27 PROCEDURE — 80053 COMPREHEN METABOLIC PANEL: CPT

## 2023-09-27 PROCEDURE — 36415 COLL VENOUS BLD VENIPUNCTURE: CPT

## 2023-09-27 PROCEDURE — 2580000003 HC RX 258: Performed by: EMERGENCY MEDICINE

## 2023-09-27 PROCEDURE — 99284 EMERGENCY DEPT VISIT MOD MDM: CPT

## 2023-09-27 PROCEDURE — 85025 COMPLETE CBC W/AUTO DIFF WBC: CPT

## 2023-09-27 PROCEDURE — 96360 HYDRATION IV INFUSION INIT: CPT

## 2023-09-27 PROCEDURE — 96361 HYDRATE IV INFUSION ADD-ON: CPT

## 2023-09-27 RX ORDER — 0.9 % SODIUM CHLORIDE 0.9 %
1000 INTRAVENOUS SOLUTION INTRAVENOUS ONCE
Status: COMPLETED | OUTPATIENT
Start: 2023-09-27 | End: 2023-09-27

## 2023-09-27 RX ADMIN — SODIUM CHLORIDE 1000 ML: 9 INJECTION, SOLUTION INTRAVENOUS at 16:38

## 2023-09-27 RX ADMIN — SODIUM CHLORIDE 1000 ML: 9 INJECTION, SOLUTION INTRAVENOUS at 19:08

## 2023-09-27 ASSESSMENT — ENCOUNTER SYMPTOMS
SHORTNESS OF BREATH: 0
VOMITING: 0
ABDOMINAL PAIN: 0
NAUSEA: 0
SORE THROAT: 0
COUGH: 0
DIARRHEA: 0
EYE REDNESS: 0

## 2023-09-27 ASSESSMENT — PATIENT HEALTH QUESTIONNAIRE - PHQ9
SUM OF ALL RESPONSES TO PHQ9 QUESTIONS 1 & 2: 0
1. LITTLE INTEREST OR PLEASURE IN DOING THINGS: 0
SUM OF ALL RESPONSES TO PHQ QUESTIONS 1-9: 0
2. FEELING DOWN, DEPRESSED OR HOPELESS: 0
SUM OF ALL RESPONSES TO PHQ QUESTIONS 1-9: 0

## 2023-09-27 ASSESSMENT — LIFESTYLE VARIABLES
HOW OFTEN DO YOU HAVE A DRINK CONTAINING ALCOHOL: NEVER
HOW MANY STANDARD DRINKS CONTAINING ALCOHOL DO YOU HAVE ON A TYPICAL DAY: PATIENT DOES NOT DRINK

## 2023-09-27 ASSESSMENT — PAIN - FUNCTIONAL ASSESSMENT: PAIN_FUNCTIONAL_ASSESSMENT: NONE - DENIES PAIN

## 2023-09-27 NOTE — ED TRIAGE NOTES
Pt had labs drawn today prior to oncology tx and lab results revealed low sodium. Pt was sent by oncology for further assessment. Pt reports no pain.

## 2023-09-27 NOTE — ED NOTES
Pt has been provided with crackers, peanut butter and demond crackers as requested and additional warm blankets. Pt repeat sodium to be drawn after completion of second bag of IV fluids.      Artemio Quiñones, RN  88/61/14 4061

## 2023-09-27 NOTE — ED PROVIDER NOTES
EMERGENCY DEPARTMENT HISTORY AND PHYSICAL EXAM      Date: 9/27/2023  Patient Name: Ceasar Gunn    History of Presenting Illness     Chief Complaint   Patient presents with    Abnormal Lab       History Provided By: Patient    HPI: Ceasar Gunn, 60 y.o. female with past medical history listed below, presents via private vehicle to the ED with cc of low sodium. Patient reports she was sent here by her PCPs office for low sodium of 117 on lab work today. Patient has a history of chronic hyponatremia. She reports for the past 2 weeks she has been following her doctor's instructions and increasing her salt intake in her food and decreasing her free water intake. She had routine labs today and her sodium was 117. She denies any chest pain or shortness of breath. No nausea vomiting or diarrhea. There are no other complaints, changes, or physical findings at this time.     PCP: Alina Kohli, DO    Current Facility-Administered Medications on File Prior to Encounter   Medication Dose Route Frequency Provider Last Rate Last Admin    0.9 % sodium chloride infusion   IntraVENous Continuous Kong Zarco MD        EPINEPHrine PF 1 MG/ML injection (Anaphylaxis) 0.3 mg  0.3 mg IntraMUSCular PRN Kong Zarco MD        albuterol sulfate HFA (PROVENTIL;VENTOLIN;PROAIR) 108 (90 Base) MCG/ACT inhaler 4 puff  4 puff Inhalation PRN Kong Zarco MD        0.9 % sodium chloride infusion   IntraVENous Continuous Kong Zarco MD        EPINEPHrine PF 1 MG/ML injection (Anaphylaxis) 0.3 mg  0.3 mg IntraMUSCular PRN Kong Zarco MD        albuterol sulfate HFA (PROVENTIL;VENTOLIN;PROAIR) 108 (90 Base) MCG/ACT inhaler 4 puff  4 puff Inhalation PRN Kong Zarco MD        0.9 % sodium chloride infusion   IntraVENous Continuous Kong Zarco MD        EPINEPHrine PF 1 MG/ML injection (Anaphylaxis) 0.3 mg  0.3 mg IntraMUSCular PRN Kong Zarco MD        albuterol sulfate HFA (PROVENTIL;VENTOLIN;PROAIR) 108 (90

## 2023-09-27 NOTE — PROGRESS NOTES
1600; received call from Andreina Mckeon in lab of critical sodium of 117 at 1504. Contacted Dr. Nadeem Medina office and left message regarding result and request to call regarding patient at [phone]. After no response from Dr. Burt Ko, called Mrs. Nabeel Hunter at [phone] and informed her of the sodium level and advised her to go to emergency room for evaluation. Also advised her that her glucose was 390- she states she had taken insulin just before lab work was drawn. She agreed to go to emergency room and requested that I let Dr. Oralia Deal know as well. Called Dr. Christina Ortega office and she was not in the office today, left message with her nurse regarding patient's lab work and my instructions for her to go to emergency room. Nursing supervisor notified as well at [phone].

## 2023-09-27 NOTE — PROGRESS NOTES
Mrs. Ysabel Schulerk in for lab work. CMP drawn left antecubital without difficulty and sent for processing. Discharged in stable condition at 1430. Returns September 28 for Evenity.

## 2023-09-27 NOTE — ED NOTES
Bedside Care handoff given to [facility] in [employer].      Andriy Bowen RN  09/27/23 9701

## 2023-09-28 ENCOUNTER — HOSPITAL ENCOUNTER (OUTPATIENT)
Facility: HOSPITAL | Age: 58
Setting detail: INFUSION SERIES
End: 2023-09-28
Payer: MEDICARE

## 2023-09-28 ENCOUNTER — TELEPHONE (OUTPATIENT)
Age: 58
End: 2023-09-28

## 2023-09-28 VITALS
SYSTOLIC BLOOD PRESSURE: 163 MMHG | HEART RATE: 74 BPM | RESPIRATION RATE: 16 BRPM | BODY MASS INDEX: 26.36 KG/M2 | TEMPERATURE: 98.3 F | DIASTOLIC BLOOD PRESSURE: 76 MMHG | OXYGEN SATURATION: 98 % | WEIGHT: 148.8 LBS

## 2023-09-28 DIAGNOSIS — M80.80XD LOCALIZED OSTEOPOROSIS WITH CURRENT PATHOLOGICAL FRACTURE WITH ROUTINE HEALING: Primary | ICD-10-CM

## 2023-09-28 LAB
ALBUMIN SERPL-MCNC: 3.8 G/DL (ref 3.5–5)
ALBUMIN/GLOB SERPL: 1.2 (ref 1.1–2.2)
ALP SERPL-CCNC: 128 U/L (ref 45–117)
ALT SERPL-CCNC: 28 U/L (ref 12–78)
ANION GAP SERPL CALC-SCNC: 5 MMOL/L (ref 5–15)
AST SERPL-CCNC: 25 U/L (ref 15–37)
BILIRUB SERPL-MCNC: 0.6 MG/DL (ref 0.2–1)
BUN SERPL-MCNC: 10 MG/DL (ref 6–20)
BUN/CREAT SERPL: 17 (ref 12–20)
CALCIUM SERPL-MCNC: 9.6 MG/DL (ref 8.5–10.1)
CHLORIDE SERPL-SCNC: 99 MMOL/L (ref 97–108)
CO2 SERPL-SCNC: 30 MMOL/L (ref 21–32)
CREAT SERPL-MCNC: 0.58 MG/DL (ref 0.55–1.02)
GLOBULIN SER CALC-MCNC: 3.1 G/DL (ref 2–4)
GLUCOSE SERPL-MCNC: 96 MG/DL (ref 65–100)
POTASSIUM SERPL-SCNC: 4.3 MMOL/L (ref 3.5–5.1)
PROT SERPL-MCNC: 6.9 G/DL (ref 6.4–8.2)
SODIUM SERPL-SCNC: 134 MMOL/L (ref 136–145)

## 2023-09-28 PROCEDURE — 6360000002 HC RX W HCPCS: Performed by: PEDIATRICS

## 2023-09-28 PROCEDURE — 80053 COMPREHEN METABOLIC PANEL: CPT

## 2023-09-28 PROCEDURE — 96372 THER/PROPH/DIAG INJ SC/IM: CPT

## 2023-09-28 PROCEDURE — 36415 COLL VENOUS BLD VENIPUNCTURE: CPT

## 2023-09-28 RX ORDER — DIPHENHYDRAMINE HYDROCHLORIDE 50 MG/ML
50 INJECTION INTRAMUSCULAR; INTRAVENOUS
Status: CANCELLED | OUTPATIENT
Start: 2023-10-26

## 2023-09-28 RX ORDER — ACETAMINOPHEN 325 MG/1
650 TABLET ORAL
Status: CANCELLED | OUTPATIENT
Start: 2023-10-26

## 2023-09-28 RX ORDER — EPINEPHRINE 1 MG/ML
0.3 INJECTION, SOLUTION, CONCENTRATE INTRAVENOUS PRN
Status: CANCELLED | OUTPATIENT
Start: 2023-10-26

## 2023-09-28 RX ORDER — SODIUM CHLORIDE 9 MG/ML
INJECTION, SOLUTION INTRAVENOUS CONTINUOUS
Status: CANCELLED | OUTPATIENT
Start: 2023-10-26

## 2023-09-28 RX ORDER — ALBUTEROL SULFATE 90 UG/1
4 AEROSOL, METERED RESPIRATORY (INHALATION) PRN
Status: CANCELLED | OUTPATIENT
Start: 2023-10-26

## 2023-09-28 RX ORDER — ONDANSETRON 2 MG/ML
8 INJECTION INTRAMUSCULAR; INTRAVENOUS
Status: CANCELLED | OUTPATIENT
Start: 2023-10-26

## 2023-09-28 RX ADMIN — ROMOSOZUMAB-AQQG 105 MG: 105 INJECTION, SOLUTION SUBCUTANEOUS at 15:42

## 2023-09-28 RX ADMIN — ROMOSOZUMAB-AQQG 105 MG: 105 INJECTION, SOLUTION SUBCUTANEOUS at 15:41

## 2023-09-28 NOTE — TELEPHONE ENCOUNTER
[facility] requested last office visit notes. The prescription that was requested said 200 strips/25 days. Medicare needs documentation that patient checks her BG more than 3 times per day. Faxed to [facility]([employer]).

## 2023-09-28 NOTE — PLAN OF CARE
Problem: Safety - Adult  Goal: Free from fall injury  Outcome: [address] Resolved Met

## 2023-10-03 ENCOUNTER — HOSPITAL ENCOUNTER (OUTPATIENT)
Facility: HOSPITAL | Age: 58
Discharge: HOME | End: 2023-10-06

## 2023-10-04 ENCOUNTER — TELEPHONE (OUTPATIENT)
Age: 58
End: 2023-10-04

## 2023-10-04 LAB
25(OH)D3+25(OH)D2 SERPL-MCNC: 88.3 NG/ML (ref 30–100)
ALBUMIN SERPL-MCNC: 4.7 G/DL (ref 3.8–4.9)
ALBUMIN/CREAT UR: <6 MG/G CREAT (ref 0–29)
ALBUMIN/GLOB SERPL: 2.4 {RATIO} (ref 1.2–2.2)
ALP SERPL-CCNC: 140 IU/L (ref 44–121)
ALT SERPL-CCNC: 22 IU/L (ref 0–32)
AST SERPL-CCNC: 24 IU/L (ref 0–40)
BILIRUB SERPL-MCNC: 0.7 MG/DL (ref 0–1.2)
BUN SERPL-MCNC: 9 MG/DL (ref 6–24)
BUN/CREAT SERPL: 13 (ref 9–23)
CALCIUM SERPL-MCNC: 9.9 MG/DL (ref 8.7–10.2)
CHLORIDE SERPL-SCNC: 93 MMOL/L (ref 96–106)
CHOLEST SERPL-MCNC: 145 MG/DL (ref 100–199)
CO2 SERPL-SCNC: 25 MMOL/L (ref 20–29)
COMMENT:: NORMAL
CREAT SERPL-MCNC: 0.69 MG/DL (ref 0.57–1)
CREAT UR-MCNC: 49.6 MG/DL
EGFRCR SERPLBLD CKD-EPI 2021: 100 ML/MIN/1.73
GLOBULIN SER CALC-MCNC: 2 G/DL (ref 1.5–4.5)
GLUCOSE SERPL-MCNC: 80 MG/DL (ref 70–99)
HBA1C MFR BLD: 8.6 % (ref 4.8–5.6)
HDLC SERPL-MCNC: 74 MG/DL
IMP & REVIEW OF LAB RESULTS: NORMAL
LDLC SERPL CALC-MCNC: 54 MG/DL (ref 0–99)
MICROALBUMIN UR-MCNC: <3 UG/ML
POTASSIUM SERPL-SCNC: 4 MMOL/L (ref 3.5–5.2)
SODIUM SERPL-SCNC: 135 MMOL/L (ref 134–144)
SPECIMEN HOLD: NORMAL
T4 FREE SERPL-MCNC: 1.62 NG/DL (ref 0.82–1.77)
TRIGL SERPL-MCNC: 89 MG/DL (ref 0–149)
TSH SERPL DL<=0.005 MIU/L-ACNC: 0.75 UIU/ML (ref 0.45–4.5)
VLDLC SERPL CALC-MCNC: 17 MG/DL (ref 5–40)

## 2023-10-04 NOTE — TELEPHONE ENCOUNTER
Spoke with patient. She states that last week she was told to go to ER after having labs drawn at the cancer center due to sodium level being 117. She was given 2 bags of sodium at the ER. Was told that she could not have any more at that time. Was told to contact endocrinologist.  She states that her blood sugars have been as low as 35 (ate a mouthful of sugar and drank a [place]). Her BG has been as high as 400 (she took 2 units of Humalog)  She states that her blood sugar did not go above 150 yesterday. This am at bedtime (at 1 am) her BG was 230. She took 4 units of Levemir. She has been having diarrhea for 2 months. Has been having 6-8 diarrheal stools daily. She is concerned about her blood sugars and her chronic low sodium levels. Patient can be reached at [phone].

## 2023-10-04 NOTE — TELEPHONE ENCOUNTER
I spoke with pt regarding her low Na and the diarrhea. I suspect some of the low Na is due to the high blood sugars, but since she has been having diarrhea I recommended she speak with her PCP about a GI referral.   Pt notes she saw her PCP on Monday and she reji blood and got stool labs. Her Na on 10/3/23 was 135 in the face of a BG 98. For her BG she notes she is waking up with high BG in the 180-200s, so I instructed her to increase her HS Levemir from 4 units to 5 units. Pt to decrease her AM Levemir from 28 units down to 27 units, if she has anymore low BG during the day. Pt voices understanding and agreement with the plan.

## 2023-10-09 LAB
ALBUMIN SERPL ELPH-MCNC: 3.9 G/DL (ref 2.9–4.4)
ALBUMIN/GLOB SERPL: 1.4 {RATIO} (ref 0.7–1.7)
ALPHA1 GLOB SERPL ELPH-MCNC: 0.3 G/DL (ref 0–0.4)
ALPHA2 GLOB SERPL ELPH-MCNC: 0.8 G/DL (ref 0.4–1)
B-GLOBULIN SERPL ELPH-MCNC: 0.9 G/DL (ref 0.7–1.3)
GAMMA GLOB SERPL ELPH-MCNC: 0.8 G/DL (ref 0.4–1.8)
GLOBULIN SER-MCNC: 2.8 G/DL (ref 2.2–3.9)
IGA SERPL-MCNC: 168 MG/DL (ref 87–352)
IGG SERPL-MCNC: 704 MG/DL (ref 586–1602)
IGM SERPL-MCNC: 106 MG/DL (ref 26–217)
INTERPRETATION SERPL IEP-IMP: NORMAL
KAPPA LC FREE SER-MCNC: 14.7 MG/L (ref 3.3–19.4)
KAPPA LC FREE/LAMBDA FREE SER: 1.15 {RATIO} (ref 0.26–1.65)
LABORATORY COMMENT REPORT: NORMAL
LAMBDA LC FREE SERPL-MCNC: 12.8 MG/L (ref 5.7–26.3)
M PROTEIN SERPL ELPH-MCNC: NORMAL G/DL
PROT SERPL-MCNC: 6.7 G/DL (ref 6–8.5)

## 2023-10-10 ENCOUNTER — TELEPHONE (OUTPATIENT)
Age: 58
End: 2023-10-10

## 2023-10-10 NOTE — TELEPHONE ENCOUNTER
Patient requested a call from [address]. She states that she has not received her lab results from 10/3/23. Patient can be reached at [phone].

## 2023-10-23 ENCOUNTER — HOSPITAL ENCOUNTER (OUTPATIENT)
Facility: HOSPITAL | Age: 58
Setting detail: INFUSION SERIES
Discharge: HOME | End: 2023-10-23
Payer: MEDICARE

## 2023-10-23 LAB
ALBUMIN SERPL-MCNC: 3.7 G/DL (ref 3.5–5)
ALBUMIN/GLOB SERPL: 1.1 (ref 1.1–2.2)
ALP SERPL-CCNC: 145 U/L (ref 45–117)
ALT SERPL-CCNC: 30 U/L (ref 12–78)
ANION GAP SERPL CALC-SCNC: 7 MMOL/L (ref 5–15)
AST SERPL-CCNC: 24 U/L (ref 15–37)
BILIRUB SERPL-MCNC: 0.8 MG/DL (ref 0.2–1)
BUN SERPL-MCNC: 11 MG/DL (ref 6–20)
BUN/CREAT SERPL: 15 (ref 12–20)
CALCIUM SERPL-MCNC: 9.5 MG/DL (ref 8.5–10.1)
CHLORIDE SERPL-SCNC: 94 MMOL/L (ref 97–108)
CO2 SERPL-SCNC: 28 MMOL/L (ref 21–32)
CREAT SERPL-MCNC: 0.73 MG/DL (ref 0.55–1.02)
GLOBULIN SER CALC-MCNC: 3.4 G/DL (ref 2–4)
GLUCOSE SERPL-MCNC: 341 MG/DL (ref 65–100)
POTASSIUM SERPL-SCNC: 4.8 MMOL/L (ref 3.5–5.1)
PROT SERPL-MCNC: 7.1 G/DL (ref 6.4–8.2)
SODIUM SERPL-SCNC: 129 MMOL/L (ref 136–145)

## 2023-10-23 PROCEDURE — 80053 COMPREHEN METABOLIC PANEL: CPT

## 2023-10-23 PROCEDURE — 36415 COLL VENOUS BLD VENIPUNCTURE: CPT

## 2023-10-23 NOTE — PROGRESS NOTES
Mrs. Rosales Quirk in for lab work prior to [facility] on 10/26/23. Labs drawn without difficulty left lateral antecubital. Site intact. Discharged in stable condition at 1140. She returns October 26 at 1100.

## 2023-10-24 ENCOUNTER — HOSPITAL ENCOUNTER (EMERGENCY)
Facility: HOSPITAL | Age: 58
Discharge: HOME | End: 2023-10-24
Attending: EMERGENCY MEDICINE
Payer: MEDICARE

## 2023-10-24 VITALS
RESPIRATION RATE: 15 BRPM | BODY MASS INDEX: 25.69 KG/M2 | SYSTOLIC BLOOD PRESSURE: 143 MMHG | HEIGHT: 63 IN | TEMPERATURE: 98.1 F | HEART RATE: 72 BPM | OXYGEN SATURATION: 98 % | DIASTOLIC BLOOD PRESSURE: 67 MMHG | WEIGHT: 145 LBS

## 2023-10-24 DIAGNOSIS — E87.1 CHRONIC HYPONATREMIA: ICD-10-CM

## 2023-10-24 DIAGNOSIS — J32.9 CHRONIC SINUSITIS, UNSPECIFIED LOCATION: Primary | ICD-10-CM

## 2023-10-24 LAB
ANION GAP SERPL CALC-SCNC: 4 MMOL/L (ref 5–15)
BUN SERPL-MCNC: 11 MG/DL (ref 6–20)
BUN/CREAT SERPL: 15 (ref 12–20)
CALCIUM SERPL-MCNC: 9.7 MG/DL (ref 8.5–10.1)
CHLORIDE SERPL-SCNC: 93 MMOL/L (ref 97–108)
CO2 SERPL-SCNC: 32 MMOL/L (ref 21–32)
CREAT SERPL-MCNC: 0.74 MG/DL (ref 0.55–1.02)
GLUCOSE BLD STRIP.AUTO-MCNC: 204 MG/DL (ref 65–117)
GLUCOSE SERPL-MCNC: 370 MG/DL (ref 65–100)
OSMOLALITY SERPL: 285 MOSM/KG H2O
POTASSIUM SERPL-SCNC: 5 MMOL/L (ref 3.5–5.1)
SERVICE CMNT-IMP: ABNORMAL
SODIUM SERPL-SCNC: 129 MMOL/L (ref 136–145)
SODIUM UR-SCNC: 45 MMOL/L

## 2023-10-24 PROCEDURE — 2580000003 HC RX 258: Performed by: EMERGENCY MEDICINE

## 2023-10-24 PROCEDURE — 99284 EMERGENCY DEPT VISIT MOD MDM: CPT

## 2023-10-24 PROCEDURE — 93005 ELECTROCARDIOGRAM TRACING: CPT | Performed by: EMERGENCY MEDICINE

## 2023-10-24 PROCEDURE — 83935 ASSAY OF URINE OSMOLALITY: CPT

## 2023-10-24 PROCEDURE — 83930 ASSAY OF BLOOD OSMOLALITY: CPT

## 2023-10-24 PROCEDURE — 96360 HYDRATION IV INFUSION INIT: CPT

## 2023-10-24 PROCEDURE — 82962 GLUCOSE BLOOD TEST: CPT

## 2023-10-24 PROCEDURE — 96361 HYDRATE IV INFUSION ADD-ON: CPT

## 2023-10-24 PROCEDURE — 36415 COLL VENOUS BLD VENIPUNCTURE: CPT

## 2023-10-24 PROCEDURE — 84300 ASSAY OF URINE SODIUM: CPT

## 2023-10-24 PROCEDURE — 80048 BASIC METABOLIC PNL TOTAL CA: CPT

## 2023-10-24 RX ORDER — AMOXICILLIN AND CLAVULANATE POTASSIUM 875; 125 MG/1; MG/1
1 TABLET, FILM COATED ORAL 2 TIMES DAILY
Qty: 14 TABLET | Refills: 0 | Status: SHIPPED | OUTPATIENT
Start: 2023-10-24 | End: 2023-10-31

## 2023-10-24 RX ORDER — 0.9 % SODIUM CHLORIDE 0.9 %
2000 INTRAVENOUS SOLUTION INTRAVENOUS ONCE
Status: COMPLETED | OUTPATIENT
Start: 2023-10-24 | End: 2023-10-24

## 2023-10-24 RX ADMIN — SODIUM CHLORIDE 2000 ML: 9 INJECTION, SOLUTION INTRAVENOUS at 11:18

## 2023-10-24 ASSESSMENT — PAIN - FUNCTIONAL ASSESSMENT: PAIN_FUNCTIONAL_ASSESSMENT: 0-10

## 2023-10-24 ASSESSMENT — PAIN DESCRIPTION - LOCATION: LOCATION: HEAD

## 2023-10-24 ASSESSMENT — PAIN SCALES - GENERAL
PAINLEVEL_OUTOF10: 5
PAINLEVEL_OUTOF10: 0

## 2023-10-24 ASSESSMENT — PAIN DESCRIPTION - DESCRIPTORS: DESCRIPTORS: PRESSURE

## 2023-10-24 NOTE — ED TRIAGE NOTES
Pt arrived by POV for low sodium. Pt reports she had lab work a few days ago and was told her NA was 129. Pt reports she has had low sodium in the past and she is feeling light headed, confused, head pressure and her eyes are heavy. Pt also reports her PMD told her she had a lot of fluid in her ears.   Pt is awake alert and oriented X 4, pt educated on ER flow

## 2023-10-24 NOTE — ED NOTES
D/C instructions provided and reviewed with patient. Opportunity provided for discussion. All questions answered nothing further at this time.        Vilma Crowder RN  10/24/23 6619

## 2023-10-24 NOTE — ED PROVIDER NOTES
and discharge instructions have been discussed, understanding conveyed, and agreed upon. The patient is to follow up as recommended or return to ER should their symptoms worsen. PATIENT REFERRED TO:  Blanche Young DO  [facility]  [phone]    Schedule an appointment as soon as possible for a visit            DISCHARGE MEDICATIONS:     Medication List        START taking these medications      amoxicillin-clavulanate 875-125 MG per tablet  Commonly known as: AUGMENTIN  Take 1 tablet by mouth 2 times daily for 7 days            ASK your doctor about these medications      ascorbic acid 1000 MG tablet  Commonly known as: VITAMIN C     aspirin 81 MG chewable tablet     atomoxetine 10 MG capsule  Commonly known as: STRATTERA     calcium carbonate 1500 (600 Ca) MG Tabs tablet     Calcium Carbonate-Vitamin D 600-5 MG-MCG Tabs     Cholecalciferol 50 MCG (2000 UT) Tabs     ciclopirox 0.77 % cream  Commonly known as: LOPROX     ferrous sulfate 325 (65 Fe) MG tablet  Commonly known as: IRON 325     gabapentin 100 MG capsule  Commonly known as: NEURONTIN     glucosamine-chondroitin 500-400 MG tablet     insulin detemir 100 UNIT/ML injection pen  Commonly known as: LEVEMIR  29 units in AM and 4 units at PM     insulin lispro (1 Unit Dial) 100 UNIT/ML Sopn  Commonly known as: HUMALOG/ADMELOG  Max daily dose 20 units     levothyroxine 75 MCG tablet  Commonly known as: SYNTHROID     lidocaine 5 %  Commonly known as: LIDODERM     losartan 50 MG tablet  Commonly known as: COZAAR     MAGNESIUM PO     metoclopramide 5 MG tablet  Commonly known as: REGLAN     mupirocin 2 % ointment  Commonly known as: BACTROBAN     OneTouch Ultra strip  Generic drug: blood glucose test strips  Check blood sugar 8 times per day due to frequent hypoglycemic events.  Dx: E10.42     Potassium Citrate(Elemental K) 99 MG Caps     potassium gluconate 550 mg tablet     simvastatin 40 MG tablet  Commonly known as: ZOCOR

## 2023-10-26 ENCOUNTER — HOSPITAL ENCOUNTER (OUTPATIENT)
Facility: HOSPITAL | Age: 58
Setting detail: INFUSION SERIES
Discharge: HOME | End: 2023-10-26
Payer: MEDICARE

## 2023-10-26 VITALS
SYSTOLIC BLOOD PRESSURE: 140 MMHG | TEMPERATURE: 98.1 F | DIASTOLIC BLOOD PRESSURE: 65 MMHG | OXYGEN SATURATION: 95 % | RESPIRATION RATE: 18 BRPM | BODY MASS INDEX: 26.39 KG/M2 | HEART RATE: 75 BPM | WEIGHT: 149 LBS

## 2023-10-26 DIAGNOSIS — M80.80XD LOCALIZED OSTEOPOROSIS WITH CURRENT PATHOLOGICAL FRACTURE WITH ROUTINE HEALING: Primary | ICD-10-CM

## 2023-10-26 LAB
EKG ATRIAL RATE: 71 BPM
EKG DIAGNOSIS: NORMAL
EKG P AXIS: 63 DEGREES
EKG P-R INTERVAL: 146 MS
EKG Q-T INTERVAL: 384 MS
EKG QRS DURATION: 82 MS
EKG QTC CALCULATION (BAZETT): 417 MS
EKG R AXIS: -33 DEGREES
EKG T AXIS: 20 DEGREES
EKG VENTRICULAR RATE: 71 BPM
OSMOLALITY UR: 366 MOSM/KG H2O

## 2023-10-26 PROCEDURE — 6360000002 HC RX W HCPCS: Performed by: PEDIATRICS

## 2023-10-26 PROCEDURE — 96372 THER/PROPH/DIAG INJ SC/IM: CPT

## 2023-10-26 RX ORDER — ONDANSETRON 2 MG/ML
8 INJECTION INTRAMUSCULAR; INTRAVENOUS
Status: DISCONTINUED | OUTPATIENT
Start: 2023-10-26 | End: 2023-10-27 | Stop reason: HOSPADM

## 2023-10-26 RX ORDER — DIPHENHYDRAMINE HYDROCHLORIDE 50 MG/ML
50 INJECTION INTRAMUSCULAR; INTRAVENOUS
OUTPATIENT
Start: 2023-11-23

## 2023-10-26 RX ORDER — DIPHENHYDRAMINE HYDROCHLORIDE 50 MG/ML
50 INJECTION INTRAMUSCULAR; INTRAVENOUS
Status: DISCONTINUED | OUTPATIENT
Start: 2023-10-26 | End: 2023-10-27 | Stop reason: HOSPADM

## 2023-10-26 RX ORDER — ALBUTEROL SULFATE 90 UG/1
4 AEROSOL, METERED RESPIRATORY (INHALATION) PRN
Status: DISCONTINUED | OUTPATIENT
Start: 2023-10-26 | End: 2023-10-27 | Stop reason: HOSPADM

## 2023-10-26 RX ORDER — ACETAMINOPHEN 325 MG/1
650 TABLET ORAL
OUTPATIENT
Start: 2023-11-23

## 2023-10-26 RX ORDER — SODIUM CHLORIDE 9 MG/ML
INJECTION, SOLUTION INTRAVENOUS CONTINUOUS
OUTPATIENT
Start: 2023-11-23

## 2023-10-26 RX ORDER — EPINEPHRINE 1 MG/ML
0.3 INJECTION, SOLUTION, CONCENTRATE INTRAVENOUS PRN
Status: DISCONTINUED | OUTPATIENT
Start: 2023-10-26 | End: 2023-10-27 | Stop reason: HOSPADM

## 2023-10-26 RX ORDER — ONDANSETRON 2 MG/ML
8 INJECTION INTRAMUSCULAR; INTRAVENOUS
OUTPATIENT
Start: 2023-11-23

## 2023-10-26 RX ORDER — ACETAMINOPHEN 325 MG/1
650 TABLET ORAL
Status: DISCONTINUED | OUTPATIENT
Start: 2023-10-26 | End: 2023-10-27 | Stop reason: HOSPADM

## 2023-10-26 RX ORDER — ALBUTEROL SULFATE 90 UG/1
4 AEROSOL, METERED RESPIRATORY (INHALATION) PRN
OUTPATIENT
Start: 2023-11-23

## 2023-10-26 RX ORDER — EPINEPHRINE 1 MG/ML
0.3 INJECTION, SOLUTION, CONCENTRATE INTRAVENOUS PRN
OUTPATIENT
Start: 2023-11-23

## 2023-10-26 RX ORDER — SODIUM CHLORIDE 9 MG/ML
INJECTION, SOLUTION INTRAVENOUS CONTINUOUS
Status: DISCONTINUED | OUTPATIENT
Start: 2023-10-26 | End: 2023-10-27 | Stop reason: HOSPADM

## 2023-10-26 RX ADMIN — ROMOSOZUMAB-AQQG 105 MG: 105 INJECTION, SOLUTION SUBCUTANEOUS at 11:52

## 2023-10-26 RX ADMIN — ROMOSOZUMAB-AQQG 105 MG: 105 INJECTION, SOLUTION SUBCUTANEOUS at 11:53

## 2023-10-26 ASSESSMENT — PAIN SCALES - GENERAL: PAINLEVEL_OUTOF10: 0

## 2023-11-01 DIAGNOSIS — E10.42 TYPE 1 DIABETES MELLITUS WITH DIABETIC POLYNEUROPATHY (HCC): ICD-10-CM

## 2023-11-01 DIAGNOSIS — I10 ESSENTIAL (PRIMARY) HYPERTENSION: ICD-10-CM

## 2023-11-01 DIAGNOSIS — E55.9 VITAMIN D DEFICIENCY, UNSPECIFIED: ICD-10-CM

## 2023-11-01 DIAGNOSIS — E03.9 ACQUIRED HYPOTHYROIDISM: ICD-10-CM

## 2023-11-13 ENCOUNTER — TRANSCRIBE ORDERS (OUTPATIENT)
Facility: HOSPITAL | Age: 58
End: 2023-11-13

## 2023-11-13 DIAGNOSIS — H90.3 SENSORY HEARING LOSS, BILATERAL: Primary | ICD-10-CM

## 2023-11-21 ENCOUNTER — HOSPITAL ENCOUNTER (OUTPATIENT)
Age: 58
Discharge: HOME | End: 2023-11-24
Payer: MEDICARE

## 2023-11-21 ENCOUNTER — OFFICE VISIT (OUTPATIENT)
Age: 58
End: 2023-11-21
Payer: MEDICARE

## 2023-11-21 VITALS
HEIGHT: 63 IN | BODY MASS INDEX: 26.47 KG/M2 | SYSTOLIC BLOOD PRESSURE: 109 MMHG | DIASTOLIC BLOOD PRESSURE: 64 MMHG | HEART RATE: 77 BPM | WEIGHT: 149.4 LBS

## 2023-11-21 VITALS — WEIGHT: 145 LBS | BODY MASS INDEX: 25.69 KG/M2

## 2023-11-21 DIAGNOSIS — E03.9 ACQUIRED HYPOTHYROIDISM: ICD-10-CM

## 2023-11-21 DIAGNOSIS — E87.1 HYPONATREMIA: ICD-10-CM

## 2023-11-21 DIAGNOSIS — E10.42 TYPE 1 DIABETES MELLITUS WITH DIABETIC POLYNEUROPATHY (HCC): Primary | ICD-10-CM

## 2023-11-21 DIAGNOSIS — I10 ESSENTIAL (PRIMARY) HYPERTENSION: ICD-10-CM

## 2023-11-21 DIAGNOSIS — E55.9 VITAMIN D DEFICIENCY, UNSPECIFIED: ICD-10-CM

## 2023-11-21 DIAGNOSIS — H90.3 SENSORY HEARING LOSS, BILATERAL: ICD-10-CM

## 2023-11-21 PROCEDURE — 3052F HG A1C>EQUAL 8.0%<EQUAL 9.0%: CPT | Performed by: INTERNAL MEDICINE

## 2023-11-21 PROCEDURE — G8427 DOCREV CUR MEDS BY ELIG CLIN: HCPCS | Performed by: INTERNAL MEDICINE

## 2023-11-21 PROCEDURE — 70553 MRI BRAIN STEM W/O & W/DYE: CPT

## 2023-11-21 PROCEDURE — 3078F DIAST BP <80 MM HG: CPT | Performed by: INTERNAL MEDICINE

## 2023-11-21 PROCEDURE — G8484 FLU IMMUNIZE NO ADMIN: HCPCS | Performed by: INTERNAL MEDICINE

## 2023-11-21 PROCEDURE — 99215 OFFICE O/P EST HI 40 MIN: CPT | Performed by: INTERNAL MEDICINE

## 2023-11-21 PROCEDURE — G8419 CALC BMI OUT NRM PARAM NOF/U: HCPCS | Performed by: INTERNAL MEDICINE

## 2023-11-21 PROCEDURE — 1036F TOBACCO NON-USER: CPT | Performed by: INTERNAL MEDICINE

## 2023-11-21 PROCEDURE — 3074F SYST BP LT 130 MM HG: CPT | Performed by: INTERNAL MEDICINE

## 2023-11-21 PROCEDURE — A9579 GAD-BASE MR CONTRAST NOS,1ML: HCPCS | Performed by: RADIOLOGY

## 2023-11-21 PROCEDURE — 2022F DILAT RTA XM EVC RTNOPTHY: CPT | Performed by: INTERNAL MEDICINE

## 2023-11-21 PROCEDURE — 6360000004 HC RX CONTRAST MEDICATION: Performed by: RADIOLOGY

## 2023-11-21 PROCEDURE — 3017F COLORECTAL CA SCREEN DOC REV: CPT | Performed by: INTERNAL MEDICINE

## 2023-11-21 RX ORDER — INSULIN LISPRO 100 [IU]/ML
INJECTION, SOLUTION INTRAVENOUS; SUBCUTANEOUS
Qty: 30 ML | Refills: 1 | Status: SHIPPED | OUTPATIENT
Start: 2023-11-21

## 2023-11-21 RX ORDER — LEVOTHYROXINE SODIUM 0.07 MG/1
TABLET ORAL
Qty: 90 TABLET | Refills: 1 | Status: SHIPPED | OUTPATIENT
Start: 2023-11-21

## 2023-11-21 RX ORDER — GABAPENTIN 100 MG/1
CAPSULE ORAL
Qty: 360 CAPSULE | Refills: 1 | Status: SHIPPED | OUTPATIENT
Start: 2023-11-21 | End: 2024-11-21

## 2023-11-21 RX ADMIN — GADOTERIDOL 13 ML: 279.3 INJECTION, SOLUTION INTRAVENOUS at 09:02

## 2023-11-21 NOTE — PROGRESS NOTES
Chief Complaint   Patient presents with    Diabetes     Pcp and pharmacy verified    Thyroid Problem    Other     Vitamin d deficiency     History of Present Illness: Ramiro Dorsey is a 60 y.o. female here for follow up of Type 1 diabetes. At our last visit in July 2023 her A1C was up to 8.3%. Pt encouraged to start checking her BGs before each meal and taking her Humalog before each meal rather than waiting till after meals, when her BG is already high. Since our last visit pt has noted that she was having issues of hyponatremia and diarrhea. I recommended she be referred to GI doctor for evaluation of the diarrhea. Her PCP did an excellent work-up for the hyponatremia. On 11/14/23 her NA was 130 with Glu 230. Her urine Osm was 483 and her serum Osm was 287. This would suggest she is concentrating her urine approprietly and does not have DI or SIADH. I suspect this is due to a combination of chronic hyperglycemia and pre-renal/dehydration from her diarrhea. Pt notes she is still having issues of diarrhea. Her A1C in October 2023 was 8.6%. Her TSH was 0.749 with FT4 of 1.62 and her Vitamin D was 88. 3. Her lipid panel was at goal (145/89/74/54). \"I have a mass behind my left ear and I just had an MRI today. \"    She is currently taking levemir 30 units in the AM and 4 units HS and Humalog \"1-3 units as needed if my BG are high\"  \"I have had some high blood sugar usually in the afternoon, but I have been waking up in the 40s. \"If my blood sugars are under 200 I drink a soda and I still wake up with BG in the 70s. She tests her BG 8 times per day. \"I have the DexCom but I did not start using it. I did not like how the [place] worked, so I have not started the [employer]. I don't trust it. \"    \"If my blood sugar drops under 100 I feel bad. \"    - Pt is waking around 7-8AM, she takes her Levemir 30 units and Humalog 1-2 units \"if my sugar is over 100s I'll take 1 unit, if it is over 200 I'll take 2

## 2023-11-24 ENCOUNTER — HOSPITAL ENCOUNTER (OUTPATIENT)
Facility: HOSPITAL | Age: 58
Setting detail: INFUSION SERIES
Discharge: HOME | End: 2023-11-24
Payer: MEDICARE

## 2023-11-24 VITALS
DIASTOLIC BLOOD PRESSURE: 77 MMHG | RESPIRATION RATE: 18 BRPM | BODY MASS INDEX: 26.47 KG/M2 | OXYGEN SATURATION: 95 % | HEART RATE: 77 BPM | HEIGHT: 63 IN | SYSTOLIC BLOOD PRESSURE: 151 MMHG | TEMPERATURE: 97.8 F

## 2023-11-24 DIAGNOSIS — M80.80XD LOCALIZED OSTEOPOROSIS WITH CURRENT PATHOLOGICAL FRACTURE WITH ROUTINE HEALING: Primary | ICD-10-CM

## 2023-11-24 LAB
ALBUMIN SERPL-MCNC: 3.7 G/DL (ref 3.5–5)
ALBUMIN/GLOB SERPL: 1.2 (ref 1.1–2.2)
ALP SERPL-CCNC: 136 U/L (ref 45–117)
ALT SERPL-CCNC: 24 U/L (ref 12–78)
ANION GAP SERPL CALC-SCNC: 8 MMOL/L (ref 5–15)
AST SERPL-CCNC: 22 U/L (ref 15–37)
BILIRUB SERPL-MCNC: 0.9 MG/DL (ref 0.2–1)
BUN SERPL-MCNC: 11 MG/DL (ref 6–20)
BUN/CREAT SERPL: 14 (ref 12–20)
CALCIUM SERPL-MCNC: 9.5 MG/DL (ref 8.5–10.1)
CHLORIDE SERPL-SCNC: 92 MMOL/L (ref 97–108)
CO2 SERPL-SCNC: 27 MMOL/L (ref 21–32)
CREAT SERPL-MCNC: 0.76 MG/DL (ref 0.55–1.02)
GLOBULIN SER CALC-MCNC: 3.2 G/DL (ref 2–4)
GLUCOSE SERPL-MCNC: 346 MG/DL (ref 65–100)
POTASSIUM SERPL-SCNC: 4.3 MMOL/L (ref 3.5–5.1)
PROT SERPL-MCNC: 6.9 G/DL (ref 6.4–8.2)
SODIUM SERPL-SCNC: 127 MMOL/L (ref 136–145)

## 2023-11-24 PROCEDURE — 36415 COLL VENOUS BLD VENIPUNCTURE: CPT

## 2023-11-24 PROCEDURE — 80053 COMPREHEN METABOLIC PANEL: CPT

## 2023-11-24 PROCEDURE — 96372 THER/PROPH/DIAG INJ SC/IM: CPT

## 2023-11-24 PROCEDURE — 6360000002 HC RX W HCPCS: Performed by: PEDIATRICS

## 2023-11-24 RX ORDER — DIPHENHYDRAMINE HYDROCHLORIDE 50 MG/ML
50 INJECTION INTRAMUSCULAR; INTRAVENOUS
OUTPATIENT
Start: 2023-12-22

## 2023-11-24 RX ORDER — ALBUTEROL SULFATE 90 UG/1
4 AEROSOL, METERED RESPIRATORY (INHALATION) PRN
OUTPATIENT
Start: 2023-12-22

## 2023-11-24 RX ORDER — ONDANSETRON 2 MG/ML
8 INJECTION INTRAMUSCULAR; INTRAVENOUS
Status: DISCONTINUED | OUTPATIENT
Start: 2023-11-24 | End: 2023-11-25 | Stop reason: HOSPADM

## 2023-11-24 RX ORDER — ACETAMINOPHEN 325 MG/1
650 TABLET ORAL
OUTPATIENT
Start: 2023-12-22

## 2023-11-24 RX ORDER — EPINEPHRINE 1 MG/ML
0.3 INJECTION, SOLUTION, CONCENTRATE INTRAVENOUS PRN
OUTPATIENT
Start: 2023-12-22

## 2023-11-24 RX ORDER — ONDANSETRON 2 MG/ML
8 INJECTION INTRAMUSCULAR; INTRAVENOUS
OUTPATIENT
Start: 2023-12-22

## 2023-11-24 RX ORDER — DIPHENHYDRAMINE HYDROCHLORIDE 50 MG/ML
50 INJECTION INTRAMUSCULAR; INTRAVENOUS
Status: DISCONTINUED | OUTPATIENT
Start: 2023-11-24 | End: 2023-11-25 | Stop reason: HOSPADM

## 2023-11-24 RX ORDER — SODIUM CHLORIDE 9 MG/ML
INJECTION, SOLUTION INTRAVENOUS CONTINUOUS
OUTPATIENT
Start: 2023-12-22

## 2023-11-24 RX ORDER — ACETAMINOPHEN 325 MG/1
650 TABLET ORAL
Status: DISCONTINUED | OUTPATIENT
Start: 2023-11-24 | End: 2023-11-25 | Stop reason: HOSPADM

## 2023-11-24 RX ORDER — EPINEPHRINE 1 MG/ML
0.3 INJECTION, SOLUTION, CONCENTRATE INTRAVENOUS PRN
Status: DISCONTINUED | OUTPATIENT
Start: 2023-11-24 | End: 2023-11-25 | Stop reason: HOSPADM

## 2023-11-24 RX ORDER — ALBUTEROL SULFATE 90 UG/1
4 AEROSOL, METERED RESPIRATORY (INHALATION) PRN
Status: DISCONTINUED | OUTPATIENT
Start: 2023-11-24 | End: 2023-11-25 | Stop reason: HOSPADM

## 2023-11-24 RX ORDER — SODIUM CHLORIDE 9 MG/ML
INJECTION, SOLUTION INTRAVENOUS CONTINUOUS
Status: DISCONTINUED | OUTPATIENT
Start: 2023-11-24 | End: 2023-11-25 | Stop reason: HOSPADM

## 2023-11-24 RX ADMIN — ROMOSOZUMAB-AQQG 105 MG: 105 INJECTION, SOLUTION SUBCUTANEOUS at 11:55

## 2023-11-24 ASSESSMENT — PAIN SCALES - GENERAL: PAINLEVEL_OUTOF10: 0

## 2023-12-20 ENCOUNTER — HOSPITAL ENCOUNTER (OUTPATIENT)
Facility: HOSPITAL | Age: 58
Setting detail: INFUSION SERIES
Discharge: HOME | End: 2023-12-20
Payer: MEDICARE

## 2023-12-20 LAB
ALBUMIN SERPL-MCNC: 3.8 G/DL (ref 3.5–5)
ALBUMIN/GLOB SERPL: 1.2 (ref 1.1–2.2)
ALP SERPL-CCNC: 140 U/L (ref 45–117)
ALT SERPL-CCNC: 27 U/L (ref 12–78)
ANION GAP SERPL CALC-SCNC: 6 MMOL/L (ref 5–15)
AST SERPL-CCNC: 27 U/L (ref 15–37)
BILIRUB SERPL-MCNC: 0.6 MG/DL (ref 0.2–1)
BUN SERPL-MCNC: 9 MG/DL (ref 6–20)
BUN/CREAT SERPL: 14 (ref 12–20)
CALCIUM SERPL-MCNC: 9.4 MG/DL (ref 8.5–10.1)
CHLORIDE SERPL-SCNC: 91 MMOL/L (ref 97–108)
CO2 SERPL-SCNC: 32 MMOL/L (ref 21–32)
CREAT SERPL-MCNC: 0.65 MG/DL (ref 0.55–1.02)
GLOBULIN SER CALC-MCNC: 3.3 G/DL (ref 2–4)
GLUCOSE SERPL-MCNC: 222 MG/DL (ref 65–100)
POTASSIUM SERPL-SCNC: 4.2 MMOL/L (ref 3.5–5.1)
PROT SERPL-MCNC: 7.1 G/DL (ref 6.4–8.2)
SODIUM SERPL-SCNC: 129 MMOL/L (ref 136–145)

## 2023-12-20 PROCEDURE — 36415 COLL VENOUS BLD VENIPUNCTURE: CPT

## 2023-12-20 PROCEDURE — 80053 COMPREHEN METABOLIC PANEL: CPT

## 2023-12-20 NOTE — PROGRESS NOTES
Labs drawn peripherally and in process for pt tx on Friday. Band-aid applied to site without redness, swelling or pain.

## 2023-12-22 ENCOUNTER — HOSPITAL ENCOUNTER (OUTPATIENT)
Facility: HOSPITAL | Age: 58
Setting detail: INFUSION SERIES
Discharge: HOME | End: 2023-12-22
Payer: MEDICARE

## 2023-12-22 VITALS
TEMPERATURE: 97.7 F | BODY MASS INDEX: 26.76 KG/M2 | RESPIRATION RATE: 17 BRPM | HEART RATE: 72 BPM | DIASTOLIC BLOOD PRESSURE: 79 MMHG | SYSTOLIC BLOOD PRESSURE: 177 MMHG | WEIGHT: 151 LBS

## 2023-12-22 DIAGNOSIS — M80.80XD LOCALIZED OSTEOPOROSIS WITH CURRENT PATHOLOGICAL FRACTURE WITH ROUTINE HEALING: Primary | ICD-10-CM

## 2023-12-22 PROCEDURE — 96372 THER/PROPH/DIAG INJ SC/IM: CPT

## 2023-12-22 PROCEDURE — 6360000002 HC RX W HCPCS: Performed by: PEDIATRICS

## 2023-12-22 RX ORDER — ACETAMINOPHEN 325 MG/1
650 TABLET ORAL
OUTPATIENT
Start: 2024-01-19

## 2023-12-22 RX ORDER — ALBUTEROL SULFATE 90 UG/1
4 AEROSOL, METERED RESPIRATORY (INHALATION) PRN
Status: DISCONTINUED | OUTPATIENT
Start: 2023-12-22 | End: 2023-12-23 | Stop reason: HOSPADM

## 2023-12-22 RX ORDER — SODIUM CHLORIDE 9 MG/ML
INJECTION, SOLUTION INTRAVENOUS CONTINUOUS
Status: DISCONTINUED | OUTPATIENT
Start: 2023-12-22 | End: 2023-12-23 | Stop reason: HOSPADM

## 2023-12-22 RX ORDER — ACETAMINOPHEN 325 MG/1
650 TABLET ORAL
Status: DISCONTINUED | OUTPATIENT
Start: 2023-12-22 | End: 2023-12-23 | Stop reason: HOSPADM

## 2023-12-22 RX ORDER — SODIUM CHLORIDE 9 MG/ML
INJECTION, SOLUTION INTRAVENOUS CONTINUOUS
OUTPATIENT
Start: 2024-01-19

## 2023-12-22 RX ORDER — ALBUTEROL SULFATE 90 UG/1
4 AEROSOL, METERED RESPIRATORY (INHALATION) PRN
OUTPATIENT
Start: 2024-01-19

## 2023-12-22 RX ORDER — EPINEPHRINE 1 MG/ML
0.3 INJECTION, SOLUTION, CONCENTRATE INTRAVENOUS PRN
Status: DISCONTINUED | OUTPATIENT
Start: 2023-12-22 | End: 2023-12-23 | Stop reason: HOSPADM

## 2023-12-22 RX ORDER — ONDANSETRON 2 MG/ML
8 INJECTION INTRAMUSCULAR; INTRAVENOUS
Status: DISCONTINUED | OUTPATIENT
Start: 2023-12-22 | End: 2023-12-23 | Stop reason: HOSPADM

## 2023-12-22 RX ORDER — ONDANSETRON 2 MG/ML
8 INJECTION INTRAMUSCULAR; INTRAVENOUS
OUTPATIENT
Start: 2024-01-19

## 2023-12-22 RX ORDER — EPINEPHRINE 1 MG/ML
0.3 INJECTION, SOLUTION, CONCENTRATE INTRAVENOUS PRN
OUTPATIENT
Start: 2024-01-19

## 2023-12-22 RX ORDER — DIPHENHYDRAMINE HYDROCHLORIDE 50 MG/ML
50 INJECTION INTRAMUSCULAR; INTRAVENOUS
Status: DISCONTINUED | OUTPATIENT
Start: 2023-12-22 | End: 2023-12-23 | Stop reason: HOSPADM

## 2023-12-22 RX ORDER — DIPHENHYDRAMINE HYDROCHLORIDE 50 MG/ML
50 INJECTION INTRAMUSCULAR; INTRAVENOUS
OUTPATIENT
Start: 2024-01-19

## 2023-12-22 RX ADMIN — ROMOSOZUMAB-AQQG 105 MG: 105 INJECTION, SOLUTION SUBCUTANEOUS at 11:46

## 2023-12-22 NOTE — PLAN OF CARE
Problem: Safety - Adult  Goal: Free from fall injury  Outcome: [address] Resolved Met
n/a

## 2023-12-27 NOTE — Clinical Note
Please schedule a VV follow up for COVID and sinusitis Refill request from pharmacy via fax    metoPROLOL succinate (TOPROL-XL) 100 MG 24 hr tablet

## 2024-01-02 ENCOUNTER — HOSPITAL ENCOUNTER (EMERGENCY)
Facility: HOSPITAL | Age: 59
Discharge: HOME | End: 2024-01-02
Attending: EMERGENCY MEDICINE
Payer: MEDICARE

## 2024-01-02 VITALS
HEIGHT: 63 IN | TEMPERATURE: 97.8 F | RESPIRATION RATE: 18 BRPM | SYSTOLIC BLOOD PRESSURE: 164 MMHG | DIASTOLIC BLOOD PRESSURE: 73 MMHG | OXYGEN SATURATION: 96 % | BODY MASS INDEX: 25.69 KG/M2 | HEART RATE: 80 BPM | WEIGHT: 145 LBS

## 2024-01-02 DIAGNOSIS — R53.83 OTHER FATIGUE: Primary | ICD-10-CM

## 2024-01-02 LAB
ALBUMIN SERPL-MCNC: 3.7 G/DL (ref 3.5–5)
ALBUMIN/GLOB SERPL: 1.1 (ref 1.1–2.2)
ALP SERPL-CCNC: 144 U/L (ref 45–117)
ALT SERPL-CCNC: 24 U/L (ref 12–78)
ANION GAP SERPL CALC-SCNC: 8 MMOL/L (ref 5–15)
AST SERPL-CCNC: 22 U/L (ref 15–37)
BILIRUB SERPL-MCNC: 0.7 MG/DL (ref 0.2–1)
BUN SERPL-MCNC: 9 MG/DL (ref 6–20)
BUN/CREAT SERPL: 13 (ref 12–20)
CALCIUM SERPL-MCNC: 9.7 MG/DL (ref 8.5–10.1)
CHLORIDE SERPL-SCNC: 94 MMOL/L (ref 97–108)
CO2 SERPL-SCNC: 29 MMOL/L (ref 21–32)
CREAT SERPL-MCNC: 0.69 MG/DL (ref 0.55–1.02)
ERYTHROCYTE [DISTWIDTH] IN BLOOD BY AUTOMATED COUNT: 11.4 % (ref 11.5–14.5)
GLOBULIN SER CALC-MCNC: 3.3 G/DL (ref 2–4)
GLUCOSE SERPL-MCNC: 255 MG/DL (ref 65–100)
HCT VFR BLD AUTO: 36.7 % (ref 35–47)
HGB BLD-MCNC: 12.5 G/DL (ref 11.5–16)
MAGNESIUM SERPL-MCNC: 1.6 MG/DL (ref 1.6–2.4)
MCH RBC QN AUTO: 30.6 PG (ref 26–34)
MCHC RBC AUTO-ENTMCNC: 34.1 G/DL (ref 30–36.5)
MCV RBC AUTO: 89.7 FL (ref 80–99)
NRBC # BLD: 0 K/UL (ref 0–0.01)
NRBC BLD-RTO: 0 PER 100 WBC
PLATELET # BLD AUTO: 185 K/UL (ref 150–400)
PMV BLD AUTO: 8.3 FL (ref 8.9–12.9)
POTASSIUM SERPL-SCNC: 4.4 MMOL/L (ref 3.5–5.1)
PROT SERPL-MCNC: 7 G/DL (ref 6.4–8.2)
RBC # BLD AUTO: 4.09 M/UL (ref 3.8–5.2)
SODIUM SERPL-SCNC: 131 MMOL/L (ref 136–145)
WBC # BLD AUTO: 5.4 K/UL (ref 3.6–11)

## 2024-01-02 PROCEDURE — 36415 COLL VENOUS BLD VENIPUNCTURE: CPT

## 2024-01-02 PROCEDURE — 83735 ASSAY OF MAGNESIUM: CPT

## 2024-01-02 PROCEDURE — 80053 COMPREHEN METABOLIC PANEL: CPT

## 2024-01-02 PROCEDURE — 85027 COMPLETE CBC AUTOMATED: CPT

## 2024-01-02 PROCEDURE — 99283 EMERGENCY DEPT VISIT LOW MDM: CPT

## 2024-01-02 ASSESSMENT — PAIN DESCRIPTION - DESCRIPTORS: DESCRIPTORS: ACHING

## 2024-01-02 ASSESSMENT — PAIN DESCRIPTION - LOCATION: LOCATION: GENERALIZED

## 2024-01-02 ASSESSMENT — PAIN SCALES - GENERAL
PAINLEVEL_OUTOF10: 5
PAINLEVEL_OUTOF10: 0

## 2024-01-02 ASSESSMENT — PAIN - FUNCTIONAL ASSESSMENT: PAIN_FUNCTIONAL_ASSESSMENT: 0-10

## 2024-01-02 NOTE — ED TRIAGE NOTES
Pt arrived by POV for low sodium.  Pt reports she called her PMD and was advised to go to the ER.  Pt reports she has a history of low sodium in the past and she is feeling confused, unsteady, has been falling along with head pressure.  Pt reports she has a bruise on her right hip an hit  her head.  Pt is awake alert and oriented X 4,   pt educated on ER flow.  This writer apologized for any delay that may occur, pt and/or family educated on acuity of the unit at this time.  Pt placed back in waiting room at this time

## 2024-01-02 NOTE — ED NOTES
D/C instructions provided and reviewed with patient. Opportunity provided for discussion. All questions answered nothing further at this time.

## 2024-01-03 NOTE — PROGRESS NOTES
Patient called on 1/3 to cancel/change appointment with rheumatology and was upset that she was not able to be seen on Thursday's (the day Dr. Cipriano mota's pediatric patients at [facility]).     The patient was last seen in January of 2023 and was told to follow up after 1 year. Evenity was started in May of 2023 for osteoporosis.     The patient will need to be seen for routine follow up or can go to other rheumatologists if it is more convenient. For now evenity will be stopped because she has not been evaluated in 1 year. We are not authorized to continue treatments for patients who have not been seen in over 1 year.    We are booked out and have limited availability. Other rheumatology offices that patient can follow up with if she does not want to return here:    [phone] - Dr. Rangel's office   [phone] - Dr. Norton's office

## 2024-01-03 NOTE — ED PROVIDER NOTES
MEDICATIONS:     Medication List        ASK your doctor about these medications      ascorbic acid 1000 MG tablet  Commonly known as: VITAMIN C     aspirin 81 MG chewable tablet     calcium carbonate 1500 (600 Ca) MG Tabs tablet     Calcium Carbonate-Vitamin D 600-5 MG-MCG Tabs     Cholecalciferol 50 MCG (2000 UT) Tabs     ciclopirox 0.77 % cream  Commonly known as: LOPROX     ferrous sulfate 325 (65 Fe) MG tablet  Commonly known as: IRON 325     gabapentin 100 MG capsule  Commonly known as: NEURONTIN  2 in AM and 2 in PM     glucosamine-chondroitin 500-400 MG tablet     insulin detemir 100 UNIT/ML injection pen  Commonly known as: LEVEMIR  33 units in AM and 2 units at PM     insulin lispro (1 Unit Dial) 100 UNIT/ML Sopn  Commonly known as: HUMALOG/ADMELOG  Max daily dose 20 units     levothyroxine 75 MCG tablet  Commonly known as: SYNTHROID  TAKE 1 TABLET BY MOUTH ONCE DAILY BEFORE  BREAKFAST     lidocaine 5 %  Commonly known as: LIDODERM     losartan 50 MG tablet  Commonly known as: COZAAR     MAGNESIUM PO     metoclopramide 5 MG tablet  Commonly known as: REGLAN     OneTouch Ultra strip  Generic drug: blood glucose test strips  Check blood sugar 8 times per day due to frequent hypoglycemic events. Dx: E10.42     potassium gluconate 550 mg tablet     simvastatin 40 MG tablet  Commonly known as: ZOCOR     VITAMIN A PO     vitamin E 400 UNIT capsule     zolpidem 10 MG tablet  Commonly known as: AMBIEN                DISCONTINUED MEDICATIONS:  Discharge Medication List as of 1/2/2024  3:49 PM          I am the Primary Clinician of Record.   Prabha Mendes MD (electronically signed)      (Please note that parts of this dictation were completed with voice recognition software. Quite often unanticipated grammatical, syntax, homophones, and other interpretive errors are inadvertently transcribed by the computer software. Please disregards these errors. Please excuse any errors that have escaped final

## 2024-01-15 ENCOUNTER — OFFICE VISIT (OUTPATIENT)
Age: 59
End: 2024-01-15
Payer: MEDICARE

## 2024-01-15 VITALS
DIASTOLIC BLOOD PRESSURE: 73 MMHG | HEART RATE: 72 BPM | SYSTOLIC BLOOD PRESSURE: 161 MMHG | OXYGEN SATURATION: 96 % | WEIGHT: 151 LBS | BODY MASS INDEX: 26.75 KG/M2 | RESPIRATION RATE: 16 BRPM | TEMPERATURE: 97.8 F

## 2024-01-15 DIAGNOSIS — M80.80XD LOCALIZED OSTEOPOROSIS WITH CURRENT PATHOLOGICAL FRACTURE WITH ROUTINE HEALING, SUBSEQUENT ENCOUNTER: Primary | ICD-10-CM

## 2024-01-15 PROCEDURE — G8484 FLU IMMUNIZE NO ADMIN: HCPCS | Performed by: PEDIATRICS

## 2024-01-15 PROCEDURE — 99214 OFFICE O/P EST MOD 30 MIN: CPT | Performed by: PEDIATRICS

## 2024-01-15 PROCEDURE — G8419 CALC BMI OUT NRM PARAM NOF/U: HCPCS | Performed by: PEDIATRICS

## 2024-01-15 PROCEDURE — 3017F COLORECTAL CA SCREEN DOC REV: CPT | Performed by: PEDIATRICS

## 2024-01-15 PROCEDURE — G8427 DOCREV CUR MEDS BY ELIG CLIN: HCPCS | Performed by: PEDIATRICS

## 2024-01-15 PROCEDURE — 1036F TOBACCO NON-USER: CPT | Performed by: PEDIATRICS

## 2024-01-15 ASSESSMENT — PATIENT HEALTH QUESTIONNAIRE - PHQ9
SUM OF ALL RESPONSES TO PHQ QUESTIONS 1-9: 0
2. FEELING DOWN, DEPRESSED OR HOPELESS: 0
SUM OF ALL RESPONSES TO PHQ QUESTIONS 1-9: 0
SUM OF ALL RESPONSES TO PHQ9 QUESTIONS 1 & 2: 0
SUM OF ALL RESPONSES TO PHQ QUESTIONS 1-9: 0
1. LITTLE INTEREST OR PLEASURE IN DOING THINGS: 0
SUM OF ALL RESPONSES TO PHQ QUESTIONS 1-9: 0

## 2024-01-15 NOTE — PROGRESS NOTES
Chief Complaint   Patient presents with    Joint Pain     1. Have you been to the ER, urgent care clinic since your last visit?  Hospitalized since your last visit? Yes seen in Urgent care for finger swelling and pain    2. Have you seen or consulted any other health care providers outside of the [facility] since your last visit?  Include any pap smears or colon screening. No

## 2024-01-15 NOTE — PROGRESS NOTES
RHEUMATOLOGY PROBLEM LIST AND CHIEF COMPLAINT  1. Osteoporosis: The patient fulfills the National Osteoporosis Foundation guidelines for pharmacologic intervention in postmenopausal women and men ?50 years of age  Treatment - Prolia. Evenity started 5/2023.    2. Remote history of sarcoidosis - diagnosed after a chest x-ray and biopsy revealed sarcoidosis. She was not on any treatment for this and has not had any symptoms of inflammatory arthritis, interstitial lung disease or inflammatory skin disease.    3. Inflammatory arthritis-swelling of b/l hands. Negative RF.     Therapy History:  Current DMARDs:   Prior DMARDs: Methotrexate (1/2020-2/2020; restarted 11/2020)    HISTORY OF PRESENT ILLNESS  This is a 58 y.o.  female.  Today, the patient complains of pain in the joints.  Location: generalized  Severity:  6 on a scale of 0-10  Timing: intermittent   Duration: several months  Modifying factors: none  Context/Associated signs and symptoms: At the patient's last visit we started her on Evenity x12 months which she started in May 2023. She needs 4 more shots. On Dec 1 she was opening a door and caught her left hand in the door handle. She sustained a possible non displaced fracture of the left 3rd proximal phalanx. She has not had any other fractures. She mentions recent lab work has showed low sodium so she was referred to nephrology.     RHEUMATOLOGY REVIEW OF SYSTEMS   Positives as per history  Negatives as follows:  CONSTITUTlONAL:  Denies unexplained persistent fevers, weight change, chronic fatigue  HEAD/EYES:   Denies eye redness, blurry vision or sudden loss of vision, dry eyes, HA, temporal artery pain  ENT:    Denies oral/nasal ulcers, recurrent sinus infections, dry mouth  RESPIRATORY:  No pleuritic pain, history of pleural effusions, hemoptysis, exertional dyspnea  CARDIOVASCULAR:  Denies chest pain, history of pericardial effusions  GASTRO:   Denies heartburn, esophageal dysmotility, abdominal

## 2024-01-16 ENCOUNTER — TELEPHONE (OUTPATIENT)
Age: 59
End: 2024-01-16

## 2024-01-16 DIAGNOSIS — M80.80XD LOCALIZED OSTEOPOROSIS WITH CURRENT PATHOLOGICAL FRACTURE WITH ROUTINE HEALING: Primary | ICD-10-CM

## 2024-01-16 RX ORDER — EPINEPHRINE 1 MG/ML
0.3 INJECTION, SOLUTION, CONCENTRATE INTRAVENOUS PRN
OUTPATIENT
Start: 2024-01-16

## 2024-01-16 RX ORDER — ONDANSETRON 2 MG/ML
8 INJECTION INTRAMUSCULAR; INTRAVENOUS
OUTPATIENT
Start: 2024-01-16

## 2024-01-16 RX ORDER — ACETAMINOPHEN 325 MG/1
650 TABLET ORAL
OUTPATIENT
Start: 2024-01-16

## 2024-01-16 RX ORDER — DIPHENHYDRAMINE HYDROCHLORIDE 50 MG/ML
50 INJECTION INTRAMUSCULAR; INTRAVENOUS
OUTPATIENT
Start: 2024-01-16

## 2024-01-16 RX ORDER — SODIUM CHLORIDE 9 MG/ML
INJECTION, SOLUTION INTRAVENOUS CONTINUOUS
OUTPATIENT
Start: 2024-01-16

## 2024-01-16 RX ORDER — ALBUTEROL SULFATE 90 UG/1
4 AEROSOL, METERED RESPIRATORY (INHALATION) PRN
OUTPATIENT
Start: 2024-01-16

## 2024-01-16 NOTE — TELEPHONE ENCOUNTER
Patient is calling she was seen by  on yesterday and she states that [facility] hasn't received her infusion orders that  was supposed to put in on yesterday, she is going on Friday. Patient can be reached at [phone], fax # [phone]

## 2024-01-17 ENCOUNTER — TELEPHONE (OUTPATIENT)
Age: 59
End: 2024-01-17

## 2024-01-17 ENCOUNTER — HOSPITAL ENCOUNTER (OUTPATIENT)
Facility: HOSPITAL | Age: 59
Setting detail: INFUSION SERIES
Discharge: HOME | End: 2024-01-17
Payer: MEDICARE

## 2024-01-17 DIAGNOSIS — M80.80XD LOCALIZED OSTEOPOROSIS WITH CURRENT PATHOLOGICAL FRACTURE WITH ROUTINE HEALING: ICD-10-CM

## 2024-01-17 LAB
ALBUMIN SERPL-MCNC: 3.6 G/DL (ref 3.5–5)
ALBUMIN/GLOB SERPL: 1.1 (ref 1.1–2.2)
ALP SERPL-CCNC: 139 U/L (ref 45–117)
ALT SERPL-CCNC: 24 U/L (ref 12–78)
ANION GAP SERPL CALC-SCNC: 7 MMOL/L (ref 5–15)
AST SERPL-CCNC: 25 U/L (ref 15–37)
BILIRUB SERPL-MCNC: 0.7 MG/DL (ref 0.2–1)
BUN SERPL-MCNC: 8 MG/DL (ref 6–20)
BUN/CREAT SERPL: 12 (ref 12–20)
CALCIUM SERPL-MCNC: 9.4 MG/DL (ref 8.5–10.1)
CHLORIDE SERPL-SCNC: 98 MMOL/L (ref 97–108)
CO2 SERPL-SCNC: 29 MMOL/L (ref 21–32)
CREAT SERPL-MCNC: 0.68 MG/DL (ref 0.55–1.02)
GLOBULIN SER CALC-MCNC: 3.3 G/DL (ref 2–4)
GLUCOSE SERPL-MCNC: 189 MG/DL (ref 65–100)
POTASSIUM SERPL-SCNC: 4.6 MMOL/L (ref 3.5–5.1)
PROT SERPL-MCNC: 6.9 G/DL (ref 6.4–8.2)
SODIUM SERPL-SCNC: 134 MMOL/L (ref 136–145)

## 2024-01-17 PROCEDURE — 36415 COLL VENOUS BLD VENIPUNCTURE: CPT

## 2024-01-17 PROCEDURE — 80053 COMPREHEN METABOLIC PANEL: CPT

## 2024-01-17 NOTE — PROGRESS NOTES
Arrived ambulatory to have labs done prior to her treatment on Friday, drawn peipherally from the left ac per her request and sent to lab. She states she will remove her armband at home. Left ambulatory and is to return on Friday @ 1100. No VS were taken for this visit.

## 2024-01-17 NOTE — TELEPHONE ENCOUNTER
PT called and stated that a order for a shot(PT didn't know shot name) was supposed to be sent to [facility] but they're stating that they haven't received it. They're also stating that they faxed paper work yesterday morning and they haven't received anything back

## 2024-01-17 NOTE — TELEPHONE ENCOUNTER
Spoke to pt informed pt that the therapy plan for the evenity has been sent over to  [facility] as requested, pt verbally acknowledged understanding and stated that she will give them a call to confirm her appt was still ok for Friday

## 2024-01-18 ENCOUNTER — TELEPHONE (OUTPATIENT)
Age: 59
End: 2024-01-18

## 2024-01-19 NOTE — PROGRESS NOTES
Pt called noting that she forgot her levemir this AM and her BG have been high all day.  Her BG is now 564.   She took 3 units 1 hour ago, she will not take more humalog.  Pt to take Levemir 15 units now.

## 2024-01-22 ENCOUNTER — HOSPITAL ENCOUNTER (OUTPATIENT)
Facility: HOSPITAL | Age: 59
Setting detail: INFUSION SERIES
Discharge: HOME | End: 2024-01-22
Payer: MEDICARE

## 2024-01-22 VITALS
TEMPERATURE: 98.4 F | OXYGEN SATURATION: 98 % | BODY MASS INDEX: 26.96 KG/M2 | HEART RATE: 73 BPM | SYSTOLIC BLOOD PRESSURE: 160 MMHG | DIASTOLIC BLOOD PRESSURE: 74 MMHG | WEIGHT: 152.2 LBS | RESPIRATION RATE: 16 BRPM

## 2024-01-22 DIAGNOSIS — M80.80XD LOCALIZED OSTEOPOROSIS WITH CURRENT PATHOLOGICAL FRACTURE WITH ROUTINE HEALING: Primary | ICD-10-CM

## 2024-01-22 PROCEDURE — 96372 THER/PROPH/DIAG INJ SC/IM: CPT

## 2024-01-22 PROCEDURE — 6360000002 HC RX W HCPCS: Performed by: PEDIATRICS

## 2024-01-22 RX ORDER — ALBUTEROL SULFATE 90 UG/1
4 AEROSOL, METERED RESPIRATORY (INHALATION) PRN
Status: DISCONTINUED | OUTPATIENT
Start: 2024-01-22 | End: 2024-01-23 | Stop reason: HOSPADM

## 2024-01-22 RX ORDER — SODIUM CHLORIDE 9 MG/ML
INJECTION, SOLUTION INTRAVENOUS CONTINUOUS
OUTPATIENT
Start: 2024-02-14

## 2024-01-22 RX ORDER — ACETAMINOPHEN 325 MG/1
650 TABLET ORAL
Status: DISCONTINUED | OUTPATIENT
Start: 2024-01-22 | End: 2024-01-23 | Stop reason: HOSPADM

## 2024-01-22 RX ORDER — ONDANSETRON 2 MG/ML
8 INJECTION INTRAMUSCULAR; INTRAVENOUS
Status: DISCONTINUED | OUTPATIENT
Start: 2024-01-22 | End: 2024-01-23 | Stop reason: HOSPADM

## 2024-01-22 RX ORDER — EPINEPHRINE 1 MG/ML
0.3 INJECTION, SOLUTION, CONCENTRATE INTRAVENOUS PRN
OUTPATIENT
Start: 2024-02-14

## 2024-01-22 RX ORDER — ACETAMINOPHEN 325 MG/1
650 TABLET ORAL
OUTPATIENT
Start: 2024-02-14

## 2024-01-22 RX ORDER — DIPHENHYDRAMINE HYDROCHLORIDE 50 MG/ML
50 INJECTION INTRAMUSCULAR; INTRAVENOUS
OUTPATIENT
Start: 2024-02-14

## 2024-01-22 RX ORDER — ONDANSETRON 2 MG/ML
8 INJECTION INTRAMUSCULAR; INTRAVENOUS
OUTPATIENT
Start: 2024-02-14

## 2024-01-22 RX ORDER — DIPHENHYDRAMINE HYDROCHLORIDE 50 MG/ML
50 INJECTION INTRAMUSCULAR; INTRAVENOUS
Status: DISCONTINUED | OUTPATIENT
Start: 2024-01-22 | End: 2024-01-23 | Stop reason: HOSPADM

## 2024-01-22 RX ORDER — SODIUM CHLORIDE 9 MG/ML
INJECTION, SOLUTION INTRAVENOUS CONTINUOUS
Status: DISCONTINUED | OUTPATIENT
Start: 2024-01-22 | End: 2024-01-23 | Stop reason: HOSPADM

## 2024-01-22 RX ORDER — EPINEPHRINE 1 MG/ML
0.3 INJECTION, SOLUTION, CONCENTRATE INTRAVENOUS PRN
Status: DISCONTINUED | OUTPATIENT
Start: 2024-01-22 | End: 2024-01-23 | Stop reason: HOSPADM

## 2024-01-22 RX ORDER — ALBUTEROL SULFATE 90 UG/1
4 AEROSOL, METERED RESPIRATORY (INHALATION) PRN
OUTPATIENT
Start: 2024-02-14

## 2024-01-22 RX ADMIN — ROMOSOZUMAB-AQQG 105 MG: 105 INJECTION, SOLUTION SUBCUTANEOUS at 13:58

## 2024-01-22 ASSESSMENT — PAIN DESCRIPTION - LOCATION: LOCATION: HIP

## 2024-01-22 ASSESSMENT — PAIN DESCRIPTION - DESCRIPTORS: DESCRIPTORS: ACHING

## 2024-01-22 ASSESSMENT — PAIN DESCRIPTION - PAIN TYPE: TYPE: CHRONIC PAIN

## 2024-01-22 ASSESSMENT — PAIN SCALES - GENERAL: PAINLEVEL_OUTOF10: 3

## 2024-01-22 NOTE — PLAN OF CARE
Problem: Chronic Conditions and Co-morbidities  Goal: Patient's chronic conditions and co-morbidity symptoms are monitored and maintained or improved  Outcome: Adequate for Discharge     Problem: Pain  Goal: Verbalizes/displays adequate comfort level or baseline comfort level  Outcome: Adequate for Discharge

## 2024-01-22 NOTE — PROGRESS NOTES
[facility] Progress Note    Date: 2024    Name: Janice Eagle    MRN: [id number]         : 1965      Ms. Eagle was assessed and education was provided.     Ms. Eagle's vitals were reviewed and patient was observed for 5 minutes prior to treatment.   Vitals:    24 1345   BP: (!) 160/74   Pulse: 73   Resp: 16   Temp: 98.4 °F (36.9 °C)   SpO2: 98%       Lab results were obtained 24 and reviewed.  No results found for this or any previous visit (from the past 12 hour(s)).    Evenity 105 mg  was administered subcutaneous in  the LUBA.  Evenity 105 mg was administered subcutaneous in the MARKO.  Band-aid applied to both sites.     Ms. Eagle tolerated well, and had no complaints.  Patient armband removed and placed in the shredder.    [facility] at 2:05. She is to return on 24 at 11:00 for her next appointment.    Shani Jenkins RN  2024  2:42 PM

## 2024-02-05 ENCOUNTER — HOSPITAL ENCOUNTER (OUTPATIENT)
Facility: HOSPITAL | Age: 59
Setting detail: RECURRING SERIES
Discharge: HOME | End: 2024-02-08
Payer: MEDICARE

## 2024-02-05 PROCEDURE — 97110 THERAPEUTIC EXERCISES: CPT

## 2024-02-05 PROCEDURE — 97165 OT EVAL LOW COMPLEX 30 MIN: CPT

## 2024-02-05 NOTE — PROGRESS NOTES
proprioception to improve patient's ability to progress to PLOF and address remaining functional goals. (see flow sheet as applicable)    Details if applicable:  education w/visual aid on anatomy of hand, tendons, ligaments and location of injury; education on warm water soak and later possibly paraffin bath once wound healed on finger; ed/demonstrated retrograde massage to the R fingers for edema; given tan light putty to work on finger motion with flexion all fingers as tolerated; recommended keeping fingers warm w/glove wear.   30 30    Total Total     [x]  Patient Education billed concurrently with other procedures   [x] Review HEP    [] Progressed/Changed HEP, detail:    [] Other detail:              Time   In / Out [id number]   Total Treatment Time 45   Total Timed Codes 45   1:1 Treatment Time 45      [facility] Totals Reminder:  bill using total billable   min of TIMED therapeutic procedures and modalities.   8-22 min = 1 unit; 23-37 min = 2 units; 38-52 min = 3 units;  53-67 min = 4 units; 68-82 min = 5 units     Skin assessment post-treatment (if applicable):    [x]  intact    []  redness- no adverse reaction                 []redness - adverse reaction:          Pain Level at end of session (0-10 scale): 3/10    Plan of Care / Statement of Necessity for Occupational Therapy Services     Assessment / key information:  As noted above patient has pain and loss of AROM with slight edema for digit III R hand. Pain is near base of finger medially with movement.  She is RHD and using hand for light activities. Strength is diminished bilaterally with R more than L.  Because she currently has a healing wound on back of digit III we were unable to trial paraffin bath for stiffness but recommended warm water soak. She was unable to make a follow-up today as is leaving town and uncertain of return date. Provided with strategies to manage edema w/retrograde massage, manage stiffness with warm water soaks and improve motion

## 2024-02-20 ENCOUNTER — HOSPITAL ENCOUNTER (OUTPATIENT)
Facility: HOSPITAL | Age: 59
Setting detail: INFUSION SERIES
Discharge: HOME | End: 2024-02-20
Payer: MEDICARE

## 2024-02-20 ENCOUNTER — TELEPHONE (OUTPATIENT)
Age: 59
End: 2024-02-20

## 2024-02-20 VITALS
WEIGHT: 152.8 LBS | HEART RATE: 83 BPM | BODY MASS INDEX: 27.07 KG/M2 | TEMPERATURE: 98.3 F | SYSTOLIC BLOOD PRESSURE: 174 MMHG | RESPIRATION RATE: 16 BRPM | OXYGEN SATURATION: 98 % | DIASTOLIC BLOOD PRESSURE: 81 MMHG

## 2024-02-20 DIAGNOSIS — M80.80XD LOCALIZED OSTEOPOROSIS WITH CURRENT PATHOLOGICAL FRACTURE WITH ROUTINE HEALING: Primary | ICD-10-CM

## 2024-02-20 LAB
ALBUMIN SERPL-MCNC: 3.6 G/DL (ref 3.5–5)
ALBUMIN/GLOB SERPL: 1.1 (ref 1.1–2.2)
ALP SERPL-CCNC: 136 U/L (ref 45–117)
ALT SERPL-CCNC: 26 U/L (ref 12–78)
ANION GAP SERPL CALC-SCNC: 8 MMOL/L (ref 5–15)
AST SERPL-CCNC: 27 U/L (ref 15–37)
BILIRUB SERPL-MCNC: 0.7 MG/DL (ref 0.2–1)
BUN SERPL-MCNC: 9 MG/DL (ref 6–20)
BUN/CREAT SERPL: 15 (ref 12–20)
CALCIUM SERPL-MCNC: 9.7 MG/DL (ref 8.5–10.1)
CHLORIDE SERPL-SCNC: 98 MMOL/L (ref 97–108)
CO2 SERPL-SCNC: 29 MMOL/L (ref 21–32)
CREAT SERPL-MCNC: 0.59 MG/DL (ref 0.55–1.02)
GLOBULIN SER CALC-MCNC: 3.3 G/DL (ref 2–4)
GLUCOSE SERPL-MCNC: 114 MG/DL (ref 65–100)
POTASSIUM SERPL-SCNC: 4.5 MMOL/L (ref 3.5–5.1)
PROT SERPL-MCNC: 6.9 G/DL (ref 6.4–8.2)
SODIUM SERPL-SCNC: 135 MMOL/L (ref 136–145)

## 2024-02-20 PROCEDURE — 6360000002 HC RX W HCPCS: Performed by: PEDIATRICS

## 2024-02-20 PROCEDURE — 96372 THER/PROPH/DIAG INJ SC/IM: CPT

## 2024-02-20 PROCEDURE — 80053 COMPREHEN METABOLIC PANEL: CPT

## 2024-02-20 PROCEDURE — 36415 COLL VENOUS BLD VENIPUNCTURE: CPT

## 2024-02-20 RX ORDER — EPINEPHRINE 1 MG/ML
0.3 INJECTION, SOLUTION, CONCENTRATE INTRAVENOUS PRN
OUTPATIENT
Start: 2024-03-10

## 2024-02-20 RX ORDER — DIPHENHYDRAMINE HYDROCHLORIDE 50 MG/ML
50 INJECTION INTRAMUSCULAR; INTRAVENOUS
Status: DISCONTINUED | OUTPATIENT
Start: 2024-02-20 | End: 2024-02-21 | Stop reason: HOSPADM

## 2024-02-20 RX ORDER — EPINEPHRINE 1 MG/ML
0.3 INJECTION, SOLUTION, CONCENTRATE INTRAVENOUS PRN
Status: DISCONTINUED | OUTPATIENT
Start: 2024-02-20 | End: 2024-02-21 | Stop reason: HOSPADM

## 2024-02-20 RX ORDER — ONDANSETRON 2 MG/ML
8 INJECTION INTRAMUSCULAR; INTRAVENOUS
Status: DISCONTINUED | OUTPATIENT
Start: 2024-02-20 | End: 2024-02-21 | Stop reason: HOSPADM

## 2024-02-20 RX ORDER — DIPHENHYDRAMINE HYDROCHLORIDE 50 MG/ML
50 INJECTION INTRAMUSCULAR; INTRAVENOUS
OUTPATIENT
Start: 2024-03-10

## 2024-02-20 RX ORDER — ALBUTEROL SULFATE 90 UG/1
4 AEROSOL, METERED RESPIRATORY (INHALATION) PRN
OUTPATIENT
Start: 2024-03-10

## 2024-02-20 RX ORDER — ALBUTEROL SULFATE 90 UG/1
4 AEROSOL, METERED RESPIRATORY (INHALATION) PRN
Status: DISCONTINUED | OUTPATIENT
Start: 2024-02-20 | End: 2024-02-21 | Stop reason: HOSPADM

## 2024-02-20 RX ORDER — ACETAMINOPHEN 325 MG/1
650 TABLET ORAL
OUTPATIENT
Start: 2024-03-10

## 2024-02-20 RX ORDER — SODIUM CHLORIDE 9 MG/ML
INJECTION, SOLUTION INTRAVENOUS CONTINUOUS
OUTPATIENT
Start: 2024-03-10

## 2024-02-20 RX ORDER — ONDANSETRON 2 MG/ML
8 INJECTION INTRAMUSCULAR; INTRAVENOUS
OUTPATIENT
Start: 2024-03-10

## 2024-02-20 RX ORDER — ACETAMINOPHEN 325 MG/1
650 TABLET ORAL
Status: DISCONTINUED | OUTPATIENT
Start: 2024-02-20 | End: 2024-02-21 | Stop reason: HOSPADM

## 2024-02-20 RX ORDER — SODIUM CHLORIDE 9 MG/ML
INJECTION, SOLUTION INTRAVENOUS CONTINUOUS
Status: DISCONTINUED | OUTPATIENT
Start: 2024-02-20 | End: 2024-02-21 | Stop reason: HOSPADM

## 2024-02-20 RX ADMIN — ROMOSOZUMAB-AQQG 105 MG: 105 INJECTION, SOLUTION SUBCUTANEOUS at 11:18

## 2024-02-20 ASSESSMENT — PAIN SCALES - GENERAL: PAINLEVEL_OUTOF10: 0

## 2024-02-20 NOTE — PROGRESS NOTES
[facility] Progress Note    Date: 2024    Name: Janice Eagle    MRN: [id number]         : 1965      Ms. Eagle was assessed and education was provided.     Ms. Eagle's vitals were reviewed and patient was observed for 5 minutes prior to treatment. Labs drawn and in process.  Vitals:    24 1100   BP: (!) 174/81   Pulse: 83   Resp: 16   Temp: 98.3 °F (36.8 °C)   SpO2: 98%       Lab results were obtained and reviewed.  Recent Results (from the past 12 hour(s))   Comprehensive Metabolic Panel    Collection Time: 24 10:00 AM   Result Value Ref Range    Sodium 135 (L) 136 - 145 mmol/L    Potassium 4.5 3.5 - 5.1 mmol/L    Chloride 98 97 - 108 mmol/L    CO2 29 21 - 32 mmol/L    Anion Gap 8 5 - 15 mmol/L    Glucose 114 (H) 65 - 100 mg/dL    BUN 9 6 - 20 MG/DL    Creatinine 0.59 0.55 - 1.02 MG/DL    Bun/Cre Ratio 15 12 - 20      Est, Glom Filt Rate >60 >60 ml/min/1.73m2    Calcium 9.7 8.5 - 10.1 MG/DL    Total Bilirubin 0.7 0.2 - 1.0 MG/DL    ALT 26 12 - 78 U/L    AST 27 15 - 37 U/L    Alk Phosphatase 136 (H) 45 - 117 U/L    Total Protein 6.9 6.4 - 8.2 g/dL    Albumin 3.6 3.5 - 5.0 g/dL    Globulin 3.3 2.0 - 4.0 g/dL    Albumin/Globulin Ratio 1.1 1.1 - 2.2         Evenity 105 mg was administered subcutaneous in the MARKO.  Evenity 105 mg was administered subcutaneous in the LUBA.     Ms. Eagle tolerated well, and had no complaints.  Patient armband removed an placed in the shredder.    [facility] condition at 11:25. She is to return on 3/19/24 at 11:00 for her next appointment.    Shani Jenkins RN  2024  11:14 AM

## 2024-02-21 RX ORDER — INSULIN DEGLUDEC INJECTION 100 U/ML
INJECTION, SOLUTION SUBCUTANEOUS
Qty: 45 ML | Refills: 1 | Status: SHIPPED | OUTPATIENT
Start: 2024-02-21

## 2024-02-21 NOTE — TELEPHONE ENCOUNTER
Spoke with [facility] pharmacist. She states that Brand Tresiba is covered, not generic. Needs new Rx with [facility] liu.

## 2024-02-23 RX ORDER — BLOOD SUGAR DIAGNOSTIC
STRIP MISCELLANEOUS
Qty: 800 EACH | Refills: 3 | Status: SHIPPED | OUTPATIENT
Start: 2024-02-23

## 2024-03-01 DIAGNOSIS — E55.9 VITAMIN D DEFICIENCY, UNSPECIFIED: ICD-10-CM

## 2024-03-01 DIAGNOSIS — E10.42 TYPE 1 DIABETES MELLITUS WITH DIABETIC POLYNEUROPATHY (HCC): ICD-10-CM

## 2024-03-01 DIAGNOSIS — E03.9 ACQUIRED HYPOTHYROIDISM: ICD-10-CM

## 2024-03-07 ENCOUNTER — TELEPHONE (OUTPATIENT)
Age: 59
End: 2024-03-07

## 2024-03-07 NOTE — TELEPHONE ENCOUNTER
Faxed DART documentation and last office visit for high frequency testing to [facility] at [phone] on 03/06/24 with receipt of confirmation.  Faxed a copy to Saundra at [facility] [place] that handles the DART documents.  Patient aware.

## 2024-03-14 ENCOUNTER — APPOINTMENT (OUTPATIENT)
Facility: HOSPITAL | Age: 59
End: 2024-03-14
Payer: MEDICARE

## 2024-03-14 ENCOUNTER — HOSPITAL ENCOUNTER (EMERGENCY)
Facility: HOSPITAL | Age: 59
Discharge: HOME | End: 2024-03-14
Attending: EMERGENCY MEDICINE
Payer: MEDICARE

## 2024-03-14 VITALS
HEIGHT: 63 IN | SYSTOLIC BLOOD PRESSURE: 139 MMHG | DIASTOLIC BLOOD PRESSURE: 71 MMHG | HEART RATE: 79 BPM | RESPIRATION RATE: 18 BRPM | WEIGHT: 150 LBS | BODY MASS INDEX: 26.58 KG/M2 | TEMPERATURE: 98.2 F | OXYGEN SATURATION: 97 %
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
## 2024-03-14 DIAGNOSIS — S43.402A SPRAIN OF LEFT SHOULDER, UNSPECIFIED SHOULDER SPRAIN TYPE, INITIAL ENCOUNTER: ICD-10-CM

## 2024-03-14 DIAGNOSIS — S70.01XA CONTUSION OF RIGHT HIP, INITIAL ENCOUNTER: ICD-10-CM

## 2024-03-14 DIAGNOSIS — S90.01XA CONTUSION OF RIGHT ANKLE, INITIAL ENCOUNTER: ICD-10-CM

## 2024-03-14 LAB
GLUCOSE BLD STRIP.AUTO-MCNC: 113 MG/DL (ref 65–117)
GLUCOSE BLD STRIP.AUTO-MCNC: 153 MG/DL (ref 65–117)
SERVICE CMNT-IMP: ABNORMAL
SERVICE CMNT-IMP: NORMAL

## 2024-03-14 PROCEDURE — 73610 X-RAY EXAM OF ANKLE: CPT

## 2024-03-14 PROCEDURE — 72170 X-RAY EXAM OF PELVIS: CPT

## 2024-03-14 PROCEDURE — 72125 CT NECK SPINE W/O DYE: CPT

## 2024-03-14 PROCEDURE — 6370000000 HC RX 637 (ALT 250 FOR IP): Performed by: EMERGENCY MEDICINE

## 2024-03-14 PROCEDURE — 73130 X-RAY EXAM OF HAND: CPT

## 2024-03-14 PROCEDURE — 73630 X-RAY EXAM OF FOOT: CPT

## 2024-03-14 PROCEDURE — 73030 X-RAY EXAM OF SHOULDER: CPT

## 2024-03-14 PROCEDURE — 99284 EMERGENCY DEPT VISIT MOD MDM: CPT

## 2024-03-14 PROCEDURE — 82962 GLUCOSE BLOOD TEST: CPT

## 2024-03-14 PROCEDURE — 70450 CT HEAD/BRAIN W/O DYE: CPT

## 2024-03-14 RX ORDER — IBUPROFEN 600 MG/1
600 TABLET ORAL
Status: COMPLETED | OUTPATIENT
Start: 2024-03-14 | End: 2024-03-14

## 2024-03-14 RX ADMIN — IBUPROFEN 600 MG: 600 TABLET, FILM COATED ORAL at 19:10

## 2024-03-14 ASSESSMENT — PAIN SCALES - GENERAL
PAINLEVEL_OUTOF10: 7
PAINLEVEL_OUTOF10: 0

## 2024-03-14 ASSESSMENT — PAIN - FUNCTIONAL ASSESSMENT: PAIN_FUNCTIONAL_ASSESSMENT: 0-10

## 2024-03-14 NOTE — ED TRIAGE NOTES
Pt arrived with c/o a mechanical fall after missing 2 steps going to pet a dog. She states she hit the back of her head on the step along with her right foot/ankle, left hand, and bilateral shoulders. Pt is alert and oriented x4, ambulated with assistance from wheelchair to bed

## 2024-03-14 NOTE — ED PROVIDER NOTES
[facility] EMERGENCY DEP  EMERGENCY DEPARTMENT ENCOUNTER       Pt Name: Janice Eagle  MRN: [id number]  Birthdate 1965  Date of evaluation: 3/14/2024  Provider: Prabha Mendes MD   PCP: Anaid Flowers DO  Note Started: 7:08 PM EDT 3/14/24     CHIEF COMPLAINT       Chief Complaint   Patient presents with    Fall        HISTORY OF PRESENT ILLNESS: 1 or more elements      History From: Patient, History limited by: none     Janice Eagle is a 58 y.o. female presents complaining of a fall.  Patient reports she was trying to pet a dog and did not realize there was steps behind her, fell backward hitting her head, left side, right ankle and hip.  No reports severe pain in multiple parts of body.  Injury occurred earlier this afternoon.  No LOC.  No treatments for pain or symptoms prior to arrival.       Please See MDM for Additional Details of the HPI/PMH  Nursing Notes were all reviewed and agreed with or any disagreements were addressed in the HPI.     REVIEW OF SYSTEMS        Positives and Pertinent negatives as per HPI.    PAST HISTORY     Past Medical History:  Past Medical History:   Diagnosis Date    Arthritis     patient states \"every joint affected\"    Bee sting 09/05/2018    left elbow bee sting     Blister of ankle 09/05/2018    Blister small per patient of left ankle. Wears an air boot due to 2 stress fractures in last 2 months.    CAD (coronary artery disease)     Constipation     Falls 2017    pt reports having 4 falls in 3 days    Fatigue     Headache     Ill-defined condition     multiple broken ribs    Ill-defined condition     reports \"getting infections easily\"    Joint pain     Memory disorder     following spinal fluid leak repair    Menopause     Nausea & vomiting     Neuropathy     Osteoporosis     Thyroid disease     Type 1 diabetes (HCC)     diagnosed at age 6    Unspecified cerebral artery occlusion with cerebral infarction     x 4 (last in 2004)       Past Surgical History:  Past Surgical

## 2024-03-15 ENCOUNTER — TELEPHONE (OUTPATIENT)
Age: 59
End: 2024-03-15

## 2024-03-15 NOTE — TELEPHONE ENCOUNTER
Patient called yesterday stating that DART (high utilization review through Medicare) is still not approving her test strips.  Spoke with Marlin with [employer]. She states that [employer] did approve the override for frequent use of test strips, but the claim is still not going through. She states that she will re-email [employer] to find out why. Patient has been advised and expressed understanding.

## 2024-03-19 ENCOUNTER — HOSPITAL ENCOUNTER (OUTPATIENT)
Facility: HOSPITAL | Age: 59
Setting detail: INFUSION SERIES
Discharge: HOME | End: 2024-03-19
Payer: MEDICARE

## 2024-03-19 VITALS
WEIGHT: 154 LBS | TEMPERATURE: 98.2 F | DIASTOLIC BLOOD PRESSURE: 69 MMHG | SYSTOLIC BLOOD PRESSURE: 149 MMHG | HEART RATE: 72 BPM | BODY MASS INDEX: 27.28 KG/M2 | RESPIRATION RATE: 16 BRPM | OXYGEN SATURATION: 99 %

## 2024-03-19 DIAGNOSIS — M80.80XD LOCALIZED OSTEOPOROSIS WITH CURRENT PATHOLOGICAL FRACTURE WITH ROUTINE HEALING: Primary | ICD-10-CM

## 2024-03-19 LAB
ALBUMIN SERPL-MCNC: 3.7 G/DL (ref 3.5–5)
ALBUMIN/GLOB SERPL: 1.1 (ref 1.1–2.2)
ALP SERPL-CCNC: 138 U/L (ref 45–117)
ALT SERPL-CCNC: 26 U/L (ref 12–78)
ANION GAP SERPL CALC-SCNC: 11 MMOL/L (ref 5–15)
AST SERPL-CCNC: 22 U/L (ref 15–37)
BILIRUB SERPL-MCNC: 0.9 MG/DL (ref 0.2–1)
BUN SERPL-MCNC: 7 MG/DL (ref 6–20)
BUN/CREAT SERPL: 10 (ref 12–20)
CALCIUM SERPL-MCNC: 9.1 MG/DL (ref 8.5–10.1)
CHLORIDE SERPL-SCNC: 89 MMOL/L (ref 97–108)
CO2 SERPL-SCNC: 27 MMOL/L (ref 21–32)
CREAT SERPL-MCNC: 0.68 MG/DL (ref 0.55–1.02)
GLOBULIN SER CALC-MCNC: 3.3 G/DL (ref 2–4)
GLUCOSE SERPL-MCNC: 274 MG/DL (ref 65–100)
POTASSIUM SERPL-SCNC: 4.2 MMOL/L (ref 3.5–5.1)
PROT SERPL-MCNC: 7 G/DL (ref 6.4–8.2)
SODIUM SERPL-SCNC: 127 MMOL/L (ref 136–145)

## 2024-03-19 PROCEDURE — 80053 COMPREHEN METABOLIC PANEL: CPT

## 2024-03-19 PROCEDURE — 6360000002 HC RX W HCPCS: Performed by: PEDIATRICS

## 2024-03-19 PROCEDURE — 96372 THER/PROPH/DIAG INJ SC/IM: CPT

## 2024-03-19 PROCEDURE — 36415 COLL VENOUS BLD VENIPUNCTURE: CPT

## 2024-03-19 RX ORDER — ALBUTEROL SULFATE 90 UG/1
4 AEROSOL, METERED RESPIRATORY (INHALATION) PRN
Status: DISCONTINUED | OUTPATIENT
Start: 2024-03-19 | End: 2024-03-20 | Stop reason: HOSPADM

## 2024-03-19 RX ORDER — DIPHENHYDRAMINE HYDROCHLORIDE 50 MG/ML
50 INJECTION INTRAMUSCULAR; INTRAVENOUS
Status: DISCONTINUED | OUTPATIENT
Start: 2024-03-19 | End: 2024-03-20 | Stop reason: HOSPADM

## 2024-03-19 RX ORDER — ONDANSETRON 2 MG/ML
8 INJECTION INTRAMUSCULAR; INTRAVENOUS
OUTPATIENT
Start: 2024-04-16

## 2024-03-19 RX ORDER — ACETAMINOPHEN 325 MG/1
650 TABLET ORAL
Status: DISCONTINUED | OUTPATIENT
Start: 2024-03-19 | End: 2024-03-20 | Stop reason: HOSPADM

## 2024-03-19 RX ORDER — DIPHENHYDRAMINE HYDROCHLORIDE 50 MG/ML
50 INJECTION INTRAMUSCULAR; INTRAVENOUS
OUTPATIENT
Start: 2024-04-16

## 2024-03-19 RX ORDER — EPINEPHRINE 1 MG/ML
0.3 INJECTION, SOLUTION, CONCENTRATE INTRAVENOUS PRN
OUTPATIENT
Start: 2024-04-16

## 2024-03-19 RX ORDER — ALBUTEROL SULFATE 90 UG/1
4 AEROSOL, METERED RESPIRATORY (INHALATION) PRN
OUTPATIENT
Start: 2024-04-16

## 2024-03-19 RX ORDER — EPINEPHRINE 1 MG/ML
0.3 INJECTION, SOLUTION, CONCENTRATE INTRAVENOUS PRN
Status: DISCONTINUED | OUTPATIENT
Start: 2024-03-19 | End: 2024-03-20 | Stop reason: HOSPADM

## 2024-03-19 RX ORDER — SODIUM CHLORIDE 9 MG/ML
INJECTION, SOLUTION INTRAVENOUS CONTINUOUS
Status: DISCONTINUED | OUTPATIENT
Start: 2024-03-19 | End: 2024-03-20 | Stop reason: HOSPADM

## 2024-03-19 RX ORDER — ONDANSETRON 2 MG/ML
8 INJECTION INTRAMUSCULAR; INTRAVENOUS
Status: DISCONTINUED | OUTPATIENT
Start: 2024-03-19 | End: 2024-03-20 | Stop reason: HOSPADM

## 2024-03-19 RX ORDER — SODIUM CHLORIDE 9 MG/ML
INJECTION, SOLUTION INTRAVENOUS CONTINUOUS
OUTPATIENT
Start: 2024-04-16

## 2024-03-19 RX ORDER — ACETAMINOPHEN 325 MG/1
650 TABLET ORAL
OUTPATIENT
Start: 2024-04-16

## 2024-03-19 RX ADMIN — ROMOSOZUMAB-AQQG 105 MG: 105 INJECTION, SOLUTION SUBCUTANEOUS at 11:28

## 2024-03-19 ASSESSMENT — PAIN SCALES - GENERAL: PAINLEVEL_OUTOF10: 0

## 2024-03-25 ENCOUNTER — OFFICE VISIT (OUTPATIENT)
Age: 59
End: 2024-03-25
Payer: MEDICARE

## 2024-03-25 VITALS
DIASTOLIC BLOOD PRESSURE: 62 MMHG | OXYGEN SATURATION: 97 % | TEMPERATURE: 97.1 F | HEIGHT: 63 IN | WEIGHT: 154.6 LBS | BODY MASS INDEX: 27.39 KG/M2 | RESPIRATION RATE: 18 BRPM | SYSTOLIC BLOOD PRESSURE: 138 MMHG | HEART RATE: 87 BPM

## 2024-03-25 DIAGNOSIS — M81.0 AGE-RELATED OSTEOPOROSIS WITHOUT CURRENT PATHOLOGICAL FRACTURE: ICD-10-CM

## 2024-03-25 DIAGNOSIS — Z86.73 HISTORY OF TIA (TRANSIENT ISCHEMIC ATTACK): ICD-10-CM

## 2024-03-25 DIAGNOSIS — E03.9 ACQUIRED HYPOTHYROIDISM: ICD-10-CM

## 2024-03-25 DIAGNOSIS — E11.42 DIABETIC PERIPHERAL NEUROPATHY ASSOCIATED WITH TYPE 2 DIABETES MELLITUS (HCC): ICD-10-CM

## 2024-03-25 DIAGNOSIS — E78.00 HYPERCHOLESTEROLEMIA: ICD-10-CM

## 2024-03-25 DIAGNOSIS — I25.10 ASCVD (ARTERIOSCLEROTIC CARDIOVASCULAR DISEASE): ICD-10-CM

## 2024-03-25 DIAGNOSIS — E10.42 TYPE 1 DIABETES MELLITUS WITH DIABETIC POLYNEUROPATHY (HCC): ICD-10-CM

## 2024-03-25 DIAGNOSIS — E87.1 CHRONIC HYPONATREMIA: Primary | ICD-10-CM

## 2024-03-25 PROBLEM — M81.6 LOCALIZED OSTEOPOROSIS WITHOUT CURRENT PATHOLOGICAL FRACTURE: Status: ACTIVE | Noted: 2023-01-17

## 2024-03-25 PROBLEM — R07.9 CHEST PAIN: Status: RESOLVED | Noted: 2021-05-25 | Resolved: 2024-03-25

## 2024-03-25 PROBLEM — M25.512 ACUTE PAIN OF LEFT SHOULDER: Status: RESOLVED | Noted: 2020-11-03 | Resolved: 2024-03-25

## 2024-03-25 PROBLEM — M19.90 ARTHRITIS: Status: RESOLVED | Noted: 2017-06-26 | Resolved: 2024-03-25

## 2024-03-25 PROBLEM — I63.9 CVA (CEREBRAL VASCULAR ACCIDENT) (HCC): Status: RESOLVED | Noted: 2021-05-23 | Resolved: 2024-03-25

## 2024-03-25 PROBLEM — M70.60 GREATER TROCHANTERIC BURSITIS: Status: RESOLVED | Noted: 2020-01-10 | Resolved: 2024-03-25

## 2024-03-25 PROBLEM — Z86.39 HISTORY OF HYPOGLYCEMIA: Status: RESOLVED | Noted: 2018-04-03 | Resolved: 2024-03-25

## 2024-03-25 PROBLEM — M79.671 PAIN IN RIGHT FOOT: Status: RESOLVED | Noted: 2018-05-03 | Resolved: 2024-03-25

## 2024-03-25 PROBLEM — J01.10 SUBACUTE FRONTAL SINUSITIS: Status: RESOLVED | Noted: 2019-03-20 | Resolved: 2024-03-25

## 2024-03-25 PROBLEM — R20.0 LEFT FACIAL NUMBNESS: Status: RESOLVED | Noted: 2021-05-25 | Resolved: 2024-03-25

## 2024-03-25 PROBLEM — L85.1 ACQUIRED PLANTAR KERATODERMA: Status: RESOLVED | Noted: 2018-11-05 | Resolved: 2024-03-25

## 2024-03-25 PROBLEM — G45.9 TIA (TRANSIENT ISCHEMIC ATTACK): Status: RESOLVED | Noted: 2020-11-24 | Resolved: 2024-03-25

## 2024-03-25 PROBLEM — M25.559 HIP PAIN: Status: RESOLVED | Noted: 2020-11-03 | Resolved: 2024-03-25

## 2024-03-25 PROBLEM — S90.852A SPLINTER OF LEFT FOOT: Status: RESOLVED | Noted: 2020-05-26 | Resolved: 2024-03-25

## 2024-03-25 PROBLEM — M85.80 OSTEOPENIA: Status: RESOLVED | Noted: 2017-06-12 | Resolved: 2024-03-25

## 2024-03-25 PROBLEM — R42 DIZZINESS: Status: RESOLVED | Noted: 2021-05-25 | Resolved: 2024-03-25

## 2024-03-25 PROCEDURE — 1036F TOBACCO NON-USER: CPT | Performed by: FAMILY MEDICINE

## 2024-03-25 PROCEDURE — G8484 FLU IMMUNIZE NO ADMIN: HCPCS | Performed by: FAMILY MEDICINE

## 2024-03-25 PROCEDURE — 99215 OFFICE O/P EST HI 40 MIN: CPT | Performed by: FAMILY MEDICINE

## 2024-03-25 PROCEDURE — 3046F HEMOGLOBIN A1C LEVEL >9.0%: CPT | Performed by: FAMILY MEDICINE

## 2024-03-25 PROCEDURE — 3017F COLORECTAL CA SCREEN DOC REV: CPT | Performed by: FAMILY MEDICINE

## 2024-03-25 PROCEDURE — G8419 CALC BMI OUT NRM PARAM NOF/U: HCPCS | Performed by: FAMILY MEDICINE

## 2024-03-25 PROCEDURE — G8427 DOCREV CUR MEDS BY ELIG CLIN: HCPCS | Performed by: FAMILY MEDICINE

## 2024-03-25 PROCEDURE — 2022F DILAT RTA XM EVC RTNOPTHY: CPT | Performed by: FAMILY MEDICINE

## 2024-03-25 RX ORDER — PREGABALIN 50 MG/1
CAPSULE ORAL
Qty: 100 CAPSULE | Refills: 1 | Status: SHIPPED | OUTPATIENT
Start: 2024-03-25 | End: 2024-07-05

## 2024-03-25 RX ORDER — FLUTICASONE PROPIONATE 50 MCG
2 SPRAY, SUSPENSION (ML) NASAL DAILY
COMMUNITY
Start: 2024-02-24

## 2024-03-25 ASSESSMENT — PATIENT HEALTH QUESTIONNAIRE - PHQ9
SUM OF ALL RESPONSES TO PHQ QUESTIONS 1-9: 0
1. LITTLE INTEREST OR PLEASURE IN DOING THINGS: NOT AT ALL
2. FEELING DOWN, DEPRESSED OR HOPELESS: NOT AT ALL
SUM OF ALL RESPONSES TO PHQ QUESTIONS 1-9: 0
SUM OF ALL RESPONSES TO PHQ9 QUESTIONS 1 & 2: 0

## 2024-03-25 NOTE — PATIENT INSTRUCTIONS
Cut gabapentin to 1 in AM, 2 at night x1wk, then 1 AM and 1 at night x1wk, then 1 in AM x1wk, then stop. If you start having a lot more leg pain/ cramps, you can start the pregabalin once you're down to 1 gabapentin/day.O

## 2024-03-25 NOTE — PROGRESS NOTES
Janice Eagle is a 58 y.o. female presenting for/with:    Chief Complaint   Patient presents with    New Patient       Vitals:    03/25/24 1400   BP: 138/62   Pulse: 87   Resp: 18   Temp: 97.1 °F (36.2 °C)   TempSrc: Temporal   SpO2: 97%   Weight: 70.1 kg (154 lb 9.6 oz)   Height: 1.6 m (5' 3\")       Pain Scale: 6/10  Pain Location: Hip    \"Have you been to the ER, urgent care clinic since your last visit?  Hospitalized since your last visit?\"    NO    “Have you seen or consulted any other health care providers outside of [facility] since your last visit?”    NO                 3/25/2024     2:33 PM   PHQ-9    Little interest or pleasure in doing things 0   Feeling down, depressed, or hopeless 0   PHQ-2 Score 0   PHQ-9 Total Score 0           1/15/2024     2:30 PM 1/10/2023    12:00 AM   [facility] AMB LEARNING ASSESSMENT   Primary Learner Patient Patient   Primary Language ENGLISH ENGLISH   Learning Preference DEMONSTRATION DEMONSTRATION   Answered By patient patient   Relationship to Learner SELF SELF            3/25/2024     2:32 PM   Amb Fall Risk Assessment and TUG Test   Do you feel unsteady or are you worried about falling?  yes   2 or more falls in past year? yes   Fall with injury in past year? yes           3/25/2024     2:00 PM   ADL ASSESSMENT   Feeding yourself No Help Needed   Getting from bed to chair No Help Needed   Getting dressed No Help Needed   Bathing or showering No Help Needed   Walk across the room (includes cane/walker) No Help Needed   Using the telphone No Help Needed   Taking your medications No Help Needed   Preparing meals No Help Needed   Managing money (expenses/bills) No Help Needed   Moderately strenuous housework (laundry) No Help Needed   Shopping for personal items (toiletries/medicines) No Help Needed   Shopping for groceries No Help Needed   Driving No Help Needed   Climbing a flight of stairs No Help Needed   Getting to places beyond walking distances No Help

## 2024-03-25 NOTE — PROGRESS NOTES
Janice Eagle is a 58 y.o. female  Chief Complaint   Patient presents with    New Patient     Transfer to us from [facility]    HPI:  Chronic hyponatremia, with sx of fatigue, balance problems, mood changes, and foggy thinking With Hx extensive workup. Mult labs in past 5y show Na+ levels running in the 110's to low 130's. Has had several admissions for sx, tx with saline infusion with good sx improvement. Workup has included Labs, nephrology eval, imaging, stool studies, none of which have been remarkable, other than for euvolemic hyponatremia.    One thing that hasn't been tried is a wean of the gabapentin. Pt has been on either Gabapentin or Tramadol for past several years, both of which can rarely lead to hyponatremia due to drug-induced SIADH. Of note, did have hx of edema in past with higher doses of gabapentin above 400mg total daily dose.    Lab Results   Component Value Date/Time     03/19/2024 10:11 AM    K 4.2 03/19/2024 10:11 AM    CL 89 03/19/2024 10:11 AM    CO2 27 03/19/2024 10:11 AM    BUN 7 03/19/2024 10:11 AM    CREATININE 0.68 03/19/2024 10:11 AM    GLUCOSE 274 03/19/2024 10:11 AM    GLUCOSE 80 10/03/2023 12:00 AM    CALCIUM 9.1 03/19/2024 10:11 AM    LABGLOM >60 03/19/2024 10:11 AM    LABGLOM 100 10/03/2023 12:00 AM      Lab Results   Component Value Date/Time    MG 1.6 01/02/2024 01:47 PM     Lab Results   Component Value Date    Urine Osms: 366 10/2023     250 8/2022    Serum Osms: 285 10/2023     292 8/2022    Urine Sodium: 45 10/2023     22 8/2022    Serum Sodium 129 10/2023     131 8/2022     Hypothyroid   Has been well controlled on synthroid 0.75mg/d. Managed by Endocrine Dr. Lowe.  Lab Results   Component Value Date    TSH 0.749 10/03/2023    T4FREE 1.62 10/03/2023     DM1  On tresiba + lispro Sliding scale. W/C per labs. Managed by Endocrine Dr. Lowe. Of note, her low sodiums were not particulaly associated with severe hyperglycemia, which is quite rare in pt lately.

## 2024-03-26 ENCOUNTER — HOSPITAL ENCOUNTER (OUTPATIENT)
Facility: HOSPITAL | Age: 59
Setting detail: INFUSION SERIES
Discharge: HOME | End: 2024-03-26
Payer: MEDICARE

## 2024-03-26 DIAGNOSIS — E87.1 CHRONIC HYPONATREMIA: ICD-10-CM

## 2024-03-26 LAB
ANION GAP SERPL CALC-SCNC: 7 MMOL/L (ref 5–15)
BUN SERPL-MCNC: 11 MG/DL (ref 6–20)
BUN/CREAT SERPL: 17 (ref 12–20)
CALCIUM SERPL-MCNC: 9.3 MG/DL (ref 8.5–10.1)
CHLORIDE SERPL-SCNC: 96 MMOL/L (ref 97–108)
CO2 SERPL-SCNC: 32 MMOL/L (ref 21–32)
CREAT SERPL-MCNC: 0.66 MG/DL (ref 0.55–1.02)
GLUCOSE SERPL-MCNC: 134 MG/DL (ref 65–100)
POTASSIUM SERPL-SCNC: 4.2 MMOL/L (ref 3.5–5.1)
SODIUM SERPL-SCNC: 135 MMOL/L (ref 136–145)

## 2024-03-26 PROCEDURE — 80048 BASIC METABOLIC PNL TOTAL CA: CPT

## 2024-03-26 PROCEDURE — 36415 COLL VENOUS BLD VENIPUNCTURE: CPT

## 2024-03-26 NOTE — PROGRESS NOTES
Patient in for lab work ordered by Dr. Rincon. Blood drawn left antecubital without difficulty. Site intact with band-aid. Discharged in stable condition at 1445.  There were no vitals taken for this visit.

## 2024-04-10 ENCOUNTER — OFFICE VISIT (OUTPATIENT)
Age: 59
End: 2024-04-10
Payer: MEDICARE

## 2024-04-10 VITALS
TEMPERATURE: 97 F | BODY MASS INDEX: 27.29 KG/M2 | WEIGHT: 154 LBS | SYSTOLIC BLOOD PRESSURE: 142 MMHG | DIASTOLIC BLOOD PRESSURE: 68 MMHG | HEART RATE: 79 BPM | OXYGEN SATURATION: 100 % | RESPIRATION RATE: 18 BRPM | HEIGHT: 63 IN

## 2024-04-10 DIAGNOSIS — E87.1 CHRONIC HYPONATREMIA: ICD-10-CM

## 2024-04-10 DIAGNOSIS — L03.116 CELLULITIS OF LEFT LOWER EXTREMITY: Primary | ICD-10-CM

## 2024-04-10 PROBLEM — E10.621 DIABETIC ULCER OF LEFT HEEL ASSOCIATED WITH TYPE 1 DIABETES MELLITUS, WITH FAT LAYER EXPOSED (HCC): Status: RESOLVED | Noted: 2020-04-27 | Resolved: 2024-04-10

## 2024-04-10 PROBLEM — L97.422 DIABETIC ULCER OF LEFT HEEL ASSOCIATED WITH TYPE 1 DIABETES MELLITUS, WITH FAT LAYER EXPOSED (HCC): Status: RESOLVED | Noted: 2020-04-27 | Resolved: 2024-04-10

## 2024-04-10 PROCEDURE — G8427 DOCREV CUR MEDS BY ELIG CLIN: HCPCS | Performed by: FAMILY MEDICINE

## 2024-04-10 PROCEDURE — 99213 OFFICE O/P EST LOW 20 MIN: CPT | Performed by: FAMILY MEDICINE

## 2024-04-10 PROCEDURE — 3017F COLORECTAL CA SCREEN DOC REV: CPT | Performed by: FAMILY MEDICINE

## 2024-04-10 PROCEDURE — 1036F TOBACCO NON-USER: CPT | Performed by: FAMILY MEDICINE

## 2024-04-10 PROCEDURE — G8419 CALC BMI OUT NRM PARAM NOF/U: HCPCS | Performed by: FAMILY MEDICINE

## 2024-04-10 RX ORDER — DOXYCYCLINE HYCLATE 100 MG
100 TABLET ORAL 2 TIMES DAILY
Qty: 20 TABLET | Refills: 0 | Status: SHIPPED | OUTPATIENT
Start: 2024-04-10 | End: 2024-04-20

## 2024-04-10 ASSESSMENT — PATIENT HEALTH QUESTIONNAIRE - PHQ9
6. FEELING BAD ABOUT YOURSELF - OR THAT YOU ARE A FAILURE OR HAVE LET YOURSELF OR YOUR FAMILY DOWN: NOT AT ALL
SUM OF ALL RESPONSES TO PHQ QUESTIONS 1-9: 0
SUM OF ALL RESPONSES TO PHQ9 QUESTIONS 1 & 2: 0
SUM OF ALL RESPONSES TO PHQ QUESTIONS 1-9: 0
8. MOVING OR SPEAKING SO SLOWLY THAT OTHER PEOPLE COULD HAVE NOTICED. OR THE OPPOSITE, BEING SO FIGETY OR RESTLESS THAT YOU HAVE BEEN MOVING AROUND A LOT MORE THAN USUAL: NOT AT ALL
7. TROUBLE CONCENTRATING ON THINGS, SUCH AS READING THE NEWSPAPER OR WATCHING TELEVISION: NOT AT ALL
10. IF YOU CHECKED OFF ANY PROBLEMS, HOW DIFFICULT HAVE THESE PROBLEMS MADE IT FOR YOU TO DO YOUR WORK, TAKE CARE OF THINGS AT HOME, OR GET ALONG WITH OTHER PEOPLE: NOT DIFFICULT AT ALL
SUM OF ALL RESPONSES TO PHQ QUESTIONS 1-9: 0
9. THOUGHTS THAT YOU WOULD BE BETTER OFF DEAD, OR OF HURTING YOURSELF: NOT AT ALL
5. POOR APPETITE OR OVEREATING: NOT AT ALL
1. LITTLE INTEREST OR PLEASURE IN DOING THINGS: NOT AT ALL
3. TROUBLE FALLING OR STAYING ASLEEP: NOT AT ALL
SUM OF ALL RESPONSES TO PHQ QUESTIONS 1-9: 0
4. FEELING TIRED OR HAVING LITTLE ENERGY: NOT AT ALL
2. FEELING DOWN, DEPRESSED OR HOPELESS: NOT AT ALL

## 2024-04-10 NOTE — PROGRESS NOTES
Janice Eagle is a 59 y.o. female presenting for/with:    Chief Complaint   Patient presents with    OTHER     Pt reports getting a skin tear on the side of her left foot last Wednesday at a Hospital. Pt reports when she went to open the bathroom door the corner of the door caught her left foot. Pt reports washing the area with soap and water and she applied triple antibiotic ointment to the area on her left foot. Pt stated that the area on her left foot has been painful and oozing.        Vitals:    04/10/24 1446   BP: (!) 142/68   Site: Left Upper Arm   Position: Sitting   Cuff Size: Medium Adult   Pulse: 79   Resp: 18   Temp: 97 °F (36.1 °C)   TempSrc: Temporal   SpO2: 100%   Weight: 69.9 kg (154 lb)   Height: 1.6 m (5' 3\")       Pain Scale: 6/10  Pain Location: Foot (left foot)    \"Have you been to the ER, urgent care clinic since your last visit?  Hospitalized since your last visit?\"    NO    “Have you seen or consulted any other health care providers outside of [facility] since your last visit?”    NO                 4/10/2024     2:32 PM   PHQ-9    Little interest or pleasure in doing things 0   Feeling down, depressed, or hopeless 0   Trouble falling or staying asleep, or sleeping too much 0   Feeling tired or having little energy 0   Poor appetite or overeating 0   Feeling bad about yourself - or that you are a failure or have let yourself or your family down 0   Trouble concentrating on things, such as reading the newspaper or watching television 0   Moving or speaking so slowly that other people could have noticed. Or the opposite - being so fidgety or restless that you have been moving around a lot more than usual 0   Thoughts that you would be better off dead, or of hurting yourself in some way 0   PHQ-2 Score 0   PHQ-9 Total Score 0   If you checked off any problems, how difficult have these problems made it for you to do your work, take care of things at home, or get along with other 
cellulitis, sustained in hospital setting  Concern for MRSA. Tx with doryx 100mg BID x10d and topical mupirocin (given in clinic) with teaching. Close follow up needed.    Chronic moderate Hyponatremia, with some cognitive and balance symptoms  C/w SIADH. Try again to start weaning off Gabapentin (already off SSRIs and Tramadol), plan recheck Na+ in 3 wks after wean. Once off gabapentin, start Lyrica instead for the peripheral neuropathy, at 50mg QHS and uptitrate to 50mg TID for starters. If carolin and effectivem, plan uptitrate PRN. Will coordinate with endocrine.     DM1  well controlled. con't current tx, mgmt per endocrine.    Time-based coding, delete if not needed: I spent at least 50 minutes with this very complex established patient, and >50% of the time was spent counseling and/or coordinating care regarding care plan and expected course.     Souleymane Rincon MD

## 2024-04-11 ENCOUNTER — HOSPITAL ENCOUNTER (OUTPATIENT)
Facility: HOSPITAL | Age: 59
Setting detail: INFUSION SERIES
Discharge: HOME | End: 2024-04-11
Payer: MEDICARE

## 2024-04-11 VITALS
TEMPERATURE: 97.8 F | SYSTOLIC BLOOD PRESSURE: 184 MMHG | RESPIRATION RATE: 18 BRPM | BODY MASS INDEX: 27.46 KG/M2 | HEART RATE: 72 BPM | DIASTOLIC BLOOD PRESSURE: 80 MMHG | OXYGEN SATURATION: 96 % | WEIGHT: 155 LBS

## 2024-04-11 DIAGNOSIS — M81.6 LOCALIZED OSTEOPOROSIS WITHOUT CURRENT PATHOLOGICAL FRACTURE: ICD-10-CM

## 2024-04-11 LAB
ALBUMIN SERPL-MCNC: 4 G/DL (ref 3.5–5)
ALBUMIN/GLOB SERPL: 1.2 (ref 1.1–2.2)
ALP SERPL-CCNC: 150 U/L (ref 45–117)
ALT SERPL-CCNC: 25 U/L (ref 12–78)
ANION GAP SERPL CALC-SCNC: 8 MMOL/L (ref 5–15)
AST SERPL-CCNC: 22 U/L (ref 15–37)
BILIRUB SERPL-MCNC: 0.8 MG/DL (ref 0.2–1)
BUN SERPL-MCNC: 8 MG/DL (ref 6–20)
BUN/CREAT SERPL: 13 (ref 12–20)
CALCIUM SERPL-MCNC: 9.5 MG/DL (ref 8.5–10.1)
CHLORIDE SERPL-SCNC: 92 MMOL/L (ref 97–108)
CO2 SERPL-SCNC: 30 MMOL/L (ref 21–32)
CREAT SERPL-MCNC: 0.64 MG/DL (ref 0.55–1.02)
GLOBULIN SER CALC-MCNC: 3.3 G/DL (ref 2–4)
GLUCOSE SERPL-MCNC: 199 MG/DL (ref 65–100)
POTASSIUM SERPL-SCNC: 4.3 MMOL/L (ref 3.5–5.1)
PROT SERPL-MCNC: 7.3 G/DL (ref 6.4–8.2)
SODIUM SERPL-SCNC: 130 MMOL/L (ref 136–145)

## 2024-04-11 PROCEDURE — 36415 COLL VENOUS BLD VENIPUNCTURE: CPT

## 2024-04-11 PROCEDURE — 80053 COMPREHEN METABOLIC PANEL: CPT

## 2024-04-11 NOTE — PROGRESS NOTES
Ms. Eagle arrived to the [facility] ambulatory and in no distress. Ordered labs drawn peripherally and in process. Patient tolerated procedure well and was discharged from the [facility] in stable condition. She will return April 16, 2024 for Evenity.

## 2024-04-12 ENCOUNTER — TELEPHONE (OUTPATIENT)
Age: 59
End: 2024-04-12

## 2024-04-12 NOTE — TELEPHONE ENCOUNTER
----- Message from Souleymane Rincon MD sent at 4/10/2024  3:28 PM EDT -----  Please check status friday

## 2024-04-12 NOTE — TELEPHONE ENCOUNTER
Patient states things are about the same still continuing to take medication and change dressings.   States nothing has worsened and will call if things change

## 2024-04-14 LAB — BACTERIA SPEC AEROBE CULT: NORMAL

## 2024-04-16 ENCOUNTER — HOSPITAL ENCOUNTER (OUTPATIENT)
Facility: HOSPITAL | Age: 59
Setting detail: INFUSION SERIES
Discharge: HOME | End: 2024-04-16
Payer: MEDICARE

## 2024-04-16 VITALS
HEART RATE: 82 BPM | SYSTOLIC BLOOD PRESSURE: 189 MMHG | DIASTOLIC BLOOD PRESSURE: 84 MMHG | TEMPERATURE: 97.6 F | HEIGHT: 63 IN | RESPIRATION RATE: 18 BRPM | OXYGEN SATURATION: 96 % | BODY MASS INDEX: 26.82 KG/M2 | WEIGHT: 151.4 LBS

## 2024-04-16 DIAGNOSIS — M81.6 LOCALIZED OSTEOPOROSIS WITHOUT CURRENT PATHOLOGICAL FRACTURE: Primary | ICD-10-CM

## 2024-04-16 PROCEDURE — 6360000002 HC RX W HCPCS: Performed by: PEDIATRICS

## 2024-04-16 PROCEDURE — 96372 THER/PROPH/DIAG INJ SC/IM: CPT

## 2024-04-16 RX ORDER — SODIUM CHLORIDE 9 MG/ML
INJECTION, SOLUTION INTRAVENOUS CONTINUOUS
Status: DISCONTINUED | OUTPATIENT
Start: 2024-04-16 | End: 2024-04-17 | Stop reason: HOSPADM

## 2024-04-16 RX ORDER — SODIUM CHLORIDE 9 MG/ML
INJECTION, SOLUTION INTRAVENOUS CONTINUOUS
OUTPATIENT
Start: 2024-05-07

## 2024-04-16 RX ORDER — ACETAMINOPHEN 325 MG/1
650 TABLET ORAL
Status: DISCONTINUED | OUTPATIENT
Start: 2024-04-16 | End: 2024-04-17 | Stop reason: HOSPADM

## 2024-04-16 RX ORDER — ONDANSETRON 2 MG/ML
8 INJECTION INTRAMUSCULAR; INTRAVENOUS
Status: DISCONTINUED | OUTPATIENT
Start: 2024-04-16 | End: 2024-04-17 | Stop reason: HOSPADM

## 2024-04-16 RX ORDER — DIPHENHYDRAMINE HYDROCHLORIDE 50 MG/ML
50 INJECTION INTRAMUSCULAR; INTRAVENOUS
Status: DISCONTINUED | OUTPATIENT
Start: 2024-04-16 | End: 2024-04-17 | Stop reason: HOSPADM

## 2024-04-16 RX ORDER — ALBUTEROL SULFATE 90 UG/1
4 AEROSOL, METERED RESPIRATORY (INHALATION) PRN
OUTPATIENT
Start: 2024-05-07

## 2024-04-16 RX ORDER — EPINEPHRINE 1 MG/ML
0.3 INJECTION, SOLUTION, CONCENTRATE INTRAVENOUS PRN
Status: DISCONTINUED | OUTPATIENT
Start: 2024-04-16 | End: 2024-04-17 | Stop reason: HOSPADM

## 2024-04-16 RX ORDER — ACETAMINOPHEN 325 MG/1
650 TABLET ORAL
OUTPATIENT
Start: 2024-05-07

## 2024-04-16 RX ORDER — ALBUTEROL SULFATE 90 UG/1
4 AEROSOL, METERED RESPIRATORY (INHALATION) PRN
Status: DISCONTINUED | OUTPATIENT
Start: 2024-04-16 | End: 2024-04-17 | Stop reason: HOSPADM

## 2024-04-16 RX ORDER — DIPHENHYDRAMINE HYDROCHLORIDE 50 MG/ML
50 INJECTION INTRAMUSCULAR; INTRAVENOUS
OUTPATIENT
Start: 2024-05-07

## 2024-04-16 RX ORDER — ONDANSETRON 2 MG/ML
8 INJECTION INTRAMUSCULAR; INTRAVENOUS
OUTPATIENT
Start: 2024-05-07

## 2024-04-16 RX ORDER — EPINEPHRINE 1 MG/ML
0.3 INJECTION, SOLUTION, CONCENTRATE INTRAVENOUS PRN
OUTPATIENT
Start: 2024-05-07

## 2024-04-16 RX ADMIN — ROMOSOZUMAB-AQQG 105 MG: 105 INJECTION, SOLUTION SUBCUTANEOUS at 11:47

## 2024-04-16 RX ADMIN — ROMOSOZUMAB-AQQG 105 MG: 105 INJECTION, SOLUTION SUBCUTANEOUS at 11:46

## 2024-04-16 NOTE — PROGRESS NOTES
[facility] Progress Note    Date: 2024    Name: Janice Eagle    MRN: [id number]         : 1965      Ms. Eagle was assessed and education was provided. Pt arrived ambulatory and reported that a few weeks ago she was visiting her grandchildren in the hospital and scraped her foot on the room door. She saw her PCP and is on abx for infection    Ms. Eagle's vitals were reviewed and patient was observed for 5 minutes prior to treatment.     Vitals:    24 1100   BP: (!) 189/84   Pulse: 82   Resp: 18   Temp: 97.6 °F (36.4 °C)   SpO2: 96%     Lab results reviewed and noted to be within tx parameters.     Evenity 105 mg given subcutaneous in right arm site without redness, swelling or pain.     Evenity 105 mg given subcutaneous in left arm site without redness, swelling or pain.      Ms. Eagle tolerated well, and had no complaints.     [facility] in [facility] condition at 1200. She is to return on May 14 at 1100 for her next appointment for Evenity if she continues on Evenity, she is going to speak with Dr. Rincon about switching to Prolia.     Nayeli Holder RN  2024  11:59 AM

## 2024-04-16 NOTE — DISCHARGE INSTRUCTIONS
romosozumab  Pronunciation:  VERENICEANDREEA stephens SOZ ue mab  Brand:  Evenity  What is the most important information I should know about romosozumab?  You should not use this medicine if you have low levels of calcium in your blood (hypocalcemia). You may not be able to use romosozumab if you have had a heart attack or stroke within the past 12 months.  Romosozumab can increase your risk of having a heart attack or stroke, or dying from a heart or blood vessel problem. Seek emergency medical help if you have symptoms such as: chest pain or pressure, shortness of breath, feeling light-headed, sudden numbness or weakness, problems with vision or speech, or loss of balance.  What is romosozumab?  Romosozumab is used to treat osteoporosis in postmenopausal women with a high risk of bone fracture who cannot use other osteoporosis medications (or when other medications did not work).  Romosozumab may also be used for purposes not listed in this medication guide.  What should I discuss with my healthcare provider before receiving romosozumab?  You should not be treated with romosozumab if you are allergic to it, or if you have low levels of calcium in your blood (hypocalcemia).  You may not be able to receive romosozumab if you have had a heart attack or stroke within the past 12 months.  This medicine can increase your risk of having a heart attack or stroke, or dying from a heart or blood vessel problem. Ask your doctor about your specific risk.  Tell your doctor if you have ever had:  heart problems;  a blood vessel disorder;  a stroke or heart attack;  hypocalcemia (low levels of calcium in your blood);  kidney disease (or if you are on dialysis); or  if you cannot take calcium and vitamin D supplements every day.  This medicine may cause jaw bone problems (osteonecrosis). The risk is highest in people with cancer, blood cell disorders, pre-existing dental problems, or people treated with steroids, chemotherapy, or radiation.

## 2024-04-17 LAB
BACTERIA SPEC AEROBE CULT: NORMAL
BACTERIA SPEC ANAEROBE CULT: NORMAL

## 2024-04-18 ENCOUNTER — OFFICE VISIT (OUTPATIENT)
Age: 59
End: 2024-04-18
Payer: MEDICARE

## 2024-04-18 VITALS
BODY MASS INDEX: 27.18 KG/M2 | RESPIRATION RATE: 18 BRPM | HEIGHT: 63 IN | OXYGEN SATURATION: 98 % | HEART RATE: 91 BPM | DIASTOLIC BLOOD PRESSURE: 64 MMHG | WEIGHT: 153.4 LBS | TEMPERATURE: 97.1 F | SYSTOLIC BLOOD PRESSURE: 148 MMHG

## 2024-04-18 DIAGNOSIS — L03.116 CELLULITIS OF LEFT LOWER EXTREMITY: ICD-10-CM

## 2024-04-18 PROCEDURE — 99213 OFFICE O/P EST LOW 20 MIN: CPT | Performed by: INTERNAL MEDICINE

## 2024-04-18 PROCEDURE — G8419 CALC BMI OUT NRM PARAM NOF/U: HCPCS | Performed by: INTERNAL MEDICINE

## 2024-04-18 PROCEDURE — 1036F TOBACCO NON-USER: CPT | Performed by: INTERNAL MEDICINE

## 2024-04-18 PROCEDURE — 3017F COLORECTAL CA SCREEN DOC REV: CPT | Performed by: INTERNAL MEDICINE

## 2024-04-18 PROCEDURE — G8427 DOCREV CUR MEDS BY ELIG CLIN: HCPCS | Performed by: INTERNAL MEDICINE

## 2024-04-18 RX ORDER — DOXYCYCLINE HYCLATE 100 MG
100 TABLET ORAL 2 TIMES DAILY
Qty: 20 TABLET | Refills: 0 | Status: SHIPPED | OUTPATIENT
Start: 2024-04-18 | End: 2024-04-28

## 2024-04-18 ASSESSMENT — PATIENT HEALTH QUESTIONNAIRE - PHQ9
3. TROUBLE FALLING OR STAYING ASLEEP: NOT AT ALL
SUM OF ALL RESPONSES TO PHQ QUESTIONS 1-9: 0
2. FEELING DOWN, DEPRESSED OR HOPELESS: NOT AT ALL
SUM OF ALL RESPONSES TO PHQ QUESTIONS 1-9: 0
8. MOVING OR SPEAKING SO SLOWLY THAT OTHER PEOPLE COULD HAVE NOTICED. OR THE OPPOSITE, BEING SO FIGETY OR RESTLESS THAT YOU HAVE BEEN MOVING AROUND A LOT MORE THAN USUAL: NOT AT ALL
SUM OF ALL RESPONSES TO PHQ QUESTIONS 1-9: 0
6. FEELING BAD ABOUT YOURSELF - OR THAT YOU ARE A FAILURE OR HAVE LET YOURSELF OR YOUR FAMILY DOWN: NOT AT ALL
4. FEELING TIRED OR HAVING LITTLE ENERGY: NOT AT ALL
1. LITTLE INTEREST OR PLEASURE IN DOING THINGS: NOT AT ALL
9. THOUGHTS THAT YOU WOULD BE BETTER OFF DEAD, OR OF HURTING YOURSELF: NOT AT ALL
10. IF YOU CHECKED OFF ANY PROBLEMS, HOW DIFFICULT HAVE THESE PROBLEMS MADE IT FOR YOU TO DO YOUR WORK, TAKE CARE OF THINGS AT HOME, OR GET ALONG WITH OTHER PEOPLE: NOT DIFFICULT AT ALL
5. POOR APPETITE OR OVEREATING: NOT AT ALL
7. TROUBLE CONCENTRATING ON THINGS, SUCH AS READING THE NEWSPAPER OR WATCHING TELEVISION: NOT AT ALL
SUM OF ALL RESPONSES TO PHQ9 QUESTIONS 1 & 2: 0
SUM OF ALL RESPONSES TO PHQ QUESTIONS 1-9: 0

## 2024-04-18 NOTE — PROGRESS NOTES
1. Cellulitis of left lower extremity  I think she probably had a photosensitivity reaction while working out in the garden today from her doxycycline.  Please see photographs.  Because of her diabetes I suspect she will need a prolonged course of doxycycline.  Discussed wound care and management today with the patient and her sister.  Will also follow-up with Dr. Rincon.  If her symptoms worsen she will return to our clinic or go to the ER.  - doxycycline hyclate (VIBRA-TABS) 100 MG tablet; Take 1 tablet by mouth 2 times daily for 10 days  Dispense: 20 tablet; Refill: 0         Chief Complaint   Patient presents with    OTHER     L foot wound and infection. Pt concerned wound is not healing.         No orders of the defined types were placed in this encounter.      Maury Zimmerman MD, FACP      HPI:         is a 59 y.o. female who arrives for concern about her foot which was evaluated about a week ago by Dr. Rincon and placed on doxycycline and mupirocin ointment.  Today while working in the garden she noticed that her toes and foot were becoming red and irritated.  She became concerned that she was developing cellulitis and came to the clinic.        No Known Allergies    Outpatient Encounter Medications as of 4/18/2024   Medication Sig Dispense Refill    doxycycline hyclate (VIBRA-TABS) 100 MG tablet Take 1 tablet by mouth 2 times daily for 10 days 20 tablet 0    fluticasone (FLONASE) 50 MCG/ACT nasal spray 2 sprays by Nasal route daily      romosozumab-aqqg (EVENITY) 105 MG/1.17ML SOSY injection Inject 2.34 mLs into the skin every 30 days Indications: osteoporosis 2.24 mL     blood glucose test strips (ONETOUCH ULTRA) strip Check blood sugar 8 times per day due to frequent hypoglycemic events. Dx: E10.42 800 each 3    TRESIBA FLEXTOUCH 100 UNIT/ML SOPN Inject 33 units every morning and 2 units every night (Replaces Levemir, formulary requirement), BRAND MEDICALLY NEEDED FOR INSURANCE TO COVER 45 mL 1

## 2024-04-18 NOTE — PROGRESS NOTES
Janice Eagle is a 59 y.o. female presenting for/with:    Chief Complaint   Patient presents with    OTHER     L foot wound and infection. Pt concerned wound is not healing.       Vitals:    04/18/24 1517   BP: (!) 148/64   Site: Left Upper Arm   Position: Sitting   Cuff Size: Medium Adult   Pulse: 91   Resp: 18   Temp: 97.1 °F (36.2 °C)   TempSrc: Temporal   SpO2: 98%   Weight: 69.6 kg (153 lb 6.4 oz)   Height: 1.6 m (5' 2.99\")       Pain Scale: 0 - No pain/10  Pain Location:     \"Have you been to the ER, urgent care clinic since your last visit?  Hospitalized since your last visit?\"    NO    “Have you seen or consulted any other health care providers outside of [facility] since your last visit?”    NO                 4/18/2024     3:15 PM   PHQ-9    Little interest or pleasure in doing things 0   Feeling down, depressed, or hopeless 0   PHQ-2 Score 0   PHQ-9 Total Score 0           1/15/2024     2:30 PM 1/10/2023    12:00 AM   [facility] AMB LEARNING ASSESSMENT   Primary Learner Patient Patient   Primary Language ENGLISH ENGLISH   Learning Preference DEMONSTRATION DEMONSTRATION   Answered By patient patient   Relationship to Learner SELF SELF            4/18/2024     3:16 PM   Amb Fall Risk Assessment and TUG Test   Do you feel unsteady or are you worried about falling?  no   2 or more falls in past year? no   Fall with injury in past year? no           4/18/2024     3:00 PM 4/10/2024     2:00 PM 3/25/2024     2:00 PM   ADL ASSESSMENT   Feeding yourself No Help Needed No Help Needed No Help Needed   Getting from bed to chair No Help Needed No Help Needed No Help Needed   Getting dressed No Help Needed No Help Needed No Help Needed   Bathing or showering No Help Needed No Help Needed No Help Needed   Walk across the room (includes cane/walker) No Help Needed No Help Needed No Help Needed   Using the telphone No Help Needed No Help Needed No Help Needed   Taking your medications No Help Needed No Help

## 2024-04-19 ENCOUNTER — TELEMEDICINE (OUTPATIENT)
Age: 59
End: 2024-04-19

## 2024-04-19 DIAGNOSIS — E10.42 TYPE 1 DIABETES MELLITUS WITH DIABETIC POLYNEUROPATHY (HCC): ICD-10-CM

## 2024-04-19 DIAGNOSIS — S91.302D OPEN WOUND OF LEFT FOOT, SUBSEQUENT ENCOUNTER: Primary | ICD-10-CM

## 2024-04-19 DIAGNOSIS — E87.1 CHRONIC HYPONATREMIA: ICD-10-CM

## 2024-04-19 DIAGNOSIS — L56.8 PHOTOTOXIC DERMATITIS: ICD-10-CM

## 2024-04-19 DIAGNOSIS — I10 PRIMARY HYPERTENSION: ICD-10-CM

## 2024-04-19 DIAGNOSIS — M81.6 LOCALIZED OSTEOPOROSIS WITHOUT CURRENT PATHOLOGICAL FRACTURE: ICD-10-CM

## 2024-04-19 DIAGNOSIS — I25.10 ASCVD (ARTERIOSCLEROTIC CARDIOVASCULAR DISEASE): ICD-10-CM

## 2024-04-19 RX ORDER — LOSARTAN POTASSIUM 50 MG/1
50 TABLET ORAL 2 TIMES DAILY
Qty: 180 TABLET | Refills: 3 | Status: SHIPPED | OUTPATIENT
Start: 2024-04-19

## 2024-04-19 ASSESSMENT — PATIENT HEALTH QUESTIONNAIRE - PHQ9
SUM OF ALL RESPONSES TO PHQ QUESTIONS 1-9: 0
1. LITTLE INTEREST OR PLEASURE IN DOING THINGS: NOT AT ALL
SUM OF ALL RESPONSES TO PHQ QUESTIONS 1-9: 0
SUM OF ALL RESPONSES TO PHQ9 QUESTIONS 1 & 2: 0
2. FEELING DOWN, DEPRESSED OR HOPELESS: NOT AT ALL

## 2024-04-19 NOTE — PROGRESS NOTES
Janice Eagle is a 59 y.o. female presenting for/with:    Chief Complaint   Patient presents with    Cellulitis     F/up on cellulitis on foot .. Saw Dr. Zimmerman yesterday and had Doxy extended for another 10 days .. Patient states foot is no worse        There were no vitals filed for this visit.    Pain Scale: 0/10  Pain Location:     \"Have you been to the ER, urgent care clinic since your last visit?  Hospitalized since your last visit?\"    NO    “Have you seen or consulted any other health care providers outside of [facility] since your last visit?”    NO                 4/19/2024    11:42 AM   PHQ-9    Little interest or pleasure in doing things 0   Feeling down, depressed, or hopeless 0   PHQ-2 Score 0   PHQ-9 Total Score 0           1/15/2024     2:30 PM 1/10/2023    12:00 AM   [facility] AMB LEARNING ASSESSMENT   Primary Learner Patient Patient   Primary Language ENGLISH ENGLISH   Learning Preference DEMONSTRATION DEMONSTRATION   Answered By patient patient   Relationship to Learner SELF SELF            4/18/2024     3:16 PM   Amb Fall Risk Assessment and TUG Test   Do you feel unsteady or are you worried about falling?  no   2 or more falls in past year? no   Fall with injury in past year? no           4/19/2024    11:00 AM 4/18/2024     3:00 PM 4/10/2024     2:00 PM 3/25/2024     2:00 PM   ADL ASSESSMENT   Feeding yourself No Help Needed No Help Needed No Help Needed No Help Needed   Getting from bed to chair No Help Needed No Help Needed No Help Needed No Help Needed   Getting dressed No Help Needed No Help Needed No Help Needed No Help Needed   Bathing or showering No Help Needed No Help Needed No Help Needed No Help Needed   Walk across the room (includes cane/walker) No Help Needed No Help Needed No Help Needed No Help Needed   Using the telphone No Help Needed No Help Needed No Help Needed No Help Needed   Taking your medications No Help Needed No Help Needed No Help Needed No Help Needed

## 2024-04-19 NOTE — PROGRESS NOTES
Janice Eagle is a 59 y.o. female who was seen by synchronous (real-time) audio technology on 4/19/2024.     Subjective:   Cellulitis (F/up on cellulitis on foot .. Saw Dr. Zimmerman yesterday and had Doxy extended for another 10 days .. Patient states foot is no worse )    HPI:  L foot wound and infection  Sx as above. Saw Dr. Zimmerman after having a lot of redness to toes after going out in the sun in sandals for a long time yesterday and prev days. Dx with phototoxic reaction, rec to avoid sun exposure. Has had gradually improving redness since yesterday, swelling pretty much resolved. No streaks coming off of the open wound. Farhad Doryx well otherwise.    PMH, SH, Medications/Allergies: reviewed, on chart.  Current Outpatient Medications   Medication Sig    doxycycline hyclate (VIBRA-TABS) 100 MG tablet Take 1 tablet by mouth 2 times daily for 10 days    fluticasone (FLONASE) 50 MCG/ACT nasal spray 2 sprays by Nasal route daily    pregabalin (LYRICA) 50 MG capsule Take 1 capsule by mouth nightly for 6 days, THEN 1 capsule 2 times daily for 6 days, THEN 1 capsule 3 times daily for 90 days. Max Daily Amount: 150 mg.    romosozumab-aqqg (EVENITY) 105 MG/1.17ML SOSY injection Inject 2.34 mLs into the skin every 30 days Indications: osteoporosis    blood glucose test strips (ONETOUCH ULTRA) strip Check blood sugar 8 times per day due to frequent hypoglycemic events. Dx: E10.42    TRESIBA FLEXTOUCH 100 UNIT/ML SOPN Inject 33 units every morning and 2 units every night (Replaces Levemir, formulary requirement), BRAND MEDICALLY NEEDED FOR INSURANCE TO COVER    insulin lispro, 1 Unit Dial, (HUMALOG/ADMELOG) 100 UNIT/ML SOPN Max daily dose 20 units (Patient taking differently: Max daily dose 20 units. Sliding scale.)    levothyroxine (SYNTHROID) 75 MCG tablet TAKE 1 TABLET BY MOUTH ONCE DAILY BEFORE  BREAKFAST    glucosamine-chondroitin 500-400 MG tablet Take 1 tablet by mouth daily    ciclopirox (LOPROX) 0.77 % cream APPLY

## 2024-04-25 ENCOUNTER — TELEPHONE (OUTPATIENT)
Age: 59
End: 2024-04-25

## 2024-04-26 ENCOUNTER — TELEPHONE (OUTPATIENT)
Age: 59
End: 2024-04-26

## 2024-04-26 NOTE — TELEPHONE ENCOUNTER
Pt called and stated that she has a question about a test that she recently got, please advise call back # [phone].

## 2024-04-29 ENCOUNTER — APPOINTMENT (OUTPATIENT)
Facility: HOSPITAL | Age: 59
End: 2024-04-29
Payer: MEDICARE

## 2024-04-29 ENCOUNTER — HOSPITAL ENCOUNTER (EMERGENCY)
Facility: HOSPITAL | Age: 59
Discharge: HOME | End: 2024-04-29
Attending: EMERGENCY MEDICINE
Payer: MEDICARE

## 2024-04-29 VITALS
OXYGEN SATURATION: 99 % | SYSTOLIC BLOOD PRESSURE: 146 MMHG | TEMPERATURE: 98 F | BODY MASS INDEX: 28.17 KG/M2 | HEART RATE: 75 BPM | RESPIRATION RATE: 16 BRPM | WEIGHT: 159 LBS | DIASTOLIC BLOOD PRESSURE: 76 MMHG

## 2024-04-29 DIAGNOSIS — E87.1 HYPONATREMIA: Primary | ICD-10-CM

## 2024-04-29 LAB
ALBUMIN SERPL-MCNC: 4.1 G/DL (ref 3.5–5)
ALBUMIN/GLOB SERPL: 1.5 (ref 1.1–2.2)
ALP SERPL-CCNC: 150 U/L (ref 45–117)
ALT SERPL-CCNC: 27 U/L (ref 12–78)
ANION GAP SERPL CALC-SCNC: 11 MMOL/L (ref 5–15)
APPEARANCE UR: CLEAR
AST SERPL-CCNC: 19 U/L (ref 15–37)
BACTERIA URNS QL MICRO: NEGATIVE /HPF
BASOPHILS # BLD: 0 K/UL (ref 0–0.1)
BASOPHILS NFR BLD: 0 % (ref 0–1)
BILIRUB SERPL-MCNC: 0.7 MG/DL (ref 0.2–1)
BILIRUB UR QL: NEGATIVE
BUN SERPL-MCNC: 13 MG/DL (ref 6–20)
BUN/CREAT SERPL: 22 (ref 12–20)
CALCIUM SERPL-MCNC: 9.9 MG/DL (ref 8.5–10.1)
CHLORIDE SERPL-SCNC: 95 MMOL/L (ref 97–108)
CO2 SERPL-SCNC: 26 MMOL/L (ref 21–32)
COLOR UR: NORMAL
CREAT SERPL-MCNC: 0.59 MG/DL (ref 0.55–1.02)
DIFFERENTIAL METHOD BLD: ABNORMAL
EOSINOPHIL # BLD: 0 K/UL (ref 0–0.4)
EOSINOPHIL NFR BLD: 0 % (ref 0–7)
EPITH CASTS URNS QL MICRO: NORMAL /LPF
ERYTHROCYTE [DISTWIDTH] IN BLOOD BY AUTOMATED COUNT: 11.8 % (ref 11.5–14.5)
GLOBULIN SER CALC-MCNC: 2.7 G/DL (ref 2–4)
GLUCOSE BLD STRIP.AUTO-MCNC: 104 MG/DL (ref 65–117)
GLUCOSE SERPL-MCNC: 76 MG/DL (ref 65–100)
GLUCOSE UR STRIP.AUTO-MCNC: NEGATIVE MG/DL
HCT VFR BLD AUTO: 35.7 % (ref 35–47)
HGB BLD-MCNC: 13 G/DL (ref 11.5–16)
HGB UR QL STRIP: NEGATIVE
IMM GRANULOCYTES # BLD AUTO: 0 K/UL (ref 0–0.04)
IMM GRANULOCYTES NFR BLD AUTO: 0 % (ref 0–0.5)
KETONES UR QL STRIP.AUTO: NEGATIVE MG/DL
LEUKOCYTE ESTERASE UR QL STRIP.AUTO: NEGATIVE
LYMPHOCYTES # BLD: 1.6 K/UL (ref 0.8–3.5)
LYMPHOCYTES NFR BLD: 30 % (ref 12–49)
MAGNESIUM SERPL-MCNC: 1.7 MG/DL (ref 1.6–2.4)
MCH RBC QN AUTO: 31.5 PG (ref 26–34)
MCHC RBC AUTO-ENTMCNC: 36.4 G/DL (ref 30–36.5)
MCV RBC AUTO: 86.4 FL (ref 80–99)
MONOCYTES # BLD: 0.6 K/UL (ref 0–1)
MONOCYTES NFR BLD: 11 % (ref 5–13)
NEUTS SEG # BLD: 3.1 K/UL (ref 1.8–8)
NEUTS SEG NFR BLD: 59 % (ref 32–75)
NITRITE UR QL STRIP.AUTO: NEGATIVE
NRBC # BLD: 0 K/UL (ref 0–0.01)
NRBC BLD-RTO: 0 PER 100 WBC
PH UR STRIP: 6.5 (ref 5–8)
PLATELET # BLD AUTO: 196 K/UL (ref 150–400)
PMV BLD AUTO: 8.5 FL (ref 8.9–12.9)
POTASSIUM SERPL-SCNC: 4.1 MMOL/L (ref 3.5–5.1)
PROT SERPL-MCNC: 6.8 G/DL (ref 6.4–8.2)
PROT UR STRIP-MCNC: NEGATIVE MG/DL
RBC # BLD AUTO: 4.13 M/UL (ref 3.8–5.2)
RBC #/AREA URNS HPF: NORMAL /HPF (ref 0–5)
SERVICE CMNT-IMP: NORMAL
SODIUM SERPL-SCNC: 132 MMOL/L (ref 136–145)
SP GR UR REFRACTOMETRY: 1.01 (ref 1–1.03)
TROPONIN I SERPL HS-MCNC: 4 NG/L (ref 0–51)
TSH SERPL DL<=0.05 MIU/L-ACNC: 0.86 UIU/ML (ref 0.36–3.74)
URINE CULTURE IF INDICATED: NORMAL
UROBILINOGEN UR QL STRIP.AUTO: 0.2 EU/DL (ref 0.2–1)
WBC # BLD AUTO: 5.3 K/UL (ref 3.6–11)
WBC URNS QL MICRO: NORMAL /HPF (ref 0–4)

## 2024-04-29 PROCEDURE — 2580000003 HC RX 258: Performed by: EMERGENCY MEDICINE

## 2024-04-29 PROCEDURE — 84443 ASSAY THYROID STIM HORMONE: CPT

## 2024-04-29 PROCEDURE — 80053 COMPREHEN METABOLIC PANEL: CPT

## 2024-04-29 PROCEDURE — 85025 COMPLETE CBC W/AUTO DIFF WBC: CPT

## 2024-04-29 PROCEDURE — 99285 EMERGENCY DEPT VISIT HI MDM: CPT

## 2024-04-29 PROCEDURE — 83735 ASSAY OF MAGNESIUM: CPT

## 2024-04-29 PROCEDURE — 81001 URINALYSIS AUTO W/SCOPE: CPT

## 2024-04-29 PROCEDURE — 96361 HYDRATE IV INFUSION ADD-ON: CPT

## 2024-04-29 PROCEDURE — 82962 GLUCOSE BLOOD TEST: CPT

## 2024-04-29 PROCEDURE — 96360 HYDRATION IV INFUSION INIT: CPT

## 2024-04-29 PROCEDURE — 36415 COLL VENOUS BLD VENIPUNCTURE: CPT

## 2024-04-29 PROCEDURE — 71045 X-RAY EXAM CHEST 1 VIEW: CPT

## 2024-04-29 PROCEDURE — 84484 ASSAY OF TROPONIN QUANT: CPT

## 2024-04-29 RX ORDER — 0.9 % SODIUM CHLORIDE 0.9 %
1000 INTRAVENOUS SOLUTION INTRAVENOUS ONCE
Status: COMPLETED | OUTPATIENT
Start: 2024-04-29 | End: 2024-04-29

## 2024-04-29 RX ADMIN — SODIUM CHLORIDE 1000 ML: 9 INJECTION, SOLUTION INTRAVENOUS at 11:03

## 2024-04-29 ASSESSMENT — ENCOUNTER SYMPTOMS
EYE REDNESS: 0
SHORTNESS OF BREATH: 0
CHEST TIGHTNESS: 1
DIARRHEA: 0
VOMITING: 0
NAUSEA: 0
ABDOMINAL PAIN: 0
SORE THROAT: 0
COUGH: 0

## 2024-04-29 ASSESSMENT — PAIN DESCRIPTION - PAIN TYPE: TYPE: ACUTE PAIN

## 2024-04-29 ASSESSMENT — PAIN DESCRIPTION - LOCATION: LOCATION: CHEST

## 2024-04-29 ASSESSMENT — PAIN - FUNCTIONAL ASSESSMENT: PAIN_FUNCTIONAL_ASSESSMENT: 0-10

## 2024-04-29 ASSESSMENT — PAIN SCALES - GENERAL: PAINLEVEL_OUTOF10: 6

## 2024-04-29 ASSESSMENT — PAIN DESCRIPTION - DESCRIPTORS: DESCRIPTORS: PRESSURE

## 2024-04-29 NOTE — ED NOTES
Pt has ambulated to restroom, provided a urine specimen. Pt reports wanting to go home now after returning to room, ED Provider aware.

## 2024-04-29 NOTE — ED TRIAGE NOTES
Pt has chest pain, dizziness and fatigue since yesterday, did assist with chores outside x 3 hours. Pt feels light headed. Pt does have hx of electrolyte imbalances due to rare condition, frequently has low sodium.

## 2024-04-29 NOTE — ED NOTES
I have reviewed discharge instructions with the patient. The patient verbalized understanding. Discharge medications discussed with patient. No questions at this time. Ambulated without difficulty, security to assist pt to her vehicle as she has vision troubles at baseline.

## 2024-04-29 NOTE — ED PROVIDER NOTES
EMERGENCY DEPARTMENT HISTORY AND PHYSICAL EXAM      Date: 4/29/2024  Patient Name: Janice Eagle    History of Presenting Illness     Chief Complaint   Patient presents with    Chest Pain       History Provided By: Patient    HPI: Janice Eagle, 59 y.o. female with PMHx significant for DM 1, insulin-dependent, prior CVA, hypothyroid presents via private vehicle to the ED with cc of chest tightness, lightheadedness and dizziness.  Patient thinks her sodium is low.  She has had similar symptoms with hyponatremia in the past.    She reports that history of chronic hyponatremia.  She used to drink in excess of 2 gallons of water a day.  Her primary doctor instructed her to stop drinking this much free water and to start adding salt to her diet.  She states she has been on a strict diet and has decreased her water intake.  She reports yesterday she was working in the yard for about 3 hours.  She reports she feels very lightheaded and reports some generalized fatigue.  No room spinning dizziness.  No nausea or vomiting.  No focal chest pain.  No vision changes.    She reports her doctor is also been tapering off her gabapentin thinking this was leading to her hyponatremia.  She was previously on 104 times a day and now is just on 100 once a day.        PMHx: Significant for IDDM, prior CVA, hypothyroid  PSHx: Significant for right hand surgery.  Left wrist surgery.  Social Hx: Former half pack a day smoker for about 3 years.  Started in 2007.  Quit in 2010.  Denies alcohol use.    There are no other complaints, changes, or physical findings at this time.    PCP: Souleymane Rincon MD    No current facility-administered medications on file prior to encounter.     Current Outpatient Medications on File Prior to Encounter   Medication Sig Dispense Refill    losartan (COZAAR) 50 MG tablet Take 1 tablet by mouth in the morning and at bedtime 180 tablet 3    fluticasone (FLONASE) 50 MCG/ACT nasal spray 2 sprays by Nasal

## 2024-05-01 ENCOUNTER — OFFICE VISIT (OUTPATIENT)
Age: 59
End: 2024-05-01

## 2024-05-01 VITALS
OXYGEN SATURATION: 98 % | RESPIRATION RATE: 18 BRPM | WEIGHT: 155 LBS | TEMPERATURE: 97.8 F | BODY MASS INDEX: 27.46 KG/M2 | DIASTOLIC BLOOD PRESSURE: 70 MMHG | HEIGHT: 63 IN | HEART RATE: 81 BPM | SYSTOLIC BLOOD PRESSURE: 131 MMHG

## 2024-05-01 DIAGNOSIS — S91.302D OPEN WOUND OF LEFT FOOT, SUBSEQUENT ENCOUNTER: ICD-10-CM

## 2024-05-01 DIAGNOSIS — I25.10 ASCVD (ARTERIOSCLEROTIC CARDIOVASCULAR DISEASE): ICD-10-CM

## 2024-05-01 DIAGNOSIS — I10 PRIMARY HYPERTENSION: ICD-10-CM

## 2024-05-01 DIAGNOSIS — E10.42 TYPE 1 DIABETES MELLITUS WITH DIABETIC POLYNEUROPATHY (HCC): ICD-10-CM

## 2024-05-01 DIAGNOSIS — E87.1 CHRONIC HYPONATREMIA: ICD-10-CM

## 2024-05-01 DIAGNOSIS — Z00.00 MEDICARE ANNUAL WELLNESS VISIT, SUBSEQUENT: Primary | ICD-10-CM

## 2024-05-01 PROCEDURE — 1036F TOBACCO NON-USER: CPT | Performed by: FAMILY MEDICINE

## 2024-05-01 RX ORDER — ERYTHROMYCIN 5 MG/G
OINTMENT OPHTHALMIC 4 TIMES DAILY
COMMUNITY
Start: 2024-04-25 | End: 2024-05-02

## 2024-05-01 ASSESSMENT — PATIENT HEALTH QUESTIONNAIRE - PHQ9
9. THOUGHTS THAT YOU WOULD BE BETTER OFF DEAD, OR OF HURTING YOURSELF: NOT AT ALL
SUM OF ALL RESPONSES TO PHQ QUESTIONS 1-9: 5
SUM OF ALL RESPONSES TO PHQ QUESTIONS 1-9: 5
4. FEELING TIRED OR HAVING LITTLE ENERGY: SEVERAL DAYS
5. POOR APPETITE OR OVEREATING: SEVERAL DAYS
7. TROUBLE CONCENTRATING ON THINGS, SUCH AS READING THE NEWSPAPER OR WATCHING TELEVISION: NOT AT ALL
SUM OF ALL RESPONSES TO PHQ9 QUESTIONS 1 & 2: 0
6. FEELING BAD ABOUT YOURSELF - OR THAT YOU ARE A FAILURE OR HAVE LET YOURSELF OR YOUR FAMILY DOWN: NOT AT ALL
SUM OF ALL RESPONSES TO PHQ QUESTIONS 1-9: 5
SUM OF ALL RESPONSES TO PHQ QUESTIONS 1-9: 5
8. MOVING OR SPEAKING SO SLOWLY THAT OTHER PEOPLE COULD HAVE NOTICED. OR THE OPPOSITE, BEING SO FIGETY OR RESTLESS THAT YOU HAVE BEEN MOVING AROUND A LOT MORE THAN USUAL: NOT AT ALL
3. TROUBLE FALLING OR STAYING ASLEEP: NEARLY EVERY DAY
2. FEELING DOWN, DEPRESSED OR HOPELESS: NOT AT ALL
1. LITTLE INTEREST OR PLEASURE IN DOING THINGS: NOT AT ALL
10. IF YOU CHECKED OFF ANY PROBLEMS, HOW DIFFICULT HAVE THESE PROBLEMS MADE IT FOR YOU TO DO YOUR WORK, TAKE CARE OF THINGS AT HOME, OR GET ALONG WITH OTHER PEOPLE: NOT DIFFICULT AT ALL

## 2024-05-01 NOTE — PROGRESS NOTES
Janice Eagle is a 59 y.o. female presenting for/with:    Chief Complaint   Patient presents with    Medicare AWV       Vitals:    05/01/24 1112   BP: 131/70   Site: Left Upper Arm   Position: Sitting   Cuff Size: Medium Adult   Pulse: 81   Resp: 18   Temp: 97.8 °F (36.6 °C)   TempSrc: Temporal   SpO2: 98%   Weight: 70.3 kg (155 lb)   Height: 1.6 m (5' 2.99\")       Pain Scale: 4/10  Pain Location: Foot (left foot)    \"Have you been to the ER, urgent care clinic since your last visit?  Hospitalized since your last visit?\"    NO    “Have you seen or consulted any other health care providers outside of [facility] since your last visit?”    NO                 5/1/2024    11:10 AM   PHQ-9    Little interest or pleasure in doing things 0   Feeling down, depressed, or hopeless 0   Trouble falling or staying asleep, or sleeping too much 3   Feeling tired or having little energy 1   Poor appetite or overeating 1   Feeling bad about yourself - or that you are a failure or have let yourself or your family down 0   Trouble concentrating on things, such as reading the newspaper or watching television 0   Moving or speaking so slowly that other people could have noticed. Or the opposite - being so fidgety or restless that you have been moving around a lot more than usual 0   Thoughts that you would be better off dead, or of hurting yourself in some way 0   PHQ-2 Score 0   PHQ-9 Total Score 5   If you checked off any problems, how difficult have these problems made it for you to do your work, take care of things at home, or get along with other people? 0           1/15/2024     2:30 PM 1/10/2023    12:00 AM   [facility] AMB LEARNING ASSESSMENT   Primary Learner Patient Patient   Primary Language ENGLISH ENGLISH   Learning Preference DEMONSTRATION DEMONSTRATION   Answered By patient patient   Relationship to Learner SELF SELF            5/1/2024    11:10 AM   Amb Fall Risk Assessment and TUG Test   Do you feel unsteady 
daily Yes Automatic Reconciliation, Ar   calcium carbonate 1500 (600 Ca) MG TABS tablet Take 1,000 mg by mouth 2 times daily Yes Automatic Reconciliation, Ar   Cholecalciferol 50 MCG (2000 UT) TABS Take 1 tablet by mouth daily Yes Automatic Reconciliation, Ar   lidocaine (LIDODERM) 5 % 2 patches every evening Yes Automatic Reconciliation, Ar   metoclopramide (REGLAN) 5 MG tablet Take 1 tablet by mouth 2 times daily Yes Automatic Reconciliation, Ar   potassium gluconate 550 mg tablet Take 99 mg by mouth every other day Yes Automatic Reconciliation, Ar   simvastatin (ZOCOR) 40 MG tablet Take 1 tablet by mouth nightly Yes Automatic Reconciliation, Ar   zolpidem (AMBIEN) 10 MG tablet Takes every night Yes Automatic Reconciliation, Ar   pregabalin (LYRICA) 50 MG capsule Take 1 capsule by mouth nightly for 6 days, THEN 1 capsule 2 times daily for 6 days, THEN 1 capsule 3 times daily for 90 days. Max Daily Amount: 150 mg.  Patient not taking: Reported on 5/1/2024  Souleymane Rincon MD       [facility] (Including outside providers/suppliers regularly involved in providing care):   Patient Care Team:  Souleymane Rincon MD as PCP - General (Family Medicine)  Souleymane Rincon MD as PCP - Empaneled Provider  Jordin Lowe MD as Consulting Physician  Ervin Murray MD as Surgeon     Reviewed and updated this visit:  Tobacco  Allergies  Meds  Problems  Med Hx  Surg Hx  Soc Hx  Fam Hx

## 2024-05-01 NOTE — PATIENT INSTRUCTIONS
performed today your provider may have ordered immunizations, labs, imaging, and/or referrals for you.  A list of these orders (if applicable) as well as your Preventive Care list are included within your After Visit Summary for your review.    Other Preventive Recommendations:    A preventive eye exam performed by an eye specialist is recommended every 1-2 years to screen for glaucoma; cataracts, macular degeneration, and other eye disorders.  A preventive dental visit is recommended every 6 months.  Try to get at least 150 minutes of exercise per week or 10,000 steps per day on a pedometer .  Order or download the FREE \"Exercise & Physical Activity: Your Everyday Guide\" from [employer]. Call 1-781.911.4012 or search [employer] online.  You need 0909-1181 mg of calcium and 6194-3204 IU of vitamin D per day. It is possible to meet your calcium requirement with diet alone, but a vitamin D supplement is usually necessary to meet this goal.  When exposed to the sun, use a sunscreen that protects against both UVA and UVB radiation with an SPF of 30 or greater. Reapply every 2 to 3 hours or after sweating, drying off with a towel, or swimming.  Always wear a seat belt when traveling in a car. Always wear a helmet when riding a bicycle or motorcycle.

## 2024-05-03 LAB
EKG ATRIAL RATE: 75 BPM
EKG DIAGNOSIS: NORMAL
EKG P AXIS: 55 DEGREES
EKG P-R INTERVAL: 150 MS
EKG Q-T INTERVAL: 398 MS
EKG QRS DURATION: 82 MS
EKG QTC CALCULATION (BAZETT): 444 MS
EKG R AXIS: -40 DEGREES
EKG T AXIS: 14 DEGREES
EKG VENTRICULAR RATE: 75 BPM

## 2024-05-06 ENCOUNTER — TELEPHONE (OUTPATIENT)
Age: 59
End: 2024-05-06

## 2024-05-06 NOTE — TELEPHONE ENCOUNTER
Got a preprocedure clearance form from [facility] for a colonoscopy, date of surgery 5/8/2024 with [facility], provider Dr. Lenny Christy MD.  Labs and VS reviewed, reassuring.  She is fit to proceed with colonoscopy under MAC.  Of note, she is prone to hyponatremia, and is recommended to hydrate with electrolyte solution while prepping for the scope, instead of plain water.

## 2024-05-07 ENCOUNTER — TELEPHONE (OUTPATIENT)
Age: 59
End: 2024-05-07

## 2024-05-08 ENCOUNTER — TELEPHONE (OUTPATIENT)
Age: 59
End: 2024-05-08

## 2024-05-08 NOTE — TELEPHONE ENCOUNTER
Ms Janice called oncasher yesterday at 6 pm indicates colonoscopy tomorrow (05/8/2024) and that she was asked not to take any levemir by her anesthesiologist, she was to take levemir 5 units at bedtime and 36 units at 8 AM, advised to take 5 units of levemir at bedtime and also at 8 AM the next morning and if blood sugar elevated before procedure it can be corrected with short acting insulin, patient indicates understanding and agrees with plan.

## 2024-05-09 DIAGNOSIS — F51.01 PRIMARY INSOMNIA: Primary | ICD-10-CM

## 2024-05-09 NOTE — TELEPHONE ENCOUNTER
Janice called stating she has been trying to get a refill on her Ambien all week, as tonight is her last dose. Please call her back in regards to this.     If by any chance a refill is able to be sent in, she uses [facility].    Thanks!

## 2024-05-10 RX ORDER — ZOLPIDEM TARTRATE 10 MG/1
TABLET ORAL
Qty: 30 TABLET | Refills: 0 | Status: SHIPPED | OUTPATIENT
Start: 2024-05-10 | End: 2024-06-09

## 2024-05-10 NOTE — TELEPHONE ENCOUNTER
[place]  reviewed. Fill pattern in goal, but getting Rx's from old PCP at [facility] while still seeing us. Will advise pt re: safe Rx practices for habit forming medications.

## 2024-05-30 ENCOUNTER — TELEPHONE (OUTPATIENT)
Age: 59
End: 2024-05-30

## 2024-05-30 NOTE — TELEPHONE ENCOUNTER
Patient left VM stating that [facility] has Levemir. She was told by the pharmacist that the  is still making Levemir. Patient does not to change and wants a prescription for Levemir.

## 2024-05-31 ENCOUNTER — TELEPHONE (OUTPATIENT)
Age: 59
End: 2024-05-31

## 2024-05-31 ENCOUNTER — APPOINTMENT (OUTPATIENT)
Facility: HOSPITAL | Age: 59
End: 2024-05-31
Payer: MEDICARE

## 2024-05-31 ENCOUNTER — HOSPITAL ENCOUNTER (EMERGENCY)
Facility: HOSPITAL | Age: 59
Discharge: HOME | End: 2024-05-31
Attending: EMERGENCY MEDICINE
Payer: MEDICARE

## 2024-05-31 VITALS
WEIGHT: 155 LBS | HEIGHT: 63 IN | DIASTOLIC BLOOD PRESSURE: 77 MMHG | TEMPERATURE: 98 F | OXYGEN SATURATION: 100 % | HEART RATE: 83 BPM | RESPIRATION RATE: 20 BRPM | BODY MASS INDEX: 27.46 KG/M2 | SYSTOLIC BLOOD PRESSURE: 173 MMHG

## 2024-05-31 DIAGNOSIS — H91.91 HEARING LOSS OF RIGHT EAR, UNSPECIFIED HEARING LOSS TYPE: Primary | ICD-10-CM

## 2024-05-31 LAB
GLUCOSE BLD STRIP.AUTO-MCNC: 188 MG/DL (ref 65–117)
SERVICE CMNT-IMP: ABNORMAL

## 2024-05-31 PROCEDURE — 99284 EMERGENCY DEPT VISIT MOD MDM: CPT

## 2024-05-31 PROCEDURE — 70450 CT HEAD/BRAIN W/O DYE: CPT

## 2024-05-31 PROCEDURE — 82962 GLUCOSE BLOOD TEST: CPT

## 2024-05-31 RX ORDER — METHYLPREDNISOLONE 4 MG/1
TABLET ORAL
Qty: 1 KIT | Refills: 0 | Status: SHIPPED | OUTPATIENT
Start: 2024-05-31 | End: 2024-06-06

## 2024-05-31 ASSESSMENT — ENCOUNTER SYMPTOMS
DIARRHEA: 0
NAUSEA: 0
SORE THROAT: 0
SHORTNESS OF BREATH: 0
COUGH: 0
VOMITING: 0
ABDOMINAL PAIN: 0
EYE REDNESS: 0

## 2024-05-31 ASSESSMENT — PAIN - FUNCTIONAL ASSESSMENT
PAIN_FUNCTIONAL_ASSESSMENT: ACTIVITIES ARE NOT PREVENTED
PAIN_FUNCTIONAL_ASSESSMENT: 0-10

## 2024-05-31 ASSESSMENT — PAIN DESCRIPTION - ORIENTATION: ORIENTATION: RIGHT

## 2024-05-31 ASSESSMENT — PAIN DESCRIPTION - DESCRIPTORS: DESCRIPTORS: SHOOTING;NAGGING

## 2024-05-31 ASSESSMENT — PAIN SCALES - GENERAL: PAINLEVEL_OUTOF10: 10

## 2024-05-31 ASSESSMENT — PAIN DESCRIPTION - LOCATION: LOCATION: EAR

## 2024-05-31 NOTE — TELEPHONE ENCOUNTER
Levemir has been approved from 5/17/24 until further notice, per [facility].  LVM for patient.

## 2024-05-31 NOTE — ED TRIAGE NOTES
Loud siren type noise and hearing loss in right ear, chronic hearing loss in left with upcoming ENT appt. Dizzy with unsteady gait

## 2024-05-31 NOTE — ED PROVIDER NOTES
Decision Making):   DDX: 59-year-old female with sudden onset hearing loss of right ear.  Will obtain CT head to rule out any possible ICH.  Similar symptoms in the left ear prior.  Patient already has follow-up with ENT scheduled next Wednesday.    ED Course:   Initial assessment performed. The patients presenting problems have been discussed, and they are in agreement with the care plan formulated and outlined with them.  I have encouraged them to ask questions as they arise throughout their visit.           PROGRESS NOTE    Pt reevaluated.  CT head without any acute findings.  Sudden onset hearing loss.  Will treat with Medrol Dosepak.  Recommended patient follow-up with ENT on Wednesday as scheduled.  Patient is a type I diabetic.  She states she checks her blood sugar multiple times a day.  I advised her to decrease her sweet/sugar intake check her blood sugar regularly and adjust her insulin accordingly.  She has been on steroids multiple times in the past.  Written by Augustine Oquendo MD     Progress note:    Pt noted to be feeling better and ready for discharge. Updated pt and/or family on all final lab and/or  imaging findings.  Will follow up as instructed. All questions have been answered, pt voiced understanding and agreement with plan.           Specific return precautions provided as well as instructions to return to the ED should sx worsen at any time. Vital signs stable for discharge.     I have also put together some discharge instructions for them that include: 1) educational information regarding their diagnosis, 2) how to care for their diagnosis at home, as well a 3) list of reasons why they would want to return to the ED prior to their follow-up appointment, should their condition change.    Written by Augustine Oquendo MD        Critical Care Time:   0    Disposition:  Discharge    PLAN:  1.      Medication List        START taking these medications      methylPREDNISolone 4 MG tablet  Commonly

## 2024-06-05 ENCOUNTER — TELEPHONE (OUTPATIENT)
Age: 59
End: 2024-06-05

## 2024-06-06 DIAGNOSIS — F51.01 PRIMARY INSOMNIA: ICD-10-CM

## 2024-06-06 RX ORDER — ZOLPIDEM TARTRATE 10 MG/1
TABLET ORAL
Qty: 30 TABLET | Refills: 0 | OUTPATIENT
Start: 2024-06-06 | End: 2024-07-06

## 2024-06-06 NOTE — TELEPHONE ENCOUNTER
pulled for patient. Last 2 Ambien fill was completed by Douglas [facility], last filled on 5/9/24. Spoke to Dr Zimmerman. Refill denied at this time.

## 2024-07-03 ENCOUNTER — TRANSCRIBE ORDERS (OUTPATIENT)
Facility: HOSPITAL | Age: 59
End: 2024-07-03

## 2024-07-03 DIAGNOSIS — Z12.31 ENCOUNTER FOR SCREENING MAMMOGRAM FOR MALIGNANT NEOPLASM OF BREAST: Primary | ICD-10-CM

## 2024-07-03 DIAGNOSIS — H91.90 HEARING LOSS, UNSPECIFIED HEARING LOSS TYPE, UNSPECIFIED LATERALITY: Primary | ICD-10-CM

## 2024-07-03 DIAGNOSIS — F51.01 PRIMARY INSOMNIA: ICD-10-CM

## 2024-07-03 RX ORDER — ZOLPIDEM TARTRATE 10 MG/1
TABLET, FILM COATED ORAL
Qty: 30 TABLET | Refills: 1 | Status: SHIPPED | OUTPATIENT
Start: 2024-07-03 | End: 2024-09-01

## 2024-07-03 NOTE — TELEPHONE ENCOUNTER
Pt stopped by to request a refill of Ambien - send to [facility] [place].  Pt has loss complete hearing 5 weeks ago. She needs a referral sent to [facility] - Audiology / [facility] for Appt scheduled 7/16/24 @ 3:00 PM

## 2024-07-12 ENCOUNTER — HOSPITAL ENCOUNTER (OUTPATIENT)
Facility: HOSPITAL | Age: 59
End: 2024-07-12
Attending: FAMILY MEDICINE
Payer: MEDICARE

## 2024-07-12 DIAGNOSIS — Z12.31 ENCOUNTER FOR SCREENING MAMMOGRAM FOR MALIGNANT NEOPLASM OF BREAST: ICD-10-CM

## 2024-07-12 PROCEDURE — 77067 SCR MAMMO BI INCL CAD: CPT

## 2024-07-12 PROCEDURE — 77063 BREAST TOMOSYNTHESIS BI: CPT

## 2024-07-16 ENCOUNTER — TELEPHONE (OUTPATIENT)
Age: 59
End: 2024-07-16

## 2024-07-16 NOTE — TELEPHONE ENCOUNTER
Attempted to call patient. No answer but was a message stating patient is totally deaf and would only be able to respond to test messages or a call to her mother, but no number provided. [facility] message sent to patient.       ----- Message from Souleymane Rincon MD sent at 7/15/2024  5:58 PM EDT -----  Regarding: RE: MRI Request  I checked the chart, and I have not done an assessment of her hips at all since I've met her. I can't order a test that I have never done an assessment for. If she had come in more recently and seen us to evaluate the hips, I would have been able to do more. Please give my apologies. Dr. Menjivar    ----- Message -----  From: Sandra Wade LPN  Sent: 7/15/2024   9:47 AM EDT  To: Souleymane Rincon MD  Subject: FW: [facility] Referral Request                           ----- Message -----  From: Meli Ramey  Sent: 7/15/2024   9:41 AM EDT  To: #  Subject: [facility] Referral Request                             [facility] Referral Request    Reason for referral request: Lab/Test Order    Specialist/Lab/Test patient is requesting (if known): Both Hips MRI    Specialist Phone Number (if applicable): [phone]    Additional Information: Patient is schedule for a brain MRI on Thursday 10AM at the [facility] and asking if she can also have it done the same day for the both hips MRI, and asking for a referral for it.  --------------------------------------------------------------------------------------------------------------------------    Relationship to Patient: Self     Call Back Information: OK to leave message on text  Preferred Call Back Number: Phone [phone]/  back up number: [phone]

## 2024-07-29 ENCOUNTER — OFFICE VISIT (OUTPATIENT)
Age: 59
End: 2024-07-29
Payer: MEDICARE

## 2024-07-29 VITALS
WEIGHT: 151.8 LBS | SYSTOLIC BLOOD PRESSURE: 138 MMHG | OXYGEN SATURATION: 99 % | RESPIRATION RATE: 18 BRPM | TEMPERATURE: 97.4 F | BODY MASS INDEX: 26.9 KG/M2 | HEIGHT: 63 IN | DIASTOLIC BLOOD PRESSURE: 68 MMHG | HEART RATE: 76 BPM

## 2024-07-29 DIAGNOSIS — M25.552 HIP PAIN, BILATERAL: ICD-10-CM

## 2024-07-29 DIAGNOSIS — E03.9 ACQUIRED HYPOTHYROIDISM: ICD-10-CM

## 2024-07-29 DIAGNOSIS — I25.10 ASCVD (ARTERIOSCLEROTIC CARDIOVASCULAR DISEASE): Primary | ICD-10-CM

## 2024-07-29 DIAGNOSIS — E10.42 TYPE 1 DIABETES MELLITUS WITH DIABETIC POLYNEUROPATHY (HCC): ICD-10-CM

## 2024-07-29 DIAGNOSIS — H91.93 BILATERAL DEAFNESS: ICD-10-CM

## 2024-07-29 DIAGNOSIS — E55.9 VITAMIN D DEFICIENCY: ICD-10-CM

## 2024-07-29 DIAGNOSIS — M25.551 HIP PAIN, BILATERAL: ICD-10-CM

## 2024-07-29 DIAGNOSIS — R21 SKIN RASH: ICD-10-CM

## 2024-07-29 DIAGNOSIS — L30.9 ECZEMA, UNSPECIFIED TYPE: ICD-10-CM

## 2024-07-29 PROCEDURE — 3051F HG A1C>EQUAL 7.0%<8.0%: CPT | Performed by: FAMILY MEDICINE

## 2024-07-29 PROCEDURE — G8419 CALC BMI OUT NRM PARAM NOF/U: HCPCS | Performed by: FAMILY MEDICINE

## 2024-07-29 PROCEDURE — 99215 OFFICE O/P EST HI 40 MIN: CPT | Performed by: FAMILY MEDICINE

## 2024-07-29 PROCEDURE — G8427 DOCREV CUR MEDS BY ELIG CLIN: HCPCS | Performed by: FAMILY MEDICINE

## 2024-07-29 PROCEDURE — 1036F TOBACCO NON-USER: CPT | Performed by: FAMILY MEDICINE

## 2024-07-29 PROCEDURE — 3017F COLORECTAL CA SCREEN DOC REV: CPT | Performed by: FAMILY MEDICINE

## 2024-07-29 PROCEDURE — 2022F DILAT RTA XM EVC RTNOPTHY: CPT | Performed by: FAMILY MEDICINE

## 2024-07-29 RX ORDER — TRIAMCINOLONE ACETONIDE 1 MG/G
CREAM TOPICAL
Qty: 454 G | Refills: 2 | Status: SHIPPED | OUTPATIENT
Start: 2024-07-29 | End: 2024-07-30

## 2024-07-29 SDOH — ECONOMIC STABILITY: FOOD INSECURITY: WITHIN THE PAST 12 MONTHS, THE FOOD YOU BOUGHT JUST DIDN'T LAST AND YOU DIDN'T HAVE MONEY TO GET MORE.: NEVER TRUE

## 2024-07-29 SDOH — ECONOMIC STABILITY: FOOD INSECURITY: WITHIN THE PAST 12 MONTHS, YOU WORRIED THAT YOUR FOOD WOULD RUN OUT BEFORE YOU GOT MONEY TO BUY MORE.: NEVER TRUE

## 2024-07-29 SDOH — ECONOMIC STABILITY: INCOME INSECURITY: HOW HARD IS IT FOR YOU TO PAY FOR THE VERY BASICS LIKE FOOD, HOUSING, MEDICAL CARE, AND HEATING?: NOT VERY HARD

## 2024-07-29 ASSESSMENT — PATIENT HEALTH QUESTIONNAIRE - PHQ9
SUM OF ALL RESPONSES TO PHQ QUESTIONS 1-9: 0
SUM OF ALL RESPONSES TO PHQ QUESTIONS 1-9: 0
SUM OF ALL RESPONSES TO PHQ9 QUESTIONS 1 & 2: 0
1. LITTLE INTEREST OR PLEASURE IN DOING THINGS: NOT AT ALL
2. FEELING DOWN, DEPRESSED OR HOPELESS: NOT AT ALL
SUM OF ALL RESPONSES TO PHQ QUESTIONS 1-9: 0
SUM OF ALL RESPONSES TO PHQ QUESTIONS 1-9: 0

## 2024-07-29 NOTE — PROGRESS NOTES
Janice Eagle is a 59 y.o. female presenting for/with:    Chief Complaint   Patient presents with    Referral - General     Multiple referral requests (Neuro, Ortho, Derm, Otolaryngology)     Skin Problem     Itching at night, open areas on right shoulder from scratching.         Vitals:    07/29/24 1000   BP: 138/68   Pulse: 76   Resp: 18   Temp: 97.4 °F (36.3 °C)   TempSrc: Temporal   SpO2: 99%   Weight: 68.9 kg (151 lb 12.8 oz)   Height: 1.6 m (5' 3\")       Pain Scale: 0 - No pain/10  Pain Location:     \"Have you been to the ER, urgent care clinic since your last visit?  Hospitalized since your last visit?\"    NO    “Have you seen or consulted any other health care providers outside of [facility] since your last visit?”    NO                 7/29/2024    10:40 AM   PHQ-9    Little interest or pleasure in doing things 0   Feeling down, depressed, or hopeless 0   PHQ-2 Score 0   PHQ-9 Total Score 0           1/15/2024     2:30 PM 1/10/2023    12:00 AM   [facility] AMB LEARNING ASSESSMENT   Primary Learner Patient Patient   Primary Language ENGLISH ENGLISH   Learning Preference DEMONSTRATION DEMONSTRATION   Answered By patient patient   Relationship to Learner SELF SELF            7/29/2024    10:40 AM   Amb Fall Risk Assessment and TUG Test   Do you feel unsteady or are you worried about falling?  no   2 or more falls in past year? no   Fall with injury in past year? no           7/29/2024    10:00 AM 5/1/2024    11:00 AM 4/19/2024    11:00 AM 4/18/2024     3:00 PM 4/10/2024     2:00 PM 3/25/2024     2:00 PM   ADL ASSESSMENT   Feeding yourself No Help Needed No Help Needed No Help Needed No Help Needed No Help Needed No Help Needed   Getting from bed to chair No Help Needed No Help Needed No Help Needed No Help Needed No Help Needed No Help Needed   Getting dressed No Help Needed No Help Needed No Help Needed No Help Needed No Help Needed No Help Needed   Bathing or showering No Help Needed No Help 
and at bedtime    fluticasone (FLONASE) 50 MCG/ACT nasal spray 2 sprays by Nasal route daily    blood glucose test strips (ONETOUCH ULTRA) strip Check blood sugar 8 times per day due to frequent hypoglycemic events. Dx: E10.42    TRESIBA FLEXTOUCH 100 UNIT/ML SOPN Inject 33 units every morning and 2 units every night (Replaces Levemir, formulary requirement), BRAND MEDICALLY NEEDED FOR INSURANCE TO COVER    insulin lispro, 1 Unit Dial, (HUMALOG/ADMELOG) 100 UNIT/ML SOPN Max daily dose 20 units (Patient taking differently: Max daily dose 20 units. Sliding scale.)    levothyroxine (SYNTHROID) 75 MCG tablet TAKE 1 TABLET BY MOUTH ONCE DAILY BEFORE  BREAKFAST    glucosamine-chondroitin 500-400 MG tablet Take 1 tablet by mouth daily    ciclopirox (LOPROX) 0.77 % cream APPLY CREAM TOPICALLY AND GENTLY MASSAGE TWICE DAILY INTO AFFECTED AREAS AND SURROUNDING SKIN    vitamin E 400 UNIT capsule Take 1 capsule by mouth daily Monday and Thursday    MAGNESIUM PO Take by mouth every 48 hours    VITAMIN A PO Take 2,400 mcg by mouth    ascorbic acid (VITAMIN C) 1000 MG tablet Take 8 tablets by mouth 2 times daily    aspirin 81 MG chewable tablet Take 1 tablet by mouth daily    calcium carbonate 1500 (600 Ca) MG TABS tablet Take 1,000 mg by mouth 2 times daily    Cholecalciferol 50 MCG (2000 UT) TABS Take 1 tablet by mouth daily    lidocaine (LIDODERM) 5 % 2 patches every evening    metoclopramide (REGLAN) 5 MG tablet Take 1 tablet by mouth 2 times daily    potassium gluconate 550 mg tablet Take 99 mg by mouth every other day    simvastatin (ZOCOR) 40 MG tablet Take 1 tablet by mouth nightly    pregabalin (LYRICA) 50 MG capsule Take 1 capsule by mouth nightly for 6 days, THEN 1 capsule 2 times daily for 6 days, THEN 1 capsule 3 times daily for 90 days. Max Daily Amount: 150 mg. (Patient not taking: Reported on 5/1/2024)     No current facility-administered medications for this visit.       ROS:   General: No fever, chills, or

## 2024-07-30 ENCOUNTER — OFFICE VISIT (OUTPATIENT)
Age: 59
End: 2024-07-30
Payer: MEDICARE

## 2024-07-30 VITALS
WEIGHT: 152.4 LBS | HEIGHT: 63 IN | SYSTOLIC BLOOD PRESSURE: 139 MMHG | DIASTOLIC BLOOD PRESSURE: 75 MMHG | HEART RATE: 73 BPM | BODY MASS INDEX: 27 KG/M2

## 2024-07-30 DIAGNOSIS — E10.42 TYPE 1 DIABETES MELLITUS WITH DIABETIC POLYNEUROPATHY (HCC): Primary | ICD-10-CM

## 2024-07-30 DIAGNOSIS — E03.9 ACQUIRED HYPOTHYROIDISM: ICD-10-CM

## 2024-07-30 DIAGNOSIS — I10 ESSENTIAL (PRIMARY) HYPERTENSION: ICD-10-CM

## 2024-07-30 DIAGNOSIS — E55.9 VITAMIN D DEFICIENCY, UNSPECIFIED: ICD-10-CM

## 2024-07-30 LAB
25(OH)D3 SERPL-MCNC: 58.1 NG/ML (ref 30–100)
ALBUMIN SERPL-MCNC: 4.1 G/DL (ref 3.5–5)
ALBUMIN/GLOB SERPL: 1.4 (ref 1.1–2.2)
ALP SERPL-CCNC: 107 U/L (ref 45–117)
ALT SERPL-CCNC: 26 U/L (ref 12–78)
ANION GAP SERPL CALC-SCNC: 7 MMOL/L (ref 5–15)
AST SERPL-CCNC: 21 U/L (ref 15–37)
BILIRUB SERPL-MCNC: 0.6 MG/DL (ref 0.2–1)
BUN SERPL-MCNC: 6 MG/DL (ref 6–20)
BUN/CREAT SERPL: 9 (ref 12–20)
CALCIUM SERPL-MCNC: 9.9 MG/DL (ref 8.5–10.1)
CHLORIDE SERPL-SCNC: 90 MMOL/L (ref 97–108)
CO2 SERPL-SCNC: 30 MMOL/L (ref 21–32)
CREAT SERPL-MCNC: 0.65 MG/DL (ref 0.55–1.02)
CREAT UR-MCNC: 24.1 MG/DL
EST. AVERAGE GLUCOSE BLD GHB EST-MCNC: 174 MG/DL
GLOBULIN SER CALC-MCNC: 3 G/DL (ref 2–4)
GLUCOSE SERPL-MCNC: 240 MG/DL (ref 65–100)
HBA1C MFR BLD: 7.7 % (ref 4–5.6)
MICROALBUMIN UR-MCNC: <0.5 MG/DL
MICROALBUMIN/CREAT UR-RTO: <21 MG/G (ref 0–30)
POTASSIUM SERPL-SCNC: 4.4 MMOL/L (ref 3.5–5.1)
PROT SERPL-MCNC: 7.1 G/DL (ref 6.4–8.2)
SODIUM SERPL-SCNC: 127 MMOL/L (ref 136–145)
T4 FREE SERPL-MCNC: 1.3 NG/DL (ref 0.8–1.5)
TSH SERPL DL<=0.05 MIU/L-ACNC: 0.52 UIU/ML (ref 0.36–3.74)

## 2024-07-30 PROCEDURE — 1036F TOBACCO NON-USER: CPT | Performed by: INTERNAL MEDICINE

## 2024-07-30 PROCEDURE — 2022F DILAT RTA XM EVC RTNOPTHY: CPT | Performed by: INTERNAL MEDICINE

## 2024-07-30 PROCEDURE — 3078F DIAST BP <80 MM HG: CPT | Performed by: INTERNAL MEDICINE

## 2024-07-30 PROCEDURE — G8427 DOCREV CUR MEDS BY ELIG CLIN: HCPCS | Performed by: INTERNAL MEDICINE

## 2024-07-30 PROCEDURE — 99215 OFFICE O/P EST HI 40 MIN: CPT | Performed by: INTERNAL MEDICINE

## 2024-07-30 PROCEDURE — 3051F HG A1C>EQUAL 7.0%<8.0%: CPT | Performed by: INTERNAL MEDICINE

## 2024-07-30 PROCEDURE — G2211 COMPLEX E/M VISIT ADD ON: HCPCS | Performed by: INTERNAL MEDICINE

## 2024-07-30 PROCEDURE — 3017F COLORECTAL CA SCREEN DOC REV: CPT | Performed by: INTERNAL MEDICINE

## 2024-07-30 PROCEDURE — G8419 CALC BMI OUT NRM PARAM NOF/U: HCPCS | Performed by: INTERNAL MEDICINE

## 2024-07-30 PROCEDURE — 3075F SYST BP GE 130 - 139MM HG: CPT | Performed by: INTERNAL MEDICINE

## 2024-07-30 RX ORDER — INSULIN LISPRO 100 [IU]/ML
INJECTION, SOLUTION INTRAVENOUS; SUBCUTANEOUS
Qty: 30 ML | Refills: 1 | Status: SHIPPED | OUTPATIENT
Start: 2024-07-30

## 2024-07-30 RX ORDER — LEVOTHYROXINE SODIUM 0.07 MG/1
TABLET ORAL
Qty: 90 TABLET | Refills: 1 | Status: SHIPPED | OUTPATIENT
Start: 2024-07-30

## 2024-07-30 RX ORDER — BLOOD SUGAR DIAGNOSTIC
STRIP MISCELLANEOUS
Qty: 800 EACH | Refills: 3 | Status: SHIPPED | OUTPATIENT
Start: 2024-07-30

## 2024-07-30 NOTE — PROGRESS NOTES
on something else but that caused severe cramping, so I stopped that.\"    Her A1C in October 2023 was 8.6%.  Her TSH was 0.749 with FT4 of 1.62 and her Vitamin D was 88.3.  Her lipid panel was at goal (145/89/74/54).    - Pt is waking around 7-8AM, she takes her Levemir 30 units and Humalog 1-2 units \"if my sugar is over 100s I'll take 1 unit, if it is over 200 I'll take 2 units, otherwise I don't take any.   - She will have the pepsi and 4 crackers.  - She will take her dog for a walk 2-3 times per day. \"My dog has an injury so we are not walking as far, we are walking a mile in the morning and a mile in the evening.\"  - She does not eat Lunch  - Around 1-2PM she will have a snack of vegetables and cheese.  - She has dinner around 5PM, last night she had rice, carrots, chicken, BBQ sauce and water.   She does not eat lunch. If she gets hungry, she will snack on cheese or mixed vegetables.      Pt has hx of CVA x4, \"one of which caused a CNS leak\".   She reports she had a stress test and an ECHO in 2015 and \"everything came back fine\".    Pt has hx of polyneuropathy from her knees to her feet. She was taken off the Gabapentin because it caused low sodium levels.    Pt has no hx of Nephrology    Last eye exam was July 2024 \"He says my eyes look great and he sent me to the cataract doctor for new glasses.\"  \"I see him every 6 months.\"    Pt has hx of osteoporosis for which she is followed by Dr. Yan Cota of Rheumatology.  She is on Prolia every 6 month.  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 She was never treated and it \"went away\".      Pt has hx of hypothyroidism and is taking LT4 75mcg daily.     Current Outpatient Medications   Medication Sig    insulin detemir (LEVEMIR FLEXTOUCH) 100 UNIT/ML injection pen 40 units every morning and 8 units at bedtime.    insulin lispro, 1 Unit Dial, (HUMALOG/ADMELOG) 100 UNIT/ML SOPN Max daily dose 20 units. Sliding scale.

## 2024-07-30 NOTE — PATIENT INSTRUCTIONS
1) Levemir: For now take 40 units in the morning and 8 units at bedtime.    2) Please send me your blood sugar readings in 3 weeks.

## 2024-08-01 ENCOUNTER — TELEPHONE (OUTPATIENT)
Age: 59
End: 2024-08-01

## 2024-08-01 NOTE — TELEPHONE ENCOUNTER
[phone] contact # ivett Arminda (Mother)  pt is hard of hearing/deaf...   would like for Dr. Rincon to pls continue with the referrals for [facility] Carlota Farmer  fax # [phone]    [facility]  [phone] (Tel)    Thanks so much,    Any question, pls contact Quincy Hilliard    Thanks

## 2024-08-06 ENCOUNTER — TELEPHONE (OUTPATIENT)
Age: 59
End: 2024-08-06

## 2024-08-06 NOTE — TELEPHONE ENCOUNTER
Dr. Rincon wrote a referral for this pt to see a neurologist and needs the diagnoses changes to something else beside hearing, pt would like a call, please advise call back # [phone].

## 2024-08-08 ENCOUNTER — HOSPITAL ENCOUNTER (EMERGENCY)
Facility: HOSPITAL | Age: 59
Discharge: HOME | End: 2024-08-08
Attending: EMERGENCY MEDICINE
Payer: MEDICARE

## 2024-08-08 VITALS
HEIGHT: 63 IN | RESPIRATION RATE: 10 BRPM | DIASTOLIC BLOOD PRESSURE: 77 MMHG | OXYGEN SATURATION: 100 % | HEART RATE: 75 BPM | SYSTOLIC BLOOD PRESSURE: 160 MMHG | TEMPERATURE: 98.8 F | BODY MASS INDEX: 27 KG/M2

## 2024-08-08 DIAGNOSIS — R42 LIGHTHEADEDNESS: Primary | ICD-10-CM

## 2024-08-08 LAB
ANION GAP SERPL CALC-SCNC: 7 MMOL/L (ref 5–15)
APPEARANCE UR: CLEAR
BACTERIA URNS QL MICRO: NEGATIVE /HPF
BASOPHILS # BLD: 0 K/UL (ref 0–0.1)
BASOPHILS NFR BLD: 1 % (ref 0–1)
BILIRUB UR QL: NEGATIVE
BUN SERPL-MCNC: 9 MG/DL (ref 6–20)
BUN/CREAT SERPL: 15 (ref 12–20)
CALCIUM SERPL-MCNC: 9.7 MG/DL (ref 8.5–10.1)
CHLORIDE SERPL-SCNC: 97 MMOL/L (ref 97–108)
CO2 SERPL-SCNC: 32 MMOL/L (ref 21–32)
COLOR UR: NORMAL
CREAT SERPL-MCNC: 0.62 MG/DL (ref 0.55–1.02)
DIFFERENTIAL METHOD BLD: ABNORMAL
EKG ATRIAL RATE: 77 BPM
EKG DIAGNOSIS: NORMAL
EKG P AXIS: 60 DEGREES
EKG P-R INTERVAL: 146 MS
EKG Q-T INTERVAL: 384 MS
EKG QRS DURATION: 86 MS
EKG QTC CALCULATION (BAZETT): 434 MS
EKG R AXIS: -58 DEGREES
EKG T AXIS: 19 DEGREES
EKG VENTRICULAR RATE: 77 BPM
EOSINOPHIL # BLD: 0 K/UL (ref 0–0.4)
EOSINOPHIL NFR BLD: 0 % (ref 0–7)
EPITH CASTS URNS QL MICRO: NORMAL /LPF
ERYTHROCYTE [DISTWIDTH] IN BLOOD BY AUTOMATED COUNT: 11.9 % (ref 11.5–14.5)
GLUCOSE BLD STRIP.AUTO-MCNC: 60 MG/DL (ref 65–117)
GLUCOSE BLD STRIP.AUTO-MCNC: 82 MG/DL (ref 65–117)
GLUCOSE BLD STRIP.AUTO-MCNC: 99 MG/DL (ref 65–117)
GLUCOSE SERPL-MCNC: 79 MG/DL (ref 65–100)
GLUCOSE UR STRIP.AUTO-MCNC: NEGATIVE MG/DL
HCT VFR BLD AUTO: 36.8 % (ref 35–47)
HGB BLD-MCNC: 13 G/DL (ref 11.5–16)
HGB UR QL STRIP: NEGATIVE
IMM GRANULOCYTES # BLD AUTO: 0 K/UL (ref 0–0.04)
IMM GRANULOCYTES NFR BLD AUTO: 0 % (ref 0–0.5)
KETONES UR QL STRIP.AUTO: NEGATIVE MG/DL
LEUKOCYTE ESTERASE UR QL STRIP.AUTO: NEGATIVE
LYMPHOCYTES # BLD: 1.1 K/UL (ref 0.8–3.5)
LYMPHOCYTES NFR BLD: 21 % (ref 12–49)
MCH RBC QN AUTO: 30.5 PG (ref 26–34)
MCHC RBC AUTO-ENTMCNC: 35.3 G/DL (ref 30–36.5)
MCV RBC AUTO: 86.4 FL (ref 80–99)
MONOCYTES # BLD: 0.6 K/UL (ref 0–1)
MONOCYTES NFR BLD: 10 % (ref 5–13)
NEUTS SEG # BLD: 3.6 K/UL (ref 1.8–8)
NEUTS SEG NFR BLD: 68 % (ref 32–75)
NITRITE UR QL STRIP.AUTO: NEGATIVE
NRBC # BLD: 0 K/UL (ref 0–0.01)
NRBC BLD-RTO: 0 PER 100 WBC
PH UR STRIP: 6 (ref 5–8)
PLATELET # BLD AUTO: 202 K/UL (ref 150–400)
PMV BLD AUTO: 7.9 FL (ref 8.9–12.9)
POTASSIUM SERPL-SCNC: 3.9 MMOL/L (ref 3.5–5.1)
PROT UR STRIP-MCNC: NEGATIVE MG/DL
RBC # BLD AUTO: 4.26 M/UL (ref 3.8–5.2)
RBC #/AREA URNS HPF: NORMAL /HPF (ref 0–5)
SERVICE CMNT-IMP: ABNORMAL
SERVICE CMNT-IMP: NORMAL
SERVICE CMNT-IMP: NORMAL
SODIUM SERPL-SCNC: 136 MMOL/L (ref 136–145)
SP GR UR REFRACTOMETRY: 1.01 (ref 1–1.03)
TROPONIN I SERPL HS-MCNC: 9 NG/L (ref 0–51)
URINE CULTURE IF INDICATED: NORMAL
UROBILINOGEN UR QL STRIP.AUTO: 0.2 EU/DL (ref 0.2–1)
WBC # BLD AUTO: 5.4 K/UL (ref 3.6–11)
WBC URNS QL MICRO: NORMAL /HPF (ref 0–4)

## 2024-08-08 PROCEDURE — 82962 GLUCOSE BLOOD TEST: CPT

## 2024-08-08 PROCEDURE — 85025 COMPLETE CBC W/AUTO DIFF WBC: CPT

## 2024-08-08 PROCEDURE — 6370000000 HC RX 637 (ALT 250 FOR IP): Performed by: EMERGENCY MEDICINE

## 2024-08-08 PROCEDURE — 99284 EMERGENCY DEPT VISIT MOD MDM: CPT

## 2024-08-08 PROCEDURE — 96360 HYDRATION IV INFUSION INIT: CPT

## 2024-08-08 PROCEDURE — 80048 BASIC METABOLIC PNL TOTAL CA: CPT

## 2024-08-08 PROCEDURE — 81001 URINALYSIS AUTO W/SCOPE: CPT

## 2024-08-08 PROCEDURE — 84484 ASSAY OF TROPONIN QUANT: CPT

## 2024-08-08 PROCEDURE — 2580000003 HC RX 258: Performed by: EMERGENCY MEDICINE

## 2024-08-08 PROCEDURE — 36415 COLL VENOUS BLD VENIPUNCTURE: CPT

## 2024-08-08 RX ORDER — PROMETHAZINE HYDROCHLORIDE 25 MG/1
25 TABLET ORAL
Status: COMPLETED | OUTPATIENT
Start: 2024-08-08 | End: 2024-08-08

## 2024-08-08 RX ORDER — 0.9 % SODIUM CHLORIDE 0.9 %
1000 INTRAVENOUS SOLUTION INTRAVENOUS ONCE
Status: COMPLETED | OUTPATIENT
Start: 2024-08-08 | End: 2024-08-08

## 2024-08-08 RX ADMIN — SODIUM CHLORIDE 1000 ML: 9 INJECTION, SOLUTION INTRAVENOUS at 09:20

## 2024-08-08 RX ADMIN — PROMETHAZINE HYDROCHLORIDE 25 MG: 25 TABLET ORAL at 10:56

## 2024-08-08 ASSESSMENT — ENCOUNTER SYMPTOMS
VOMITING: 0
COUGH: 0
EYE REDNESS: 0
ABDOMINAL PAIN: 0
NAUSEA: 0
DIARRHEA: 0
SHORTNESS OF BREATH: 0
SORE THROAT: 0

## 2024-08-08 ASSESSMENT — PAIN - FUNCTIONAL ASSESSMENT: PAIN_FUNCTIONAL_ASSESSMENT: 0-10

## 2024-08-08 ASSESSMENT — PAIN SCALES - GENERAL: PAINLEVEL_OUTOF10: 8

## 2024-08-08 NOTE — ED PROVIDER NOTES
EMERGENCY DEPARTMENT HISTORY AND PHYSICAL EXAM      Date: 8/8/2024  Patient Name: Janice Eagle    History of Presenting Illness     Chief Complaint   Patient presents with    Near Syncope       History Provided By: Patient    HPI: Janice Eagle, 59 y.o. female with PMHx significant for IDDM, neuropathy, hypothyroidism, hypertension presents via private vehicle to the ED with cc of lightheadedness.  Patient reports she feels like she is going to pass out.  Patient reports she has had similar symptoms in the past when her blood sugar was less than 100.  Denies any chest pain or shortness of breath.  No nausea vomiting.  No abdominal pain.  No headache.  No recent falls or trauma.  She is currently seeing Dr. Lucero with neurology for her polyneuropathy as well as some new onset hearing loss.        PMHx: Significant for IDDM, neuropathy, hypothyroidism, hypertension  PSHx: Significant for hysterectomy, rotator cuff repair.  Carpal tunnel release.  Tonsillectomy.  Social Hx: Former smoker.  Quit in 2010.  Half pack a day for less than 3 years.  Nondrinker.    There are no other complaints, changes, or physical findings at this time.    PCP: Souleymane Rincon MD    No current facility-administered medications on file prior to encounter.     Current Outpatient Medications on File Prior to Encounter   Medication Sig Dispense Refill    insulin detemir (LEVEMIR FLEXTOUCH) 100 UNIT/ML injection pen 40 units every morning and 8 units at bedtime. 45 mL 3    insulin lispro, 1 Unit Dial, (HUMALOG/ADMELOG) 100 UNIT/ML SOPN Max daily dose 20 units. Sliding scale. 30 mL 1    levothyroxine (SYNTHROID) 75 MCG tablet TAKE 1 TABLET BY MOUTH ONCE DAILY BEFORE  BREAKFAST 90 tablet 1    blood glucose test strips (ONETOUCH ULTRA) strip Check blood sugar 8 times per day due to frequent hypoglycemic events. Dx: E10.42 800 each 3    AMBIEN 10 MG tablet TAKE 1 TABLET BY MOUTH AT BEDTIME 30 tablet 1    losartan (COZAAR) 50 MG tablet

## 2024-08-08 NOTE — ED TRIAGE NOTES
Pt states that she feels like she is going to \"pass out\". She reports that this has been ongoing for 8-9 months. Has had numerous full work ups that have been negative, including MRI. She states that she is now totally deaf x 9 weeks. Pt also reports right sided dental pain that she feels is a \"sinus infection\"   Pt requesting to use writing pad to communicate, d/t hearing loss.

## 2024-08-12 ENCOUNTER — TELEPHONE (OUTPATIENT)
Age: 59
End: 2024-08-12

## 2024-08-12 NOTE — TELEPHONE ENCOUNTER
Patient still cannot hear. She went to ortho and received 2 steroid injections in her hips today. She states that her blood sugars are rising.  She states that she presently takes Levemir 40 units in AM and 8 units at night.  She states that she takes Humalog 1-5 units in divided doses through out the day/evening, depending on her blood sugar readings.  She wonders how to adjust her insulin due to the steroid shots.   Patient MAY be able to get to the phone (sometimes it works and sometimes it does not--has a phone that types out conversations) [phone]  Or can reach Mother at [phone]. (Her mother can text the instructions to her)

## 2024-08-12 NOTE — TELEPHONE ENCOUNTER
I instructed pt to increase her Levemir to 48 units in the AM and 12 units HS for the next 7 days and use the 1:50 correction for BG >150.

## 2024-08-13 LAB
EKG ATRIAL RATE: 77 BPM
EKG DIAGNOSIS: NORMAL
EKG P AXIS: 60 DEGREES
EKG P-R INTERVAL: 146 MS
EKG Q-T INTERVAL: 384 MS
EKG QRS DURATION: 86 MS
EKG QTC CALCULATION (BAZETT): 434 MS
EKG R AXIS: -58 DEGREES
EKG T AXIS: 19 DEGREES
EKG VENTRICULAR RATE: 77 BPM

## 2024-08-19 ENCOUNTER — TELEPHONE (OUTPATIENT)
Age: 59
End: 2024-08-19

## 2024-08-20 RX ORDER — FLUTICASONE PROPIONATE 50 MCG
2 SPRAY, SUSPENSION (ML) NASAL DAILY
Qty: 16 G | OUTPATIENT
Start: 2024-08-20

## 2024-08-20 RX ORDER — FLUTICASONE PROPIONATE 50 MCG
2 SPRAY, SUSPENSION (ML) NASAL DAILY
Qty: 48 G | Refills: 1 | Status: SHIPPED | OUTPATIENT
Start: 2024-08-20

## 2024-08-21 ENCOUNTER — OFFICE VISIT (OUTPATIENT)
Age: 59
End: 2024-08-21
Payer: MEDICARE

## 2024-08-21 VITALS — TEMPERATURE: 98.2 F | RESPIRATION RATE: 18 BRPM | OXYGEN SATURATION: 94 % | HEART RATE: 82 BPM

## 2024-08-21 DIAGNOSIS — G96.01 CSF LEAK FROM NOSE: ICD-10-CM

## 2024-08-21 DIAGNOSIS — M25.50 POLYARTHRALGIA: ICD-10-CM

## 2024-08-21 DIAGNOSIS — J01.10 SUBACUTE FRONTAL SINUSITIS: Primary | ICD-10-CM

## 2024-08-21 DIAGNOSIS — R21 SKIN RASH: ICD-10-CM

## 2024-08-21 DIAGNOSIS — B37.31 VAGINAL CANDIDOSIS: ICD-10-CM

## 2024-08-21 DIAGNOSIS — L30.9 ECZEMA, UNSPECIFIED TYPE: ICD-10-CM

## 2024-08-21 PROCEDURE — 99215 OFFICE O/P EST HI 40 MIN: CPT | Performed by: FAMILY MEDICINE

## 2024-08-21 PROCEDURE — G8419 CALC BMI OUT NRM PARAM NOF/U: HCPCS | Performed by: FAMILY MEDICINE

## 2024-08-21 PROCEDURE — 3017F COLORECTAL CA SCREEN DOC REV: CPT | Performed by: FAMILY MEDICINE

## 2024-08-21 PROCEDURE — G8427 DOCREV CUR MEDS BY ELIG CLIN: HCPCS | Performed by: FAMILY MEDICINE

## 2024-08-21 PROCEDURE — 1036F TOBACCO NON-USER: CPT | Performed by: FAMILY MEDICINE

## 2024-08-21 RX ORDER — FLUCONAZOLE 150 MG/1
150 TABLET ORAL ONCE
Qty: 1 TABLET | Refills: 0 | Status: SHIPPED | OUTPATIENT
Start: 2024-08-21 | End: 2024-08-21

## 2024-08-21 RX ORDER — CEPHALEXIN 500 MG/1
1000 CAPSULE ORAL 2 TIMES DAILY
Qty: 40 CAPSULE | Refills: 0 | Status: SHIPPED | OUTPATIENT
Start: 2024-08-21 | End: 2024-08-31

## 2024-08-21 ASSESSMENT — PATIENT HEALTH QUESTIONNAIRE - PHQ9
2. FEELING DOWN, DEPRESSED OR HOPELESS: NOT AT ALL
1. LITTLE INTEREST OR PLEASURE IN DOING THINGS: NOT AT ALL
SUM OF ALL RESPONSES TO PHQ9 QUESTIONS 1 & 2: 0
SUM OF ALL RESPONSES TO PHQ QUESTIONS 1-9: 0

## 2024-08-21 NOTE — PROGRESS NOTES
Janice Eagle is a 59 y.o. female presenting for/with:    Chief Complaint   Patient presents with    Other     Pt reports having a running nose with green production for a few days. Pt denies having a fever/SOB.       Vitals:    08/21/24 1507   Pulse: 82   Resp: 18   Temp: 98.2 °F (36.8 °C)   TempSrc: Temporal   SpO2: 94%       Pain Scale: 0 - No pain/10  Pain Location:     08/08/2024-[facility] -Lightheadedness    “Have you seen or consulted any other health care providers outside of [facility] since your last visit?”    NO                 8/21/2024     3:03 PM   PHQ-9    Little interest or pleasure in doing things 0   Feeling down, depressed, or hopeless 0   PHQ-2 Score 0   PHQ-9 Total Score 0           1/15/2024     2:30 PM 1/10/2023    12:00 AM   [facility] AMB LEARNING ASSESSMENT   Primary Learner Patient Patient   Primary Language ENGLISH ENGLISH   Learning Preference DEMONSTRATION DEMONSTRATION   Answered By patient patient   Relationship to Learner SELF SELF            8/21/2024     3:04 PM   Amb Fall Risk Assessment and TUG Test   Do you feel unsteady or are you worried about falling?  no   2 or more falls in past year? no   Fall with injury in past year? no           8/21/2024     3:00 PM 7/29/2024    10:00 AM 5/1/2024    11:00 AM 4/19/2024    11:00 AM 4/18/2024     3:00 PM 4/10/2024     2:00 PM 3/25/2024     2:00 PM   ADL ASSESSMENT   Feeding yourself No Help Needed No Help Needed No Help Needed No Help Needed No Help Needed No Help Needed No Help Needed   Getting from bed to chair No Help Needed No Help Needed No Help Needed No Help Needed No Help Needed No Help Needed No Help Needed   Getting dressed No Help Needed No Help Needed No Help Needed No Help Needed No Help Needed No Help Needed No Help Needed   Bathing or showering No Help Needed No Help Needed No Help Needed No Help Needed No Help Needed No Help Needed No Help Needed   Walk across the room (includes cane/walker) No Help Needed No Help

## 2024-08-21 NOTE — PROGRESS NOTES
Janice Eagle is a 59 y.o. female  Chief Complaint   Patient presents with    Other     Pt reports having a running nose with green production for a few days. Pt denies having a fever/SOB.     HPI:  Sinusitis  Pt reports sx of sinus congestion, purulent discharge without fever for past 4d.  Using flonse, but not helping. Reports usually needs abx for this after a difficult surgery in past for CSF leak.    Hearing loss  Onset in June 2024. Has had workup so far including MRI brain, CT head and sinus (with contrast), and audiology consult, c/w sensorineural loss. Has visit soon in [place].    Chronic hyponatremia, with sx of fatigue, balance problems, mood changes, and foggy thinking With Hx extensive workup. Mult labs in past 5y show Na+ levels running in the 110's to low 130's. Sodium normalized with holding gabapentin.    Lab Results   Component Value Date/Time     08/08/2024 09:08 AM    K 3.9 08/08/2024 09:08 AM    CL 97 08/08/2024 09:08 AM    CO2 32 08/08/2024 09:08 AM    BUN 9 08/08/2024 09:08 AM    CREATININE 0.62 08/08/2024 09:08 AM    GLUCOSE 79 08/08/2024 09:08 AM    GLUCOSE 80 10/03/2023 12:00 AM    CALCIUM 9.7 08/08/2024 09:08 AM    LABGLOM >90 08/08/2024 09:08 AM    LABGLOM >90 04/29/2024 10:57 AM    LABGLOM 100 10/03/2023 12:00 AM      Lab Results   Component Value Date/Time    MG 1.7 04/29/2024 10:57 AM     Lab Results   Component Value Date    Urine Osms: 366 10/2023     250 8/2022    Serum Osms: 285 10/2023     292 8/2022    Urine Sodium: 45 10/2023     22 8/2022    Serum Sodium 129 10/2023     131 8/2022     Hypothyroid   Has been well controlled on synthroid 0.75mg/d. Managed by Endocrine Dr. Lowe.  Lab Results   Component Value Date    TSH 0.52 07/29/2024    T4FREE 1.3 07/29/2024     DM1  On tresiba + lispro Sliding scale. W/C per labs. Managed by Endocrine Dr. Lowe. Of note, her low sodiums were not particulaly associated with severe hyperglycemia, which is quite rare in pt

## 2024-08-29 ENCOUNTER — HOSPITAL ENCOUNTER (OUTPATIENT)
Facility: HOSPITAL | Age: 59
Setting detail: RECURRING SERIES
End: 2024-08-29
Payer: MEDICARE

## 2024-08-29 PROCEDURE — G0283 ELEC STIM OTHER THAN WOUND: HCPCS

## 2024-08-29 PROCEDURE — 97110 THERAPEUTIC EXERCISES: CPT

## 2024-08-29 PROCEDURE — 97161 PT EVAL LOW COMPLEX 20 MIN: CPT

## 2024-09-02 DIAGNOSIS — F51.01 PRIMARY INSOMNIA: ICD-10-CM

## 2024-09-03 RX ORDER — ZOLPIDEM TARTRATE 10 MG/1
TABLET, FILM COATED ORAL
Qty: 30 TABLET | Refills: 2 | Status: SHIPPED | OUTPATIENT
Start: 2024-09-03 | End: 2024-12-02

## 2024-09-03 NOTE — TELEPHONE ENCOUNTER
Pt has been counseled about risks of long term sedative use, including memory loss, pt gave understanding.

## 2024-09-04 ENCOUNTER — HOSPITAL ENCOUNTER (OUTPATIENT)
Facility: HOSPITAL | Age: 59
Setting detail: RECURRING SERIES
Discharge: HOME | End: 2024-09-07
Payer: MEDICARE

## 2024-09-04 ENCOUNTER — TELEMEDICINE (OUTPATIENT)
Age: 59
End: 2024-09-04
Payer: MEDICARE

## 2024-09-04 ENCOUNTER — TELEPHONE (OUTPATIENT)
Age: 59
End: 2024-09-04

## 2024-09-04 DIAGNOSIS — Z90.710 HISTORY OF HYSTERECTOMY: ICD-10-CM

## 2024-09-04 DIAGNOSIS — R35.0 FREQUENCY OF URINATION: Primary | ICD-10-CM

## 2024-09-04 LAB
BILIRUBIN, URINE, POC: NEGATIVE
BLOOD URINE, POC: NEGATIVE
GLUCOSE URINE, POC: NEGATIVE
KETONES, URINE, POC: NEGATIVE
LEUKOCYTE ESTERASE, URINE, POC: NEGATIVE
NITRITE, URINE, POC: NEGATIVE
PH, URINE, POC: 6.5 (ref 4.6–8)
PROTEIN,URINE, POC: NEGATIVE
SPECIFIC GRAVITY, URINE, POC: 1.01 (ref 1–1.03)
URINALYSIS CLARITY, POC: CLEAR
URINALYSIS COLOR, POC: YELLOW
UROBILINOGEN, POC: NORMAL

## 2024-09-04 PROCEDURE — G0283 ELEC STIM OTHER THAN WOUND: HCPCS

## 2024-09-04 PROCEDURE — 81003 URINALYSIS AUTO W/O SCOPE: CPT | Performed by: FAMILY MEDICINE

## 2024-09-04 PROCEDURE — 97110 THERAPEUTIC EXERCISES: CPT

## 2024-09-04 ASSESSMENT — PATIENT HEALTH QUESTIONNAIRE - PHQ9
2. FEELING DOWN, DEPRESSED OR HOPELESS: NOT AT ALL
SUM OF ALL RESPONSES TO PHQ QUESTIONS 1-9: 0
SUM OF ALL RESPONSES TO PHQ9 QUESTIONS 1 & 2: 0
SUM OF ALL RESPONSES TO PHQ QUESTIONS 1-9: 0
SUM OF ALL RESPONSES TO PHQ QUESTIONS 1-9: 0
1. LITTLE INTEREST OR PLEASURE IN DOING THINGS: NOT AT ALL
SUM OF ALL RESPONSES TO PHQ QUESTIONS 1-9: 0

## 2024-09-04 NOTE — PROGRESS NOTES
telphone No Help Needed No Help Needed No Help Needed No Help Needed No Help Needed No Help Needed No Help Needed   Taking your medications No Help Needed No Help Needed No Help Needed No Help Needed No Help Needed No Help Needed No Help Needed   Preparing meals No Help Needed No Help Needed No Help Needed No Help Needed No Help Needed No Help Needed No Help Needed   Managing money (expenses/bills) No Help Needed No Help Needed No Help Needed No Help Needed No Help Needed No Help Needed No Help Needed   Moderately strenuous housework (laundry) No Help Needed No Help Needed No Help Needed No Help Needed No Help Needed No Help Needed No Help Needed   Shopping for personal items (toiletries/medicines) No Help Needed No Help Needed No Help Needed No Help Needed No Help Needed No Help Needed No Help Needed   Shopping for groceries No Help Needed No Help Needed No Help Needed No Help Needed No Help Needed No Help Needed No Help Needed   Driving No Help Needed No Help Needed No Help Needed No Help Needed No Help Needed No Help Needed No Help Needed   Climbing a flight of stairs No Help Needed No Help Needed No Help Needed No Help Needed No Help Needed No Help Needed No Help Needed   Getting to places beyond walking distances No Help Needed No Help Needed No Help Needed No Help Needed No Help Needed No Help Needed No Help Needed           9/4/2024     2:00 PM   AMB Abuse Screening   Do you ever feel afraid of your partner? N   Are you in a relationship with someone who physically or mentally threatens you? N   Is it safe for you to go home? Y       Advance Care Planning     The patient has appointed the following active healthcare agents:    Primary Decision Maker: Lucia Zimmer - Friend - [phone]    Secondary Decision Maker: Arminda Palomino - Parent - [phone]

## 2024-09-04 NOTE — PROGRESS NOTES
PHYSICAL THERAPY - MEDICARE DAILY TREATMENT NOTE (updated 3/23)      Date: 2024          Patient Name:  Janice Eagle :  1965   Medical   Diagnosis:  Pain in left hip [M25.552]  Pain in right hip [M25.551] Treatment Diagnosis:  M25.551  RIGHT HIP PAIN and M25.552  LEFT HIP PAIN    Referral Source:  Sánchez Dempsey MD Insurance:   Payor: MEDICARE / Plan: MEDICARE PART A AND B / Product Type: *No Product type* /                     Patient  verified yes     Visit #   Current  / Total 2 16   Time   In / Out 2 245   Total Treatment Time 45   Total Timed Codes 25   1:1 Treatment Time 25      [facility] Totals Reminder:  bill using total billable   min of TIMED therapeutic procedures and modalities.   8-22 min = 1 unit; 23-37 min = 2 units; 38-52 min = 3 units; 53-67 min = 4 units; 68-82 min = 5 units        .episode  SUBJECTIVE    Pain Level (0-10 scale): R 4/10, L hip 10/10    Any medication changes, allergies to medications, adverse drug reactions, diagnosis change, or new procedure performed?: [x] No    [] Yes (see summary sheet for update)  Medications: Verified on Patient Summary List    Subjective functional status/changes:     \" I have a yeast infection\" \"my blood sugar is low\" my hips hurt with exercise, the TENS machine helped    OBJECTIVE      Therapeutic Procedures:  Tx Min Billable or 1:1 Min (if diff from Tx Min) Procedure, Rationale, Specifics   25 25 70052 Therapeutic Exercise (timed):  increase ROM, strength, coordination, balance, and proprioception to improve patient's ability to progress to PLOF and address remaining functional goals. (see flow sheet as applicable)     Details if applicable:    Supine knee extension LE on wedge 2x10x  Supine pelvic tilt, LE on wedge-unable today due to stomach issues  Reviewed glut sets  Education in positioning for comfort  Gentle AAROM bilateral hips all planes                       25 25    Total Total       Modalities Rationale:     decrease

## 2024-09-04 NOTE — PROGRESS NOTES
tablet Take 1 tablet by mouth nightly     No current facility-administered medications for this visit.      No Known Allergies      Objective:     VS review:  Wt Readings from Last 3 Encounters:   07/30/24 69.1 kg (152 lb 6.4 oz)   07/29/24 68.9 kg (151 lb 12.8 oz)   05/31/24 70.3 kg (155 lb)     BP Readings from Last 3 Encounters:   08/08/24 (!) 160/77   07/30/24 139/75   07/29/24 138/68     Results for orders placed or performed in visit on 09/04/24   AMB POC URINALYSIS DIP STICK AUTO W/O MICRO   Result Value Ref Range    Color, Urine, POC Yellow     Clarity, Urine, POC Clear     Glucose, Urine, POC Negative     Bilirubin, Urine, POC Negative     Ketones, Urine, POC Negative     Specific Gravity, Urine, POC 1.015 1.001 - 1.035    Blood, Urine, POC Negative     pH, Urine, POC 6.5 4.6 - 8.0    Protein, Urine, POC Negative     Urobilinogen, POC 0.2 mg/dL     Nitrite, Urine, POC Negative     Leukocyte Esterase, Urine, POC Negative        Assessment & Plan:     Dysuria with normal UA  Discussed options. Has hysterectomy, so hasn't had GYN exam in long time. May have inflammatory or other GYN condition. Needs GYN exam. Pt advised by message per her instructions.    Given that a 2 way conversation was not possible due to pt not having her TTY service working or a , there is no provider charge for this encounter.

## 2024-09-09 ENCOUNTER — HOSPITAL ENCOUNTER (OUTPATIENT)
Facility: HOSPITAL | Age: 59
Setting detail: RECURRING SERIES
Discharge: HOME | End: 2024-09-12
Payer: MEDICARE

## 2024-09-09 PROCEDURE — G0283 ELEC STIM OTHER THAN WOUND: HCPCS

## 2024-09-09 PROCEDURE — 97110 THERAPEUTIC EXERCISES: CPT

## 2024-09-17 ENCOUNTER — HOSPITAL ENCOUNTER (OUTPATIENT)
Facility: HOSPITAL | Age: 59
Setting detail: RECURRING SERIES
Discharge: HOME | End: 2024-09-20
Payer: MEDICARE

## 2024-09-17 PROCEDURE — 97110 THERAPEUTIC EXERCISES: CPT

## 2024-09-17 PROCEDURE — G0283 ELEC STIM OTHER THAN WOUND: HCPCS

## 2024-09-19 ENCOUNTER — HOSPITAL ENCOUNTER (OUTPATIENT)
Facility: HOSPITAL | Age: 59
Setting detail: RECURRING SERIES
Discharge: HOME | End: 2024-09-22
Payer: MEDICARE

## 2024-09-19 PROCEDURE — 97110 THERAPEUTIC EXERCISES: CPT

## 2024-09-19 PROCEDURE — G0283 ELEC STIM OTHER THAN WOUND: HCPCS

## 2024-09-22 ENCOUNTER — HOSPITAL ENCOUNTER (EMERGENCY)
Facility: HOSPITAL | Age: 59
Discharge: HOME | End: 2024-09-22
Attending: EMERGENCY MEDICINE
Payer: MEDICARE

## 2024-09-22 ENCOUNTER — APPOINTMENT (OUTPATIENT)
Facility: HOSPITAL | Age: 59
End: 2024-09-22
Payer: MEDICARE

## 2024-09-22 VITALS
HEART RATE: 84 BPM | WEIGHT: 145 LBS | HEIGHT: 63 IN | BODY MASS INDEX: 25.69 KG/M2 | TEMPERATURE: 98.7 F | OXYGEN SATURATION: 99 % | DIASTOLIC BLOOD PRESSURE: 64 MMHG | RESPIRATION RATE: 17 BRPM | SYSTOLIC BLOOD PRESSURE: 134 MMHG

## 2024-09-22 DIAGNOSIS — T07.XXXA CONTUSION OF MULTIPLE SITES: ICD-10-CM

## 2024-09-22 DIAGNOSIS — E16.2 HYPOGLYCEMIA: ICD-10-CM

## 2024-09-22 DIAGNOSIS — S09.90XA HEAD INJURY, INITIAL ENCOUNTER: Primary | ICD-10-CM
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
## 2024-09-22 LAB
ANION GAP SERPL CALC-SCNC: 6 MMOL/L (ref 2–12)
BASOPHILS # BLD: 0 K/UL (ref 0–0.1)
BASOPHILS NFR BLD: 1 % (ref 0–1)
BUN SERPL-MCNC: 7 MG/DL (ref 6–20)
BUN/CREAT SERPL: 12 (ref 12–20)
CALCIUM SERPL-MCNC: 9.9 MG/DL (ref 8.5–10.1)
CHLORIDE SERPL-SCNC: 93 MMOL/L (ref 97–108)
CO2 SERPL-SCNC: 33 MMOL/L (ref 21–32)
CREAT SERPL-MCNC: 0.6 MG/DL (ref 0.55–1.02)
DIFFERENTIAL METHOD BLD: ABNORMAL
EOSINOPHIL # BLD: 0 K/UL (ref 0–0.4)
EOSINOPHIL NFR BLD: 0 % (ref 0–7)
ERYTHROCYTE [DISTWIDTH] IN BLOOD BY AUTOMATED COUNT: 11.9 % (ref 11.5–14.5)
GLUCOSE BLD STRIP.AUTO-MCNC: 49 MG/DL (ref 65–117)
GLUCOSE BLD STRIP.AUTO-MCNC: 68 MG/DL (ref 65–117)
GLUCOSE BLD STRIP.AUTO-MCNC: 89 MG/DL (ref 65–117)
GLUCOSE BLD STRIP.AUTO-MCNC: 93 MG/DL (ref 65–117)
GLUCOSE SERPL-MCNC: 50 MG/DL (ref 65–100)
HCT VFR BLD AUTO: 34.6 % (ref 35–47)
HGB BLD-MCNC: 12.6 G/DL (ref 11.5–16)
IMM GRANULOCYTES # BLD AUTO: 0 K/UL (ref 0–0.04)
IMM GRANULOCYTES NFR BLD AUTO: 0 % (ref 0–0.5)
LYMPHOCYTES # BLD: 1 K/UL (ref 0.8–3.5)
LYMPHOCYTES NFR BLD: 17 % (ref 12–49)
MCH RBC QN AUTO: 31.1 PG (ref 26–34)
MCHC RBC AUTO-ENTMCNC: 36.4 G/DL (ref 30–36.5)
MCV RBC AUTO: 85.4 FL (ref 80–99)
MONOCYTES # BLD: 0.6 K/UL (ref 0–1)
MONOCYTES NFR BLD: 10 % (ref 5–13)
NEUTS SEG # BLD: 4.3 K/UL (ref 1.8–8)
NEUTS SEG NFR BLD: 72 % (ref 32–75)
NRBC # BLD: 0 K/UL (ref 0–0.01)
NRBC BLD-RTO: 0 PER 100 WBC
PLATELET # BLD AUTO: 194 K/UL (ref 150–400)
PMV BLD AUTO: 7.9 FL (ref 8.9–12.9)
POTASSIUM SERPL-SCNC: 3.7 MMOL/L (ref 3.5–5.1)
RBC # BLD AUTO: 4.05 M/UL (ref 3.8–5.2)
SERVICE CMNT-IMP: ABNORMAL
SERVICE CMNT-IMP: NORMAL
SODIUM SERPL-SCNC: 132 MMOL/L (ref 136–145)
WBC # BLD AUTO: 5.9 K/UL (ref 3.6–11)

## 2024-09-22 PROCEDURE — 73502 X-RAY EXAM HIP UNI 2-3 VIEWS: CPT

## 2024-09-22 PROCEDURE — 70450 CT HEAD/BRAIN W/O DYE: CPT

## 2024-09-22 PROCEDURE — 72125 CT NECK SPINE W/O DYE: CPT

## 2024-09-22 PROCEDURE — 36415 COLL VENOUS BLD VENIPUNCTURE: CPT

## 2024-09-22 PROCEDURE — 80048 BASIC METABOLIC PNL TOTAL CA: CPT

## 2024-09-22 PROCEDURE — 99284 EMERGENCY DEPT VISIT MOD MDM: CPT

## 2024-09-22 PROCEDURE — 82962 GLUCOSE BLOOD TEST: CPT

## 2024-09-22 PROCEDURE — 85025 COMPLETE CBC W/AUTO DIFF WBC: CPT

## 2024-09-22 ASSESSMENT — PAIN - FUNCTIONAL ASSESSMENT: PAIN_FUNCTIONAL_ASSESSMENT: 0-10

## 2024-09-22 ASSESSMENT — PAIN DESCRIPTION - DESCRIPTORS: DESCRIPTORS: DISCOMFORT;SORE

## 2024-09-22 ASSESSMENT — PAIN DESCRIPTION - LOCATION: LOCATION: HEAD;NECK;HIP

## 2024-09-22 ASSESSMENT — PAIN SCALES - GENERAL: PAINLEVEL_OUTOF10: 8

## 2024-09-24 ENCOUNTER — HOSPITAL ENCOUNTER (OUTPATIENT)
Facility: HOSPITAL | Age: 59
Setting detail: RECURRING SERIES
Discharge: HOME | End: 2024-09-27
Payer: MEDICARE

## 2024-09-24 PROCEDURE — 97110 THERAPEUTIC EXERCISES: CPT

## 2024-09-24 PROCEDURE — G0283 ELEC STIM OTHER THAN WOUND: HCPCS

## 2024-09-25 ENCOUNTER — TELEPHONE (OUTPATIENT)
Age: 59
End: 2024-09-25

## 2024-09-25 RX ORDER — BLOOD-GLUCOSE METER
1 EACH MISCELLANEOUS DAILY
Qty: 1 KIT | Refills: 0 | Status: SHIPPED | OUTPATIENT
Start: 2024-09-25

## 2024-09-26 ENCOUNTER — HOSPITAL ENCOUNTER (OUTPATIENT)
Facility: HOSPITAL | Age: 59
Setting detail: RECURRING SERIES
Discharge: HOME | End: 2024-09-29
Payer: MEDICARE

## 2024-09-26 PROCEDURE — 97032 APPL MODALITY 1+ESTIM EA 15: CPT

## 2024-09-26 PROCEDURE — 97110 THERAPEUTIC EXERCISES: CPT

## 2024-09-26 NOTE — PROGRESS NOTES
PHYSICAL THERAPY - MEDICARE DAILY TREATMENT NOTE (updated 3/23)      Date: 2024          Patient Name:  Janice Eagle :  1965   Medical   Diagnosis:  Pain in left hip [M25.552]  Pain in right hip [M25.551] Treatment Diagnosis:  M25.551  RIGHT HIP PAIN and M25.552  LEFT HIP PAIN    Referral Source:  Sánchez Dempsey MD Insurance:   Payor: MEDICARE / Plan: MEDICARE PART A AND B / Product Type: *No Product type* /                     Patient  verified yes     Visit #   Current  / Total 7 16   Time   In / Out 1045 1130   Total Treatment Time 45   Total Timed Codes 45   1:1 Treatment Time 45      [facility] Totals Reminder:  bill using total billable   min of TIMED therapeutic procedures and modalities.   8-22 min = 1 unit; 23-37 min = 2 units; 38-52 min = 3 units; 53-67 min = 4 units; 68-82 min = 5 units        .episode  SUBJECTIVE    Pain Level (0-10 scale): 5 with L hip more pain than R    Any medication changes, allergies to medications, adverse drug reactions, diagnosis change, or new procedure performed?: [x] No    [] Yes (see summary sheet for update)  Medications: Verified on Patient Summary List    Subjective functional status/changes:     I fell last Saturday, and  went to th ED.     OBJECTIVE      Therapeutic Procedures:  Tx Min Billable or 1:1 Min (if diff from Tx Min) Procedure, Rationale, Specifics   30 30 18374 Therapeutic Exercise (timed):  increase ROM, strength, coordination, balance, and proprioception to improve patient's ability to progress to PLOF and address remaining functional goals. (see flow sheet as applicable)     Details if applicable:    Seated marches no weights x20, >1.5 # alt marches with VC for eccentric control x20 with visual cue for target for increased hip flexion.  Seated manual hip ABD with manual resistance 10s hold 5r x2,   Seated hip ADD with yellow ball 10s hold 5r x2,   STS from chair 30s STS =9 with occasional UE support.           Sitting scapular

## 2024-10-01 ENCOUNTER — HOSPITAL ENCOUNTER (OUTPATIENT)
Facility: HOSPITAL | Age: 59
Setting detail: RECURRING SERIES
Discharge: HOME | End: 2024-10-04
Payer: MEDICARE

## 2024-10-01 PROCEDURE — 97110 THERAPEUTIC EXERCISES: CPT

## 2024-10-01 PROCEDURE — G0283 ELEC STIM OTHER THAN WOUND: HCPCS

## 2024-10-01 NOTE — PROGRESS NOTES
[facility]  [address] [place] [address]  Office [phone]  Fax [phone]   PHYSICAL THERAPY PROGRESS NOTE  Patient Name:  Janice Eagle :  1965   Treatment/Medical Diagnosis: Pain in left hip [M25.552]  Pain in right hip [M25.551]   Referral Source:  Sánchez Dempsey MD     Date of Initial Visit:  2024 Attended Visits:  7 Missed Visits:       SUMMARY OF TREATMENT/ASSESSMENT:  Patient is being seen for treatment for hip pain    CURRENT STATUS  Patient reports therapy helps decrease her hip pain.  Her pain varies from a 2-7/10.  She is independent in gentle exercises.  She is progressing towards goals.      Short Term Goals: To be accomplished in 8 treatments   Patient is independent with HEP.MET  Patient will report her hip pain has decreased to a 5/10 with activities and rest.  PROGRESSING  Patient will ambulate with decreased gait deviations and a decreased trendelenberg.PROGRESSING  Long Term Goals: To be accomplished in 16 treatments  Patient will report her hip pain has decreased to a 2/10 with activities and rest.  Patient will ambulate without gait deviations and without a trendelenberg.    Patient will report she is able to perform activities at home with minimal difficulty.       RECOMMENDATIONS  Continue skilled PT 1-2 times/week for 16-20 visits to progress towards goals.          Gale Lorenz, PT       10/1/2024       1:54 PM    If you have any questions/comments please contact us directly at [phone].   Thank you for allowing us to assist in the care of your patient.

## 2024-10-01 NOTE — PROGRESS NOTES
PHYSICAL THERAPY - MEDICARE DAILY TREATMENT NOTE (updated 3/23)      Date: 10/1/2024             Patient Name:  Janice Eagle :  1965   Medical   Diagnosis:  Pain in left hip [M25.552]  Pain in right hip [M25.551] Treatment Diagnosis:  M25.551  RIGHT HIP PAIN and M25.552  LEFT HIP PAIN    Referral Source:  Sánchez Dempsey MD Insurance:   Payor: MEDICARE / Plan: MEDICARE PART A AND B / Product Type: *No Product type* /                              Patient  verified yes     Visit #   Current  / Total 8 16   Time   In / Out 115 200   Total Treatment Time 45   Total Timed Codes 30   1:1 Treatment Time 30      [facility] Totals Reminder:  bill using total billable   min of TIMED therapeutic procedures and modalities.   8-22 min = 1 unit; 23-37 min = 2 units; 38-52 min = 3 units; 53-67 min = 4 units; 68-82 min = 5 units         SUBJECTIVE     Pain Level (0-10 scale): R 4-5/10, L hip 7-8/10     Any medication changes, allergies to medications, adverse drug reactions, diagnosis change, or new procedure performed?: [x] No    [] Yes (see summary sheet for update)  Medications: Verified on Patient Summary List     Subjective functional status/changes:     \"I did not leave my house all weekend, head felt funny, I have been sitting a lot and hips bothering me  The therapy helps decrease my pain\"  OBJECTIVE        Therapeutic Procedures:  Tx Min Billable or 1:1 Min (if diff from Tx Min) Procedure, Rationale, Specifics   30 30 38669 Therapeutic Exercise (timed):  increase ROM, strength, coordination, balance, and proprioception to improve patient's ability to progress to PLOF and address remaining functional goals. (see flow sheet as applicable)      Details if applicable:       Gentle AAROM bilateral hips all planes-patient supine with LE on wedge attempting to stay in pain free range       sitting hip flexion with 1.5 pounds 3x10  Sitting knee extension 3x10  Sitting ankle pumps 3x10  Sitting hip adduction with red ball

## 2024-10-03 ENCOUNTER — HOSPITAL ENCOUNTER (OUTPATIENT)
Facility: HOSPITAL | Age: 59
Setting detail: RECURRING SERIES
Discharge: HOME | End: 2024-10-06
Payer: MEDICARE

## 2024-10-03 PROCEDURE — 97110 THERAPEUTIC EXERCISES: CPT

## 2024-10-03 NOTE — PROGRESS NOTES
PHYSICAL THERAPY - MEDICARE DAILY TREATMENT NOTE (updated 3/23)      Date: 10/3/2024          Patient Name:  Janice Eagle :  1965   Medical   Diagnosis:  Pain in left hip [M25.552]  Pain in right hip [M25.551] Treatment Diagnosis:  M25.551  RIGHT HIP PAIN and M25.552  LEFT HIP PAIN    Referral Source:  Sánchez Dempsey MD Insurance:   Payor: MEDICARE / Plan: MEDICARE PART A AND B / Product Type: *No Product type* /                     Patient  verified yes     Visit #   Current  / Total 9 16   Time   In / Out 115 200   Total Treatment Time 45   Total Timed Codes 45   1:1 Treatment Time 45      [facility] Totals Reminder:  bill using total billable   min of TIMED therapeutic procedures and modalities.   8-22 min = 1 unit; 23-37 min = 2 units; 38-52 min = 3 units; 53-67 min = 4 units; 68-82 min = 5 units        .episode  SUBJECTIVE    Pain Level (0-10 scale): Right -4, Left 6    Any medication changes, allergies to medications, adverse drug reactions, diagnosis change, or new procedure performed?: [x] No    [] Yes (see summary sheet for update)  Medications: Verified on Patient Summary List    Subjective functional status/changes:     I tend to do too much when I help my friends with yard work.     OBJECTIVE      Therapeutic Procedures:  Tx Min Billable or 1:1 Min (if diff from Tx Min) Procedure, Rationale, Specifics   45 45 59906 Therapeutic Exercise (timed):  increase ROM, strength, coordination, balance, and proprioception to improve patient's ability to progress to PLOF and address remaining functional goals. (see flow sheet as applicable)     Details if applicable:    Standing marches to dowel alt x20, >2# alt x20 x2  Standing ABD 10r 1> 2#  10r ,   Seated piriformis stretch 30s hold x2 B,   Seated HS  stretch 30s hold x2 B  Seated manual hip ABD with manual resistance 10s hold 5r x2,   Standing hip ext with UE support with 2# x10  STS 10r x2 no UE support  SciFit LE only SL 15, lvl 2.5 10 mins with target

## 2024-10-08 ENCOUNTER — HOSPITAL ENCOUNTER (OUTPATIENT)
Facility: HOSPITAL | Age: 59
Setting detail: RECURRING SERIES
Discharge: HOME | End: 2024-10-11
Payer: MEDICARE

## 2024-10-08 PROCEDURE — 97110 THERAPEUTIC EXERCISES: CPT

## 2024-10-08 PROCEDURE — G0283 ELEC STIM OTHER THAN WOUND: HCPCS

## 2024-10-08 NOTE — PROGRESS NOTES
See Progress Note/Recertification     Working towards goals     Short Term Goals: To be accomplished in 8 treatments   Patient is independent with HEP.MET  Patient will report her hip pain has decreased to a 5/10 with activities and rest.  PROGRESSING  Patient will ambulate with decreased gait deviations and a decreased trendelenberg.PROGRESSING  Long Term Goals: To be accomplished in 16 treatments  Patient will report her hip pain has decreased to a 2/10 with activities and rest.  Patient will ambulate without gait deviations and without a trendelenberg.    Patient will report she is able to perform activities at home with minimal difficulty.        PLAN  Yes  Continue plan of care     [x]  Upgrade activities as tolerated  []  Discharge due to :  []  Other:       Gale Lorenz, PT       10/8/2024       8:50 AM

## 2024-10-21 ENCOUNTER — OFFICE VISIT (OUTPATIENT)
Age: 59
End: 2024-10-21
Payer: MEDICARE

## 2024-10-21 VITALS
TEMPERATURE: 97.5 F | HEART RATE: 98 BPM | OXYGEN SATURATION: 99 % | SYSTOLIC BLOOD PRESSURE: 156 MMHG | DIASTOLIC BLOOD PRESSURE: 77 MMHG | HEIGHT: 63 IN | RESPIRATION RATE: 18 BRPM | BODY MASS INDEX: 26.51 KG/M2 | WEIGHT: 149.6 LBS

## 2024-10-21 DIAGNOSIS — G89.4 CHRONIC PAIN SYNDROME: ICD-10-CM

## 2024-10-21 DIAGNOSIS — I25.10 ASCVD (ARTERIOSCLEROTIC CARDIOVASCULAR DISEASE): Primary | ICD-10-CM

## 2024-10-21 DIAGNOSIS — E87.1 CHRONIC HYPONATREMIA: ICD-10-CM

## 2024-10-21 DIAGNOSIS — R68.89 OTHER GENERAL SYMPTOMS AND SIGNS: ICD-10-CM

## 2024-10-21 DIAGNOSIS — T07.XXXA MULTIPLE EXCORIATIONS: ICD-10-CM

## 2024-10-21 DIAGNOSIS — E10.42 TYPE 1 DIABETES MELLITUS WITH DIABETIC POLYNEUROPATHY (HCC): ICD-10-CM

## 2024-10-21 DIAGNOSIS — L29.9 PRURITUS: ICD-10-CM

## 2024-10-21 DIAGNOSIS — Z86.73 HISTORY OF TIA (TRANSIENT ISCHEMIC ATTACK): ICD-10-CM

## 2024-10-21 PROCEDURE — G8419 CALC BMI OUT NRM PARAM NOF/U: HCPCS | Performed by: FAMILY MEDICINE

## 2024-10-21 PROCEDURE — 2022F DILAT RTA XM EVC RTNOPTHY: CPT | Performed by: FAMILY MEDICINE

## 2024-10-21 PROCEDURE — 1036F TOBACCO NON-USER: CPT | Performed by: FAMILY MEDICINE

## 2024-10-21 PROCEDURE — 99214 OFFICE O/P EST MOD 30 MIN: CPT | Performed by: FAMILY MEDICINE

## 2024-10-21 PROCEDURE — G8427 DOCREV CUR MEDS BY ELIG CLIN: HCPCS | Performed by: FAMILY MEDICINE

## 2024-10-21 PROCEDURE — 3017F COLORECTAL CA SCREEN DOC REV: CPT | Performed by: FAMILY MEDICINE

## 2024-10-21 PROCEDURE — G8484 FLU IMMUNIZE NO ADMIN: HCPCS | Performed by: FAMILY MEDICINE

## 2024-10-21 PROCEDURE — 3051F HG A1C>EQUAL 7.0%<8.0%: CPT | Performed by: FAMILY MEDICINE

## 2024-10-21 SDOH — ECONOMIC STABILITY: FOOD INSECURITY: WITHIN THE PAST 12 MONTHS, THE FOOD YOU BOUGHT JUST DIDN'T LAST AND YOU DIDN'T HAVE MONEY TO GET MORE.: NEVER TRUE

## 2024-10-21 SDOH — ECONOMIC STABILITY: INCOME INSECURITY: HOW HARD IS IT FOR YOU TO PAY FOR THE VERY BASICS LIKE FOOD, HOUSING, MEDICAL CARE, AND HEATING?: NOT VERY HARD

## 2024-10-21 SDOH — ECONOMIC STABILITY: FOOD INSECURITY: WITHIN THE PAST 12 MONTHS, YOU WORRIED THAT YOUR FOOD WOULD RUN OUT BEFORE YOU GOT MONEY TO BUY MORE.: NEVER TRUE

## 2024-10-21 ASSESSMENT — PATIENT HEALTH QUESTIONNAIRE - PHQ9
SUM OF ALL RESPONSES TO PHQ QUESTIONS 1-9: 0
5. POOR APPETITE OR OVEREATING: NOT AT ALL
9. THOUGHTS THAT YOU WOULD BE BETTER OFF DEAD, OR OF HURTING YOURSELF: NOT AT ALL
6. FEELING BAD ABOUT YOURSELF - OR THAT YOU ARE A FAILURE OR HAVE LET YOURSELF OR YOUR FAMILY DOWN: NOT AT ALL
8. MOVING OR SPEAKING SO SLOWLY THAT OTHER PEOPLE COULD HAVE NOTICED. OR THE OPPOSITE, BEING SO FIGETY OR RESTLESS THAT YOU HAVE BEEN MOVING AROUND A LOT MORE THAN USUAL: NOT AT ALL
SUM OF ALL RESPONSES TO PHQ9 QUESTIONS 1 & 2: 0
SUM OF ALL RESPONSES TO PHQ QUESTIONS 1-9: 0
SUM OF ALL RESPONSES TO PHQ QUESTIONS 1-9: 0
2. FEELING DOWN, DEPRESSED OR HOPELESS: NOT AT ALL
7. TROUBLE CONCENTRATING ON THINGS, SUCH AS READING THE NEWSPAPER OR WATCHING TELEVISION: NOT AT ALL
3. TROUBLE FALLING OR STAYING ASLEEP: NOT AT ALL
1. LITTLE INTEREST OR PLEASURE IN DOING THINGS: NOT AT ALL
4. FEELING TIRED OR HAVING LITTLE ENERGY: NOT AT ALL
SUM OF ALL RESPONSES TO PHQ QUESTIONS 1-9: 0

## 2024-10-21 NOTE — PROGRESS NOTES
Janice Eagle is a 59 y.o. female  Chief Complaint   Patient presents with    Hypothyroidism    Diabetes    Medication Check    Epistaxis     HPI:  Hx fall at home  Thought to be related to severe hypoglycemia. Had mostly benign workup at ER 9/2022, just low sugar. Talked to endocrine, they cut the dose of levemir down a notch to 37 units, and shifted a little to PM at 10 units. Still having some lows, but may have been due to overly aggressive insulin use during vacation.     CT Head W/O Contrast   Final Result   No evidence of acute intracranial abnormality.   No skull fracture or significant scalp lesion.   Chronic microangiopathic white matter changes.           Electronically signed by DENA RICO       CT CSpine W/O Contrast   Final Result   No acute fracture. Stable 3 mm C4-C5 anterolisthesis. Multilevel   cervical degenerative changes.           Chronic hyponatremia, with sx of fatigue, balance problems, mood changes, and foggy thinking With Hx extensive workup. Mult labs in past 5y show Na+ levels running in the 110's to low 130's. Sodium normalized with holding gabapentin.    Lab Results   Component Value Date/Time     09/22/2024 08:54 AM    K 3.7 09/22/2024 08:54 AM    CL 93 09/22/2024 08:54 AM    CO2 33 09/22/2024 08:54 AM    BUN 7 09/22/2024 08:54 AM    CREATININE 0.60 09/22/2024 08:54 AM    GLUCOSE 50 09/22/2024 08:54 AM    GLUCOSE 80 10/03/2023 12:00 AM    CALCIUM 9.9 09/22/2024 08:54 AM    LABGLOM >90 09/22/2024 08:54 AM    LABGLOM >90 04/29/2024 10:57 AM    LABGLOM 100 10/03/2023 12:00 AM      Lab Results   Component Value Date/Time    MG 1.7 04/29/2024 10:57 AM     Lab Results   Component Value Date    Urine Osms: 366 10/2023     250 8/2022    Serum Osms: 285 10/2023     292 8/2022    Urine Sodium: 45 10/2023     22 8/2022    Serum Sodium 129 10/2023     131 8/2022     Hypothyroid   Has been well controlled on synthroid 0.75mg/d. Managed by Endocrine Dr. Lowe.  Lab Results

## 2024-10-21 NOTE — PATIENT INSTRUCTIONS
Please make sure to take the losartan twice daily for pressure and kidney protection.    The flonase can cause nosebleeds, but is very unlikely to cause eye issues.

## 2024-10-21 NOTE — PROGRESS NOTES
Janice Eagle is a 59 y.o. female presenting for/with:    Chief Complaint   Patient presents with    Hypothyroidism    Diabetes    Medication Check    Epistaxis       Vitals:    10/21/24 0900   BP: (!) 156/77   Pulse: 98   Resp: 18   Temp: 97.5 °F (36.4 °C)   TempSrc: Temporal   SpO2: 99%   Weight: 67.9 kg (149 lb 9.6 oz)   Height: 1.6 m (5' 3\")       Pain Scale: 0 - No pain/10  Pain Location:     \"Have you been to the ER, urgent care clinic since your last visit?  Hospitalized since your last visit?\"    NO    “Have you seen or consulted any other health care providers outside of [facility] since your last visit?”    NO                 10/21/2024     9:27 AM   PHQ-9    Little interest or pleasure in doing things 0   Feeling down, depressed, or hopeless 0   Trouble falling or staying asleep, or sleeping too much 0   Feeling tired or having little energy 0   Poor appetite or overeating 0   Feeling bad about yourself - or that you are a failure or have let yourself or your family down 0   Trouble concentrating on things, such as reading the newspaper or watching television 0   Moving or speaking so slowly that other people could have noticed. Or the opposite - being so fidgety or restless that you have been moving around a lot more than usual 0   Thoughts that you would be better off dead, or of hurting yourself in some way 0   PHQ-2 Score 0   PHQ-9 Total Score 0           1/15/2024     2:30 PM 1/10/2023    12:00 AM   [facility] AMB LEARNING ASSESSMENT   Primary Learner Patient Patient   Primary Language ENGLISH ENGLISH   Learning Preference DEMONSTRATION DEMONSTRATION   Answered By patient patient   Relationship to Learner SELF SELF            10/21/2024     9:27 AM   Amb Fall Risk Assessment and TUG Test   Do you feel unsteady or are you worried about falling?  no   2 or more falls in past year? no   Fall with injury in past year? no           10/21/2024     9:00 AM 9/4/2024     2:00 PM 8/21/2024     3:00

## 2024-10-22 ENCOUNTER — HOSPITAL ENCOUNTER (OUTPATIENT)
Facility: HOSPITAL | Age: 59
Setting detail: RECURRING SERIES
Discharge: HOME | End: 2024-10-25
Payer: MEDICARE

## 2024-10-22 PROCEDURE — 97110 THERAPEUTIC EXERCISES: CPT

## 2024-10-22 PROCEDURE — G0283 ELEC STIM OTHER THAN WOUND: HCPCS

## 2024-10-22 NOTE — PROGRESS NOTES
[facility]  [address] [place] [address]  Office [phone]  Fax [phone]   PHYSICAL THERAPY PROGRESS NOTE  Patient Name:  Janice Eagle :  1965   Treatment/Medical Diagnosis: Pain in left hip [M25.552]  Pain in right hip [M25.551]   Referral Source:  Sánchez Dempsey MD     Date of Initial Visit:  2024 Attended Visits:  11 Missed Visits:         SUMMARY OF TREATMENT/ASSESSMENT:  Patient is being seen for treatment for hip pain     CURRENT STATUS  Patient reports therapy helps decrease her hip pain.  Her pain varies from a 2-7/10.  She is independent in gentle exercises.  She is progressing towards goals.  Today her pain is less than usual L 5/10, R 2/10.    Short Term Goals: To be accomplished in 8 treatments   Patient is independent with HEP.MET  Patient will report her hip pain has decreased to a 5/10 with activities and rest.  PROGRESSING  Patient will ambulate with decreased gait deviations and a decreased trendelenberg.PROGRESSING  Long Term Goals: To be accomplished in 16 treatments  Patient will report her hip pain has decreased to a 2/10 with activities and rest.  Patient will ambulate without gait deviations and without a trendelenberg.    Patient will report she is able to perform activities at home with minimal difficulty.        RECOMMENDATIONS  Continue skilled PT 1-2 times/week for 16-20 visits to progress towards goals.         Gale Lorenz, PT       10/22/2024       1:52 PM    If you have any questions/comments please contact us directly at [phone].   Thank you for allowing us to assist in the care of your patient.   
  Total Total           Modalities Rationale:     decrease inflammation and decrease pain to improve patient's ability to progress to PLOF and address remaining functional goals.     15    min [x] Estim Unattended,             type/location:premod each hip laterally near greater trochanter       []  w/ice    []  w/heat             LE on wedge        min [] Estim Attended,             type/location:       []  w/ice   []  w/heat         []  w/US   []  TENS insruct                          min []  Mechanical Traction,        type/lbs:        []  pro      []  sup           []  int       []  cont            []  before manual           []  after manual       min []  Ultrasound,         settings/location:        min  unbilled []  Ice     []  Heat            location/position:             min []  Vasopneumatic Device,      press/temp:   pre-treatment girth :    post-treatment girth :    measured at (landmark       location) :   If using vaso (only need to measure limb vaso being performed on)         min []  Other:        Skin assessment post-treatment (if applicable):    [x]  intact    []  redness- no adverse reaction                 []redness - adverse reaction:            [x]  Patient Education billed concurrently with other procedures   [x] Review HEP    [] Progressed/Changed HEP, detail:    [] Other detail:           Other Objective/Functional Measures     Pain Level at end of session (0-10 scale): \"less pain\"        Assessment   Treatment helps decrease pain, however, pain returns.  Janice tolerates very gentle exercise.  Today she is having less pain  Patient will continue to benefit from skilled PT / OT services to modify and progress therapeutic interventions and analyze and address functional mobility deficits to address functional deficits and attain remaining goals.     Progress toward goals / Updated goals:  []  See Progress Note/Recertification     Working towards goals     Short Term Goals: To be accomplished

## 2024-10-23 LAB
AMPHETAMINES UR QL SCN: NEGATIVE NG/ML
BARBITURATES UR QL SCN: NEGATIVE NG/ML
BENZODIAZ UR QL SCN: NEGATIVE NG/ML
BZE UR QL SCN: NEGATIVE NG/ML
CANNABINOIDS UR QL SCN: NEGATIVE NG/ML
CREAT UR-MCNC: 42.3 MG/DL (ref 20–300)
LABORATORY COMMENT REPORT: NORMAL
METHADONE UR QL SCN: NEGATIVE NG/ML
OPIATES UR QL SCN: NEGATIVE NG/ML
OXYCODONE+OXYMORPHONE UR QL SCN: NEGATIVE NG/ML
PCP UR QL: NEGATIVE NG/ML
PH UR: 5.7 (ref 4.5–8.9)
PROPOXYPH UR QL SCN: NEGATIVE NG/ML

## 2024-10-29 ENCOUNTER — HOSPITAL ENCOUNTER (OUTPATIENT)
Facility: HOSPITAL | Age: 59
Setting detail: RECURRING SERIES
Discharge: HOME | End: 2024-11-01
Payer: MEDICARE

## 2024-10-29 PROCEDURE — 97110 THERAPEUTIC EXERCISES: CPT

## 2024-10-29 PROCEDURE — G0283 ELEC STIM OTHER THAN WOUND: HCPCS

## 2024-10-29 NOTE — PROGRESS NOTES
Progress Note/Recertification     Working towards goals     Short Term Goals: To be accomplished in 8 treatments   Patient is independent with HEP.MET  Patient will report her hip pain has decreased to a 5/10 with activities and rest.  PROGRESSING  Patient will ambulate with decreased gait deviations and a decreased trendelenberg.PROGRESSING  Long Term Goals: To be accomplished in 16 treatments  Patient will report her hip pain has decreased to a 2/10 with activities and rest.  Patient will ambulate without gait deviations and without a trendelenberg.    Patient will report she is able to perform activities at home with minimal difficulty.        PLAN  Yes  Continue plan of care     [x]  Upgrade activities as tolerated  []  Discharge due to :  [x]  Other:    Gale Lorenz, PT       10/29/2024       8:42 AM

## 2024-10-31 ENCOUNTER — HOSPITAL ENCOUNTER (OUTPATIENT)
Facility: HOSPITAL | Age: 59
Setting detail: RECURRING SERIES
Discharge: HOME | End: 2024-11-03
Payer: MEDICARE

## 2024-10-31 PROCEDURE — 97110 THERAPEUTIC EXERCISES: CPT

## 2024-10-31 PROCEDURE — G0283 ELEC STIM OTHER THAN WOUND: HCPCS

## 2024-10-31 NOTE — THERAPY DISCHARGE
[facility]  [address] [place] [address]  Office [phone]  Fax [phone]   DISCHARGE SUMMARY  Patient Name: Janice Eagle : 1965   Treatment/Medical Diagnosis: Pain in left hip [M25.552]  Pain in right hip [M25.551]   Referral Source: Sánchez Dempsey MD     Date of Initial Visit: 2024 Attended Visits: 13 Missed Visits:      SUMMARY OF TREATMENT  Janice is being treated for hip pain with modalities, education, and exercise.    CURRENT STATUS  Patient reports therapy helps decrease her hip pain, however, pain returns depending on her activities. Patient has been helping her mother with yard work, and that increases her pain.   Her pain varies from a 2-7/10.  She is independent in gentle exercises. Currently, patient has many doctor appointments.  She will be receiving a cochlear implant soon.  It was decided to barrett skilled PT at this time.  Patient has plateaued with her progress in skilled PT.  If she has a continuous flare up of pain, she plans to get a new order from the physician for skilled PT.    Short Term Goals: To be accomplished in 8 treatments   Patient is independent with HEP.MET  Patient will report her hip pain has decreased to a 5/10 with activities and rest.  PROGRESSING  Patient will ambulate with decreased gait deviations and a decreased trendelenberg.PROGRESSING  Long Term Goals: To be accomplished in 16 treatments  Patient will report her hip pain has decreased to a 2/10 with activities and rest.  Patient will ambulate without gait deviations and without a trendelenberg.    Patient will report she is able to perform activities at home with minimal difficulty.           RECOMMENDATIONS  Discontinue therapy due to lack of appreciable progress towards goals. And patient having many appointments regarding her cochlear implant.          Gale Lorenz, PT       10/31/2024       8:37 AM    If you have any

## 2024-10-31 NOTE — PROGRESS NOTES
PHYSICAL THERAPY - MEDICARE DAILY TREATMENT NOTE (updated 3/23)      Date: 10/31/2024        Patient Name:  Janice Eagle :  1965   Medical   Diagnosis:  Pain in left hip [M25.552]  Pain in right hip [M25.551] Treatment Diagnosis:  M25.551  RIGHT HIP PAIN and M25.552  LEFT HIP PAIN    Referral Source:  Sánchez Dempsey MD Insurance:   Payor: MEDICARE / Plan: MEDICARE PART A AND B / Product Type: *No Product type* /                              Patient  verified yes     Visit #   Current  / Total 13 16   Time   In / Out 115 200   Total Treatment Time 45   Total Timed Codes 30   1:1 Treatment Time 30      [facility] Totals Reminder:  bill using total billable   min of TIMED therapeutic procedures and modalities.   8-22 min = 1 unit; 23-37 min = 2 units; 38-52 min = 3 units; 53-67 min = 4 units; 68-82 min = 5 units         SUBJECTIVE     Pain Level (0-10 scale): R 2/10, L hip 6/10     Any medication changes, allergies to medications, adverse drug reactions, diagnosis change, or new procedure performed?: [x] No    [] Yes (see summary sheet for update)  Medications: Verified on Patient Summary List     Subjective functional status/changes:     Patient helped her mom for 8 hours in the yard yesterday  OBJECTIVE        Therapeutic Procedures:  Tx Min Billable or 1:1 Min (if diff from Tx Min) Procedure, Rationale, Specifics   30 30 [id number] Therapeutic Exercise (timed):  increase ROM, strength, coordination, balance, and proprioception to improve patient's ability to progress to PLOF and address remaining functional goals. (see flow sheet as applicable)      Details if applicable:       Gentle AAROM bilateral hips all planes-patient supine with LE on wedge attempting to stay in pain free range        sitting hip flexion 3x10  Sitting knee extension 3x10  Sitting ankle pumps 3x10  Sitting hip adduction with towel 30x, 5\"  Sitting scapular retraction 10x  Supine glut sets                                         30 30      Total Total           Modalities Rationale:     decrease inflammation and decrease pain to improve patient's ability to progress to PLOF and address remaining functional goals.     15    min [x] Estim Unattended,             type/location:premod each hip laterally near greater trochanter       []  w/ice    []  w/heat             LE on wedge        min [] Estim Attended,             type/location:       []  w/ice   []  w/heat         []  w/US   []  TENS insruct                          min []  Mechanical Traction,        type/lbs:        []  pro      []  sup           []  int       []  cont            []  before manual           []  after manual       min []  Ultrasound,         settings/location:        min  unbilled []  Ice     []  Heat            location/position:             min []  Vasopneumatic Device,      press/temp:   pre-treatment girth :    post-treatment girth :    measured at (landmark       location) :   If using vaso (only need to measure limb vaso being performed on)         min []  Other:        Skin assessment post-treatment (if applicable):    [x]  intact    []  redness- no adverse reaction                 []redness - adverse reaction:            [x]  Patient Education billed concurrently with other procedures   [x] Review HEP    [] Progressed/Changed HEP, detail:    [] Other detail:           Other Objective/Functional Measures     Pain Level at end of session (0-10 scale): \"less pain\"        Assessment   Patient is able to perform increased functional activities.  Pain usually 2-6/10.  She is ready for D/C          Working towards goals     Short Term Goals: To be accomplished in 8 treatments   Patient is independent with HEP.MET  Patient will report her hip pain has decreased to a 5/10 with activities and rest.  PROGRESSING  Patient will ambulate with decreased gait deviations and a decreased trendelenberg.PROGRESSING  Long Term Goals: To be accomplished in 16 treatments  Patient will report

## 2024-11-08 ENCOUNTER — TELEPHONE (OUTPATIENT)
Age: 59
End: 2024-11-08

## 2024-11-08 DIAGNOSIS — M81.6 LOCALIZED OSTEOPOROSIS WITHOUT CURRENT PATHOLOGICAL FRACTURE: Primary | ICD-10-CM

## 2024-11-08 NOTE — TELEPHONE ENCOUNTER
Pt requesting order be sent to infusion center ([facility]) for prolia shot  Pt states Dr. Cota  is leaving infusion center

## 2024-11-11 RX ORDER — EPINEPHRINE 1 MG/ML
0.3 INJECTION, SOLUTION, CONCENTRATE INTRAVENOUS PRN
Status: CANCELLED | OUTPATIENT
Start: 2024-11-11

## 2024-11-11 RX ORDER — DIPHENHYDRAMINE HYDROCHLORIDE 50 MG/ML
50 INJECTION INTRAMUSCULAR; INTRAVENOUS
Status: CANCELLED | OUTPATIENT
Start: 2024-11-11

## 2024-11-11 RX ORDER — ONDANSETRON 2 MG/ML
8 INJECTION INTRAMUSCULAR; INTRAVENOUS
Status: CANCELLED | OUTPATIENT
Start: 2024-11-11

## 2024-11-11 RX ORDER — SODIUM CHLORIDE 9 MG/ML
INJECTION, SOLUTION INTRAVENOUS CONTINUOUS
Status: CANCELLED | OUTPATIENT
Start: 2024-11-11

## 2024-11-11 RX ORDER — ACETAMINOPHEN 325 MG/1
650 TABLET ORAL
Status: CANCELLED | OUTPATIENT
Start: 2024-11-11

## 2024-11-11 RX ORDER — FAMOTIDINE 10 MG/ML
20 INJECTION, SOLUTION INTRAVENOUS
Status: CANCELLED | OUTPATIENT
Start: 2024-11-11

## 2024-11-11 RX ORDER — ALBUTEROL SULFATE 90 UG/1
4 INHALANT RESPIRATORY (INHALATION) PRN
Status: CANCELLED | OUTPATIENT
Start: 2024-11-11

## 2024-11-12 ENCOUNTER — HOSPITAL ENCOUNTER (OUTPATIENT)
Facility: HOSPITAL | Age: 59
Setting detail: INFUSION SERIES
Discharge: HOME | End: 2024-11-12
Payer: MEDICARE

## 2024-11-12 DIAGNOSIS — M81.6 LOCALIZED OSTEOPOROSIS WITHOUT CURRENT PATHOLOGICAL FRACTURE: ICD-10-CM

## 2024-11-12 LAB
ALBUMIN SERPL-MCNC: 3.7 G/DL (ref 3.5–5)
ALBUMIN/GLOB SERPL: 1.2 (ref 1.1–2.2)
ALP SERPL-CCNC: 113 U/L (ref 45–117)
ALT SERPL-CCNC: 23 U/L (ref 12–78)
ANION GAP SERPL CALC-SCNC: 5 MMOL/L (ref 2–12)
AST SERPL-CCNC: 21 U/L (ref 15–37)
BILIRUB SERPL-MCNC: 0.6 MG/DL (ref 0.2–1)
BUN SERPL-MCNC: 9 MG/DL (ref 6–20)
BUN/CREAT SERPL: 14 (ref 12–20)
CALCIUM SERPL-MCNC: 9.8 MG/DL (ref 8.5–10.1)
CHLORIDE SERPL-SCNC: 95 MMOL/L (ref 97–108)
CO2 SERPL-SCNC: 30 MMOL/L (ref 21–32)
CREAT SERPL-MCNC: 0.66 MG/DL (ref 0.55–1.02)
GLOBULIN SER CALC-MCNC: 3.2 G/DL (ref 2–4)
GLUCOSE SERPL-MCNC: 131 MG/DL (ref 65–100)
POTASSIUM SERPL-SCNC: 4.5 MMOL/L (ref 3.5–5.1)
PROT SERPL-MCNC: 6.9 G/DL (ref 6.4–8.2)
SODIUM SERPL-SCNC: 130 MMOL/L (ref 136–145)

## 2024-11-12 PROCEDURE — 36415 COLL VENOUS BLD VENIPUNCTURE: CPT

## 2024-11-12 PROCEDURE — 80053 COMPREHEN METABOLIC PANEL: CPT

## 2024-11-12 NOTE — PROGRESS NOTES
[facility] Short Visit Note:     Pt arrived ambulatory and in no distress for lab draw. Labs drawn from left arm. Gauze and tape applied. Patient tolerated procedure well. D/Cd from [facility] ambulatory and in no distress.  Next visit 11/15/24@1:00.

## 2024-11-15 ENCOUNTER — HOSPITAL ENCOUNTER (OUTPATIENT)
Facility: HOSPITAL | Age: 59
Setting detail: INFUSION SERIES
Discharge: HOME | End: 2024-11-15
Payer: MEDICARE

## 2024-11-15 VITALS
RESPIRATION RATE: 20 BRPM | BODY MASS INDEX: 26.96 KG/M2 | SYSTOLIC BLOOD PRESSURE: 157 MMHG | DIASTOLIC BLOOD PRESSURE: 76 MMHG | WEIGHT: 152.2 LBS | OXYGEN SATURATION: 99 % | HEART RATE: 78 BPM | TEMPERATURE: 97.1 F

## 2024-11-15 DIAGNOSIS — M81.6 LOCALIZED OSTEOPOROSIS WITHOUT CURRENT PATHOLOGICAL FRACTURE: Primary | ICD-10-CM

## 2024-11-15 PROCEDURE — 6360000002 HC RX W HCPCS: Performed by: FAMILY MEDICINE

## 2024-11-15 PROCEDURE — 96372 THER/PROPH/DIAG INJ SC/IM: CPT

## 2024-11-15 RX ORDER — DIPHENHYDRAMINE HYDROCHLORIDE 50 MG/ML
50 INJECTION INTRAMUSCULAR; INTRAVENOUS
Status: DISCONTINUED | OUTPATIENT
Start: 2024-11-15 | End: 2024-11-16 | Stop reason: HOSPADM

## 2024-11-15 RX ORDER — EPINEPHRINE 1 MG/ML
0.3 INJECTION, SOLUTION, CONCENTRATE INTRAVENOUS PRN
OUTPATIENT
Start: 2025-05-13

## 2024-11-15 RX ORDER — ONDANSETRON 2 MG/ML
8 INJECTION INTRAMUSCULAR; INTRAVENOUS
Status: DISCONTINUED | OUTPATIENT
Start: 2024-11-15 | End: 2024-11-16 | Stop reason: HOSPADM

## 2024-11-15 RX ORDER — ACETAMINOPHEN 325 MG/1
650 TABLET ORAL
OUTPATIENT
Start: 2025-05-13

## 2024-11-15 RX ORDER — SODIUM CHLORIDE 9 MG/ML
INJECTION, SOLUTION INTRAVENOUS CONTINUOUS
OUTPATIENT
Start: 2025-05-13

## 2024-11-15 RX ORDER — DIPHENHYDRAMINE HYDROCHLORIDE 50 MG/ML
50 INJECTION INTRAMUSCULAR; INTRAVENOUS
OUTPATIENT
Start: 2025-05-13

## 2024-11-15 RX ORDER — SODIUM CHLORIDE 9 MG/ML
INJECTION, SOLUTION INTRAVENOUS CONTINUOUS
Status: DISCONTINUED | OUTPATIENT
Start: 2024-11-15 | End: 2024-11-16 | Stop reason: HOSPADM

## 2024-11-15 RX ORDER — ALBUTEROL SULFATE 90 UG/1
4 INHALANT RESPIRATORY (INHALATION) PRN
Status: DISCONTINUED | OUTPATIENT
Start: 2024-11-15 | End: 2024-11-16 | Stop reason: HOSPADM

## 2024-11-15 RX ORDER — HYDROCORTISONE SODIUM SUCCINATE 100 MG/2ML
100 INJECTION INTRAMUSCULAR; INTRAVENOUS
OUTPATIENT
Start: 2025-05-13

## 2024-11-15 RX ORDER — ACETAMINOPHEN 325 MG/1
650 TABLET ORAL
Status: DISCONTINUED | OUTPATIENT
Start: 2024-11-15 | End: 2024-11-16 | Stop reason: HOSPADM

## 2024-11-15 RX ORDER — HYDROCORTISONE SODIUM SUCCINATE 100 MG/2ML
100 INJECTION INTRAMUSCULAR; INTRAVENOUS
Status: DISCONTINUED | OUTPATIENT
Start: 2024-11-15 | End: 2024-11-16 | Stop reason: HOSPADM

## 2024-11-15 RX ORDER — EPINEPHRINE 1 MG/ML
0.3 INJECTION, SOLUTION, CONCENTRATE INTRAVENOUS PRN
Status: DISCONTINUED | OUTPATIENT
Start: 2024-11-15 | End: 2024-11-16 | Stop reason: HOSPADM

## 2024-11-15 RX ORDER — ALBUTEROL SULFATE 90 UG/1
4 INHALANT RESPIRATORY (INHALATION) PRN
OUTPATIENT
Start: 2025-05-13

## 2024-11-15 RX ORDER — ONDANSETRON 2 MG/ML
8 INJECTION INTRAMUSCULAR; INTRAVENOUS
OUTPATIENT
Start: 2025-05-13

## 2024-11-15 RX ADMIN — DENOSUMAB 60 MG: 60 INJECTION SUBCUTANEOUS at 12:46

## 2024-11-15 NOTE — PROGRESS NOTES
[facility] Progress Note    Date: November 15, 2024    Name: Janice Eagle    MRN: [id number]         : 1965      Ms. Eagle was assessed and education was provided. Pt reports loss of hearing about six months ago , after leaving this hospital . Scheduled for Cochlear implant on  @ [facility].  Reports falls , once causing a concussion and injury to hand. PCP aware.   Ms. Eagle's vitals were reviewed and patient was observed for 5 minutes prior to treatment.   Vitals:    11/15/24 1243   BP: (!) 157/76   Pulse: 78   Resp: 20   Temp: 97.1 °F (36.2 °C)   SpO2: 99%     Labs reviewed     Prolia 60 mg  was administered subcutaneous in  left arm. .       Ms. Eagle tolerated well, and had no complaints.  Patient armband removed and shredded    [facility] at 1300. She is to return on May 9, 2025  at 1300 for her next appointment.    Lynn Wheat RN  November 15, 2024

## 2024-11-15 NOTE — PLAN OF CARE
Problem: Safety - Adult  Goal: Free from fall injury  Outcome: Adequate for Discharge     Problem: Chronic Conditions and Co-morbidities  Goal: Patient's chronic conditions and co-morbidity symptoms are monitored and maintained or improved  Outcome: Adequate for Discharge

## 2024-11-27 ENCOUNTER — OFFICE VISIT (OUTPATIENT)
Age: 59
End: 2024-11-27
Payer: MEDICARE

## 2024-11-27 VITALS
HEIGHT: 63 IN | SYSTOLIC BLOOD PRESSURE: 135 MMHG | WEIGHT: 144.5 LBS | DIASTOLIC BLOOD PRESSURE: 65 MMHG | BODY MASS INDEX: 25.6 KG/M2 | HEART RATE: 68 BPM | OXYGEN SATURATION: 99 %

## 2024-11-27 DIAGNOSIS — E03.9 ACQUIRED HYPOTHYROIDISM: ICD-10-CM

## 2024-11-27 DIAGNOSIS — E10.42 TYPE 1 DIABETES MELLITUS WITH DIABETIC POLYNEUROPATHY (HCC): Primary | ICD-10-CM

## 2024-11-27 DIAGNOSIS — E55.9 VITAMIN D DEFICIENCY, UNSPECIFIED: ICD-10-CM

## 2024-11-27 DIAGNOSIS — I10 ESSENTIAL (PRIMARY) HYPERTENSION: ICD-10-CM

## 2024-11-27 LAB — HBA1C MFR BLD: 7.8 %

## 2024-11-27 PROCEDURE — 2022F DILAT RTA XM EVC RTNOPTHY: CPT | Performed by: INTERNAL MEDICINE

## 2024-11-27 PROCEDURE — G2211 COMPLEX E/M VISIT ADD ON: HCPCS | Performed by: INTERNAL MEDICINE

## 2024-11-27 PROCEDURE — 99214 OFFICE O/P EST MOD 30 MIN: CPT | Performed by: INTERNAL MEDICINE

## 2024-11-27 PROCEDURE — 3017F COLORECTAL CA SCREEN DOC REV: CPT | Performed by: INTERNAL MEDICINE

## 2024-11-27 PROCEDURE — 3075F SYST BP GE 130 - 139MM HG: CPT | Performed by: INTERNAL MEDICINE

## 2024-11-27 PROCEDURE — G8419 CALC BMI OUT NRM PARAM NOF/U: HCPCS | Performed by: INTERNAL MEDICINE

## 2024-11-27 PROCEDURE — G8427 DOCREV CUR MEDS BY ELIG CLIN: HCPCS | Performed by: INTERNAL MEDICINE

## 2024-11-27 PROCEDURE — 3078F DIAST BP <80 MM HG: CPT | Performed by: INTERNAL MEDICINE

## 2024-11-27 PROCEDURE — 1036F TOBACCO NON-USER: CPT | Performed by: INTERNAL MEDICINE

## 2024-11-27 PROCEDURE — 83036 HEMOGLOBIN GLYCOSYLATED A1C: CPT | Performed by: INTERNAL MEDICINE

## 2024-11-27 PROCEDURE — 3051F HG A1C>EQUAL 7.0%<8.0%: CPT | Performed by: INTERNAL MEDICINE

## 2024-11-27 PROCEDURE — G8484 FLU IMMUNIZE NO ADMIN: HCPCS | Performed by: INTERNAL MEDICINE

## 2024-11-27 PROCEDURE — PBSHW AMB POC HEMOGLOBIN A1C: Performed by: INTERNAL MEDICINE

## 2024-11-29 ENCOUNTER — TELEPHONE (OUTPATIENT)
Age: 59
End: 2024-11-29

## 2024-11-29 DIAGNOSIS — F51.01 PRIMARY INSOMNIA: ICD-10-CM

## 2024-12-01 RX ORDER — ZOLPIDEM TARTRATE 10 MG/1
TABLET ORAL
Qty: 30 TABLET | Refills: 0 | Status: SHIPPED | OUTPATIENT
Start: 2024-12-01 | End: 2024-12-30

## 2024-12-11 ENCOUNTER — OFFICE VISIT (OUTPATIENT)
Age: 59
End: 2024-12-11

## 2024-12-11 VITALS
RESPIRATION RATE: 18 BRPM | HEIGHT: 63 IN | HEART RATE: 77 BPM | SYSTOLIC BLOOD PRESSURE: 147 MMHG | DIASTOLIC BLOOD PRESSURE: 64 MMHG | OXYGEN SATURATION: 99 % | BODY MASS INDEX: 26.22 KG/M2 | TEMPERATURE: 98.2 F | WEIGHT: 148 LBS

## 2024-12-11 DIAGNOSIS — I25.10 ASCVD (ARTERIOSCLEROTIC CARDIOVASCULAR DISEASE): ICD-10-CM

## 2024-12-11 DIAGNOSIS — H90.5 SENSORINEURAL HEARING LOSS (SNHL), UNSPECIFIED LATERALITY: Primary | ICD-10-CM

## 2024-12-11 DIAGNOSIS — L29.9 PRURITUS: ICD-10-CM

## 2024-12-11 DIAGNOSIS — E10.42 TYPE 1 DIABETES MELLITUS WITH DIABETIC POLYNEUROPATHY (HCC): ICD-10-CM

## 2024-12-11 DIAGNOSIS — Z01.818 PREOP EXAMINATION: ICD-10-CM

## 2024-12-11 DIAGNOSIS — Z23 ENCOUNTER FOR IMMUNIZATION: ICD-10-CM

## 2024-12-11 DIAGNOSIS — E87.1 CHRONIC HYPONATREMIA: ICD-10-CM

## 2024-12-11 PROBLEM — G96.01 CSF LEAK FROM NOSE: Status: RESOLVED | Noted: 2021-07-13 | Resolved: 2024-12-11

## 2024-12-11 ASSESSMENT — PATIENT HEALTH QUESTIONNAIRE - PHQ9
10. IF YOU CHECKED OFF ANY PROBLEMS, HOW DIFFICULT HAVE THESE PROBLEMS MADE IT FOR YOU TO DO YOUR WORK, TAKE CARE OF THINGS AT HOME, OR GET ALONG WITH OTHER PEOPLE: NOT DIFFICULT AT ALL
2. FEELING DOWN, DEPRESSED OR HOPELESS: NOT AT ALL
8. MOVING OR SPEAKING SO SLOWLY THAT OTHER PEOPLE COULD HAVE NOTICED. OR THE OPPOSITE, BEING SO FIGETY OR RESTLESS THAT YOU HAVE BEEN MOVING AROUND A LOT MORE THAN USUAL: NOT AT ALL
SUM OF ALL RESPONSES TO PHQ QUESTIONS 1-9: 0
SUM OF ALL RESPONSES TO PHQ9 QUESTIONS 1 & 2: 0
4. FEELING TIRED OR HAVING LITTLE ENERGY: NOT AT ALL
SUM OF ALL RESPONSES TO PHQ QUESTIONS 1-9: 0
1. LITTLE INTEREST OR PLEASURE IN DOING THINGS: NOT AT ALL
7. TROUBLE CONCENTRATING ON THINGS, SUCH AS READING THE NEWSPAPER OR WATCHING TELEVISION: NOT AT ALL
5. POOR APPETITE OR OVEREATING: NOT AT ALL
3. TROUBLE FALLING OR STAYING ASLEEP: NOT AT ALL
SUM OF ALL RESPONSES TO PHQ QUESTIONS 1-9: 0
6. FEELING BAD ABOUT YOURSELF - OR THAT YOU ARE A FAILURE OR HAVE LET YOURSELF OR YOUR FAMILY DOWN: NOT AT ALL
SUM OF ALL RESPONSES TO PHQ QUESTIONS 1-9: 0
9. THOUGHTS THAT YOU WOULD BE BETTER OFF DEAD, OR OF HURTING YOURSELF: NOT AT ALL

## 2024-12-11 NOTE — PROGRESS NOTES
Janice Eagle is a 59 y.o. female presenting for/with:    Chief Complaint   Patient presents with    Pre-op Exam     [facility]- [facility] 12/24/2024   Procedure:INSERTION, PROSTHESIS, COCHLEAR (Right)  Dr.Daniel West       Vitals:    12/11/24 1402   BP: (!) 147/64   Site: Left Upper Arm   Position: Sitting   Cuff Size: Medium Adult   Pulse: 77   Resp: 18   Temp: 98.2 °F (36.8 °C)   TempSrc: Temporal   SpO2: 99%   Weight: 67.1 kg (148 lb)   Height: 1.6 m (5' 3\")       Pain Scale: 0 - No pain/10  Pain Location:     \"Have you been to the ER, urgent care clinic since your last visit?  Hospitalized since your last visit?\"    NO    “Have you seen or consulted any other health care providers outside of [facility] since your last visit?”    [facility] Pre OP Assessment -12/10/2024                 12/11/2024     1:59 PM   PHQ-9    Little interest or pleasure in doing things 0   Feeling down, depressed, or hopeless 0   Trouble falling or staying asleep, or sleeping too much 0   Feeling tired or having little energy 0   Poor appetite or overeating 0   Feeling bad about yourself - or that you are a failure or have let yourself or your family down 0   Trouble concentrating on things, such as reading the newspaper or watching television 0   Moving or speaking so slowly that other people could have noticed. Or the opposite - being so fidgety or restless that you have been moving around a lot more than usual 0   Thoughts that you would be better off dead, or of hurting yourself in some way 0   PHQ-2 Score 0   PHQ-9 Total Score 0   If you checked off any problems, how difficult have these problems made it for you to do your work, take care of things at home, or get along with other people? 0           1/15/2024     2:30 PM 1/10/2023    12:00 AM   [facility] AMB LEARNING ASSESSMENT   Primary Learner Patient Patient   Primary Language ENGLISH ENGLISH   Learning Preference DEMONSTRATION DEMONSTRATION

## 2024-12-11 NOTE — PROGRESS NOTES
Janice Eagle is a 59 y.o. female  Chief Complaint   Patient presents with    Pre-op Exam     [facility]- Out patient Surgery 12/24/2024   Procedure:INSERTION, PROSTHESIS, COCHLEAR (Right)  Dr.Daniel West     HPI:  Hearing loss  Onset in June 2024. Has had workup so far including MRI brain, CT head and sinus (with contrast), and audiology consult, c/w sensorineural loss. Seeing provider at [facility] ENT DR. West.  DOS 12/24/2024, ACC and at this time sx scheduled for 7:30 am. Ear to be implanted: Right    selected: AB, Processor: Dual Alis M90, Color: Sand Beige, Accessories: TV Connector, PartnerMic, Remote Control, Additional Straight Battery, M Zn-Air Battery James     WBC  3.9 - 11.7 10e9/L 6.5   RBC  3.85 - 5.16 10e12/L 4.34   HGB  12.0 - 15.0 g/dL 13.2   HCT  34.8 - 45.0 % 37.9   MCV  78.5 - 96.4 fL 87.3   MCH  25.6 - 32.2 pg 30.4   MCHC  30.5 - 34.0 g/dL 34.8 High    RDW  11.3 - 14.7 % 11.3   PLT  172 - 440 10e9/L 272   MPV  8.7 - 12.3 fL 8.7   % NRBC  0.0 - 0.2 % 0   Resulting Agency [facility]EMATOLOG[facility]   Specimen Collected: 12/10/24 14:33     Quantiferon Gold TB  Negative Negative   Resulting Agency [facility] SHARED LAB   Specimen Collected: 10/08/24 12:11         Chronic hyponatremia, with sx of fatigue, balance problems, mood changes, and foggy thinking With Hx extensive workup. Mult labs in past 5y show Na+ levels running in the 110's to low 130's. Sodium mostly normalized with holding gabapentin.    Lab Results   Component Value Date/Time     11/12/2024 11:58 AM    K 4.5 11/12/2024 11:58 AM    CL 95 11/12/2024 11:58 AM    CO2 30 11/12/2024 11:58 AM    BUN 9 11/12/2024 11:58 AM    CREATININE 0.66 11/12/2024 11:58 AM    GLUCOSE 131 11/12/2024 11:58 AM    GLUCOSE 80 10/03/2023 12:00 AM    CALCIUM 9.8 11/12/2024 11:58 AM    LABGLOM >90 11/12/2024 11:58 AM    LABGLOM >90 04/29/2024 10:57 AM    LABGLOM 100 10/03/2023 12:00 AM      Lab Results   Component

## 2024-12-11 NOTE — ADDENDUM NOTE
Addended by: AGUILAR VIEIRA on: 12/11/2024 03:33 PM     Modules accepted: Orders, Level of Service

## 2024-12-27 ENCOUNTER — TELEPHONE (OUTPATIENT)
Age: 59
End: 2024-12-27

## 2024-12-27 NOTE — TELEPHONE ENCOUNTER
Patient's mother notified of message per Dr. Baer and voiced understanding of what was read to them.   Ms Candelario wrote down the sliding scale and verbalized understanding.

## 2024-12-27 NOTE — TELEPHONE ENCOUNTER
Pt's mother Arminda stated since her daughter's surgery on 12/24 her sugars have been in the 400-500's and not coming down much with insulin. She stated this AM her glucose was 290. She stated they did not know pt received steroids during surgery. She says her daughter is taking Levemir 38 units in the AM and 11 units in the PM, for Humalog she is taking 1-3 units at a time. She also stated pt is not eating as much either. She asked what should she do.

## 2024-12-27 NOTE — TELEPHONE ENCOUNTER
Please have her take a now dose of 10 units of levemir and tonight take 14 units and see how her sugar is tomorrow morning.    If her sugar is staying under 150, then she should go back to the dose that Dr. Lowe recommend at their last visit on 11/27 of 36 units in the morning and 10 units at bedtime.    If her sugar is still over 150 tomorrow morning, then she should take 40 units in the morning and 14 units at bedtime and stay on this dose until her sugar is under 150 in the morning and then go back to the 36 units in the morning and 10 units at bedtime.    She can take humalog based on the scale below to bring her sugar down:  Blood sugar  Insulin dose          151-200  4 units      201-250  6 units     251-300  8 units      301-350  10 units      351-400  12 units  401-450  14 units  451-500  16 units  Over 500  18 units    If she has access to [employer], you can send this message to her but be sure her mother writes these instructions down.

## 2024-12-27 NOTE — TELEPHONE ENCOUNTER
Arminda (Mom) lvm about pt getting cochlear implant on 12/24/24 steroid was used during surgery, now pt blood sugar is not under control , would like to know how much should she raise it to?   Vm was left on 12/27/24 @ 12:34 pm

## 2024-12-29 DIAGNOSIS — F51.01 PRIMARY INSOMNIA: ICD-10-CM

## 2024-12-30 RX ORDER — ZOLPIDEM TARTRATE 10 MG/1
TABLET ORAL
Qty: 30 TABLET | Refills: 0 | Status: SHIPPED | OUTPATIENT
Start: 2025-01-01 | End: 2025-01-31

## 2025-01-02 RX ORDER — INSULIN DEGLUDEC 100 U/ML
INJECTION, SOLUTION SUBCUTANEOUS
Qty: 15 ML | Refills: 0 | Status: SHIPPED | OUTPATIENT
Start: 2025-01-02

## 2025-01-02 NOTE — TELEPHONE ENCOUNTER
Shira is on permanent back order with [facility]. Patient would like to know what alternative she can take.   Per 02/21/24, Tresiba was sent to [facility] but with different directions.   Spoke with patient. She states that she is taking 38 units in Am and 11 units in PM because she has COVID right now and her blood sugars are higher.

## 2025-01-08 RX ORDER — FLUTICASONE PROPIONATE 50 MCG
2 SPRAY, SUSPENSION (ML) NASAL DAILY
Qty: 48 G | Refills: 1 | Status: SHIPPED | OUTPATIENT
Start: 2025-01-08

## 2025-01-08 RX ORDER — CICLOPIROX OLAMINE 7.7 MG/G
CREAM TOPICAL
Qty: 30 G | Refills: 11 | Status: SHIPPED | OUTPATIENT
Start: 2025-01-08

## 2025-01-13 RX ORDER — INSULIN DEGLUDEC 100 U/ML
INJECTION, SOLUTION SUBCUTANEOUS
Qty: 45 ML | Refills: 1 | Status: SHIPPED | OUTPATIENT
Start: 2025-01-13

## 2025-01-20 ENCOUNTER — OFFICE VISIT (OUTPATIENT)
Age: 60
End: 2025-01-20
Payer: MEDICARE

## 2025-01-20 VITALS
RESPIRATION RATE: 18 BRPM | TEMPERATURE: 97.6 F | HEART RATE: 85 BPM | OXYGEN SATURATION: 99 % | BODY MASS INDEX: 25.76 KG/M2 | DIASTOLIC BLOOD PRESSURE: 76 MMHG | SYSTOLIC BLOOD PRESSURE: 149 MMHG | HEIGHT: 63 IN | WEIGHT: 145.4 LBS

## 2025-01-20 DIAGNOSIS — Z91.89 AT RISK FOR CEREBROSPINAL FLUID LEAKAGE: ICD-10-CM

## 2025-01-20 DIAGNOSIS — E10.42 TYPE 1 DIABETES MELLITUS WITH DIABETIC POLYNEUROPATHY (HCC): ICD-10-CM

## 2025-01-20 DIAGNOSIS — H90.5 SENSORINEURAL HEARING LOSS (SNHL), UNSPECIFIED LATERALITY: ICD-10-CM

## 2025-01-20 DIAGNOSIS — R51.0 POSTURAL HEADACHE: Primary | ICD-10-CM

## 2025-01-20 PROCEDURE — 2022F DILAT RTA XM EVC RTNOPTHY: CPT | Performed by: FAMILY MEDICINE

## 2025-01-20 PROCEDURE — 1036F TOBACCO NON-USER: CPT | Performed by: FAMILY MEDICINE

## 2025-01-20 PROCEDURE — G8419 CALC BMI OUT NRM PARAM NOF/U: HCPCS | Performed by: FAMILY MEDICINE

## 2025-01-20 PROCEDURE — 3046F HEMOGLOBIN A1C LEVEL >9.0%: CPT | Performed by: FAMILY MEDICINE

## 2025-01-20 PROCEDURE — G8427 DOCREV CUR MEDS BY ELIG CLIN: HCPCS | Performed by: FAMILY MEDICINE

## 2025-01-20 PROCEDURE — 99214 OFFICE O/P EST MOD 30 MIN: CPT | Performed by: FAMILY MEDICINE

## 2025-01-20 PROCEDURE — 3017F COLORECTAL CA SCREEN DOC REV: CPT | Performed by: FAMILY MEDICINE

## 2025-01-20 SDOH — ECONOMIC STABILITY: FOOD INSECURITY: WITHIN THE PAST 12 MONTHS, YOU WORRIED THAT YOUR FOOD WOULD RUN OUT BEFORE YOU GOT MONEY TO BUY MORE.: NEVER TRUE

## 2025-01-20 SDOH — ECONOMIC STABILITY: FOOD INSECURITY: WITHIN THE PAST 12 MONTHS, THE FOOD YOU BOUGHT JUST DIDN'T LAST AND YOU DIDN'T HAVE MONEY TO GET MORE.: NEVER TRUE

## 2025-01-20 ASSESSMENT — PATIENT HEALTH QUESTIONNAIRE - PHQ9
SUM OF ALL RESPONSES TO PHQ9 QUESTIONS 1 & 2: 0
2. FEELING DOWN, DEPRESSED OR HOPELESS: NOT AT ALL
SUM OF ALL RESPONSES TO PHQ QUESTIONS 1-9: 0
SUM OF ALL RESPONSES TO PHQ9 QUESTIONS 1 & 2: 0
SUM OF ALL RESPONSES TO PHQ QUESTIONS 1-9: 0
SUM OF ALL RESPONSES TO PHQ9 QUESTIONS 1 & 2: 0
8. MOVING OR SPEAKING SO SLOWLY THAT OTHER PEOPLE COULD HAVE NOTICED. OR THE OPPOSITE, BEING SO FIGETY OR RESTLESS THAT YOU HAVE BEEN MOVING AROUND A LOT MORE THAN USUAL: NOT AT ALL
SUM OF ALL RESPONSES TO PHQ QUESTIONS 1-9: 3
SUM OF ALL RESPONSES TO PHQ QUESTIONS 1-9: 0
1. LITTLE INTEREST OR PLEASURE IN DOING THINGS: NOT AT ALL
7. TROUBLE CONCENTRATING ON THINGS, SUCH AS READING THE NEWSPAPER OR WATCHING TELEVISION: SEVERAL DAYS
SUM OF ALL RESPONSES TO PHQ QUESTIONS 1-9: 3
2. FEELING DOWN, DEPRESSED OR HOPELESS: NOT AT ALL
10. IF YOU CHECKED OFF ANY PROBLEMS, HOW DIFFICULT HAVE THESE PROBLEMS MADE IT FOR YOU TO DO YOUR WORK, TAKE CARE OF THINGS AT HOME, OR GET ALONG WITH OTHER PEOPLE: NOT DIFFICULT AT ALL
SUM OF ALL RESPONSES TO PHQ QUESTIONS 1-9: 0
9. THOUGHTS THAT YOU WOULD BE BETTER OFF DEAD, OR OF HURTING YOURSELF: NOT AT ALL
3. TROUBLE FALLING OR STAYING ASLEEP: SEVERAL DAYS
4. FEELING TIRED OR HAVING LITTLE ENERGY: SEVERAL DAYS
6. FEELING BAD ABOUT YOURSELF - OR THAT YOU ARE A FAILURE OR HAVE LET YOURSELF OR YOUR FAMILY DOWN: NOT AT ALL
SUM OF ALL RESPONSES TO PHQ QUESTIONS 1-9: 3
SUM OF ALL RESPONSES TO PHQ QUESTIONS 1-9: 0
SUM OF ALL RESPONSES TO PHQ QUESTIONS 1-9: 0
5. POOR APPETITE OR OVEREATING: NOT AT ALL
SUM OF ALL RESPONSES TO PHQ QUESTIONS 1-9: 3
1. LITTLE INTEREST OR PLEASURE IN DOING THINGS: NOT AT ALL
SUM OF ALL RESPONSES TO PHQ QUESTIONS 1-9: 0
SUM OF ALL RESPONSES TO PHQ QUESTIONS 1-9: 0
2. FEELING DOWN, DEPRESSED OR HOPELESS: NOT AT ALL
1. LITTLE INTEREST OR PLEASURE IN DOING THINGS: NOT AT ALL

## 2025-01-20 NOTE — PROGRESS NOTES
Janice Eagle is a 59 y.o. female presenting for/with:    Chief Complaint   Patient presents with    Diabetes    Medication Check       Vitals:    01/20/25 0900   BP: (!) 149/76   Pulse: 85   Resp: 18   Temp: 97.6 °F (36.4 °C)   TempSrc: Temporal   SpO2: 99%   Weight: 66 kg (145 lb 6.4 oz)   Height: 1.6 m (5' 3\")       Pain Scale: /10  Pain Location:     \"Have you been to the ER, urgent care clinic since your last visit?  Hospitalized since your last visit?\"    NO    “Have you seen or consulted any other health care providers outside of [facility] since your last visit?”    NO                 1/20/2025     9:33 AM   PHQ-9    Little interest or pleasure in doing things 0   Feeling down, depressed, or hopeless 0   Trouble falling or staying asleep, or sleeping too much 1   Feeling tired or having little energy 1   Poor appetite or overeating 0   Feeling bad about yourself - or that you are a failure or have let yourself or your family down 0   Trouble concentrating on things, such as reading the newspaper or watching television 1   Moving or speaking so slowly that other people could have noticed. Or the opposite - being so fidgety or restless that you have been moving around a lot more than usual 0   Thoughts that you would be better off dead, or of hurting yourself in some way 0   PHQ-2 Score 0   PHQ-9 Total Score 3   If you checked off any problems, how difficult have these problems made it for you to do your work, take care of things at home, or get along with other people? 0           1/15/2024     2:30 PM 1/10/2023    12:00 AM   [facility] AMB LEARNING ASSESSMENT   Primary Learner Patient Patient   Primary Language ENGLISH ENGLISH   Learning Preference DEMONSTRATION DEMONSTRATION   Answered By patient patient   Relationship to Learner SELF SELF            1/20/2025     9:25 AM   Amb Fall Risk Assessment and TUG Test   Do you feel unsteady or are you worried about falling?  no   2 or more falls in past

## 2025-01-23 ENCOUNTER — TELEPHONE (OUTPATIENT)
Age: 60
End: 2025-01-23

## 2025-01-23 NOTE — TELEPHONE ENCOUNTER
Pt called came in stating that Neuro said that they need a referral and office notes for her to be able to scheduled.     I advised I would send a msg to Renetta in regards to this.     Please call pt with any questions.     Thanks!

## 2025-01-29 DIAGNOSIS — F51.01 PRIMARY INSOMNIA: ICD-10-CM

## 2025-01-29 RX ORDER — ZOLPIDEM TARTRATE 10 MG/1
TABLET ORAL
Qty: 30 TABLET | Refills: 1 | Status: SHIPPED | OUTPATIENT
Start: 2025-01-29 | End: 2025-03-30

## 2025-02-05 ENCOUNTER — TELEPHONE (OUTPATIENT)
Age: 60
End: 2025-02-05

## 2025-02-05 NOTE — TELEPHONE ENCOUNTER
Patient left  stating that she has had several \"semi-conscious\" events in the mornings x 1 week.   She states that her blood sugars are running high in the Ams and she is having to increase her Humalog doses. She states that she is taking 40 units in AM and 12 units in PM of Levemir.  Patient can be reached at [phone].

## 2025-02-06 NOTE — TELEPHONE ENCOUNTER
Pt's mother notes her evening BG have been in the 300s and her AM BG have been in the 40-60s.  She is currently taking Levemir 36 units in the morning and 10 units HS.    I instructed her to increase the AM Levemir to 40 units and HS 8 units

## 2025-02-06 NOTE — TELEPHONE ENCOUNTER
Called pt, no answer LVM.    Since her BG have been high in the early morning, I instructed her to increase her HS Levemir from 12 units to 15 units and call us back next week with an update of her sugars on this new regimen.

## 2025-02-06 NOTE — TELEPHONE ENCOUNTER
Mother, Arminda Palomino, left VM this morning stating that patient was \"pretty out of it\" this morning. Her blood sugar was 42. Mother thinks patient may have given incorrect information. Mother states has lows in the morning.   Mother can be reached at [phone].

## 2025-02-07 ENCOUNTER — TELEPHONE (OUTPATIENT)
Age: 60
End: 2025-02-07

## 2025-02-07 ENCOUNTER — APPOINTMENT (OUTPATIENT)
Facility: HOSPITAL | Age: 60
End: 2025-02-07
Payer: MEDICARE

## 2025-02-07 ENCOUNTER — HOSPITAL ENCOUNTER (EMERGENCY)
Facility: HOSPITAL | Age: 60
Discharge: HOME | End: 2025-02-07
Attending: EMERGENCY MEDICINE
Payer: MEDICARE

## 2025-02-07 VITALS
OXYGEN SATURATION: 98 % | TEMPERATURE: 97.7 F | WEIGHT: 148 LBS | HEART RATE: 89 BPM | DIASTOLIC BLOOD PRESSURE: 71 MMHG | HEIGHT: 63 IN | SYSTOLIC BLOOD PRESSURE: 164 MMHG | BODY MASS INDEX: 26.22 KG/M2 | RESPIRATION RATE: 20 BRPM

## 2025-02-07 DIAGNOSIS — E87.1 CHRONIC HYPONATREMIA: ICD-10-CM
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
## 2025-02-07 DIAGNOSIS — E10.65 TYPE 1 DIABETES MELLITUS WITH HYPERGLYCEMIA (HCC): ICD-10-CM

## 2025-02-07 LAB
ANION GAP SERPL CALC-SCNC: 8 MMOL/L (ref 2–12)
APPEARANCE UR: CLEAR
BACTERIA URNS QL MICRO: NEGATIVE /HPF
BASOPHILS # BLD: 0.03 K/UL (ref 0–0.1)
BASOPHILS NFR BLD: 0.3 % (ref 0–1)
BILIRUB UR QL: NEGATIVE
BUN SERPL-MCNC: 12 MG/DL (ref 6–20)
BUN/CREAT SERPL: 17 (ref 12–20)
CALCIUM SERPL-MCNC: 9.1 MG/DL (ref 8.5–10.1)
CHLORIDE SERPL-SCNC: 91 MMOL/L (ref 97–108)
CO2 SERPL-SCNC: 31 MMOL/L (ref 21–32)
COLOR UR: ABNORMAL
CREAT SERPL-MCNC: 0.72 MG/DL (ref 0.55–1.02)
DIFFERENTIAL METHOD BLD: ABNORMAL
EOSINOPHIL # BLD: 0 K/UL (ref 0–0.4)
EOSINOPHIL NFR BLD: 0 % (ref 0–0.7)
EPITH CASTS URNS QL MICRO: ABNORMAL /LPF
ERYTHROCYTE [DISTWIDTH] IN BLOOD BY AUTOMATED COUNT: 12.4 % (ref 11.5–14.5)
GLUCOSE BLD STRIP.AUTO-MCNC: 268 MG/DL (ref 65–117)
GLUCOSE SERPL-MCNC: 258 MG/DL (ref 65–100)
GLUCOSE UR STRIP.AUTO-MCNC: 500 MG/DL
HCT VFR BLD AUTO: 37.5 % (ref 35–47)
HGB BLD-MCNC: 13 G/DL (ref 11.5–16)
HGB UR QL STRIP: ABNORMAL
IMM GRANULOCYTES # BLD AUTO: 0.05 K/UL (ref 0–0.04)
IMM GRANULOCYTES NFR BLD AUTO: 0.6 % (ref 0–0.5)
KETONES UR QL STRIP.AUTO: NEGATIVE MG/DL
LEUKOCYTE ESTERASE UR QL STRIP.AUTO: NEGATIVE
LYMPHOCYTES # BLD: 0.63 K/UL (ref 0.8–3.5)
LYMPHOCYTES NFR BLD: 7.2 % (ref 12–49)
MCH RBC QN AUTO: 30.5 PG (ref 26–34)
MCHC RBC AUTO-ENTMCNC: 34.7 G/DL (ref 30–36.5)
MCV RBC AUTO: 88 FL (ref 80–99)
MONOCYTES # BLD: 0.67 K/UL (ref 0–1)
MONOCYTES NFR BLD: 7.7 % (ref 5–13)
NEUTS SEG # BLD: 7.33 K/UL (ref 1.8–8)
NEUTS SEG NFR BLD: 84.2 % (ref 32–75)
NITRITE UR QL STRIP.AUTO: NEGATIVE
NRBC # BLD: 0 K/UL (ref 0–0.01)
NRBC BLD-RTO: 0 PER 100 WBC
PH UR STRIP: 6 (ref 5–8)
PLATELET # BLD AUTO: 217 K/UL (ref 150–400)
PLATELET COMMENT: ABNORMAL
PMV BLD AUTO: 8 FL (ref 8.9–12.9)
POTASSIUM SERPL-SCNC: 4.6 MMOL/L (ref 3.5–5.1)
PROT UR STRIP-MCNC: NEGATIVE MG/DL
RBC # BLD AUTO: 4.26 M/UL (ref 3.8–5.2)
RBC #/AREA URNS HPF: ABNORMAL /HPF (ref 0–5)
RBC MORPH BLD: ABNORMAL
SERVICE CMNT-IMP: ABNORMAL
SODIUM SERPL-SCNC: 130 MMOL/L (ref 136–145)
SP GR UR REFRACTOMETRY: 1.01 (ref 1–1.03)
URINE CULTURE IF INDICATED: ABNORMAL
UROBILINOGEN UR QL STRIP.AUTO: 0.2 EU/DL (ref 0.2–1)
WBC # BLD AUTO: 8.7 K/UL (ref 3.6–11)
WBC URNS QL MICRO: ABNORMAL /HPF (ref 0–4)

## 2025-02-07 PROCEDURE — 99285 EMERGENCY DEPT VISIT HI MDM: CPT

## 2025-02-07 PROCEDURE — 73590 X-RAY EXAM OF LOWER LEG: CPT

## 2025-02-07 PROCEDURE — 85025 COMPLETE CBC W/AUTO DIFF WBC: CPT

## 2025-02-07 PROCEDURE — 82962 GLUCOSE BLOOD TEST: CPT

## 2025-02-07 PROCEDURE — 6370000000 HC RX 637 (ALT 250 FOR IP): Performed by: EMERGENCY MEDICINE

## 2025-02-07 PROCEDURE — 80048 BASIC METABOLIC PNL TOTAL CA: CPT

## 2025-02-07 PROCEDURE — 73030 X-RAY EXAM OF SHOULDER: CPT

## 2025-02-07 PROCEDURE — 73502 X-RAY EXAM HIP UNI 2-3 VIEWS: CPT

## 2025-02-07 PROCEDURE — 93005 ELECTROCARDIOGRAM TRACING: CPT | Performed by: EMERGENCY MEDICINE

## 2025-02-07 PROCEDURE — 70450 CT HEAD/BRAIN W/O DYE: CPT

## 2025-02-07 PROCEDURE — 36415 COLL VENOUS BLD VENIPUNCTURE: CPT

## 2025-02-07 PROCEDURE — 81001 URINALYSIS AUTO W/SCOPE: CPT

## 2025-02-07 RX ORDER — HYDROCODONE BITARTRATE AND ACETAMINOPHEN 5; 325 MG/1; MG/1
2 TABLET ORAL
Status: COMPLETED | OUTPATIENT
Start: 2025-02-07 | End: 2025-02-07

## 2025-02-07 RX ORDER — INSULIN LISPRO-AABC 100 [IU]/ML
INJECTION, SOLUTION SUBCUTANEOUS
Qty: 30 ML | Refills: 1 | Status: SHIPPED | OUTPATIENT
Start: 2025-02-07 | End: 2025-03-17

## 2025-02-07 RX ADMIN — HYDROCODONE BITARTRATE AND ACETAMINOPHEN 2 TABLET: 5; 325 TABLET ORAL at 11:31

## 2025-02-07 ASSESSMENT — ENCOUNTER SYMPTOMS
SORE THROAT: 0
COUGH: 0
SHORTNESS OF BREATH: 0
DIARRHEA: 0
VOMITING: 0
ABDOMINAL PAIN: 0
NAUSEA: 0
EYE REDNESS: 0

## 2025-02-07 ASSESSMENT — PAIN DESCRIPTION - DESCRIPTORS: DESCRIPTORS: SHARP;ACHING

## 2025-02-07 ASSESSMENT — PAIN SCALES - GENERAL
PAINLEVEL_OUTOF10: 7
PAINLEVEL_OUTOF10: 8
PAINLEVEL_OUTOF10: 8

## 2025-02-07 ASSESSMENT — PAIN DESCRIPTION - LOCATION: LOCATION: SHOULDER

## 2025-02-07 ASSESSMENT — PAIN - FUNCTIONAL ASSESSMENT
PAIN_FUNCTIONAL_ASSESSMENT: 0-10

## 2025-02-07 NOTE — TELEPHONE ENCOUNTER
Patient called and her sister (shahida) stated that she was at the ER today after experiencing four falls. Despite following the prescribed insulin regimen, she continues to experience episodes of semi-consciousness, particularly in the mornings.  The insulin regimen includes:  40 units of Levemir administered this morning  8 units of Levemir administered last night  However, her blood glucose readings have been concernin mg/dL this morning  185 mg/dL currently

## 2025-02-07 NOTE — ED NOTES
Patient went to and from CT.  C/o left hip pain and requesting more than tylenol. DR Oquendo made aware

## 2025-02-07 NOTE — ED NOTES
Patient feeling a little better able to move but still sore.  Encouraged movement at home and ice for 48 hours. Has ice packs at home.

## 2025-02-07 NOTE — ED TRIAGE NOTES
Low BGL at home fell 4 times this am.  Passed out around 0700.  Mother came to the house.  Third day of these events.  Called family provider and they adjusted her medications.Had chest pains and struck her head and left shoulder and her side.Has pain on the right side of her head where her implant is.

## 2025-02-07 NOTE — ED PROVIDER NOTES
MG/DL   CBC with Auto Differential    Collection Time: 02/07/25 10:13 AM   Result Value Ref Range    WBC 8.7 3.6 - 11.0 K/uL    RBC 4.26 3.80 - 5.20 M/uL    Hemoglobin 13.0 11.5 - 16.0 g/dL    Hematocrit 37.5 35.0 - 47.0 %    MCV 88.0 80.0 - 99.0 FL    MCH 30.5 26.0 - 34.0 PG    MCHC 34.7 30.0 - 36.5 g/dL    RDW 12.4 11.5 - 14.5 %    Platelets 217 150 - 400 K/uL    MPV 8.0 (L) 8.9 - 12.9 FL    Nucleated RBCs 0.0 0  WBC    nRBC 0.00 0.00 - 0.01 K/uL    Neutrophils % 84.2 (H) 32.0 - 75.0 %    Lymphocytes % 7.2 (L) 12.0 - 49.0 %    Monocytes % 7.7 5.0 - 13.0 %    Eosinophils % 0.0 0.0 - 0.70 %    Basophils % 0.3 0.0 - 1.0 %    Immature Granulocytes % 0.6 (H) 0.0 - 0.5 %    Neutrophils Absolute 7.33 1.80 - 8.00 K/UL    Lymphocytes Absolute 0.63 (L) 0.80 - 3.50 K/UL    Monocytes Absolute 0.67 0.00 - 1.00 K/UL    Eosinophils Absolute 0.00 0.00 - 0.40 K/UL    Basophils Absolute 0.03 0.00 - 0.10 K/UL    Immature Granulocytes Absolute 0.05 (H) 0.00 - 0.04 K/UL    Differential Type AUTOMATED      Platelet Comment ADEQUATE PLATELETS      RBC Comment NORMOCYTIC, NORMOCHROMIC         Radiologic Studies -   CT HEAD WO CONTRAST   Final Result   No evidence of acute intracranial abnormality, acute skull or scalp lesion,   noting suboptimal evaluation due to right cochlear implant associated metallic   artifacts.         Electronically signed by DENA RICO      XR SHOULDER LEFT (MIN 2 VIEWS)   Final Result   No acute abnormality.      Electronically signed by Jonny Gentile      XR HIP 2-3 VW W PELVIS LEFT   Final Result   No acute abnormality.      Electronically signed by OVIDIO ARZOLA      XR TIBIA FIBULA LEFT (2 VIEWS)   Final Result   No acute abnormality.      Electronically signed by Amaris Gonsalez        [unfilled]  [unfilled]      Medical Decision Making   I am the first provider for this patient.    I reviewed the vital signs, available nursing notes, past medical history, past surgical history, family history and  social history.    Vital Signs-Reviewed the patient's vital signs.  Patient Vitals for the past 12 hrs:   Temp Pulse Resp BP SpO2   02/07/25 0945 97.7 °F (36.5 °C) 94 14 (!) 158/86 99 %           Records Reviewed: Nursing notes reviewed    Provider Notes (Medical Decision Making):   DDX: 59-year-old female with ground-level fall associated with hypoglycemic episode.  Patient currently hyperglycemic.  Will obtain CT head to rule any possible ICH.  Will obtain plain films of left shoulder, left hip and left tib-fib rule out any possible fracture or dislocation.  Will obtain basic blood work to rule out any electrolyte abnormalities and assess blood sugar.    ED Course:   Initial assessment performed. The patients presenting problems have been discussed, and they are in agreement with the care plan formulated and outlined with them.  I have encouraged them to ask questions as they arise throughout their visit.         ED EKG interpretation:09:59  Rhythm: normal sinus rhythm; and regular . Rate (approx.): 100; Axis: left axis deviation; OH Interval: normal; QRS interval: normal ; ST/T wave: non-specific changes; This EKG was interpreted by Augustine Oquendo MD,ED Provider.       PROGRESS NOTE          Pt reevaluated.  CT head without any acute findings.  Plain films of left shoulder, left hip and tib-fib without any acute findings.  Reassured patient.  CBC and BMP remarkable for chronic hyponatremia at 138, unchanged from prior.  Hyperglycemia without anion gap.  Recommended patient call her endocrinologist today and further work on tweaking her insulin regimens to avoid a.m. hypoglycemia.  Written by Augustine Oquendo MD     Progress note:    Pt  ready for discharge. Updated pt and/or family on all final lab and/or  imaging findings.  Will follow up as instructed. All questions have been answered, pt voiced understanding and agreement with plan.           Specific return precautions provided as well as instructions to return

## 2025-02-08 LAB
EKG ATRIAL RATE: 100 BPM
EKG DIAGNOSIS: NORMAL
EKG P AXIS: 63 DEGREES
EKG P-R INTERVAL: 156 MS
EKG Q-T INTERVAL: 356 MS
EKG QRS DURATION: 88 MS
EKG QTC CALCULATION (BAZETT): 459 MS
EKG R AXIS: -35 DEGREES
EKG T AXIS: 34 DEGREES
EKG VENTRICULAR RATE: 100 BPM

## 2025-02-10 ENCOUNTER — TELEPHONE (OUTPATIENT)
Age: 60
End: 2025-02-10

## 2025-02-10 NOTE — TELEPHONE ENCOUNTER
Mother has been advised to stop the night dose of Tresiba. Continue 40 units of Tresiba (per mother) in Am. Mother was advised to call if her blood sugars start to rise too high in the morning. Mother agreed.

## 2025-02-10 NOTE — TELEPHONE ENCOUNTER
Mother, Arminda Palomino, left VM stating that patient's blood sugars have been low since 02/07/25:  2/7/25 in PM, took 5 units Levemir. At 2 am= glucose was 52. Ate peanut butter and Brenda. At 7 am=32 glucose reading   2/8/25: PM took Levemir 5 units. At 2 iu=601. 7 am=31  Last night took 3 units Levemir. At 9 30 pm= 400. At 2 mc=218. Fasting this am=32.  She has been taking \"new manufactured Levemir\" since beginning of February. Her fasting blood sugars are running low since then. Mother very concerned. Please call her mobile number at [phone].

## 2025-02-10 NOTE — TELEPHONE ENCOUNTER
Mother left  stating that patient is taking 10,000 mg of Vitamin C twice a day. She states that she read that too much Vitamin C can cause gastric GI upset,headaches, rash and mood changes. Mother states that patient has several of those symptoms. She states that patient has reduced the amount to 5000 mg twice a day, as of yesterday.  Mother would like 's thoughts.  Arminda Candelario, Mother, can be reached at [phone].

## 2025-02-11 NOTE — TELEPHONE ENCOUNTER
Spoke with mother. She states that her blood sugar was 71 this morning, fasting. Patient had no insulin last night. She did take 35 units this morning of Toujeo. Advised to let us know if her blood sugars drop again. Mother agreed with cutting the Vitamin C in half.

## 2025-02-18 RX ORDER — SIMVASTATIN 40 MG
40 TABLET ORAL NIGHTLY
Qty: 90 TABLET | Refills: 3 | Status: SHIPPED | OUTPATIENT
Start: 2025-02-18

## 2025-02-19 RX ORDER — BLOOD SUGAR DIAGNOSTIC
STRIP MISCELLANEOUS
Qty: 800 EACH | Refills: 3 | Status: SHIPPED | OUTPATIENT
Start: 2025-02-19

## 2025-02-24 NOTE — DISCHARGE INSTRUCTIONS
Drinking 2 gallons of water a day without any electrolytes and it can cause your sodium levels to be low. Please try to mix electrolytes or electrolyte fluids in with your water intake. There are products that you can purchase over-the-counter such as liquid IV that have electrolytes but do not have high levels of sugar. 80

## 2025-03-01 DIAGNOSIS — E10.42 TYPE 1 DIABETES MELLITUS WITH DIABETIC POLYNEUROPATHY (HCC): ICD-10-CM

## 2025-03-01 DIAGNOSIS — E55.9 VITAMIN D DEFICIENCY, UNSPECIFIED: ICD-10-CM

## 2025-03-01 DIAGNOSIS — I10 ESSENTIAL (PRIMARY) HYPERTENSION: ICD-10-CM

## 2025-03-01 DIAGNOSIS — E03.9 ACQUIRED HYPOTHYROIDISM: ICD-10-CM

## 2025-03-13 ENCOUNTER — TRANSCRIBE ORDERS (OUTPATIENT)
Facility: HOSPITAL | Age: 60
End: 2025-03-13

## 2025-03-13 DIAGNOSIS — M25.552 ACUTE PAIN OF BOTH HIPS: Primary | ICD-10-CM

## 2025-03-13 DIAGNOSIS — M25.551 ACUTE PAIN OF BOTH HIPS: Primary | ICD-10-CM

## 2025-03-13 DIAGNOSIS — M16.12 PRIMARY OSTEOARTHRITIS OF LEFT HIP: ICD-10-CM

## 2025-03-13 DIAGNOSIS — M16.11 PRIMARY OSTEOARTHRITIS OF RIGHT HIP: ICD-10-CM

## 2025-03-13 DIAGNOSIS — T14.8XXA TENDON TEAR: ICD-10-CM

## 2025-03-14 ENCOUNTER — HOSPITAL ENCOUNTER (OUTPATIENT)
Facility: HOSPITAL | Age: 60
Discharge: HOME | End: 2025-03-17
Payer: MEDICARE

## 2025-03-14 LAB
ALBUMIN SERPL-MCNC: 3.8 G/DL (ref 3.5–5)
ALBUMIN/GLOB SERPL: 1.1 (ref 1.1–2.2)
ALP SERPL-CCNC: 71 U/L (ref 45–117)
ALT SERPL-CCNC: 17 U/L (ref 12–78)
ANION GAP SERPL CALC-SCNC: 6 MMOL/L (ref 2–12)
AST SERPL-CCNC: 21 U/L (ref 15–37)
BILIRUB SERPL-MCNC: 0.8 MG/DL (ref 0.2–1)
BUN SERPL-MCNC: 9 MG/DL (ref 6–20)
BUN/CREAT SERPL: 14 (ref 12–20)
CALCIUM SERPL-MCNC: 9.5 MG/DL (ref 8.5–10.1)
CHLORIDE SERPL-SCNC: 95 MMOL/L (ref 97–108)
CO2 SERPL-SCNC: 33 MMOL/L (ref 21–32)
CREAT SERPL-MCNC: 0.65 MG/DL (ref 0.55–1.02)
CREAT UR-MCNC: 20.5 MG/DL
EST. AVERAGE GLUCOSE BLD GHB EST-MCNC: 200 MG/DL
GLOBULIN SER CALC-MCNC: 3.4 G/DL (ref 2–4)
GLUCOSE SERPL-MCNC: 244 MG/DL (ref 65–100)
HBA1C MFR BLD: 8.6 % (ref 4–5.6)
MICROALBUMIN UR-MCNC: 0.96 MG/DL
MICROALBUMIN/CREAT UR-RTO: 47 MG/G (ref 0–30)
POTASSIUM SERPL-SCNC: 4.5 MMOL/L (ref 3.5–5.1)
PROT SERPL-MCNC: 7.2 G/DL (ref 6.4–8.2)
SODIUM SERPL-SCNC: 134 MMOL/L (ref 136–145)
T4 FREE SERPL-MCNC: 1.4 NG/DL (ref 0.8–1.5)
TSH SERPL DL<=0.05 MIU/L-ACNC: 0.92 UIU/ML (ref 0.36–3.74)

## 2025-03-14 PROCEDURE — 84439 ASSAY OF FREE THYROXINE: CPT

## 2025-03-14 PROCEDURE — 80053 COMPREHEN METABOLIC PANEL: CPT

## 2025-03-14 PROCEDURE — 84443 ASSAY THYROID STIM HORMONE: CPT

## 2025-03-14 PROCEDURE — 36415 COLL VENOUS BLD VENIPUNCTURE: CPT

## 2025-03-14 PROCEDURE — 82306 VITAMIN D 25 HYDROXY: CPT

## 2025-03-14 PROCEDURE — 82043 UR ALBUMIN QUANTITATIVE: CPT

## 2025-03-14 PROCEDURE — 83036 HEMOGLOBIN GLYCOSYLATED A1C: CPT

## 2025-03-14 PROCEDURE — 82570 ASSAY OF URINE CREATININE: CPT

## 2025-03-16 LAB — 25(OH)D3 SERPL-MCNC: 81 NG/ML (ref 30–100)

## 2025-03-17 ENCOUNTER — OFFICE VISIT (OUTPATIENT)
Age: 60
End: 2025-03-17
Payer: MEDICARE

## 2025-03-17 VITALS
WEIGHT: 153.4 LBS | HEIGHT: 63 IN | DIASTOLIC BLOOD PRESSURE: 80 MMHG | HEART RATE: 68 BPM | BODY MASS INDEX: 27.18 KG/M2 | SYSTOLIC BLOOD PRESSURE: 150 MMHG

## 2025-03-17 DIAGNOSIS — E10.42 TYPE 1 DIABETES MELLITUS WITH DIABETIC POLYNEUROPATHY (HCC): Primary | ICD-10-CM

## 2025-03-17 DIAGNOSIS — E03.9 ACQUIRED HYPOTHYROIDISM: ICD-10-CM

## 2025-03-17 DIAGNOSIS — I10 ESSENTIAL (PRIMARY) HYPERTENSION: ICD-10-CM

## 2025-03-17 DIAGNOSIS — E55.9 VITAMIN D DEFICIENCY, UNSPECIFIED: ICD-10-CM

## 2025-03-17 PROCEDURE — 3052F HG A1C>EQUAL 8.0%<EQUAL 9.0%: CPT | Performed by: INTERNAL MEDICINE

## 2025-03-17 PROCEDURE — 1036F TOBACCO NON-USER: CPT | Performed by: INTERNAL MEDICINE

## 2025-03-17 PROCEDURE — 3079F DIAST BP 80-89 MM HG: CPT | Performed by: INTERNAL MEDICINE

## 2025-03-17 PROCEDURE — G2211 COMPLEX E/M VISIT ADD ON: HCPCS | Performed by: INTERNAL MEDICINE

## 2025-03-17 PROCEDURE — G8419 CALC BMI OUT NRM PARAM NOF/U: HCPCS | Performed by: INTERNAL MEDICINE

## 2025-03-17 PROCEDURE — 99215 OFFICE O/P EST HI 40 MIN: CPT | Performed by: INTERNAL MEDICINE

## 2025-03-17 PROCEDURE — 3017F COLORECTAL CA SCREEN DOC REV: CPT | Performed by: INTERNAL MEDICINE

## 2025-03-17 PROCEDURE — G8427 DOCREV CUR MEDS BY ELIG CLIN: HCPCS | Performed by: INTERNAL MEDICINE

## 2025-03-17 PROCEDURE — 3077F SYST BP >= 140 MM HG: CPT | Performed by: INTERNAL MEDICINE

## 2025-03-17 PROCEDURE — 2022F DILAT RTA XM EVC RTNOPTHY: CPT | Performed by: INTERNAL MEDICINE

## 2025-03-17 RX ORDER — INSULIN LISPRO 100 [IU]/ML
INJECTION, SOLUTION INTRAVENOUS; SUBCUTANEOUS
Qty: 30 ML | Refills: 1 | Status: ON HOLD | OUTPATIENT
Start: 2025-03-17

## 2025-03-17 RX ORDER — BLOOD SUGAR DIAGNOSTIC
STRIP MISCELLANEOUS
Qty: 800 EACH | Refills: 3 | Status: ON HOLD | OUTPATIENT
Start: 2025-03-17

## 2025-03-17 RX ORDER — INSULIN DEGLUDEC 100 U/ML
INJECTION, SOLUTION SUBCUTANEOUS
Qty: 45 ML | Refills: 1 | Status: ON HOLD | OUTPATIENT
Start: 2025-03-17

## 2025-03-17 RX ORDER — SULFAMETHOXAZOLE AND TRIMETHOPRIM 800; 160 MG/1; MG/1
1 TABLET ORAL 2 TIMES DAILY
Status: ON HOLD | COMMUNITY
Start: 2025-03-14 | End: 2025-03-28

## 2025-03-17 NOTE — PROGRESS NOTES
Chief Complaint   Patient presents with    Diabetes    Thyroid Problem    Vitamin D deficiency     Pcp and pharmacy verified     History of Present Illness: Janice Eagle is a 59 y.o. female here for follow up of Type 1 diabetes.    At our last visit in November 2024 her A1C was 7.8%. She reported having low BG during the day and overnight, so I instructed her to decrease her Levemir to 36 units in the AM and 10 units HS.    Since our last visit we have been in contact multiple times adjusting her insulin, because she was continuing to have hypoglycemia overnight.    Per my last instructions I told her to take Tresiba (formulary change) 40 units in the AM and stop the HS Tresiba, but continue the Humalog 3 units WITH MEALS and not after she eats and when her BG are already high.    \"I had a 2 week period of confusion in the morning and then a 2 week period where my mother was coming in and checking on me. I did have more low blood sugars so I had to lower my Tresiba further. My mother took me to the ER, they did a CT and x-rays and said I had some brusing from a fall. I stayed with my mother for a week.\"    She is currently taking Tresiba 38 units every morning (35 was too low). She will take humalog only when her BG >250-300. She notes she is having a lot of high BG in the evening and she will take 1-2 units of the Humalog only.    Pt had the Cochlear implant on [place] [employer], but she notes they turned it on in February 2025.     She has not been using the DexCom \"I have it, but I have never taken out of the box I want to save that in case I can't get my test strips\".    She tests her BG 8 times per day. \"I have the DexCom but I did not start using it. I did not like how the Davi worked, so I have not started the DexCom. I don't trust it.\"    Her A1C last week was 8.6%.       - Pt is waking around 7-8AM, she takes her Levemir 38 units and Humalog 1-2 units \"if my sugar is over 300\".    - She will have the pepsi

## 2025-03-23 ENCOUNTER — APPOINTMENT (OUTPATIENT)
Facility: HOSPITAL | Age: 60
End: 2025-03-23
Payer: MEDICARE

## 2025-03-23 ENCOUNTER — HOSPITAL ENCOUNTER (INPATIENT)
Facility: HOSPITAL | Age: 60
LOS: 5 days | Discharge: HOME | DRG: 644 | End: 2025-03-28
Attending: HOSPITALIST | Admitting: HOSPITALIST
Payer: MEDICARE

## 2025-03-23 ENCOUNTER — HOSPITAL ENCOUNTER (EMERGENCY)
Facility: HOSPITAL | Age: 60
Discharge: ANOTHER HOSPITAL | End: 2025-03-23
Attending: EMERGENCY MEDICINE
Payer: MEDICARE

## 2025-03-23 VITALS
DIASTOLIC BLOOD PRESSURE: 57 MMHG | TEMPERATURE: 98.1 F | HEART RATE: 66 BPM | HEIGHT: 63 IN | BODY MASS INDEX: 25.34 KG/M2 | OXYGEN SATURATION: 95 % | SYSTOLIC BLOOD PRESSURE: 136 MMHG | RESPIRATION RATE: 15 BRPM | WEIGHT: 143 LBS

## 2025-03-23 DIAGNOSIS — E87.1 HYPONATREMIA: Primary | ICD-10-CM

## 2025-03-23 LAB
ALBUMIN SERPL-MCNC: 4 G/DL (ref 3.5–5)
ALBUMIN/GLOB SERPL: 1.2 (ref 1.1–2.2)
ALP SERPL-CCNC: 77 U/L (ref 45–117)
ALT SERPL-CCNC: 24 U/L (ref 12–78)
ANION GAP SERPL CALC-SCNC: 6 MMOL/L (ref 2–12)
ANION GAP SERPL CALC-SCNC: 8 MMOL/L (ref 2–12)
AST SERPL-CCNC: 37 U/L (ref 15–37)
BASOPHILS # BLD: 0.01 K/UL (ref 0–0.1)
BASOPHILS NFR BLD: 0.3 % (ref 0–1)
BILIRUB SERPL-MCNC: 0.4 MG/DL (ref 0.2–1)
BUN SERPL-MCNC: 10 MG/DL (ref 6–20)
BUN SERPL-MCNC: 9 MG/DL (ref 6–20)
BUN/CREAT SERPL: 11 (ref 12–20)
BUN/CREAT SERPL: 11 (ref 12–20)
CALCIUM SERPL-MCNC: 9.2 MG/DL (ref 8.5–10.1)
CALCIUM SERPL-MCNC: 9.3 MG/DL (ref 8.5–10.1)
CHLORIDE SERPL-SCNC: 81 MMOL/L (ref 97–108)
CHLORIDE SERPL-SCNC: 85 MMOL/L (ref 97–108)
CO2 SERPL-SCNC: 24 MMOL/L (ref 21–32)
CO2 SERPL-SCNC: 27 MMOL/L (ref 21–32)
CREAT SERPL-MCNC: 0.83 MG/DL (ref 0.55–1.02)
CREAT SERPL-MCNC: 0.89 MG/DL (ref 0.55–1.02)
CREAT UR-MCNC: <13 MG/DL
DIFFERENTIAL METHOD BLD: ABNORMAL
EOSINOPHIL # BLD: 0.01 K/UL (ref 0–0.4)
EOSINOPHIL NFR BLD: 0.3 % (ref 0–0.7)
ERYTHROCYTE [DISTWIDTH] IN BLOOD BY AUTOMATED COUNT: 11.7 % (ref 11.5–14.5)
GLOBULIN SER CALC-MCNC: 3.3 G/DL (ref 2–4)
GLUCOSE BLD STRIP.AUTO-MCNC: 203 MG/DL (ref 65–117)
GLUCOSE BLD STRIP.AUTO-MCNC: 257 MG/DL (ref 65–117)
GLUCOSE BLD-MCNC: 257 MG/DL
GLUCOSE SERPL-MCNC: 202 MG/DL (ref 65–100)
GLUCOSE SERPL-MCNC: 245 MG/DL (ref 65–100)
HCT VFR BLD AUTO: 35.1 % (ref 35–47)
HGB BLD-MCNC: 12.9 G/DL (ref 11.5–16)
IMM GRANULOCYTES # BLD AUTO: 0 K/UL (ref 0–0.04)
IMM GRANULOCYTES NFR BLD AUTO: 0 % (ref 0–0.5)
INR PPP: 0.9 (ref 0.9–1.1)
LYMPHOCYTES # BLD: 0.82 K/UL (ref 0.8–3.5)
LYMPHOCYTES NFR BLD: 24.1 % (ref 12–49)
MCH RBC QN AUTO: 30.9 PG (ref 26–34)
MCHC RBC AUTO-ENTMCNC: 36.8 G/DL (ref 30–36.5)
MCV RBC AUTO: 84 FL (ref 80–99)
MONOCYTES # BLD: 0.49 K/UL (ref 0–1)
MONOCYTES NFR BLD: 14.4 % (ref 5–13)
NEUTS SEG # BLD: 2.07 K/UL (ref 1.8–8)
NEUTS SEG NFR BLD: 60.9 % (ref 32–75)
NRBC # BLD: 0 K/UL (ref 0–0.01)
NRBC BLD-RTO: 0 PER 100 WBC
OSMOLALITY SERPL: 254 MOSM/KG H2O
OSMOLALITY UR: 180 MOSM/KG H2O
PLATELET # BLD AUTO: 174 K/UL (ref 150–400)
PMV BLD AUTO: 8.5 FL (ref 8.9–12.9)
POTASSIUM SERPL-SCNC: 4.8 MMOL/L (ref 3.5–5.1)
POTASSIUM SERPL-SCNC: 5 MMOL/L (ref 3.5–5.1)
PROT SERPL-MCNC: 7.3 G/DL (ref 6.4–8.2)
PROTHROMBIN TIME: 9.3 SEC (ref 9.2–11.2)
RBC # BLD AUTO: 4.18 M/UL (ref 3.8–5.2)
SERVICE CMNT-IMP: ABNORMAL
SERVICE CMNT-IMP: ABNORMAL
SODIUM SERPL-SCNC: 114 MMOL/L (ref 136–145)
SODIUM SERPL-SCNC: 117 MMOL/L (ref 136–145)
SODIUM UR-SCNC: 42 MMOL/L
TROPONIN I SERPL HS-MCNC: 6 NG/L (ref 0–51)
WBC # BLD AUTO: 3.4 K/UL (ref 3.6–11)

## 2025-03-23 PROCEDURE — 0042T CT BRAIN PERFUSION: CPT

## 2025-03-23 PROCEDURE — 83605 ASSAY OF LACTIC ACID: CPT

## 2025-03-23 PROCEDURE — 85610 PROTHROMBIN TIME: CPT

## 2025-03-23 PROCEDURE — 84300 ASSAY OF URINE SODIUM: CPT

## 2025-03-23 PROCEDURE — 83930 ASSAY OF BLOOD OSMOLALITY: CPT

## 2025-03-23 PROCEDURE — 96361 HYDRATE IV INFUSION ADD-ON: CPT

## 2025-03-23 PROCEDURE — 84133 ASSAY OF URINE POTASSIUM: CPT

## 2025-03-23 PROCEDURE — 84145 PROCALCITONIN (PCT): CPT

## 2025-03-23 PROCEDURE — 82962 GLUCOSE BLOOD TEST: CPT

## 2025-03-23 PROCEDURE — 70450 CT HEAD/BRAIN W/O DYE: CPT

## 2025-03-23 PROCEDURE — 96374 THER/PROPH/DIAG INJ IV PUSH: CPT

## 2025-03-23 PROCEDURE — 36415 COLL VENOUS BLD VENIPUNCTURE: CPT

## 2025-03-23 PROCEDURE — 80053 COMPREHEN METABOLIC PANEL: CPT

## 2025-03-23 PROCEDURE — 83935 ASSAY OF URINE OSMOLALITY: CPT

## 2025-03-23 PROCEDURE — 2580000003 HC RX 258: Performed by: EMERGENCY MEDICINE

## 2025-03-23 PROCEDURE — 2000000000 HC ICU R&B

## 2025-03-23 PROCEDURE — 99285 EMERGENCY DEPT VISIT HI MDM: CPT

## 2025-03-23 PROCEDURE — 6360000004 HC RX CONTRAST MEDICATION: Performed by: EMERGENCY MEDICINE

## 2025-03-23 PROCEDURE — 80076 HEPATIC FUNCTION PANEL: CPT

## 2025-03-23 PROCEDURE — 80048 BASIC METABOLIC PNL TOTAL CA: CPT

## 2025-03-23 PROCEDURE — 6370000000 HC RX 637 (ALT 250 FOR IP): Performed by: EMERGENCY MEDICINE

## 2025-03-23 PROCEDURE — 96375 TX/PRO/DX INJ NEW DRUG ADDON: CPT

## 2025-03-23 PROCEDURE — 84484 ASSAY OF TROPONIN QUANT: CPT

## 2025-03-23 PROCEDURE — 93005 ELECTROCARDIOGRAM TRACING: CPT | Performed by: EMERGENCY MEDICINE

## 2025-03-23 PROCEDURE — 83735 ASSAY OF MAGNESIUM: CPT

## 2025-03-23 PROCEDURE — 85025 COMPLETE CBC W/AUTO DIFF WBC: CPT

## 2025-03-23 PROCEDURE — 70498 CT ANGIOGRAPHY NECK: CPT

## 2025-03-23 PROCEDURE — 82570 ASSAY OF URINE CREATININE: CPT

## 2025-03-23 PROCEDURE — 6360000002 HC RX W HCPCS: Performed by: EMERGENCY MEDICINE

## 2025-03-23 PROCEDURE — 84100 ASSAY OF PHOSPHORUS: CPT

## 2025-03-23 RX ORDER — ONDANSETRON 2 MG/ML
4 INJECTION INTRAMUSCULAR; INTRAVENOUS EVERY 6 HOURS PRN
Status: DISCONTINUED | OUTPATIENT
Start: 2025-03-23 | End: 2025-03-28 | Stop reason: HOSPADM

## 2025-03-23 RX ORDER — METOCLOPRAMIDE HYDROCHLORIDE 5 MG/ML
10 INJECTION INTRAMUSCULAR; INTRAVENOUS ONCE
Status: COMPLETED | OUTPATIENT
Start: 2025-03-23 | End: 2025-03-23

## 2025-03-23 RX ORDER — ACETAMINOPHEN 500 MG
1000 TABLET ORAL
Status: COMPLETED | OUTPATIENT
Start: 2025-03-23 | End: 2025-03-23

## 2025-03-23 RX ORDER — SODIUM CHLORIDE 9 MG/ML
INJECTION, SOLUTION INTRAVENOUS CONTINUOUS
Status: DISCONTINUED | OUTPATIENT
Start: 2025-03-23 | End: 2025-03-23 | Stop reason: HOSPADM

## 2025-03-23 RX ORDER — INSULIN GLARGINE 100 [IU]/ML
38 INJECTION, SOLUTION SUBCUTANEOUS EVERY MORNING
Status: DISCONTINUED | OUTPATIENT
Start: 2025-03-24 | End: 2025-03-24

## 2025-03-23 RX ORDER — ACETAMINOPHEN 650 MG/1
650 SUPPOSITORY RECTAL EVERY 6 HOURS PRN
Status: DISCONTINUED | OUTPATIENT
Start: 2025-03-23 | End: 2025-03-28 | Stop reason: HOSPADM

## 2025-03-23 RX ORDER — SODIUM CHLORIDE 0.9 % (FLUSH) 0.9 %
5-40 SYRINGE (ML) INJECTION EVERY 12 HOURS SCHEDULED
Status: DISCONTINUED | OUTPATIENT
Start: 2025-03-23 | End: 2025-03-28 | Stop reason: HOSPADM

## 2025-03-23 RX ORDER — POLYETHYLENE GLYCOL 3350 17 G/17G
17 POWDER, FOR SOLUTION ORAL DAILY PRN
Status: DISCONTINUED | OUTPATIENT
Start: 2025-03-23 | End: 2025-03-28 | Stop reason: HOSPADM

## 2025-03-23 RX ORDER — LEVOTHYROXINE SODIUM 75 UG/1
75 TABLET ORAL DAILY
Status: DISCONTINUED | OUTPATIENT
Start: 2025-03-24 | End: 2025-03-28 | Stop reason: HOSPADM

## 2025-03-23 RX ORDER — IOPAMIDOL 755 MG/ML
100 INJECTION, SOLUTION INTRAVASCULAR
Status: COMPLETED | OUTPATIENT
Start: 2025-03-23 | End: 2025-03-23

## 2025-03-23 RX ORDER — ONDANSETRON 4 MG/1
4 TABLET, ORALLY DISINTEGRATING ORAL EVERY 8 HOURS PRN
Status: DISCONTINUED | OUTPATIENT
Start: 2025-03-23 | End: 2025-03-28 | Stop reason: HOSPADM

## 2025-03-23 RX ORDER — INSULIN LISPRO 100 [IU]/ML
0-4 INJECTION, SOLUTION INTRAVENOUS; SUBCUTANEOUS
Status: DISCONTINUED | OUTPATIENT
Start: 2025-03-24 | End: 2025-03-24

## 2025-03-23 RX ORDER — POTASSIUM CHLORIDE 29.8 MG/ML
20 INJECTION INTRAVENOUS PRN
Status: DISCONTINUED | OUTPATIENT
Start: 2025-03-23 | End: 2025-03-28 | Stop reason: HOSPADM

## 2025-03-23 RX ORDER — SODIUM CHLORIDE 9 MG/ML
INJECTION, SOLUTION INTRAVENOUS PRN
Status: DISCONTINUED | OUTPATIENT
Start: 2025-03-23 | End: 2025-03-28 | Stop reason: HOSPADM

## 2025-03-23 RX ORDER — GLUCAGON 1 MG/ML
1 KIT INJECTION PRN
Status: DISCONTINUED | OUTPATIENT
Start: 2025-03-23 | End: 2025-03-28 | Stop reason: HOSPADM

## 2025-03-23 RX ORDER — ENOXAPARIN SODIUM 100 MG/ML
40 INJECTION SUBCUTANEOUS DAILY
Status: DISCONTINUED | OUTPATIENT
Start: 2025-03-24 | End: 2025-03-28 | Stop reason: HOSPADM

## 2025-03-23 RX ORDER — LIDOCAINE 4 G/G
1 PATCH TOPICAL DAILY
Status: DISCONTINUED | OUTPATIENT
Start: 2025-03-24 | End: 2025-03-24

## 2025-03-23 RX ORDER — KETOROLAC TROMETHAMINE 15 MG/ML
15 INJECTION, SOLUTION INTRAMUSCULAR; INTRAVENOUS ONCE
Status: COMPLETED | OUTPATIENT
Start: 2025-03-23 | End: 2025-03-23

## 2025-03-23 RX ORDER — SODIUM CHLORIDE 0.9 % (FLUSH) 0.9 %
5-40 SYRINGE (ML) INJECTION PRN
Status: DISCONTINUED | OUTPATIENT
Start: 2025-03-23 | End: 2025-03-28 | Stop reason: HOSPADM

## 2025-03-23 RX ORDER — DEXTROSE MONOHYDRATE 100 MG/ML
INJECTION, SOLUTION INTRAVENOUS CONTINUOUS PRN
Status: DISCONTINUED | OUTPATIENT
Start: 2025-03-23 | End: 2025-03-28 | Stop reason: HOSPADM

## 2025-03-23 RX ORDER — MAGNESIUM SULFATE IN WATER 40 MG/ML
2000 INJECTION, SOLUTION INTRAVENOUS PRN
Status: DISCONTINUED | OUTPATIENT
Start: 2025-03-23 | End: 2025-03-28 | Stop reason: HOSPADM

## 2025-03-23 RX ORDER — POTASSIUM CHLORIDE 7.45 MG/ML
10 INJECTION INTRAVENOUS PRN
Status: DISCONTINUED | OUTPATIENT
Start: 2025-03-23 | End: 2025-03-28 | Stop reason: HOSPADM

## 2025-03-23 RX ORDER — IBUPROFEN 400 MG/1
400 TABLET, FILM COATED ORAL
Status: COMPLETED | OUTPATIENT
Start: 2025-03-23 | End: 2025-03-23

## 2025-03-23 RX ORDER — ACETAMINOPHEN 325 MG/1
650 TABLET ORAL EVERY 6 HOURS PRN
Status: DISCONTINUED | OUTPATIENT
Start: 2025-03-23 | End: 2025-03-28 | Stop reason: HOSPADM

## 2025-03-23 RX ADMIN — ACETAMINOPHEN 1000 MG: 500 TABLET, FILM COATED ORAL at 18:48

## 2025-03-23 RX ADMIN — KETOROLAC TROMETHAMINE 15 MG: 15 INJECTION, SOLUTION INTRAMUSCULAR; INTRAVENOUS at 14:41

## 2025-03-23 RX ADMIN — IOPAMIDOL 40 ML: 755 INJECTION, SOLUTION INTRAVENOUS at 12:33

## 2025-03-23 RX ADMIN — IBUPROFEN 400 MG: 400 TABLET, FILM COATED ORAL at 18:48

## 2025-03-23 RX ADMIN — METOCLOPRAMIDE 10 MG: 5 INJECTION, SOLUTION INTRAMUSCULAR; INTRAVENOUS at 14:41

## 2025-03-23 RX ADMIN — SODIUM CHLORIDE: 0.9 INJECTION, SOLUTION INTRAVENOUS at 17:22

## 2025-03-23 ASSESSMENT — PAIN DESCRIPTION - DESCRIPTORS
DESCRIPTORS: ACHING
DESCRIPTORS: PRESSURE
DESCRIPTORS: PRESSURE
DESCRIPTORS: ACHING
DESCRIPTORS: PRESSURE

## 2025-03-23 ASSESSMENT — PAIN DESCRIPTION - ORIENTATION
ORIENTATION: LEFT;RIGHT
ORIENTATION: RIGHT;LEFT
ORIENTATION: LEFT;RIGHT

## 2025-03-23 ASSESSMENT — PAIN SCALES - GENERAL
PAINLEVEL_OUTOF10: 0
PAINLEVEL_OUTOF10: 8
PAINLEVEL_OUTOF10: 6
PAINLEVEL_OUTOF10: 0
PAINLEVEL_OUTOF10: 8
PAINLEVEL_OUTOF10: 8
PAINLEVEL_OUTOF10: 5

## 2025-03-23 ASSESSMENT — PAIN DESCRIPTION - LOCATION
LOCATION: HEAD
LOCATION: HEAD
LOCATION: HIP
LOCATION: HIP
LOCATION: HEAD

## 2025-03-23 ASSESSMENT — PAIN - FUNCTIONAL ASSESSMENT
PAIN_FUNCTIONAL_ASSESSMENT: 0-10
PAIN_FUNCTIONAL_ASSESSMENT: 0-10

## 2025-03-23 NOTE — ED TRIAGE NOTES
Pt arrived with c/o \"feeling funny in my head\" and confusion that started around 10 pm. States she was up all night knocking things off the walls and off the tables and didn't realize what she was doing. Does not have any weakness, but does note occasional double vision

## 2025-03-23 NOTE — PROGRESS NOTES
Arranged ALS transport w/[facility] -  advised to bring appropiate equipment - ETA of  1900  minutes given - updated ED staff w/ETA

## 2025-03-23 NOTE — ED NOTES
TRANSFER - OUT REPORT:    Verbal report given to Mei Smith RN on Janice Eagle  being transferred to [facility] 2520 for urgent transfer       Report consisted of patient's Situation, Background, Assessment and   Recommendations(SBAR).     Information from the following report(s) Nurse Handoff Report, ED Encounter Summary, ED SBAR, MAR, Recent Results, Cardiac Rhythm  , Neuro Assessment, and Event Log was reviewed with the receiving nurse.    [place] Fall Assessment:    Presents to emergency department  because of falls (Syncope, seizure, or loss of consciousness): No  Age > 70: No  Altered Mental Status, Intoxication with alcohol or substance confusion (Disorientation, impaired judgment, poor safety awaremess, or inability to follow instructions): No  Impaired Mobility: Ambulates or transfers with assistive devices or assistance; Unable to ambulate or transer.: Yes  Nursing Judgement: Yes          Lines:   Peripheral IV 03/23/25 Left Antecubital (Active)   Site Assessment Clean, dry & intact 03/23/25 1301   Line Status Blood return noted;Brisk blood return;Flushed;Normal saline locked 03/23/25 1301   Phlebitis Assessment No symptoms 03/23/25 1301   Infiltration Assessment 0 03/23/25 1301   Dressing Status New dressing applied;Clean, dry & intact 03/23/25 1301   Dressing Type Transparent 03/23/25 1301        Opportunity for questions and clarification was provided.      Patient transported with:  Monitor

## 2025-03-23 NOTE — PROGRESS NOTES
Attempted to do Fingerstick BS with acucheck. Unsuccessful. Pt took bs with her machine while I was standing there. Results 254. ER aware.

## 2025-03-23 NOTE — ED PROVIDER NOTES
[facility] EMERGENCY DEPARTMENT  EMERGENCY DEPARTMENT ENCOUNTER       Pt Name: Janice Eagle  MRN: [id number]  Birthdate 1965  Date of evaluation: 3/23/2025  Provider: Roger Garcia DO   PCP: Souleymane Rincon MD  Note Started: 1:06 PM EDT 3/23/25     CHIEF COMPLAINT       Chief Complaint   Patient presents with    Fatigue        HISTORY OF PRESENT ILLNESS: 1 or more elements      History From: Patient, History limited by: none     Janice Eagle is a 59 y.o. female presenting the emergency department complaining of unsteady gait, confusion, symptoms started yesterday, does report associated headache.  Does have a history of diabetes, hypertension, CAD, prior TIAs.       Please See MDM for Additional Details of the HPI/PMH  Nursing Notes were all reviewed and agreed with or any disagreements were addressed in the HPI.     REVIEW OF SYSTEMS        Positives and Pertinent negatives as per HPI.    PAST HISTORY     Past Medical History:  Past Medical History:   Diagnosis Date    Arthritis     patient states \"every joint affected\"    Bee sting 2018    left elbow bee sting     Blister of ankle 2018    Blister small per patient of left ankle. Wears an air boot due to 2 stress fractures in last 2 months.    CAD (coronary artery disease)     Constipation     Falls 2017    pt reports having 4 falls in 3 days    Fatigue     Headache     Ill-defined condition     multiple broken ribs    Ill-defined condition     reports \"getting infections easily\"    Joint pain     Memory disorder     following spinal fluid leak repair    Menopause     Nausea & vomiting     Neuropathy     Osteoporosis     Thyroid disease     Type 1 diabetes (HCC)     diagnosed at age 6    Unspecified cerebral artery occlusion with cerebral infarction     x 4 (last in )       Past Surgical History:  Past Surgical History:   Procedure Laterality Date    CARPAL TUNNEL RELEASE Right 2018     SECTION  1987     
able to do heavy housework

## 2025-03-24 ENCOUNTER — APPOINTMENT (OUTPATIENT)
Facility: HOSPITAL | Age: 60
DRG: 644 | End: 2025-03-24
Attending: HOSPITALIST
Payer: MEDICARE

## 2025-03-24 LAB
ALBUMIN SERPL-MCNC: 3.6 G/DL (ref 3.5–5)
ALBUMIN/GLOB SERPL: 1.3 (ref 1.1–2.2)
ALP SERPL-CCNC: 68 U/L (ref 45–117)
ALT SERPL-CCNC: 24 U/L (ref 12–78)
ANION GAP SERPL CALC-SCNC: 10 MMOL/L (ref 2–12)
ANION GAP SERPL CALC-SCNC: 5 MMOL/L (ref 2–12)
ANION GAP SERPL CALC-SCNC: 5 MMOL/L (ref 2–12)
ANION GAP SERPL CALC-SCNC: 6 MMOL/L (ref 2–12)
ANION GAP SERPL CALC-SCNC: 6 MMOL/L (ref 2–12)
ANION GAP SERPL CALC-SCNC: 8 MMOL/L (ref 2–12)
AST SERPL-CCNC: 29 U/L (ref 15–37)
BILIRUB DIRECT SERPL-MCNC: 0.2 MG/DL (ref 0–0.2)
BILIRUB SERPL-MCNC: 0.5 MG/DL (ref 0.2–1)
BUN SERPL-MCNC: 10 MG/DL (ref 6–20)
BUN SERPL-MCNC: 10 MG/DL (ref 6–20)
BUN SERPL-MCNC: 14 MG/DL (ref 6–20)
BUN SERPL-MCNC: 7 MG/DL (ref 6–20)
BUN SERPL-MCNC: 7 MG/DL (ref 6–20)
BUN SERPL-MCNC: 8 MG/DL (ref 6–20)
BUN/CREAT SERPL: 10 (ref 12–20)
BUN/CREAT SERPL: 11 (ref 12–20)
BUN/CREAT SERPL: 9 (ref 12–20)
BUN/CREAT SERPL: 9 (ref 12–20)
CALCIUM SERPL-MCNC: 7.6 MG/DL (ref 8.5–10.1)
CALCIUM SERPL-MCNC: 7.9 MG/DL (ref 8.5–10.1)
CALCIUM SERPL-MCNC: 8.6 MG/DL (ref 8.5–10.1)
CALCIUM SERPL-MCNC: 8.8 MG/DL (ref 8.5–10.1)
CALCIUM SERPL-MCNC: 8.9 MG/DL (ref 8.5–10.1)
CALCIUM SERPL-MCNC: 9 MG/DL (ref 8.5–10.1)
CHLORIDE SERPL-SCNC: 88 MMOL/L (ref 97–108)
CHLORIDE SERPL-SCNC: 90 MMOL/L (ref 97–108)
CHLORIDE SERPL-SCNC: 91 MMOL/L (ref 97–108)
CHLORIDE SERPL-SCNC: 94 MMOL/L (ref 97–108)
CO2 SERPL-SCNC: 22 MMOL/L (ref 21–32)
CO2 SERPL-SCNC: 23 MMOL/L (ref 21–32)
CO2 SERPL-SCNC: 25 MMOL/L (ref 21–32)
CO2 SERPL-SCNC: 25 MMOL/L (ref 21–32)
CO2 SERPL-SCNC: 26 MMOL/L (ref 21–32)
CO2 SERPL-SCNC: 27 MMOL/L (ref 21–32)
CREAT SERPL-MCNC: 0.68 MG/DL (ref 0.55–1.02)
CREAT SERPL-MCNC: 0.7 MG/DL (ref 0.55–1.02)
CREAT SERPL-MCNC: 0.86 MG/DL (ref 0.55–1.02)
CREAT SERPL-MCNC: 0.97 MG/DL (ref 0.55–1.02)
CREAT SERPL-MCNC: 1.17 MG/DL (ref 0.55–1.02)
CREAT SERPL-MCNC: 1.33 MG/DL (ref 0.55–1.02)
ERYTHROCYTE [DISTWIDTH] IN BLOOD BY AUTOMATED COUNT: 12 % (ref 11.5–14.5)
GLOBULIN SER CALC-MCNC: 2.8 G/DL (ref 2–4)
GLUCOSE BLD STRIP.AUTO-MCNC: 161 MG/DL (ref 65–117)
GLUCOSE BLD STRIP.AUTO-MCNC: 203 MG/DL (ref 65–117)
GLUCOSE BLD STRIP.AUTO-MCNC: 246 MG/DL (ref 65–117)
GLUCOSE BLD STRIP.AUTO-MCNC: 258 MG/DL (ref 65–117)
GLUCOSE BLD STRIP.AUTO-MCNC: 278 MG/DL (ref 65–117)
GLUCOSE BLD STRIP.AUTO-MCNC: 330 MG/DL (ref 65–117)
GLUCOSE BLD STRIP.AUTO-MCNC: 352 MG/DL (ref 65–117)
GLUCOSE BLD STRIP.AUTO-MCNC: 42 MG/DL (ref 65–117)
GLUCOSE BLD STRIP.AUTO-MCNC: 43 MG/DL (ref 65–117)
GLUCOSE BLD STRIP.AUTO-MCNC: 62 MG/DL (ref 65–117)
GLUCOSE BLD STRIP.AUTO-MCNC: 64 MG/DL (ref 65–117)
GLUCOSE BLD STRIP.AUTO-MCNC: 74 MG/DL (ref 65–117)
GLUCOSE SERPL-MCNC: 164 MG/DL (ref 65–100)
GLUCOSE SERPL-MCNC: 186 MG/DL (ref 65–100)
GLUCOSE SERPL-MCNC: 308 MG/DL (ref 65–100)
GLUCOSE SERPL-MCNC: 356 MG/DL (ref 65–100)
GLUCOSE SERPL-MCNC: 375 MG/DL (ref 65–100)
GLUCOSE SERPL-MCNC: 41 MG/DL (ref 65–100)
HCT VFR BLD AUTO: 39.2 % (ref 35–47)
HGB BLD-MCNC: 13.7 G/DL (ref 11.5–16)
LACTATE SERPL-SCNC: 1.8 MMOL/L (ref 0.4–2)
MAGNESIUM SERPL-MCNC: 1.5 MG/DL (ref 1.6–2.4)
MAGNESIUM SERPL-MCNC: 1.6 MG/DL (ref 1.6–2.4)
MAGNESIUM SERPL-MCNC: 1.8 MG/DL (ref 1.6–2.4)
MAGNESIUM SERPL-MCNC: 1.9 MG/DL (ref 1.6–2.4)
MCH RBC QN AUTO: 30.1 PG (ref 26–34)
MCHC RBC AUTO-ENTMCNC: 34.9 G/DL (ref 30–36.5)
MCV RBC AUTO: 86.2 FL (ref 80–99)
NRBC # BLD: 0 K/UL (ref 0–0.01)
NRBC BLD-RTO: 0 PER 100 WBC
PHOSPHATE SERPL-MCNC: 2.4 MG/DL (ref 2.6–4.7)
PHOSPHATE SERPL-MCNC: 3 MG/DL (ref 2.6–4.7)
PHOSPHATE SERPL-MCNC: 3.1 MG/DL (ref 2.6–4.7)
PHOSPHATE SERPL-MCNC: 3.1 MG/DL (ref 2.6–4.7)
PHOSPHATE SERPL-MCNC: 3.2 MG/DL (ref 2.6–4.7)
PHOSPHATE SERPL-MCNC: 3.2 MG/DL (ref 2.6–4.7)
PLATELET # BLD AUTO: 179 K/UL (ref 150–400)
PMV BLD AUTO: 7.8 FL (ref 8.9–12.9)
POTASSIUM SERPL-SCNC: 4.1 MMOL/L (ref 3.5–5.1)
POTASSIUM SERPL-SCNC: 4.2 MMOL/L (ref 3.5–5.1)
POTASSIUM SERPL-SCNC: 4.4 MMOL/L (ref 3.5–5.1)
POTASSIUM SERPL-SCNC: 5.1 MMOL/L (ref 3.5–5.1)
POTASSIUM SERPL-SCNC: 5.1 MMOL/L (ref 3.5–5.1)
POTASSIUM SERPL-SCNC: 5.6 MMOL/L (ref 3.5–5.1)
POTASSIUM UR-SCNC: 6 MMOL/L
PROCALCITONIN SERPL-MCNC: <0.05 NG/ML
PROT SERPL-MCNC: 6.4 G/DL (ref 6.4–8.2)
RBC # BLD AUTO: 4.55 M/UL (ref 3.8–5.2)
SERVICE CMNT-IMP: ABNORMAL
SERVICE CMNT-IMP: NORMAL
SODIUM SERPL-SCNC: 118 MMOL/L (ref 136–145)
SODIUM SERPL-SCNC: 121 MMOL/L (ref 136–145)
SODIUM SERPL-SCNC: 122 MMOL/L (ref 136–145)
SODIUM SERPL-SCNC: 122 MMOL/L (ref 136–145)
SODIUM SERPL-SCNC: 123 MMOL/L (ref 136–145)
SODIUM SERPL-SCNC: 125 MMOL/L (ref 136–145)
WBC # BLD AUTO: 3.6 K/UL (ref 3.6–11)

## 2025-03-24 PROCEDURE — 2060000000 HC ICU INTERMEDIATE R&B

## 2025-03-24 PROCEDURE — 6360000002 HC RX W HCPCS

## 2025-03-24 PROCEDURE — 36415 COLL VENOUS BLD VENIPUNCTURE: CPT

## 2025-03-24 PROCEDURE — 6370000000 HC RX 637 (ALT 250 FOR IP): Performed by: NURSE PRACTITIONER

## 2025-03-24 PROCEDURE — 82962 GLUCOSE BLOOD TEST: CPT

## 2025-03-24 PROCEDURE — 2500000003 HC RX 250 WO HCPCS: Performed by: NURSE PRACTITIONER

## 2025-03-24 PROCEDURE — 84100 ASSAY OF PHOSPHORUS: CPT

## 2025-03-24 PROCEDURE — 85027 COMPLETE CBC AUTOMATED: CPT

## 2025-03-24 PROCEDURE — 2580000003 HC RX 258: Performed by: NURSE PRACTITIONER

## 2025-03-24 PROCEDURE — 6370000000 HC RX 637 (ALT 250 FOR IP): Performed by: HOSPITALIST

## 2025-03-24 PROCEDURE — 71045 X-RAY EXAM CHEST 1 VIEW: CPT

## 2025-03-24 PROCEDURE — 6360000002 HC RX W HCPCS: Performed by: NURSE PRACTITIONER

## 2025-03-24 PROCEDURE — 80048 BASIC METABOLIC PNL TOTAL CA: CPT

## 2025-03-24 PROCEDURE — 6370000000 HC RX 637 (ALT 250 FOR IP): Performed by: STUDENT IN AN ORGANIZED HEALTH CARE EDUCATION/TRAINING PROGRAM

## 2025-03-24 PROCEDURE — 83735 ASSAY OF MAGNESIUM: CPT

## 2025-03-24 PROCEDURE — 2580000003 HC RX 258: Performed by: HOSPITALIST

## 2025-03-24 RX ORDER — INSULIN LISPRO 100 [IU]/ML
10 INJECTION, SOLUTION INTRAVENOUS; SUBCUTANEOUS ONCE
Status: DISCONTINUED | OUTPATIENT
Start: 2025-03-24 | End: 2025-03-24

## 2025-03-24 RX ORDER — INSULIN LISPRO 100 [IU]/ML
0-8 INJECTION, SOLUTION INTRAVENOUS; SUBCUTANEOUS
Status: DISCONTINUED | OUTPATIENT
Start: 2025-03-24 | End: 2025-03-24

## 2025-03-24 RX ORDER — INSULIN LISPRO 100 [IU]/ML
0-4 INJECTION, SOLUTION INTRAVENOUS; SUBCUTANEOUS
Status: DISCONTINUED | OUTPATIENT
Start: 2025-03-24 | End: 2025-03-27

## 2025-03-24 RX ORDER — DIPHENHYDRAMINE HYDROCHLORIDE 50 MG/ML
25 INJECTION, SOLUTION INTRAMUSCULAR; INTRAVENOUS EVERY 6 HOURS PRN
Status: DISCONTINUED | OUTPATIENT
Start: 2025-03-24 | End: 2025-03-28 | Stop reason: HOSPADM

## 2025-03-24 RX ORDER — LIDOCAINE 4 G/G
2 PATCH TOPICAL DAILY
Status: DISCONTINUED | OUTPATIENT
Start: 2025-03-24 | End: 2025-03-28 | Stop reason: HOSPADM

## 2025-03-24 RX ORDER — DEXTROSE MONOHYDRATE 50 MG/ML
INJECTION, SOLUTION INTRAVENOUS CONTINUOUS
Status: DISCONTINUED | OUTPATIENT
Start: 2025-03-24 | End: 2025-03-24

## 2025-03-24 RX ORDER — INSULIN GLARGINE 100 [IU]/ML
19 INJECTION, SOLUTION SUBCUTANEOUS EVERY MORNING
Status: DISCONTINUED | OUTPATIENT
Start: 2025-03-25 | End: 2025-03-25

## 2025-03-24 RX ORDER — OXYCODONE HYDROCHLORIDE 5 MG/1
5 TABLET ORAL EVERY 4 HOURS PRN
Refills: 0 | Status: DISCONTINUED | OUTPATIENT
Start: 2025-03-24 | End: 2025-03-24

## 2025-03-24 RX ORDER — OXYCODONE HYDROCHLORIDE 5 MG/1
5 TABLET ORAL EVERY 6 HOURS PRN
Refills: 0 | Status: DISCONTINUED | OUTPATIENT
Start: 2025-03-24 | End: 2025-03-28 | Stop reason: HOSPADM

## 2025-03-24 RX ORDER — PANTOPRAZOLE SODIUM 40 MG/1
40 TABLET, DELAYED RELEASE ORAL DAILY
COMMUNITY

## 2025-03-24 RX ORDER — HYDRALAZINE HYDROCHLORIDE 20 MG/ML
20 INJECTION INTRAMUSCULAR; INTRAVENOUS EVERY 6 HOURS PRN
Status: DISCONTINUED | OUTPATIENT
Start: 2025-03-24 | End: 2025-03-28 | Stop reason: HOSPADM

## 2025-03-24 RX ORDER — LIDOCAINE 4 G/G
1 PATCH TOPICAL DAILY
Status: DISCONTINUED | OUTPATIENT
Start: 2025-03-24 | End: 2025-03-24

## 2025-03-24 RX ORDER — INSULIN LISPRO 100 [IU]/ML
2 INJECTION, SOLUTION INTRAVENOUS; SUBCUTANEOUS ONCE
Status: COMPLETED | OUTPATIENT
Start: 2025-03-24 | End: 2025-03-24

## 2025-03-24 RX ORDER — KETOROLAC TROMETHAMINE 30 MG/ML
15 INJECTION, SOLUTION INTRAMUSCULAR; INTRAVENOUS EVERY 6 HOURS PRN
Status: DISCONTINUED | OUTPATIENT
Start: 2025-03-24 | End: 2025-03-28 | Stop reason: HOSPADM

## 2025-03-24 RX ADMIN — SODIUM CHLORIDE, PRESERVATIVE FREE 10 ML: 5 INJECTION INTRAVENOUS at 08:17

## 2025-03-24 RX ADMIN — LEVOTHYROXINE SODIUM 75 MCG: 0.07 TABLET ORAL at 07:16

## 2025-03-24 RX ADMIN — Medication 1 AMPULE: at 23:00

## 2025-03-24 RX ADMIN — DEXTROSE MONOHYDRATE: 50 INJECTION, SOLUTION INTRAVENOUS at 05:48

## 2025-03-24 RX ADMIN — ACETAMINOPHEN 650 MG: 325 TABLET ORAL at 01:00

## 2025-03-24 RX ADMIN — SODIUM CHLORIDE, PRESERVATIVE FREE 10 ML: 5 INJECTION INTRAVENOUS at 21:46

## 2025-03-24 RX ADMIN — OXYCODONE 5 MG: 5 TABLET ORAL at 10:05

## 2025-03-24 RX ADMIN — MELATONIN 6 MG: at 21:42

## 2025-03-24 RX ADMIN — OXYCODONE 5 MG: 5 TABLET ORAL at 00:59

## 2025-03-24 RX ADMIN — INSULIN LISPRO 0.5 UNITS: 100 INJECTION, SOLUTION INTRAVENOUS; SUBCUTANEOUS at 11:16

## 2025-03-24 RX ADMIN — Medication 16 G: at 04:20

## 2025-03-24 RX ADMIN — DIPHENHYDRAMINE HYDROCHLORIDE 25 MG: 50 INJECTION INTRAMUSCULAR; INTRAVENOUS at 10:55

## 2025-03-24 RX ADMIN — INSULIN LISPRO 2 UNITS: 100 INJECTION, SOLUTION INTRAVENOUS; SUBCUTANEOUS at 17:18

## 2025-03-24 RX ADMIN — ENOXAPARIN SODIUM 40 MG: 100 INJECTION SUBCUTANEOUS at 08:31

## 2025-03-24 RX ADMIN — Medication 1 AMPULE: at 05:45

## 2025-03-24 RX ADMIN — INSULIN LISPRO 2 UNITS: 100 INJECTION, SOLUTION INTRAVENOUS; SUBCUTANEOUS at 21:42

## 2025-03-24 RX ADMIN — KETOROLAC TROMETHAMINE 15 MG: 30 INJECTION, SOLUTION INTRAMUSCULAR at 17:19

## 2025-03-24 RX ADMIN — DIPHENHYDRAMINE HYDROCHLORIDE 25 MG: 50 INJECTION INTRAMUSCULAR; INTRAVENOUS at 23:00

## 2025-03-24 RX ADMIN — DIPHENHYDRAMINE HYDROCHLORIDE 25 MG: 50 INJECTION INTRAMUSCULAR; INTRAVENOUS at 17:19

## 2025-03-24 RX ADMIN — ACETAMINOPHEN 650 MG: 325 TABLET ORAL at 14:59

## 2025-03-24 RX ADMIN — Medication 1 AMPULE: at 08:18

## 2025-03-24 RX ADMIN — OXYCODONE 5 MG: 5 TABLET ORAL at 22:40

## 2025-03-24 RX ADMIN — SODIUM CHLORIDE, PRESERVATIVE FREE 10 ML: 5 INJECTION INTRAVENOUS at 01:01

## 2025-03-24 RX ADMIN — DEXTROSE MONOHYDRATE: 50 INJECTION, SOLUTION INTRAVENOUS at 11:50

## 2025-03-24 RX ADMIN — INSULIN GLARGINE 38 UNITS: 100 INJECTION, SOLUTION SUBCUTANEOUS at 08:31

## 2025-03-24 ASSESSMENT — PAIN DESCRIPTION - ORIENTATION
ORIENTATION: LEFT
ORIENTATION: RIGHT
ORIENTATION: LEFT;RIGHT;POSTERIOR
ORIENTATION: LEFT
ORIENTATION: LEFT

## 2025-03-24 ASSESSMENT — PAIN DESCRIPTION - LOCATION
LOCATION: HIP
LOCATION: HIP
LOCATION: HEAD
LOCATION: HIP
LOCATION: HIP;LEG

## 2025-03-24 ASSESSMENT — PAIN SCALES - GENERAL
PAINLEVEL_OUTOF10: 7
PAINLEVEL_OUTOF10: 6
PAINLEVEL_OUTOF10: 4
PAINLEVEL_OUTOF10: 7
PAINLEVEL_OUTOF10: 9
PAINLEVEL_OUTOF10: 6
PAINLEVEL_OUTOF10: 0

## 2025-03-24 ASSESSMENT — PAIN DESCRIPTION - DESCRIPTORS: DESCRIPTORS: ACHING;DISCOMFORT

## 2025-03-24 NOTE — H&P
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  No other recent medication changes. She presented to the ED at [facility] 3/23 with HA, unsteady gait, and confusion that started the day prior to presentation. CT head, CTA H/N, CT perfusion negative for acute finding.  in the ED at [facility] (1200 3/23).  She was given 1L NS and transferred to [facility] ICU.  At the time of arrival to [facility] she was oriented x3 and denied HA.  Gait not observed.  She reported dexcom reading of 47. Patient had not yet been pulled into EPIC.  2 orange juice were given with subsequent .     blood glucose test strips (ONETOUCH ULTRA) strip Check blood sugar 8 times per day due to frequent hypoglycemic events. Dx: E10.42 Jordin Lowe MD Needs Review   Insulin Degludec (TRESIBA FLEXTOUCH) 100 UNIT/ML SOPN DISPENSE GENERIC    Inject under the skin 38 units in AM Jordin Lowe MD Needs Review   insulin lispro, 1 Unit Dial, (HUMALOG/ADMELOG) 100 UNIT/ML SOPN Max daily dose 20 units. Sliding scale. Jordin Lowe MD Needs Review   simvastatin (ZOCOR) 40 MG tablet Take 1 tablet by mouth nightly Souleymane Rincno MD Needs Review   zolpidem (AMBIEN) 10 MG tablet TAKE 1 TABLET BY MOUTH AT BEDTIME Souleymane Rincon MD Needs Review   fluticasone (FLONASE) 50 MCG/ACT nasal spray 2 sprays by Each Nostril route daily Souleymane Rincon MD Needs Review   ciclopirox (LOPROX) 0.77 % cream Apply topically

## 2025-03-24 NOTE — ED NOTES
Transport report given to Chidi Akers- paramedic with [facility]. IVF changed to dial flow at 200 ml/hr.

## 2025-03-25 LAB
ANION GAP SERPL CALC-SCNC: 4 MMOL/L (ref 2–12)
ANION GAP SERPL CALC-SCNC: 6 MMOL/L (ref 2–12)
ANION GAP SERPL CALC-SCNC: 6 MMOL/L (ref 2–12)
ANION GAP SERPL CALC-SCNC: 8 MMOL/L (ref 2–12)
ANION GAP SERPL CALC-SCNC: 8 MMOL/L (ref 2–12)
APPEARANCE UR: CLEAR
BACTERIA URNS QL MICRO: NEGATIVE /HPF
BILIRUB UR QL: NEGATIVE
BUN SERPL-MCNC: 16 MG/DL (ref 6–20)
BUN SERPL-MCNC: 16 MG/DL (ref 6–20)
BUN SERPL-MCNC: 17 MG/DL (ref 6–20)
BUN SERPL-MCNC: 31 MG/DL (ref 6–20)
BUN SERPL-MCNC: 32 MG/DL (ref 6–20)
BUN/CREAT SERPL: 15 (ref 12–20)
BUN/CREAT SERPL: 15 (ref 12–20)
BUN/CREAT SERPL: 16 (ref 12–20)
BUN/CREAT SERPL: 33 (ref 12–20)
BUN/CREAT SERPL: 36 (ref 12–20)
CALCIUM SERPL-MCNC: 9 MG/DL (ref 8.5–10.1)
CALCIUM SERPL-MCNC: 9 MG/DL (ref 8.5–10.1)
CALCIUM SERPL-MCNC: 9.2 MG/DL (ref 8.5–10.1)
CALCIUM SERPL-MCNC: 9.4 MG/DL (ref 8.5–10.1)
CALCIUM SERPL-MCNC: 9.5 MG/DL (ref 8.5–10.1)
CHLORIDE SERPL-SCNC: 90 MMOL/L (ref 97–108)
CHLORIDE SERPL-SCNC: 90 MMOL/L (ref 97–108)
CHLORIDE SERPL-SCNC: 92 MMOL/L (ref 97–108)
CHLORIDE SERPL-SCNC: 92 MMOL/L (ref 97–108)
CHLORIDE SERPL-SCNC: 93 MMOL/L (ref 97–108)
CO2 SERPL-SCNC: 22 MMOL/L (ref 21–32)
CO2 SERPL-SCNC: 23 MMOL/L (ref 21–32)
CO2 SERPL-SCNC: 24 MMOL/L (ref 21–32)
CO2 SERPL-SCNC: 27 MMOL/L (ref 21–32)
CO2 SERPL-SCNC: 28 MMOL/L (ref 21–32)
COLOR UR: ABNORMAL
COMMENT:: NORMAL
CREAT SERPL-MCNC: 0.88 MG/DL (ref 0.55–1.02)
CREAT SERPL-MCNC: 0.94 MG/DL (ref 0.55–1.02)
CREAT SERPL-MCNC: 0.98 MG/DL (ref 0.55–1.02)
CREAT SERPL-MCNC: 1.05 MG/DL (ref 0.55–1.02)
CREAT SERPL-MCNC: 1.11 MG/DL (ref 0.55–1.02)
EKG ATRIAL RATE: 76 BPM
EKG DIAGNOSIS: NORMAL
EKG P AXIS: 38 DEGREES
EKG P-R INTERVAL: 162 MS
EKG Q-T INTERVAL: 382 MS
EKG QRS DURATION: 88 MS
EKG QTC CALCULATION (BAZETT): 429 MS
EKG R AXIS: -82 DEGREES
EKG T AXIS: 43 DEGREES
EKG VENTRICULAR RATE: 76 BPM
EPITH CASTS URNS QL MICRO: ABNORMAL /LPF
ERYTHROCYTE [DISTWIDTH] IN BLOOD BY AUTOMATED COUNT: 11.8 % (ref 11.5–14.5)
GLUCOSE BLD STRIP.AUTO-MCNC: 181 MG/DL (ref 65–117)
GLUCOSE BLD STRIP.AUTO-MCNC: 201 MG/DL (ref 65–117)
GLUCOSE BLD STRIP.AUTO-MCNC: 207 MG/DL (ref 65–117)
GLUCOSE BLD STRIP.AUTO-MCNC: 225 MG/DL (ref 65–117)
GLUCOSE BLD STRIP.AUTO-MCNC: 61 MG/DL (ref 65–117)
GLUCOSE BLD STRIP.AUTO-MCNC: 61 MG/DL (ref 65–117)
GLUCOSE BLD STRIP.AUTO-MCNC: 64 MG/DL (ref 65–117)
GLUCOSE BLD STRIP.AUTO-MCNC: 65 MG/DL (ref 65–117)
GLUCOSE BLD STRIP.AUTO-MCNC: 70 MG/DL (ref 65–117)
GLUCOSE BLD STRIP.AUTO-MCNC: 74 MG/DL (ref 65–117)
GLUCOSE BLD STRIP.AUTO-MCNC: 87 MG/DL (ref 65–117)
GLUCOSE BLD STRIP.AUTO-MCNC: 93 MG/DL (ref 65–117)
GLUCOSE BLD STRIP.AUTO-MCNC: 96 MG/DL (ref 65–117)
GLUCOSE SERPL-MCNC: 195 MG/DL (ref 65–100)
GLUCOSE SERPL-MCNC: 207 MG/DL (ref 65–100)
GLUCOSE SERPL-MCNC: 83 MG/DL (ref 65–100)
GLUCOSE SERPL-MCNC: 87 MG/DL (ref 65–100)
GLUCOSE SERPL-MCNC: 99 MG/DL (ref 65–100)
GLUCOSE UR STRIP.AUTO-MCNC: 250 MG/DL
HCT VFR BLD AUTO: 37.4 % (ref 35–47)
HGB BLD-MCNC: 13.2 G/DL (ref 11.5–16)
HGB UR QL STRIP: NEGATIVE
KETONES UR QL STRIP.AUTO: NEGATIVE MG/DL
LEUKOCYTE ESTERASE UR QL STRIP.AUTO: ABNORMAL
MAGNESIUM SERPL-MCNC: 1.8 MG/DL (ref 1.6–2.4)
MAGNESIUM SERPL-MCNC: 1.9 MG/DL (ref 1.6–2.4)
MAGNESIUM SERPL-MCNC: 1.9 MG/DL (ref 1.6–2.4)
MAGNESIUM SERPL-MCNC: 2 MG/DL (ref 1.6–2.4)
MAGNESIUM SERPL-MCNC: 2.1 MG/DL (ref 1.6–2.4)
MCH RBC QN AUTO: 30.5 PG (ref 26–34)
MCHC RBC AUTO-ENTMCNC: 35.3 G/DL (ref 30–36.5)
MCV RBC AUTO: 86.4 FL (ref 80–99)
NITRITE UR QL STRIP.AUTO: NEGATIVE
NRBC # BLD: 0 K/UL (ref 0–0.01)
NRBC BLD-RTO: 0 PER 100 WBC
OSMOLALITY UR: 252 MOSM/KG H2O
PH UR STRIP: 5.5 (ref 5–8)
PHOSPHATE SERPL-MCNC: 3.6 MG/DL (ref 2.6–4.7)
PHOSPHATE SERPL-MCNC: 3.8 MG/DL (ref 2.6–4.7)
PHOSPHATE SERPL-MCNC: 3.9 MG/DL (ref 2.6–4.7)
PHOSPHATE SERPL-MCNC: 4.1 MG/DL (ref 2.6–4.7)
PHOSPHATE SERPL-MCNC: 4.7 MG/DL (ref 2.6–4.7)
PLATELET # BLD AUTO: 167 K/UL (ref 150–400)
PMV BLD AUTO: 8.1 FL (ref 8.9–12.9)
POTASSIUM SERPL-SCNC: 4.6 MMOL/L (ref 3.5–5.1)
POTASSIUM SERPL-SCNC: 4.9 MMOL/L (ref 3.5–5.1)
POTASSIUM SERPL-SCNC: 5.1 MMOL/L (ref 3.5–5.1)
POTASSIUM SERPL-SCNC: 5.1 MMOL/L (ref 3.5–5.1)
POTASSIUM SERPL-SCNC: 5.3 MMOL/L (ref 3.5–5.1)
PROT UR STRIP-MCNC: NEGATIVE MG/DL
RBC # BLD AUTO: 4.33 M/UL (ref 3.8–5.2)
RBC #/AREA URNS HPF: ABNORMAL /HPF (ref 0–5)
SERVICE CMNT-IMP: ABNORMAL
SERVICE CMNT-IMP: NORMAL
SODIUM SERPL-SCNC: 121 MMOL/L (ref 136–145)
SODIUM SERPL-SCNC: 122 MMOL/L (ref 136–145)
SODIUM SERPL-SCNC: 123 MMOL/L (ref 136–145)
SODIUM SERPL-SCNC: 123 MMOL/L (ref 136–145)
SODIUM SERPL-SCNC: 124 MMOL/L (ref 136–145)
SODIUM UR-SCNC: 20 MMOL/L
SP GR UR REFRACTOMETRY: 1.01
SPECIMEN HOLD: NORMAL
TSH SERPL DL<=0.05 MIU/L-ACNC: 1.57 UIU/ML (ref 0.36–3.74)
URATE SERPL-MCNC: 2.2 MG/DL (ref 2.6–6)
UROBILINOGEN UR QL STRIP.AUTO: 0.2 EU/DL (ref 0.2–1)
WBC # BLD AUTO: 3 K/UL (ref 3.6–11)
WBC URNS QL MICRO: ABNORMAL /HPF (ref 0–4)

## 2025-03-25 PROCEDURE — 82962 GLUCOSE BLOOD TEST: CPT

## 2025-03-25 PROCEDURE — 36415 COLL VENOUS BLD VENIPUNCTURE: CPT

## 2025-03-25 PROCEDURE — 84550 ASSAY OF BLOOD/URIC ACID: CPT

## 2025-03-25 PROCEDURE — 6370000000 HC RX 637 (ALT 250 FOR IP): Performed by: NURSE PRACTITIONER

## 2025-03-25 PROCEDURE — 6370000000 HC RX 637 (ALT 250 FOR IP): Performed by: INTERNAL MEDICINE

## 2025-03-25 PROCEDURE — 84100 ASSAY OF PHOSPHORUS: CPT

## 2025-03-25 PROCEDURE — 85027 COMPLETE CBC AUTOMATED: CPT

## 2025-03-25 PROCEDURE — 6360000002 HC RX W HCPCS: Performed by: NURSE PRACTITIONER

## 2025-03-25 PROCEDURE — 84443 ASSAY THYROID STIM HORMONE: CPT

## 2025-03-25 PROCEDURE — 83935 ASSAY OF URINE OSMOLALITY: CPT

## 2025-03-25 PROCEDURE — 6370000000 HC RX 637 (ALT 250 FOR IP): Performed by: HOSPITALIST

## 2025-03-25 PROCEDURE — 2060000000 HC ICU INTERMEDIATE R&B

## 2025-03-25 PROCEDURE — 81001 URINALYSIS AUTO W/SCOPE: CPT

## 2025-03-25 PROCEDURE — 80048 BASIC METABOLIC PNL TOTAL CA: CPT

## 2025-03-25 PROCEDURE — 99221 1ST HOSP IP/OBS SF/LOW 40: CPT | Performed by: INTERNAL MEDICINE

## 2025-03-25 PROCEDURE — 83735 ASSAY OF MAGNESIUM: CPT

## 2025-03-25 PROCEDURE — 2500000003 HC RX 250 WO HCPCS: Performed by: NURSE PRACTITIONER

## 2025-03-25 PROCEDURE — 84300 ASSAY OF URINE SODIUM: CPT

## 2025-03-25 RX ORDER — INSULIN GLARGINE 100 [IU]/ML
38 INJECTION, SOLUTION SUBCUTANEOUS EVERY MORNING
Status: DISCONTINUED | OUTPATIENT
Start: 2025-03-25 | End: 2025-03-27

## 2025-03-25 RX ADMIN — ACETAMINOPHEN 650 MG: 325 TABLET ORAL at 10:46

## 2025-03-25 RX ADMIN — MELATONIN 6 MG: at 20:44

## 2025-03-25 RX ADMIN — INSULIN GLARGINE 38 UNITS: 100 INJECTION, SOLUTION SUBCUTANEOUS at 09:21

## 2025-03-25 RX ADMIN — SODIUM CHLORIDE, PRESERVATIVE FREE 10 ML: 5 INJECTION INTRAVENOUS at 20:38

## 2025-03-25 RX ADMIN — OXYCODONE 5 MG: 5 TABLET ORAL at 22:55

## 2025-03-25 RX ADMIN — DIPHENHYDRAMINE HYDROCHLORIDE 25 MG: 50 INJECTION INTRAMUSCULAR; INTRAVENOUS at 12:01

## 2025-03-25 RX ADMIN — ONDANSETRON 4 MG: 2 INJECTION, SOLUTION INTRAMUSCULAR; INTRAVENOUS at 01:42

## 2025-03-25 RX ADMIN — DIPHENHYDRAMINE HYDROCHLORIDE 25 MG: 50 INJECTION INTRAMUSCULAR; INTRAVENOUS at 23:03

## 2025-03-25 RX ADMIN — ENOXAPARIN SODIUM 40 MG: 100 INJECTION SUBCUTANEOUS at 09:21

## 2025-03-25 RX ADMIN — Medication 16 G: at 01:31

## 2025-03-25 RX ADMIN — Medication 1 AMPULE: at 20:38

## 2025-03-25 RX ADMIN — Medication 16 G: at 22:41

## 2025-03-25 RX ADMIN — Medication 15 G: at 10:30

## 2025-03-25 RX ADMIN — ACETAMINOPHEN 650 MG: 325 TABLET ORAL at 20:58

## 2025-03-25 ASSESSMENT — PAIN DESCRIPTION - LOCATION
LOCATION: HEAD;HIP
LOCATION: HEAD
LOCATION: HIP;HEAD
LOCATION: HEAD

## 2025-03-25 ASSESSMENT — PAIN SCALES - GENERAL
PAINLEVEL_OUTOF10: 5
PAINLEVEL_OUTOF10: 7
PAINLEVEL_OUTOF10: 0
PAINLEVEL_OUTOF10: 5
PAINLEVEL_OUTOF10: 0
PAINLEVEL_OUTOF10: 5

## 2025-03-25 ASSESSMENT — PAIN DESCRIPTION - DESCRIPTORS
DESCRIPTORS: ACHING
DESCRIPTORS: ACHING

## 2025-03-25 NOTE — CONSULTS
03/25/25        I have been asked to see this patient by Babatunde Mittal MD  for advice/opinion re: -----                                                        Assessment:         Acute on chronic, severe, euvolemic hyponatremia  Acute kidney injury-resolved  History of CSF leak  Headache  Neuropathy  Hypothyroidism Discussion:     Sodium 121 today-2 days ago it was as low as 141  Component of pseudohyponatremia with elevated blood sugars  Euvolemic, prior history of SIADH    Plan:   Workup sent  Initiate Urena  Fluid restriction 1500 mL or less  Normal saline will probably not help                 Thanks for consulting me. Renal service will follow patient with you.Please don't hesitate to contact me if any questions arise of if I can assist in any manner.      Rip Amin MD  Cell no- [phone]  Available on perfect serve.          Signed By: Rip Amin MD     March 25, 2025           Consult Date: 3/25/2025    Inpatient consult to Nephrology  Consult performed by: Rip Amin MD  Consult ordered by: Zena Montejo APRN - CNP            Subjective   HISTORY OF PRESENTING ILLNESS :  Janice Eagle is a 59 y.o.,female ,White (non-) with past medical history of chronic hyponatremia, prior CSF leak idiopathic, cochlear implant for idiopathic sensorineural deafness, type 1 diabetes and hypothyroidism who is admitted to the hospital with complaints of headache unsteady gait and confusion and found to have severe hyponatremia with sodium 114.  She was transferred from [facility] hospital here.  Given IV fluids and treated for hyperglycemia.  Blood sugars were labile and she became hypoglycemic.  Sodium improved to 122 123  She has some hearing impairment but with the help of the phone able to communicate very well.  Specifically denied nausea vomiting and

## 2025-03-25 NOTE — CONSULTS
No chief complaint on file.    History of Present Illness: Janice Eagle is a 59 y.o. female who I was asked to see in consult for hypoglycemia. Pt is well known to me as an OP. She is a type 1 Diabetic and is prone to hypoglycemia.  Pt notes that on  she was feeling \"off\" and they assumed it was her BG, but she was not hypoglycemic and she continued to feel poorly, so she presented to the ER and was found to be hyponatremic to 114. Her BG at the time was 245, but the Na was still very low. Pt has hx of hyponatremia, that was previously felt to be due to medications.    Pt was admitted and started on Lantus 38 units and \"sliding scale humalog\".  Yesterday evening she received a total of 4 units of Humalog over 2 hours and that caused her to have an episode of BG in the 80s \"which is low for her\".    Pt is currently on a fluid restriction and Nephrology is working up the etiology of the hyponatremia.    Past Medical History:   Diagnosis Date    Arthritis     patient states \"every joint affected\"    Bee sting 2018    left elbow bee sting     Blister of ankle 2018    Blister small per patient of left ankle. Wears an air boot due to 2 stress fractures in last 2 months.    CAD (coronary artery disease)     Constipation     Falls     pt reports having 4 falls in 3 days    Fatigue     Headache     Ill-defined condition     multiple broken ribs    Ill-defined condition     reports \"getting infections easily\"    Joint pain     Memory disorder     following spinal fluid leak repair    Menopause     Nausea & vomiting     Neuropathy     Osteoporosis     Thyroid disease     Type 1 diabetes (HCC)     diagnosed at age 6    Unspecified cerebral artery occlusion with cerebral infarction     x 4 (last in )     Past Surgical History:   Procedure Laterality Date    CARPAL TUNNEL RELEASE Right 2018     SECTION  1987     SECTION      CHOLECYSTECTOMY      HYSTERECTOMY (CERVIX

## 2025-03-25 NOTE — CARE COORDINATION
Care Management Initial Assessment       RUR: 9% Low RUR  Readmission? No  1st IM letter given? Yes - 3/24  1st  letter given: No     03/25/25 0829   Service Assessment   Patient Orientation Alert and Oriented;Person;Place;Situation;Self   Cognition Alert   History Provided By Patient   Primary Caregiver Self   Accompanied By/Relationship no one   Support Systems Parent  (Arminda Palomino (Parent)  [phone])   Patient's Healthcare Decision Maker is: Patient Declined (Legal Next of Kin Remains as Decision Maker)   PCP Verified by CM Yes   Last Visit to PCP Within last 3 months   Prior Functional Level Independent in ADLs/IADLs   Current Functional Level Assistance with the following:;Shopping;Housework;Cooking   Can patient return to prior living arrangement Yes   Ability to make needs known: Good   Family able to assist with home care needs: Yes   Would you like for me to discuss the discharge plan with any other family members/significant others, and if so, who? Yes  (Arminda Palomino (Parent)  [phone])   Financial Resources Medicare   Community Resources None   CM/ Referral Disease Management Education   Social/Functional History   Lives With Alone   Type of Home Apartment   Home Layout One level   Home Equipment Walker - Rolling;Wheelchair - Manual;Cane   Active  Yes   Discharge Planning   Type of Residence House   Living Arrangements Alone   Current Services Prior To Admission Other (Comment)   Potential Assistance Needed N/A   Patient expects to be discharged to: Apartment         The  (FLORIAN) conducted an initial meeting with the patient who prefers to use her device to communicate due to her medical condition, formally introducing themselves and clarifying their role in discharge planning and transitional care. Demographic and insurance details were verified for accuracy. Notably, the patient has no prior history with home health, skilled nursing facilities (SNF), or inpatient

## 2025-03-26 LAB
ANION GAP SERPL CALC-SCNC: 5 MMOL/L (ref 2–12)
ANION GAP SERPL CALC-SCNC: 5 MMOL/L (ref 2–12)
ANION GAP SERPL CALC-SCNC: 7 MMOL/L (ref 2–12)
ANION GAP SERPL CALC-SCNC: 9 MMOL/L (ref 2–12)
BUN SERPL-MCNC: 24 MG/DL (ref 6–20)
BUN SERPL-MCNC: 26 MG/DL (ref 6–20)
BUN SERPL-MCNC: 27 MG/DL (ref 6–20)
BUN SERPL-MCNC: 39 MG/DL (ref 6–20)
BUN/CREAT SERPL: 27 (ref 12–20)
BUN/CREAT SERPL: 29 (ref 12–20)
BUN/CREAT SERPL: 30 (ref 12–20)
BUN/CREAT SERPL: 35 (ref 12–20)
CALCIUM SERPL-MCNC: 8.9 MG/DL (ref 8.5–10.1)
CALCIUM SERPL-MCNC: 9 MG/DL (ref 8.5–10.1)
CALCIUM SERPL-MCNC: 9.1 MG/DL (ref 8.5–10.1)
CALCIUM SERPL-MCNC: 9.5 MG/DL (ref 8.5–10.1)
CHLORIDE SERPL-SCNC: 88 MMOL/L (ref 97–108)
CHLORIDE SERPL-SCNC: 92 MMOL/L (ref 97–108)
CHLORIDE SERPL-SCNC: 93 MMOL/L (ref 97–108)
CHLORIDE SERPL-SCNC: 94 MMOL/L (ref 97–108)
CO2 SERPL-SCNC: 26 MMOL/L (ref 21–32)
CO2 SERPL-SCNC: 27 MMOL/L (ref 21–32)
COMMENT:: NORMAL
CREAT SERPL-MCNC: 0.8 MG/DL (ref 0.55–1.02)
CREAT SERPL-MCNC: 0.9 MG/DL (ref 0.55–1.02)
CREAT SERPL-MCNC: 0.99 MG/DL (ref 0.55–1.02)
CREAT SERPL-MCNC: 1.11 MG/DL (ref 0.55–1.02)
CREAT UR-MCNC: 24.8 MG/DL
ERYTHROCYTE [DISTWIDTH] IN BLOOD BY AUTOMATED COUNT: 12.1 % (ref 11.5–14.5)
GLUCOSE BLD STRIP.AUTO-MCNC: 117 MG/DL (ref 65–117)
GLUCOSE BLD STRIP.AUTO-MCNC: 162 MG/DL (ref 65–117)
GLUCOSE BLD STRIP.AUTO-MCNC: 191 MG/DL (ref 65–117)
GLUCOSE BLD STRIP.AUTO-MCNC: 259 MG/DL (ref 65–117)
GLUCOSE BLD STRIP.AUTO-MCNC: 279 MG/DL (ref 65–117)
GLUCOSE BLD STRIP.AUTO-MCNC: 47 MG/DL (ref 65–117)
GLUCOSE BLD STRIP.AUTO-MCNC: 53 MG/DL (ref 65–117)
GLUCOSE BLD STRIP.AUTO-MCNC: 54 MG/DL (ref 65–117)
GLUCOSE BLD STRIP.AUTO-MCNC: 57 MG/DL (ref 65–117)
GLUCOSE BLD STRIP.AUTO-MCNC: 58 MG/DL (ref 65–117)
GLUCOSE BLD STRIP.AUTO-MCNC: 59 MG/DL (ref 65–117)
GLUCOSE BLD STRIP.AUTO-MCNC: 68 MG/DL (ref 65–117)
GLUCOSE BLD STRIP.AUTO-MCNC: 78 MG/DL (ref 65–117)
GLUCOSE BLD STRIP.AUTO-MCNC: 88 MG/DL (ref 65–117)
GLUCOSE SERPL-MCNC: 232 MG/DL (ref 65–100)
GLUCOSE SERPL-MCNC: 330 MG/DL (ref 65–100)
GLUCOSE SERPL-MCNC: 90 MG/DL (ref 65–100)
GLUCOSE SERPL-MCNC: 93 MG/DL (ref 65–100)
HCT VFR BLD AUTO: 37.3 % (ref 35–47)
HGB BLD-MCNC: 12.9 G/DL (ref 11.5–16)
MAGNESIUM SERPL-MCNC: 1.9 MG/DL (ref 1.6–2.4)
MAGNESIUM SERPL-MCNC: 1.9 MG/DL (ref 1.6–2.4)
MAGNESIUM SERPL-MCNC: 2 MG/DL (ref 1.6–2.4)
MAGNESIUM SERPL-MCNC: 2.1 MG/DL (ref 1.6–2.4)
MCH RBC QN AUTO: 30 PG (ref 26–34)
MCHC RBC AUTO-ENTMCNC: 34.6 G/DL (ref 30–36.5)
MCV RBC AUTO: 86.7 FL (ref 80–99)
NRBC # BLD: 0 K/UL (ref 0–0.01)
NRBC BLD-RTO: 0 PER 100 WBC
PHOSPHATE SERPL-MCNC: 4.2 MG/DL (ref 2.6–4.7)
PHOSPHATE SERPL-MCNC: 4.3 MG/DL (ref 2.6–4.7)
PHOSPHATE SERPL-MCNC: 4.8 MG/DL (ref 2.6–4.7)
PHOSPHATE SERPL-MCNC: 4.9 MG/DL (ref 2.6–4.7)
PLATELET # BLD AUTO: 166 K/UL (ref 150–400)
PMV BLD AUTO: 8.3 FL (ref 8.9–12.9)
POTASSIUM SERPL-SCNC: 4.7 MMOL/L (ref 3.5–5.1)
POTASSIUM SERPL-SCNC: 4.9 MMOL/L (ref 3.5–5.1)
POTASSIUM SERPL-SCNC: 4.9 MMOL/L (ref 3.5–5.1)
POTASSIUM SERPL-SCNC: 5.1 MMOL/L (ref 3.5–5.1)
PROT UR-MCNC: <5 MG/DL (ref 0–11.9)
PROT/CREAT UR-RTO: <0.2
RBC # BLD AUTO: 4.3 M/UL (ref 3.8–5.2)
SERVICE CMNT-IMP: ABNORMAL
SERVICE CMNT-IMP: NORMAL
SODIUM SERPL-SCNC: 122 MMOL/L (ref 136–145)
SODIUM SERPL-SCNC: 123 MMOL/L (ref 136–145)
SODIUM SERPL-SCNC: 125 MMOL/L (ref 136–145)
SODIUM SERPL-SCNC: 128 MMOL/L (ref 136–145)
SPECIMEN HOLD: NORMAL
WBC # BLD AUTO: 3.4 K/UL (ref 3.6–11)

## 2025-03-26 PROCEDURE — 97530 THERAPEUTIC ACTIVITIES: CPT | Performed by: OCCUPATIONAL THERAPIST

## 2025-03-26 PROCEDURE — 2060000000 HC ICU INTERMEDIATE R&B

## 2025-03-26 PROCEDURE — 85027 COMPLETE CBC AUTOMATED: CPT

## 2025-03-26 PROCEDURE — 2500000003 HC RX 250 WO HCPCS: Performed by: NURSE PRACTITIONER

## 2025-03-26 PROCEDURE — 2580000003 HC RX 258: Performed by: NURSE PRACTITIONER

## 2025-03-26 PROCEDURE — 82570 ASSAY OF URINE CREATININE: CPT

## 2025-03-26 PROCEDURE — 6370000000 HC RX 637 (ALT 250 FOR IP): Performed by: INTERNAL MEDICINE

## 2025-03-26 PROCEDURE — 97116 GAIT TRAINING THERAPY: CPT

## 2025-03-26 PROCEDURE — 97165 OT EVAL LOW COMPLEX 30 MIN: CPT | Performed by: OCCUPATIONAL THERAPIST

## 2025-03-26 PROCEDURE — 82962 GLUCOSE BLOOD TEST: CPT

## 2025-03-26 PROCEDURE — 6370000000 HC RX 637 (ALT 250 FOR IP): Performed by: NURSE PRACTITIONER

## 2025-03-26 PROCEDURE — 6360000002 HC RX W HCPCS: Performed by: NURSE PRACTITIONER

## 2025-03-26 PROCEDURE — 6370000000 HC RX 637 (ALT 250 FOR IP): Performed by: HOSPITALIST

## 2025-03-26 PROCEDURE — 80048 BASIC METABOLIC PNL TOTAL CA: CPT

## 2025-03-26 PROCEDURE — 97161 PT EVAL LOW COMPLEX 20 MIN: CPT

## 2025-03-26 PROCEDURE — 84156 ASSAY OF PROTEIN URINE: CPT

## 2025-03-26 PROCEDURE — 83735 ASSAY OF MAGNESIUM: CPT

## 2025-03-26 RX ORDER — INSULIN GLARGINE 100 [IU]/ML
36 INJECTION, SOLUTION SUBCUTANEOUS ONCE
Status: COMPLETED | OUTPATIENT
Start: 2025-03-26 | End: 2025-03-26

## 2025-03-26 RX ADMIN — ACETAMINOPHEN 650 MG: 325 TABLET ORAL at 09:14

## 2025-03-26 RX ADMIN — OXYCODONE 5 MG: 5 TABLET ORAL at 23:39

## 2025-03-26 RX ADMIN — ENOXAPARIN SODIUM 40 MG: 100 INJECTION SUBCUTANEOUS at 09:17

## 2025-03-26 RX ADMIN — SODIUM CHLORIDE, PRESERVATIVE FREE 10 ML: 5 INJECTION INTRAVENOUS at 09:18

## 2025-03-26 RX ADMIN — DEXTROSE 125 ML: 10 SOLUTION INTRAVENOUS at 23:08

## 2025-03-26 RX ADMIN — Medication 16 G: at 22:53

## 2025-03-26 RX ADMIN — SODIUM CHLORIDE, PRESERVATIVE FREE 10 ML: 5 INJECTION INTRAVENOUS at 21:03

## 2025-03-26 RX ADMIN — Medication 15 G: at 09:17

## 2025-03-26 RX ADMIN — INSULIN LISPRO 1 UNITS: 100 INJECTION, SOLUTION INTRAVENOUS; SUBCUTANEOUS at 12:40

## 2025-03-26 RX ADMIN — Medication 16 G: at 22:34

## 2025-03-26 RX ADMIN — Medication 1 AMPULE: at 09:16

## 2025-03-26 RX ADMIN — INSULIN GLARGINE 36 UNITS: 100 INJECTION, SOLUTION SUBCUTANEOUS at 10:55

## 2025-03-26 RX ADMIN — MELATONIN 6 MG: at 21:02

## 2025-03-26 RX ADMIN — Medication 16 G: at 17:39

## 2025-03-26 RX ADMIN — LEVOTHYROXINE SODIUM 75 MCG: 0.07 TABLET ORAL at 06:11

## 2025-03-26 ASSESSMENT — PAIN DESCRIPTION - DESCRIPTORS
DESCRIPTORS: ACHING
DESCRIPTORS: CRAMPING;ACHING
DESCRIPTORS: ACHING

## 2025-03-26 ASSESSMENT — PAIN SCALES - WONG BAKER: WONGBAKER_NUMERICALRESPONSE: NO HURT

## 2025-03-26 ASSESSMENT — PAIN SCALES - GENERAL
PAINLEVEL_OUTOF10: 5
PAINLEVEL_OUTOF10: 8
PAINLEVEL_OUTOF10: 3
PAINLEVEL_OUTOF10: 0

## 2025-03-26 ASSESSMENT — PAIN DESCRIPTION - LOCATION
LOCATION: HEAD
LOCATION: HIP;HEAD;KNEE
LOCATION: HEAD

## 2025-03-26 ASSESSMENT — PAIN DESCRIPTION - ORIENTATION: ORIENTATION: INNER

## 2025-03-26 NOTE — PLAN OF CARE
Problem: Chronic Conditions and Co-morbidities  Goal: Patient's chronic conditions and co-morbidity symptoms are monitored and maintained or improved  Outcome: Progressing     Problem: Discharge Planning  Goal: Discharge to home or other facility with appropriate resources  Outcome: Progressing     Problem: Safety - Adult  Goal: Free from fall injury  Outcome: Progressing     Problem: ABCDS Injury Assessment  Goal: Absence of physical injury  Outcome: Progressing     Problem: Respiratory - Adult  Goal: Achieves optimal ventilation and oxygenation  Outcome: Progressing     Problem: Pain  Goal: Verbalizes/displays adequate comfort level or baseline comfort level  Outcome: Progressing

## 2025-03-27 LAB
ANION GAP SERPL CALC-SCNC: 7 MMOL/L (ref 2–12)
BUN SERPL-MCNC: 35 MG/DL (ref 6–20)
BUN/CREAT SERPL: 43 (ref 12–20)
CALCIUM SERPL-MCNC: 9 MG/DL (ref 8.5–10.1)
CHLORIDE SERPL-SCNC: 93 MMOL/L (ref 97–108)
CO2 SERPL-SCNC: 28 MMOL/L (ref 21–32)
CREAT SERPL-MCNC: 0.82 MG/DL (ref 0.55–1.02)
CREAT UR-MCNC: 79.7 MG/DL
GLUCOSE BLD STRIP.AUTO-MCNC: 106 MG/DL (ref 65–117)
GLUCOSE BLD STRIP.AUTO-MCNC: 106 MG/DL (ref 65–117)
GLUCOSE BLD STRIP.AUTO-MCNC: 149 MG/DL (ref 65–117)
GLUCOSE BLD STRIP.AUTO-MCNC: 269 MG/DL (ref 65–117)
GLUCOSE BLD STRIP.AUTO-MCNC: 275 MG/DL (ref 65–117)
GLUCOSE BLD STRIP.AUTO-MCNC: 319 MG/DL (ref 65–117)
GLUCOSE SERPL-MCNC: 231 MG/DL (ref 65–100)
POTASSIUM SERPL-SCNC: 4.2 MMOL/L (ref 3.5–5.1)
PROT UR-MCNC: 7 MG/DL (ref 0–11.9)
PROT/CREAT UR-RTO: 0.1
SERVICE CMNT-IMP: ABNORMAL
SERVICE CMNT-IMP: NORMAL
SERVICE CMNT-IMP: NORMAL
SODIUM SERPL-SCNC: 128 MMOL/L (ref 136–145)

## 2025-03-27 PROCEDURE — 2060000000 HC ICU INTERMEDIATE R&B

## 2025-03-27 PROCEDURE — 82962 GLUCOSE BLOOD TEST: CPT

## 2025-03-27 PROCEDURE — 80048 BASIC METABOLIC PNL TOTAL CA: CPT

## 2025-03-27 PROCEDURE — 82570 ASSAY OF URINE CREATININE: CPT

## 2025-03-27 PROCEDURE — 6370000000 HC RX 637 (ALT 250 FOR IP): Performed by: NURSE PRACTITIONER

## 2025-03-27 PROCEDURE — 6370000000 HC RX 637 (ALT 250 FOR IP)

## 2025-03-27 PROCEDURE — 36415 COLL VENOUS BLD VENIPUNCTURE: CPT

## 2025-03-27 PROCEDURE — 6370000000 HC RX 637 (ALT 250 FOR IP): Performed by: INTERNAL MEDICINE

## 2025-03-27 PROCEDURE — 84156 ASSAY OF PROTEIN URINE: CPT

## 2025-03-27 PROCEDURE — 6360000002 HC RX W HCPCS: Performed by: NURSE PRACTITIONER

## 2025-03-27 PROCEDURE — 6370000000 HC RX 637 (ALT 250 FOR IP): Performed by: HOSPITALIST

## 2025-03-27 RX ORDER — INSULIN LISPRO 100 [IU]/ML
0-8 INJECTION, SOLUTION INTRAVENOUS; SUBCUTANEOUS
Status: DISCONTINUED | OUTPATIENT
Start: 2025-03-27 | End: 2025-03-28 | Stop reason: HOSPADM

## 2025-03-27 RX ORDER — INSULIN GLARGINE 100 [IU]/ML
36 INJECTION, SOLUTION SUBCUTANEOUS EVERY MORNING
Status: DISCONTINUED | OUTPATIENT
Start: 2025-03-27 | End: 2025-03-28 | Stop reason: HOSPADM

## 2025-03-27 RX ORDER — INSULIN LISPRO 100 [IU]/ML
0-8 INJECTION, SOLUTION INTRAVENOUS; SUBCUTANEOUS
Status: DISCONTINUED | OUTPATIENT
Start: 2025-03-27 | End: 2025-03-27

## 2025-03-27 RX ADMIN — DIPHENHYDRAMINE HYDROCHLORIDE 25 MG: 50 INJECTION INTRAMUSCULAR; INTRAVENOUS at 00:18

## 2025-03-27 RX ADMIN — MELATONIN 6 MG: at 21:05

## 2025-03-27 RX ADMIN — INSULIN GLARGINE 36 UNITS: 100 INJECTION, SOLUTION SUBCUTANEOUS at 10:07

## 2025-03-27 RX ADMIN — INSULIN LISPRO 1 UNITS: 100 INJECTION, SOLUTION INTRAVENOUS; SUBCUTANEOUS at 14:55

## 2025-03-27 RX ADMIN — ENOXAPARIN SODIUM 40 MG: 100 INJECTION SUBCUTANEOUS at 10:07

## 2025-03-27 RX ADMIN — DIPHENHYDRAMINE HYDROCHLORIDE 25 MG: 50 INJECTION INTRAMUSCULAR; INTRAVENOUS at 22:44

## 2025-03-27 RX ADMIN — ACETAMINOPHEN 650 MG: 325 TABLET ORAL at 22:44

## 2025-03-27 RX ADMIN — OXYCODONE 5 MG: 5 TABLET ORAL at 22:44

## 2025-03-27 RX ADMIN — LEVOTHYROXINE SODIUM 75 MCG: 0.07 TABLET ORAL at 06:58

## 2025-03-27 RX ADMIN — Medication 15 G: at 10:07

## 2025-03-27 ASSESSMENT — PAIN SCALES - GENERAL
PAINLEVEL_OUTOF10: 7
PAINLEVEL_OUTOF10: 0
PAINLEVEL_OUTOF10: 0

## 2025-03-27 NOTE — PLAN OF CARE
Problem: Chronic Conditions and Co-morbidities  Goal: Patient's chronic conditions and co-morbidity symptoms are monitored and maintained or improved  Outcome: Progressing     Problem: Discharge Planning  Goal: Discharge to home or other facility with appropriate resources  Outcome: Progressing     Problem: Safety - Adult  Goal: Free from fall injury  3/26/2025 2223 by Cesario Mantilla RN  Outcome: Progressing  3/26/2025 1653 by Yue Salazar RN  Outcome: Progressing     Problem: ABCDS Injury Assessment  Goal: Absence of physical injury  3/26/2025 2223 by Cesario Mantilla RN  Outcome: Progressing  3/26/2025 1653 by Yue Salazar RN  Outcome: Progressing     Problem: Respiratory - Adult  Goal: Achieves optimal ventilation and oxygenation  Outcome: Progressing     Problem: Pain  Goal: Verbalizes/displays adequate comfort level or baseline comfort level  3/26/2025 2223 by Cesario Mantilla RN  Outcome: Progressing  3/26/2025 1653 by Yue Salazar RN  Outcome: Progressing

## 2025-03-28 VITALS
TEMPERATURE: 97.7 F | OXYGEN SATURATION: 99 % | HEIGHT: 63 IN | BODY MASS INDEX: 28.14 KG/M2 | WEIGHT: 158.8 LBS | HEART RATE: 93 BPM | RESPIRATION RATE: 14 BRPM | DIASTOLIC BLOOD PRESSURE: 67 MMHG | SYSTOLIC BLOOD PRESSURE: 144 MMHG

## 2025-03-28 LAB
ALBUMIN SERPL-MCNC: 3.4 G/DL (ref 3.5–5)
ANION GAP SERPL CALC-SCNC: 2 MMOL/L (ref 2–12)
ANION GAP SERPL CALC-SCNC: 5 MMOL/L (ref 2–12)
BASOPHILS # BLD: 0.03 K/UL (ref 0–0.1)
BASOPHILS NFR BLD: 0.5 % (ref 0–1)
BUN SERPL-MCNC: 26 MG/DL (ref 6–20)
BUN SERPL-MCNC: 27 MG/DL (ref 6–20)
BUN/CREAT SERPL: 39 (ref 12–20)
BUN/CREAT SERPL: 40 (ref 12–20)
CALCIUM SERPL-MCNC: 8.9 MG/DL (ref 8.5–10.1)
CALCIUM SERPL-MCNC: 9.2 MG/DL (ref 8.5–10.1)
CHLORIDE SERPL-SCNC: 100 MMOL/L (ref 97–108)
CHLORIDE SERPL-SCNC: 99 MMOL/L (ref 97–108)
CO2 SERPL-SCNC: 27 MMOL/L (ref 21–32)
CO2 SERPL-SCNC: 28 MMOL/L (ref 21–32)
CREAT SERPL-MCNC: 0.66 MG/DL (ref 0.55–1.02)
CREAT SERPL-MCNC: 0.67 MG/DL (ref 0.55–1.02)
CREAT UR-MCNC: 65.4 MG/DL
DIFFERENTIAL METHOD BLD: ABNORMAL
EOSINOPHIL # BLD: 0.03 K/UL (ref 0–0.4)
EOSINOPHIL NFR BLD: 0.5 % (ref 0–7)
ERYTHROCYTE [DISTWIDTH] IN BLOOD BY AUTOMATED COUNT: 11.9 % (ref 11.5–14.5)
GLUCOSE BLD STRIP.AUTO-MCNC: 229 MG/DL (ref 65–117)
GLUCOSE BLD STRIP.AUTO-MCNC: 62 MG/DL (ref 65–117)
GLUCOSE BLD STRIP.AUTO-MCNC: 70 MG/DL (ref 65–117)
GLUCOSE SERPL-MCNC: 82 MG/DL (ref 65–100)
GLUCOSE SERPL-MCNC: 85 MG/DL (ref 65–100)
HCT VFR BLD AUTO: 37.1 % (ref 35–47)
HGB BLD-MCNC: 12.8 G/DL (ref 11.5–16)
IMM GRANULOCYTES # BLD AUTO: 0.02 K/UL (ref 0–0.04)
IMM GRANULOCYTES NFR BLD AUTO: 0.4 % (ref 0–0.5)
LYMPHOCYTES # BLD: 1.82 K/UL (ref 0.8–3.5)
LYMPHOCYTES NFR BLD: 32.8 % (ref 12–49)
MCH RBC QN AUTO: 30.1 PG (ref 26–34)
MCHC RBC AUTO-ENTMCNC: 34.5 G/DL (ref 30–36.5)
MCV RBC AUTO: 87.3 FL (ref 80–99)
MONOCYTES # BLD: 0.56 K/UL (ref 0–1)
MONOCYTES NFR BLD: 10.1 % (ref 5–13)
NEUTS SEG # BLD: 3.09 K/UL (ref 1.8–8)
NEUTS SEG NFR BLD: 55.7 % (ref 32–75)
NRBC # BLD: 0 K/UL (ref 0–0.01)
NRBC BLD-RTO: 0 PER 100 WBC
PHOSPHATE SERPL-MCNC: 3.9 MG/DL (ref 2.6–4.7)
PLATELET # BLD AUTO: 170 K/UL (ref 150–400)
PMV BLD AUTO: 8 FL (ref 8.9–12.9)
POTASSIUM SERPL-SCNC: 4.1 MMOL/L (ref 3.5–5.1)
POTASSIUM SERPL-SCNC: 4.2 MMOL/L (ref 3.5–5.1)
PROT UR-MCNC: 8 MG/DL (ref 0–11.9)
PROT/CREAT UR-RTO: 0.1
RBC # BLD AUTO: 4.25 M/UL (ref 3.8–5.2)
SERVICE CMNT-IMP: ABNORMAL
SERVICE CMNT-IMP: ABNORMAL
SERVICE CMNT-IMP: NORMAL
SODIUM SERPL-SCNC: 130 MMOL/L (ref 136–145)
SODIUM SERPL-SCNC: 131 MMOL/L (ref 136–145)
SPECIMEN HOLD: NORMAL
WBC # BLD AUTO: 5.6 K/UL (ref 3.6–11)

## 2025-03-28 PROCEDURE — 36415 COLL VENOUS BLD VENIPUNCTURE: CPT

## 2025-03-28 PROCEDURE — 82570 ASSAY OF URINE CREATININE: CPT

## 2025-03-28 PROCEDURE — 6360000002 HC RX W HCPCS: Performed by: NURSE PRACTITIONER

## 2025-03-28 PROCEDURE — 84156 ASSAY OF PROTEIN URINE: CPT

## 2025-03-28 PROCEDURE — 82962 GLUCOSE BLOOD TEST: CPT

## 2025-03-28 PROCEDURE — 6370000000 HC RX 637 (ALT 250 FOR IP): Performed by: HOSPITALIST

## 2025-03-28 PROCEDURE — 85025 COMPLETE CBC W/AUTO DIFF WBC: CPT

## 2025-03-28 PROCEDURE — 6370000000 HC RX 637 (ALT 250 FOR IP): Performed by: INTERNAL MEDICINE

## 2025-03-28 PROCEDURE — 2500000003 HC RX 250 WO HCPCS: Performed by: NURSE PRACTITIONER

## 2025-03-28 PROCEDURE — 80048 BASIC METABOLIC PNL TOTAL CA: CPT

## 2025-03-28 PROCEDURE — 6370000000 HC RX 637 (ALT 250 FOR IP): Performed by: NURSE PRACTITIONER

## 2025-03-28 PROCEDURE — 80069 RENAL FUNCTION PANEL: CPT

## 2025-03-28 RX ORDER — SODIUM CHLORIDE 1 G/1
1 TABLET ORAL 3 TIMES DAILY
Qty: 90 TABLET | Refills: 3 | Status: SHIPPED | OUTPATIENT
Start: 2025-03-28

## 2025-03-28 RX ADMIN — INSULIN GLARGINE 36 UNITS: 100 INJECTION, SOLUTION SUBCUTANEOUS at 09:11

## 2025-03-28 RX ADMIN — ACETAMINOPHEN 650 MG: 325 TABLET ORAL at 05:18

## 2025-03-28 RX ADMIN — DIPHENHYDRAMINE HYDROCHLORIDE 25 MG: 50 INJECTION INTRAMUSCULAR; INTRAVENOUS at 05:16

## 2025-03-28 RX ADMIN — ENOXAPARIN SODIUM 40 MG: 100 INJECTION SUBCUTANEOUS at 09:13

## 2025-03-28 RX ADMIN — SODIUM CHLORIDE, PRESERVATIVE FREE 10 ML: 5 INJECTION INTRAVENOUS at 09:07

## 2025-03-28 RX ADMIN — LEVOTHYROXINE SODIUM 75 MCG: 0.07 TABLET ORAL at 05:18

## 2025-03-28 RX ADMIN — OXYCODONE 5 MG: 5 TABLET ORAL at 05:18

## 2025-03-28 ASSESSMENT — PAIN SCALES - GENERAL
PAINLEVEL_OUTOF10: 7
PAINLEVEL_OUTOF10: 5
PAINLEVEL_OUTOF10: 0

## 2025-03-28 ASSESSMENT — PAIN DESCRIPTION - ORIENTATION: ORIENTATION: RIGHT;LEFT

## 2025-03-28 ASSESSMENT — PAIN DESCRIPTION - LOCATION: LOCATION: HIP

## 2025-03-28 NOTE — PLAN OF CARE
Predictive Model Details          25 (Normal)  Factor Value    Calculated 3/28/2025 07:42 43% Age 59 years old    Deterioration Index Model 27% Pulse oximetry 86 %     14% Sodium abnormal (130 mmol/L)     6% Respiratory rate 15     4% BUN abnormal (26 MG/DL)     4% Potassium 4.2 mmol/L     2% Systolic 107     0% Pulse 78     0% Temperature 98.2 °F (36.8 °C)     0% WBC count 5.6 K/uL     0% Hematocrit 37.1 %       Problem: Chronic Conditions and Co-morbidities  Goal: Patient's chronic conditions and co-morbidity symptoms are monitored and maintained or improved  3/28/2025 0742 by Ervin Melendez RN  Outcome: Progressing  3/27/2025 2023 by Caro Peguero RN  Outcome: Progressing     Problem: Discharge Planning  Goal: Discharge to home or other facility with appropriate resources  3/28/2025 0742 by Ervin Melendez RN  Outcome: Progressing  3/27/2025 2023 by Caro Peguero RN  Outcome: Progressing     Problem: Safety - Adult  Goal: Free from fall injury  3/28/2025 0742 by Ervin Melendez RN  Outcome: Progressing  3/27/2025 2023 by Caro Peguero RN  Outcome: Progressing     Problem: ABCDS Injury Assessment  Goal: Absence of physical injury  3/28/2025 0742 by Ervin Melendez RN  Outcome: Progressing  3/27/2025 2023 by Caro Peguero RN  Outcome: Progressing     Problem: Respiratory - Adult  Goal: Achieves optimal ventilation and oxygenation  3/28/2025 0742 by Ervin Melendez RN  Outcome: Progressing  3/27/2025 2023 by Caro Peguero RN  Outcome: Progressing     Problem: Pain  Goal: Verbalizes/displays adequate comfort level or baseline comfort level  3/28/2025 0742 by Ervin Melendez RN  Outcome: Progressing  3/27/2025 2023 by Caro Peguero RN  Outcome: Progressing

## 2025-03-28 NOTE — PROGRESS NOTES
03/27/25        I have been asked to see this patient by Babatunde Mittal MD  for advice/opinion re: -----                                                        Assessment:         Acute on chronic, severe, euvolemic hyponatremia  Acute kidney injury-resolved  History of CSF leak  Headache  Neuropathy  Hypothyroidism Discussion:     Sodium 128 today  Urine osmolality 180, urine sodium 42 on 3/23/2025  Urine osmole 252 and urine sodium 20 on 3/25/2025  Low urine osmolality suggesting polydipsia as a contributory role.  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 should be ok for dc   Nacl 2 gm PO BID on discharge  BMP in 7 days , Urine Na and Urine osm in 7 days and follow up with me in 10 days                    Thanks for consulting me. Renal service will follow patient with you.Please don't hesitate to contact me if any questions arise of if I can assist in any manner.      Rip Amin MD  Cell no- [phone]  Available on perfect serve.          Signed By: Rip Amin MD     March 27, 2025           Consult Date: 3/27/2025        Subjective   Seen and examined  Reinforced Fluid restriction  No new complaints   PMH:  Past Medical History:   Diagnosis Date    Arthritis     patient states \"every joint affected\"    Bee sting 09/05/2018    left elbow bee sting     Blister of ankle 09/05/2018    Blister small per patient of left ankle. Wears an air boot due to 2 stress fractures in last 2 months.    CAD (coronary artery disease)     Constipation     Falls 2017    pt reports having 4 falls in 3 days    Fatigue     Headache     Ill-defined condition     multiple broken ribs    Ill-defined condition     reports \"getting 
                                                                                                                                         03/28/25        I have been asked to see this patient by No att. providers found  for advice/opinion re: -----                                                        Assessment:         Acute on chronic, severe, euvolemic hyponatremia  Acute kidney injury-resolved  History of CSF leak  Headache  Neuropathy  Hypothyroidism Discussion:     Sodium 131 today  Urine osmolality 180, urine sodium 42 on 3/23/2025  Urine osmole 252 and urine sodium 20 on 3/25/2025  Low urine osmolality suggesting polydipsia as a contributory role.  Explained that to the patient  Diagnosis of chronic hyponatremia is SIADH plus aggravation by polydipsia  Plan:   Ok for dc  Nacl 2 gm PO BID on discharge  BMP in 7 days , Urine Na and Urine osm in 7 days and follow up with me in 10 days                    Thanks for consulting me. Renal service will follow patient with you.Please don't hesitate to contact me if any questions arise of if I can assist in any manner.      Rip Amin MD  Cell no- [phone]  Available on perfect serve.          Signed By: Rip Amin MD     March 28, 2025           Consult Date: 3/28/2025        Subjective   Seen and examined  Reinforced Fluid restriction  Discussed dc plan with patient  PMH:  Past Medical History:   Diagnosis Date    Arthritis     patient states \"every joint affected\"    Bee sting 09/05/2018    left elbow bee sting     Blister of ankle 09/05/2018    Blister small per patient of left ankle. Wears an air boot due to 2 stress fractures in last 2 months.    CAD (coronary artery disease)     Constipation     Falls 2017    pt reports having 4 falls in 3 days    Fatigue     Headache     Ill-defined condition     multiple broken ribs    Ill-defined condition     reports \"getting infections easily\"    Joint pain     Memory disorder     following spinal 
                 Critical Care Progress Note  Sara Wells MD          Date of Service:  3/24/2025  NAME:  Janice Eagle  :  1965  MRN:  [id number]      Subjective/Hospital course:      3/24: Patient reports feeling better this morning.  Hemodynamically stable.  No acute events overnight.       Assessment/Plan:       NEUROLOGICAL:      Severe Hyponatremia: Initially c/b confusion. Head imaging unrevealing for acute finding  Hx CSF leak: Repaired , idiopathic. Re-eval recently by ENT, no acute leak.  Idiopathic nasal septal perf: Follow as outpatient w ENT  Idiopathic sensineuronal deafness: s/p cochlear implant 2024: Follow as outpt w ENT: Patient uses kate on phone to communicate via cochlear implant; please wait for patient to turn on to speak     PULMONOLOGY:      On RA  - CXR for complete workup of severe hyponatremia     CARDIOVASCULAR:      HTN:   - Losartan can rarely cause hyponatremia; holding for now and will treat BP PRN with hydralazine  - Restart and monitor closely when NA levels corrected; more likely to be drug induced in the setting of bactrim     GASTROINTESTINAL                 Fluid restriction to 1500ml     RENAL/ELECTROLYTE/FLUIDS:      Hyponatremia:   -Likely from polydipsia and compounded by bactrim. Had rapid correction, now on D5W.  - Recheck NA now and q4h  - Goal sodium is about  today.      ENDOCRINE:      DM1: Follows with Dr Lowe as outpatient.  Last saw on 3/17 and requires frequent adjustments in insulin due to episodes of hypoglycemia. She was on Tresiba 38 units am (none at night) and humalog TID w meals only if BG >250-300. She was checking her BG 8x/day with Dexcom.  A1c 8.6%  - No tresiba or levamir on formulary; ordered lantus 38 units am  - SSI TID with meals only; no nightly coverage ordered  - Patient experienced one episode of hypoglycemia at the time of admission per her Dexcom before she was pulled into the computer system, this was treated with 
      Hospitalist Progress Note        Demographics    Patient Name  Janice Eagle   Date of Birth 1965   Medical Record Number  [id number]      Age  59 y.o.   PCP Souleymane Rincon MD   Admit date:  3/23/2025 10:39 PM     Room Number  2306/01  @ [facility]           Admission Diagnoses:  Hyponatremia   Admission Summary:  Janice Goins is a 60 yo female with a PMH of chronic hyponatremia (see note from PCP, thought to be SIADH from gabapentin), prior CSF leak, idiopathic, 1998 (resolved), idiopathic nasal septal perforation 3/2025), cochlear implant for idiopathic sensineuronal deafness 12/2024, HLD, hypothyroidism and DM1 who was started on bactrim for sinusitis by ENT 3/14. No other recent medication changes. She presented to the ED at [facility] 3/23 with HA, unsteady gait, and confusion that started the day prior to presentation. CT head, CTA H/N, CT perfusion negative for acute finding.  in the ED at [facility] (1200 3/23). She was given 1L NS and transferred to [facility] ICU. At the time of arrival to [facility] she was oriented x3 and denied HA. Gait not observed. She reported dexcom reading of 47. Patient had not yet been pulled into EPIC. 2 orange juice were given with subsequent .      Assessment and plan:     Severe Hyponatremia  History of hyponatremia-  Patient presented to [facility] with sodium of 114  Transferred to [facility] with sodium of 117 POA  Patient apparently transferred to ICU.   Sodium up to 125 this a.m., patient downgraded from ICU to hospitalist service  Current sodium 122  Continue fluid restriction of 1200  Patient reports similar problem with severe hyponatremia 2 years ago  Nephrology consulted    Hypoglycemia   History of diabetes type 2    Glucose of 41 noted on chemistry this a.m.    Point-of-care glucose this a.m. 42  Patient started on D5 continuous infusion at 125 in ICU    At 1443 point-of-care glucose 330.    Will discontinued D5  Patient reports being very sensitive 
      Hospitalist Progress Note        Demographics    Patient Name  Janice Eagle   Date of Birth 1965   Medical Record Number  [id number]      Age  59 y.o.   PCP Souleymane Rincon MD   Admit date:  3/23/2025 10:39 PM     Room Number  2306/01  @ [facility]           Admission Diagnoses:  Hyponatremia   Admission Summary:  Janice Goins is a 60 yo female with a PMH of chronic hyponatremia (see note from PCP, thought to be SIADH from gabapentin), prior CSF leak, idiopathic, 1998 (resolved), idiopathic nasal septal perforation 3/2025), cochlear implant for idiopathic sensineuronal deafness 12/2024, HLD, hypothyroidism and DM1 who was started on bactrim for sinusitis by ENT 3/14. No other recent medication changes. She presented to the ED at [facility] 3/23 with HA, unsteady gait, and confusion that started the day prior to presentation. CT head, CTA H/N, CT perfusion negative for acute finding.  in the ED at [facility] (1200 3/23). She was given 1L NS and transferred to [facility] ICU. At the time of arrival to [facility] she was oriented x3 and denied HA. Gait not observed. She reported dexcom reading of 47. Patient had not yet been pulled into EPIC. 2 orange juice were given with subsequent .      Assessment and plan:     Severe Hyponatremia  History of hyponatremia-  Patient presented to [facility] with sodium of 114  Transferred to [facility] with sodium of 117 POA  Sodium up to 125 yesterday, patient downgraded from ICU to hospitalist service  Current sodium 122  Continue fluid restriction of 1200  Patient reports similar problem with severe hyponatremia 2 years ago  Pending nephrology evaluation    Hypoglycemia   History of diabetes type 2    Glucose of 61 noted on chemistry yesterday, improved to 99 this a.m.  Point-of-care glucose 61 noted, improved to 87  Patient reports being very sensitive to insulin  Continue low-dose sliding scale  Continue 38 units units, recommended by endocrinologist  Snacks at 
      Hospitalist Progress Note        Demographics    Patient Name  Janice Eagle   Date of Birth 1965   Medical Record Number  [id number]      Age  59 y.o.   PCP Souleymane Rincon MD   Admit date:  3/23/2025 10:39 PM     Room Number  2306/01  @ [facility]           Admission Diagnoses:  Hyponatremia   Admission Summary:  Janice Goins is a 58 yo female with a PMH of chronic hyponatremia (see note from PCP, thought to be SIADH from gabapentin), prior CSF leak, idiopathic, 1998 (resolved), idiopathic nasal septal perforation 3/2025), cochlear implant for idiopathic sensineuronal deafness 12/2024, HLD, hypothyroidism and DM1 who was started on bactrim for sinusitis by ENT 3/14. No other recent medication changes. She presented to the ED at [facility] 3/23 with HA, unsteady gait, and confusion that started the day prior to presentation. CT head, CTA H/N, CT perfusion negative for acute finding.  in the ED at [facility] (1200 3/23). She was given 1L NS and transferred to [facility] ICU. At the time of arrival to [facility] she was oriented x3 and denied HA. Gait not observed. She reported dexcom reading of 47. Patient had not yet been pulled into EPIC. 2 orange juice were given with subsequent .      Assessment and plan:     Severe Hyponatremia  History of hyponatremia-  Patient presented to [facility] with sodium of 114  Transferred to [facility] with sodium of 117 POA  Sodium was 122 yesterday, improved to 125 this am   Continue fluid restriction of 1500  Patient reports similar problem with severe hyponatremia 2 years ago  Nephrology following and started Urea 15 mg package  Appreciate nephrology inputs    Hypoglycemia   History of diabetes type 2    Point-of-care glucose 59 noted, improved to 68  Patient reports being very sensitive to insulin  Continue low-dose sliding scale  Continue 38 units units, recommended by endocrinologist  Snacks at bedtime  Accu-Checks  Hypoglycemic protocol  Endocrinology is 
0700: Bedside shift report received from FAYE Chavira (offgoing nurse). Dual skin check completed, infusions verified and confirmed that bed alarm is on.     0800: Shift assessment completed. Pt is alert and oriented x 4. Pt follows commands. Pt opens eyes spontaneously and is able to focus and track. Pt has cochlear implant in right ear. Lung sounds are clear bilaterally. Pt is not on any supplemental oxygen. Pt is sinus rhythm on the monitor. Bowel sounds are active throughout. Pt is up to the bedside commode with assistance. UOP adequate. Pt turns herself and refuses having any pads on the bed underneath her. D5 infusing @ 75 mL/hr. Labs drawn and sent.     0816: POC glucose 203. Pt declined receiving 1 unit Humalog per sliding scale order. Stated that she is a brittle diabetic and BS is very labile, if she receives a unit of Humalog it will drop her glucose significantly and she will become hypoglycemic. Dose held.     0927: D5 infusion rate changed to 125 mL/hr per MD order.     1005: PRN Oxycodone 5 mg PO given for pain 7/10.     1039: Transfer report given to FAYE Vasquez. Pt moving to room 2306.     1055: PRN Benadryl 25 mg IVP given for itching.     1116: POC glucose 258, pt requested that we only give her 1/2 unit of Humalog so that she does not become hypoglycemic.    1200: Shift reassessment completed, no changes to previous assessment. D5 infusing @ 125 mL/hr. Labs drawn and sent.     1230: Pt moved to room 2306.          
2102 Notified the over MD FELIPE Gould of the patients blood sugar of 352  and she had no night time coverage; he order 10u of humalog; pt said she had a sensitivity to insulin so MD ordered 2u instead.      0116 pt blood glucose was 61 administered PRN glucose tabs, she wanted cranberry juice instead of OJ.  BS came up to 87.              End of Shift Note    Bedside shift change report given to  FAYE Beach(oncoming nurse) by Lilly Vargas RN (offgoing nurse).  Report included the following information SBAR, Kardex, Intake/Output, MAR, and Recent Results    Shift worked:  night     Shift summary and any significant changes:    Blood sugar dropped overnight   Concerns for physician to address:    Zone phone for oncoming shift:          Activity:  Level of Assistance: Standby assist, set-up cues, supervision of patient - no hands on  Number times ambulated in hallways past shift: 0  Number of times OOB to chair past shift: 0    Cardiac:   Cardiac Monitoring: Yes      Cardiac Rhythm: Sinus rhythm    Access:  Current line(s): PIV     Genitourinary:   Urinary Status: Voiding    Respiratory:   O2 Device: None (Room air)  Chronic home O2 use?: NO  Incentive spirometer at bedside: NO    GI:  Last BM (including prior to admit): 03/24/25  Current diet:  ADULT DIET; Regular; 3 carb choices (45 gm/meal)  Passing flatus: YES    Pain Management:   Patient states pain is manageable on current regimen: NO    Skin:  Dre Scale Score: 20  Interventions: Wound Offloading (Prevention Methods): Pillows, Turning, Repositioning    Patient Safety:  Fall Risk: Nursing Judgement-Fall Risk High(Add Comments): Yes  Fall Risk Interventions  Nursing Judgement-Fall Risk High(Add Comments): Yes  Toilet Every 2 Hours-In Advance of Need: Yes  Hourly Visual Checks: In bed  Fall Visual Posted: Fall sign posted  Room Door Open: Deferred to decrease stimulation  Alarm On: Bed  Patient Moved Closer to Nursing Station: No    Active Consults:   IP 
2249 Patient arrived to ICU from [facility]. Head to toe assessment done upon arrival. Patient is AO X 4, deaf, has cochlear implant and uses kate on phone to communicate, Follows commands. Respirations even and unlabored on RA.  See flowsheet for details.    2300 She reported dexcom reading of 47 before she has been pulled into EPIC. Orange and apple juice given. Subsequent reading of 114 on her dexcom.    2350  Patient is requesting her home medicines. Complaining of pain on her left hip. MICHELLE Tovar notified.     2358 NP at bedside. Plan to give oxycodone and lidocaine patch for pain.     0014 Blood sugar 246 at the moment. Order placed for checking blood sugar Q4.    0346 Patient is complaining of itching. MICHELLE Tovar notified. Order placed for IV benedryl.    0400 Re assessment done.   Blood sugar 42, repeat 43. Cranberry and apple juice given.    0417 Re peat blood sugar 62. PRN Glucose tablets given.     0449 Blood sugar 161 at the moment.    0500 Patient is calm and sleeping at the moment.    Bedside and verbal report given to Edith MCKINNEY  
Bedside shift change report given to FAYE Renteria and FAYE Mueller (oncoming nurse) by FAYE Elizondo (offgoing nurse). Report included the following information Nurse Handoff Report and Cardiac Rhythm NSR .      0803--FAYE Mueller will be charting on this patient.    0839--Blood sugar dropped to 62 this AM. After giving orange juice, blood sugar 70. Dr. Lowe with endocrinology and [facility] (NP) made aware. Per Dr. Lowe and NP, give Lantus insulin.     1036--This RN agrees with FAYE Mueller's charting. Patient wheeled out to ride. Discharge instructions gone over with patient.  
End of Shift Note    Bedside shift change report given to Caro (oncoming nurse) by ITZEL CHAVARRIA RN (offgoing nurse).  Report included the following information SBAR, Kardex, and MAR    Shift worked:  7a-7p     Shift summary and any significant changes:     NO significant changes, Pt sliding scale adjusted today to compensate for sensitivity to Humalog.      Concerns for physician to address:       Zone phone for oncoming shift:          Activity:  Level of Assistance: Independent  Number times ambulated in hallways past shift: 4  Number of times OOB to chair past shift: 2    Cardiac:   Cardiac Monitoring: Yes      Cardiac Rhythm: Sinus rhythm    Access:  Current line(s): PIV     Genitourinary:   Urinary Status: Voiding, Bathroom privileges    Respiratory:   O2 Device: None (Room air)  Chronic home O2 use?: NO  Incentive spirometer at bedside: NO    GI:  Last BM (including prior to admit): 03/24/25  Current diet:  ADULT DIET; Regular; Low Fat/Low Chol/High Fiber/2 gm Na  DIET ONE TIME MESSAGE;  DIET ONE TIME MESSAGE;  Passing flatus: YES    Pain Management:   Patient states pain is manageable on current regimen: N/A    Skin:  Dre Scale Score: 19  Interventions: Wound Offloading (Prevention Methods): Repositioning    Patient Safety:  Fall Risk: Nursing Judgement-Fall Risk High(Add Comments): Yes  Fall Risk Interventions  Nursing Judgement-Fall Risk High(Add Comments): Yes  Toilet Every 2 Hours-In Advance of Need: Yes  Hourly Visual Checks: In bed  Fall Visual Posted: Fall sign posted, [place]  Room Door Open: Deferred to promote rest  Alarm On: Bed  Patient Moved Closer to Nursing Station: No    Active Consults:   IP CONSULT TO NEPHROLOGY  IP CONSULT TO ENDOCRINOLOGY    Length of Stay:  Expected LOS: 5  Actual LOS: 4    ITZEL CHAVARRIA RN                            
End of Shift Note    Bedside shift change report given to FAYE Carr (oncoming nurse) by Christina Cantu RN (offgoing nurse).  Report included the following information SBAR, Kardex, Intake/Output, MAR, Recent Results, Cardiac Rhythm  , and Alarm Parameters     Shift worked:9833-0060       Shift summary and any significant changes:   Patient admitted to PCU from CCU with no complications. Endocrinology, urology, and nephrology all consulted today. Patient is brittle diabetic with sensitivity to insulin also presents with low NA. Q4 BMP. Continue to manage pain per MAR       Concerns for physician to address: patient and her mother think she has SIADH and would like their questions about that addressed       Zone phone for oncoming shift: 1152         Activity:  Level of Assistance: Standby assist, set-up cues, supervision of patient - no hands on  Number times ambulated in hallways past shift: 0  Number of times OOB to chair past shift: 2    Cardiac:   Cardiac Monitoring: Yes      Cardiac Rhythm: Sinus rhythm    Access:  Current line(s): PIV     Genitourinary:   Urinary Status: Voiding    Respiratory:   O2 Device: None (Room air)  Chronic home O2 use?: NO  Incentive spirometer at bedside: NO    GI:  Last BM (including prior to admit): 03/24/25  Current diet:  ADULT DIET; Regular; 3 carb choices (45 gm/meal)  Passing flatus: YES    Pain Management:   Patient states pain is manageable on current regimen: YES    Skin:  Dre Scale Score: 20  Interventions: Wound Offloading (Prevention Methods): Pillows, Repositioning    Patient Safety:  Fall Risk: Nursing Judgement-Fall Risk High(Add Comments): Yes  Fall Risk Interventions  Nursing Judgement-Fall Risk High(Add Comments): Yes  Toilet Every 2 Hours-In Advance of Need: Yes  Hourly Visual Checks: In bed  Fall Visual Posted: Fall sign posted  Room Door Open: Deferred to decrease stimulation  Alarm On: Bed  Patient Moved Closer to Nursing Station: No    Active Consults: 
End of Shift Note    Bedside shift change report given to FAYE Solares (oncoming nurse) by Cesario Mantilla RN (offgoing nurse).  Report included the following information SBAR, Intake/Output, MAR, Recent Results, Med Rec Status, and Cardiac Rhythm NSR    Shift worked:  1027-3136     Shift summary and any significant changes:     2332: Pt reported headache, BG 53, given glucose tablets x1, Charge RN notified    2251: Pt BG 47 on recheck, given 2nd round of glucose tablets, Covering Provider notified    2305: Pt BG 54 on recheck    2308: D10 bolus iniated per MD     2331: Bolus completed, Pt reported decreased headache, Pt      PRN oxy and benadryl given for pain and itching, Pt satisfied    Unable to collect AM Labs, Phlebotomy called       Concerns for physician to address: BG Stabilization     Zone phone for oncoming shift:          Activity:  Level of Assistance: Standby assist, set-up cues, supervision of patient - no hands on  Number times ambulated in hallways past shift: 0  Number of times OOB to chair past shift: 0    Cardiac:   Cardiac Monitoring: Yes      Cardiac Rhythm: Sinus rhythm    Access:  Current line(s): PIV     Genitourinary:   Urinary Status: Voiding, Bathroom privileges    Respiratory:   O2 Device: None (Room air)  Chronic home O2 use?: NO  Incentive spirometer at bedside: YES    GI:  Last BM (including prior to admit): 03/24/25  Current diet:  ADULT DIET; Regular; 3 carb choices (45 gm/meal)  Passing flatus: YES    Pain Management:   Patient states pain is manageable on current regimen: YES    Skin:  Dre Scale Score: 20  Interventions: Wound Offloading (Prevention Methods): Repositioning, Pillows, Turning    Patient Safety:  Fall Risk: Nursing Judgement-Fall Risk High(Add Comments): Yes  Fall Risk Interventions  Nursing Judgement-Fall Risk High(Add Comments): Yes  Toilet Every 2 Hours-In Advance of Need: Yes  Hourly Visual Checks: In bed  Fall Visual Posted: Fall sign posted, 
End of Shift Note    Bedside shift change report given to FAYE Turner (oncoming nurse) by Cesario Mantilla RN (offgoing nurse).  Report included the following information SBAR, Intake/Output, MAR, Recent Results, Med Rec Status, and Cardiac Rhythm NSR    Shift worked:  9640-0620     Shift summary and any significant changes:     Urine collected (See recent results)    PRN glucose tablets given x1, BG increased    PRN Oxy given for pain, PT satisfied    PRN benadryl given for itching, Pt satisfied       Concerns for physician to address:       Zone phone for oncoming shift:          Activity:  Level of Assistance: Standby assist, set-up cues, supervision of patient - no hands on  Number times ambulated in hallways past shift: 0  Number of times OOB to chair past shift: 0    Cardiac:   Cardiac Monitoring: Yes      Cardiac Rhythm: Sinus rhythm    Access:  Current line(s): PIV     Genitourinary:   Urinary Status: Voiding    Respiratory:   O2 Device: None (Room air)  Chronic home O2 use?: NO  Incentive spirometer at bedside: YES    GI:  Last BM (including prior to admit): 03/24/25  Current diet:  ADULT DIET; Regular; 3 carb choices (45 gm/meal)  Passing flatus: YES    Pain Management:   Patient states pain is manageable on current regimen: YES    Skin:  Dre Scale Score: 20  Interventions: Wound Offloading (Prevention Methods): Turning, Repositioning, Pillows    Patient Safety:  Fall Risk: Nursing Judgement-Fall Risk High(Add Comments): Yes  Fall Risk Interventions  Nursing Judgement-Fall Risk High(Add Comments): Yes  Toilet Every 2 Hours-In Advance of Need: Yes  Hourly Visual Checks: In bed  Fall Visual Posted: Fall sign posted, Armband  Room Door Open: Deferred to decrease stimulation  Alarm On: Bed  Patient Moved Closer to Nursing Station: No    Active Consults:   IP CONSULT TO NEPHROLOGY  IP CONSULT TO ENDOCRINOLOGY    Length of Stay:  Expected LOS: 4  Actual LOS: 3    Cesario Mantilla, RN                            
I have reviewed discharge instructions with the patient. The patient verbalized understanding. Discharge medications reviewed with patient and appropriate educational materials and side effects teaching were provided. Follow-up appointments reviewed. Opportunity for questions and clarification was provided.  Venous access removed without difficulty.  Patient's belongings gathered and sent with patient. Patient is ready for discharge. Patient was wheeled down and  their mother.     Ervin Melendez RN   
OCCUPATIONAL THERAPY EVALUATION/DISCHARGE  Patient: Janice Eagle (59 y.o. female)  Date: 3/26/2025  Primary Diagnosis: Hyponatremia [E87.1]         Precautions:                    ASSESSMENT :  Based on the objective data below, the patient is very quick moving and needs cues to slow down.  Occasional path deviation, but pt is able to self correct.  Educated pt on fall prevention and slowing down.  She reports a history of CSF leak and was being worked up OP for this.  She reports a pressure in her head at times, this was present prior to admit.  Vitals were stable. Pt uses a text to speech kate on her phone due to being deaf and recently had implant to improve her hearing, that she states isnt fully functional yet.  Overall pt is performing mobility and ADLS on her own and further OT services aren't needed at this time.   Pt is in agreement.    Functional Outcome Measure:  The patient scored 100/100 on the barthel outcome measure which is indicative of independence in mobility and ADLs..      Further skilled acute occupational therapy is not indicated at this time.     PLAN :  Recommend with staff:  mobilize    Recommendation for discharge: (in order for the patient to meet his/her long term goals):   No skilled occupational therapy    Other factors to consider for discharge: no additional factors    IF patient discharges home will need the following DME: none     SUBJECTIVE:   Patient stated, “I think I have another CSF leak.  I was working on getting that checked out before I got here.  The doctor didn't want to do the test.”    OBJECTIVE DATA SUMMARY:     Past Medical History:   Diagnosis Date    Arthritis     patient states \"every joint affected\"    Bee sting 09/05/2018    left elbow bee sting     Blister of ankle 09/05/2018    Blister small per patient of left ankle. Wears an air boot due to 2 stress fractures in last 2 months.    CAD (coronary artery disease)     Constipation     Falls 2017    pt reports 
PHYSICAL THERAPY EVALUATION/DISCHARGE    Patient: Janice Eagle (59 y.o. female)  Date: 3/26/2025  Primary Diagnosis: Hyponatremia [E87.1]       Precautions: Restrictions/Precautions  Restrictions/Precautions: Other (Comment) (hearing impaired)            ASSESSMENT AND RECOMMENDATIONS:  Based on the objective data below, the patient presents at baseline LOF with mobility following admit with unsteady gait, falls due to hyponatremia.     Pt received in bed with supportive mother present. Reports up to restroom without difficulty, feels back to baseline. Pt mobilized with modified indep and cues for safety due to mild impulsivity, moves quickly. Pt tolerated amb on unit without device; noted minor path deviations, but no LOB. Tolerated up to chair at end of session. Vitals stable with position change and activity on RA.     Pt endorses baseline LOF with mobility, mother in agreement. No further PT needs at this time. Will sign off.    Functional Outcome Measure:  The patient scored 24/24 on the [facility] outcome measure which is indicative of Cutoff score <=171,2,3 had higher odds of discharging home with home health or need of SNF/IPR.    Further skilled acute physical therapy is not indicated at this time.       PLAN :  Recommendation for discharge: (in order for the patient to meet his/her long term goals):   No skilled physical therapy    Other factors to consider for discharge: no additional factors    IF patient discharges home will need the following DME: none       SUBJECTIVE:   Patient stated “I'm fine, I walked in there earlier” re restroom    OBJECTIVE DATA SUMMARY:     Past Medical History:   Diagnosis Date    Arthritis     patient states \"every joint affected\"    Bee sting 09/05/2018    left elbow bee sting     Blister of ankle 09/05/2018    Blister small per patient of left ankle. Wears an air boot due to 2 stress fractures in last 2 months.    CAD (coronary artery disease)     Constipation     Falls 2017 
Predictive Model Details          25 (Normal)  Factor Value    Calculated 3/28/2025 07:39 43% Age 59 years old    Deterioration Index Model 27% Pulse oximetry 86 %     14% Sodium abnormal (130 mmol/L)     6% Respiratory rate 15     4% BUN abnormal (26 MG/DL)     4% Potassium 4.2 mmol/L     2% Systolic 107     0% Pulse 78     0% Temperature 98.2 °F (36.8 °C)     0% WBC count 5.6 K/uL     0% Hematocrit 37.1 %       End of Shift Note    Bedside shift change report given to Simone Renteria RN (oncoming nurse) by Caro Peguero RN (offgoing nurse).  Report included the following information SBAR, ED Summary, Intake/Output, MAR, Accordion, Recent Results, Med Rec Status, Cardiac Rhythm NSR, Alarm Parameters , and Quality Measures    Shift worked:  Night     Shift summary and any significant changes:    None Unremarkable shift     Concerns for physician to address:  Review labs     Zone phone for oncoming shift:  1152       Activity:  Level of Assistance: Independent  Number times ambulated in hallways past shift: 1  Number of times OOB to chair past shift: 2    Cardiac:   Cardiac Monitoring: Yes      Cardiac Rhythm: Sinus rhythm    Access:  Current line(s): PIV     Genitourinary:   Urinary Status: Voiding, Bathroom privileges    Respiratory:   O2 Device: None (Room air)  Chronic home O2 use?: NO  Incentive spirometer at bedside: NO    GI:  Last BM (including prior to admit): 03/24/25  Current diet:  ADULT DIET; Regular; Low Fat/Low Chol/High Fiber/2 gm Na  DIET ONE TIME MESSAGE;  DIET ONE TIME MESSAGE;  Passing flatus: YES    Pain Management:   Patient states pain is manageable on current regimen: YES    Skin:  Dre Scale Score: 19  Interventions: Wound Offloading (Prevention Methods): Repositioning    Patient Safety:  Fall Risk: Nursing Judgement-Fall Risk High(Add Comments): Yes  Fall Risk Interventions  Nursing Judgement-Fall Risk High(Add Comments): Yes  Toilet Every 2 Hours-In Advance of Need: Yes  Hourly Visual Checks: 
Renal Consult received  Full note to follow soon  Labs and Chart reviewed briefly  Initial impression--- Hyponatremia  Hyperglycemia  Workup ordered  Thanks for letting me know.    
rhythm    Access:  Current line(s): PIV     Genitourinary:   Urinary Status: Voiding, Bathroom privileges    Respiratory:   O2 Device: None (Room air)  Chronic home O2 use?: NO  Incentive spirometer at bedside: YES    GI:  Last BM (including prior to admit): 03/24/25  Current diet:  ADULT DIET; Regular; 3 carb choices (45 gm/meal)  Passing flatus: YES    Pain Management:   Patient states pain is manageable on current regimen: YES    Skin:  Dre Scale Score: 20  Interventions: Wound Offloading (Prevention Methods): Pillows, Repositioning, Turning    Patient Safety:  Fall Risk: Nursing Judgement-Fall Risk High(Add Comments): Yes  Fall Risk Interventions  Nursing Judgement-Fall Risk High(Add Comments): Yes  Toilet Every 2 Hours-In Advance of Need: Yes  Hourly Visual Checks: In bed  Fall Visual Posted: Fall sign posted, Armband  Room Door Open: Deferred to promote rest  Alarm On: Bed, Chair  Patient Moved Closer to Nursing Station: No    Active Consults:   IP CONSULT TO NEPHROLOGY  IP CONSULT TO ENDOCRINOLOGY    Length of Stay:  Expected LOS: 4  Actual LOS: 3    PRAKASH GREEN RN                              
chloride, potassium chloride **OR** potassium chloride, magnesium sulfate, ondansetron **OR** ondansetron, polyethylene glycol, acetaminophen **OR** acetaminophen, glucose, dextrose bolus **OR** dextrose bolus, glucagon (rDNA), dextrose       Relevant other information:   Results       ** No results found for the last 336 hours. **            Recent Labs     03/25/25  0612 03/26/25  0035   WBC 3.0* 3.4*   HGB 13.2 12.9   HCT 37.4 37.3    166     Recent Labs     03/26/25  0346 03/26/25  0658 03/26/25  1546   * 125* 128*   K 4.9 4.7 5.1   CL 92* 94* 93*   CO2 26 26 26   BUN 26* 24* 39*   MG 2.0 1.9 2.1   PHOS 4.2 4.3 4.9*      Lab Results   Component Value Date/Time    TSH 1.57 03/25/2025 10:17 AM         Other medical conditions are listed in the active hospital problem list section; these and other pertinent data were taken into consideration when the treatment plan was developed and customized to this patient's unique overall circumstances and needs.  We have reviewed relevant medical records within the constraints of this admission process.   High complexity decision making was performed for this patient who is at high risk for decompensation with multiple organ involvement.   Today total floor/unit time was 40 minutes while caring for this patient and greater than 50% of that time was spent with patient (and/or family/responsible party) coordinating patient's clinical issues; this includes time spent during multidisciplinary rounds.        JOSE Stewart - JESSICA    3/27/2025      
      Add-on orders for these samples will be processed based on acceptable specimen integrity and analyte stability, which may vary by analyte.   Basic Metabolic Panel    Collection Time: 03/26/25  6:58 AM   Result Value Ref Range    Sodium 125 (L) 136 - 145 mmol/L    Potassium 4.7 3.5 - 5.1 mmol/L    Chloride 94 (L) 97 - 108 mmol/L    CO2 26 21 - 32 mmol/L    Anion Gap 5 2 - 12 mmol/L    Glucose 93 65 - 100 mg/dL    BUN 24 (H) 6 - 20 MG/DL    Creatinine 0.80 0.55 - 1.02 MG/DL    BUN/Creatinine Ratio 30 (H) 12 - 20      Est, Glom Filt Rate 84 >60 ml/min/1.73m2    Calcium 9.0 8.5 - 10.1 MG/DL   Magnesium    Collection Time: 03/26/25  6:58 AM   Result Value Ref Range    Magnesium 1.9 1.6 - 2.4 mg/dL   Phosphorus    Collection Time: 03/26/25  6:58 AM   Result Value Ref Range    Phosphorus 4.3 2.6 - 4.7 MG/DL   POCT Glucose    Collection Time: 03/26/25  7:59 AM   Result Value Ref Range    POC Glucose 58 (L) 65 - 117 mg/dL    Performed by: Martin Turner RN    POCT Glucose    Collection Time: 03/26/25  7:59 AM   Result Value Ref Range    POC Glucose 59 (L) 65 - 117 mg/dL    Performed by: Martin Turner RN    POCT Glucose    Collection Time: 03/26/25  8:25 AM   Result Value Ref Range    POC Glucose 68 65 - 117 mg/dL    Performed by: Martin Turner RN    POCT Glucose    Collection Time: 03/26/25  8:57 AM   Result Value Ref Range    POC Glucose 88 65 - 117 mg/dL    Performed by: Martin Turner RN    POCT Glucose    Collection Time: 03/26/25 11:35 AM   Result Value Ref Range    POC Glucose 279 (H) 65 - 117 mg/dL    Performed by: Martin Turner RN          XR CHEST PORTABLE   Final Result      No acute process on portable chest.         Electronically signed by ANIRUDH COBIAN           Patient Active Problem List   Diagnosis    Heel callus    Trochanteric bursitis of right hip    Chronic pain syndrome    Insomnia    Primary osteoarthritis involving multiple joints    Vitamin D

## 2025-03-28 NOTE — CARE COORDINATION
Patient verbalized understanding and gave permission for possible discharge within 4 hours of receiving IMM   George Vargas RN  Case Management  [phone]

## 2025-03-28 NOTE — CARE COORDINATION
Clear from cm standpoint      Transition of Care Plan:    RUR: RUR: 11% Low RUR    Prior Level of Functioning: Independent in ADLs/IADLs    Disposition: home with follow up  VERO: 3/28  If SNF or IPR: Date FOC offered: na  Date FOC received:   Accepting facility:   Date authorization started with reference number:   Date authorization received and expires:   Follow up appointments: pcp/specialist   DME needed: none  Transportation at discharge: the patient's family  IM/IMM Medicare/ letter given: 2nd IM 3/28  Is patient a  and connected with [facility]? na   If yes, was  transfer form completed and [facility] notified? na  Caregiver Contact: Arminda Palomino,(Parent) [phone] i  Discharge Caregiver contacted prior to discharge? contacted  Care Conference needed? no  Barriers to discharge: none      The  (CM) noted no futher needs or concerns. The patient agrees with the discharge plan. The patient's family will provide  transportation upon discharge. The primary registered nurse (RN) has been informed that the patient is cleared from the case management perspective.    The patient is alert, able to communicate  needs effectively,  independent in ADLS/IADLS, and able to drive. She lives alone in an apartment that has no steps to enter and has a wheelchair, walker, and a cane. The patient's mother Arminda Palomino,(Parent) [phone] is the primary support system who lives 2 miles away and frequently checks on the patient. The patient is anticipated to need a     03/28/25 0933   Services At/After Discharge   Transition of Care Consult (CM Consult) Discharge Planning;Transportation Assistance   Services At/After Discharge None    Resource Information Provided? No   Mode of Transport at Discharge Other (see comment)  (the patient's family)   Confirm Follow Up Transport Self           George Vargas RN  Case Management  [phone]

## 2025-03-28 NOTE — DISCHARGE SUMMARY
Follow up Follow-up Information       Follow up With Specialties Details Why Contact Info    Souleymane Rincon MD Family Medicine Follow up  [address] [address]  [phone]      [facility], In-Home Clinical Assessments Follow up As needed -[facility] is a mobile urgent care provider that comes to your home. You may call them if you would like to set up an appt to be seen for a follow up while waiting to be seen by your PCP. Mobile Urgent Care That Comes To Your Home  [email or web address]  [place]  [phone]    Souleymane Rincon MD Family Medicine Schedule an appointment as soon as possible for a visit  [address] [place] [address]  [phone]      Souleymane Rincon MD Family Medicine   [address] [place] [address]  [phone]               Other Pertinent data:   TODAY'S CLINICAL FINDINGS:     Vitals:    03/28/25 1005   BP: (!) 144/67   Pulse: 93   Resp: 14   Temp: 97.7 °F (36.5 °C)   SpO2: 99%     Wt Readings from Last 10 Encounters:   03/27/25 72 kg (158 lb 12.8 oz)   03/23/25 64.9 kg (143 lb)   03/17/25 69.6 kg (153 lb 6.4 oz)   02/07/25 67.1 kg (148 lb)   01/20/25 66 kg (145 lb 6.4 oz)   12/11/24 67.1 kg (148 lb)   11/27/24 65.5 kg (144 lb 8 oz)   11/15/24 69 kg (152 lb 3.2 oz)   10/21/24 67.9 kg (149 lb 9.6 oz)   09/22/24 65.8 kg (145 lb)      Exam:   Pt is alert and oriented ; in no apparent distress             Medication List        START taking these medications      sodium chloride 1 g tablet  Take 1 tablet by mouth 3 times daily            CONTINUE taking these medications      ascorbic acid 1000 MG tablet  Commonly known as: VITAMIN C     calcium carbonate 1500 (600 Ca) MG Tabs tablet     Cholecalciferol 50 MCG (2000 UT) Tabs     ciclopirox 0.77 % cream  Commonly known as: LOPROX  Apply topically 2 times daily.     fluticasone 50 MCG/ACT nasal spray  Commonly known as: FLONASE  2 sprays by Each Nostril route daily

## 2025-03-28 NOTE — PLAN OF CARE
Problem: Chronic Conditions and Co-morbidities  Goal: Patient's chronic conditions and co-morbidity symptoms are monitored and maintained or improved  3/27/2025 2023 by Caro Peguero RN  Outcome: Progressing  3/27/2025 1112 by Sujatha Bear RN  Outcome: Progressing     Problem: Discharge Planning  Goal: Discharge to home or other facility with appropriate resources  3/27/2025 2023 by Caro Peguero RN  Outcome: Progressing  3/27/2025 1112 by Sujatha Bear RN  Outcome: Progressing     Problem: Safety - Adult  Goal: Free from fall injury  3/27/2025 2023 by Caro Peguero RN  Outcome: Progressing  3/27/2025 1112 by Sujatha Bear RN  Outcome: Progressing     Problem: ABCDS Injury Assessment  Goal: Absence of physical injury  3/27/2025 2023 by Caro Peguero RN  Outcome: Progressing  3/27/2025 1112 by Sujatha Bear RN  Outcome: Progressing     Problem: Respiratory - Adult  Goal: Achieves optimal ventilation and oxygenation  3/27/2025 2023 by Caro Peguero RN  Outcome: Progressing  3/27/2025 1112 by Sujatha Bear RN  Outcome: Progressing     Problem: Pain  Goal: Verbalizes/displays adequate comfort level or baseline comfort level  3/27/2025 2023 by Caro Peguero RN  Outcome: Progressing  3/27/2025 1112 by Sujatha Bear RN  Outcome: Progressing

## 2025-03-31 ENCOUNTER — TELEPHONE (OUTPATIENT)
Age: 60
End: 2025-03-31

## 2025-03-31 NOTE — TELEPHONE ENCOUNTER
Care Transitions Initial Follow Up Call    Outreach made within 2 business days of discharge: Yes    Patient: Janice Eagle Patient : 1965   MRN: [id number]  Reason for Admission: hyponatremia  Discharge Date: 3/28/25       Spoke with: N/A- Message left    Discharge department/facility: [facility]      Scheduled appointment with PCP within 7-14 days    Follow Up  Future Appointments   Date Time Provider Department Center   2025  3:20 PM Dorys Reynolds MD [facility] MAIN [facility]   2025  3:00 PM [facility] CHAIR 1 [facility]   2025 11:50 AM Jordin Lowe MD RDE [facility] PBB BS [facility]       ISAI ALMARAZ, ROBERTO CARLOSN

## 2025-04-01 ENCOUNTER — OFFICE VISIT (OUTPATIENT)
Age: 60
End: 2025-04-01
Payer: MEDICARE

## 2025-04-01 VITALS
DIASTOLIC BLOOD PRESSURE: 83 MMHG | TEMPERATURE: 97 F | SYSTOLIC BLOOD PRESSURE: 153 MMHG | HEART RATE: 71 BPM | RESPIRATION RATE: 18 BRPM | OXYGEN SATURATION: 97 % | BODY MASS INDEX: 26.75 KG/M2 | HEIGHT: 63 IN | WEIGHT: 151 LBS

## 2025-04-01 DIAGNOSIS — E87.1 HYPONATREMIA: Primary | ICD-10-CM

## 2025-04-01 DIAGNOSIS — E87.1 CHRONIC HYPONATREMIA: ICD-10-CM

## 2025-04-01 DIAGNOSIS — E22.2 SIADH (SYNDROME OF INAPPROPRIATE ADH PRODUCTION): ICD-10-CM

## 2025-04-01 DIAGNOSIS — E10.42 TYPE 1 DIABETES MELLITUS WITH DIABETIC POLYNEUROPATHY (HCC): ICD-10-CM

## 2025-04-01 DIAGNOSIS — E03.9 ACQUIRED HYPOTHYROIDISM: ICD-10-CM

## 2025-04-01 DIAGNOSIS — G96.00 CEREBROSPINAL FLUID LEAK: ICD-10-CM

## 2025-04-01 DIAGNOSIS — I10 ESSENTIAL HYPERTENSION: ICD-10-CM

## 2025-04-01 PROCEDURE — 3052F HG A1C>EQUAL 8.0%<EQUAL 9.0%: CPT | Performed by: STUDENT IN AN ORGANIZED HEALTH CARE EDUCATION/TRAINING PROGRAM

## 2025-04-01 PROCEDURE — 3079F DIAST BP 80-89 MM HG: CPT | Performed by: STUDENT IN AN ORGANIZED HEALTH CARE EDUCATION/TRAINING PROGRAM

## 2025-04-01 PROCEDURE — 2022F DILAT RTA XM EVC RTNOPTHY: CPT | Performed by: STUDENT IN AN ORGANIZED HEALTH CARE EDUCATION/TRAINING PROGRAM

## 2025-04-01 PROCEDURE — 36415 COLL VENOUS BLD VENIPUNCTURE: CPT | Performed by: STUDENT IN AN ORGANIZED HEALTH CARE EDUCATION/TRAINING PROGRAM

## 2025-04-01 PROCEDURE — 99214 OFFICE O/P EST MOD 30 MIN: CPT | Performed by: STUDENT IN AN ORGANIZED HEALTH CARE EDUCATION/TRAINING PROGRAM

## 2025-04-01 PROCEDURE — 3017F COLORECTAL CA SCREEN DOC REV: CPT | Performed by: STUDENT IN AN ORGANIZED HEALTH CARE EDUCATION/TRAINING PROGRAM

## 2025-04-01 PROCEDURE — G8427 DOCREV CUR MEDS BY ELIG CLIN: HCPCS | Performed by: STUDENT IN AN ORGANIZED HEALTH CARE EDUCATION/TRAINING PROGRAM

## 2025-04-01 PROCEDURE — 1036F TOBACCO NON-USER: CPT | Performed by: STUDENT IN AN ORGANIZED HEALTH CARE EDUCATION/TRAINING PROGRAM

## 2025-04-01 PROCEDURE — G8419 CALC BMI OUT NRM PARAM NOF/U: HCPCS | Performed by: STUDENT IN AN ORGANIZED HEALTH CARE EDUCATION/TRAINING PROGRAM

## 2025-04-01 PROCEDURE — 1111F DSCHRG MED/CURRENT MED MERGE: CPT | Performed by: STUDENT IN AN ORGANIZED HEALTH CARE EDUCATION/TRAINING PROGRAM

## 2025-04-01 PROCEDURE — 3077F SYST BP >= 140 MM HG: CPT | Performed by: STUDENT IN AN ORGANIZED HEALTH CARE EDUCATION/TRAINING PROGRAM

## 2025-04-01 RX ORDER — AMLODIPINE BESYLATE 2.5 MG/1
2.5 TABLET ORAL DAILY
Qty: 30 TABLET | Refills: 3 | Status: SHIPPED | OUTPATIENT
Start: 2025-04-01

## 2025-04-01 ASSESSMENT — PATIENT HEALTH QUESTIONNAIRE - PHQ9
10. IF YOU CHECKED OFF ANY PROBLEMS, HOW DIFFICULT HAVE THESE PROBLEMS MADE IT FOR YOU TO DO YOUR WORK, TAKE CARE OF THINGS AT HOME, OR GET ALONG WITH OTHER PEOPLE: NOT DIFFICULT AT ALL
1. LITTLE INTEREST OR PLEASURE IN DOING THINGS: NOT AT ALL
SUM OF ALL RESPONSES TO PHQ QUESTIONS 1-9: 0
8. MOVING OR SPEAKING SO SLOWLY THAT OTHER PEOPLE COULD HAVE NOTICED. OR THE OPPOSITE, BEING SO FIGETY OR RESTLESS THAT YOU HAVE BEEN MOVING AROUND A LOT MORE THAN USUAL: NOT AT ALL
7. TROUBLE CONCENTRATING ON THINGS, SUCH AS READING THE NEWSPAPER OR WATCHING TELEVISION: NOT AT ALL
2. FEELING DOWN, DEPRESSED OR HOPELESS: NOT AT ALL
SUM OF ALL RESPONSES TO PHQ QUESTIONS 1-9: 0
9. THOUGHTS THAT YOU WOULD BE BETTER OFF DEAD, OR OF HURTING YOURSELF: NOT AT ALL
SUM OF ALL RESPONSES TO PHQ QUESTIONS 1-9: 0
3. TROUBLE FALLING OR STAYING ASLEEP: NOT AT ALL
SUM OF ALL RESPONSES TO PHQ QUESTIONS 1-9: 0
6. FEELING BAD ABOUT YOURSELF - OR THAT YOU ARE A FAILURE OR HAVE LET YOURSELF OR YOUR FAMILY DOWN: NOT AT ALL
4. FEELING TIRED OR HAVING LITTLE ENERGY: NOT AT ALL
5. POOR APPETITE OR OVEREATING: NOT AT ALL

## 2025-04-01 NOTE — PROGRESS NOTES
Labs drawn in left arm per Dorys Reynolds's orders.  Patient tolerated well.  
\"Have you been to the ER, urgent care clinic since your last visit?  Hospitalized since your last visit?\"    YES - When: approximately 5 days ago.  Where and Why: low sodium.    “Have you seen or consulted any other health care providers outside our system since your last visit?”    YES - When: approximately 4  weeks ago.  Where and Why: Low sodium.      “Have you had a diabetic eye exam?”    NO     Date of last diabetic eye exam: 10/5/2023          
cerebral artery occlusion with cerebral infarction     x 4 (last in 2004)         Current Outpatient Medications:     amLODIPine (NORVASC) 2.5 MG tablet, Take 1 tablet by mouth daily, Disp: 30 tablet, Rfl: 3    sodium chloride 1 g tablet, Take 1 tablet by mouth 3 times daily, Disp: 90 tablet, Rfl: 3    pantoprazole (PROTONIX) 40 MG tablet, Take 1 tablet by mouth daily, Disp: , Rfl:     blood glucose test strips (ONETOUCH ULTRA) strip, Check blood sugar 8 times per day due to frequent hypoglycemic events. Dx: E10.42, Disp: 800 each, Rfl: 3    Insulin Degludec (TRESIBA FLEXTOUCH) 100 UNIT/ML SOPN, DISPENSE GENERIC    Inject under the skin 38 units in AM, Disp: 45 mL, Rfl: 1    insulin lispro, 1 Unit Dial, (HUMALOG/ADMELOG) 100 UNIT/ML SOPN, Max daily dose 20 units. Sliding scale., Disp: 30 mL, Rfl: 1    simvastatin (ZOCOR) 40 MG tablet, Take 1 tablet by mouth nightly, Disp: 90 tablet, Rfl: 3    fluticasone (FLONASE) 50 MCG/ACT nasal spray, 2 sprays by Each Nostril route daily, Disp: 48 g, Rfl: 1    ciclopirox (LOPROX) 0.77 % cream, Apply topically 2 times daily., Disp: 30 g, Rfl: 11    Blood Glucose Monitoring Suppl (ONE TOUCH ULTRA 2) w/Device KIT, 1 kit by Does not apply route daily Test blood sugars 6 times per day. E10.641, Disp: 1 kit, Rfl: 0    levothyroxine (SYNTHROID) 75 MCG tablet, TAKE 1 TABLET BY MOUTH ONCE DAILY BEFORE  BREAKFAST, Disp: 90 tablet, Rfl: 1    losartan (COZAAR) 50 MG tablet, Take 1 tablet by mouth in the morning and at bedtime, Disp: 180 tablet, Rfl: 3    glucosamine-chondroitin 500-400 MG tablet, Take 1 tablet by mouth daily, Disp: , Rfl:     vitamin E 400 UNIT capsule, Take 1 capsule by mouth daily Monday and Thursday only, Disp: , Rfl:     MAGNESIUM PO, Take by mouth every 48 hours, Disp: , Rfl:     VITAMIN A PO, Take 2,400 mcg by mouth, Disp: , Rfl:     ascorbic acid (VITAMIN C) 1000 MG tablet, Take 8 tablets by mouth 2 times daily, Disp: , Rfl:     calcium carbonate 1500 (600 Ca) MG TABS

## 2025-04-01 NOTE — PATIENT INSTRUCTIONS
Start checking Blood pressures. These should be between 140-110 on the top number     Rajendra Sharma MD   [address] [address]   Phone: tel:[phone]

## 2025-04-02 ENCOUNTER — TELEPHONE (OUTPATIENT)
Age: 60
End: 2025-04-02

## 2025-04-02 PROBLEM — E22.2 SIADH (SYNDROME OF INAPPROPRIATE ADH PRODUCTION): Status: ACTIVE | Noted: 2025-04-02

## 2025-04-02 PROBLEM — G96.00 CEREBROSPINAL FLUID LEAK: Status: ACTIVE | Noted: 2021-07-13

## 2025-04-02 LAB
ALBUMIN SERPL-MCNC: 4 G/DL (ref 3.5–5)
ALBUMIN/GLOB SERPL: 1.4 (ref 1.1–2.2)
ALP SERPL-CCNC: 73 U/L (ref 45–117)
ALT SERPL-CCNC: 29 U/L (ref 12–78)
ANION GAP SERPL CALC-SCNC: 7 MMOL/L (ref 2–12)
AST SERPL-CCNC: 34 U/L (ref 15–37)
BILIRUB SERPL-MCNC: 0.5 MG/DL (ref 0.2–1)
BUN SERPL-MCNC: 12 MG/DL (ref 6–20)
BUN/CREAT SERPL: 16 (ref 12–20)
CALCIUM SERPL-MCNC: 9.8 MG/DL (ref 8.5–10.1)
CHLORIDE SERPL-SCNC: 96 MMOL/L (ref 97–108)
CO2 SERPL-SCNC: 26 MMOL/L (ref 21–32)
CREAT SERPL-MCNC: 0.77 MG/DL (ref 0.55–1.02)
GLOBULIN SER CALC-MCNC: 2.8 G/DL (ref 2–4)
GLUCOSE SERPL-MCNC: 164 MG/DL (ref 65–100)
MAGNESIUM SERPL-MCNC: 1.8 MG/DL (ref 1.6–2.4)
POTASSIUM SERPL-SCNC: 4.8 MMOL/L (ref 3.5–5.1)
PROT SERPL-MCNC: 6.8 G/DL (ref 6.4–8.2)
SODIUM SERPL-SCNC: 129 MMOL/L (ref 136–145)

## 2025-04-02 NOTE — TELEPHONE ENCOUNTER
PT needs results of labs from yesterday before she drives to [place] in the morning. It's important.

## 2025-04-02 NOTE — TELEPHONE ENCOUNTER
Thank you, those urine tests are ordered.  Can we please confirm that she is able to stop by and drop a urine sample.

## 2025-04-02 NOTE — TELEPHONE ENCOUNTER
You had spoke w/ Nephrology this morning and there is new information pertaining to that call. Dr Barahona said that this PT can go on amlodipine but wants you to order a spot urine sodium and a spot urine osmolality. Call back with any concerns/questions.

## 2025-04-03 ENCOUNTER — LAB (OUTPATIENT)
Age: 60
End: 2025-04-03

## 2025-04-03 ENCOUNTER — RESULTS FOLLOW-UP (OUTPATIENT)
Age: 60
End: 2025-04-03

## 2025-04-03 DIAGNOSIS — E87.1 HYPONATREMIA: ICD-10-CM

## 2025-04-04 ENCOUNTER — TELEPHONE (OUTPATIENT)
Age: 60
End: 2025-04-04

## 2025-04-04 DIAGNOSIS — G47.00 INSOMNIA, UNSPECIFIED TYPE: Primary | ICD-10-CM

## 2025-04-04 DIAGNOSIS — F51.01 PRIMARY INSOMNIA: ICD-10-CM

## 2025-04-04 LAB
OSMOLALITY UR: 336 MOSM/KG H2O
SODIUM UR-SCNC: 52 MMOL/L

## 2025-04-04 RX ORDER — ZOLPIDEM TARTRATE 10 MG/1
10 TABLET ORAL NIGHTLY
Qty: 30 TABLET | Refills: 0 | OUTPATIENT
Start: 2025-04-04

## 2025-04-04 RX ORDER — ZOLPIDEM TARTRATE 10 MG/1
10 TABLET ORAL NIGHTLY PRN
Qty: 30 TABLET | Refills: 0 | Status: SHIPPED | OUTPATIENT
Start: 2025-04-04 | End: 2025-05-04

## 2025-04-04 NOTE — PROGRESS NOTES
[facility]  [address] [place] [address]  Office [phone]  Fax [phone]       PHYSICAL THERAPY - MEDICARE EVALUATION/PLAN OF CARE NOTE (updated 3/23)      Date: 2024          Patient Name:  Janice Eagle :  1965   Medical   Diagnosis:  Pain in left hip [M25.552]  Pain in right hip [M25.551] Treatment Diagnosis:  M25.551  RIGHT HIP PAIN and M25.552  LEFT HIP PAIN    Referral Source:  Sánchez Dempsey MD Insurance:  Payor: MEDICARE / Plan: MEDICARE PART A AND B / Product Type: *No Product type* /             Patient  verified yes     Visit #   Current  / Total 1 16     SUBJECTIVE  Pain Level (0-10 scale): 6-8/10  []constant []intermittent []improving []worsening []no change since onset    Any medication changes, allergies to medications, adverse drug reactions, diagnosis change, or new procedure performed?: [x] No    [] Yes (see summary sheet for update)  Medications: Verified on Patient Summary List    Subjective functional status/changes:     Patient has hip pain for years, usually L is worse, sometimes the same  Patient has a h/o falls and decreased balance, last fall 2 days ago  Patient seeing a neurologist in December, head feels unsteady at time and feels that she may pass out  Cortisone shots in hips really helped 2 weeks ago  Start of Care: 2024  PLOF: ambulates without a device, drives, lives alone  Comorbidities:diabetes for 53 years type 1, arthritis, thyroid problem, osteoporosis, RA, wear glasses, deaf, may not be a good candidate for cochlear implants, 6 TIAs,R hip tendon surgery years ago  Pt Goals: to be able to walk better and have less pain     OBJECTIVE    Posture:  stands with hips flexed  Gait and Functional Mobility:  ambulates with wide NISH, trendelenberg,antalgic, independent without a device  Palpation: moderate tenderness bilateral hip abductors, greater trochanteric region  ROM: bilateral hip  Pt has arrived to room 316 at 2010.   Lily Up RN on 4/3/2025 at 8:10 PM     ER, IR ROM limited and painful     MMT: bilateral hip MMT grossly 4/5  Neurological: Reflexes / Sensations: intact sensation  Objective/Functional Outcome Measure: 30 second sit to stand 2x       20 min [x]Eval - untimed                          Therapeutic Procedures:  Tx Min Billable or 1:1 Min (if diff from Tx Min) Procedure, Rationale, Specifics   10 10 [id number] Therapeutic Exercise (timed):  increase ROM, strength, coordination, balance, and proprioception to improve patient's ability to progress to PLOF and address remaining functional goals. (see flow sheet as applicable)    Details if applicable:    Patient educated in and performed glut sets and beginner bridge 10x                       10 10    Total Total     [x]  Patient Education billed concurrently with other procedures   [x] Review HEP    [] Progressed/Changed HEP, detail:    [] Other detail:         Modalities Rationale:     decrease inflammation and decrease pain to improve patient's ability to progress to PLOF and address remaining functional goals.    15   min [x] Estim Unattended,             type/location:premod R and L hip       []  w/ice    []  w/heat Patient supine with LE elevated           min [] Estim Attended,             type/location:       []  w/ice   []  w/heat         []  w/US   []  TENS insruct                min []  Mechanical Traction,        type/lbs:        []  pro      []  sup           []  int       []  cont            []  before manual           []  after manual     min []  Ultrasound,         settings/location:      min  unbilled []  Ice     []  Heat            location/position:               min []  Vasopneumatic Device,      press/temp:   pre-treatment girth :    post-treatment girth :    measured at (landmark       location) :   If using vaso (only need to measure limb vaso being performed on)        min []  Other:               Time   In / Out 10 1045   Total Treatment Time 45   Total Timed Codes 45   1:1 Treatment Time 45

## 2025-04-04 NOTE — TELEPHONE ENCOUNTER
I do see this has been a long-term medication for her.  We did not discuss at her last visit.  I will send a one-time refill however will need appointment to discuss medication and discuss  further refills

## 2025-04-04 NOTE — TELEPHONE ENCOUNTER
Medication Refill Request    Janice Eagle is requesting a refill of the following medication(s):     Zolpidem 10 MG Tab    (Dr. Rincon gave to pt on 01/29/25)    Please send refill to:     [facility] [address] [place] [address] - [address] [facility] -  [phone] - F [phone]  [address] [facility] [address]  Phone: [phone] Fax: [phone]

## 2025-04-09 ENCOUNTER — TELEPHONE (OUTPATIENT)
Age: 60
End: 2025-04-09

## 2025-04-09 DIAGNOSIS — E87.1 HYPONATREMIA: Primary | ICD-10-CM

## 2025-04-09 NOTE — TELEPHONE ENCOUNTER
PT's mother Arminda, (on disclosure) called and stated that Janice isn't feeling right and would like her sodium levels checked again.

## 2025-04-10 ENCOUNTER — HOSPITAL ENCOUNTER (EMERGENCY)
Facility: HOSPITAL | Age: 60
Discharge: HOME | End: 2025-04-10
Attending: EMERGENCY MEDICINE
Payer: MEDICARE

## 2025-04-10 ENCOUNTER — HOSPITAL ENCOUNTER (OUTPATIENT)
Facility: HOSPITAL | Age: 60
Setting detail: INFUSION SERIES
Discharge: HOME | End: 2025-04-10
Payer: MEDICARE

## 2025-04-10 ENCOUNTER — RESULTS FOLLOW-UP (OUTPATIENT)
Age: 60
End: 2025-04-10

## 2025-04-10 VITALS
HEART RATE: 80 BPM | WEIGHT: 150 LBS | BODY MASS INDEX: 26.57 KG/M2 | TEMPERATURE: 97.8 F | OXYGEN SATURATION: 100 % | SYSTOLIC BLOOD PRESSURE: 191 MMHG | RESPIRATION RATE: 18 BRPM | DIASTOLIC BLOOD PRESSURE: 79 MMHG

## 2025-04-10 DIAGNOSIS — I10 ESSENTIAL HYPERTENSION: Primary | ICD-10-CM

## 2025-04-10 DIAGNOSIS — E87.1 HYPONATREMIA: ICD-10-CM

## 2025-04-10 DIAGNOSIS — G89.29 CHRONIC INTRACTABLE HEADACHE, UNSPECIFIED HEADACHE TYPE: ICD-10-CM

## 2025-04-10 DIAGNOSIS — R51.9 CHRONIC INTRACTABLE HEADACHE, UNSPECIFIED HEADACHE TYPE: ICD-10-CM

## 2025-04-10 LAB
ANION GAP SERPL CALC-SCNC: 7 MMOL/L (ref 2–12)
BUN SERPL-MCNC: 11 MG/DL (ref 6–20)
BUN/CREAT SERPL: 15 (ref 12–20)
CALCIUM SERPL-MCNC: 9.4 MG/DL (ref 8.5–10.1)
CHLORIDE SERPL-SCNC: 100 MMOL/L (ref 97–108)
CO2 SERPL-SCNC: 32 MMOL/L (ref 21–32)
CREAT SERPL-MCNC: 0.71 MG/DL (ref 0.55–1.02)
GLUCOSE SERPL-MCNC: 276 MG/DL (ref 65–100)
POTASSIUM SERPL-SCNC: 4 MMOL/L (ref 3.5–5.1)
SODIUM SERPL-SCNC: 139 MMOL/L (ref 136–145)

## 2025-04-10 PROCEDURE — 36415 COLL VENOUS BLD VENIPUNCTURE: CPT

## 2025-04-10 PROCEDURE — 99283 EMERGENCY DEPT VISIT LOW MDM: CPT

## 2025-04-10 PROCEDURE — 80048 BASIC METABOLIC PNL TOTAL CA: CPT

## 2025-04-10 RX ORDER — AMLODIPINE BESYLATE 2.5 MG/1
5 TABLET ORAL DAILY
Qty: 60 TABLET | Refills: 3 | Status: SHIPPED | OUTPATIENT
Start: 2025-04-10

## 2025-04-10 ASSESSMENT — PAIN - FUNCTIONAL ASSESSMENT: PAIN_FUNCTIONAL_ASSESSMENT: 0-10

## 2025-04-10 ASSESSMENT — PAIN SCALES - GENERAL: PAINLEVEL_OUTOF10: 7

## 2025-04-10 NOTE — ED TRIAGE NOTES
Pt arrived with c/o head pressure and being sent by her doctor for high BP and concern for low sodium. Pt states she feels like her eyes and ears are going to \"blow off\"

## 2025-04-10 NOTE — ED PROVIDER NOTES
[facility] EMERGENCY DEPARTMENT  EMERGENCY DEPARTMENT ENCOUNTER       Pt Name: Janice Eagle  MRN: [id number]  Birthdate 1965  Date of evaluation: 4/10/2025  Provider: Roger Garcia DO   PCP: Dorys Reynolds MD  Note Started: 1:02 PM EDT 4/10/25     CHIEF COMPLAINT       Chief Complaint   Patient presents with    Headache        HISTORY OF PRESENT ILLNESS: 1 or more elements      History From: Patient, History limited by: none     Janice Eagle is a 60 y.o. female past medical history significant for hyponatremia, TIA, history of CSF leak, diabetes presenting the emergency department complaining of headache, elevated blood pressure.  Patient reports that she has had a headache over the last year, but is worse today, feels unsteady on her feet, feels like she may pass out.  Nothing makes her symptoms better other than laying down.  She is worried that she has another CSF leak.  She has a MRI scheduled in May, but needs a specialized MRI at [facility].       Please See MDM for Additional Details of the HPI/PMH  Nursing Notes were all reviewed and agreed with or any disagreements were addressed in the HPI.     REVIEW OF SYSTEMS        Positives and Pertinent negatives as per HPI.    PAST HISTORY     Past Medical History:  Past Medical History:   Diagnosis Date    Arthritis     patient states \"every joint affected\"    Bee sting 09/05/2018    left elbow bee sting     Blister of ankle 09/05/2018    Blister small per patient of left ankle. Wears an air boot due to 2 stress fractures in last 2 months.    CAD (coronary artery disease)     Constipation     Falls 2017    pt reports having 4 falls in 3 days    Fatigue     Headache     Ill-defined condition     multiple broken ribs    Ill-defined condition     reports \"getting infections easily\"    Joint pain     Memory disorder     following spinal fluid leak repair    Menopause     Nausea & vomiting     Neuropathy     Osteoporosis     Thyroid disease     Type 1

## 2025-04-10 NOTE — TELEPHONE ENCOUNTER
I can place order for a BMP however may take us day to get this back.  If she is having similar symptoms to when she was last in the hospital (confusion, weakness, trouble walking) needs to be seen in the ER   THanks

## 2025-04-10 NOTE — TELEPHONE ENCOUNTER
Pt aware 2 identifiers verified name and    She could hardly hear me but she says she is headed over to infusion center to have this done now

## 2025-04-10 NOTE — PROGRESS NOTES
1148: Pt arrived ambulatory as a walk-in for labs which were drawn peripherally by Jeanne Sevilla CPT, and taken to the lab. 2x2 and band-aid to site. There were no VS taken for this visit. Armband was removed and shredded. Left ambulatory.

## 2025-04-11 ENCOUNTER — TELEPHONE (OUTPATIENT)
Age: 60
End: 2025-04-11

## 2025-04-11 NOTE — TELEPHONE ENCOUNTER
Please let patient know that her sodium level is now 139.  Can you please send this to her nephrologist, Dr. Barahona as well     Blood pressure looks quite high today.  Has she been taking her amlodipine, I agree with increasing it to 5 mg.  We may need to go up on this further.  If she able to check her blood pressure at home?

## 2025-04-11 NOTE — TELEPHONE ENCOUNTER
Spoke with patients mother WILLIMAS Palomino    She is worried about patients memory.  Stating she is afraid the patient is not taking her Insulin correctly.  She  had episodes of low blood sugar on Tuesday (53) and Wednesday (40).  Just so you know she is working with Dr. Lowe on this and Dexacom is not a solution for her.  Her concern is her memory because she said her daughter forgets to take her medication and sometimes forgets she has taken it.     She does have an appointment with Dr. Barahona on 4/15/2025.    She will give her a BP monitor to monitor her BP.

## 2025-04-11 NOTE — TELEPHONE ENCOUNTER
Spoke w/ PT yesterday and told her what you mentioned about going to the ED if she was still feeling like she did prior. PT refused.

## 2025-04-18 ENCOUNTER — TELEPHONE (OUTPATIENT)
Age: 60
End: 2025-04-18

## 2025-04-18 ENCOUNTER — HOSPITAL ENCOUNTER (OUTPATIENT)
Age: 60
Discharge: HOME | End: 2025-04-21

## 2025-04-18 DIAGNOSIS — M25.551 RIGHT HIP PAIN: ICD-10-CM

## 2025-04-18 DIAGNOSIS — M25.551 ACUTE PAIN OF BOTH HIPS: ICD-10-CM

## 2025-04-18 DIAGNOSIS — T14.8XXA TENDON TEAR: ICD-10-CM

## 2025-04-18 DIAGNOSIS — M16.11 PRIMARY OSTEOARTHRITIS OF RIGHT HIP: ICD-10-CM

## 2025-04-18 DIAGNOSIS — M25.552 ACUTE PAIN OF BOTH HIPS: ICD-10-CM

## 2025-04-18 DIAGNOSIS — M25.552 LEFT HIP PAIN: ICD-10-CM

## 2025-04-18 DIAGNOSIS — M16.12 PRIMARY OSTEOARTHRITIS OF LEFT HIP: ICD-10-CM

## 2025-04-18 NOTE — TELEPHONE ENCOUNTER
If she is going to [place] she can be seen in the emergency department there are if she is having worsened headache facial pain or injury.  She absolutely needs to let her endocrinologist know about her low in fall

## 2025-04-18 NOTE — TELEPHONE ENCOUNTER
PT called and said that last night she tested her BS and it was 53. She went to the kitchen, turned around and fell and hit her face and head. Had no ride to the hospital. Today she is going to [place] for some tests and will probably go and get checked out there. She just wanted this noted.

## 2025-04-20 ENCOUNTER — APPOINTMENT (OUTPATIENT)
Facility: HOSPITAL | Age: 60
End: 2025-04-20
Payer: MEDICARE

## 2025-04-20 ENCOUNTER — HOSPITAL ENCOUNTER (EMERGENCY)
Facility: HOSPITAL | Age: 60
Discharge: HOME | End: 2025-04-20
Attending: EMERGENCY MEDICINE
Payer: MEDICARE

## 2025-04-20 VITALS
HEART RATE: 85 BPM | RESPIRATION RATE: 16 BRPM | BODY MASS INDEX: 26.93 KG/M2 | SYSTOLIC BLOOD PRESSURE: 179 MMHG | HEIGHT: 63 IN | OXYGEN SATURATION: 95 % | WEIGHT: 152 LBS | DIASTOLIC BLOOD PRESSURE: 71 MMHG | TEMPERATURE: 98.4 F

## 2025-04-20 DIAGNOSIS — S00.83XA CONTUSION OF FACE, INITIAL ENCOUNTER: ICD-10-CM
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
## 2025-04-20 LAB
ALBUMIN SERPL-MCNC: 3.7 G/DL (ref 3.5–5)
ALBUMIN/GLOB SERPL: 1.1 (ref 1.1–2.2)
ALP SERPL-CCNC: 70 U/L (ref 45–117)
ALT SERPL-CCNC: 15 U/L (ref 12–78)
ANION GAP SERPL CALC-SCNC: 8 MMOL/L (ref 2–12)
AST SERPL-CCNC: 20 U/L (ref 15–37)
BASOPHILS # BLD: 0.01 K/UL (ref 0–0.1)
BASOPHILS NFR BLD: 0.2 % (ref 0–1)
BILIRUB SERPL-MCNC: 0.8 MG/DL (ref 0.2–1)
BUN SERPL-MCNC: 9 MG/DL (ref 6–20)
BUN/CREAT SERPL: 15 (ref 12–20)
CALCIUM SERPL-MCNC: 9.4 MG/DL (ref 8.5–10.1)
CHLORIDE SERPL-SCNC: 101 MMOL/L (ref 97–108)
CO2 SERPL-SCNC: 31 MMOL/L (ref 21–32)
CREAT SERPL-MCNC: 0.62 MG/DL (ref 0.55–1.02)
DIFFERENTIAL METHOD BLD: ABNORMAL
EOSINOPHIL # BLD: 0.01 K/UL (ref 0–0.4)
EOSINOPHIL NFR BLD: 0.2 % (ref 0–0.7)
ERYTHROCYTE [DISTWIDTH] IN BLOOD BY AUTOMATED COUNT: 13 % (ref 11.5–14.5)
GLOBULIN SER CALC-MCNC: 3.3 G/DL (ref 2–4)
GLUCOSE SERPL-MCNC: 214 MG/DL (ref 65–100)
HCT VFR BLD AUTO: 35.1 % (ref 35–47)
HGB BLD-MCNC: 12 G/DL (ref 11.5–16)
IMM GRANULOCYTES # BLD AUTO: 0.01 K/UL (ref 0–0.04)
IMM GRANULOCYTES NFR BLD AUTO: 0.2 % (ref 0–0.5)
LYMPHOCYTES # BLD: 1.19 K/UL (ref 0.8–3.5)
LYMPHOCYTES NFR BLD: 22.3 % (ref 12–49)
MAGNESIUM SERPL-MCNC: 1.7 MG/DL (ref 1.6–2.4)
MCH RBC QN AUTO: 31 PG (ref 26–34)
MCHC RBC AUTO-ENTMCNC: 34.2 G/DL (ref 30–36.5)
MCV RBC AUTO: 90.7 FL (ref 80–99)
MONOCYTES # BLD: 0.47 K/UL (ref 0–1)
MONOCYTES NFR BLD: 8.8 % (ref 5–13)
NEUTS SEG # BLD: 3.64 K/UL (ref 1.8–8)
NEUTS SEG NFR BLD: 68.3 % (ref 32–75)
NRBC # BLD: 0 K/UL (ref 0–0.01)
NRBC BLD-RTO: 0 PER 100 WBC
PLATELET # BLD AUTO: 191 K/UL (ref 150–400)
PMV BLD AUTO: 8.3 FL (ref 8.9–12.9)
POTASSIUM SERPL-SCNC: 4.1 MMOL/L (ref 3.5–5.1)
PROT SERPL-MCNC: 7 G/DL (ref 6.4–8.2)
RBC # BLD AUTO: 3.87 M/UL (ref 3.8–5.2)
SODIUM SERPL-SCNC: 140 MMOL/L (ref 136–145)
WBC # BLD AUTO: 5.3 K/UL (ref 3.6–11)

## 2025-04-20 PROCEDURE — 72125 CT NECK SPINE W/O DYE: CPT

## 2025-04-20 PROCEDURE — 83735 ASSAY OF MAGNESIUM: CPT

## 2025-04-20 PROCEDURE — 70450 CT HEAD/BRAIN W/O DYE: CPT

## 2025-04-20 PROCEDURE — 36415 COLL VENOUS BLD VENIPUNCTURE: CPT

## 2025-04-20 PROCEDURE — 93005 ELECTROCARDIOGRAM TRACING: CPT | Performed by: EMERGENCY MEDICINE

## 2025-04-20 PROCEDURE — 85025 COMPLETE CBC W/AUTO DIFF WBC: CPT

## 2025-04-20 PROCEDURE — 80053 COMPREHEN METABOLIC PANEL: CPT

## 2025-04-20 PROCEDURE — 99284 EMERGENCY DEPT VISIT MOD MDM: CPT

## 2025-04-20 ASSESSMENT — PAIN - FUNCTIONAL ASSESSMENT: PAIN_FUNCTIONAL_ASSESSMENT: NONE - DENIES PAIN

## 2025-04-20 NOTE — ED TRIAGE NOTES
Unsteady gait and weakness resulting in x2 falls with hx of low sodium and hypoglycemia. C/o right face pain and left hip pain after the falls

## 2025-04-20 NOTE — ED PROVIDER NOTES
[facility] EMERGENCY DEPARTMENT  EMERGENCY DEPARTMENT ENCOUNTER       Pt Name: Janice Eagle  MRN: [id number]  Birthdate 1965  Date of evaluation: 4/20/2025  Provider: Prabha Mendes MD   PCP: Dorys Reynolds MD  Note Started: 1:09 PM EDT 4/20/25     CHIEF COMPLAINT       Chief Complaint   Patient presents with    Fall        HISTORY OF PRESENT ILLNESS: 1 or more elements      History From: Patient, History limited by: none     Janice Eagle is a 60 y.o. female presents to ED complaining of possible low sodium.  Patient reports she had 2 falls over the last week, concerned that her sodium is low.  Reports she now has right facial pain and bilateral hip and low back pain after the fall.  Reports most recent fall was 2 days ago in her kitchen, noted her blood sugar was 50 prior to fall.       Please See MDM for Additional Details of the HPI/PMH  Nursing Notes were all reviewed and agreed with or any disagreements were addressed in the HPI.     REVIEW OF SYSTEMS        Positives and Pertinent negatives as per HPI.    PAST HISTORY     Past Medical History:  Past Medical History:   Diagnosis Date    Arthritis     patient states \"every joint affected\"    Bee sting 09/05/2018    left elbow bee sting     Blister of ankle 09/05/2018    Blister small per patient of left ankle. Wears an air boot due to 2 stress fractures in last 2 months.    CAD (coronary artery disease)     Constipation     Falls 2017    pt reports having 4 falls in 3 days    Fatigue     Headache     Ill-defined condition     multiple broken ribs    Ill-defined condition     reports \"getting infections easily\"    Joint pain     Memory disorder     following spinal fluid leak repair    Menopause     Nausea & vomiting     Neuropathy     Osteoporosis     Thyroid disease     Type 1 diabetes (HCC)     diagnosed at age 6    Unspecified cerebral artery occlusion with cerebral infarction     x 4 (last in 2004)       Past Surgical

## 2025-04-21 LAB
EKG ATRIAL RATE: 79 BPM
EKG DIAGNOSIS: NORMAL
EKG P AXIS: 48 DEGREES
EKG P-R INTERVAL: 140 MS
EKG Q-T INTERVAL: 400 MS
EKG QRS DURATION: 90 MS
EKG QTC CALCULATION (BAZETT): 458 MS
EKG R AXIS: -25 DEGREES
EKG T AXIS: 26 DEGREES
EKG VENTRICULAR RATE: 79 BPM

## 2025-04-22 ENCOUNTER — OFFICE VISIT (OUTPATIENT)
Age: 60
End: 2025-04-22
Payer: MEDICARE

## 2025-04-22 VITALS
WEIGHT: 151 LBS | TEMPERATURE: 97 F | OXYGEN SATURATION: 98 % | DIASTOLIC BLOOD PRESSURE: 74 MMHG | BODY MASS INDEX: 26.75 KG/M2 | RESPIRATION RATE: 18 BRPM | SYSTOLIC BLOOD PRESSURE: 146 MMHG | HEART RATE: 63 BPM | HEIGHT: 63 IN

## 2025-04-22 DIAGNOSIS — Z00.00 MEDICARE ANNUAL WELLNESS VISIT, SUBSEQUENT: Primary | ICD-10-CM

## 2025-04-22 DIAGNOSIS — E22.2 SIADH (SYNDROME OF INAPPROPRIATE ADH PRODUCTION): ICD-10-CM
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
## 2025-04-22 DIAGNOSIS — E87.1 HYPONATREMIA: ICD-10-CM

## 2025-04-22 DIAGNOSIS — E10.42 TYPE 1 DIABETES MELLITUS WITH DIABETIC POLYNEUROPATHY (HCC): ICD-10-CM

## 2025-04-22 DIAGNOSIS — I10 ESSENTIAL HYPERTENSION: ICD-10-CM

## 2025-04-22 DIAGNOSIS — G96.00 CEREBROSPINAL FLUID LEAK: ICD-10-CM

## 2025-04-22 PROCEDURE — 1036F TOBACCO NON-USER: CPT | Performed by: STUDENT IN AN ORGANIZED HEALTH CARE EDUCATION/TRAINING PROGRAM

## 2025-04-22 PROCEDURE — G8427 DOCREV CUR MEDS BY ELIG CLIN: HCPCS | Performed by: STUDENT IN AN ORGANIZED HEALTH CARE EDUCATION/TRAINING PROGRAM

## 2025-04-22 PROCEDURE — 2022F DILAT RTA XM EVC RTNOPTHY: CPT | Performed by: STUDENT IN AN ORGANIZED HEALTH CARE EDUCATION/TRAINING PROGRAM

## 2025-04-22 PROCEDURE — 3078F DIAST BP <80 MM HG: CPT | Performed by: STUDENT IN AN ORGANIZED HEALTH CARE EDUCATION/TRAINING PROGRAM

## 2025-04-22 PROCEDURE — 3017F COLORECTAL CA SCREEN DOC REV: CPT | Performed by: STUDENT IN AN ORGANIZED HEALTH CARE EDUCATION/TRAINING PROGRAM

## 2025-04-22 PROCEDURE — 1111F DSCHRG MED/CURRENT MED MERGE: CPT | Performed by: STUDENT IN AN ORGANIZED HEALTH CARE EDUCATION/TRAINING PROGRAM

## 2025-04-22 PROCEDURE — G0439 PPPS, SUBSEQ VISIT: HCPCS | Performed by: STUDENT IN AN ORGANIZED HEALTH CARE EDUCATION/TRAINING PROGRAM

## 2025-04-22 PROCEDURE — 3052F HG A1C>EQUAL 8.0%<EQUAL 9.0%: CPT | Performed by: STUDENT IN AN ORGANIZED HEALTH CARE EDUCATION/TRAINING PROGRAM

## 2025-04-22 PROCEDURE — 3077F SYST BP >= 140 MM HG: CPT | Performed by: STUDENT IN AN ORGANIZED HEALTH CARE EDUCATION/TRAINING PROGRAM

## 2025-04-22 PROCEDURE — 99213 OFFICE O/P EST LOW 20 MIN: CPT | Performed by: STUDENT IN AN ORGANIZED HEALTH CARE EDUCATION/TRAINING PROGRAM

## 2025-04-22 PROCEDURE — G8419 CALC BMI OUT NRM PARAM NOF/U: HCPCS | Performed by: STUDENT IN AN ORGANIZED HEALTH CARE EDUCATION/TRAINING PROGRAM

## 2025-04-22 RX ORDER — AMLODIPINE BESYLATE 5 MG/1
5 TABLET ORAL DAILY
Qty: 90 TABLET | Refills: 0 | Status: SHIPPED | OUTPATIENT
Start: 2025-04-22

## 2025-04-22 ASSESSMENT — PATIENT HEALTH QUESTIONNAIRE - PHQ9
2. FEELING DOWN, DEPRESSED OR HOPELESS: NOT AT ALL
8. MOVING OR SPEAKING SO SLOWLY THAT OTHER PEOPLE COULD HAVE NOTICED. OR THE OPPOSITE, BEING SO FIGETY OR RESTLESS THAT YOU HAVE BEEN MOVING AROUND A LOT MORE THAN USUAL: NOT AT ALL
SUM OF ALL RESPONSES TO PHQ QUESTIONS 1-9: 0
3. TROUBLE FALLING OR STAYING ASLEEP: NOT AT ALL
SUM OF ALL RESPONSES TO PHQ QUESTIONS 1-9: 0
SUM OF ALL RESPONSES TO PHQ QUESTIONS 1-9: 0
6. FEELING BAD ABOUT YOURSELF - OR THAT YOU ARE A FAILURE OR HAVE LET YOURSELF OR YOUR FAMILY DOWN: NOT AT ALL
7. TROUBLE CONCENTRATING ON THINGS, SUCH AS READING THE NEWSPAPER OR WATCHING TELEVISION: NOT AT ALL
9. THOUGHTS THAT YOU WOULD BE BETTER OFF DEAD, OR OF HURTING YOURSELF: NOT AT ALL
4. FEELING TIRED OR HAVING LITTLE ENERGY: NOT AT ALL
SUM OF ALL RESPONSES TO PHQ QUESTIONS 1-9: 0
1. LITTLE INTEREST OR PLEASURE IN DOING THINGS: NOT AT ALL
5. POOR APPETITE OR OVEREATING: NOT AT ALL
10. IF YOU CHECKED OFF ANY PROBLEMS, HOW DIFFICULT HAVE THESE PROBLEMS MADE IT FOR YOU TO DO YOUR WORK, TAKE CARE OF THINGS AT HOME, OR GET ALONG WITH OTHER PEOPLE: NOT DIFFICULT AT ALL

## 2025-04-22 NOTE — PROGRESS NOTES
Medicare Annual Wellness Visit    Janice Eagle is here for Medicare AWV    Assessment & Plan   Medicare annual wellness visit, subsequent  Screenings reviewed, information provided    Fall, subsequent encounter  Type 1 diabetes mellitus with diabetic polyneuropathy (HCC)  Related to her ongoing gait imbalance and hypoglycemia.  She has type 1 diabetes with labile glucose readings.  Discussed limiting short acting insulin particularly in the evenings before bedtime with reactive hypoglycemia.  She continues to monitor her sugars closely.  Will also reach out to endocrinology for further adjustment.  Warning signs ER precautions given.  -     ESTABLISHED, LOW MDM, 20-29 MIN [[id number]]    SIADH (syndrome of inappropriate ADH production)  Hyponatremia  Patient with SIADH and hyponatremia, established with nephrology in [facility] for insurance purposes she would like to transfer care through [facility].  Most recent sodium in the .  May be able to further decrease sodium tablets.  Will reach out to nephrology if can down titrate to once daily.  Will plan to repeat labs in 2 weeks.  Referral given to [facility] nephrology  Warning signs ER precautions given  -     ESTABLISHED, LOW MDM, 20-29 MIN [[id number]]  Cerebrospinal fluid leak  Continues to have gait and balance, unclear if related however patient is concerned this could be due to underlying CSF leak which has been ongoing since December.  She has seen neurosurgery and ENT.  She has follow-up with her original ENT scheduled for 5/2.  Recent CT reviewed, offered MRI however she does not feel this would be beneficial.  She will follow-up with her ENT as scheduled.  ER precautions given.  -     ESTABLISHED, LOW MDM, 20-29 MIN [[id number]]  Essential hypertension  Blood pressure improved however remains above goal.  Continue losartan and amlodipine.  I am hopeful her blood pressure will further improve with decreased sodium tablets however if needed can increase 
\"Have you been to the ER, urgent care clinic since your last visit?  Hospitalized since your last visit?\"    NO    “Have you seen or consulted any other health care providers outside our system since your last visit?”    NO      “Have you had a diabetic eye exam?”    YES - Where: [facility] Nurse/CMA to request most recent records if not in the chart     Date of last diabetic eye exam: 10/5/2023          
Take 8 tablets by mouth 2 times daily, Disp: , Rfl:     calcium carbonate 1500 (600 Ca) MG TABS tablet, Take 1 tablet by mouth 2 times daily, Disp: , Rfl:     Cholecalciferol 50 MCG (2000 UT) TABS, Take 1 tablet by mouth daily, Disp: , Rfl:     potassium gluconate 550 mg tablet, Take 99 mg by mouth every other day, Disp: , Rfl:     Social History     Tobacco Use   Smoking Status Former    Current packs/day: 0.00    Average packs/day: 0.5 packs/day for 2.7 years (1.4 ttl pk-yrs)    Types: Cigarettes    Start date: 4/10/2007    Quit date: 1/1/2010    Years since quitting: 15.3    Passive exposure: Past   Smokeless Tobacco Never       Review of Systems  ROS:  Gen: denies fever, chills, or fatigue  Resp: denies dyspnea, cough, or wheezing  CV: denies chest pain, pressure, or palpitations  GI:: denies abdominal pain, nausea, vomiting, diarrhea, or constipation  Neuro: denies numbness/tingling or dizziness          Objective:     There were no vitals taken for this visit.  There is no height or weight on file to calculate BMI.    Physical Exam   General: Alert and oriented. No acute distress.  Well nourished.  HEENT :  Eyes: Sclera white, conjunctiva clear.   Mouth: Pharynx non-erythematous, without exudate   Neck: Supple with FROM. No thyroid-megaly  Lungs: no increased wob, All lobes clear to auscultation bilaterally   Heart :RRR, no murmur   Extremities: Non-edematous  Abdomen: Soft and non-distended. Bowel sounds active. No tenderness to palpation, no masses.  Neuro: Cranial nerves grossly normal.  Mental status: Cognition, concentration, memory, and speech intact.   Psych: Mood and thought content appropriate for situation. Dressed appropriately and with good hygiene.  Skin: Warm, dry, and intact. No lesions or discoloration visible.      No results found for this visit on 04/22/25.      Assessment/ Plan:     Janice was seen today for established new doctor.    Diagnoses and all orders for this

## 2025-04-23 ENCOUNTER — TELEPHONE (OUTPATIENT)
Age: 60
End: 2025-04-23

## 2025-04-23 DIAGNOSIS — E22.2 SIADH (SYNDROME OF INAPPROPRIATE ADH PRODUCTION): Primary | ICD-10-CM

## 2025-04-23 NOTE — TELEPHONE ENCOUNTER
Did she hear back from her nephrologist (sodium/ kidney doctor)?  I think she could probably decrease her sodium tablets to once a day based on her salt level from the ER and repeat them in a week however would want her to check with them.    If her blood pressure is still running high can increase her amlodipine to 10 mg however going down on her salt tab may help her blood pressure also  Thanks!

## 2025-04-24 ENCOUNTER — CLINICAL DOCUMENTATION (OUTPATIENT)
Age: 60
End: 2025-04-24

## 2025-04-24 NOTE — TELEPHONE ENCOUNTER
Yes if she needs to have repeat sodium levels drawn we can do it locally and fax them if she cannot get there.  She had said she was going to try and get a new kidney doctor and needed a referral through [facility] or [facility] which would be up in [place].    Would she like to stay with Dr. Barahona if she can have her blood work drawn here?  She may need to see Dr. Barahona for a few appointments just depending on what she recommends

## 2025-04-24 NOTE — TELEPHONE ENCOUNTER
Ms Palomino called back to confirm what was sent to her daughter Janice.  I informed her of the advice message that was sent to Ms Eagle via [employer].  Ms Palomino is confirming if can just be a phone call to them.  The kidney doctor is all the way in [place].  She was informed of the lab repeat can probably be done in our office, but will confirm with Dr. Reynolds for that as well, OK and thanks.

## 2025-04-24 NOTE — TELEPHONE ENCOUNTER
RAPHAEL GRANDE pt, she is driving, so will not be able to take the message, please send to [employer].

## 2025-04-24 NOTE — TELEPHONE ENCOUNTER
PC to HARJINDER Palomino per her request to give her a call back with the update.  MB full, so was unable to leave a VM to rt the call. Updated message sent to pt via [employer].

## 2025-04-28 ENCOUNTER — TELEPHONE (OUTPATIENT)
Age: 60
End: 2025-04-28

## 2025-04-28 ENCOUNTER — HOSPITAL ENCOUNTER (OUTPATIENT)
Facility: HOSPITAL | Age: 60
Setting detail: INFUSION SERIES
Discharge: HOME | End: 2025-04-28
Payer: MEDICARE

## 2025-04-28 DIAGNOSIS — E22.2 SIADH (SYNDROME OF INAPPROPRIATE ADH PRODUCTION): ICD-10-CM

## 2025-04-28 LAB
ANION GAP SERPL CALC-SCNC: 7 MMOL/L (ref 2–12)
BUN SERPL-MCNC: 8 MG/DL (ref 6–20)
BUN/CREAT SERPL: 13 (ref 12–20)
CALCIUM SERPL-MCNC: 9.6 MG/DL (ref 8.5–10.1)
CHLORIDE SERPL-SCNC: 97 MMOL/L (ref 97–108)
CO2 SERPL-SCNC: 30 MMOL/L (ref 21–32)
CREAT SERPL-MCNC: 0.61 MG/DL (ref 0.55–1.02)
GLUCOSE SERPL-MCNC: 87 MG/DL (ref 65–100)
POTASSIUM SERPL-SCNC: 3.9 MMOL/L (ref 3.5–5.1)
SODIUM SERPL-SCNC: 134 MMOL/L (ref 136–145)

## 2025-04-28 PROCEDURE — 80048 BASIC METABOLIC PNL TOTAL CA: CPT

## 2025-04-28 PROCEDURE — 36415 COLL VENOUS BLD VENIPUNCTURE: CPT

## 2025-04-28 NOTE — TELEPHONE ENCOUNTER
Valeri Henry from Dr Rogel's office returned your call. [facility] call her back at [phone].

## 2025-04-28 NOTE — PROGRESS NOTES
Arrived ambulatory for labs which were drawn peripherally from the left ac and taken to the lab. 2x2 and band-aid to site. There were no VS taken for this visit. Armband was removed and shredded. Will return on 5-9-25 @ 1500 for her next appointment. Left ambulatory.

## 2025-04-29 ENCOUNTER — RESULTS FOLLOW-UP (OUTPATIENT)
Age: 60
End: 2025-04-29

## 2025-04-30 RX ORDER — AMLODIPINE BESYLATE 5 MG/1
10 TABLET ORAL DAILY
Qty: 180 TABLET | Refills: 0 | Status: SHIPPED
Start: 2025-04-30

## 2025-04-30 NOTE — TELEPHONE ENCOUNTER
Return call, nephrology agrees with sodium tablet once daily.  She will continue this for now.  Sent to patient via [employer] message given hearing impaired

## 2025-05-02 DIAGNOSIS — G47.00 INSOMNIA, UNSPECIFIED TYPE: ICD-10-CM

## 2025-05-02 RX ORDER — ZOLPIDEM TARTRATE 10 MG/1
TABLET ORAL
Qty: 30 TABLET | Refills: 0 | Status: SHIPPED | OUTPATIENT
Start: 2025-05-02 | End: 2025-06-01

## 2025-05-09 ENCOUNTER — OFFICE VISIT (OUTPATIENT)
Age: 60
End: 2025-05-09
Payer: MEDICARE

## 2025-05-09 ENCOUNTER — HOSPITAL ENCOUNTER (OUTPATIENT)
Facility: HOSPITAL | Age: 60
Setting detail: INFUSION SERIES
Discharge: HOME | End: 2025-05-09
Payer: MEDICARE

## 2025-05-09 VITALS
WEIGHT: 152 LBS | HEART RATE: 89 BPM | BODY MASS INDEX: 26.93 KG/M2 | RESPIRATION RATE: 18 BRPM | DIASTOLIC BLOOD PRESSURE: 78 MMHG | SYSTOLIC BLOOD PRESSURE: 148 MMHG | HEIGHT: 63 IN | TEMPERATURE: 97 F | OXYGEN SATURATION: 98 %

## 2025-05-09 VITALS
SYSTOLIC BLOOD PRESSURE: 131 MMHG | OXYGEN SATURATION: 98 % | TEMPERATURE: 98.3 F | DIASTOLIC BLOOD PRESSURE: 72 MMHG | RESPIRATION RATE: 16 BRPM | HEART RATE: 76 BPM

## 2025-05-09 DIAGNOSIS — G96.00 CEREBROSPINAL FLUID LEAK: ICD-10-CM

## 2025-05-09 DIAGNOSIS — E10.42 TYPE 1 DIABETES MELLITUS WITH DIABETIC POLYNEUROPATHY (HCC): ICD-10-CM

## 2025-05-09 DIAGNOSIS — G47.00 INSOMNIA, UNSPECIFIED TYPE: ICD-10-CM

## 2025-05-09 DIAGNOSIS — I10 ESSENTIAL HYPERTENSION: ICD-10-CM

## 2025-05-09 DIAGNOSIS — E87.1 HYPONATREMIA: Primary | ICD-10-CM

## 2025-05-09 DIAGNOSIS — M81.6 LOCALIZED OSTEOPOROSIS WITHOUT CURRENT PATHOLOGICAL FRACTURE: Primary | ICD-10-CM

## 2025-05-09 DIAGNOSIS — E87.1 HYPONATREMIA: ICD-10-CM

## 2025-05-09 DIAGNOSIS — E22.2 SIADH (SYNDROME OF INAPPROPRIATE ADH PRODUCTION): ICD-10-CM

## 2025-05-09 LAB
ALBUMIN SERPL-MCNC: 3.9 G/DL (ref 3.5–5)
ALBUMIN/GLOB SERPL: 1.2 (ref 1.1–2.2)
ALP SERPL-CCNC: 72 U/L (ref 45–117)
ALT SERPL-CCNC: 17 U/L (ref 12–78)
ANION GAP SERPL CALC-SCNC: 5 MMOL/L (ref 2–12)
AST SERPL-CCNC: 19 U/L (ref 15–37)
BILIRUB SERPL-MCNC: 0.9 MG/DL (ref 0.2–1)
BUN SERPL-MCNC: 11 MG/DL (ref 6–20)
BUN/CREAT SERPL: 16 (ref 12–20)
CALCIUM SERPL-MCNC: 9.7 MG/DL (ref 8.5–10.1)
CHLORIDE SERPL-SCNC: 94 MMOL/L (ref 97–108)
CO2 SERPL-SCNC: 31 MMOL/L (ref 21–32)
CREAT SERPL-MCNC: 0.69 MG/DL (ref 0.55–1.02)
GLOBULIN SER CALC-MCNC: 3.3 G/DL (ref 2–4)
GLUCOSE SERPL-MCNC: 184 MG/DL (ref 65–100)
POTASSIUM SERPL-SCNC: 4.3 MMOL/L (ref 3.5–5.1)
PROT SERPL-MCNC: 7.2 G/DL (ref 6.4–8.2)
SODIUM SERPL-SCNC: 130 MMOL/L (ref 136–145)

## 2025-05-09 PROCEDURE — 99214 OFFICE O/P EST MOD 30 MIN: CPT | Performed by: STUDENT IN AN ORGANIZED HEALTH CARE EDUCATION/TRAINING PROGRAM

## 2025-05-09 PROCEDURE — 96372 THER/PROPH/DIAG INJ SC/IM: CPT

## 2025-05-09 PROCEDURE — 6360000002 HC RX W HCPCS: Performed by: FAMILY MEDICINE

## 2025-05-09 PROCEDURE — 3078F DIAST BP <80 MM HG: CPT | Performed by: STUDENT IN AN ORGANIZED HEALTH CARE EDUCATION/TRAINING PROGRAM

## 2025-05-09 PROCEDURE — G8427 DOCREV CUR MEDS BY ELIG CLIN: HCPCS | Performed by: STUDENT IN AN ORGANIZED HEALTH CARE EDUCATION/TRAINING PROGRAM

## 2025-05-09 PROCEDURE — 1036F TOBACCO NON-USER: CPT | Performed by: STUDENT IN AN ORGANIZED HEALTH CARE EDUCATION/TRAINING PROGRAM

## 2025-05-09 PROCEDURE — 3017F COLORECTAL CA SCREEN DOC REV: CPT | Performed by: STUDENT IN AN ORGANIZED HEALTH CARE EDUCATION/TRAINING PROGRAM

## 2025-05-09 PROCEDURE — 36415 COLL VENOUS BLD VENIPUNCTURE: CPT

## 2025-05-09 PROCEDURE — 2022F DILAT RTA XM EVC RTNOPTHY: CPT | Performed by: STUDENT IN AN ORGANIZED HEALTH CARE EDUCATION/TRAINING PROGRAM

## 2025-05-09 PROCEDURE — 3052F HG A1C>EQUAL 8.0%<EQUAL 9.0%: CPT | Performed by: STUDENT IN AN ORGANIZED HEALTH CARE EDUCATION/TRAINING PROGRAM

## 2025-05-09 PROCEDURE — 80053 COMPREHEN METABOLIC PANEL: CPT

## 2025-05-09 PROCEDURE — 3077F SYST BP >= 140 MM HG: CPT | Performed by: STUDENT IN AN ORGANIZED HEALTH CARE EDUCATION/TRAINING PROGRAM

## 2025-05-09 PROCEDURE — G8419 CALC BMI OUT NRM PARAM NOF/U: HCPCS | Performed by: STUDENT IN AN ORGANIZED HEALTH CARE EDUCATION/TRAINING PROGRAM

## 2025-05-09 RX ORDER — ACETAMINOPHEN 325 MG/1
650 TABLET ORAL
Status: DISCONTINUED | OUTPATIENT
Start: 2025-05-09 | End: 2025-05-10 | Stop reason: HOSPADM

## 2025-05-09 RX ORDER — EPINEPHRINE 1 MG/ML
0.3 INJECTION, SOLUTION, CONCENTRATE INTRAVENOUS PRN
OUTPATIENT
Start: 2025-05-11

## 2025-05-09 RX ORDER — HYDROCORTISONE SODIUM SUCCINATE 100 MG/2ML
100 INJECTION INTRAMUSCULAR; INTRAVENOUS
Status: DISCONTINUED | OUTPATIENT
Start: 2025-05-09 | End: 2025-05-10 | Stop reason: HOSPADM

## 2025-05-09 RX ORDER — ALBUTEROL SULFATE 90 UG/1
4 INHALANT RESPIRATORY (INHALATION) PRN
OUTPATIENT
Start: 2025-05-11

## 2025-05-09 RX ORDER — HYDROCORTISONE SODIUM SUCCINATE 100 MG/2ML
100 INJECTION INTRAMUSCULAR; INTRAVENOUS
OUTPATIENT
Start: 2025-05-11

## 2025-05-09 RX ORDER — SODIUM CHLORIDE 9 MG/ML
INJECTION, SOLUTION INTRAVENOUS CONTINUOUS
Status: DISCONTINUED | OUTPATIENT
Start: 2025-05-09 | End: 2025-05-10 | Stop reason: HOSPADM

## 2025-05-09 RX ORDER — ONDANSETRON 2 MG/ML
8 INJECTION INTRAMUSCULAR; INTRAVENOUS
Status: DISCONTINUED | OUTPATIENT
Start: 2025-05-09 | End: 2025-05-10 | Stop reason: HOSPADM

## 2025-05-09 RX ORDER — EPINEPHRINE 1 MG/ML
0.3 INJECTION, SOLUTION, CONCENTRATE INTRAVENOUS PRN
Status: DISCONTINUED | OUTPATIENT
Start: 2025-05-09 | End: 2025-05-10 | Stop reason: HOSPADM

## 2025-05-09 RX ORDER — SODIUM CHLORIDE 9 MG/ML
INJECTION, SOLUTION INTRAVENOUS CONTINUOUS
OUTPATIENT
Start: 2025-05-11

## 2025-05-09 RX ORDER — ALBUTEROL SULFATE 90 UG/1
4 INHALANT RESPIRATORY (INHALATION) PRN
Status: DISCONTINUED | OUTPATIENT
Start: 2025-05-09 | End: 2025-05-10 | Stop reason: HOSPADM

## 2025-05-09 RX ORDER — DIPHENHYDRAMINE HYDROCHLORIDE 50 MG/ML
50 INJECTION, SOLUTION INTRAMUSCULAR; INTRAVENOUS
Status: DISCONTINUED | OUTPATIENT
Start: 2025-05-09 | End: 2025-05-10 | Stop reason: HOSPADM

## 2025-05-09 RX ORDER — ONDANSETRON 2 MG/ML
8 INJECTION INTRAMUSCULAR; INTRAVENOUS
OUTPATIENT
Start: 2025-05-11

## 2025-05-09 RX ORDER — ACETAMINOPHEN 325 MG/1
650 TABLET ORAL
OUTPATIENT
Start: 2025-05-11

## 2025-05-09 RX ORDER — DIPHENHYDRAMINE HYDROCHLORIDE 50 MG/ML
50 INJECTION, SOLUTION INTRAMUSCULAR; INTRAVENOUS
OUTPATIENT
Start: 2025-05-11

## 2025-05-09 RX ADMIN — DENOSUMAB 60 MG: 60 INJECTION SUBCUTANEOUS at 15:02

## 2025-05-09 ASSESSMENT — PATIENT HEALTH QUESTIONNAIRE - PHQ9
5. POOR APPETITE OR OVEREATING: NOT AT ALL
4. FEELING TIRED OR HAVING LITTLE ENERGY: NOT AT ALL
SUM OF ALL RESPONSES TO PHQ QUESTIONS 1-9: 0
6. FEELING BAD ABOUT YOURSELF - OR THAT YOU ARE A FAILURE OR HAVE LET YOURSELF OR YOUR FAMILY DOWN: NOT AT ALL
10. IF YOU CHECKED OFF ANY PROBLEMS, HOW DIFFICULT HAVE THESE PROBLEMS MADE IT FOR YOU TO DO YOUR WORK, TAKE CARE OF THINGS AT HOME, OR GET ALONG WITH OTHER PEOPLE: NOT DIFFICULT AT ALL
SUM OF ALL RESPONSES TO PHQ QUESTIONS 1-9: 0
SUM OF ALL RESPONSES TO PHQ QUESTIONS 1-9: 0
7. TROUBLE CONCENTRATING ON THINGS, SUCH AS READING THE NEWSPAPER OR WATCHING TELEVISION: NOT AT ALL
9. THOUGHTS THAT YOU WOULD BE BETTER OFF DEAD, OR OF HURTING YOURSELF: NOT AT ALL
3. TROUBLE FALLING OR STAYING ASLEEP: NOT AT ALL
2. FEELING DOWN, DEPRESSED OR HOPELESS: NOT AT ALL
8. MOVING OR SPEAKING SO SLOWLY THAT OTHER PEOPLE COULD HAVE NOTICED. OR THE OPPOSITE, BEING SO FIGETY OR RESTLESS THAT YOU HAVE BEEN MOVING AROUND A LOT MORE THAN USUAL: NOT AT ALL
SUM OF ALL RESPONSES TO PHQ QUESTIONS 1-9: 0
1. LITTLE INTEREST OR PLEASURE IN DOING THINGS: NOT AT ALL

## 2025-05-09 ASSESSMENT — PAIN SCALES - GENERAL: PAINLEVEL_OUTOF10: 0

## 2025-05-09 NOTE — PROGRESS NOTES
Have you been to the ER, urgent care clinic since your last visit?  Hospitalized since your last visit?   NO    Have you seen or consulted any other health care providers outside our system since your last visit?   NO      “Have you had a diabetic eye exam?”    NO     Date of last diabetic eye exam: 10/5/2023

## 2025-05-09 NOTE — PROGRESS NOTES
[facility] Progress Note    Date: May 9, 2025    Name: Janice Eagle    MRN: [id number]         : 1965      Ms. Eagle was assessed and education was provided. She denies new problems, continues to have problems with hyponatremia, balance issues at times.    Ms. Eagle's vitals were reviewed and patient was observed for 5 minutes prior to treatment.   Vitals:    25 1445   BP: 131/72   Pulse: 76   Resp: 16   Temp: 98.3 °F (36.8 °C)   SpO2: 98%       Lab results were obtained and reviewed.  Recent Results (from the past 12 hours)   Comprehensive Metabolic Panel    Collection Time: 25  2:55 PM   Result Value Ref Range    Sodium 130 (L) 136 - 145 mmol/L    Potassium 4.3 3.5 - 5.1 mmol/L    Chloride 94 (L) 97 - 108 mmol/L    CO2 31 21 - 32 mmol/L    Anion Gap 5 2 - 12 mmol/L    Glucose 184 (H) 65 - 100 mg/dL    BUN 11 6 - 20 MG/DL    Creatinine 0.69 0.55 - 1.02 MG/DL    BUN/Creatinine Ratio 16 12 - 20      Est, Glom Filt Rate >90 >60 ml/min/1.73m2    Calcium 9.7 8.5 - 10.1 MG/DL    Total Bilirubin 0.9 0.2 - 1.0 MG/DL    ALT 17 12 - 78 U/L    AST 19 15 - 37 U/L    Alk Phosphatase 72 45 - 117 U/L    Total Protein 7.2 6.4 - 8.2 g/dL    Albumin 3.9 3.5 - 5.0 g/dL    Globulin 3.3 2.0 - 4.0 g/dL    Albumin/Globulin Ratio 1.2 1.1 - 2.2         Prolia 60 mg was administered subcutaneous in  left upper arm and site intact.     Ms. Eagle tolerated well, and had no complaints.  Patient armband removed and shredded    [facility] at 1510. She is to return on  at 2:30 for her next appointment.    Romana Stewart RN  May 9, 2025  4:21 PM

## 2025-05-09 NOTE — DISCHARGE INSTRUCTIONS
denosumab (Prolia)  Pronunciation:  javi [place] alem mab  Brand:  Prolia  What is the most important information I should know about Prolia?  This medication guide provides information about the Prolia brand of denosumab. Xgeva is another brand of denosumab used to prevent bone fractures and other skeletal conditions in people with tumors that have spread to the bone.  Prolia can cause many serious side effects. Call your doctor at once if you have a fever, chills, pain or burning when you urinate, severe stomach pain, cough, shortness of breath, skin problems, numbness or tingling, severe or unusual pain, or skin problems.  Do not use if you are pregnant. Use effective birth control while using Prolia, and for at least 5 months after you stop.  What is denosumab (Prolia)?  Denosumab is a monoclonal antibody. Monoclonal antibodies are made to target and destroy only certain cells in the body. This may help to protect healthy cells from damage.  The Prolia brand of denosumab is used to treat osteoporosis or bone loss in men and women who have a high risk of bone fracture. Prolia is sometimes used in people whose bone fracture is caused by certain medicines or cancer treatments.  This medication guide provides information about the Prolia brand of denosumab. Xgeva is another brand of denosumab used to prevent bone fractures and other skeletal conditions in people with tumors that have spread to the bone.  Denosumab may also be used for purposes not listed in this medication guide.  What should I discuss with my healthcare provider before receiving Prolia?  You should not receive Prolia if you are allergic to denosumab, or if you have:  low levels of calcium in your blood (hypocalcemia); or  if you are pregnant.  While you are using Prolia, you should not receive Xgeva, another brand of denosumab.  Tell your doctor if you have ever had:  kidney disease (or if you are on dialysis);  a weak immune system (caused by  mouth, or in your fingers and toes).  Serious infections may occur during treatment with Prolia. Call your doctor right away if you have signs of infection such as:  fever, chills, night sweats;  swelling, pain, tenderness, warmth, or redness anywhere on your body;  pain or burning when you urinate;  increased or urgent need to urinate;  severe stomach pain; or  cough, wheezing, feeling short of breath.  Common side effects may include:  bladder infection (painful or difficult urination);  lung infection (cough, shortness of breath);  headache;  back pain, muscle pain, joint pain;  increased blood pressure;  cold symptoms such as stuffy nose, sneezing, sore throat;  high cholesterol; or  pain in your arms or legs.  This is not a complete list of side effects and others may occur. Call your doctor for medical advice about side effects. You may report side effects to FDA at 5-939-FDA-3008.  What other drugs will affect Prolia?  Other drugs may affect Prolia, including prescription and over-the-counter medicines, vitamins, and herbal products. Tell your doctor about all your current medicines and any medicine you start or stop using.  Where can I get more information?  Your doctor or pharmacist can provide more information about denosumab (Prolia).  Remember, keep this and all other medicines out of the reach of children, never share your medicines with others, and use this medication only for the indication prescribed.   Every effort has been made to ensure that the information provided by [employer]') is accurate, up-to-date, and complete, but no guarantee is made to that effect. Drug information contained herein may be time sensitive. [employer] information has been compiled for use by healthcare practitioners and consumers in the [place] and therefore [employer] does not warrant that uses outside of the [place] are appropriate, unless specifically indicated otherwise. [employer]'s drug information

## 2025-05-09 NOTE — PROGRESS NOTES
Subjective:     Chief Complaint   Patient presents with    Follow-up       Janice Eagle is a 60 y.o. female who presents today for follow up     HPI    Hyponatremia:   Has been chronic however worsened acutely with patient symptomatic with gait imbalance and confusion prior to most recent admission.  Previously thought to be combination of SIADH and gabapentin use.  She presented on 3/23 with sodium 114, had recently completed course of Bactrim otherwise no new medication changes.    Nephrology was consulted appears to be thought secondary to polydipsia and SIADH.  Recommended fluid restriction of 1500mL daily and sodium 1g tid initially at hospital discharge.    Patient states she has been following her fluid restriction.  Sodium level has remained greater than 130 and has been able to gradually reduce sodium tablets to once daily.  States she may have missed a couple doses.  Overall she is feeling at her baseline     She has been unable to establish with [facility] nephro- having difficulty with scheduling. Would prefer [place] if possible.     Sensorineural hearing loss  Follows with the ENT through [facility].  Status post right cochlear implant on 12/24/2024  Unfotunately, after the surgery, pt reports frequent postural headache.   She is using kate for communication assistance today.  She has had recent follow-up with her original ENT as below.     Hx of CSF leak:   this was idopahtic.   Since cochlear implant in December she is having HA with standing and sitting up quickly.  This is caused her to spend majority of her day supine to prevent aggravation of her headache.  She has seen both neurosurgery and ENT at [facility].  CSF leak was thought unlikely however MRI skull base protocol ordered- she is trying to have this apt moved up.  Recently had follow up with ENT who initially diagnosed CSF leak on 5/2 who ordered CT sinus that she is having done on 5/19.  Pt states she has had nose bleeds with sinus flushes. No

## 2025-05-12 ENCOUNTER — RESULTS FOLLOW-UP (OUTPATIENT)
Age: 60
End: 2025-05-12

## 2025-05-12 DIAGNOSIS — E22.2 SIADH (SYNDROME OF INAPPROPRIATE ADH PRODUCTION): Primary | ICD-10-CM

## 2025-05-12 PROBLEM — I10 ESSENTIAL HYPERTENSION: Status: ACTIVE | Noted: 2025-05-12

## 2025-05-20 ENCOUNTER — HOSPITAL ENCOUNTER (OUTPATIENT)
Facility: HOSPITAL | Age: 60
Setting detail: INFUSION SERIES
Discharge: HOME | End: 2025-05-20
Payer: MEDICARE

## 2025-05-20 ENCOUNTER — RESULTS FOLLOW-UP (OUTPATIENT)
Age: 60
End: 2025-05-20

## 2025-05-20 DIAGNOSIS — E22.2 SIADH (SYNDROME OF INAPPROPRIATE ADH PRODUCTION): ICD-10-CM

## 2025-05-20 LAB
ANION GAP SERPL CALC-SCNC: 8 MMOL/L (ref 2–12)
BUN SERPL-MCNC: 11 MG/DL (ref 6–20)
BUN/CREAT SERPL: 15 (ref 12–20)
CALCIUM SERPL-MCNC: 10.1 MG/DL (ref 8.5–10.1)
CHLORIDE SERPL-SCNC: 96 MMOL/L (ref 97–108)
CO2 SERPL-SCNC: 29 MMOL/L (ref 21–32)
CREAT SERPL-MCNC: 0.74 MG/DL (ref 0.55–1.02)
GLUCOSE SERPL-MCNC: 295 MG/DL (ref 65–100)
POTASSIUM SERPL-SCNC: 4.7 MMOL/L (ref 3.5–5.1)
SODIUM SERPL-SCNC: 133 MMOL/L (ref 136–145)

## 2025-05-20 PROCEDURE — 36415 COLL VENOUS BLD VENIPUNCTURE: CPT

## 2025-05-20 PROCEDURE — 80048 BASIC METABOLIC PNL TOTAL CA: CPT

## 2025-05-21 RX ORDER — CICLOPIROX OLAMINE 7.7 MG/G
CREAM TOPICAL
Qty: 30 G | Refills: 11 | Status: SHIPPED | OUTPATIENT
Start: 2025-05-21

## 2025-05-21 RX ORDER — CICLOPIROX OLAMINE 7.7 MG/G
CREAM TOPICAL
Qty: 30 G | Refills: 11 | Status: CANCELLED | OUTPATIENT
Start: 2025-05-21

## 2025-05-23 ENCOUNTER — TELEPHONE (OUTPATIENT)
Age: 60
End: 2025-05-23

## 2025-05-23 NOTE — TELEPHONE ENCOUNTER
The patient called stating she received a call from us but was unable to communicate because her caption phone was not working and she is completely deaf. She was able to get it working again and is asking for either a call back and if she does not  a SAVOt message.

## 2025-05-27 ENCOUNTER — OFFICE VISIT (OUTPATIENT)
Age: 60
End: 2025-05-27
Payer: MEDICARE

## 2025-05-27 ENCOUNTER — HOSPITAL ENCOUNTER (OUTPATIENT)
Facility: HOSPITAL | Age: 60
Discharge: HOME | End: 2025-05-30
Payer: MEDICARE

## 2025-05-27 VITALS
WEIGHT: 155.6 LBS | TEMPERATURE: 98.4 F | RESPIRATION RATE: 16 BRPM | HEART RATE: 66 BPM | DIASTOLIC BLOOD PRESSURE: 75 MMHG | BODY MASS INDEX: 27.57 KG/M2 | OXYGEN SATURATION: 98 % | SYSTOLIC BLOOD PRESSURE: 131 MMHG | HEIGHT: 63 IN

## 2025-05-27 DIAGNOSIS — G96.00 CEREBROSPINAL FLUID LEAK: ICD-10-CM

## 2025-05-27 DIAGNOSIS — I10 ESSENTIAL HYPERTENSION: Primary | ICD-10-CM

## 2025-05-27 DIAGNOSIS — E22.2 SIADH (SYNDROME OF INAPPROPRIATE ADH PRODUCTION): ICD-10-CM

## 2025-05-27 DIAGNOSIS — G89.29 CHRONIC NONINTRACTABLE HEADACHE, UNSPECIFIED HEADACHE TYPE: ICD-10-CM

## 2025-05-27 DIAGNOSIS — R26.89 IMBALANCE: ICD-10-CM

## 2025-05-27 DIAGNOSIS — R51.9 CHRONIC NONINTRACTABLE HEADACHE, UNSPECIFIED HEADACHE TYPE: ICD-10-CM

## 2025-05-27 PROCEDURE — 99214 OFFICE O/P EST MOD 30 MIN: CPT | Performed by: STUDENT IN AN ORGANIZED HEALTH CARE EDUCATION/TRAINING PROGRAM

## 2025-05-27 PROCEDURE — 3017F COLORECTAL CA SCREEN DOC REV: CPT | Performed by: STUDENT IN AN ORGANIZED HEALTH CARE EDUCATION/TRAINING PROGRAM

## 2025-05-27 PROCEDURE — 3078F DIAST BP <80 MM HG: CPT | Performed by: STUDENT IN AN ORGANIZED HEALTH CARE EDUCATION/TRAINING PROGRAM

## 2025-05-27 PROCEDURE — G8419 CALC BMI OUT NRM PARAM NOF/U: HCPCS | Performed by: STUDENT IN AN ORGANIZED HEALTH CARE EDUCATION/TRAINING PROGRAM

## 2025-05-27 PROCEDURE — G8428 CUR MEDS NOT DOCUMENT: HCPCS | Performed by: STUDENT IN AN ORGANIZED HEALTH CARE EDUCATION/TRAINING PROGRAM

## 2025-05-27 PROCEDURE — 70450 CT HEAD/BRAIN W/O DYE: CPT

## 2025-05-27 PROCEDURE — 1036F TOBACCO NON-USER: CPT | Performed by: STUDENT IN AN ORGANIZED HEALTH CARE EDUCATION/TRAINING PROGRAM

## 2025-05-27 PROCEDURE — 3075F SYST BP GE 130 - 139MM HG: CPT | Performed by: STUDENT IN AN ORGANIZED HEALTH CARE EDUCATION/TRAINING PROGRAM

## 2025-05-27 RX ORDER — HYDRALAZINE HYDROCHLORIDE 10 MG/1
10 TABLET, FILM COATED ORAL 2 TIMES DAILY PRN
Qty: 60 TABLET | Refills: 3 | Status: SHIPPED | OUTPATIENT
Start: 2025-05-27 | End: 2026-05-27

## 2025-05-27 RX ORDER — AMLODIPINE BESYLATE 5 MG/1
5 TABLET ORAL DAILY
Qty: 90 TABLET | Refills: 0 | Status: SHIPPED
Start: 2025-05-27

## 2025-05-27 NOTE — PATIENT INSTRUCTIONS
Decrease your amlodipine to 5mg ( one pill)   Please start checking your blood pressures at home.   If this is higher than 150/90 you can take the hydralazine up to twice dialy   If the blood pressure is less than 150/90 you dont need this   Please let me know if it is running high.

## 2025-05-27 NOTE — PROGRESS NOTES
Chief Complaint   Patient presents with    Leg Swelling       There were no vitals filed for this visit.    Have you been to the ER, urgent care clinic since your last visit?  Hospitalized since your last visit?   NO    Have you seen or consulted any other health care providers outside our system since your last visit?   NO      “Have you had a diabetic eye exam?”    YES - Where: [facility] in [place] Nurse/CMA to request most recent records if not in the chart     Date of last diabetic eye exam: 10/5/2023          
worsening memory and chronic decreasing.  This is all been progressive over the last 1 to 2 months.  She has not had any acute change no acute worsening headache, visual change, weakness numbness or tingling.  She states her ENT is aware of her symptoms.  She has not had any acute change.    Unfortunately patient states she does not tolerate MRI without significant sedation and also states it has to be protocol for her cochlear implant.    Social History     Tobacco Use   Smoking Status Former    Current packs/day: 0.00    Average packs/day: 0.5 packs/day for 2.7 years (1.4 ttl pk-yrs)    Types: Cigarettes    Start date: 4/10/2007    Quit date: 1/1/2010    Years since quitting: 15.4    Passive exposure: Past   Smokeless Tobacco Never      BP Readings from Last 3 Encounters:   05/27/25 131/75   05/09/25 131/72   05/09/25 (!) 148/78     Lab Results   Component Value Date    LABGLOM >90 05/20/2025    LABGLOM >90 05/09/2025    LABGLOM >90 04/28/2025      Lab Results   Component Value Date    CREATININE 0.74 05/20/2025    CREATININE 0.69 05/09/2025    CREATININE 0.61 04/28/2025     No results found for: \"MALBCR\"   Wt Readings from Last 5 Encounters:   05/27/25 70.6 kg (155 lb 9.6 oz)   05/09/25 68.9 kg (152 lb)   04/22/25 68.5 kg (151 lb)   04/20/25 68.9 kg (152 lb)   04/10/25 68 kg (150 lb)          Patient Active Problem List   Diagnosis    Heel callus    Trochanteric bursitis of right hip    Chronic pain syndrome    Insomnia    Primary osteoarthritis involving multiple joints    Vitamin D deficiency    Hypothyroid    Chronic allergic rhinitis    Trochanteric bursitis of left hip    ASCVD (arteriosclerotic cardiovascular disease)    Diabetic retinopathy screening    Trigeminal neuralgia    Reactive depression    Hx of brain surgery    Diabetic peripheral neuropathy associated with type 2 diabetes mellitus (HCC)    Cerebrospinal fluid leak    Hypercholesterolemia    Type 1 diabetes mellitus with diabetic polyneuropathy

## 2025-05-28 ENCOUNTER — PATIENT MESSAGE (OUTPATIENT)
Age: 60
End: 2025-05-28

## 2025-05-28 ENCOUNTER — TELEPHONE (OUTPATIENT)
Age: 60
End: 2025-05-28

## 2025-05-28 DIAGNOSIS — E22.2 SIADH (SYNDROME OF INAPPROPRIATE ADH PRODUCTION): Primary | ICD-10-CM

## 2025-05-28 NOTE — TELEPHONE ENCOUNTER
Randy from [facility] called and stated that they haven't had an aid in over 8 years but will send out a nurse tomorrow  5/29 for PT. Any questions, please call her direct at [phone].

## 2025-05-29 ENCOUNTER — TELEPHONE (OUTPATIENT)
Age: 60
End: 2025-05-29

## 2025-05-29 ENCOUNTER — RESULTS FOLLOW-UP (OUTPATIENT)
Age: 60
End: 2025-05-29

## 2025-05-29 NOTE — TELEPHONE ENCOUNTER
Wendy from [facility]  stated that this PT has been excepted for Home Health. PT was complaining about her headaches still. She has taken over the counter pain meds such as tylenol and it's not helping.

## 2025-05-30 NOTE — TELEPHONE ENCOUNTER
Thank you for letting me know.  This headache has been chronic.  I do recommend she call her neurosurgeon and ENT and let them know about her symptoms.  If she has any new or worsening symptoms she needs to go back to the ER

## 2025-06-01 DIAGNOSIS — G47.00 INSOMNIA, UNSPECIFIED TYPE: ICD-10-CM

## 2025-06-02 RX ORDER — ZOLPIDEM TARTRATE 10 MG/1
10 TABLET ORAL NIGHTLY PRN
Qty: 30 TABLET | Refills: 0 | Status: SHIPPED | OUTPATIENT
Start: 2025-06-02 | End: 2025-07-02

## 2025-06-02 NOTE — TELEPHONE ENCOUNTER
Patient requesting refill on     Requested Prescriptions     Pending Prescriptions Disp Refills    zolpidem (AMBIEN) 10 MG tablet [Pharmacy Med Name: Zolpidem Tartrate 10 MG Oral Tablet] 30 tablet 0     Sig: Take 1 tablet by mouth nightly as needed for Sleep for up to 30 days. for sleep Max Daily Amount: 10 mg        Last OV 5/27/2025

## 2025-06-05 ENCOUNTER — TELEPHONE (OUTPATIENT)
Age: 60
End: 2025-06-05

## 2025-06-05 NOTE — TELEPHONE ENCOUNTER
Earnestine from [facility]  called and stated that this PT has some spinal fluid leakage again. She has to sit in a semi upright position. She goes for a intensive MRI on 6/13. The next home visit will be put on hold until results come back then will continue . Any questions pls call [phone].

## 2025-06-06 NOTE — TELEPHONE ENCOUNTER
If she has any new symptoms she needs to call her ENT or be seen in the ER emergency department.  If this is her baseline can follow-up for imaging as scheduled

## 2025-06-09 ENCOUNTER — HOSPITAL ENCOUNTER (OUTPATIENT)
Facility: HOSPITAL | Age: 60
Setting detail: INFUSION SERIES
Discharge: HOME | End: 2025-06-09
Payer: MEDICARE

## 2025-06-09 DIAGNOSIS — E22.2 SIADH (SYNDROME OF INAPPROPRIATE ADH PRODUCTION): ICD-10-CM

## 2025-06-09 LAB
ANION GAP SERPL CALC-SCNC: 7 MMOL/L (ref 2–12)
BUN SERPL-MCNC: 10 MG/DL (ref 6–20)
BUN/CREAT SERPL: 14 (ref 12–20)
CALCIUM SERPL-MCNC: 9.9 MG/DL (ref 8.5–10.1)
CHLORIDE SERPL-SCNC: 93 MMOL/L (ref 97–108)
CO2 SERPL-SCNC: 28 MMOL/L (ref 21–32)
CREAT SERPL-MCNC: 0.69 MG/DL (ref 0.55–1.02)
GLUCOSE SERPL-MCNC: 250 MG/DL (ref 65–100)
POTASSIUM SERPL-SCNC: 4.3 MMOL/L (ref 3.5–5.1)
SODIUM SERPL-SCNC: 128 MMOL/L (ref 136–145)

## 2025-06-09 PROCEDURE — 80048 BASIC METABOLIC PNL TOTAL CA: CPT

## 2025-06-09 PROCEDURE — 36415 COLL VENOUS BLD VENIPUNCTURE: CPT

## 2025-06-09 NOTE — PROGRESS NOTES
Ms. Eagle in for labs. Blood drawn without difficulty left antecubital and site intact with gauze and band-aid. Discharged in stable condition at 1225.

## 2025-06-10 ENCOUNTER — RESULTS FOLLOW-UP (OUTPATIENT)
Age: 60
End: 2025-06-10

## 2025-06-10 DIAGNOSIS — E22.2 SIADH (SYNDROME OF INAPPROPRIATE ADH PRODUCTION): Primary | ICD-10-CM

## 2025-06-12 ENCOUNTER — HOSPITAL ENCOUNTER (OUTPATIENT)
Facility: HOSPITAL | Age: 60
Discharge: HOME | End: 2025-06-15
Payer: MEDICARE

## 2025-06-12 ENCOUNTER — RESULTS FOLLOW-UP (OUTPATIENT)
Age: 60
End: 2025-06-12

## 2025-06-12 LAB
ANION GAP SERPL CALC-SCNC: 9 MMOL/L (ref 2–12)
BUN SERPL-MCNC: 9 MG/DL (ref 6–20)
BUN/CREAT SERPL: 13 (ref 12–20)
CALCIUM SERPL-MCNC: 9.5 MG/DL (ref 8.5–10.1)
CHLORIDE SERPL-SCNC: 95 MMOL/L (ref 97–108)
CO2 SERPL-SCNC: 28 MMOL/L (ref 21–32)
CREAT SERPL-MCNC: 0.71 MG/DL (ref 0.55–1.02)
GLUCOSE SERPL-MCNC: 199 MG/DL (ref 65–100)
POTASSIUM SERPL-SCNC: 4.5 MMOL/L (ref 3.5–5.1)
SODIUM SERPL-SCNC: 132 MMOL/L (ref 136–145)

## 2025-06-12 PROCEDURE — 80048 BASIC METABOLIC PNL TOTAL CA: CPT

## 2025-06-12 PROCEDURE — 82607 VITAMIN B-12: CPT

## 2025-06-12 PROCEDURE — 82746 ASSAY OF FOLIC ACID SERUM: CPT

## 2025-06-12 PROCEDURE — 36415 COLL VENOUS BLD VENIPUNCTURE: CPT

## 2025-06-14 LAB
FOLATE SERPL-MCNC: 13.7 NG/ML (ref 5–21)
VIT B12 SERPL-MCNC: 479 PG/ML (ref 193–986)

## 2025-06-15 NOTE — ANESTHESIA POSTPROCEDURE EVALUATION
Post-Anesthesia Evaluation and Assessment Patient: Kinjal Roberts MRN: [id number]  SSN: [id number] YOB: 1965  Age: 46 y.o. Sex: female Cardiovascular Function/Vital Signs Visit Vitals  /72  Pulse 70  Temp 36.5 °C (97.7 °F)  Resp 13  Ht 5' 3\" (1.6 m)  Wt 61.2 kg (135 lb)  SpO2 97%  BMI 23.91 kg/m2 Patient is status post MAC, regional anesthesia for Procedure(s): RIGHT CARPAL TUNNEL RELEASE  (AXILLARY BLOCK). Nausea/Vomiting: None Postoperative hydration reviewed and adequate. Pain: 
Pain Scale 1: Numeric (0 - 10) (09/07/18 5717) Pain Intensity 1: 5 (09/07/18 5214) Managed Neurological Status:  
Neuro (WDL): Within Defined Limits (09/07/18 0657) At baseline Mental Status and Level of Consciousness: Arousable Pulmonary Status:  
O2 Device: Room air (09/07/18 0841) Adequate oxygenation and airway patent Complications related to anesthesia: None Post-anesthesia assessment completed. No concerns Signed By: Floridalma Forrest MD   
 September 7, 2018 No

## 2025-06-17 ENCOUNTER — OFFICE VISIT (OUTPATIENT)
Age: 60
End: 2025-06-17
Payer: MEDICARE

## 2025-06-17 ENCOUNTER — HOSPITAL ENCOUNTER (OUTPATIENT)
Facility: HOSPITAL | Age: 60
Discharge: HOME | End: 2025-06-20

## 2025-06-17 VITALS
OXYGEN SATURATION: 98 % | HEART RATE: 73 BPM | BODY MASS INDEX: 27.29 KG/M2 | WEIGHT: 154 LBS | DIASTOLIC BLOOD PRESSURE: 68 MMHG | TEMPERATURE: 97 F | HEIGHT: 63 IN | RESPIRATION RATE: 18 BRPM | SYSTOLIC BLOOD PRESSURE: 151 MMHG

## 2025-06-17 DIAGNOSIS — H90.5 SENSORINEURAL HEARING LOSS (SNHL), UNSPECIFIED LATERALITY: ICD-10-CM

## 2025-06-17 DIAGNOSIS — E22.2 SIADH (SYNDROME OF INAPPROPRIATE ADH PRODUCTION): ICD-10-CM

## 2025-06-17 DIAGNOSIS — G96.00 CEREBROSPINAL FLUID LEAK: ICD-10-CM

## 2025-06-17 DIAGNOSIS — I10 ESSENTIAL HYPERTENSION: Primary | ICD-10-CM

## 2025-06-17 PROCEDURE — G8419 CALC BMI OUT NRM PARAM NOF/U: HCPCS | Performed by: STUDENT IN AN ORGANIZED HEALTH CARE EDUCATION/TRAINING PROGRAM

## 2025-06-17 PROCEDURE — 3017F COLORECTAL CA SCREEN DOC REV: CPT | Performed by: STUDENT IN AN ORGANIZED HEALTH CARE EDUCATION/TRAINING PROGRAM

## 2025-06-17 PROCEDURE — 3077F SYST BP >= 140 MM HG: CPT | Performed by: STUDENT IN AN ORGANIZED HEALTH CARE EDUCATION/TRAINING PROGRAM

## 2025-06-17 PROCEDURE — 99214 OFFICE O/P EST MOD 30 MIN: CPT | Performed by: STUDENT IN AN ORGANIZED HEALTH CARE EDUCATION/TRAINING PROGRAM

## 2025-06-17 PROCEDURE — 1036F TOBACCO NON-USER: CPT | Performed by: STUDENT IN AN ORGANIZED HEALTH CARE EDUCATION/TRAINING PROGRAM

## 2025-06-17 PROCEDURE — G8427 DOCREV CUR MEDS BY ELIG CLIN: HCPCS | Performed by: STUDENT IN AN ORGANIZED HEALTH CARE EDUCATION/TRAINING PROGRAM

## 2025-06-17 PROCEDURE — 3078F DIAST BP <80 MM HG: CPT | Performed by: STUDENT IN AN ORGANIZED HEALTH CARE EDUCATION/TRAINING PROGRAM

## 2025-06-17 RX ORDER — CARVEDILOL 3.12 MG/1
3.12 TABLET ORAL 2 TIMES DAILY
Qty: 60 TABLET | Refills: 3 | Status: SHIPPED | OUTPATIENT
Start: 2025-06-17

## 2025-06-17 ASSESSMENT — PATIENT HEALTH QUESTIONNAIRE - PHQ9
SUM OF ALL RESPONSES TO PHQ QUESTIONS 1-9: 0
6. FEELING BAD ABOUT YOURSELF - OR THAT YOU ARE A FAILURE OR HAVE LET YOURSELF OR YOUR FAMILY DOWN: NOT AT ALL
1. LITTLE INTEREST OR PLEASURE IN DOING THINGS: NOT AT ALL
5. POOR APPETITE OR OVEREATING: NOT AT ALL
10. IF YOU CHECKED OFF ANY PROBLEMS, HOW DIFFICULT HAVE THESE PROBLEMS MADE IT FOR YOU TO DO YOUR WORK, TAKE CARE OF THINGS AT HOME, OR GET ALONG WITH OTHER PEOPLE: NOT DIFFICULT AT ALL
8. MOVING OR SPEAKING SO SLOWLY THAT OTHER PEOPLE COULD HAVE NOTICED. OR THE OPPOSITE, BEING SO FIGETY OR RESTLESS THAT YOU HAVE BEEN MOVING AROUND A LOT MORE THAN USUAL: NOT AT ALL
9. THOUGHTS THAT YOU WOULD BE BETTER OFF DEAD, OR OF HURTING YOURSELF: NOT AT ALL
3. TROUBLE FALLING OR STAYING ASLEEP: NOT AT ALL
SUM OF ALL RESPONSES TO PHQ QUESTIONS 1-9: 0
SUM OF ALL RESPONSES TO PHQ QUESTIONS 1-9: 0
4. FEELING TIRED OR HAVING LITTLE ENERGY: NOT AT ALL
7. TROUBLE CONCENTRATING ON THINGS, SUCH AS READING THE NEWSPAPER OR WATCHING TELEVISION: NOT AT ALL
SUM OF ALL RESPONSES TO PHQ QUESTIONS 1-9: 0
2. FEELING DOWN, DEPRESSED OR HOPELESS: NOT AT ALL

## 2025-06-17 NOTE — PROGRESS NOTES
Subjective:     Chief Complaint   Patient presents with    Follow-up     Had MRI yesterday U    Hypertension       Janice Eagle is a 60 y.o. female who presents today for follow up     HPI    Hypertension Follow Up  Patient is here to follow up of hypertension.   Feels this problems is uncontrolled  Taking medications as prescribed? Yes  Amlodipine 5 mg + losartan 50mg bid.    States that blood pressures have typically been in the 140s has not been taking the hydralazine.  Did take 1-2 doses did not have side effects.      hyponatremia:   thought to be combination of SIADH and gabapentin use.  She presented on 3/23 with sodium 114, had recently completed course of Bactrim otherwise no new medication changes.  Nephrology was consulted- thought secondary to polydipsia and SIADH.  Recommended fluid restriction of 1500mL sodium tablets which have been weaned to once daily.  She has an appointment with nephrology locally in July to follow-up.  Most recent sodium 132   Has follow up with nephrology early next month       Hx of CSF leak:   this was idopahtic.   Since cochlear implant in December she continues to have positional headache and gait imbalance.      .  She has seen both neurosurgery and E[facility]She has also been seen most recently by her initial ENT in [place].  MRI skull base protocol completed yesterday:  1. No significant fluid signal or air-fluid level noted within the paranasal sinuses. Under pneumatization of the right sphenoid sinus.   2. Anterior nasal septal perforation.   3. Significant field distortion artifact related to the right cochlear implant. Fluid signal in the right middle ear cavity and mastoid air cells.   4. New enhancement within the right cochlea since prior MRI brain 7/18/2024. Consider inflammatory process.     Symptoms have overall been unchanged since our last visit.  She has not had fevers or chills.  She has chronic headaches and gait imbalance these are unchanged.

## 2025-06-18 ENCOUNTER — HOSPITAL ENCOUNTER (OUTPATIENT)
Facility: HOSPITAL | Age: 60
Discharge: HOME | End: 2025-06-21
Payer: MEDICARE

## 2025-06-18 LAB
ANION GAP SERPL CALC-SCNC: 7 MMOL/L (ref 2–12)
BUN SERPL-MCNC: 10 MG/DL (ref 6–20)
BUN/CREAT SERPL: 13 (ref 12–20)
CALCIUM SERPL-MCNC: 9.2 MG/DL (ref 8.5–10.1)
CHLORIDE SERPL-SCNC: 92 MMOL/L (ref 97–108)
CO2 SERPL-SCNC: 30 MMOL/L (ref 21–32)
CREAT SERPL-MCNC: 0.78 MG/DL (ref 0.55–1.02)
GLUCOSE SERPL-MCNC: 317 MG/DL (ref 65–100)
POTASSIUM SERPL-SCNC: 4.4 MMOL/L (ref 3.5–5.1)
SODIUM SERPL-SCNC: 129 MMOL/L (ref 136–145)

## 2025-06-18 PROCEDURE — 36415 COLL VENOUS BLD VENIPUNCTURE: CPT

## 2025-06-18 PROCEDURE — 80048 BASIC METABOLIC PNL TOTAL CA: CPT

## 2025-06-19 ENCOUNTER — TELEPHONE (OUTPATIENT)
Age: 60
End: 2025-06-19

## 2025-06-19 ENCOUNTER — OFFICE VISIT (OUTPATIENT)
Age: 60
End: 2025-06-19

## 2025-06-19 ENCOUNTER — RESULTS FOLLOW-UP (OUTPATIENT)
Age: 60
End: 2025-06-19

## 2025-06-19 VITALS
WEIGHT: 154 LBS | SYSTOLIC BLOOD PRESSURE: 132 MMHG | HEART RATE: 70 BPM | TEMPERATURE: 97 F | BODY MASS INDEX: 27.29 KG/M2 | DIASTOLIC BLOOD PRESSURE: 70 MMHG | RESPIRATION RATE: 18 BRPM | OXYGEN SATURATION: 100 % | HEIGHT: 63 IN

## 2025-06-19 DIAGNOSIS — Z96.21 COCHLEAR IMPLANT IN PLACE: ICD-10-CM

## 2025-06-19 DIAGNOSIS — E10.42 TYPE 1 DIABETES MELLITUS WITH DIABETIC POLYNEUROPATHY (HCC): Primary | ICD-10-CM

## 2025-06-19 DIAGNOSIS — J34.89 NASAL SEPTAL PERFORATION: ICD-10-CM

## 2025-06-19 RX ORDER — INSULIN GLARGINE 100 [IU]/ML
36 INJECTION, SOLUTION SUBCUTANEOUS NIGHTLY
Qty: 45 ML | Refills: 1 | Status: SHIPPED | OUTPATIENT
Start: 2025-06-19

## 2025-06-19 NOTE — PROGRESS NOTES
Subjective:     Chief Complaint   Patient presents with    Ear Problem       Janice Eagle is a 60 y.o. female who presents today for follow up     HPI    Patient presents today for follow-up from visit yesterday.  Return for ENT exam have left prior to completion of this before yesterday's visit.  MRI findings discussed yesterday.  She reports that she has heard back from her regular ENT.  They did not recommend any specific intervention from their standpoint per patient she was told that she needs to follow-up with the physician who placed her cochlear implant.  This was done at [facility].  She plans to call them today.    Additionally she has been having a lot of difficulty regulating her blood sugars.  This has been chronic.  She follows with endocrinology, Dr. Lowe.  Patient reports that she is having low blood sugar readings in the mornings.  Has been as low as 40 in the last couple days.  She is symptomatic feels more confused during these times.  She attributes it to her evening Humalog.  She only takes 1 unit before bed.  States if she does not take this she will have severe hyperglycemia which is also symptomatic with dry mouth and polyuria.  To manage this she has been eating sweets to raise her blood sugar above 300 before she goes to bed to try and avoid hypoglycemia.  She does not use a CGM.  She is not able to afford the supplies for this and.  Test strips which she needs to verify readings.  She tried to reach out to her endocrinologist as well.      Patient Active Problem List   Diagnosis    Heel callus    Trochanteric bursitis of right hip    Chronic pain syndrome    Insomnia    Primary osteoarthritis involving multiple joints    Vitamin D deficiency    Hypothyroid    Chronic allergic rhinitis    Trochanteric bursitis of left hip    ASCVD (arteriosclerotic cardiovascular disease)    Diabetic retinopathy screening    Trigeminal neuralgia    Reactive depression    Hx of brain surgery    Diabetic

## 2025-06-19 NOTE — TELEPHONE ENCOUNTER
----- Message from Dr. Dorys Reynolds MD sent at 6/19/2025 11:51 AM EDT -----  Good morning,  I had spoken to Ms. Eagle and she had asked that I reach out to you.  She was trying to leave a message for Dr Lowe to explain what was happening with her blood sugars and was having trouble with the MyChart and her phone.  From what she told me yesterday it seems that her blood sugars in the morning have been in the 40s and she is symptomatic with this.  She is having high readings in the evening.  She has been purposefully eating sweets to get her blood sugar in the 300s in the evening to try and prevent her morning lows.  She told me she only took 1 unit of her Humalog yesterday evening and blood sugar was low 40s this morning,     Thank you for your help  Sincerely, Dorys

## 2025-06-19 NOTE — TELEPHONE ENCOUNTER
I called pt and recommended she decrease her Tresiba to 36 units. She refuses to make the change. She is convinced her hypoglycemia is due to Humalog and she will not take any humalog unles her BG >300 and she will not go to bed unless her BG >300.

## 2025-06-19 NOTE — TELEPHONE ENCOUNTER
PT called and said her sodium level was at 132 and it is 128 now and wants to know what to do. She forgot to ask you yesterday. Would like a response in her Mychart.

## 2025-06-20 ENCOUNTER — TELEPHONE (OUTPATIENT)
Age: 60
End: 2025-06-20

## 2025-06-20 RX ORDER — INSULIN LISPRO 100 [IU]/ML
INJECTION, SOLUTION INTRAVENOUS; SUBCUTANEOUS
Qty: 30 ML | Refills: 1 | Status: SHIPPED | OUTPATIENT
Start: 2025-06-20

## 2025-06-20 RX ORDER — INSULIN DEGLUDEC 100 U/ML
INJECTION, SOLUTION SUBCUTANEOUS
Qty: 15 ML | Refills: 0 | OUTPATIENT
Start: 2025-06-20

## 2025-06-20 NOTE — TELEPHONE ENCOUNTER
FYI:   Have initiated a PA for brand Lantus, as generic not available. (Basaglar is not covered, either)

## 2025-06-23 NOTE — TELEPHONE ENCOUNTER
Nothing to do with her hearing, they have talked to her multiple times, the patient has even texted the nurse about not coming.,

## 2025-06-23 NOTE — TELEPHONE ENCOUNTER
Patient had said she was having trouble getting into contact with them at her last visit.  She struggles with her hearing. can we make sure there is no confusion about this and she does not want HH .    If she does not want to do home health that is fine but I do think she would benefit from physical therapy.

## 2025-06-24 ENCOUNTER — TELEPHONE (OUTPATIENT)
Age: 60
End: 2025-06-24

## 2025-06-24 NOTE — TELEPHONE ENCOUNTER
Brand Lantus has been approved from 06/09/25 until further notice. Patient was notified via [employer].

## 2025-06-25 ENCOUNTER — TRANSCRIBE ORDERS (OUTPATIENT)
Facility: HOSPITAL | Age: 60
End: 2025-06-25

## 2025-06-25 DIAGNOSIS — Z12.31 SCREENING MAMMOGRAM FOR BREAST CANCER: Primary | ICD-10-CM

## 2025-06-27 ENCOUNTER — RESULTS FOLLOW-UP (OUTPATIENT)
Age: 60
End: 2025-06-27

## 2025-06-27 ENCOUNTER — HOSPITAL ENCOUNTER (OUTPATIENT)
Facility: HOSPITAL | Age: 60
Discharge: HOME | End: 2025-06-30
Payer: MEDICARE

## 2025-06-27 ENCOUNTER — TELEPHONE (OUTPATIENT)
Age: 60
End: 2025-06-27

## 2025-06-27 DIAGNOSIS — G47.00 INSOMNIA, UNSPECIFIED TYPE: ICD-10-CM

## 2025-06-27 LAB
25(OH)D3 SERPL-MCNC: 60.7 NG/ML (ref 30–100)
ALBUMIN SERPL-MCNC: 3.8 G/DL (ref 3.5–5)
ALBUMIN/GLOB SERPL: 1.3 (ref 1.1–2.2)
ALP SERPL-CCNC: 64 U/L (ref 45–117)
ALT SERPL-CCNC: 27 U/L (ref 12–78)
ANION GAP SERPL CALC-SCNC: 6 MMOL/L (ref 2–12)
AST SERPL-CCNC: 25 U/L (ref 15–37)
BILIRUB SERPL-MCNC: 0.5 MG/DL (ref 0.2–1)
BUN SERPL-MCNC: 9 MG/DL (ref 6–20)
BUN/CREAT SERPL: 16 (ref 12–20)
CALCIUM SERPL-MCNC: 9.7 MG/DL (ref 8.5–10.1)
CHLORIDE SERPL-SCNC: 98 MMOL/L (ref 97–108)
CO2 SERPL-SCNC: 31 MMOL/L (ref 21–32)
CREAT SERPL-MCNC: 0.58 MG/DL (ref 0.55–1.02)
EST. AVERAGE GLUCOSE BLD GHB EST-MCNC: 169 MG/DL
GLOBULIN SER CALC-MCNC: 2.9 G/DL (ref 2–4)
GLUCOSE SERPL-MCNC: 41 MG/DL (ref 65–100)
HBA1C MFR BLD: 7.5 % (ref 4–5.6)
POTASSIUM SERPL-SCNC: 4.3 MMOL/L (ref 3.5–5.1)
PROT SERPL-MCNC: 6.7 G/DL (ref 6.4–8.2)
SODIUM SERPL-SCNC: 135 MMOL/L (ref 136–145)
T4 FREE SERPL-MCNC: 1.4 NG/DL (ref 0.8–1.5)
TSH SERPL DL<=0.05 MIU/L-ACNC: 0.53 UIU/ML (ref 0.36–3.74)

## 2025-06-27 PROCEDURE — 36415 COLL VENOUS BLD VENIPUNCTURE: CPT

## 2025-06-27 PROCEDURE — 83036 HEMOGLOBIN GLYCOSYLATED A1C: CPT

## 2025-06-27 PROCEDURE — 84439 ASSAY OF FREE THYROXINE: CPT

## 2025-06-27 PROCEDURE — 80053 COMPREHEN METABOLIC PANEL: CPT

## 2025-06-27 PROCEDURE — 82306 VITAMIN D 25 HYDROXY: CPT

## 2025-06-27 PROCEDURE — 84443 ASSAY THYROID STIM HORMONE: CPT

## 2025-06-27 RX ORDER — ZOLPIDEM TARTRATE 10 MG/1
TABLET ORAL
Qty: 30 TABLET | Refills: 1 | Status: SHIPPED | OUTPATIENT
Start: 2025-06-27 | End: 2025-08-26

## 2025-06-27 NOTE — TELEPHONE ENCOUNTER
Cc on lab result from endocrine for hypoglycemia.  She has had a slurpee and peanut butter cracker. BG improved to 179.  She is feeling well and continue to monitor BG

## 2025-06-27 NOTE — TELEPHONE ENCOUNTER
Patient stated she has all the symptoms of a spinal fluid leak. She had this in 1997 and the nurse practitioner at her Religion stated she needs a doppler or something of the vessels in her neck.

## 2025-07-01 ENCOUNTER — TELEPHONE (OUTPATIENT)
Age: 60
End: 2025-07-01

## 2025-07-01 NOTE — TELEPHONE ENCOUNTER
Returned call - no medications changes at this point - suspect some of hyponatremia is secondary poor solute intake   Recommended regular intake and follow up labs at next nephrology visit in one month

## 2025-07-02 DIAGNOSIS — I25.10 ASCVD (ARTERIOSCLEROTIC CARDIOVASCULAR DISEASE): ICD-10-CM

## 2025-07-02 DIAGNOSIS — I10 PRIMARY HYPERTENSION: ICD-10-CM

## 2025-07-03 RX ORDER — LEVOTHYROXINE SODIUM 75 UG/1
75 TABLET ORAL
Qty: 90 TABLET | Refills: 1 | Status: SHIPPED | OUTPATIENT
Start: 2025-07-03

## 2025-07-03 RX ORDER — AMLODIPINE BESYLATE 5 MG/1
5 TABLET ORAL DAILY
Qty: 90 TABLET | Refills: 0 | Status: SHIPPED | OUTPATIENT
Start: 2025-07-03

## 2025-07-03 RX ORDER — LOSARTAN POTASSIUM 50 MG/1
50 TABLET ORAL 2 TIMES DAILY
Qty: 180 TABLET | Refills: 3 | Status: SHIPPED | OUTPATIENT
Start: 2025-07-03

## 2025-07-03 RX ORDER — LOSARTAN POTASSIUM 50 MG/1
50 TABLET ORAL 2 TIMES DAILY
Qty: 180 TABLET | Refills: 0 | OUTPATIENT
Start: 2025-07-03

## 2025-07-08 ENCOUNTER — APPOINTMENT (OUTPATIENT)
Facility: HOSPITAL | Age: 60
End: 2025-07-08
Payer: MEDICARE

## 2025-07-08 ENCOUNTER — TELEPHONE (OUTPATIENT)
Age: 60
End: 2025-07-08

## 2025-07-08 ENCOUNTER — HOSPITAL ENCOUNTER (EMERGENCY)
Facility: HOSPITAL | Age: 60
Discharge: HOME | End: 2025-07-08
Attending: EMERGENCY MEDICINE
Payer: MEDICARE

## 2025-07-08 VITALS
TEMPERATURE: 98.2 F | DIASTOLIC BLOOD PRESSURE: 54 MMHG | OXYGEN SATURATION: 96 % | BODY MASS INDEX: 27.28 KG/M2 | HEIGHT: 63 IN | HEART RATE: 72 BPM | SYSTOLIC BLOOD PRESSURE: 126 MMHG | RESPIRATION RATE: 20 BRPM

## 2025-07-08 DIAGNOSIS — R53.1 GENERALIZED WEAKNESS: Primary | ICD-10-CM

## 2025-07-08 DIAGNOSIS — R73.9 HYPERGLYCEMIA: ICD-10-CM

## 2025-07-08 LAB
ALBUMIN SERPL-MCNC: 3.8 G/DL (ref 3.5–5)
ALBUMIN/GLOB SERPL: 1.3 (ref 1.1–2.2)
ALP SERPL-CCNC: 63 U/L (ref 45–117)
ALT SERPL-CCNC: 24 U/L (ref 12–78)
ANION GAP SERPL CALC-SCNC: 8 MMOL/L (ref 2–12)
APPEARANCE UR: CLEAR
AST SERPL-CCNC: 19 U/L (ref 15–37)
BACTERIA URNS QL MICRO: NEGATIVE /HPF
BASOPHILS # BLD: 0.03 K/UL (ref 0–0.1)
BASOPHILS NFR BLD: 0.8 % (ref 0–1)
BILIRUB SERPL-MCNC: 0.7 MG/DL (ref 0.2–1)
BILIRUB UR QL: NEGATIVE
BUN SERPL-MCNC: 10 MG/DL (ref 6–20)
BUN/CREAT SERPL: 14 (ref 12–20)
CALCIUM SERPL-MCNC: 9.1 MG/DL (ref 8.5–10.1)
CHLORIDE SERPL-SCNC: 98 MMOL/L (ref 97–108)
CO2 SERPL-SCNC: 28 MMOL/L (ref 21–32)
COLOR UR: ABNORMAL
CREAT SERPL-MCNC: 0.73 MG/DL (ref 0.55–1.02)
DIFFERENTIAL METHOD BLD: ABNORMAL
EOSINOPHIL # BLD: 0 K/UL (ref 0–0.4)
EOSINOPHIL NFR BLD: 0 % (ref 0–0.7)
EPITH CASTS URNS QL MICRO: ABNORMAL /LPF
ERYTHROCYTE [DISTWIDTH] IN BLOOD BY AUTOMATED COUNT: 11.7 % (ref 11.5–14.5)
GLOBULIN SER CALC-MCNC: 3 G/DL (ref 2–4)
GLUCOSE SERPL-MCNC: 338 MG/DL (ref 65–100)
GLUCOSE UR STRIP.AUTO-MCNC: >1000 MG/DL
HCT VFR BLD AUTO: 35.7 % (ref 35–47)
HGB BLD-MCNC: 12.6 G/DL (ref 11.5–16)
HGB UR QL STRIP: NEGATIVE
IMM GRANULOCYTES # BLD AUTO: 0.02 K/UL (ref 0–0.04)
IMM GRANULOCYTES NFR BLD AUTO: 0.5 % (ref 0–0.5)
KETONES UR QL STRIP.AUTO: NEGATIVE MG/DL
LEUKOCYTE ESTERASE UR QL STRIP.AUTO: NEGATIVE
LYMPHOCYTES # BLD: 0.97 K/UL (ref 0.8–3.5)
LYMPHOCYTES NFR BLD: 24.4 % (ref 12–49)
MAGNESIUM SERPL-MCNC: 1.8 MG/DL (ref 1.6–2.4)
MCH RBC QN AUTO: 31.6 PG (ref 26–34)
MCHC RBC AUTO-ENTMCNC: 35.3 G/DL (ref 30–36.5)
MCV RBC AUTO: 89.5 FL (ref 80–99)
MONOCYTES # BLD: 0.37 K/UL (ref 0–1)
MONOCYTES NFR BLD: 9.3 % (ref 5–13)
NEUTS SEG # BLD: 2.58 K/UL (ref 1.8–8)
NEUTS SEG NFR BLD: 65 % (ref 32–75)
NITRITE UR QL STRIP.AUTO: NEGATIVE
NRBC # BLD: 0 K/UL (ref 0–0.01)
NRBC BLD-RTO: 0 PER 100 WBC
PH UR STRIP: 6.5 (ref 5–8)
PLATELET # BLD AUTO: 198 K/UL (ref 150–400)
PMV BLD AUTO: 8.4 FL (ref 8.9–12.9)
POTASSIUM SERPL-SCNC: 4.6 MMOL/L (ref 3.5–5.1)
PROT SERPL-MCNC: 6.8 G/DL (ref 6.4–8.2)
PROT UR STRIP-MCNC: NEGATIVE MG/DL
RBC # BLD AUTO: 3.99 M/UL (ref 3.8–5.2)
RBC #/AREA URNS HPF: ABNORMAL /HPF (ref 0–5)
SODIUM SERPL-SCNC: 134 MMOL/L (ref 136–145)
SODIUM UR-SCNC: 53 MMOL/L
SP GR UR REFRACTOMETRY: 1.01 (ref 1–1.03)
TROPONIN I SERPL HS-MCNC: 6 NG/L (ref 0–51)
URINE CULTURE IF INDICATED: ABNORMAL
UROBILINOGEN UR QL STRIP.AUTO: 0.2 EU/DL (ref 0.2–1)
WBC # BLD AUTO: 4 K/UL (ref 3.6–11)
WBC URNS QL MICRO: ABNORMAL /HPF (ref 0–4)

## 2025-07-08 PROCEDURE — 81001 URINALYSIS AUTO W/SCOPE: CPT

## 2025-07-08 PROCEDURE — 83735 ASSAY OF MAGNESIUM: CPT

## 2025-07-08 PROCEDURE — 96374 THER/PROPH/DIAG INJ IV PUSH: CPT

## 2025-07-08 PROCEDURE — 85025 COMPLETE CBC W/AUTO DIFF WBC: CPT

## 2025-07-08 PROCEDURE — 83930 ASSAY OF BLOOD OSMOLALITY: CPT

## 2025-07-08 PROCEDURE — 84300 ASSAY OF URINE SODIUM: CPT

## 2025-07-08 PROCEDURE — 80053 COMPREHEN METABOLIC PANEL: CPT

## 2025-07-08 PROCEDURE — 70450 CT HEAD/BRAIN W/O DYE: CPT

## 2025-07-08 PROCEDURE — 83935 ASSAY OF URINE OSMOLALITY: CPT

## 2025-07-08 PROCEDURE — 71046 X-RAY EXAM CHEST 2 VIEWS: CPT

## 2025-07-08 PROCEDURE — 96361 HYDRATE IV INFUSION ADD-ON: CPT

## 2025-07-08 PROCEDURE — 84484 ASSAY OF TROPONIN QUANT: CPT

## 2025-07-08 PROCEDURE — 36415 COLL VENOUS BLD VENIPUNCTURE: CPT

## 2025-07-08 PROCEDURE — 2580000003 HC RX 258: Performed by: EMERGENCY MEDICINE

## 2025-07-08 PROCEDURE — 6360000002 HC RX W HCPCS: Performed by: EMERGENCY MEDICINE

## 2025-07-08 PROCEDURE — 99285 EMERGENCY DEPT VISIT HI MDM: CPT

## 2025-07-08 RX ORDER — 0.9 % SODIUM CHLORIDE 0.9 %
1000 INTRAVENOUS SOLUTION INTRAVENOUS ONCE
Status: COMPLETED | OUTPATIENT
Start: 2025-07-08 | End: 2025-07-08

## 2025-07-08 RX ORDER — METOCLOPRAMIDE HYDROCHLORIDE 5 MG/ML
10 INJECTION INTRAMUSCULAR; INTRAVENOUS ONCE
Status: COMPLETED | OUTPATIENT
Start: 2025-07-08 | End: 2025-07-08

## 2025-07-08 RX ADMIN — METOCLOPRAMIDE 10 MG: 5 INJECTION, SOLUTION INTRAMUSCULAR; INTRAVENOUS at 11:07

## 2025-07-08 RX ADMIN — SODIUM CHLORIDE 1000 ML: 9 INJECTION, SOLUTION INTRAVENOUS at 10:10

## 2025-07-08 ASSESSMENT — PAIN - FUNCTIONAL ASSESSMENT
PAIN_FUNCTIONAL_ASSESSMENT: 0-10
PAIN_FUNCTIONAL_ASSESSMENT: 0-10

## 2025-07-08 ASSESSMENT — PAIN SCALES - GENERAL
PAINLEVEL_OUTOF10: 0
PAINLEVEL_OUTOF10: 8

## 2025-07-08 ASSESSMENT — PAIN DESCRIPTION - LOCATION: LOCATION: HEAD

## 2025-07-08 ASSESSMENT — PAIN DESCRIPTION - DESCRIPTORS: DESCRIPTORS: PRESSURE

## 2025-07-08 NOTE — ED TRIAGE NOTES
Pt reports that PCP sent her d/t \"feeling like I'm going to pass out, pressure in my head\".  Pt has a pmhx of hyponatremia.

## 2025-07-08 NOTE — ED NOTES
I have reviewed discharge instructions with the patient, mother and sister. The patient, mother and sister verbalized understanding. Discharge medications discussed with patient. No questions at this time. Ambulated without difficulty.

## 2025-07-08 NOTE — TELEPHONE ENCOUNTER
SW pt, when she woke this morning, she had to lay in bed for a little bit more, very little energy to get up.  She tried calling her mom, but was very confused and unsteady once she got up.  The last time this all happened, her sodium was down very low.    I did informed pt, is recommending the ER, but will confirm with Dr. Reynolds.    Pt informed of I SW Dr. Reynolds, she is recommending the ER for evaluation as well if she is feeling way off from her normal self.  Also, needs to check her BS.  Pt stated her BS was OK at 132 this AM, so will go to the ER for evaluation.

## 2025-07-08 NOTE — ED PROVIDER NOTES
[facility] EMERGENCY DEPARTMENT  EMERGENCY DEPARTMENT ENCOUNTER       Pt Name: Janice Eagle  MRN: [id number]  Birthdate 1965  Date of evaluation: 7/8/2025  Provider: Roger Garcia DO   PCP: Dorys Reynolds MD  Note Started: 9:54 AM EDT 7/8/25     CHIEF COMPLAINT       Chief Complaint   Patient presents with    Fatigue        HISTORY OF PRESENT ILLNESS: 1 or more elements      History From: Patient, History limited by: none     Janice Eagle is a 60 y.o. female past medical history significant for SIADH on fluid restriction, diabetes, hypertension.  She is presenting today complaining of lethargy, fatigue.  She feels like her sodium is low.  Also complains of a headache, chest pressure.  She reports daily headaches in the morning and this headache is no different, other than it is not relieved by acetaminophen.  Patient denies any alcohol, states that she is maintaining to her fluid restriction.       Please See MDM for Additional Details of the HPI/PMH  Nursing Notes were all reviewed and agreed with or any disagreements were addressed in the HPI.     REVIEW OF SYSTEMS        Positives and Pertinent negatives as per HPI.    PAST HISTORY     Past Medical History:  Past Medical History:   Diagnosis Date    Arthritis     patient states \"every joint affected\"    Bee sting 09/05/2018    left elbow bee sting     Blister of ankle 09/05/2018    Blister small per patient of left ankle. Wears an air boot due to 2 stress fractures in last 2 months.    CAD (coronary artery disease)     Constipation     Falls 2017    pt reports having 4 falls in 3 days    Fatigue     Headache     Ill-defined condition     multiple broken ribs    Ill-defined condition     reports \"getting infections easily\"    Joint pain     Memory disorder     following spinal fluid leak repair    Menopause     Nausea & vomiting     Neuropathy     Osteoporosis     Thyroid disease     Type 1 diabetes (HCC)     diagnosed at age 6    Unspecified

## 2025-07-09 LAB
OSMOLALITY SERPL: 291 MOSM/KG H2O
OSMOLALITY UR: 293 MOSM/KG H2O

## 2025-07-11 DIAGNOSIS — E03.9 ACQUIRED HYPOTHYROIDISM: ICD-10-CM

## 2025-07-11 DIAGNOSIS — I10 ESSENTIAL (PRIMARY) HYPERTENSION: ICD-10-CM

## 2025-07-11 DIAGNOSIS — E10.42 TYPE 1 DIABETES MELLITUS WITH DIABETIC POLYNEUROPATHY (HCC): ICD-10-CM

## 2025-07-11 DIAGNOSIS — E55.9 VITAMIN D DEFICIENCY, UNSPECIFIED: ICD-10-CM

## 2025-07-13 LAB
EKG ATRIAL RATE: 69 BPM
EKG DIAGNOSIS: NORMAL
EKG P AXIS: 57 DEGREES
EKG P-R INTERVAL: 162 MS
EKG Q-T INTERVAL: 416 MS
EKG QRS DURATION: 98 MS
EKG QTC CALCULATION (BAZETT): 445 MS
EKG R AXIS: -20 DEGREES
EKG T AXIS: 27 DEGREES
EKG VENTRICULAR RATE: 69 BPM

## 2025-07-15 ENCOUNTER — HOSPITAL ENCOUNTER (OUTPATIENT)
Facility: HOSPITAL | Age: 60
Discharge: HOME | End: 2025-07-18
Attending: STUDENT IN AN ORGANIZED HEALTH CARE EDUCATION/TRAINING PROGRAM
Payer: MEDICARE

## 2025-07-15 DIAGNOSIS — Z12.31 SCREENING MAMMOGRAM FOR BREAST CANCER: ICD-10-CM

## 2025-07-15 PROCEDURE — 77063 BREAST TOMOSYNTHESIS BI: CPT

## 2025-07-17 ENCOUNTER — OFFICE VISIT (OUTPATIENT)
Age: 60
End: 2025-07-17
Payer: MEDICARE

## 2025-07-17 VITALS
WEIGHT: 153.4 LBS | RESPIRATION RATE: 20 BRPM | HEIGHT: 63 IN | SYSTOLIC BLOOD PRESSURE: 132 MMHG | OXYGEN SATURATION: 98 % | TEMPERATURE: 98.1 F | BODY MASS INDEX: 27.18 KG/M2 | HEART RATE: 78 BPM | DIASTOLIC BLOOD PRESSURE: 74 MMHG

## 2025-07-17 DIAGNOSIS — G96.00 CEREBROSPINAL FLUID LEAK: ICD-10-CM

## 2025-07-17 DIAGNOSIS — I10 ESSENTIAL HYPERTENSION: ICD-10-CM

## 2025-07-17 DIAGNOSIS — E87.1 HYPONATREMIA: Primary | ICD-10-CM

## 2025-07-17 DIAGNOSIS — E10.42 TYPE 1 DIABETES MELLITUS WITH DIABETIC POLYNEUROPATHY (HCC): ICD-10-CM

## 2025-07-17 DIAGNOSIS — E87.1 CHRONIC HYPONATREMIA: ICD-10-CM

## 2025-07-17 DIAGNOSIS — K04.7 DENTAL INFECTION: ICD-10-CM

## 2025-07-17 PROCEDURE — 1036F TOBACCO NON-USER: CPT | Performed by: STUDENT IN AN ORGANIZED HEALTH CARE EDUCATION/TRAINING PROGRAM

## 2025-07-17 PROCEDURE — 2022F DILAT RTA XM EVC RTNOPTHY: CPT | Performed by: STUDENT IN AN ORGANIZED HEALTH CARE EDUCATION/TRAINING PROGRAM

## 2025-07-17 PROCEDURE — 3051F HG A1C>EQUAL 7.0%<8.0%: CPT | Performed by: STUDENT IN AN ORGANIZED HEALTH CARE EDUCATION/TRAINING PROGRAM

## 2025-07-17 PROCEDURE — 3075F SYST BP GE 130 - 139MM HG: CPT | Performed by: STUDENT IN AN ORGANIZED HEALTH CARE EDUCATION/TRAINING PROGRAM

## 2025-07-17 PROCEDURE — 3017F COLORECTAL CA SCREEN DOC REV: CPT | Performed by: STUDENT IN AN ORGANIZED HEALTH CARE EDUCATION/TRAINING PROGRAM

## 2025-07-17 PROCEDURE — 3078F DIAST BP <80 MM HG: CPT | Performed by: STUDENT IN AN ORGANIZED HEALTH CARE EDUCATION/TRAINING PROGRAM

## 2025-07-17 PROCEDURE — G8427 DOCREV CUR MEDS BY ELIG CLIN: HCPCS | Performed by: STUDENT IN AN ORGANIZED HEALTH CARE EDUCATION/TRAINING PROGRAM

## 2025-07-17 PROCEDURE — 99214 OFFICE O/P EST MOD 30 MIN: CPT | Performed by: STUDENT IN AN ORGANIZED HEALTH CARE EDUCATION/TRAINING PROGRAM

## 2025-07-17 PROCEDURE — G8419 CALC BMI OUT NRM PARAM NOF/U: HCPCS | Performed by: STUDENT IN AN ORGANIZED HEALTH CARE EDUCATION/TRAINING PROGRAM

## 2025-07-17 RX ORDER — SODIUM CHLORIDE 1 G/1
1 TABLET ORAL DAILY
Qty: 60 TABLET | Refills: 1 | Status: SHIPPED | OUTPATIENT
Start: 2025-07-17

## 2025-07-17 RX ORDER — IBUPROFEN 200 MG
1 CAPSULE ORAL DAILY
COMMUNITY

## 2025-07-17 RX ORDER — IBUPROFEN 200 MG
200 TABLET ORAL EVERY 6 HOURS
COMMUNITY

## 2025-07-17 RX ORDER — ACETAMINOPHEN 500 MG
500 TABLET ORAL EVERY 6 HOURS
COMMUNITY

## 2025-07-18 ENCOUNTER — TELEPHONE (OUTPATIENT)
Age: 60
End: 2025-07-18

## 2025-07-18 NOTE — PROGRESS NOTES
Chief Complaint   Patient presents with    Medication Check       Vitals:    07/17/25 1121   BP: 132/74   Pulse: (!) 20   Resp: 20   Temp: 98.1 °F (36.7 °C)   SpO2: 98%     Have you been to the ER, urgent care clinic since your last visit?  Hospitalized since your last visit?   YES - When: approximately 10 days ago.  Where and Why: Dehydration.    Have you seen or consulted any other health care providers outside our system since your last visit?   NO      “Have you had a diabetic eye exam?”    NO     Date of last diabetic eye exam: 10/5/2023          
Chief Complaint   Patient presents with    Medication Check       Vitals:    07/17/25 1121   BP: 132/74   Pulse: 78   Resp: 20   Temp: 98.1 °F (36.7 °C)   SpO2: 98%       
Subjective:     Chief Complaint   Patient presents with    Medication Check       Janice Eagle is a 60 y.o. female who presents today for follow up     HPI  hypertension Follow Up  Patient is here to follow up of hypertension.   Feels this problems is uncontrolled  Taking medications as prescribed? Yes  Amlodipine 10 mg + losartan 50mg bid+ atenolol 3.25 twice daily    States that blood pressures have typically been in the 140s has not been taking the hydralazine.  Did take 1-2 doses did not have side effects.    hyponatremia:   She is established with [facility].  Hyponatremia thought to be potentially multifactorial potentially due to poor solute intake.  Most recent sodium was 135.  She was continued on salt tablets once daily as well as her fluid restriction.  Overall this has been stable.  Euvolemic on exam.  Try overall eat more regular meals however often eats a small breakfast and dinner skips between.  Has tried a high-protein Ensure.    Hx of CSF leak:   this was idopahtic.   Since cochlear implant in December she continues to have positional headache and gait imbalance.    She has had recent MRI brain without clear evidence of CSF leak.  She has had follow-up with her ENT.  ENT has recommended that she see a subspecialist however she has difficulty scheduling this.  She does not have the name of the subspecialist. She has also been seen by neurology for her symptoms where she feels that the was not helpful. Symptoms are overall unchanged   She requests she reach out to her ENT to help coordinate this     Type 1 diabetes  She follows with endocrinology,and has an upcoming appointment next week.   She continues to struggle with labile sugars.  Unfortunately has not been able to use a CGM.  Will have lows as low as 40 and is symptomatic with this as well as episodes of hyperglycemia to the 400s.  She feels she is very stressed sensitive to her short acting insulin and will cause reactive hypoglycemia 
Take by mouth every 48 hours, Disp: , Rfl:     VITAMIN A PO, Take 2,400 mcg by mouth, Disp: , Rfl:     ascorbic acid (VITAMIN C) 1000 MG tablet, Take 8 tablets by mouth 2 times daily, Disp: , Rfl:     Cholecalciferol 50 MCG (2000 UT) TABS, Take 1 tablet by mouth daily, Disp: , Rfl:     pantoprazole (PROTONIX) 40 MG tablet, Take 1 tablet by mouth daily (Patient not taking: Reported on 7/17/2025), Disp: , Rfl:     Social History     Tobacco Use   Smoking Status Former    Current packs/day: 0.00    Average packs/day: 0.5 packs/day for 2.7 years (1.4 ttl pk-yrs)    Types: Cigarettes    Start date: 4/10/2007    Quit date: 1/1/2010    Years since quitting: 15.5    Passive exposure: Past   Smokeless Tobacco Never       Review of Systems  ROS:  Gen: denies fever, chills, or fatigue  Resp: denies dyspnea, cough, or wheezing  CV: denies chest pain, pressure, or palpitations  GI:: denies abdominal pain, nausea, vomiting, diarrhea, or constipation  Neuro: denies numbness/tingling or dizziness          Objective:     /74 (BP Site: Left Upper Arm)   Pulse (!) 20   Temp 98.1 °F (36.7 °C) (Oral)   Resp 20   Ht 1.6 m (5' 3\")   Wt 69.6 kg (153 lb 6.4 oz)   SpO2 98%   BMI 27.17 kg/m²   Body mass index is 27.17 kg/m².    Physical Exam  HENT:      Right Ear: No mastoid tenderness. Tympanic membrane is not injected, scarred or erythematous.      Left Ear: Tympanic membrane, ear canal and external ear normal.      Ears:      Comments: Cochlear implant present.  No tenderness over the mastoid process.       Nose:      Comments: Blood clot present at the nasal septum bilaterally  Cardiovascular:      Rate and Rhythm: Normal rate and regular rhythm.      Pulses: Normal pulses.      Heart sounds: Normal heart sounds.   Pulmonary:      Effort: Pulmonary effort is normal.      Breath sounds: Normal breath sounds.   Abdominal:      General: There is no distension.      Palpations: Abdomen is soft.      Tenderness: There is no

## 2025-07-21 ENCOUNTER — OFFICE VISIT (OUTPATIENT)
Age: 60
End: 2025-07-21
Payer: MEDICARE

## 2025-07-21 VITALS
WEIGHT: 152.4 LBS | DIASTOLIC BLOOD PRESSURE: 60 MMHG | SYSTOLIC BLOOD PRESSURE: 124 MMHG | BODY MASS INDEX: 27 KG/M2 | HEIGHT: 63 IN | HEART RATE: 68 BPM

## 2025-07-21 DIAGNOSIS — I10 ESSENTIAL (PRIMARY) HYPERTENSION: ICD-10-CM

## 2025-07-21 DIAGNOSIS — E55.9 VITAMIN D DEFICIENCY, UNSPECIFIED: ICD-10-CM

## 2025-07-21 DIAGNOSIS — E03.9 ACQUIRED HYPOTHYROIDISM: ICD-10-CM

## 2025-07-21 DIAGNOSIS — E10.42 TYPE 1 DIABETES MELLITUS WITH DIABETIC POLYNEUROPATHY (HCC): Primary | ICD-10-CM

## 2025-07-21 PROCEDURE — 3078F DIAST BP <80 MM HG: CPT | Performed by: INTERNAL MEDICINE

## 2025-07-21 PROCEDURE — G2211 COMPLEX E/M VISIT ADD ON: HCPCS | Performed by: INTERNAL MEDICINE

## 2025-07-21 PROCEDURE — 3074F SYST BP LT 130 MM HG: CPT | Performed by: INTERNAL MEDICINE

## 2025-07-21 PROCEDURE — 3017F COLORECTAL CA SCREEN DOC REV: CPT | Performed by: INTERNAL MEDICINE

## 2025-07-21 PROCEDURE — 99214 OFFICE O/P EST MOD 30 MIN: CPT | Performed by: INTERNAL MEDICINE

## 2025-07-21 PROCEDURE — G8419 CALC BMI OUT NRM PARAM NOF/U: HCPCS | Performed by: INTERNAL MEDICINE

## 2025-07-21 PROCEDURE — 3051F HG A1C>EQUAL 7.0%<8.0%: CPT | Performed by: INTERNAL MEDICINE

## 2025-07-21 PROCEDURE — 2022F DILAT RTA XM EVC RTNOPTHY: CPT | Performed by: INTERNAL MEDICINE

## 2025-07-21 PROCEDURE — G8427 DOCREV CUR MEDS BY ELIG CLIN: HCPCS | Performed by: INTERNAL MEDICINE

## 2025-07-21 PROCEDURE — 1036F TOBACCO NON-USER: CPT | Performed by: INTERNAL MEDICINE

## 2025-07-21 RX ORDER — INSULIN LISPRO 100 [IU]/ML
INJECTION, SOLUTION INTRAVENOUS; SUBCUTANEOUS
Qty: 30 ML | Refills: 1 | Status: SHIPPED | OUTPATIENT
Start: 2025-07-21

## 2025-07-21 RX ORDER — LEVOTHYROXINE SODIUM 75 UG/1
75 TABLET ORAL
Qty: 90 TABLET | Refills: 1 | Status: SHIPPED | OUTPATIENT
Start: 2025-07-21

## 2025-07-21 RX ORDER — INSULIN GLARGINE 100 [IU]/ML
35 INJECTION, SOLUTION SUBCUTANEOUS NIGHTLY
Qty: 45 ML | Refills: 1 | Status: SHIPPED | OUTPATIENT
Start: 2025-07-21

## 2025-07-21 RX ORDER — BLOOD SUGAR DIAGNOSTIC
STRIP MISCELLANEOUS
Qty: 800 EACH | Refills: 3 | Status: SHIPPED | OUTPATIENT
Start: 2025-07-21

## 2025-07-21 RX ORDER — AMLODIPINE BESYLATE 10 MG/1
10 TABLET ORAL DAILY
COMMUNITY
Start: 2025-06-30

## 2025-07-21 NOTE — PROGRESS NOTES
Chief Complaint   Patient presents with    Diabetes    Thyroid Problem    Vitamin D deficiency     Pcp and pharmacy verified     History of Present Illness: Janice Eagle is a 60 y.o. female here for follow up of Type 1 diabetes.    \"I now have to have tendon surgery in my left hip, but they need an MRI first. Because of my cochlear implant they are going to have to get a special MRI.\"    For her diabetes she is taking Lantus 35 units (when I was on 32 units I was having a lot of high BG, when I increased it to 35 my BG improved.). She is not taking any HS Lantus. She is taking Humalog based on her BG. She will take humalog only when her BG >250-300. She notes she is having a lot of high BG in the evening and she will take 1-2 units of the Humalog only.    Pt had the Cochlear implant on Alexsi Alexa, but she notes they turned it on in February 2025.     She has not been using the DexCom \"I have it, but I have never taken out of the box I want to save that in case I can't get my test strips\".    She tests her BG 8 times per day. \"I have the DexCom but I did not start using it. I did not like how the Davi worked, so I have not started the DexCom. I don't trust it.\"    Her A1C in June 2025 was 7.5%.      - Pt is waking around 7-8AM, she takes her Levemir 35 units and Humalog 1-2 units \"if my sugar is over 300\".    - She will have the pepsi and 4 crackers every morning.  - She is not eating lunch, or snacking in the afternoon.  - She has dinner around 5PM, last night she had nachos and a slurpie.   - Pt notes she is eating at bedtime to keep her BG >300.    Pt has hx of CVA x4, \"one of which caused a CNS leak\".   She reports she had a stress test and an ECHO in 2015 and \"everything came back fine\".    Pt has hx of polyneuropathy from her knees to her feet. She was taken off the Gabapentin because it caused low sodium levels.    Pt has no hx of Nephrology    Last eye exam was May 2025 \"He says my eyes look great and

## 2025-07-28 DIAGNOSIS — G47.00 INSOMNIA, UNSPECIFIED TYPE: ICD-10-CM

## 2025-07-28 RX ORDER — ZOLPIDEM TARTRATE 10 MG/1
10 TABLET ORAL NIGHTLY PRN
Qty: 30 TABLET | Refills: 1 | Status: SHIPPED | OUTPATIENT
Start: 2025-07-28 | End: 2025-09-26

## 2025-07-28 NOTE — TELEPHONE ENCOUNTER
Medication Refill Request    Janice Eagle is requesting a refill of the following medication(s):   Ambien (Brand)  Please send refill to:     [facility] [address] [place] [address] - [address] [facility] -  [phone] - F [phone]  [address] [facility] [address]  Phone: [phone] Fax: [phone]    Patient is wanting to alternate from brand to generic each month.

## 2025-07-28 NOTE — TELEPHONE ENCOUNTER
Patient requesting refill on     Requested Prescriptions      No prescriptions requested or ordered in this encounter        Last OV 7/17/2025

## 2025-08-04 RX ORDER — FLUTICASONE PROPIONATE 50 MCG
2 SPRAY, SUSPENSION (ML) NASAL DAILY
Qty: 48 G | Refills: 1 | Status: SHIPPED | OUTPATIENT
Start: 2025-08-04

## 2025-08-06 ENCOUNTER — TELEPHONE (OUTPATIENT)
Age: 60
End: 2025-08-06

## 2025-08-06 ENCOUNTER — HOSPITAL ENCOUNTER (OUTPATIENT)
Facility: HOSPITAL | Age: 60
Setting detail: RECURRING SERIES
Discharge: HOME | End: 2025-08-09
Payer: MEDICARE

## 2025-08-06 DIAGNOSIS — E22.2 SIADH (SYNDROME OF INAPPROPRIATE ADH PRODUCTION): Primary | ICD-10-CM

## 2025-08-06 PROCEDURE — 97162 PT EVAL MOD COMPLEX 30 MIN: CPT

## 2025-08-06 PROCEDURE — 97110 THERAPEUTIC EXERCISES: CPT

## 2025-08-07 ENCOUNTER — TELEPHONE (OUTPATIENT)
Age: 60
End: 2025-08-07

## 2025-08-08 ENCOUNTER — TELEPHONE (OUTPATIENT)
Age: 60
End: 2025-08-08

## 2025-08-08 DIAGNOSIS — I10 ESSENTIAL HYPERTENSION: Primary | ICD-10-CM

## 2025-08-08 RX ORDER — ADHESIVE BANDAGE 3/4"
BANDAGE TOPICAL
Qty: 1 EACH | Refills: 0 | Status: SHIPPED | OUTPATIENT
Start: 2025-08-08

## 2025-08-12 ENCOUNTER — OFFICE VISIT (OUTPATIENT)
Age: 60
End: 2025-08-12
Payer: MEDICARE

## 2025-08-12 VITALS
SYSTOLIC BLOOD PRESSURE: 144 MMHG | DIASTOLIC BLOOD PRESSURE: 76 MMHG | WEIGHT: 155 LBS | HEART RATE: 70 BPM | RESPIRATION RATE: 18 BRPM | OXYGEN SATURATION: 98 % | HEIGHT: 63 IN | BODY MASS INDEX: 27.46 KG/M2 | TEMPERATURE: 97 F

## 2025-08-12 DIAGNOSIS — E56.9 VITAMIN DEFICIENCY: Primary | ICD-10-CM

## 2025-08-12 DIAGNOSIS — L29.9 CHRONIC PRURITUS: ICD-10-CM

## 2025-08-12 DIAGNOSIS — Z85.828 HX OF NONMELANOMA SKIN CANCER: ICD-10-CM

## 2025-08-12 DIAGNOSIS — R25.2 BILATERAL LEG CRAMPS: ICD-10-CM

## 2025-08-12 DIAGNOSIS — L98.9 SKIN LESION: ICD-10-CM

## 2025-08-12 DIAGNOSIS — E78.5 HYPERLIPIDEMIA, UNSPECIFIED HYPERLIPIDEMIA TYPE: ICD-10-CM

## 2025-08-12 DIAGNOSIS — I65.21 STENOSIS OF RIGHT CAROTID ARTERY: ICD-10-CM

## 2025-08-12 LAB
COMMENT:: NORMAL
SPECIMEN HOLD: NORMAL

## 2025-08-12 PROCEDURE — 3077F SYST BP >= 140 MM HG: CPT | Performed by: STUDENT IN AN ORGANIZED HEALTH CARE EDUCATION/TRAINING PROGRAM

## 2025-08-12 PROCEDURE — 1036F TOBACCO NON-USER: CPT | Performed by: STUDENT IN AN ORGANIZED HEALTH CARE EDUCATION/TRAINING PROGRAM

## 2025-08-12 PROCEDURE — 3078F DIAST BP <80 MM HG: CPT | Performed by: STUDENT IN AN ORGANIZED HEALTH CARE EDUCATION/TRAINING PROGRAM

## 2025-08-12 PROCEDURE — 99214 OFFICE O/P EST MOD 30 MIN: CPT | Performed by: STUDENT IN AN ORGANIZED HEALTH CARE EDUCATION/TRAINING PROGRAM

## 2025-08-12 PROCEDURE — 36415 COLL VENOUS BLD VENIPUNCTURE: CPT | Performed by: STUDENT IN AN ORGANIZED HEALTH CARE EDUCATION/TRAINING PROGRAM

## 2025-08-12 PROCEDURE — 3017F COLORECTAL CA SCREEN DOC REV: CPT | Performed by: STUDENT IN AN ORGANIZED HEALTH CARE EDUCATION/TRAINING PROGRAM

## 2025-08-12 PROCEDURE — G8419 CALC BMI OUT NRM PARAM NOF/U: HCPCS | Performed by: STUDENT IN AN ORGANIZED HEALTH CARE EDUCATION/TRAINING PROGRAM

## 2025-08-12 PROCEDURE — G8427 DOCREV CUR MEDS BY ELIG CLIN: HCPCS | Performed by: STUDENT IN AN ORGANIZED HEALTH CARE EDUCATION/TRAINING PROGRAM

## 2025-08-12 ASSESSMENT — PATIENT HEALTH QUESTIONNAIRE - PHQ9
SUM OF ALL RESPONSES TO PHQ QUESTIONS 1-9: 0
9. THOUGHTS THAT YOU WOULD BE BETTER OFF DEAD, OR OF HURTING YOURSELF: NOT AT ALL
1. LITTLE INTEREST OR PLEASURE IN DOING THINGS: NOT AT ALL
5. POOR APPETITE OR OVEREATING: NOT AT ALL
8. MOVING OR SPEAKING SO SLOWLY THAT OTHER PEOPLE COULD HAVE NOTICED. OR THE OPPOSITE, BEING SO FIGETY OR RESTLESS THAT YOU HAVE BEEN MOVING AROUND A LOT MORE THAN USUAL: NOT AT ALL
SUM OF ALL RESPONSES TO PHQ QUESTIONS 1-9: 0
10. IF YOU CHECKED OFF ANY PROBLEMS, HOW DIFFICULT HAVE THESE PROBLEMS MADE IT FOR YOU TO DO YOUR WORK, TAKE CARE OF THINGS AT HOME, OR GET ALONG WITH OTHER PEOPLE: NOT DIFFICULT AT ALL
SUM OF ALL RESPONSES TO PHQ QUESTIONS 1-9: 0
6. FEELING BAD ABOUT YOURSELF - OR THAT YOU ARE A FAILURE OR HAVE LET YOURSELF OR YOUR FAMILY DOWN: NOT AT ALL
4. FEELING TIRED OR HAVING LITTLE ENERGY: NOT AT ALL
2. FEELING DOWN, DEPRESSED OR HOPELESS: NOT AT ALL
7. TROUBLE CONCENTRATING ON THINGS, SUCH AS READING THE NEWSPAPER OR WATCHING TELEVISION: NOT AT ALL
SUM OF ALL RESPONSES TO PHQ QUESTIONS 1-9: 0
3. TROUBLE FALLING OR STAYING ASLEEP: NOT AT ALL

## 2025-08-13 ENCOUNTER — HOSPITAL ENCOUNTER (OUTPATIENT)
Facility: HOSPITAL | Age: 60
Setting detail: RECURRING SERIES
Discharge: HOME | End: 2025-08-16
Payer: MEDICARE

## 2025-08-13 LAB
ANION GAP SERPL CALC-SCNC: 12 MMOL/L (ref 2–14)
BUN SERPL-MCNC: 15 MG/DL (ref 8–23)
BUN/CREAT SERPL: 22 (ref 12–20)
CALCIUM SERPL-MCNC: 9.8 MG/DL (ref 8.8–10.2)
CHLORIDE SERPL-SCNC: 95 MMOL/L (ref 98–107)
CHOLEST SERPL-MCNC: 161 MG/DL (ref 0–200)
CO2 SERPL-SCNC: 24 MMOL/L (ref 20–29)
CREAT SERPL-MCNC: 0.69 MG/DL (ref 0.6–1)
GLUCOSE SERPL-MCNC: 209 MG/DL (ref 65–100)
HDLC SERPL-MCNC: 71 MG/DL (ref 40–60)
HDLC SERPL: 2.3 (ref 0–5)
LDLC SERPL CALC-MCNC: 74 MG/DL (ref 0–100)
MAGNESIUM SERPL-MCNC: 1.9 MG/DL (ref 1.6–2.4)
POTASSIUM SERPL-SCNC: 4.3 MMOL/L (ref 3.5–5.1)
SODIUM SERPL-SCNC: 131 MMOL/L (ref 136–145)
TRIGL SERPL-MCNC: 80 MG/DL (ref 0–150)
VLDLC SERPL CALC-MCNC: 16 MG/DL

## 2025-08-13 PROCEDURE — 97110 THERAPEUTIC EXERCISES: CPT

## 2025-08-14 ENCOUNTER — CLINICAL DOCUMENTATION (OUTPATIENT)
Age: 60
End: 2025-08-14

## 2025-08-16 LAB
VIT A SERPL-MCNC: 70.9 UG/DL (ref 22–69.5)
VITAMIN E ALPHA: 17.7 MG/L (ref 9–29)
VITAMIN E GAMMA: 0.4 MG/L (ref 0.5–4.9)

## 2025-08-22 ENCOUNTER — TELEPHONE (OUTPATIENT)
Age: 60
End: 2025-08-22

## 2025-08-22 DIAGNOSIS — E22.2 SIADH (SYNDROME OF INAPPROPRIATE ADH PRODUCTION): Primary | ICD-10-CM

## 2025-08-25 ENCOUNTER — TELEPHONE (OUTPATIENT)
Age: 60
End: 2025-08-25

## 2025-08-26 ENCOUNTER — HOSPITAL ENCOUNTER (OUTPATIENT)
Facility: HOSPITAL | Age: 60
Setting detail: RECURRING SERIES
Discharge: HOME | End: 2025-08-29
Payer: MEDICARE

## 2025-08-26 PROCEDURE — 97110 THERAPEUTIC EXERCISES: CPT

## 2025-08-28 ENCOUNTER — CLINICAL DOCUMENTATION (OUTPATIENT)
Age: 60
End: 2025-08-28

## 2025-08-28 ENCOUNTER — HOSPITAL ENCOUNTER (OUTPATIENT)
Facility: HOSPITAL | Age: 60
Setting detail: RECURRING SERIES
Discharge: HOME | End: 2025-08-31
Payer: MEDICARE

## 2025-08-28 ENCOUNTER — HOSPITAL ENCOUNTER (OUTPATIENT)
Facility: HOSPITAL | Age: 60
Discharge: HOME | End: 2025-08-31
Payer: MEDICARE

## 2025-08-28 LAB
ANION GAP SERPL CALC-SCNC: 5 MMOL/L (ref 2–12)
BUN SERPL-MCNC: 13 MG/DL (ref 6–20)
BUN/CREAT SERPL: 19 (ref 12–20)
CALCIUM SERPL-MCNC: 9.2 MG/DL (ref 8.5–10.1)
CHLORIDE SERPL-SCNC: 97 MMOL/L (ref 97–108)
CO2 SERPL-SCNC: 32 MMOL/L (ref 21–32)
CREAT SERPL-MCNC: 0.68 MG/DL (ref 0.55–1.02)
GLUCOSE SERPL-MCNC: 233 MG/DL (ref 65–100)
POTASSIUM SERPL-SCNC: 3.9 MMOL/L (ref 3.5–5.1)
SODIUM SERPL-SCNC: 134 MMOL/L (ref 136–145)

## 2025-08-28 PROCEDURE — 80048 BASIC METABOLIC PNL TOTAL CA: CPT

## 2025-08-28 PROCEDURE — 36415 COLL VENOUS BLD VENIPUNCTURE: CPT

## 2025-08-28 PROCEDURE — 97110 THERAPEUTIC EXERCISES: CPT

## (undated) DEVICE — (D)SOL MEDC ALC ISO 70% 16OZ -- CONVERT TO ITEM 364515

## (undated) DEVICE — LIGHT HANDLE: Brand: DEVON

## (undated) DEVICE — SUTURE PROL SZ 4-0 L18IN NONABSORBABLE BLU L13MM P-3 3/8 8699G

## (undated) DEVICE — INFECTION CONTROL KIT SYS

## (undated) DEVICE — 1010 S-DRAPE TOWEL DRAPE 10/BX: Brand: STERI-DRAPE™

## (undated) DEVICE — STERILE POLYISOPRENE POWDER-FREE SURGICAL GLOVES: Brand: PROTEXIS

## (undated) DEVICE — SKIN PREP TRAY W/CHG: Brand: MEDLINE INDUSTRIES, INC.

## (undated) DEVICE — BIPOLAR FORCEPS CORD: Brand: VALLEYLAB

## (undated) DEVICE — HAND I-LF: Brand: MEDLINE INDUSTRIES, INC.

## (undated) DEVICE — HOOK LOCK LATEX FREE ELASTIC BANDAGE 3INX5YD

## (undated) DEVICE — ZIMMER® STERILE DISPOSABLE TOURNIQUET CUFF WITH PROTECTIVE SLEEVE AND PLC, DUAL PORT, SINGLE BLADDER, 18 IN. (46 CM)

## (undated) DEVICE — SOLUTION IV 1000ML 0.9% SOD CHL

## (undated) DEVICE — DRAPE,REIN 53X77,STERILE: Brand: MEDLINE

## (undated) DEVICE — DEVON™ KNEE AND BODY STRAP 60" X 3" (1.5 M X 7.6 CM): Brand: DEVON

## (undated) DEVICE — IMPERVIOUS SURGICAL GOWN, LG: Brand: CONVERTORS

## (undated) DEVICE — OCCLUSIVE GAUZE STRIP,3% BISMUTH TRIBROMOPHENATE IN PETROLATUM BLEND: Brand: XEROFORM